# Patient Record
Sex: MALE | Race: WHITE | Employment: OTHER | URBAN - METROPOLITAN AREA
[De-identification: names, ages, dates, MRNs, and addresses within clinical notes are randomized per-mention and may not be internally consistent; named-entity substitution may affect disease eponyms.]

---

## 2017-01-20 ENCOUNTER — ANTI-COAG VISIT (OUTPATIENT)
Dept: PHARMACY | Facility: CLINIC | Age: 67
End: 2017-01-20

## 2017-01-20 DIAGNOSIS — Z95.2 S/P AVR (AORTIC VALVE REPLACEMENT): ICD-10-CM

## 2017-01-20 LAB — INR BLD: 3.2

## 2017-01-23 ENCOUNTER — TELEPHONE (OUTPATIENT)
Dept: CARDIOLOGY CLINIC | Age: 67
End: 2017-01-23

## 2017-01-23 RX ORDER — WARFARIN SODIUM 7.5 MG/1
TABLET ORAL
Qty: 90 TABLET | Refills: 1 | Status: SHIPPED | OUTPATIENT
Start: 2017-01-23 | End: 2017-06-14 | Stop reason: SDUPTHER

## 2017-01-30 RX ORDER — TESTOSTERONE 16.2 MG/G
GEL TRANSDERMAL
Qty: 5 BOTTLE | Refills: 1 | Status: SHIPPED | OUTPATIENT
Start: 2017-01-30 | End: 2017-05-23 | Stop reason: SDUPTHER

## 2017-02-04 ENCOUNTER — HOSPITAL ENCOUNTER (OUTPATIENT)
Dept: OTHER | Age: 67
Discharge: HOME | End: 2017-02-04
Attending: INTERNAL MEDICINE | Admitting: INTERNAL MEDICINE

## 2017-02-04 DIAGNOSIS — E03.9 ACQUIRED HYPOTHYROIDISM: ICD-10-CM

## 2017-02-04 DIAGNOSIS — E11.59 TYPE 2 DIABETES MELLITUS WITH OTHER CIRCULATORY COMPLICATION: ICD-10-CM

## 2017-02-04 DIAGNOSIS — E79.0 HYPERURICEMIA: ICD-10-CM

## 2017-02-04 DIAGNOSIS — Z95.2 S/P AVR (AORTIC VALVE REPLACEMENT): ICD-10-CM

## 2017-02-04 DIAGNOSIS — E55.9 VITAMIN D DEFICIENCY: ICD-10-CM

## 2017-02-04 DIAGNOSIS — R79.83 HOMOCYSTEINEMIA: ICD-10-CM

## 2017-02-04 DIAGNOSIS — E78.5 HYPERLIPIDEMIA WITH TARGET LDL LESS THAN 70: ICD-10-CM

## 2017-02-04 LAB
A/G RATIO: 1.4 (ref 1.1–2.2)
ALBUMIN SERPL-MCNC: 3.9 G/DL (ref 3.4–5)
ALP BLD-CCNC: 78 U/L (ref 40–129)
ALT SERPL-CCNC: 33 U/L (ref 10–40)
ANION GAP SERPL CALCULATED.3IONS-SCNC: 12 MMOL/L (ref 3–16)
AST SERPL-CCNC: 27 U/L (ref 15–37)
BILIRUB SERPL-MCNC: 0.3 MG/DL (ref 0–1)
BUN BLDV-MCNC: 15 MG/DL (ref 7–20)
CALCIUM SERPL-MCNC: 9.2 MG/DL (ref 8.3–10.6)
CHLORIDE BLD-SCNC: 106 MMOL/L (ref 99–110)
CHOLESTEROL, TOTAL: 122 MG/DL (ref 0–199)
CO2: 23 MMOL/L (ref 21–32)
CREAT SERPL-MCNC: 1.1 MG/DL (ref 0.8–1.3)
GFR AFRICAN AMERICAN: >60
GFR NON-AFRICAN AMERICAN: >60
GLOBULIN: 2.8 G/DL
GLUCOSE BLD-MCNC: 123 MG/DL (ref 70–99)
HDLC SERPL-MCNC: 33 MG/DL (ref 40–60)
HOMOCYSTEINE: 8 UMOL/L (ref 0–10)
LDL CHOLESTEROL CALCULATED: 72 MG/DL
POTASSIUM SERPL-SCNC: 4.4 MMOL/L (ref 3.5–5.1)
SODIUM BLD-SCNC: 141 MMOL/L (ref 136–145)
TOTAL PROTEIN: 6.7 G/DL (ref 6.4–8.2)
TRIGL SERPL-MCNC: 87 MG/DL (ref 0–150)
URIC ACID, SERUM: 3.4 MG/DL (ref 3.5–7.2)
VITAMIN D 25-HYDROXY: 45 NG/ML
VLDLC SERPL CALC-MCNC: 17 MG/DL

## 2017-02-05 LAB
ESTIMATED AVERAGE GLUCOSE: 137 MG/DL
HBA1C MFR BLD: 6.4 %

## 2017-02-10 ENCOUNTER — OFFICE VISIT (OUTPATIENT)
Dept: ENDOCRINOLOGY | Age: 67
End: 2017-02-10

## 2017-02-10 VITALS
OXYGEN SATURATION: 98 % | HEART RATE: 78 BPM | BODY MASS INDEX: 30.8 KG/M2 | WEIGHT: 220 LBS | HEIGHT: 71 IN | SYSTOLIC BLOOD PRESSURE: 118 MMHG | DIASTOLIC BLOOD PRESSURE: 78 MMHG

## 2017-02-10 DIAGNOSIS — E79.0 HYPERURICEMIA: ICD-10-CM

## 2017-02-10 DIAGNOSIS — E03.9 ACQUIRED HYPOTHYROIDISM: ICD-10-CM

## 2017-02-10 DIAGNOSIS — E11.59 TYPE 2 DIABETES MELLITUS WITH OTHER CIRCULATORY COMPLICATION: Primary | ICD-10-CM

## 2017-02-10 DIAGNOSIS — E78.5 HYPERLIPIDEMIA WITH TARGET LDL LESS THAN 70: ICD-10-CM

## 2017-02-10 DIAGNOSIS — E55.9 VITAMIN D DEFICIENCY: ICD-10-CM

## 2017-02-10 DIAGNOSIS — R79.83 HOMOCYSTEINEMIA: ICD-10-CM

## 2017-02-10 PROCEDURE — 99214 OFFICE O/P EST MOD 30 MIN: CPT | Performed by: INTERNAL MEDICINE

## 2017-02-10 ASSESSMENT — PATIENT HEALTH QUESTIONNAIRE - PHQ9
2. FEELING DOWN, DEPRESSED OR HOPELESS: 0
SUM OF ALL RESPONSES TO PHQ9 QUESTIONS 1 & 2: 0
1. LITTLE INTEREST OR PLEASURE IN DOING THINGS: 0
SUM OF ALL RESPONSES TO PHQ QUESTIONS 1-9: 0

## 2017-02-17 ENCOUNTER — ANTI-COAG VISIT (OUTPATIENT)
Dept: PHARMACY | Facility: CLINIC | Age: 67
End: 2017-02-17

## 2017-02-17 DIAGNOSIS — Z95.2 S/P AVR (AORTIC VALVE REPLACEMENT): ICD-10-CM

## 2017-02-17 LAB — INR BLD: 3.2

## 2017-02-22 ENCOUNTER — TELEPHONE (OUTPATIENT)
Dept: PHARMACY | Facility: CLINIC | Age: 67
End: 2017-02-22

## 2017-03-24 ENCOUNTER — ANTI-COAG VISIT (OUTPATIENT)
Dept: PHARMACY | Facility: CLINIC | Age: 67
End: 2017-03-24

## 2017-03-24 DIAGNOSIS — Z95.2 S/P AVR (AORTIC VALVE REPLACEMENT): ICD-10-CM

## 2017-03-24 LAB — INR BLD: 3

## 2017-04-08 ENCOUNTER — EMPLOYEE WELLNESS (OUTPATIENT)
Dept: OTHER | Age: 67
End: 2017-04-08

## 2017-04-08 LAB
CHOLESTEROL, TOTAL: 117 MG/DL (ref 0–199)
GLUCOSE BLD-MCNC: 104 MG/DL (ref 70–99)
HDLC SERPL-MCNC: 30 MG/DL (ref 40–60)
LDL CHOLESTEROL CALCULATED: 62 MG/DL
TRIGL SERPL-MCNC: 126 MG/DL (ref 0–150)

## 2017-04-28 ENCOUNTER — ANTI-COAG VISIT (OUTPATIENT)
Dept: PHARMACY | Facility: CLINIC | Age: 67
End: 2017-04-28

## 2017-04-28 LAB — INR BLD: 2.6

## 2017-05-13 ENCOUNTER — HOSPITAL ENCOUNTER (OUTPATIENT)
Dept: OTHER | Age: 67
Discharge: HOME | End: 2017-05-13
Attending: INTERNAL MEDICINE | Admitting: INTERNAL MEDICINE

## 2017-05-13 DIAGNOSIS — E11.59 TYPE 2 DIABETES MELLITUS WITH OTHER CIRCULATORY COMPLICATION: ICD-10-CM

## 2017-05-13 DIAGNOSIS — E78.5 HYPERLIPIDEMIA WITH TARGET LDL LESS THAN 70: ICD-10-CM

## 2017-05-13 DIAGNOSIS — E03.9 ACQUIRED HYPOTHYROIDISM: ICD-10-CM

## 2017-05-13 DIAGNOSIS — E55.9 VITAMIN D DEFICIENCY: ICD-10-CM

## 2017-05-13 DIAGNOSIS — R79.83 HOMOCYSTEINEMIA: ICD-10-CM

## 2017-05-13 DIAGNOSIS — E79.0 HYPERURICEMIA: ICD-10-CM

## 2017-05-13 LAB
A/G RATIO: 1.8 (ref 1.1–2.2)
ALBUMIN SERPL-MCNC: 4.5 G/DL (ref 3.4–5)
ALP BLD-CCNC: 66 U/L (ref 40–129)
ALT SERPL-CCNC: 27 U/L (ref 10–40)
ANION GAP SERPL CALCULATED.3IONS-SCNC: 13 MMOL/L (ref 3–16)
AST SERPL-CCNC: 21 U/L (ref 15–37)
BILIRUB SERPL-MCNC: 0.4 MG/DL (ref 0–1)
BUN BLDV-MCNC: 19 MG/DL (ref 7–20)
C-REACTIVE PROTEIN, HIGH SENSITIVITY: 0.56 MG/L (ref 0.16–3)
CALCIUM SERPL-MCNC: 9.7 MG/DL (ref 8.3–10.6)
CHLORIDE BLD-SCNC: 104 MMOL/L (ref 99–110)
CHOLESTEROL, TOTAL: 142 MG/DL (ref 0–199)
CO2: 22 MMOL/L (ref 21–32)
CREAT SERPL-MCNC: 1 MG/DL (ref 0.8–1.3)
GFR AFRICAN AMERICAN: >60
GFR NON-AFRICAN AMERICAN: >60
GLOBULIN: 2.5 G/DL
GLUCOSE BLD-MCNC: 146 MG/DL (ref 70–99)
HDLC SERPL-MCNC: 30 MG/DL (ref 40–60)
HOMOCYSTEINE: 8 UMOL/L (ref 0–10)
LDL CHOLESTEROL CALCULATED: 93 MG/DL
POTASSIUM SERPL-SCNC: 4.7 MMOL/L (ref 3.5–5.1)
SODIUM BLD-SCNC: 139 MMOL/L (ref 136–145)
TOTAL PROTEIN: 7 G/DL (ref 6.4–8.2)
TRIGL SERPL-MCNC: 95 MG/DL (ref 0–150)
VLDLC SERPL CALC-MCNC: 19 MG/DL

## 2017-05-15 LAB
ESTIMATED AVERAGE GLUCOSE: 151.3 MG/DL
HBA1C MFR BLD: 6.9 %

## 2017-05-19 ENCOUNTER — OFFICE VISIT (OUTPATIENT)
Dept: CARDIOLOGY CLINIC | Age: 67
End: 2017-05-19

## 2017-05-19 VITALS
HEART RATE: 73 BPM | DIASTOLIC BLOOD PRESSURE: 64 MMHG | SYSTOLIC BLOOD PRESSURE: 124 MMHG | HEIGHT: 71 IN | WEIGHT: 222 LBS | OXYGEN SATURATION: 95 % | BODY MASS INDEX: 31.08 KG/M2

## 2017-05-19 DIAGNOSIS — I25.10 CORONARY ARTERY DISEASE INVOLVING NATIVE CORONARY ARTERY OF NATIVE HEART WITHOUT ANGINA PECTORIS: ICD-10-CM

## 2017-05-19 DIAGNOSIS — E78.5 HYPERLIPIDEMIA WITH TARGET LDL LESS THAN 70: ICD-10-CM

## 2017-05-19 DIAGNOSIS — I10 ESSENTIAL HYPERTENSION: ICD-10-CM

## 2017-05-19 DIAGNOSIS — Z95.2 S/P AVR (AORTIC VALVE REPLACEMENT): Primary | ICD-10-CM

## 2017-05-19 DIAGNOSIS — E78.00 HYPERCHOLESTEROLEMIA: ICD-10-CM

## 2017-05-19 DIAGNOSIS — I35.0 NONRHEUMATIC AORTIC VALVE STENOSIS: ICD-10-CM

## 2017-05-19 PROCEDURE — 99214 OFFICE O/P EST MOD 30 MIN: CPT | Performed by: INTERNAL MEDICINE

## 2017-05-22 ENCOUNTER — TELEPHONE (OUTPATIENT)
Dept: CARDIOLOGY CLINIC | Age: 67
End: 2017-05-22

## 2017-05-22 RX ORDER — EXENATIDE 2 MG/.65ML
INJECTION, SUSPENSION, EXTENDED RELEASE SUBCUTANEOUS
Qty: 12 EACH | Refills: 0 | Status: SHIPPED | OUTPATIENT
Start: 2017-05-22 | End: 2017-05-23 | Stop reason: SDUPTHER

## 2017-05-23 ENCOUNTER — OFFICE VISIT (OUTPATIENT)
Dept: ENDOCRINOLOGY | Age: 67
End: 2017-05-23

## 2017-05-23 VITALS
HEIGHT: 71 IN | OXYGEN SATURATION: 96 % | WEIGHT: 222 LBS | HEART RATE: 82 BPM | DIASTOLIC BLOOD PRESSURE: 74 MMHG | BODY MASS INDEX: 31.08 KG/M2 | SYSTOLIC BLOOD PRESSURE: 132 MMHG

## 2017-05-23 DIAGNOSIS — E11.59 TYPE 2 DIABETES MELLITUS WITH OTHER CIRCULATORY COMPLICATION: Primary | ICD-10-CM

## 2017-05-23 DIAGNOSIS — E78.00 HYPERCHOLESTEROLEMIA: ICD-10-CM

## 2017-05-23 DIAGNOSIS — R79.83 HOMOCYSTEINEMIA: ICD-10-CM

## 2017-05-23 DIAGNOSIS — E29.1 HYPOGONADISM MALE: ICD-10-CM

## 2017-05-23 DIAGNOSIS — E03.9 ACQUIRED HYPOTHYROIDISM: ICD-10-CM

## 2017-05-23 DIAGNOSIS — E55.9 VITAMIN D DEFICIENCY: ICD-10-CM

## 2017-05-23 PROCEDURE — 99214 OFFICE O/P EST MOD 30 MIN: CPT | Performed by: INTERNAL MEDICINE

## 2017-05-23 RX ORDER — TESTOSTERONE 16.2 MG/G
GEL TRANSDERMAL
Qty: 5 BOTTLE | Refills: 1 | Status: SHIPPED | OUTPATIENT
Start: 2017-05-23 | End: 2019-05-29 | Stop reason: ALTCHOICE

## 2017-05-23 RX ORDER — ROSUVASTATIN CALCIUM 40 MG/1
40 TABLET, COATED ORAL EVERY EVENING
Qty: 90 TABLET | Refills: 3 | Status: SHIPPED | OUTPATIENT
Start: 2017-05-23

## 2017-05-23 RX ORDER — INSULIN GLARGINE 300 U/ML
40 INJECTION, SOLUTION SUBCUTANEOUS DAILY
Qty: 18 PEN | Refills: 3 | Status: SHIPPED | OUTPATIENT
Start: 2017-05-23

## 2017-05-24 ENCOUNTER — HOSPITAL ENCOUNTER (OUTPATIENT)
Dept: NON INVASIVE DIAGNOSTICS | Age: 67
Discharge: HOME | End: 2017-05-24
Attending: INTERNAL MEDICINE | Admitting: INTERNAL MEDICINE

## 2017-05-24 DIAGNOSIS — Z95.2 S/P AVR (AORTIC VALVE REPLACEMENT): ICD-10-CM

## 2017-05-24 DIAGNOSIS — Z95.2 PRESENCE OF PROSTHETIC HEART VALVE: ICD-10-CM

## 2017-05-24 DIAGNOSIS — I35.0 NONRHEUMATIC AORTIC VALVE STENOSIS: ICD-10-CM

## 2017-05-24 LAB
LV EF: 58 %
LVEF MODALITY: NORMAL

## 2017-06-02 ENCOUNTER — ANTI-COAG VISIT (OUTPATIENT)
Dept: PHARMACY | Facility: CLINIC | Age: 67
End: 2017-06-02

## 2017-06-02 DIAGNOSIS — Z95.2 S/P AVR (AORTIC VALVE REPLACEMENT): ICD-10-CM

## 2017-06-02 LAB — INR BLD: 3

## 2017-06-05 RX ORDER — NIACIN 500 MG/1
TABLET, EXTENDED RELEASE ORAL
Qty: 360 TABLET | Refills: 0 | Status: SHIPPED | OUTPATIENT
Start: 2017-06-05 | End: 2020-02-05

## 2017-06-07 ENCOUNTER — TELEPHONE (OUTPATIENT)
Dept: CARDIOLOGY CLINIC | Age: 67
End: 2017-06-07

## 2017-06-08 ENCOUNTER — TELEPHONE (OUTPATIENT)
Dept: ENDOCRINOLOGY | Age: 67
End: 2017-06-08

## 2017-06-13 ENCOUNTER — TELEPHONE (OUTPATIENT)
Dept: CARDIOLOGY CLINIC | Age: 67
End: 2017-06-13

## 2017-06-14 RX ORDER — WARFARIN SODIUM 7.5 MG/1
TABLET ORAL
Qty: 90 TABLET | Refills: 1 | Status: SHIPPED | OUTPATIENT
Start: 2017-06-14 | End: 2017-12-27 | Stop reason: DRUGHIGH

## 2017-06-26 RX ORDER — LEVOTHYROXINE SODIUM 0.15 MG/1
TABLET ORAL
Qty: 90 TABLET | Refills: 2 | Status: SHIPPED | OUTPATIENT
Start: 2017-06-26 | End: 2019-05-29 | Stop reason: ALTCHOICE

## 2017-06-30 ENCOUNTER — TELEPHONE (OUTPATIENT)
Dept: ENDOCRINOLOGY | Age: 67
End: 2017-06-30

## 2017-07-07 ENCOUNTER — ANTI-COAG VISIT (OUTPATIENT)
Dept: PHARMACY | Facility: CLINIC | Age: 67
End: 2017-07-07

## 2017-07-07 DIAGNOSIS — Z95.2 S/P AVR (AORTIC VALVE REPLACEMENT): ICD-10-CM

## 2017-07-07 LAB — INR BLD: 4.2

## 2017-08-11 ENCOUNTER — ANTI-COAG VISIT (OUTPATIENT)
Dept: PHARMACY | Facility: CLINIC | Age: 67
End: 2017-08-11

## 2017-08-11 DIAGNOSIS — Z95.2 S/P AVR (AORTIC VALVE REPLACEMENT): ICD-10-CM

## 2017-08-11 LAB — INR BLD: 4.9

## 2017-08-23 ENCOUNTER — ANTI-COAG VISIT (OUTPATIENT)
Dept: PHARMACY | Facility: CLINIC | Age: 67
End: 2017-08-23

## 2017-08-23 LAB — INR BLD: 2.3

## 2017-09-07 ENCOUNTER — TELEPHONE (OUTPATIENT)
Dept: ENDOCRINOLOGY | Age: 67
End: 2017-09-07

## 2017-09-12 ENCOUNTER — HOSPITAL ENCOUNTER (OUTPATIENT)
Dept: OTHER | Age: 67
Discharge: HOME | End: 2017-09-12
Attending: INTERNAL MEDICINE | Admitting: INTERNAL MEDICINE

## 2017-09-12 LAB
A/G RATIO: 1.4 (ref 1.1–2.2)
ALBUMIN SERPL-MCNC: 4 G/DL (ref 3.4–5)
ALP BLD-CCNC: 54 U/L (ref 40–129)
ALT SERPL-CCNC: 40 U/L (ref 10–40)
ANION GAP SERPL CALCULATED.3IONS-SCNC: 11 MMOL/L (ref 3–16)
AST SERPL-CCNC: 33 U/L (ref 15–37)
BILIRUB SERPL-MCNC: 0.4 MG/DL (ref 0–1)
BUN BLDV-MCNC: 25 MG/DL (ref 7–20)
CALCIUM SERPL-MCNC: 9.4 MG/DL (ref 8.3–10.6)
CHLORIDE BLD-SCNC: 106 MMOL/L (ref 99–110)
CHOLESTEROL, TOTAL: 103 MG/DL (ref 0–199)
CO2: 24 MMOL/L (ref 21–32)
CREAT SERPL-MCNC: 1.2 MG/DL (ref 0.8–1.3)
CREATININE URINE: 93.5 MG/DL (ref 39–259)
ESTIMATED AVERAGE GLUCOSE: 148.5 MG/DL
ESTRADIOL LEVEL: 18 PG/ML
GFR AFRICAN AMERICAN: >60
GFR NON-AFRICAN AMERICAN: >60
GLOBULIN: 2.8 G/DL
GLUCOSE BLD-MCNC: 95 MG/DL (ref 70–99)
HBA1C MFR BLD: 6.8 %
HDLC SERPL-MCNC: 29 MG/DL (ref 40–60)
LDL CHOLESTEROL CALCULATED: 45 MG/DL
MICROALBUMIN UR-MCNC: 4 MG/DL
MICROALBUMIN/CREAT UR-RTO: 42.8 MG/G (ref 0–30)
POTASSIUM SERPL-SCNC: 4.7 MMOL/L (ref 3.5–5.1)
SODIUM BLD-SCNC: 141 MMOL/L (ref 136–145)
T4 FREE: 1.4 NG/DL (ref 0.9–1.8)
TOTAL PROTEIN: 6.8 G/DL (ref 6.4–8.2)
TRIGL SERPL-MCNC: 143 MG/DL (ref 0–150)
TSH SERPL DL<=0.05 MIU/L-ACNC: 1.64 UIU/ML (ref 0.27–4.2)
VITAMIN D 25-HYDROXY: 51.4 NG/ML
VLDLC SERPL CALC-MCNC: 29 MG/DL

## 2017-09-15 LAB
SEX HORMONE BINDING GLOBULIN: 18 NMOL/L (ref 11–80)
TESTOSTERONE FREE-NONMALE: 94 PG/ML (ref 47–244)
TESTOSTERONE TOTAL: 348 NG/DL (ref 220–1000)

## 2017-09-28 ENCOUNTER — TELEPHONE (OUTPATIENT)
Dept: PHARMACY | Facility: CLINIC | Age: 67
End: 2017-09-28

## 2017-09-28 ENCOUNTER — ANTI-COAG VISIT (OUTPATIENT)
Dept: PHARMACY | Facility: CLINIC | Age: 67
End: 2017-09-28

## 2017-09-28 DIAGNOSIS — Z95.2 S/P AVR (AORTIC VALVE REPLACEMENT): ICD-10-CM

## 2017-09-28 LAB — INR BLD: 4.9

## 2017-10-05 ENCOUNTER — OFFICE VISIT (OUTPATIENT)
Dept: PULMONOLOGY | Age: 67
End: 2017-10-05

## 2017-10-05 VITALS
BODY MASS INDEX: 30.52 KG/M2 | DIASTOLIC BLOOD PRESSURE: 78 MMHG | SYSTOLIC BLOOD PRESSURE: 126 MMHG | OXYGEN SATURATION: 98 % | HEIGHT: 71 IN | TEMPERATURE: 98.9 F | RESPIRATION RATE: 16 BRPM | HEART RATE: 80 BPM | WEIGHT: 218 LBS

## 2017-10-05 DIAGNOSIS — G47.10 HYPERSOMNIA WITH SLEEP APNEA: ICD-10-CM

## 2017-10-05 DIAGNOSIS — G47.30 HYPERSOMNIA WITH SLEEP APNEA: ICD-10-CM

## 2017-10-05 DIAGNOSIS — G47.33 OSA ON CPAP: Primary | ICD-10-CM

## 2017-10-05 DIAGNOSIS — Z23 NEED FOR INFLUENZA VACCINATION: ICD-10-CM

## 2017-10-05 DIAGNOSIS — Z99.89 OSA ON CPAP: Primary | ICD-10-CM

## 2017-10-05 PROCEDURE — 90688 IIV4 VACCINE SPLT 0.5 ML IM: CPT | Performed by: INTERNAL MEDICINE

## 2017-10-05 PROCEDURE — 99213 OFFICE O/P EST LOW 20 MIN: CPT | Performed by: INTERNAL MEDICINE

## 2017-10-05 PROCEDURE — 90471 IMMUNIZATION ADMIN: CPT | Performed by: INTERNAL MEDICINE

## 2017-10-05 ASSESSMENT — SLEEP AND FATIGUE QUESTIONNAIRES
HOW LIKELY ARE YOU TO NOD OFF OR FALL ASLEEP WHILE SITTING AND TALKING TO SOMEONE: 0
HOW LIKELY ARE YOU TO NOD OFF OR FALL ASLEEP WHEN YOU ARE A PASSENGER IN A CAR FOR AN HOUR WITHOUT A BREAK: 1
HOW LIKELY ARE YOU TO NOD OFF OR FALL ASLEEP WHILE SITTING QUIETLY AFTER LUNCH WITHOUT ALCOHOL: 1
HOW LIKELY ARE YOU TO NOD OFF OR FALL ASLEEP WHILE SITTING AND READING: 2
HOW LIKELY ARE YOU TO NOD OFF OR FALL ASLEEP IN A CAR, WHILE STOPPED FOR A FEW MINUTES IN TRAFFIC: 0
HOW LIKELY ARE YOU TO NOD OFF OR FALL ASLEEP WHILE WATCHING TV: 2
HOW LIKELY ARE YOU TO NOD OFF OR FALL ASLEEP WHILE SITTING INACTIVE IN A PUBLIC PLACE: 1
ESS TOTAL SCORE: 9
HOW LIKELY ARE YOU TO NOD OFF OR FALL ASLEEP WHILE LYING DOWN TO REST IN THE AFTERNOON WHEN CIRCUMSTANCES PERMIT: 2
NECK CIRCUMFERENCE (INCHES): 17

## 2017-10-05 NOTE — MR AVS SNAPSHOT
estimate of body fat, calculated from your height and weight. The higher your BMI, the greater your risk of heart disease, high blood pressure, type 2 diabetes, stroke, gallstones, arthritis, sleep apnea, and certain cancers. BMI is not perfect. It may overestimate body fat in athletes and people who are more muscular. Even a small weight loss (between 5 and 10 percent of your current weight) by decreasing your calorie intake and becoming more physically active will help lower your risk of developing or worsening diseases associated with obesity. Learn more at: iovationco.uk             Medications and Orders      Your Current Medications Are              levothyroxine (SYNTHROID) 150 MCG tablet TAKE 1 TABLET DAILY    warfarin (COUMADIN) 7.5 MG tablet Take 1 tablets daily as directed.     niacin (NIASPAN) 500 MG extended release tablet TAKE 4 TABLETS AT BEDTIME WITH A SMALL SNACK 30 MINUTES AFTER ASPIRIN    PT/INR Testing Monitor KIT 1 each by Does not apply route once a week Coumadin Therapy for Aortic Valve replacement    PT/INR Test STRP 1 each by In Vitro route once a week Coumadin Therapy for Aortic Valve replacement    Exenatide (BYDUREON) 2 MG PEN Inject 1 Pen into the skin once a week    ANDROGEL PUMP 20.25 MG/ACT (1.62%) GEL gel Place 3 ACT onto skin daily, please, dispense 5 bottles for 90 day suppy    TOUJEO SOLOSTAR 300 UNIT/ML injection pen Inject 40 Units into the skin daily    Empagliflozin-Metformin HCl ER 12.5-1000 MG TB24 Take 2 tablets by mouth daily    rosuvastatin (CRESTOR) 40 MG tablet Take 1 tablet by mouth every evening    ZETIA 10 MG tablet TAKE 1 TABLET DAILY    allopurinol (ZYLOPRIM) 300 MG tablet TAKE 1 TABLET DAILY    EASY TOUCH PEN NEEDLES 31G X 8 MM MISC USE 3 NEEDLES PER DAY AS DIRECTED    acetaminophen (TYLENOL) 325 MG tablet Take 2 tablets by mouth every 4 hours as needed for Pain    FOLBIC 2.5-25-2 MG TABS TAKE 1 TABLET DAILY ONE TOUCH ULTRA TEST strip TEST 4 TIMES DAILY    tiZANidine (ZANAFLEX) 4 MG tablet 1 po hs    ONETOUCH DELICA LANCETS 54V MISC USE 4 TIMES DAILY    Cholecalciferol (VITAMIN D3) 5000 UNITS TABS Take 5,000 Units by mouth daily    b complex vitamins capsule Take 1 capsule by mouth daily. aspirin 81 MG EC tablet Take 81 mg by mouth nightly.     metoprolol succinate (TOPROL XL) 25 MG extended release tablet Take 3 tablets by mouth 2 times daily 3 tabs po BID    losartan (COZAAR) 25 MG tablet Take 1 tablet by mouth daily      Allergies              Ciprofloxacin Hives      We Ordered/Performed the following           INFLUENZA, QUADV, 3 YRS AND OLDER, IM, MDV, 0.5ML (Chela Pawel)          Additional Information        Basic Information     Date Of Birth Sex Race Ethnicity Preferred Language    1950 Male White Non-/Non  English      Problem List as of 10/5/2017  Date Reviewed: 5/23/2017                Nonrheumatic aortic valve stenosis    Bicuspid aortic valve    Type 2 diabetes mellitus with circulatory disorder (HCC)    Essential hypertension    S/P AVR (aortic valve replacement)    Pre-op chest exam    Coronary artery disease involving native coronary artery of native heart with angina pectoris (HCC)    Benign non-nodular prostatic hyperplasia without lower urinary tract symptoms    Coronary artery disease due to lipid rich plaque    Hyperuricemia    Hypoglycemia    Hyperlipidemia with target LDL less than 70    Colitis    Acute renal failure (ARF) ([facility] [address]    [facility] DJD(carpometacarpal degenerative joint disease), localized primary    Hypogonadism male    Homocysteinemia ([facility] [address]    Vitamin D deficiency    Hypothyroid    Kidney stones    Hypercholesterolemia      Immunizations as of 10/5/2017     Name Date    Influenza Virus Vaccine 11/1/2015, 10/4/2012, 10/4/2011    Influenza, Flavia Carter, 3 Years and older, IM 10/25/2016    Pneumococcal Polysaccharide ([id number]) 10/4/2012 Tdap (Boostrix, Adacel) 4/3/2015    Zoster 4/3/2015      Preventive Care        Date Due    Hepatitis C screening is recommended for all adults regardless of risk factors born between [facility] at least once (lifetime) who have never been tested. 1950    HIV screening is recommended for all people regardless of risk factors  aged 15-65 years at least once (lifetime) who have never been HIV tested. 12/12/1967    Eye Exam By An Eye Doctor 7/1/2015    Diabetic Foot Exam 8/5/2017    Yearly Flu Vaccine (1) 9/1/2017    Hemoglobin A1C (Test For Long-Term Glucose Control) 9/12/2018    Urine Check For Kidney Problems 9/12/2018    Cholesterol Screening 9/12/2018    Colonoscopy 9/24/2024    Tetanus Combination Vaccine (2 - Td) 4/3/2025            [employer] Signup           Our records indicate that you have an active [employer] account. You can view your After Visit Summary by going to [email or web address] and logging in with your [employer] username and password. If you don't have a [employer] username and password but a parent or guardian has access to your record, the parent or guardian should login with their own [employer] username and password and access your record to view the After Visit Summary. Additional Information  If you have questions, please contact the physician practice where you receive care. Remember, [employer] is NOT to be used for urgent needs. For medical emergencies, dial 911. For questions regarding your [employer] account call [phone]. If you have a clinical question, please call your doctor's office.

## 2017-10-12 ENCOUNTER — ANTI-COAG VISIT (OUTPATIENT)
Dept: PHARMACY | Facility: CLINIC | Age: 67
End: 2017-10-12

## 2017-10-12 LAB — INR BLD: 3.4

## 2017-10-12 NOTE — PROGRESS NOTES
Mr. Selam Arrieta is a 61 y.o.  male with history of Heart Valve Replacement who presents today for anticoagulation monitoring and adjustment. Patient verifies current dosing regimen. Patient denies s/s bleeding/bruising/swelling/SOB. No blood in urine or stool. No dietary changes. No changes in medication/OTC agents/Herbals. No change in alcohol use. No missed doses. No Procedures scheduled in the future at this time. Lab Results   Component Value Date    INR 3.4 10/12/2017    INR 4.9 09/28/2017    INR 2.3 08/23/2017     Will give only 3.75mg (1/2 tab) today instead of 7.5mg then will continue current dose. Coumadin dose: 7.5mg daily except 3.75mg QMon & Fri. Recheck INR in 4 weeks. Patient reminded to call the Anticoagulation Clinic with any signs or symptoms of bleeding or with any medication changes. Patient given instructions utilizing the teach back method. After visit summary printed and reviewed with patient.       Medications reviewed and updated on home medication list Yes  Warfarin dose updated on patient home medication list  Yes

## 2017-10-26 ENCOUNTER — CLINICAL DOCUMENTATION (OUTPATIENT)
Dept: PHARMACY | Facility: CLINIC | Age: 67
End: 2017-10-26

## 2017-11-01 ENCOUNTER — HOSPITAL ENCOUNTER (OUTPATIENT)
Dept: OTHER | Age: 67
Discharge: HOME | End: 2017-11-30
Attending: INTERNAL MEDICINE | Admitting: INTERNAL MEDICINE

## 2017-11-06 ENCOUNTER — TELEPHONE (OUTPATIENT)
Dept: PHARMACY | Facility: CLINIC | Age: 67
End: 2017-11-06

## 2017-11-06 ENCOUNTER — OFFICE VISIT (OUTPATIENT)
Dept: FAMILY MEDICINE CLINIC | Age: 67
End: 2017-11-06

## 2017-11-06 VITALS
HEIGHT: 71 IN | RESPIRATION RATE: 18 BRPM | DIASTOLIC BLOOD PRESSURE: 76 MMHG | HEART RATE: 78 BPM | SYSTOLIC BLOOD PRESSURE: 118 MMHG | BODY MASS INDEX: 30.38 KG/M2 | WEIGHT: 217 LBS

## 2017-11-06 DIAGNOSIS — R31.29 OTHER MICROSCOPIC HEMATURIA: ICD-10-CM

## 2017-11-06 DIAGNOSIS — N41.0 ACUTE PROSTATITIS: Primary | ICD-10-CM

## 2017-11-06 DIAGNOSIS — I10 ESSENTIAL HYPERTENSION: ICD-10-CM

## 2017-11-06 LAB
BILIRUBIN, POC: ABNORMAL
BLOOD URINE, POC: ABNORMAL
CLARITY, POC: ABNORMAL
COLOR, POC: YELLOW
GLUCOSE URINE, POC: 500
KETONES, POC: ABNORMAL
LEUKOCYTE EST, POC: ABNORMAL
NITRITE, POC: ABNORMAL
PH, POC: 6
PROTEIN, POC: ABNORMAL
SPECIFIC GRAVITY, POC: 1.01
UROBILINOGEN, POC: 0.2

## 2017-11-06 PROCEDURE — 99214 OFFICE O/P EST MOD 30 MIN: CPT | Performed by: FAMILY MEDICINE

## 2017-11-06 PROCEDURE — 81002 URINALYSIS NONAUTO W/O SCOPE: CPT | Performed by: FAMILY MEDICINE

## 2017-11-06 RX ORDER — SULFAMETHOXAZOLE AND TRIMETHOPRIM 800; 160 MG/1; MG/1
1 TABLET ORAL 2 TIMES DAILY
Qty: 56 TABLET | Refills: 0 | Status: SHIPPED | OUTPATIENT
Start: 2017-11-06 | End: 2017-11-06 | Stop reason: SDUPTHER

## 2017-11-06 RX ORDER — SULFAMETHOXAZOLE AND TRIMETHOPRIM 800; 160 MG/1; MG/1
1 TABLET ORAL 2 TIMES DAILY
Qty: 56 TABLET | Refills: 0 | Status: SHIPPED | OUTPATIENT
Start: 2017-11-06 | End: 2017-12-04

## 2017-11-06 NOTE — PROGRESS NOTES
sounds normal. No respiratory distress. He has no wheezes. He has no rhonchi. He has no rales. Abdominal: Soft. Bowel sounds are normal. He exhibits no distension. There is no tenderness. Genitourinary:   Genitourinary Comments: Prostate normal size w/o nodule but boggy, nontender   Musculoskeletal: He exhibits no edema. Neurological: He is alert. Skin: Skin is intact. No rash noted. No erythema. Psychiatric: He has a normal mood and affect. Assessment:      1. Acute prostatitis     2. Essential hypertension     3.  Other microscopic hematuria             Plan:      F/u endo/ cardio/ pulm - on cpap for wale  inr f/u w/ hospital - stable AV valve replacement  Kidney stones rare - push water intake  Most problems managed by endo    Bactrim ds bid for 28 days - se d/w pt  Probiotic encouraged  Recheck ua / f/u at end of course - f/u sooner prn  ua showed sm blood and 500 glucose - o/w normal  Check ucx  Push water/ avoid bladder irritants  F/u prn

## 2017-11-06 NOTE — TELEPHONE ENCOUNTER
Patient put on Bactrim BID for 28 days  Will need weekly dose reduction  Coming in for appt on Thursday  Having patient take 1/2 tablet on Wednesday this week prior to appointment. Further adjustment to be done at appointment for the 4 week duration.

## 2017-11-08 LAB — URINE CULTURE, ROUTINE: NORMAL

## 2017-11-09 ENCOUNTER — ANTI-COAG VISIT (OUTPATIENT)
Dept: PHARMACY | Facility: CLINIC | Age: 67
End: 2017-11-09

## 2017-11-09 DIAGNOSIS — Z95.2 S/P AVR (AORTIC VALVE REPLACEMENT): ICD-10-CM

## 2017-11-09 LAB — INR BLD: 2.6

## 2017-11-15 ENCOUNTER — ANTI-COAG VISIT (OUTPATIENT)
Dept: PHARMACY | Facility: CLINIC | Age: 67
End: 2017-11-15

## 2017-11-15 LAB — INR BLD: 3.6

## 2017-11-15 NOTE — PROGRESS NOTES
Mr. Rolly Baron is a 61 y.o.  male with history of Heart Valve Replacement who presents today for anticoagulation monitoring and adjustment. Patient verifies current dosing regimen. Patient denies s/s bleeding/bruising/swelling/SOB. No blood in urine or stool. No dietary changes. No changes in medication/OTC agents/Herbals. No change in alcohol use. No missed doses. No Procedures scheduled in the future at this time. Lab Results   Component Value Date    INR 3.6 11/15/2017    INR 2.6 11/09/2017    INR 3.4 10/12/2017       He started a 28 day course of Bactrim last week. He says that his last day will be 12-4-17. We reduced his warfarin dose in anticipation of an increase in his INR due to the interaction with the BActrim. His INR is 3.6 today. We will continue to alternate warfarin 3.75mg and 7.5mg every other day while on the Bactrim. He will call with any bruising or bleeding issues. He will retrun in 2 weeks for his next INR. Of note, he will be leaving for a trip to [facility] shortly after his next appointment. Coumadin dose: 3.75mg alternating with 7.5mg while on the Bactrim. Recheck INR in 2 weeks. Patient reminded to call the Anticoagulation Clinic with any signs or symptoms of bleeding or with any medication changes. Patient given instructions utilizing the teach back method. After visit summary printed and reviewed with patient.

## 2017-11-27 ENCOUNTER — PATIENT MESSAGE (OUTPATIENT)
Dept: FAMILY MEDICINE CLINIC | Age: 67
End: 2017-11-27

## 2017-11-27 RX ORDER — TAMSULOSIN HYDROCHLORIDE 0.4 MG/1
0.4 CAPSULE ORAL NIGHTLY
Qty: 30 CAPSULE | Refills: 0 | Status: SHIPPED | OUTPATIENT
Start: 2017-11-27 | End: 2018-02-07 | Stop reason: ALTCHOICE

## 2017-11-28 ENCOUNTER — OFFICE VISIT (OUTPATIENT)
Dept: CARDIOLOGY CLINIC | Age: 67
End: 2017-11-28

## 2017-11-28 VITALS
HEIGHT: 71 IN | OXYGEN SATURATION: 96 % | DIASTOLIC BLOOD PRESSURE: 62 MMHG | SYSTOLIC BLOOD PRESSURE: 116 MMHG | BODY MASS INDEX: 29.12 KG/M2 | HEART RATE: 81 BPM | WEIGHT: 208 LBS

## 2017-11-28 DIAGNOSIS — I35.0 NONRHEUMATIC AORTIC VALVE STENOSIS: Primary | ICD-10-CM

## 2017-11-28 PROCEDURE — 99214 OFFICE O/P EST MOD 30 MIN: CPT | Performed by: INTERNAL MEDICINE

## 2017-11-28 PROCEDURE — 93000 ELECTROCARDIOGRAM COMPLETE: CPT | Performed by: INTERNAL MEDICINE

## 2017-11-28 NOTE — PROGRESS NOTES
[facility]      Cardiology follow up    Mirlande Diaz  1950 November 28, 2017    CC: AVR     HPI:  Dear Doctor Dr. Hemant Parra, It was our pleasure to evaluate your patient today during their recent visit to our clinic. As you know Elodia Wiggins is a 63 y.o. male with PMH of Bicuspid Aortic valve, hypercholesterolemia, hypertension, type II diabetes and hypothyroid, vitamin D deficiency, who is being seen today as a follow up. He immediately reports that he is doing well. Walking at home for exercise on the treadmill, yard work. No cardiac complaints. Patient denies any symptoms of chest pain, pressure, tightness, TOLENTINO, shortness of breath at rest, nausea, vomiting, near syncope, syncope, heart racing, palpitations, dizziness, lightheaded, PND, orthopnea, bilateral lower extremities pain, cramping or fatigue. No abnormal bruising or bleeding. Compliant with medical therapy and tolerating.        Past Medical History:   Diagnosis Date    Aortic stenosis 9/2013    moderate    Arthritis     Asthma     BRONCHIAL ASTHMA  AS A CHILD    Deviated septum     Diabetes mellitus type II 2000    Dr. Dm Milton thyroiditis dx 1984    medical treatment    Homocysteinemia ([facility] [address]     Hyperlipidemia     Hypertension     Hypertriglyceridemia     Hypogonadism male     topical testosterone     Hypothyroid 12/9/2011    Kidney stone 1990s    Passed on own and also surgical excision     Murmur since 2005    Nasal polyps     Osteoarthritis     Right thumb     Prolonged emergence from general anesthesia     Sleep apnea 2005    CPAP nightly    Wears glasses      Past Surgical History:   Procedure Laterality Date    AORTIC VALVE REPLACEMENT  09/14/2016    21mm [facility]    COLECTOMY  1996    perforated bowel: colonoscopy    COLONOSCOPY  9/24/2014; 2004    x2 normal colonoscopies/ perforated bowel    CYST REMOVAL      hairy nevus left biceps as child    KNEE SURGERY Left 2004 torn meniscus     NASAL POLYP SURGERY  1989, 1987    OTHER SURGICAL HISTORY  1990s    kidney stone removal     ROTATOR CUFF REPAIR Right 2009     Family History   Problem Relation Age of Onset    Heart Disease Father     Kidney Disease Father      was on HD     Arthritis Father     High Blood Pressure Father     High Cholesterol Mother     Stroke Mother     Diabetes Brother     Other Brother     High Cholesterol Brother     Diabetes Other      aunts, uncles/ grandparents    Heart Disease Maternal Grandfather     Cancer Paternal Grandmother      type unknown. Social History   Substance Use Topics    Smoking status: Never Smoker    Smokeless tobacco: Never Used    Alcohol use 1.2 oz/week     1 Shots of liquor, 1 Standard drinks or equivalent per week      Comment: 1 per week - is rare        Allergies   Allergen Reactions    Ciprofloxacin Hives     Current Outpatient Prescriptions   Medication Sig Dispense Refill    tamsulosin (FLOMAX) 0.4 MG capsule Take 1 capsule by mouth nightly 30 capsule 0    sulfamethoxazole-trimethoprim (BACTRIM DS) 800-160 MG per tablet Take 1 tablet by mouth 2 times daily for 28 days 56 tablet 0    levothyroxine (SYNTHROID) 150 MCG tablet TAKE 1 TABLET DAILY 90 tablet 2    warfarin (COUMADIN) 7.5 MG tablet Take 1 tablets daily as directed.  (Patient taking differently: 7.5 mg 7.5mg every-other-day alternating with  3.75mg every-other-day or as directed by [facility] anticoagulation service) 90 tablet 1    niacin (NIASPAN) 500 MG extended release tablet TAKE 4 TABLETS AT BEDTIME WITH A SMALL SNACK 30 MINUTES AFTER ASPIRIN 360 tablet 0    PT/INR Testing Monitor KIT 1 each by Does not apply route once a week Coumadin Therapy for Aortic Valve replacement 1 kit 0    PT/INR Test STRP 1 each by In Vitro route once a week Coumadin Therapy for Aortic Valve replacement 25 strip 1    Exenatide (BYDUREON) 2 MG PEN Inject 1 Pen into the skin once a week 12 each 2    ANDROGEL PUMP 20.25 MG/ACT (1.62%) GEL gel Place 3 ACT onto skin daily, please, dispense 5 bottles for 90 day suppy 5 Bottle 1    TOUJEO SOLOSTAR 300 UNIT/ML injection pen Inject 40 Units into the skin daily 18 Pen 3    Empagliflozin-Metformin HCl ER 12.5-1000 MG TB24 Take 2 tablets by mouth daily 180 tablet 3    rosuvastatin (CRESTOR) 40 MG tablet Take 1 tablet by mouth every evening 90 tablet 3    ZETIA 10 MG tablet TAKE 1 TABLET DAILY 90 tablet 2    allopurinol (ZYLOPRIM) 300 MG tablet TAKE 1 TABLET DAILY 90 tablet 3    metoprolol succinate (TOPROL XL) 25 MG extended release tablet Take 3 tablets by mouth 2 times daily 3 tabs po  tablet 3    losartan (COZAAR) 25 MG tablet Take 1 tablet by mouth daily 90 tablet 3    EASY TOUCH PEN NEEDLES 31G X 8 MM MISC USE 3 NEEDLES PER DAY AS DIRECTED 300 each 3    acetaminophen (TYLENOL) 325 MG tablet Take 2 tablets by mouth every 4 hours as needed for Pain 120 tablet 3    FOLBIC 2.5-25-2 MG TABS TAKE 1 TABLET DAILY 90 tablet 3    ONE TOUCH ULTRA TEST strip TEST 4 TIMES DAILY 400 strip 3    tiZANidine (ZANAFLEX) 4 MG tablet 1 po hs (Patient taking differently: as needed 1 po hs) 30 tablet 0    ONETOUCH DELICA LANCETS 99D MISC USE 4 TIMES DAILY 400 each 3    Cholecalciferol (VITAMIN D3) 5000 UNITS TABS Take 5,000 Units by mouth daily      b complex vitamins capsule Take 1 capsule by mouth daily.  aspirin 81 MG EC tablet Take 81 mg by mouth nightly. No current facility-administered medications for this visit.         Review of Systems:    Constitutional: negative  Eyes: negative  Ears, nose, mouth, throat, and face: negative  Respiratory: negative  Cardiovascular: negative  Gastrointestinal: negative  Genitourinary:negative  Integument/breast: negative  Hematologic/lymphatic: negative  Musculoskeletal:negative  Neurological: negative  Behavioral/Psych: negative  Endocrine: negative  Allergic/Immunologic: negative     Physical Exam:   /62 (Site: Right Arm, Position: Sitting)   Pulse 81   Ht 5' 11\" (1.803 m)   Wt 208 lb (94.3 kg) Comment: shoes on  SpO2 96%   BMI 29.01 kg/m²   Wt Readings from Last 3 Encounters:   11/28/17 208 lb (94.3 kg)   11/06/17 217 lb (98.4 kg)   10/05/17 218 lb (98.9 kg)       Constitutional: He is oriented to person, place, and time. He appears well-developed and well-nourished. In no acute distress. Head: Normocephalic and atraumatic. Pupils equal and round. Neck: Neck supple. No JVP or carotid bruit appreciated. No mass and no thyromegaly present. No lymphadenopathy present. Cardiovascular: Normal rate. Normal heart sounds. Exam reveals no gallop and no friction rub. No murmur heard. Pulmonary/Chest: Effort normal and breath sounds normal. No respiratory distress. He has no wheezes, rhonchi or rales. Abdominal: Soft, non-tender. Bowel sounds are normal. He exhibits no organomegaly, mass or bruit. Extremities: No edema, cyanosis, or clubbing. Pulses are 2+ radial/dorsalis pedis/posterior tibial/carotid bilaterally. Neurological: Awake  alert and orientedNo gross cranial nerve deficit. Coordination normal.     Skin: Skin is warm and dry. There is no rash or diaphoresis. Psychiatric: He has a normal mood and affect. His speech is normal and behavior is normal.     Lab Review:   Lab Results   Component Value Date    TRIG 143 09/12/2017    HDL 29 09/12/2017    HDL 26 04/27/2012    LDLCALC 45 09/12/2017    LABVLDL 29 09/12/2017    TC 92 9/12/16  Lab Results   Component Value Date    HGB 9.1 09/21/2016    HCT 28.9 09/21/2016     Lab Results   Component Value Date     09/12/2017      Lab Results   Component Value Date    K 4.7 09/12/2017     Lab Results   Component Value Date    BUN 25 09/12/2017    CREATININE 1.2 09/12/2017     EKG Interpretation: 11/28/17 SR, 82 bpm     Image Review:    ECHO:6/22/16  LVH with normal systolic function. EF    60%.  Grade I Diastolic dysfunction.  [place] mitral regurgitation is present.   The aortic valve area is calculated at .81 cm2 with a maximum pressure   gradient of 66 mmHg and a mean pressure gradient of 44 mmHg. This is c/w   severe aortic stenosis.   Trivial tricuspid regurgitation.   ormal right ventricular size and function. [facility]: 1/8/16  1. Moderate to severe aortic valve stenosis. 2. Moderate left main disease with 40% stenosis of the ostium of the left  Main. Fractional flow reserve of the ostium of the left main was done. It was  0.86.  3. Elevated left-sided pressures. [facility]:8/31/16  ANGIOGRAPHIC FINDINGS:  1. The left main coronary artery comes from the left coronary cusp, giving  rise to the left anterior descending artery, left circumflex artery. Ostial  left main has 40% stenosis. 2. The intravascular ultrasound did not reveal any stenotic lesion of left  Main. The LM area was calculated to be at least 20 mm square. The Fractional Flow Reserve was 0.83. 3. The left anterior descending artery comes from the left main coronary  artery. It is patent without any significant stenosis. 4. The left circumflex comes from the left main coronary artery. It is  patent, giving rise to several obtuse marginal without any stenosis. Christel Forrest 6. The right coronary artery is nondominant and small caliber with 50 to 60%  stenosis in the midportion. 7. Aortic valve has 40 to 45 mmHg gradient. SUMMARY: Severe aortic valve stenosis. Stress Study:9/1/16   Pharmacological Stress/MPI Results:        1. Technically a satisfactory study.    2. Normal pharmacological stress portion of the study.    3. No evidence of Ischemia by Myocardial Perfusion Imaging.    4. Gated Study shows normal LV size and Systolic function; EF is 49 %. Carotid Duplex:9/12/16  Impressions   Right Impression   The right internal carotid artery appears to have a 1-15% diameter reducing   stenosis based on velocity criteria.    The right vertebral artery demonstrates normal antegrade

## 2017-11-29 ENCOUNTER — ANTI-COAG VISIT (OUTPATIENT)
Dept: PHARMACY | Facility: CLINIC | Age: 67
End: 2017-11-29

## 2017-11-29 LAB — INTERNATIONAL NORMALIZATION RATIO, POC: 3.4

## 2017-12-01 ENCOUNTER — HOSPITAL ENCOUNTER (OUTPATIENT)
Dept: OTHER | Age: 67
Discharge: HOME | End: 2017-12-31
Attending: INTERNAL MEDICINE | Admitting: INTERNAL MEDICINE

## 2017-12-14 ENCOUNTER — ANTI-COAG VISIT (OUTPATIENT)
Dept: PHARMACY | Facility: CLINIC | Age: 67
End: 2017-12-14

## 2017-12-14 DIAGNOSIS — Z95.2 S/P AVR (AORTIC VALVE REPLACEMENT): ICD-10-CM

## 2017-12-14 LAB — INTERNATIONAL NORMALIZATION RATIO, POC: 2

## 2017-12-15 ENCOUNTER — OFFICE VISIT (OUTPATIENT)
Dept: FAMILY MEDICINE CLINIC | Age: 67
End: 2017-12-15

## 2017-12-15 VITALS
SYSTOLIC BLOOD PRESSURE: 120 MMHG | BODY MASS INDEX: 29.12 KG/M2 | HEART RATE: 82 BPM | DIASTOLIC BLOOD PRESSURE: 78 MMHG | WEIGHT: 208 LBS | RESPIRATION RATE: 14 BRPM | HEIGHT: 71 IN

## 2017-12-15 DIAGNOSIS — N41.0 ACUTE PROSTATITIS: ICD-10-CM

## 2017-12-15 DIAGNOSIS — R31.9 HEMATURIA, UNSPECIFIED TYPE: ICD-10-CM

## 2017-12-15 DIAGNOSIS — R30.0 DYSURIA: Primary | ICD-10-CM

## 2017-12-15 LAB
BILIRUBIN, POC: ABNORMAL
BLOOD URINE, POC: ABNORMAL
CLARITY, POC: CLEAR
COLOR, POC: YELLOW
GLUCOSE URINE, POC: ABNORMAL
KETONES, POC: ABNORMAL
LEUKOCYTE EST, POC: ABNORMAL
NITRITE, POC: ABNORMAL
PH, POC: 6
PROTEIN, POC: ABNORMAL
SPECIFIC GRAVITY, POC: 1.02
UROBILINOGEN, POC: 0.2

## 2017-12-15 PROCEDURE — 99213 OFFICE O/P EST LOW 20 MIN: CPT | Performed by: FAMILY MEDICINE

## 2017-12-15 PROCEDURE — 81002 URINALYSIS NONAUTO W/O SCOPE: CPT | Performed by: FAMILY MEDICINE

## 2017-12-15 NOTE — PROGRESS NOTES
Subjective:      Patient ID: Kenyatta Winston is a 74 y.o. male. HPI  Chief Complaint   Patient presents with    Dysuria     FOLLOW UP TO URINARY ISSUES PAIN PRESSURE NOT BEING ABLE TO EMPTY BLADDER HE IS NOT LONGER HAVING ANY OF THESE SX      BP Readings from Last 3 Encounters:   12/15/17 120/78   11/28/17 116/62   11/06/17 118/76     Pulse Readings from Last 3 Encounters:   12/15/17 82   11/28/17 81   11/06/17 78     Wt Readings from Last 3 Encounters:   12/15/17 208 lb (94.3 kg)   11/28/17 208 lb (94.3 kg)   11/06/17 217 lb (98.4 kg)       Per cardiology 2 weeks ago:  Severe Aortic Stenosis Bicuspid Aortic valve with no evidence of aortic aneurysm  S/p S/p AVR with 21mm [facility] per Dr. Deandra Haq. Champ Umana 9/14/16  Doing well  No murmur  PT/INR [facility] I  Echo 5/24/17 wnl and will repeat next year   Walking for exercise, yard work      CAD: Moderate left main stenosis. No angina. Continue ASA, Statin, Toprol XL 25 mg daily. Walk daily for aerobic exercise. Strict lipid management. repeat stress study next year.      HTN: Stable.      Hyperlipidemia: 9/12/17 lipid profile with an LDL-c 45. On Zetia, niacin and crestor. Continue high potency statin therapy. He does follow a specialist-Dr. Mireya Tomas      DM: Managed per Dr. Mireya Tomas      Hypothyroid: Managed per PCP. Seen here 11/06 and tx'd w/ bactrim for 4 weeks. ua today concentrated o/w normal w/o blood or signs of infection. No significant changes. More labs per dr. Essie Chaparro tomorrow. bs readings fairly stable - high today 125 -usually around 100. Eating differently to lose weight -on adipex. Just got back from cruise. Limited pop  Review of Systems    Objective:   Physical Exam   Constitutional: He is oriented to person, place, and time. He appears well-developed and well-nourished. No distress. Eyes: No scleral icterus. Pulmonary/Chest: Effort normal.   Musculoskeletal: He exhibits no edema.    Neurological: He is alert and oriented to person, place, and time. Skin: Skin is warm and dry. Psychiatric: He has a normal mood and affect. Assessment:      1. Dysuria     2. Hematuria, unspecified type     3.  Acute prostatitis             Plan:      F/u yearly/ prn  Urinary sx cleared - ua reviewed w/ pt  As well as plan per endo and cardio  No other concerns presently   Stay hydrated encouraged - drinks mostly water

## 2017-12-16 ENCOUNTER — HOSPITAL ENCOUNTER (OUTPATIENT)
Dept: OTHER | Age: 67
Discharge: HOME | End: 2017-12-16
Attending: INTERNAL MEDICINE | Admitting: INTERNAL MEDICINE

## 2017-12-16 LAB
CHOLESTEROL, TOTAL: 106 MG/DL (ref 0–199)
HDLC SERPL-MCNC: 31 MG/DL (ref 40–60)
HOMOCYSTEINE: 12 UMOL/L (ref 0–10)
LDL CHOLESTEROL CALCULATED: 46 MG/DL
TRIGL SERPL-MCNC: 144 MG/DL (ref 0–150)
VLDLC SERPL CALC-MCNC: 29 MG/DL

## 2017-12-17 LAB
ESTIMATED AVERAGE GLUCOSE: 131.2 MG/DL
HBA1C MFR BLD: 6.2 %

## 2017-12-27 ENCOUNTER — ANTI-COAG VISIT (OUTPATIENT)
Dept: PHARMACY | Facility: CLINIC | Age: 67
End: 2017-12-27

## 2017-12-27 DIAGNOSIS — Z95.2 S/P AVR (AORTIC VALVE REPLACEMENT): ICD-10-CM

## 2017-12-27 LAB — INTERNATIONAL NORMALIZATION RATIO, POC: 2.9

## 2017-12-27 RX ORDER — WARFARIN SODIUM 7.5 MG/1
7.5 TABLET ORAL DAILY
COMMUNITY
End: 2018-02-19 | Stop reason: SDUPTHER

## 2017-12-27 NOTE — PROGRESS NOTES
Mr. Cam Fernandez is a 74 y.o.  male with history of Heart Valve Replacement who presents today for anticoagulation monitoring and adjustment. Patient verifies current dosing regimen  Patient denies s/s bleeding/bruising/swelling/SOB  No blood in urine or stool. No dietary changes. No changes in medication/OTC agents/Herbals. No change in alcohol use. No missed doses. No Procedures scheduled in the future at this time. Lab Results   Component Value Date    INR 2.9 12/27/2017    INR 2 12/14/2017    INR 3.4 11/29/2017     After visit summary printed and reviewed with patient.       Medications reviewed and updated on home medication list Yes  Warfarin dose updated on patient home medication list:Yes     Influenza vaccine:     [] given    [] declined   [x] received previously   [] plans to receive at a later time   [] refused    [x] documented in EPIC

## 2018-01-01 ENCOUNTER — HOSPITAL ENCOUNTER (OUTPATIENT)
Dept: OTHER | Age: 68
Discharge: HOME | End: 2018-01-31
Attending: INTERNAL MEDICINE | Admitting: INTERNAL MEDICINE

## 2018-01-24 ENCOUNTER — ANTI-COAG VISIT (OUTPATIENT)
Dept: PHARMACY | Facility: CLINIC | Age: 68
End: 2018-01-24

## 2018-01-24 DIAGNOSIS — Z95.2 S/P AVR (AORTIC VALVE REPLACEMENT): ICD-10-CM

## 2018-01-24 LAB — INR BLD: 3.4

## 2018-01-24 NOTE — PROGRESS NOTES
Mr. Marlon Gooden is a 74 y.o.  male with history of Heart Valve Replacement who presents today for anticoagulation monitoring and adjustment. Patient verifies current dosing regimen  Patient denies s/s bleeding/bruising/swelling/SOB  No blood in urine or stool. No dietary changes. No changes in medication/OTC agents/Herbals. No change in alcohol use. No missed doses. No Procedures scheduled in the future at this time. Lab Results   Component Value Date    INR 3.4 01/24/2018    INR 2.9 12/27/2017    INR 2 12/14/2017       Mr. Kevin states he is doing very well today and has not had any medication changes or recent antibiotics or steroids. Today's INR is within goal range, so will have him continue his current warfarin regimen and return in 4 weeks. After visit summary printed and reviewed with patient.       Medications reviewed and updated on home medication list: No: stated no changes  Warfarin dose updated on patient home medication list: No: no changes

## 2018-02-01 ENCOUNTER — HOSPITAL ENCOUNTER (OUTPATIENT)
Dept: OTHER | Age: 68
Discharge: HOME | End: 2018-02-28
Attending: INTERNAL MEDICINE | Admitting: INTERNAL MEDICINE

## 2018-02-07 ENCOUNTER — SCHEDULED TELEPHONE ENCOUNTER (OUTPATIENT)
Dept: PHARMACY | Facility: CLINIC | Age: 68
End: 2018-02-07

## 2018-02-07 RX ORDER — LANCETS 33 GAUGE
EACH MISCELLANEOUS
Qty: 400 EACH | Refills: 3 | Status: SHIPPED | OUTPATIENT
Start: 2018-02-07 | End: 2020-01-30 | Stop reason: ALTCHOICE

## 2018-02-07 RX ORDER — ASPIRIN 81 MG/1
81 TABLET ORAL NIGHTLY
Qty: 100 TABLET | Refills: 3 | OUTPATIENT
Start: 2018-02-07

## 2018-02-07 RX ORDER — BLOOD-GLUCOSE METER
EACH MISCELLANEOUS
Qty: 1 KIT | Refills: 0 | Status: SHIPPED | OUTPATIENT
Start: 2018-02-07 | End: 2020-01-30 | Stop reason: ALTCHOICE

## 2018-02-07 NOTE — TELEPHONE ENCOUNTER
Thong Booth MD,  Your patient is currently enrolled in [facility].   Please assist, orders pended for your signature/modification if you agree:  · ASA and Agamatrix blood glucose meter/strips/lancets (eligible for copay waiver to $0 for patient through mail order pharmacy)    Thank you,  Chelsea Peña, PharmD, [address] [facility]  Direct: [phone]  Department, toll free: [phone], option 7

## 2018-02-07 NOTE — TELEPHONE ENCOUNTER
[address] [facility] Diabetes Program  =================================================================  Kraig Matos is a 74 y.o. male enrolled in the [address] [place] [address] Diabetes Program.    Medications:  As per current medication list (updated)  - One touch - running out of strips, tests @TID-QID     Allergies: Allergies   Allergen Reactions    Ciprofloxacin Hives      Vitals/Labs:  BP Readings from Last 3 Encounters:   12/15/17 120/78   11/28/17 116/62   11/06/17 118/76     Lab Results   Component Value Date    LABMICR 4.00 (H) 09/12/2017     Lab Results   Component Value Date    LABA1C 6.2 12/16/2017    LABA1C 6.8 09/12/2017    LABA1C 6.9 05/13/2017     Lab Results   Component Value Date    CHOL 106 12/16/2017     Lab Results   Component Value Date    TRIG 144 12/16/2017     Lab Results   Component Value Date    HDL 31 (L) 12/16/2017     Lab Results   Component Value Date    LDLCALC 46 12/16/2017    LDLCHOLESTEROL 39 04/16/2016     ALT   Date Value Ref Range Status   09/12/2017 40 10 - 40 U/L Final     The ASCVD Risk score (Tayo Issa, et al., 2013) failed to calculate for the following reasons: The valid total cholesterol range is 130 to 320 mg/dL     CrCl cannot be calculated (Unknown ideal weight.).     Immunizations:  Immunization History   Administered Date(s) Administered    Influenza Virus Vaccine 10/04/2011, 10/04/2012, 11/01/2015    Influenza, Quadv, 3 Years and older, IM 10/25/2016, 10/05/2017    Pneumococcal Polysaccharide ([id number]) 10/04/2012    Tdap (Boostrix, Adacel) 04/03/2015    Zoster 04/03/2015      Smoking Status:  History   Smoking Status    Never Smoker   Smokeless Tobacco    Never Used      ASSESSMENT:  Initial Program Requirements (to be completed by 7/1/2018):  [] OV with provider for DM (1st)  [] A1c (1st)  [x] On statin   [x] On ACEi/ARB     Ongoing Program Requirements (to be completed by 12/15/2018):  [] OV with provider for DM (2nd)  [] ACC/diabetes educator visit (if A1c over 8%) - discussed with patient  [] A1c (2nd)  [] Lipid panel  [] Urine protein  [] Pneumococcal vaccination: up to date - turned 74, due for Prevnar (PCV13)  [] Influenza vaccination for 4197-3767  [] Medication adherence over 70%    Formulary Medication Review:  Non-formulary or medications with cost-effective alternatives: n/a. Current medications eligible for copay waiver, up to $600, through [facility]) (mail order) Pharmacy:  - aspirin, Trulicity, Synjardy, losartan, rosuvastatin, Toujeo  - Generic ([facility] pharmacy-stocked) insulin syringes and pen needles  - Agamatrix meter and supplies     Other Considerations:  - Glycemic Goal: <7.0% and directed by provider.  Is at blood glucose goal.   - Blood Pressure Goal: BP less than 140/90 mmHg due to history of DM: Is at blood pressure goal   - Lipids: on high intensity statin  - Smoking status: never smoked    PLAN:   - Consideration(s) for provider:   · Rx request: Agamatrix, ASA - see refill encounter to PCP  - DM program gaps identified:   · Initial requirements: OV with provider for DM (1st) and A1c (1st)   · Ongoing requirements: OV with provider for DM (2nd), A1c (2nd), Lipid panel, Pneumococcal vaccination: up to date, Influenza vaccination for 2775-2503, Medication adherence over 70% and urine protein   · Prevnar/PCV13 - Pneumococcal vaccine at PCP appt or available for $0 at local outpatient pharmacies (as long as not a [facility] or [facility] pharmacy) - message to medication/anticoag clinic to give if able/available  - Follow up: PCP for identified gaps or as scheduled below  - Upcoming appointments:   Future Appointments  Date Time Provider Chaz Eaton   2/21/2018 8:30 AM [address] [facility] None   4/10/2018 8:00 AM Ira Alonso MD [facility]     Mitali Peña, PharmD, [address] [facility]  Direct: [phone]  Department, toll free: [phone], option 7

## 2018-02-12 ENCOUNTER — PATIENT MESSAGE (OUTPATIENT)
Dept: FAMILY MEDICINE CLINIC | Age: 68
End: 2018-02-12

## 2018-02-13 RX ORDER — ALLOPURINOL 300 MG/1
TABLET ORAL
Qty: 90 TABLET | Refills: 3 | Status: SHIPPED | OUTPATIENT
Start: 2018-02-13 | End: 2019-01-16 | Stop reason: SDUPTHER

## 2018-02-19 ENCOUNTER — ANTI-COAG VISIT (OUTPATIENT)
Dept: PHARMACY | Facility: CLINIC | Age: 68
End: 2018-02-19

## 2018-02-19 DIAGNOSIS — Z95.2 S/P AVR (AORTIC VALVE REPLACEMENT): ICD-10-CM

## 2018-02-19 LAB — INTERNATIONAL NORMALIZATION RATIO, POC: 2.8

## 2018-02-19 RX ORDER — WARFARIN SODIUM 7.5 MG/1
7.5 TABLET ORAL SEE ADMIN INSTRUCTIONS
Qty: 90 TABLET | Refills: 3 | OUTPATIENT
Start: 2018-02-19 | End: 2018-06-24 | Stop reason: SDUPTHER

## 2018-02-19 NOTE — PROGRESS NOTES
Mr. Gema Echeverria is a 74 y.o.  male with history of Heart Valve Replacement who presents today for anticoagulation monitoring and adjustment. Patient verifies current dosing regimen  Patient denies s/s bleeding/bruising/swelling/SOB  No blood in urine or stool. No dietary changes. No changes in medication/OTC agents/Herbals. No change in alcohol use. No missed doses. No Procedures scheduled in the future at this time. Lab Results   Component Value Date    INR 2.8 02/19/2018    INR 3.4 01/24/2018    INR 2.9 12/27/2017       Warfarin 7.5mg daily except 3.75mg(1/2 tablet) on Monday and Friday    See in 4 weeks    After visit summary printed and reviewed with patient.       Medications reviewed and updated on home medication list: Yes  Warfarin dose updated on patient home medication list: Yes     Influenza vaccine:     [] given    [] declined   [] received previously   [] plans to receive at a later time   [] refused    [] documented in EPIC

## 2018-03-01 ENCOUNTER — HOSPITAL ENCOUNTER (OUTPATIENT)
Dept: OTHER | Age: 68
Discharge: HOME | End: 2018-03-31
Attending: INTERNAL MEDICINE | Admitting: INTERNAL MEDICINE

## 2018-03-12 ENCOUNTER — EMPLOYEE WELLNESS (OUTPATIENT)
Dept: OTHER | Age: 68
End: 2018-03-12

## 2018-03-12 LAB
CHOLESTEROL, TOTAL: 91 MG/DL (ref 0–199)
GLUCOSE BLD-MCNC: 102 MG/DL (ref 70–99)
HDLC SERPL-MCNC: 27 MG/DL (ref 40–60)
LDL CHOLESTEROL CALCULATED: 34 MG/DL
TRIGL SERPL-MCNC: 148 MG/DL (ref 0–150)

## 2018-03-20 ENCOUNTER — ANTI-COAG VISIT (OUTPATIENT)
Dept: PHARMACY | Facility: CLINIC | Age: 68
End: 2018-03-20

## 2018-03-20 VITALS — WEIGHT: 219 LBS | BODY MASS INDEX: 30.54 KG/M2

## 2018-03-20 DIAGNOSIS — Z95.2 S/P AVR (AORTIC VALVE REPLACEMENT): ICD-10-CM

## 2018-03-20 LAB — INTERNATIONAL NORMALIZATION RATIO, POC: 2.8

## 2018-04-01 ENCOUNTER — HOSPITAL ENCOUNTER (OUTPATIENT)
Dept: OTHER | Age: 68
Discharge: HOME | End: 2018-04-30
Attending: INTERNAL MEDICINE | Admitting: INTERNAL MEDICINE

## 2018-04-02 VITALS — BODY MASS INDEX: 27.75 KG/M2 | WEIGHT: 199 LBS

## 2018-04-05 ENCOUNTER — CLINICAL DOCUMENTATION (OUTPATIENT)
Dept: PHARMACY | Facility: CLINIC | Age: 68
End: 2018-04-05

## 2018-04-18 ENCOUNTER — ANTI-COAG VISIT (OUTPATIENT)
Dept: PHARMACY | Facility: CLINIC | Age: 68
End: 2018-04-18

## 2018-04-18 ENCOUNTER — TELEPHONE (OUTPATIENT)
Dept: PHARMACY | Facility: CLINIC | Age: 68
End: 2018-04-18

## 2018-04-18 LAB — INTERNATIONAL NORMALIZATION RATIO, POC: 2.8

## 2018-04-30 ENCOUNTER — OFFICE VISIT (OUTPATIENT)
Dept: PULMONOLOGY | Age: 68
End: 2018-04-30

## 2018-04-30 ENCOUNTER — OFFICE VISIT (OUTPATIENT)
Dept: CARDIOLOGY CLINIC | Age: 68
End: 2018-04-30

## 2018-04-30 VITALS
OXYGEN SATURATION: 97 % | BODY MASS INDEX: 27.72 KG/M2 | HEART RATE: 74 BPM | WEIGHT: 198 LBS | SYSTOLIC BLOOD PRESSURE: 116 MMHG | HEIGHT: 71 IN | DIASTOLIC BLOOD PRESSURE: 80 MMHG

## 2018-04-30 VITALS
DIASTOLIC BLOOD PRESSURE: 62 MMHG | RESPIRATION RATE: 16 BRPM | SYSTOLIC BLOOD PRESSURE: 100 MMHG | HEIGHT: 71 IN | TEMPERATURE: 98.4 F | BODY MASS INDEX: 27.72 KG/M2 | OXYGEN SATURATION: 98 % | HEART RATE: 74 BPM | WEIGHT: 198 LBS

## 2018-04-30 DIAGNOSIS — G47.30 HYPERSOMNIA WITH SLEEP APNEA: ICD-10-CM

## 2018-04-30 DIAGNOSIS — Z99.89 OSA ON CPAP: Primary | ICD-10-CM

## 2018-04-30 DIAGNOSIS — I25.10 CORONARY ARTERY DISEASE INVOLVING NATIVE CORONARY ARTERY OF NATIVE HEART WITHOUT ANGINA PECTORIS: Primary | ICD-10-CM

## 2018-04-30 DIAGNOSIS — I10 ESSENTIAL HYPERTENSION: ICD-10-CM

## 2018-04-30 DIAGNOSIS — E11.9 CONTROLLED TYPE 2 DIABETES MELLITUS WITHOUT COMPLICATION, WITHOUT LONG-TERM CURRENT USE OF INSULIN (HCC): ICD-10-CM

## 2018-04-30 DIAGNOSIS — E03.9 HYPOTHYROIDISM, UNSPECIFIED TYPE: ICD-10-CM

## 2018-04-30 DIAGNOSIS — G47.10 HYPERSOMNIA WITH SLEEP APNEA: ICD-10-CM

## 2018-04-30 DIAGNOSIS — E78.2 MIXED HYPERLIPIDEMIA: ICD-10-CM

## 2018-04-30 DIAGNOSIS — G47.33 OSA ON CPAP: Primary | ICD-10-CM

## 2018-04-30 DIAGNOSIS — Z95.2 S/P AVR (AORTIC VALVE REPLACEMENT): ICD-10-CM

## 2018-04-30 PROCEDURE — 99214 OFFICE O/P EST MOD 30 MIN: CPT | Performed by: INTERNAL MEDICINE

## 2018-04-30 PROCEDURE — 99213 OFFICE O/P EST LOW 20 MIN: CPT | Performed by: INTERNAL MEDICINE

## 2018-04-30 RX ORDER — ASPIRIN 81 MG/1
81 TABLET ORAL NIGHTLY
Qty: 90 TABLET | Refills: 3 | Status: SHIPPED | OUTPATIENT
Start: 2018-04-30

## 2018-04-30 ASSESSMENT — SLEEP AND FATIGUE QUESTIONNAIRES
HOW LIKELY ARE YOU TO NOD OFF OR FALL ASLEEP WHEN YOU ARE A PASSENGER IN A CAR FOR AN HOUR WITHOUT A BREAK: 0
HOW LIKELY ARE YOU TO NOD OFF OR FALL ASLEEP WHILE WATCHING TV: 1
NECK CIRCUMFERENCE (INCHES): 17
HOW LIKELY ARE YOU TO NOD OFF OR FALL ASLEEP WHILE SITTING INACTIVE IN A PUBLIC PLACE: 0
ESS TOTAL SCORE: 3
HOW LIKELY ARE YOU TO NOD OFF OR FALL ASLEEP IN A CAR, WHILE STOPPED FOR A FEW MINUTES IN TRAFFIC: 0
HOW LIKELY ARE YOU TO NOD OFF OR FALL ASLEEP WHILE SITTING AND READING: 1
HOW LIKELY ARE YOU TO NOD OFF OR FALL ASLEEP WHILE SITTING QUIETLY AFTER LUNCH WITHOUT ALCOHOL: 0
HOW LIKELY ARE YOU TO NOD OFF OR FALL ASLEEP WHILE LYING DOWN TO REST IN THE AFTERNOON WHEN CIRCUMSTANCES PERMIT: 1
HOW LIKELY ARE YOU TO NOD OFF OR FALL ASLEEP WHILE SITTING AND TALKING TO SOMEONE: 0

## 2018-05-01 ENCOUNTER — HOSPITAL ENCOUNTER (OUTPATIENT)
Dept: OTHER | Age: 68
Discharge: HOME | End: 2018-05-31
Attending: INTERNAL MEDICINE | Admitting: INTERNAL MEDICINE

## 2018-05-16 ENCOUNTER — ANTI-COAG VISIT (OUTPATIENT)
Dept: PHARMACY | Facility: CLINIC | Age: 68
End: 2018-05-16

## 2018-05-16 DIAGNOSIS — Z95.2 S/P AVR (AORTIC VALVE REPLACEMENT): ICD-10-CM

## 2018-05-16 LAB — INR BLD: 2.8

## 2018-06-01 ENCOUNTER — HOSPITAL ENCOUNTER (OUTPATIENT)
Dept: OTHER | Age: 68
Discharge: HOME | End: 2018-06-30
Attending: INTERNAL MEDICINE | Admitting: INTERNAL MEDICINE

## 2018-06-13 ENCOUNTER — ANTI-COAG VISIT (OUTPATIENT)
Dept: PHARMACY | Facility: CLINIC | Age: 68
End: 2018-06-13

## 2018-06-13 LAB — INTERNATIONAL NORMALIZATION RATIO, POC: 2.6

## 2018-06-30 RX ORDER — WARFARIN SODIUM 7.5 MG/1
TABLET ORAL
Qty: 90 TABLET | Refills: 2 | Status: SHIPPED | OUTPATIENT
Start: 2018-06-30 | End: 2019-01-07 | Stop reason: SDUPTHER

## 2018-07-01 ENCOUNTER — HOSPITAL ENCOUNTER (OUTPATIENT)
Dept: OTHER | Age: 68
Discharge: HOME | End: 2018-07-31
Attending: INTERNAL MEDICINE | Admitting: INTERNAL MEDICINE

## 2018-07-11 ENCOUNTER — ANTI-COAG VISIT (OUTPATIENT)
Dept: PHARMACY | Facility: CLINIC | Age: 68
End: 2018-07-11

## 2018-07-11 DIAGNOSIS — Z95.2 S/P AVR (AORTIC VALVE REPLACEMENT): ICD-10-CM

## 2018-07-11 LAB — INR BLD: 2.7

## 2018-07-18 ENCOUNTER — HOSPITAL ENCOUNTER (OUTPATIENT)
Dept: OTHER | Age: 68
Discharge: HOME | End: 2018-07-18
Attending: INTERNAL MEDICINE | Admitting: INTERNAL MEDICINE

## 2018-07-18 LAB
CREATININE URINE: 73.1 MG/DL (ref 39–259)
MICROALBUMIN UR-MCNC: 4.2 MG/DL
MICROALBUMIN/CREAT UR-RTO: 57.5 MG/G (ref 0–30)

## 2018-08-01 ENCOUNTER — HOSPITAL ENCOUNTER (OUTPATIENT)
Dept: OTHER | Age: 68
Discharge: HOME | End: 2018-08-31
Attending: INTERNAL MEDICINE | Admitting: INTERNAL MEDICINE

## 2018-08-08 ENCOUNTER — ANTI-COAG VISIT (OUTPATIENT)
Dept: PHARMACY | Facility: CLINIC | Age: 68
End: 2018-08-08

## 2018-08-08 DIAGNOSIS — Z95.2 S/P AVR (AORTIC VALVE REPLACEMENT): ICD-10-CM

## 2018-08-08 LAB — INR BLD: 2.1

## 2018-09-01 ENCOUNTER — HOSPITAL ENCOUNTER (OUTPATIENT)
Dept: OTHER | Age: 68
Discharge: HOME | End: 2018-09-01
Attending: INTERNAL MEDICINE | Admitting: INTERNAL MEDICINE

## 2018-09-05 ENCOUNTER — ANTI-COAG VISIT (OUTPATIENT)
Dept: PHARMACY | Facility: CLINIC | Age: 68
End: 2018-09-05

## 2018-09-05 LAB — INTERNATIONAL NORMALIZATION RATIO, POC: 2.7

## 2018-10-03 ENCOUNTER — ANTI-COAG VISIT (OUTPATIENT)
Dept: PHARMACY | Age: 68
End: 2018-10-03
Payer: COMMERCIAL

## 2018-10-03 LAB — INTERNATIONAL NORMALIZATION RATIO, POC: 2.6

## 2018-10-03 PROCEDURE — 85610 PROTHROMBIN TIME: CPT

## 2018-10-03 PROCEDURE — 99211 OFF/OP EST MAY X REQ PHY/QHP: CPT

## 2018-10-11 ENCOUNTER — OFFICE VISIT (OUTPATIENT)
Dept: FAMILY MEDICINE CLINIC | Age: 68
End: 2018-10-11
Payer: COMMERCIAL

## 2018-10-11 ENCOUNTER — TELEPHONE (OUTPATIENT)
Dept: PHARMACY | Age: 68
End: 2018-10-11

## 2018-10-11 VITALS
HEART RATE: 60 BPM | HEIGHT: 69 IN | BODY MASS INDEX: 31.55 KG/M2 | WEIGHT: 213 LBS | RESPIRATION RATE: 14 BRPM | DIASTOLIC BLOOD PRESSURE: 74 MMHG | SYSTOLIC BLOOD PRESSURE: 120 MMHG

## 2018-10-11 DIAGNOSIS — Z11.4 ENCOUNTER FOR SCREENING FOR HIV: ICD-10-CM

## 2018-10-11 DIAGNOSIS — Z11.59 ENCOUNTER FOR HEPATITIS C SCREENING TEST FOR LOW RISK PATIENT: ICD-10-CM

## 2018-10-11 DIAGNOSIS — I10 ESSENTIAL HYPERTENSION: ICD-10-CM

## 2018-10-11 DIAGNOSIS — E78.5 HYPERLIPIDEMIA WITH TARGET LDL LESS THAN 70: ICD-10-CM

## 2018-10-11 DIAGNOSIS — Z23 NEED FOR INFLUENZA VACCINATION: ICD-10-CM

## 2018-10-11 DIAGNOSIS — E03.9 ACQUIRED HYPOTHYROIDISM: ICD-10-CM

## 2018-10-11 DIAGNOSIS — N20.0 KIDNEY STONES: ICD-10-CM

## 2018-10-11 DIAGNOSIS — I25.10 CORONARY ARTERY DISEASE DUE TO LIPID RICH PLAQUE: ICD-10-CM

## 2018-10-11 DIAGNOSIS — E29.1 HYPOGONADISM MALE: ICD-10-CM

## 2018-10-11 DIAGNOSIS — I25.83 CORONARY ARTERY DISEASE DUE TO LIPID RICH PLAQUE: ICD-10-CM

## 2018-10-11 DIAGNOSIS — E79.0 HYPERURICEMIA: ICD-10-CM

## 2018-10-11 DIAGNOSIS — Z00.00 WELL ADULT EXAM: Primary | ICD-10-CM

## 2018-10-11 DIAGNOSIS — R35.0 URINE FREQUENCY: ICD-10-CM

## 2018-10-11 DIAGNOSIS — Z23 NEED FOR PNEUMOCOCCAL VACCINATION: ICD-10-CM

## 2018-10-11 LAB
BILIRUBIN, POC: ABNORMAL
BLOOD URINE, POC: ABNORMAL
CLARITY, POC: CLEAR
COLOR, POC: YELLOW
GLUCOSE URINE, POC: 500
HEPATITIS C ANTIBODY INTERPRETATION: NORMAL
KETONES, POC: ABNORMAL
LEUKOCYTE EST, POC: ABNORMAL
NITRITE, POC: ABNORMAL
PH, POC: 6
PROTEIN, POC: 30
SPECIFIC GRAVITY, POC: 1.02
UROBILINOGEN, POC: 0.2

## 2018-10-11 PROCEDURE — 90471 IMMUNIZATION ADMIN: CPT | Performed by: FAMILY MEDICINE

## 2018-10-11 PROCEDURE — 90472 IMMUNIZATION ADMIN EACH ADD: CPT | Performed by: FAMILY MEDICINE

## 2018-10-11 PROCEDURE — 90670 PCV13 VACCINE IM: CPT | Performed by: FAMILY MEDICINE

## 2018-10-11 PROCEDURE — 90662 IIV NO PRSV INCREASED AG IM: CPT | Performed by: FAMILY MEDICINE

## 2018-10-11 PROCEDURE — 81002 URINALYSIS NONAUTO W/O SCOPE: CPT | Performed by: FAMILY MEDICINE

## 2018-10-11 PROCEDURE — 99397 PER PM REEVAL EST PAT 65+ YR: CPT | Performed by: FAMILY MEDICINE

## 2018-10-11 ASSESSMENT — PATIENT HEALTH QUESTIONNAIRE - PHQ9
2. FEELING DOWN, DEPRESSED OR HOPELESS: 0
SUM OF ALL RESPONSES TO PHQ QUESTIONS 1-9: 0
SUM OF ALL RESPONSES TO PHQ9 QUESTIONS 1 & 2: 0
1. LITTLE INTEREST OR PLEASURE IN DOING THINGS: 0
SUM OF ALL RESPONSES TO PHQ QUESTIONS 1-9: 0

## 2018-10-11 NOTE — PROGRESS NOTES
nightly Yes Michaela Vega MD   allopurinol (ZYLOPRIM) 300 MG tablet TAKE 1 TABLET DAILY Yes Shahbaz Guidry MD   Dulaglutide (TRULICITY) 1.5 GI/0.5UR SOPN Inject 1.5 mg into the skin every 7 days Yes Historical Provider, MD   glucose blood VI test strips (AGAMATRIX PRESTO TEST) strip 1 each by In Vitro route 4 times daily As needed.  Yes Matthieu SadeMARITA hernadez CNP   AGAMATRIX ULTRA-THIN LANCETS MISC Use as directed to test up to 4 times daily Yes MARITA Rodriguez CNP   Blood Glucose Monitoring Suppl (AGAMATRIX PRESTO) w/Device KIT Use as directed to test up to 4 times daily Yes MARITA Rodriguez CNP   levothyroxine (SYNTHROID) 150 MCG tablet TAKE 1 TABLET DAILY Yes MARITA Samson CNP   niacin (NIASPAN) 500 MG extended release tablet TAKE 4 TABLETS AT BEDTIME WITH A SMALL SNACK 30 MINUTES AFTER ASPIRIN Yes MARITA Martino CNP   ANDROGEL PUMP 20.25 MG/ACT (1.62%) GEL gel Place 3 ACT onto skin daily, please, dispense 5 bottles for 90 day suppy Yes Molly Abarca MD   TOUJEO SOLOSTAR 300 UNIT/ML injection pen Inject 40 Units into the skin daily Yes Molly Abarca MD   Empagliflozin-Metformin HCl ER 12.5-1000 MG TB24 Take 2 tablets by mouth daily Yes Molly Abarca MD   rosuvastatin (CRESTOR) 40 MG tablet Take 1 tablet by mouth every evening Yes Molly Abarca MD   ZETIA 10 MG tablet TAKE 1 TABLET DAILY Yes MARITA Martino CNP   metoprolol succinate (TOPROL XL) 25 MG extended release tablet Take 3 tablets by mouth 2 times daily 3 tabs po BID Yes Molly Abarca MD   losartan (COZAAR) 25 MG tablet Take 1 tablet by mouth daily Yes Molly Abarca MD   EASY TOUCH PEN NEEDLES 31G X 8 MM MISC USE 3 NEEDLES PER DAY AS DIRECTED Yes MARITA Martino CNP   acetaminophen (TYLENOL) 325 MG tablet Take 2 tablets by mouth every 4 hours as needed for Pain Yes MARITA Chen CNP   FOLBIC 2.5-25-2 MG TABS TAKE 1 TABLET DAILY Yes Romel Felix

## 2018-10-12 LAB
HIV AG/AB: NORMAL
HIV ANTIGEN: NORMAL
HIV-1 ANTIBODY: NORMAL
HIV-2 AB: NORMAL

## 2018-10-13 LAB — URINE CULTURE, ROUTINE: NORMAL

## 2018-10-19 ENCOUNTER — ANTI-COAG VISIT (OUTPATIENT)
Dept: PHARMACY | Age: 68
End: 2018-10-19
Payer: COMMERCIAL

## 2018-10-19 DIAGNOSIS — E66.9 DIABETES MELLITUS TYPE 2 IN OBESE (HCC): ICD-10-CM

## 2018-10-19 DIAGNOSIS — Z95.2 S/P AVR (AORTIC VALVE REPLACEMENT): ICD-10-CM

## 2018-10-19 DIAGNOSIS — E11.69 DIABETES MELLITUS TYPE 2 IN OBESE (HCC): ICD-10-CM

## 2018-10-19 LAB
ESTIMATED AVERAGE GLUCOSE: 125.5 MG/DL
HBA1C MFR BLD: 6 %
INTERNATIONAL NORMALIZATION RATIO, POC: 1.7

## 2018-10-19 PROCEDURE — 85610 PROTHROMBIN TIME: CPT

## 2018-10-19 PROCEDURE — 99211 OFF/OP EST MAY X REQ PHY/QHP: CPT

## 2018-10-19 NOTE — PROGRESS NOTES
Mr. Joi Fan is a 74 y.o.  male with history of Heart Valve Replacement who presents today for anticoagulation monitoring and adjustment. Patient verifies current dosing regimen  Patient denies s/s bleeding/bruising/swelling/SOB  No blood in urine or stool. No dietary changes. No change in alcohol use. No missed doses. No Procedures scheduled in the future at this time. Lab Results   Component Value Date    INR 1.7 10/19/2018    INR 2.6 10/03/2018    INR 2.7 09/05/2018       Pertinent findings: Bactrim for 7 days that was completed 10/18/18 - significant interaction with warfarin. His warfarin dose was lowered over this past week. Warfarin dosing: Take warfarin 11.25mg (1.5 tablets) today only    THEN go back to Warfarin 7.5mg daily except 3.75mg(1/2 tablet) on Monday and Friday    After visit summary printed and reviewed with patient. Medications reviewed and updated on home medication list: No: no changes  Warfarin dose updated on patient home medication list: Yes     Assessed Influenza Vaccine Information for 9748-0568 season. The vaccine was given and/or documented in EPIC: No: did not assess today.

## 2018-10-31 ENCOUNTER — ANTI-COAG VISIT (OUTPATIENT)
Dept: PHARMACY | Age: 68
End: 2018-10-31
Payer: COMMERCIAL

## 2018-10-31 DIAGNOSIS — Z95.2 S/P AVR (AORTIC VALVE REPLACEMENT): ICD-10-CM

## 2018-10-31 LAB — INTERNATIONAL NORMALIZATION RATIO, POC: 2.8

## 2018-10-31 PROCEDURE — 85610 PROTHROMBIN TIME: CPT

## 2018-10-31 PROCEDURE — 99211 OFF/OP EST MAY X REQ PHY/QHP: CPT

## 2018-11-19 ENCOUNTER — OFFICE VISIT (OUTPATIENT)
Dept: CARDIOLOGY CLINIC | Age: 68
End: 2018-11-19
Payer: COMMERCIAL

## 2018-11-19 VITALS
WEIGHT: 210 LBS | BODY MASS INDEX: 31.1 KG/M2 | OXYGEN SATURATION: 97 % | SYSTOLIC BLOOD PRESSURE: 110 MMHG | HEIGHT: 69 IN | HEART RATE: 79 BPM | DIASTOLIC BLOOD PRESSURE: 64 MMHG

## 2018-11-19 DIAGNOSIS — I35.0 NONRHEUMATIC AORTIC VALVE STENOSIS: Primary | ICD-10-CM

## 2018-11-19 DIAGNOSIS — E78.2 MIXED HYPERLIPIDEMIA: ICD-10-CM

## 2018-11-19 DIAGNOSIS — I25.10 CORONARY ARTERY DISEASE INVOLVING NATIVE CORONARY ARTERY OF NATIVE HEART WITHOUT ANGINA PECTORIS: ICD-10-CM

## 2018-11-19 DIAGNOSIS — Z95.2 S/P AVR: ICD-10-CM

## 2018-11-19 DIAGNOSIS — I10 ESSENTIAL HYPERTENSION: ICD-10-CM

## 2018-11-19 PROCEDURE — 93000 ELECTROCARDIOGRAM COMPLETE: CPT | Performed by: INTERNAL MEDICINE

## 2018-11-19 PROCEDURE — 99214 OFFICE O/P EST MOD 30 MIN: CPT | Performed by: INTERNAL MEDICINE

## 2018-11-19 NOTE — PATIENT INSTRUCTIONS
angina, and you have angina symptoms that do not go away with rest or are not getting better within 5 minutes after you take a dose of nitroglycerin. · You have symptoms of a heart attack. These may include:  ? Chest pain or pressure, or a strange feeling in the chest.  ? Sweating. ? Shortness of breath. ? Nausea or vomiting. ? Pain, pressure, or a strange feeling in the back, neck, jaw, or upper belly or in one or both shoulders or arms. ? Lightheadedness or sudden weakness. ? A fast or irregular heartbeat. After you call 911, the  may tell you to chew 1 adult-strength or 2 to 4 low-dose aspirin. Wait for an ambulance. Do not try to drive yourself. Watch closely for changes in your health, and be sure to contact your doctor if you have any problems. Follow-up care is a key part of your treatment and safety. Be sure to make and go to all appointments, and call your doctor if you are having problems. It's also a good idea to keep a list of the medicines you take. Ask your doctor when you can expect to have your test results. Where can you learn more? Go to [email or web address] and sign in to your Reble account. Enter R320 in the Rollbar box to learn more about \"Cardiac Perfusion Scan (Medicine): About This Test.\"     If you do not have an account, please click on the \"Sign Up Now\" link. Current as of: December 6, 2017  Content Version: 11.8  © 1183-1027 Healthwise, Incorporated. Care instructions adapted under license by [facility] ([facility]). If you have questions about a medical condition or this instruction, always ask your healthcare professional. [place] [address] any warranty or liability for your use of this information.

## 2018-11-27 ENCOUNTER — TELEPHONE (OUTPATIENT)
Dept: PHARMACY | Facility: CLINIC | Age: 68
End: 2018-11-27

## 2018-11-28 ENCOUNTER — ANTI-COAG VISIT (OUTPATIENT)
Dept: PHARMACY | Age: 68
End: 2018-11-28
Payer: COMMERCIAL

## 2018-11-28 ENCOUNTER — CLINICAL DOCUMENTATION (OUTPATIENT)
Dept: PHARMACY | Facility: CLINIC | Age: 68
End: 2018-11-28

## 2018-11-28 DIAGNOSIS — Z95.2 S/P AVR (AORTIC VALVE REPLACEMENT): ICD-10-CM

## 2018-11-28 LAB — INTERNATIONAL NORMALIZATION RATIO, POC: 2.5

## 2018-11-28 PROCEDURE — 85610 PROTHROMBIN TIME: CPT

## 2018-11-28 PROCEDURE — 99211 OFF/OP EST MAY X REQ PHY/QHP: CPT

## 2018-11-28 NOTE — LETTER
Lalit Cameron   [address]  [address]           11/28/18     Dear Frank Lee,    We regret to inform you that you have been disqualified from The [address] [place] [address] DM Program because the following requirements were not met by the stated deadline:    [address] [facility] will not receive the copay waiver up to $600 towards your diabetic medications and supplies in 2018. If you qualify once again, you will be eligible to reapply to The [address] [place] [address] DM Program the following calendar year. [address] [place] [address] Team    [phone] Option #7    Email: [email or web address]    Fax Number: [phone]

## 2019-01-02 ENCOUNTER — ANTI-COAG VISIT (OUTPATIENT)
Dept: PHARMACY | Age: 69
End: 2019-01-02
Payer: MEDICARE

## 2019-01-02 LAB — INTERNATIONAL NORMALIZATION RATIO, POC: 2.9

## 2019-01-02 PROCEDURE — 99211 OFF/OP EST MAY X REQ PHY/QHP: CPT

## 2019-01-02 PROCEDURE — 85610 PROTHROMBIN TIME: CPT

## 2019-01-07 RX ORDER — WARFARIN SODIUM 7.5 MG/1
TABLET ORAL
Qty: 90 TABLET | Refills: 2 | Status: SHIPPED | OUTPATIENT
Start: 2019-01-07 | End: 2019-11-03 | Stop reason: SDUPTHER

## 2019-01-16 RX ORDER — ALLOPURINOL 300 MG/1
TABLET ORAL
Qty: 90 TABLET | Refills: 2 | Status: SHIPPED | OUTPATIENT
Start: 2019-01-16 | End: 2019-08-05 | Stop reason: SDUPTHER

## 2019-01-30 ENCOUNTER — ANTI-COAG VISIT (OUTPATIENT)
Dept: PHARMACY | Age: 69
End: 2019-01-30
Payer: MEDICARE

## 2019-01-30 DIAGNOSIS — Z95.2 S/P AVR (AORTIC VALVE REPLACEMENT): ICD-10-CM

## 2019-01-30 LAB — INR BLD: 3.8

## 2019-01-30 PROCEDURE — 85610 PROTHROMBIN TIME: CPT

## 2019-01-30 PROCEDURE — 99211 OFF/OP EST MAY X REQ PHY/QHP: CPT

## 2019-02-27 ENCOUNTER — ANTI-COAG VISIT (OUTPATIENT)
Dept: PHARMACY | Age: 69
End: 2019-02-27
Payer: MEDICARE

## 2019-02-27 LAB — INTERNATIONAL NORMALIZATION RATIO, POC: 2.7

## 2019-02-27 PROCEDURE — 85610 PROTHROMBIN TIME: CPT

## 2019-02-27 PROCEDURE — 99211 OFF/OP EST MAY X REQ PHY/QHP: CPT

## 2019-03-27 ENCOUNTER — ANTI-COAG VISIT (OUTPATIENT)
Dept: PHARMACY | Age: 69
End: 2019-03-27
Payer: MEDICARE

## 2019-03-27 LAB — INTERNATIONAL NORMALIZATION RATIO, POC: 3

## 2019-03-27 PROCEDURE — 85610 PROTHROMBIN TIME: CPT

## 2019-03-27 PROCEDURE — 99211 OFF/OP EST MAY X REQ PHY/QHP: CPT

## 2019-04-24 ENCOUNTER — ANTI-COAG VISIT (OUTPATIENT)
Dept: PHARMACY | Age: 69
End: 2019-04-24
Payer: MEDICARE

## 2019-04-24 LAB — INTERNATIONAL NORMALIZATION RATIO, POC: 2.6

## 2019-04-24 PROCEDURE — 99211 OFF/OP EST MAY X REQ PHY/QHP: CPT

## 2019-04-24 PROCEDURE — 85610 PROTHROMBIN TIME: CPT

## 2019-05-16 ENCOUNTER — HOSPITAL ENCOUNTER (OUTPATIENT)
Dept: NON INVASIVE DIAGNOSTICS | Age: 69
Discharge: HOME | End: 2019-05-16
Payer: MEDICARE

## 2019-05-16 DIAGNOSIS — Z95.2 S/P AVR: ICD-10-CM

## 2019-05-16 DIAGNOSIS — I25.10 CORONARY ARTERY DISEASE INVOLVING NATIVE CORONARY ARTERY OF NATIVE HEART WITHOUT ANGINA PECTORIS: ICD-10-CM

## 2019-05-16 DIAGNOSIS — I10 ESSENTIAL HYPERTENSION: ICD-10-CM

## 2019-05-16 DIAGNOSIS — I35.0 NONRHEUMATIC AORTIC VALVE STENOSIS: ICD-10-CM

## 2019-05-16 LAB
LV EF: 53 %
LV EF: 58 %
LVEF MODALITY: NORMAL
LVEF MODALITY: NORMAL

## 2019-05-16 PROCEDURE — 78452 HT MUSCLE IMAGE SPECT MULT: CPT

## 2019-05-16 PROCEDURE — 3430000000 HC RX DIAGNOSTIC RADIOPHARMACEUTICAL: Performed by: INTERNAL MEDICINE

## 2019-05-16 PROCEDURE — A9502 TC99M TETROFOSMIN: HCPCS | Performed by: INTERNAL MEDICINE

## 2019-05-16 PROCEDURE — 93306 TTE W/DOPPLER COMPLETE: CPT

## 2019-05-16 PROCEDURE — 6360000002 HC RX W HCPCS: Performed by: INTERNAL MEDICINE

## 2019-05-16 PROCEDURE — 93017 CV STRESS TEST TRACING ONLY: CPT

## 2019-05-16 RX ADMIN — REGADENOSON 0.4 MG: 0.08 INJECTION, SOLUTION INTRAVENOUS at 10:28

## 2019-05-16 RX ADMIN — TETROFOSMIN 30 MILLICURIE: 1.38 INJECTION, POWDER, LYOPHILIZED, FOR SOLUTION INTRAVENOUS at 10:33

## 2019-05-16 RX ADMIN — TETROFOSMIN 10 MILLICURIE: 1.38 INJECTION, POWDER, LYOPHILIZED, FOR SOLUTION INTRAVENOUS at 09:26

## 2019-05-22 ENCOUNTER — ANTI-COAG VISIT (OUTPATIENT)
Dept: PHARMACY | Age: 69
End: 2019-05-22
Payer: MEDICARE

## 2019-05-22 LAB — INTERNATIONAL NORMALIZATION RATIO, POC: 2.4

## 2019-05-22 PROCEDURE — 85610 PROTHROMBIN TIME: CPT

## 2019-05-22 PROCEDURE — 99211 OFF/OP EST MAY X REQ PHY/QHP: CPT

## 2019-05-23 ENCOUNTER — OFFICE VISIT (OUTPATIENT)
Dept: CARDIOLOGY CLINIC | Age: 69
End: 2019-05-23
Payer: MEDICARE

## 2019-05-23 VITALS
BODY MASS INDEX: 31.7 KG/M2 | HEART RATE: 74 BPM | SYSTOLIC BLOOD PRESSURE: 118 MMHG | HEIGHT: 69 IN | OXYGEN SATURATION: 95 % | DIASTOLIC BLOOD PRESSURE: 76 MMHG | WEIGHT: 214 LBS

## 2019-05-23 DIAGNOSIS — I10 ESSENTIAL HYPERTENSION: Primary | ICD-10-CM

## 2019-05-23 DIAGNOSIS — I35.0 NONRHEUMATIC AORTIC VALVE STENOSIS: ICD-10-CM

## 2019-05-23 DIAGNOSIS — Z95.2 S/P AVR (AORTIC VALVE REPLACEMENT): ICD-10-CM

## 2019-05-23 PROCEDURE — 1036F TOBACCO NON-USER: CPT | Performed by: INTERNAL MEDICINE

## 2019-05-23 PROCEDURE — G8598 ASA/ANTIPLAT THER USED: HCPCS | Performed by: INTERNAL MEDICINE

## 2019-05-23 PROCEDURE — 99214 OFFICE O/P EST MOD 30 MIN: CPT | Performed by: INTERNAL MEDICINE

## 2019-05-23 PROCEDURE — 4040F PNEUMOC VAC/ADMIN/RCVD: CPT | Performed by: INTERNAL MEDICINE

## 2019-05-23 PROCEDURE — 1123F ACP DISCUSS/DSCN MKR DOCD: CPT | Performed by: INTERNAL MEDICINE

## 2019-05-23 PROCEDURE — G8417 CALC BMI ABV UP PARAM F/U: HCPCS | Performed by: INTERNAL MEDICINE

## 2019-05-23 PROCEDURE — 3017F COLORECTAL CA SCREEN DOC REV: CPT | Performed by: INTERNAL MEDICINE

## 2019-05-23 PROCEDURE — G8427 DOCREV CUR MEDS BY ELIG CLIN: HCPCS | Performed by: INTERNAL MEDICINE

## 2019-05-23 NOTE — PROGRESS NOTES
[facility]      Cardiology follow up    Zonia Curtis  1950    May 23, 2019    CC: \"I am feeling great. \"     HPI:  Morris Ko is a 63 y.o. male with PMH significant for type II diabetes and hypothyroid, vitamin D deficiency. and presents in follow up for  Bicuspid Aortic valve, LM CAD non obstructive, hypercholesterolemia, hypertension, He returns in follow up for his CAD, AVR with hx of stenosis of aortic valve. Doing well from cardiovascular stand point and reports that he \"feels great. \"   No cardiac complaints reported today. He is exercising 2-3 times per week at the [facility]. No abnormal bruising or bleeding. Compliant with medical therapy Patient denies any symptoms of chest pain, pressure, tightness, TOLENTINO, shortness of breath at rest, nausea, vomiting, near syncope, syncope, heart racing, palpitations, dizziness, lightheaded, PND, orthopnea, bilateral lower extremities pain, cramping or fatigue. No abnormal bruising or bleeding.     Past Medical History:   Diagnosis Date    Aortic stenosis 9/2013    moderate    Arthritis     Asthma     BRONCHIAL ASTHMA  AS A CHILD    Deviated septum     Diabetes mellitus type II 2000    Dr. Андрей [place] thyroiditis dx 1984    medical treatment    Homocysteinemia ([address]     Hyperlipidemia     Hypertension     Hypertriglyceridemia     Hypogonadism male     topical testosterone     Hypothyroid 12/9/2011    Kidney stone 1990s    Passed on own and also surgical excision     Murmur since 2005    Nasal polyps     Osteoarthritis     Right thumb     Prolonged emergence from general anesthesia     Sleep apnea 2005    CPAP nightly    Wears glasses      Past Surgical History:   Procedure Laterality Date    AORTIC VALVE REPLACEMENT  09/14/2016    21mm [facility] [place]    COLECTOMY  1996    perforated bowel: colonoscopy    COLONOSCOPY  9/24/2014; 2004    x2 normal colonoscopies/ perforated bowel    CYST REMOVAL      hairy nevus left tablet TAKE 4 TABLETS AT BEDTIME WITH A SMALL SNACK 30 MINUTES AFTER ASPIRIN 360 tablet 0    ANDROGEL PUMP 20.25 MG/ACT (1.62%) GEL gel Place 3 ACT onto skin daily, please, dispense 5 bottles for 90 day suppy 5 Bottle 1    TOUJEO SOLOSTAR 300 UNIT/ML injection pen Inject 40 Units into the skin daily 18 Pen 3    Empagliflozin-Metformin HCl ER 12.5-1000 MG TB24 Take 2 tablets by mouth daily 180 tablet 3    rosuvastatin (CRESTOR) 40 MG tablet Take 1 tablet by mouth every evening 90 tablet 3    ZETIA 10 MG tablet TAKE 1 TABLET DAILY 90 tablet 2    metoprolol succinate (TOPROL XL) 25 MG extended release tablet Take 3 tablets by mouth 2 times daily 3 tabs po  tablet 3    losartan (COZAAR) 25 MG tablet Take 1 tablet by mouth daily 90 tablet 3    EASY TOUCH PEN NEEDLES 31G X 8 MM MISC USE 3 NEEDLES PER DAY AS DIRECTED 300 each 3    acetaminophen (TYLENOL) 325 MG tablet Take 2 tablets by mouth every 4 hours as needed for Pain 120 tablet 3    FOLBIC 2.5-25-2 MG TABS TAKE 1 TABLET DAILY 90 tablet 3    b complex vitamins capsule Take 1 capsule by mouth daily. No current facility-administered medications for this visit. Review of Systems:    Constitutional: negative  Eyes: negative  Ears, nose, mouth, throat, and face: negative  Respiratory: negative  Cardiovascular: negative  Gastrointestinal: negative  Genitourinary:negative  Integument/breast: negative  Hematologic/lymphatic: negative  Musculoskeletal:negative  Neurological: negative  Behavioral/Psych: negative  Endocrine: negative  Allergic/Immunologic: negative     Physical Exam:   Vitals:    05/23/19 1307   BP: 118/76   Site: Right Upper Arm   Position: Sitting   Pulse: 74   SpO2: 95%   Weight: 214 lb (97.1 kg)   Height: 5' 9\" (1.753 m)       Wt Readings from Last 3 Encounters:   05/23/19 214 lb (97.1 kg)   11/19/18 210 lb (95.3 kg)   10/11/18 213 lb (96.6 kg)       Constitutional: He is oriented to person, place, and time.  He appears well-developed and well-nourished. In no acute distress. Head: Normocephalic and atraumatic. Pupils equal and round. Neck: Neck supple. No JVP or carotid bruit appreciated. No mass and no thyromegaly present. No lymphadenopathy present. Cardiovascular: Normal rate. Normal heart sounds. Exam reveals no gallop and no friction rub. No murmur heard. Pulmonary/Chest: Effort normal and breath sounds normal. No respiratory distress. He has no wheezes, rhonchi or rales. Abdominal: Soft, non-tender. Bowel sounds are normal. He exhibits no organomegaly, mass or bruit. Extremities: No edema, cyanosis, or clubbing. Pulses are 2+ radial/dorsalis pedis/posterior tibial/carotid bilaterally. Neurological: Awake  alert and orientedNo gross cranial nerve deficit. Coordination normal.     Skin: Skin is warm and dry. There is no rash or diaphoresis. Psychiatric: He has a normal mood and affect. His speech is normal and behavior is normal.     Lab Review:   Lab Results   Component Value Date    TRIG 156 04/11/2019    HDL 28 04/11/2019    HDL 26 04/27/2012    LDLCALC 33 04/11/2019    LABVLDL 31 04/11/2019    TC 92 9/12/16  Lab Results   Component Value Date    HGB 14.9 04/11/2019    HCT 45.3 04/11/2019     Lab Results   Component Value Date     04/11/2019      Lab Results   Component Value Date    K 4.3 04/11/2019     Lab Results   Component Value Date    BUN 18 04/11/2019    CREATININE 1.2 04/11/2019     Image Review:    ECHO:6/22/16  LVH with normal systolic function. EF    60%. Grade I Diastolic dysfunction.   Mild mitral regurgitation is present.   The aortic valve area is calculated at .81 cm2 with a maximum pressure   gradient of 66 mmHg and a mean pressure gradient of 44 mmHg. This is c/w   severe aortic stenosis.   Trivial tricuspid regurgitation.   ormal right ventricular size and function. [facility]: 1/8/16  1. Moderate to severe aortic valve stenosis.   2. Moderate left main disease with 40% stenosis of the ostium of the left  Main. Fractional flow reserve of the ostium of the left main was done. It was  0.86.  3. Elevated left-sided pressures. [facility]:8/31/16  ANGIOGRAPHIC FINDINGS:  1. The left main coronary artery comes from the left coronary cusp, giving  rise to the left anterior descending artery, left circumflex artery. Ostial  left main has 40% stenosis. 2. The intravascular ultrasound did not reveal any stenotic lesion of left  Main. The LM area was calculated to be at least 20 mm square. The Fractional Flow Reserve was 0.83. 3. The left anterior descending artery comes from the left main coronary  artery. It is patent without any significant stenosis. 4. The left circumflex comes from the left main coronary artery. It is  patent, giving rise to several obtuse marginal without any stenosis. [place] 6. The right coronary artery is nondominant and small caliber with 50 to 60%  stenosis in the midportion. 7. Aortic valve has 40 to 45 mmHg gradient. SUMMARY: Severe aortic valve stenosis. Stress Study:9/1/16   Pharmacological Stress/MPI Results:        1. Technically a satisfactory study.    2. Normal pharmacological stress portion of the study.    3. No evidence of Ischemia by Myocardial Perfusion Imaging.    4. Gated Study shows normal LV size and Systolic function; EF is 49 %. Carotid Duplex:9/12/16  Impressions   Right Impression   The right internal carotid artery appears to have a 1-15% diameter reducing   stenosis based on velocity criteria. The right vertebral artery demonstrates normal antegrade flow. Left Impression   The left internal carotid artery appears to have a 1-15% diameter reducing   stenosis based on velocity criteria. The left vertebral artery demonstrates normal antegrade flow. S/p AVR with 21mm [facility] [place] per Dr. Lori Hidalgo.  Piña 9/14/16    Echo 5/24/17  Summary   Normal left ventricular size.   Normal left ventricular systolic function with an estimated ejection   fraction of 55-60% .   No regional wall motion abnormalities.   A mechinical aortic valve appears well seated with a mean gradient of 16 mmHg.   Normal right ventricular size and function. T    Echo:5/16/19  Summary   Left ventricular cavity size is normal.   There is mild concentric left ventricular hypertrophy.   Ejection fraction is visually estimated to be 55-60%.   A mechanical artificial aortic valve with a mean gradient of 17mmHg.   No evidence of aortic valve regurgitation   Normal right ventricular size and function.   mild MAC    Assessment&Plan:    -Severe Aortic Stenosis Bicuspid Aortic valve with no evidence of aortic aneurysm  S/p S/p AVR with 21mm [facility] [place] [place] per Dr. Kaveh Grider. Piña 9/14/16  No murmur appreciated   PT/INR [facility] -Coumadin therapy   Also on Aspirin 81 mg daily   Reviewed his echocardiogram 5/16/19 normal with normal valve function  Will need echo ordered at next visit    -CAD: Moderate left main stenosis. No angina reported today. Continue ASA, Statin, niacin, zetia, Toprol XL, losartan  Walk daily for aerobic exercise-current 2-3 x per week  Strict lipid management again discussed    -HTN:   Stable today on toprol-xl and losartan     -Hyperlipidemia:   4/11/19 stable except low HDL, elevated TRG   We again discussed working on both   On Zetia, niacin and crestor. Continue high potency statin therapy. He does follow a specialist-Dr. Monster Penaloza     -DM:  Managed per Dr. Monster Penaloza     -Hypothyroid: Managed per PCP. He will return in follow up 7-8 months     Thank you very much for allowing me to participate in the care of your patient. Please do not hesitate to contact me if you have any questions.     Sincerely,    Alanis Vega MD, MPH      [facility], [address] [place] [address]  Ph: [phone]  Fax: [phone]    This note was scribed in the presence of Dr. Lazara Barnes MD by Mary Crane, RN.  Physician Attestation:  The scribes documentation has been prepared under my direction and personally reviewed by me in its entirety. I confirm that the note above accurately reflects all work, treatment, procedures, and medical decision making performed by me.

## 2019-05-29 ENCOUNTER — OFFICE VISIT (OUTPATIENT)
Dept: FAMILY MEDICINE CLINIC | Age: 69
End: 2019-05-29
Payer: MEDICARE

## 2019-05-29 VITALS
OXYGEN SATURATION: 99 % | HEIGHT: 69 IN | BODY MASS INDEX: 31.25 KG/M2 | SYSTOLIC BLOOD PRESSURE: 128 MMHG | RESPIRATION RATE: 14 BRPM | WEIGHT: 211 LBS | DIASTOLIC BLOOD PRESSURE: 76 MMHG | HEART RATE: 72 BPM

## 2019-05-29 DIAGNOSIS — G47.00 INSOMNIA, UNSPECIFIED TYPE: ICD-10-CM

## 2019-05-29 DIAGNOSIS — F32.1 CURRENT MODERATE EPISODE OF MAJOR DEPRESSIVE DISORDER WITHOUT PRIOR EPISODE (HCC): ICD-10-CM

## 2019-05-29 DIAGNOSIS — R10.9 LEFT FLANK PAIN: Primary | ICD-10-CM

## 2019-05-29 LAB
BILIRUBIN, POC: ABNORMAL
BLOOD URINE, POC: ABNORMAL
CLARITY, POC: CLEAR
COLOR, POC: YELLOW
GLUCOSE URINE, POC: 1000
KETONES, POC: ABNORMAL
LEUKOCYTE EST, POC: ABNORMAL
NITRITE, POC: ABNORMAL
PH, POC: 5.5
PROTEIN, POC: 30
SPECIFIC GRAVITY, POC: 1.02
UROBILINOGEN, POC: 0.2

## 2019-05-29 PROCEDURE — 3017F COLORECTAL CA SCREEN DOC REV: CPT | Performed by: FAMILY MEDICINE

## 2019-05-29 PROCEDURE — 1036F TOBACCO NON-USER: CPT | Performed by: FAMILY MEDICINE

## 2019-05-29 PROCEDURE — 1123F ACP DISCUSS/DSCN MKR DOCD: CPT | Performed by: FAMILY MEDICINE

## 2019-05-29 PROCEDURE — G8417 CALC BMI ABV UP PARAM F/U: HCPCS | Performed by: FAMILY MEDICINE

## 2019-05-29 PROCEDURE — 4040F PNEUMOC VAC/ADMIN/RCVD: CPT | Performed by: FAMILY MEDICINE

## 2019-05-29 PROCEDURE — 99214 OFFICE O/P EST MOD 30 MIN: CPT | Performed by: FAMILY MEDICINE

## 2019-05-29 PROCEDURE — 81002 URINALYSIS NONAUTO W/O SCOPE: CPT | Performed by: FAMILY MEDICINE

## 2019-05-29 PROCEDURE — G8598 ASA/ANTIPLAT THER USED: HCPCS | Performed by: FAMILY MEDICINE

## 2019-05-29 PROCEDURE — G8427 DOCREV CUR MEDS BY ELIG CLIN: HCPCS | Performed by: FAMILY MEDICINE

## 2019-05-29 RX ORDER — TRAMADOL HYDROCHLORIDE 50 MG/1
50 TABLET ORAL EVERY 6 HOURS PRN
Qty: 20 TABLET | Refills: 0 | Status: SHIPPED | OUTPATIENT
Start: 2019-05-29 | End: 2019-06-03

## 2019-05-29 RX ORDER — LEVOTHYROXINE SODIUM 137 UG/1
137 TABLET ORAL DAILY
COMMUNITY
Start: 2019-04-05 | End: 2020-02-05

## 2019-05-29 RX ORDER — MIRTAZAPINE 30 MG/1
30 TABLET, FILM COATED ORAL NIGHTLY
Qty: 30 TABLET | Refills: 2 | Status: SHIPPED | OUTPATIENT
Start: 2019-05-29 | End: 2019-07-16 | Stop reason: SDUPTHER

## 2019-05-29 ASSESSMENT — PATIENT HEALTH QUESTIONNAIRE - PHQ9
SUM OF ALL RESPONSES TO PHQ9 QUESTIONS 1 & 2: 2
SUM OF ALL RESPONSES TO PHQ QUESTIONS 1-9: 2
1. LITTLE INTEREST OR PLEASURE IN DOING THINGS: 1
SUM OF ALL RESPONSES TO PHQ QUESTIONS 1-9: 2
2. FEELING DOWN, DEPRESSED OR HOPELESS: 1

## 2019-05-29 NOTE — PROGRESS NOTES
Subjective:      Patient ID: Lionel Hedrick is a 79 y.o. male. HPI  Chief Complaint   Patient presents with    Depression     DEPRESSION, NOT SURE IF HE IS DEPRESSED HAS A LOT GOING ON NO THOUGHTS OF SELF HARM    Insomnia     3 HOURS MAYBE TOTAL HE IS UP AND DOWN MOST OF THE NIGHT    some mind racing/ anxious  Wife passed in November - harder now - grieving over/ reality has set in  Emotions bubble up at unexpected times  Looking for something to take edge off. Has 4 boys - 3 of them take meds. Never used in past anything to help. [facility]ined [facility]x/ week for cardio  Got stabbing pain in back after 1 hour of cardio - left sided - used heat/ icy hot - better but pain started moving around - could be kidney stone - 2 weeks ago - pain mostly gone now - tylenol not real helpful  Tens seemed to help. Some radiation into leg initially -not so much now  BP Readings from Last 3 Encounters:   05/29/19 128/76   05/23/19 118/76   11/19/18 110/64     Pulse Readings from Last 3 Encounters:   05/29/19 72   05/23/19 74   11/19/18 79     Wt Readings from Last 3 Encounters:   05/29/19 211 lb (95.7 kg)   05/23/19 214 lb (97.1 kg)   11/19/18 210 lb (95.3 kg)   a1c 6.0%  Current Outpatient Medications   Medication Sig Dispense Refill    levothyroxine (SYNTHROID) 137 MCG tablet Take 137 mcg by mouth daily      allopurinol (ZYLOPRIM) 300 MG tablet TAKE 1 TABLET DAILY 90 tablet 2    warfarin (COUMADIN) 7.5 MG tablet TAKE ONE TABLET (7.5MG) BY MOUTH DAILY EXCEPT ONE-HALF TABLET (3.75MG) ON MONDAYS AND FRIDAYS OR AS DIRECTED BY [facility] 90 tablet 2    aspirin 81 MG EC tablet Take 1 tablet by mouth nightly 90 tablet 3    Dulaglutide (TRULICITY) 1.5 SK/5.9PH SOPN Inject 1.5 mg into the skin every 7 days      glucose blood VI test strips (AGAMATRIX PRESTO TEST) strip 1 each by In Vitro route 4 times daily As needed.  400 each 3    AGAMATRIX ULTRA-THIN LANCETS MISC Use as directed to test up to 4 times daily 400 each 3    Blood Glucose Monitoring Suppl (AGAMATRIX PRESTO) w/Device KIT Use as directed to test up to 4 times daily 1 kit 0    niacin (NIASPAN) 500 MG extended release tablet TAKE 4 TABLETS AT BEDTIME WITH A SMALL SNACK 30 MINUTES AFTER ASPIRIN 360 tablet 0    TOUJEO SOLOSTAR 300 UNIT/ML injection pen Inject 40 Units into the skin daily 18 Pen 3    Empagliflozin-Metformin HCl ER 12.5-1000 MG TB24 Take 2 tablets by mouth daily 180 tablet 3    rosuvastatin (CRESTOR) 40 MG tablet Take 1 tablet by mouth every evening 90 tablet 3    ZETIA 10 MG tablet TAKE 1 TABLET DAILY 90 tablet 2    metoprolol succinate (TOPROL XL) 25 MG extended release tablet Take 3 tablets by mouth 2 times daily 3 tabs po  tablet 3    losartan (COZAAR) 25 MG tablet Take 1 tablet by mouth daily 90 tablet 3    EASY TOUCH PEN NEEDLES 31G X 8 MM MISC USE 3 NEEDLES PER DAY AS DIRECTED 300 each 3    acetaminophen (TYLENOL) 325 MG tablet Take 2 tablets by mouth every 4 hours as needed for Pain 120 tablet 3    FOLBIC 2.5-25-2 MG TABS TAKE 1 TABLET DAILY 90 tablet 3    b complex vitamins capsule Take 1 capsule by mouth daily. No current facility-administered medications for this visit. tried tylenol pm which works sometimes , sometimes doesn't -same as zzquil/ melatonin  Any option/ combo may work 50% of time - up 2-3x/ night - not from urge to urinate  On cpap for wale  Not much change in urine color - bright from b complex  Flow slower than in past  Review of Systems  Pain 10% better overall  Able to bend/ walk  Would like something for pain  Objective:   Physical Exam   Constitutional: He is oriented to person, place, and time. He appears well-developed and well-nourished. No distress. Eyes: No scleral icterus. Pulmonary/Chest: Effort normal.   Abdominal:   Mild ttp left low back to flank area   Musculoskeletal: He exhibits no edema. Neurological: He is alert and oriented to person, place, and time. Skin: Skin is warm and dry. Psychiatric: He has a normal mood and affect. Assessment:       Diagnosis Orders   1. Left flank pain     2. Insomnia, unspecified type     3.  Current moderate episode of major depressive disorder without prior episode ([facility] [address]             Plan:      ua  Consider ct of kidney prior to PT if fails to improve  Push water  Ultram/ apap prn - se dw/ pt  oarrs reviewed and results c/w rx'd meds  Start remeron 30mg nightly for mood/ sleep - otc aids prn  Sleep hygiene d/w pt  E-stim for pain/ gentle stretching        Gloria Fontaine MD

## 2019-06-19 ENCOUNTER — ANTI-COAG VISIT (OUTPATIENT)
Dept: PHARMACY | Age: 69
End: 2019-06-19
Payer: MEDICARE

## 2019-06-19 LAB — INTERNATIONAL NORMALIZATION RATIO, POC: 2.6

## 2019-06-19 PROCEDURE — 99211 OFF/OP EST MAY X REQ PHY/QHP: CPT

## 2019-06-19 PROCEDURE — 85610 PROTHROMBIN TIME: CPT

## 2019-06-19 NOTE — PROGRESS NOTES
Mr. Francisca Garcia is a 79 y.o.  male with history of Heart Valve Replacement who presents today for anticoagulation monitoring and adjustment. Patient verifies current dosing regimen  Patient denies s/s bleeding/bruising/swelling/SOB  No blood in urine or stool. No dietary changes. No changes in medication/OTC agents/Herbals. No change in alcohol use. No missed doses. No Procedures scheduled in the future at this time. Lab Results   Component Value Date    INR 2.6 06/19/2019    INR 2.4 05/22/2019    INR 2.6 04/24/2019       Pertinent findings: Looks and feels well today. No changes in medications or diet. No bruising or bleeding noted. No change in dose for INR of  2.6. Will see in 4 weeks for next INR. Warfarin dosing: Continue Warfarin 7.5mg daily except 3.75mg(1/2 tablet) on Monday and Friday    After visit summary printed and reviewed with patient.

## 2019-07-10 ENCOUNTER — OFFICE VISIT (OUTPATIENT)
Dept: PULMONOLOGY | Age: 69
End: 2019-07-10
Payer: MEDICARE

## 2019-07-10 VITALS
HEIGHT: 71 IN | WEIGHT: 214 LBS | OXYGEN SATURATION: 94 % | BODY MASS INDEX: 29.96 KG/M2 | RESPIRATION RATE: 16 BRPM | HEART RATE: 80 BPM | TEMPERATURE: 97.8 F | DIASTOLIC BLOOD PRESSURE: 68 MMHG | SYSTOLIC BLOOD PRESSURE: 120 MMHG

## 2019-07-10 DIAGNOSIS — G47.33 OSA ON CPAP: Primary | ICD-10-CM

## 2019-07-10 DIAGNOSIS — Z99.89 OSA ON CPAP: Primary | ICD-10-CM

## 2019-07-10 PROCEDURE — 1123F ACP DISCUSS/DSCN MKR DOCD: CPT | Performed by: INTERNAL MEDICINE

## 2019-07-10 PROCEDURE — 4040F PNEUMOC VAC/ADMIN/RCVD: CPT | Performed by: INTERNAL MEDICINE

## 2019-07-10 PROCEDURE — G8417 CALC BMI ABV UP PARAM F/U: HCPCS | Performed by: INTERNAL MEDICINE

## 2019-07-10 PROCEDURE — 1036F TOBACCO NON-USER: CPT | Performed by: INTERNAL MEDICINE

## 2019-07-10 PROCEDURE — 3017F COLORECTAL CA SCREEN DOC REV: CPT | Performed by: INTERNAL MEDICINE

## 2019-07-10 PROCEDURE — 99213 OFFICE O/P EST LOW 20 MIN: CPT | Performed by: INTERNAL MEDICINE

## 2019-07-10 PROCEDURE — G8598 ASA/ANTIPLAT THER USED: HCPCS | Performed by: INTERNAL MEDICINE

## 2019-07-10 PROCEDURE — G8427 DOCREV CUR MEDS BY ELIG CLIN: HCPCS | Performed by: INTERNAL MEDICINE

## 2019-07-10 ASSESSMENT — SLEEP AND FATIGUE QUESTIONNAIRES
HOW LIKELY ARE YOU TO NOD OFF OR FALL ASLEEP WHILE WATCHING TV: 2
HOW LIKELY ARE YOU TO NOD OFF OR FALL ASLEEP WHILE SITTING QUIETLY AFTER LUNCH WITHOUT ALCOHOL: 0
HOW LIKELY ARE YOU TO NOD OFF OR FALL ASLEEP WHILE SITTING AND READING: 2
HOW LIKELY ARE YOU TO NOD OFF OR FALL ASLEEP WHEN YOU ARE A PASSENGER IN A CAR FOR AN HOUR WITHOUT A BREAK: 0
ESS TOTAL SCORE: 6
HOW LIKELY ARE YOU TO NOD OFF OR FALL ASLEEP WHILE SITTING AND TALKING TO SOMEONE: 0
HOW LIKELY ARE YOU TO NOD OFF OR FALL ASLEEP WHILE SITTING INACTIVE IN A PUBLIC PLACE: 1
HOW LIKELY ARE YOU TO NOD OFF OR FALL ASLEEP WHILE LYING DOWN TO REST IN THE AFTERNOON WHEN CIRCUMSTANCES PERMIT: 1
HOW LIKELY ARE YOU TO NOD OFF OR FALL ASLEEP IN A CAR, WHILE STOPPED FOR A FEW MINUTES IN TRAFFIC: 0

## 2019-07-16 ENCOUNTER — OFFICE VISIT (OUTPATIENT)
Dept: FAMILY MEDICINE CLINIC | Age: 69
End: 2019-07-16
Payer: MEDICARE

## 2019-07-16 VITALS
HEIGHT: 71 IN | BODY MASS INDEX: 29.82 KG/M2 | WEIGHT: 213 LBS | SYSTOLIC BLOOD PRESSURE: 104 MMHG | DIASTOLIC BLOOD PRESSURE: 70 MMHG

## 2019-07-16 DIAGNOSIS — R79.83 HOMOCYSTEINEMIA: ICD-10-CM

## 2019-07-16 DIAGNOSIS — E66.9 DIABETES MELLITUS TYPE 2 IN OBESE (HCC): Primary | ICD-10-CM

## 2019-07-16 DIAGNOSIS — R45.89 DEPRESSED MOOD: ICD-10-CM

## 2019-07-16 DIAGNOSIS — E11.69 DIABETES MELLITUS TYPE 2 IN OBESE (HCC): Primary | ICD-10-CM

## 2019-07-16 DIAGNOSIS — G47.00 INSOMNIA, UNSPECIFIED TYPE: ICD-10-CM

## 2019-07-16 PROCEDURE — 3044F HG A1C LEVEL LT 7.0%: CPT | Performed by: FAMILY MEDICINE

## 2019-07-16 PROCEDURE — 2022F DILAT RTA XM EVC RTNOPTHY: CPT | Performed by: FAMILY MEDICINE

## 2019-07-16 PROCEDURE — G8598 ASA/ANTIPLAT THER USED: HCPCS | Performed by: FAMILY MEDICINE

## 2019-07-16 PROCEDURE — 3017F COLORECTAL CA SCREEN DOC REV: CPT | Performed by: FAMILY MEDICINE

## 2019-07-16 PROCEDURE — 1036F TOBACCO NON-USER: CPT | Performed by: FAMILY MEDICINE

## 2019-07-16 PROCEDURE — 4040F PNEUMOC VAC/ADMIN/RCVD: CPT | Performed by: FAMILY MEDICINE

## 2019-07-16 PROCEDURE — 99213 OFFICE O/P EST LOW 20 MIN: CPT | Performed by: FAMILY MEDICINE

## 2019-07-16 PROCEDURE — 1123F ACP DISCUSS/DSCN MKR DOCD: CPT | Performed by: FAMILY MEDICINE

## 2019-07-16 PROCEDURE — G8417 CALC BMI ABV UP PARAM F/U: HCPCS | Performed by: FAMILY MEDICINE

## 2019-07-16 PROCEDURE — G8427 DOCREV CUR MEDS BY ELIG CLIN: HCPCS | Performed by: FAMILY MEDICINE

## 2019-07-16 RX ORDER — MIRTAZAPINE 30 MG/1
30 TABLET, FILM COATED ORAL NIGHTLY
Qty: 90 TABLET | Refills: 3 | Status: SHIPPED | OUTPATIENT
Start: 2019-07-16 | End: 2020-05-11

## 2019-07-17 ENCOUNTER — ANTI-COAG VISIT (OUTPATIENT)
Dept: PHARMACY | Age: 69
End: 2019-07-17
Payer: MEDICARE

## 2019-07-17 DIAGNOSIS — Z95.2 S/P AVR (AORTIC VALVE REPLACEMENT): ICD-10-CM

## 2019-07-17 LAB — INTERNATIONAL NORMALIZATION RATIO, POC: 3.4

## 2019-07-17 PROCEDURE — 99211 OFF/OP EST MAY X REQ PHY/QHP: CPT

## 2019-07-17 PROCEDURE — 85610 PROTHROMBIN TIME: CPT

## 2019-07-17 RX ORDER — MIRTAZAPINE 30 MG/1
TABLET, FILM COATED ORAL
Qty: 30 TABLET | Refills: 2 | Status: SHIPPED | OUTPATIENT
Start: 2019-07-17 | End: 2020-01-20

## 2019-08-05 RX ORDER — ALLOPURINOL 300 MG/1
TABLET ORAL
Qty: 90 TABLET | Refills: 1 | Status: SHIPPED | OUTPATIENT
Start: 2019-08-05 | End: 2020-06-08

## 2019-08-14 ENCOUNTER — ANTI-COAG VISIT (OUTPATIENT)
Dept: PHARMACY | Age: 69
End: 2019-08-14
Payer: MEDICARE

## 2019-08-14 DIAGNOSIS — Z95.2 S/P AVR (AORTIC VALVE REPLACEMENT): ICD-10-CM

## 2019-08-14 LAB — INTERNATIONAL NORMALIZATION RATIO, POC: 2.4

## 2019-08-14 PROCEDURE — 85610 PROTHROMBIN TIME: CPT

## 2019-08-14 PROCEDURE — 99211 OFF/OP EST MAY X REQ PHY/QHP: CPT

## 2019-09-05 ENCOUNTER — TELEPHONE (OUTPATIENT)
Dept: PHARMACY | Age: 69
End: 2019-09-05

## 2019-09-05 NOTE — TELEPHONE ENCOUNTER
I spoke with the patient. We are adjusting his dose. He will be taking 3.75mg (1/2 tab) on Friday 9/6, Sunday 9/8, Monday 9/9, and Tuesday 9/10, and then go back to his normal dose after that. Patient verbalized understanding of the new instructions, and knows to watch out for signs and symptoms of bleeding. He has a follow up appointment on 9/18.

## 2019-09-18 ENCOUNTER — ANTI-COAG VISIT (OUTPATIENT)
Dept: PHARMACY | Age: 69
End: 2019-09-18
Payer: MEDICARE

## 2019-09-18 LAB — INTERNATIONAL NORMALIZATION RATIO, POC: 2.1

## 2019-09-18 PROCEDURE — 99211 OFF/OP EST MAY X REQ PHY/QHP: CPT

## 2019-09-18 PROCEDURE — 85610 PROTHROMBIN TIME: CPT

## 2019-09-25 ENCOUNTER — IMMUNIZATION (OUTPATIENT)
Dept: FAMILY MEDICINE CLINIC | Age: 69
End: 2019-09-25
Payer: MEDICARE

## 2019-09-25 DIAGNOSIS — Z23 NEED FOR IMMUNIZATION AGAINST INFLUENZA: Primary | ICD-10-CM

## 2019-09-25 PROCEDURE — G0008 ADMIN INFLUENZA VIRUS VAC: HCPCS | Performed by: FAMILY MEDICINE

## 2019-09-25 PROCEDURE — 90653 IIV ADJUVANT VACCINE IM: CPT | Performed by: FAMILY MEDICINE

## 2019-09-25 NOTE — PROGRESS NOTES
Vaccine Information Sheet, \"Influenza - Inactivated\"  given to Faith Bacon, or parent/legal guardian of  Faith Bacon and verbalized understanding. Patient responses:    Have you ever had a reaction to a flu vaccine? No  Do you have any current illness? No  Have you ever had Guillian Mcallen Syndrome? No  Do you have a serious allergy to any of the follow: Neomycin, Polymyxin, Thimerosal, eggs or egg products? No    Flu vaccine given per order. Please see immunization tab. Risks and benefits explained. Current VIS given. Consent signed.     Immunizations Administered     Name Date Dose Route    Influenza, Triv, inactivated, subunit, adjuvanted, IM (Fluad 65 yrs and older) 9/25/2019 0.5 mL Intramuscular    Site: Deltoid- Left    Lot: [id number]    [address]: 88559-231-14

## 2019-10-14 ENCOUNTER — TELEPHONE (OUTPATIENT)
Dept: PHARMACY | Age: 69
End: 2019-10-14

## 2019-10-14 ENCOUNTER — ANTI-COAG VISIT (OUTPATIENT)
Dept: PHARMACY | Age: 69
End: 2019-10-14
Payer: MEDICARE

## 2019-10-14 DIAGNOSIS — Z95.2 S/P AVR (AORTIC VALVE REPLACEMENT): ICD-10-CM

## 2019-10-14 LAB — INTERNATIONAL NORMALIZATION RATIO, POC: 3.4

## 2019-10-14 PROCEDURE — 85610 PROTHROMBIN TIME: CPT

## 2019-10-14 PROCEDURE — 99211 OFF/OP EST MAY X REQ PHY/QHP: CPT

## 2019-11-04 ENCOUNTER — ANTI-COAG VISIT (OUTPATIENT)
Dept: PHARMACY | Age: 69
End: 2019-11-04
Payer: MEDICARE

## 2019-11-04 DIAGNOSIS — Z95.2 S/P AVR (AORTIC VALVE REPLACEMENT): ICD-10-CM

## 2019-11-04 LAB — INTERNATIONAL NORMALIZATION RATIO, POC: 1.5

## 2019-11-04 PROCEDURE — 85610 PROTHROMBIN TIME: CPT

## 2019-11-04 PROCEDURE — 99211 OFF/OP EST MAY X REQ PHY/QHP: CPT

## 2019-11-05 RX ORDER — WARFARIN SODIUM 7.5 MG/1
TABLET ORAL
Qty: 78 TABLET | Refills: 2 | Status: SHIPPED | OUTPATIENT
Start: 2019-11-05 | End: 2019-12-04 | Stop reason: DRUGHIGH

## 2019-11-13 ENCOUNTER — ANTI-COAG VISIT (OUTPATIENT)
Dept: PHARMACY | Age: 69
End: 2019-11-13
Payer: MEDICARE

## 2019-11-13 DIAGNOSIS — Z95.2 S/P AVR (AORTIC VALVE REPLACEMENT): ICD-10-CM

## 2019-11-13 LAB — INTERNATIONAL NORMALIZATION RATIO, POC: 2.1

## 2019-11-13 PROCEDURE — 85610 PROTHROMBIN TIME: CPT

## 2019-11-13 PROCEDURE — 99211 OFF/OP EST MAY X REQ PHY/QHP: CPT

## 2019-12-04 ENCOUNTER — ANTI-COAG VISIT (OUTPATIENT)
Dept: PHARMACY | Age: 69
End: 2019-12-04
Payer: MEDICARE

## 2019-12-04 DIAGNOSIS — Z95.2 S/P AVR (AORTIC VALVE REPLACEMENT): ICD-10-CM

## 2019-12-04 LAB — INTERNATIONAL NORMALIZATION RATIO, POC: 3.8

## 2019-12-04 PROCEDURE — 99211 OFF/OP EST MAY X REQ PHY/QHP: CPT

## 2019-12-04 PROCEDURE — 85610 PROTHROMBIN TIME: CPT

## 2019-12-04 RX ORDER — WARFARIN SODIUM 7.5 MG/1
TABLET ORAL
Qty: 78 TABLET | Refills: 2 | Status: SHIPPED
Start: 2019-12-04 | End: 2020-07-24

## 2019-12-09 ENCOUNTER — OFFICE VISIT (OUTPATIENT)
Dept: CARDIOLOGY CLINIC | Age: 69
End: 2019-12-09
Payer: MEDICARE

## 2019-12-09 VITALS
SYSTOLIC BLOOD PRESSURE: 112 MMHG | BODY MASS INDEX: 30.24 KG/M2 | OXYGEN SATURATION: 98 % | DIASTOLIC BLOOD PRESSURE: 72 MMHG | WEIGHT: 216 LBS | HEART RATE: 68 BPM | HEIGHT: 71 IN

## 2019-12-09 DIAGNOSIS — E78.2 MIXED HYPERLIPIDEMIA: ICD-10-CM

## 2019-12-09 DIAGNOSIS — I25.10 CORONARY ARTERY DISEASE INVOLVING NATIVE CORONARY ARTERY OF NATIVE HEART WITHOUT ANGINA PECTORIS: ICD-10-CM

## 2019-12-09 DIAGNOSIS — I10 ESSENTIAL HYPERTENSION: ICD-10-CM

## 2019-12-09 DIAGNOSIS — I35.0 NONRHEUMATIC AORTIC VALVE STENOSIS: ICD-10-CM

## 2019-12-09 DIAGNOSIS — Z95.2 S/P AVR: Primary | ICD-10-CM

## 2019-12-09 PROCEDURE — G8417 CALC BMI ABV UP PARAM F/U: HCPCS | Performed by: INTERNAL MEDICINE

## 2019-12-09 PROCEDURE — G8598 ASA/ANTIPLAT THER USED: HCPCS | Performed by: INTERNAL MEDICINE

## 2019-12-09 PROCEDURE — 3017F COLORECTAL CA SCREEN DOC REV: CPT | Performed by: INTERNAL MEDICINE

## 2019-12-09 PROCEDURE — 1123F ACP DISCUSS/DSCN MKR DOCD: CPT | Performed by: INTERNAL MEDICINE

## 2019-12-09 PROCEDURE — G8427 DOCREV CUR MEDS BY ELIG CLIN: HCPCS | Performed by: INTERNAL MEDICINE

## 2019-12-09 PROCEDURE — 4040F PNEUMOC VAC/ADMIN/RCVD: CPT | Performed by: INTERNAL MEDICINE

## 2019-12-09 PROCEDURE — G8482 FLU IMMUNIZE ORDER/ADMIN: HCPCS | Performed by: INTERNAL MEDICINE

## 2019-12-09 PROCEDURE — 1036F TOBACCO NON-USER: CPT | Performed by: INTERNAL MEDICINE

## 2019-12-09 PROCEDURE — 99214 OFFICE O/P EST MOD 30 MIN: CPT | Performed by: INTERNAL MEDICINE

## 2019-12-09 PROCEDURE — 93000 ELECTROCARDIOGRAM COMPLETE: CPT | Performed by: INTERNAL MEDICINE

## 2020-01-08 ENCOUNTER — ANTI-COAG VISIT (OUTPATIENT)
Dept: PHARMACY | Age: 70
End: 2020-01-08
Payer: MEDICARE

## 2020-01-08 LAB — INTERNATIONAL NORMALIZATION RATIO, POC: 2.7

## 2020-01-08 PROCEDURE — 99211 OFF/OP EST MAY X REQ PHY/QHP: CPT

## 2020-01-08 PROCEDURE — 85610 PROTHROMBIN TIME: CPT

## 2020-01-08 NOTE — PROGRESS NOTES
Mr. Daphnie Rendon is a 81 y.o.  male with history of Heart Valve Replacement who presents today for anticoagulation monitoring and adjustment. Patient verifies current dosing regimen  Patient denies s/s bleeding/bruising/swelling/SOB  No blood in urine or stool. No dietary changes. No changes in medication/OTC agents/Herbals. No change in alcohol use. No missed doses. No Procedures scheduled in the future at this time. Lab Results   Component Value Date    INR 2.7 01/08/2020    INR 3.8 12/04/2019    INR 2.1 11/13/2019       Pertinent findings: Looks and feels well today. No changes in medications or diet. No bruising or bleeding noted. No change in dose for INR of  2.7. Will see in 4 weeks for next INR. Warfarin dosing: Continue Warfarin 7.5mg daily except 3.75mg(1/2 tablet) on Monday and Friday    After visit summary printed and reviewed with patient.

## 2020-01-15 ENCOUNTER — TELEPHONE (OUTPATIENT)
Dept: PHARMACY | Age: 70
End: 2020-01-15

## 2020-01-20 RX ORDER — MIRTAZAPINE 30 MG/1
TABLET, FILM COATED ORAL
Qty: 90 TABLET | Refills: 0 | Status: SHIPPED | OUTPATIENT
Start: 2020-01-20 | End: 2020-01-30 | Stop reason: ALTCHOICE

## 2020-01-28 ENCOUNTER — ANTI-COAG VISIT (OUTPATIENT)
Dept: PHARMACY | Age: 70
End: 2020-01-28
Payer: MEDICARE

## 2020-01-28 LAB — INTERNATIONAL NORMALIZATION RATIO, POC: 1.7

## 2020-01-28 PROCEDURE — 85610 PROTHROMBIN TIME: CPT

## 2020-01-28 PROCEDURE — 99211 OFF/OP EST MAY X REQ PHY/QHP: CPT

## 2020-01-30 ENCOUNTER — OFFICE VISIT (OUTPATIENT)
Dept: FAMILY MEDICINE CLINIC | Age: 70
End: 2020-01-30
Payer: MEDICARE

## 2020-01-30 VITALS
WEIGHT: 216 LBS | DIASTOLIC BLOOD PRESSURE: 74 MMHG | SYSTOLIC BLOOD PRESSURE: 128 MMHG | HEART RATE: 70 BPM | RESPIRATION RATE: 14 BRPM | BODY MASS INDEX: 30.24 KG/M2 | HEIGHT: 71 IN

## 2020-01-30 PROCEDURE — G8417 CALC BMI ABV UP PARAM F/U: HCPCS | Performed by: FAMILY MEDICINE

## 2020-01-30 PROCEDURE — G8482 FLU IMMUNIZE ORDER/ADMIN: HCPCS | Performed by: FAMILY MEDICINE

## 2020-01-30 PROCEDURE — G8427 DOCREV CUR MEDS BY ELIG CLIN: HCPCS | Performed by: FAMILY MEDICINE

## 2020-01-30 PROCEDURE — 3017F COLORECTAL CA SCREEN DOC REV: CPT | Performed by: FAMILY MEDICINE

## 2020-01-30 PROCEDURE — 1036F TOBACCO NON-USER: CPT | Performed by: FAMILY MEDICINE

## 2020-01-30 PROCEDURE — 4040F PNEUMOC VAC/ADMIN/RCVD: CPT | Performed by: FAMILY MEDICINE

## 2020-01-30 PROCEDURE — 1123F ACP DISCUSS/DSCN MKR DOCD: CPT | Performed by: FAMILY MEDICINE

## 2020-01-30 PROCEDURE — 99213 OFFICE O/P EST LOW 20 MIN: CPT | Performed by: FAMILY MEDICINE

## 2020-01-30 ASSESSMENT — PATIENT HEALTH QUESTIONNAIRE - PHQ9
2. FEELING DOWN, DEPRESSED OR HOPELESS: 0
SUM OF ALL RESPONSES TO PHQ9 QUESTIONS 1 & 2: 0
SUM OF ALL RESPONSES TO PHQ QUESTIONS 1-9: 0
SUM OF ALL RESPONSES TO PHQ QUESTIONS 1-9: 0
1. LITTLE INTEREST OR PLEASURE IN DOING THINGS: 0

## 2020-01-30 NOTE — PROGRESS NOTES
Dr. Adams [facility], [place], [address]  Phone: [phone]    HPI:  Chief Complaint   Patient presents with    Depression     DEPRESSION ROUTINE FOLLOW UP     Other     CHRONIC CONDITION         Daphnie Rendon is a 81 y.o. male here for evaluation of depression. Patient states that he visited the cardiologist on 12/9/19 and will be visiting him again in May 2020. He states that he is doing well s/p AVR. He notes that he has been sleeping well on remeron 30 mg 1 tablet daily. Patient states that he visits Dr. Charley Maldonado for management of his diabetes every 3 months and notes that his A1C has been around 6.4%. He reports that his mood has been good. He states that he wears his CPAP machine nightly and visits a sleep physician once a year. Patient notes that he has yearly eye exams. He states that he has no neuropathy in his feet. Patient reports that he has recently had some dental work.      Vitals:  BP Readings from Last 3 Encounters:   01/30/20 128/74   12/09/19 112/72   07/16/19 104/70       Pulse Readings from Last 3 Encounters:   01/30/20 70   12/09/19 68   07/10/19 80        Wt Readings from Last 3 Encounters:   01/30/20 216 lb (98 kg)   12/09/19 216 lb (98 kg)   07/16/19 213 lb (96.6 kg)        Medication Review:  Current Outpatient Medications   Medication Sig Dispense Refill    warfarin (JANTOVEN) 7.5 MG tablet Take 7.5mg by mouth daily EXCEPT  3.75mg every Monday and Friday or as directed by [facility] [phone] (Patient taking differently: Take 7.5mg by mouth daily EXCEPT  3.75mg every Monday and Friday or as directed by [facility] [phone] Goal INR = 2.5 - 3.5) 78 tablet 2    allopurinol (ZYLOPRIM) 300 MG tablet TAKE 1 TABLET DAILY 90 tablet 1    mirtazapine (REMERON) 30 MG tablet Take 1 tablet by mouth nightly 90 tablet 3    CPAP Machine MISC by Does not apply route CPAP 7 cwp, travel device with humidifier 1 each 0 emergence from general anesthesia     Sleep apnea 2005    CPAP nightly    Warfarin anticoagulation 2016    Wears glasses         Past Surgical History:   Procedure Laterality Date    AORTIC VALVE REPLACEMENT  09/14/2016    21mm [facility] [place] [facility]    COLECTOMY  1996    perforated bowel: colonoscopy    COLONOSCOPY  9/24/2014; 2004    x2 normal colonoscopies/ perforated bowel    CYST REMOVAL      hairy nevus left biceps as child    KNEE SURGERY Left 2004    torn meniscus     NASAL POLYP SURGERY  1989, 1987    OTHER SURGICAL HISTORY  1990s    kidney stone removal     ROTATOR CUFF REPAIR Right 2009        Social History     Socioeconomic History    Marital status:      Spouse name: Not on file    Number of children: 3    Years of education: Not on file    Highest education level: Not on file   Occupational History    Occupation:      Employer: [employer]   Social Needs    Financial resource strain: Not on file    Food insecurity:     Worry: Not on file     Inability: Not on file   [employer] needs:     Medical: Not on file     Non-medical: Not on file   Tobacco Use    Smoking status: Never Smoker    Smokeless tobacco: Never Used   Substance and Sexual Activity    Alcohol use:  Yes     Alcohol/week: 2.0 standard drinks     Types: 1 Shots of liquor, 1 Standard drinks or equivalent per week     Comment: 1 per week - is rare     Drug use: No     Comment: never    Sexual activity: Not Currently   Lifestyle    Physical activity:     Days per week: Not on file     Minutes per session: Not on file    Stress: Not on file   Relationships    Social connections:     Talks on phone: Not on file     Gets together: Not on file     Attends Confucianism service: Not on file     Active member of club or organization: Not on file     Attends meetings of clubs or organizations: Not on file     Relationship status: Not on file    Intimate partner violence:     Fear of current or ex partner: Not on file     Emotionally abused: Not on file     Physically abused: Not on file     Forced sexual activity: Not on file   Other Topics Concern    Not on file   Social History Narrative    Not on file        Family History   Problem Relation Age of Onset    Heart Disease Father     Kidney Disease Father         was on HD    Adelfo Lemus Arthritis Father     High Blood Pressure Father     High Cholesterol Mother     Stroke Mother     Diabetes Brother     Other Brother     High Cholesterol Brother     Diabetes Other         aunts, uncles/ grandparents    Heart Disease Maternal Grandfather     Cancer Paternal Grandmother         type unknown. Physical Exam:  Physical Exam  Vitals signs and nursing note reviewed. Constitutional:       General: He is not in acute distress. Appearance: He is well-developed. HENT:      Head: Normocephalic and atraumatic. Right Ear: External ear normal.      Left Ear: External ear normal.   Eyes:      General: No scleral icterus. Conjunctiva/sclera: Conjunctivae normal.   Cardiovascular:      Rate and Rhythm: Normal rate and regular rhythm. Heart sounds: Normal heart sounds. No murmur. Pulmonary:      Effort: Pulmonary effort is normal. No respiratory distress. Breath sounds: Normal breath sounds. No wheezing or rales. Abdominal:      General: There is no distension. Palpations: Abdomen is soft. Tenderness: There is no abdominal tenderness. Skin:     General: Skin is warm and dry. Neurological:      Mental Status: He is alert and oriented to person, place, and time.             Laboratory Studies:  Lab Results   Component Value Date    LABA1C 6.6 11/15/2019    LABA1C 6.9 08/09/2019    LABA1C 6.0 04/11/2019        Lab Results   Component Value Date    WBC 6.1 04/11/2019    HGB 14.9 04/11/2019    HCT 45.3 04/11/2019    MCV 90.2 04/11/2019     04/11/2019        Lab Results   Component Value Date     11/15/2019    K 4.2 11/15/2019     11/15/2019    CO2 22 11/15/2019    BUN 18 11/15/2019    CREATININE 1.2 11/15/2019    GLUCOSE 103 (H) 11/15/2019    CALCIUM 9.9 11/15/2019    PROT 6.6 11/15/2019    LABALBU 4.3 11/15/2019    BILITOT 0.3 11/15/2019    ALKPHOS 67 11/15/2019    AST 28 11/15/2019    ALT 30 11/15/2019    LABGLOM >60 11/15/2019    GFRAA >60 11/15/2019    AGRATIO 1.9 11/15/2019    GLOB 2.3 11/15/2019       Lab Results   Component Value Date    CHOL 102 11/15/2019    TRIG 237 (H) 11/15/2019    HDL 33 (L) 11/15/2019    LDLCHOLESTEROL 39 04/16/2016    LDLCALC 22 11/15/2019    LABVLDL 47 11/15/2019       Lab Results   Component Value Date    TSH 3.37 11/15/2019    R9STMNE 8.7 08/27/2011    FT3 2.4 08/09/2019    T4FREE 1.7 08/09/2019        Lab Results   Component Value Date    VITD25 49.7 11/15/2019    VITD25 51.9 04/11/2019    VITD25 33.2 01/11/2019            Health Maintenance Review:  Health Maintenance Due   Topic Date Due    Shingles Vaccine (2 of 3) 05/29/2015    Diabetic foot exam  08/05/2017    Annual Wellness Visit (AWV)  06/16/2019    Pneumococcal 65+ years Vaccine (2 of 2 - PPSV23) 10/11/2019         Cancer Screenings:  Lab Results   Component Value Date    PSA 2.09 01/11/2019    PSA 1.37 07/30/2016    PSA 1.50 07/11/2015     Immunizations:  Immunization History   Administered Date(s) Administered    Influenza 11/01/2015    Influenza Vaccine, unspecified formulation 10/05/2017    Influenza Virus Vaccine 10/04/2011, 10/04/2012    Influenza, High Dose (Fluzone 65 yrs and older) 10/11/2018    Influenza, [place], IM, (6 mo and older Fluzone, Flulaval, Fluarix and 3 yrs and older Afluria) 10/25/2016, 10/05/2017    Influenza, Triv, inactivated, subunit, adjuvanted, IM (Fluad 65 yrs and older) 09/25/2019    Pneumococcal Conjugate 13-valent ([id number]) 10/11/2018    Pneumococcal Polysaccharide ([id number]) 10/04/2012    Tdap (Boostrix, Adacel) 04/03/2015    Zoster Live (Zostavax) 04/03/2015 have reviewed the chart and discharge instructions (if applicable) and agree that the record reflects my personal performance and is accurate and complete.  Cirilo Gandara MD, 1/30/2020, 5:11 PM.

## 2020-02-05 ENCOUNTER — ANTI-COAG VISIT (OUTPATIENT)
Dept: PHARMACY | Age: 70
End: 2020-02-05
Payer: MEDICARE

## 2020-02-05 LAB — INTERNATIONAL NORMALIZATION RATIO, POC: 2.5

## 2020-02-05 PROCEDURE — 99211 OFF/OP EST MAY X REQ PHY/QHP: CPT

## 2020-02-05 PROCEDURE — 85610 PROTHROMBIN TIME: CPT

## 2020-02-05 RX ORDER — LEVOTHYROXINE SODIUM 112 UG/1
112 TABLET ORAL DAILY
COMMUNITY

## 2020-02-05 RX ORDER — NIACIN 1000 MG
1000 TABLET, EXTENDED RELEASE ORAL NIGHTLY
COMMUNITY

## 2020-03-04 ENCOUNTER — ANTI-COAG VISIT (OUTPATIENT)
Dept: PHARMACY | Age: 70
End: 2020-03-04
Payer: MEDICARE

## 2020-03-04 LAB — INTERNATIONAL NORMALIZATION RATIO, POC: 4.1

## 2020-03-04 PROCEDURE — 85610 PROTHROMBIN TIME: CPT

## 2020-03-04 PROCEDURE — 99211 OFF/OP EST MAY X REQ PHY/QHP: CPT

## 2020-03-18 ENCOUNTER — ANTI-COAG VISIT (OUTPATIENT)
Dept: PHARMACY | Age: 70
End: 2020-03-18
Payer: MEDICARE

## 2020-03-18 LAB — INTERNATIONAL NORMALIZATION RATIO, POC: 2.9

## 2020-03-18 PROCEDURE — 85610 PROTHROMBIN TIME: CPT

## 2020-03-18 PROCEDURE — 99211 OFF/OP EST MAY X REQ PHY/QHP: CPT

## 2020-03-18 NOTE — PROGRESS NOTES
Mr. Yaneth Acosta is a 81 y.o.  male with history of Heart Valve Replacement who presents today for anticoagulation monitoring and adjustment. Patient verifies current dosing regimen  Patient denies s/s bleeding/bruising/swelling/SOB  No blood in urine or stool. No dietary changes. No changes in medication/OTC agents/Herbals. No change in alcohol use. No missed doses. No Procedures scheduled in the future at this time. Lab Results   Component Value Date    INR 2.9 03/18/2020    INR 4.1 03/04/2020    INR 2.5 02/05/2020       Pertinent findings: He looks and feels well today. His tooth extraction was postponed indefinitely due to the Vinny virus. He will let us know when it is rescheduled. He will hold his warfarin for 5 days prior to the procedure, in order to get the INR under 2, as desired by the oral surgeon. No change in warfarin dose for INR of 2.9 today. We will schedule his next INR for 6 weeks due to corona virus concerns. He will call with any concerns or medication chnages. Warfarin dosing: Continue:  Warfarin 7.5mg daily except 3.75mg(1/2 tablet) on Monday and Friday      After visit summary printed and reviewed with patient.

## 2020-04-21 RX ORDER — MIRTAZAPINE 30 MG/1
TABLET, FILM COATED ORAL
Qty: 90 TABLET | Refills: 0 | OUTPATIENT
Start: 2020-04-21

## 2020-04-27 ENCOUNTER — TELEPHONE (OUTPATIENT)
Dept: PHARMACY | Age: 70
End: 2020-04-27

## 2020-04-29 ENCOUNTER — ANTI-COAG VISIT (OUTPATIENT)
Dept: PHARMACY | Age: 70
End: 2020-04-29
Payer: MEDICARE

## 2020-04-29 DIAGNOSIS — Z95.2 S/P AVR (AORTIC VALVE REPLACEMENT): ICD-10-CM

## 2020-04-29 LAB
INR BLD: 2.83 (ref 0.86–1.14)
PROTHROMBIN TIME: 33.2 SEC (ref 10–13.2)

## 2020-04-29 PROCEDURE — 99211 OFF/OP EST MAY X REQ PHY/QHP: CPT

## 2020-05-11 RX ORDER — MIRTAZAPINE 30 MG/1
TABLET, FILM COATED ORAL
Qty: 90 TABLET | Refills: 1 | Status: SHIPPED | OUTPATIENT
Start: 2020-05-11 | End: 2020-11-02

## 2020-05-27 ENCOUNTER — ANTI-COAG VISIT (OUTPATIENT)
Dept: PHARMACY | Age: 70
End: 2020-05-27
Payer: MEDICARE

## 2020-05-27 DIAGNOSIS — Z95.2 S/P AVR (AORTIC VALVE REPLACEMENT): ICD-10-CM

## 2020-05-27 LAB
INR BLD: 2.94 (ref 0.86–1.14)
PROTHROMBIN TIME: 34.5 SEC (ref 10–13.2)

## 2020-05-27 PROCEDURE — 99211 OFF/OP EST MAY X REQ PHY/QHP: CPT

## 2020-06-08 RX ORDER — ALLOPURINOL 300 MG/1
TABLET ORAL
Qty: 90 TABLET | Refills: 1 | Status: SHIPPED | OUTPATIENT
Start: 2020-06-08 | End: 2020-11-11

## 2020-06-09 ENCOUNTER — HOSPITAL ENCOUNTER (OUTPATIENT)
Dept: NON INVASIVE DIAGNOSTICS | Age: 70
Discharge: HOME | End: 2020-06-09
Payer: MEDICARE

## 2020-06-09 LAB
LV EF: 58 %
LVEF MODALITY: NORMAL

## 2020-06-09 PROCEDURE — 93306 TTE W/DOPPLER COMPLETE: CPT

## 2020-06-12 NOTE — PROGRESS NOTES
m)   Wt 213 lb (96.6 kg) Comment: with shoes  SpO2 97%   BMI 29.71 kg/m²   Wt Readings from Last 2 Encounters:   06/16/20 213 lb (96.6 kg)   01/30/20 216 lb (98 kg)     Constitutional: He is oriented to person, place, and time. He appears well-developed and well-nourished. In no acute distress. HEENT: Normocephalic and atraumatic. Sclerae anicteric. No xanthelasmas. EOM's intact. Neck: Supple. No JVD present. Carotids without bruits. No thyromegaly present. No lymphadenopathy present. Cardiovascular: RRR, normal S1 and S2; soft valve closure  Pulmonary/Chest: Effort normal.  Lungs clear to auscultation. Chest wall nontender  Abdominal: soft, nontender, nondistended. + bowel sounds; no hepatomegaly or bruits. Extremities: No edema, cyanosis, or clubbing. Pulses are 2+ radial/carotid/DP/PT bilaterally. Cap refill brisk. Neurological: No focal deficit. Skin: Skin is warm and dry. Psychiatric: He has a normal mood and affect. His speech is normal and behavior is normal.     Lab Review:   Lab Results   Component Value Date    TRIG 174 03/13/2020    HDL 23 03/13/2020    HDL 26 04/27/2012    LDLCALC 20 03/13/2020    LABVLDL 35 03/13/2020     Lab Results   Component Value Date     03/13/2020    K 4.6 03/13/2020     03/13/2020    CO2 24 03/13/2020    BUN 22 03/13/2020    CREATININE 1.5 03/13/2020    GLUCOSE 86 03/13/2020    CALCIUM 9.9 03/13/2020      Lab Results   Component Value Date    WBC 6.1 04/11/2019    HGB 14.9 04/11/2019    HCT 45.3 04/11/2019    MCV 90.2 04/11/2019     04/11/2019     Echo 6/9/2020:   Left ventricular cavity size is normal.   There is mild concentric left ventricular hypertrophy. Ejection fraction is visually estimated to be 55-60%. grade 1 diastolic dysfunction   A 21 mm [facility] [place] mechanical aortic valve appears well seated with a   maximum velocity of 2.67 m/s and a mean gradient of 15 mmHg. No evidence of aortic valve regurgitation.     5/16/2019

## 2020-06-16 ENCOUNTER — OFFICE VISIT (OUTPATIENT)
Dept: CARDIOLOGY CLINIC | Age: 70
End: 2020-06-16
Payer: MEDICARE

## 2020-06-16 VITALS
BODY MASS INDEX: 29.82 KG/M2 | HEIGHT: 71 IN | OXYGEN SATURATION: 97 % | HEART RATE: 72 BPM | WEIGHT: 213 LBS | DIASTOLIC BLOOD PRESSURE: 78 MMHG | SYSTOLIC BLOOD PRESSURE: 120 MMHG | TEMPERATURE: 97.6 F

## 2020-06-16 PROCEDURE — G8427 DOCREV CUR MEDS BY ELIG CLIN: HCPCS | Performed by: NURSE PRACTITIONER

## 2020-06-16 PROCEDURE — 99214 OFFICE O/P EST MOD 30 MIN: CPT | Performed by: NURSE PRACTITIONER

## 2020-06-16 PROCEDURE — 1123F ACP DISCUSS/DSCN MKR DOCD: CPT | Performed by: NURSE PRACTITIONER

## 2020-06-16 PROCEDURE — 4040F PNEUMOC VAC/ADMIN/RCVD: CPT | Performed by: NURSE PRACTITIONER

## 2020-06-16 PROCEDURE — 3017F COLORECTAL CA SCREEN DOC REV: CPT | Performed by: NURSE PRACTITIONER

## 2020-06-16 PROCEDURE — 93000 ELECTROCARDIOGRAM COMPLETE: CPT | Performed by: NURSE PRACTITIONER

## 2020-06-16 PROCEDURE — G8417 CALC BMI ABV UP PARAM F/U: HCPCS | Performed by: NURSE PRACTITIONER

## 2020-06-16 PROCEDURE — 1036F TOBACCO NON-USER: CPT | Performed by: NURSE PRACTITIONER

## 2020-07-09 ENCOUNTER — ANTI-COAG VISIT (OUTPATIENT)
Dept: PHARMACY | Age: 70
End: 2020-07-09
Payer: MEDICARE

## 2020-07-09 ENCOUNTER — OFFICE VISIT (OUTPATIENT)
Dept: PULMONOLOGY | Age: 70
End: 2020-07-09
Payer: MEDICARE

## 2020-07-09 VITALS
WEIGHT: 212 LBS | BODY MASS INDEX: 29.68 KG/M2 | RESPIRATION RATE: 14 BRPM | DIASTOLIC BLOOD PRESSURE: 76 MMHG | TEMPERATURE: 97.7 F | HEIGHT: 71 IN | SYSTOLIC BLOOD PRESSURE: 118 MMHG | HEART RATE: 74 BPM | OXYGEN SATURATION: 96 %

## 2020-07-09 LAB — INTERNATIONAL NORMALIZATION RATIO, POC: 1.4

## 2020-07-09 PROCEDURE — 4040F PNEUMOC VAC/ADMIN/RCVD: CPT | Performed by: INTERNAL MEDICINE

## 2020-07-09 PROCEDURE — 99213 OFFICE O/P EST LOW 20 MIN: CPT | Performed by: INTERNAL MEDICINE

## 2020-07-09 PROCEDURE — G8427 DOCREV CUR MEDS BY ELIG CLIN: HCPCS | Performed by: INTERNAL MEDICINE

## 2020-07-09 PROCEDURE — 1036F TOBACCO NON-USER: CPT | Performed by: INTERNAL MEDICINE

## 2020-07-09 PROCEDURE — 85610 PROTHROMBIN TIME: CPT

## 2020-07-09 PROCEDURE — 1123F ACP DISCUSS/DSCN MKR DOCD: CPT | Performed by: INTERNAL MEDICINE

## 2020-07-09 PROCEDURE — 99211 OFF/OP EST MAY X REQ PHY/QHP: CPT

## 2020-07-09 PROCEDURE — G8417 CALC BMI ABV UP PARAM F/U: HCPCS | Performed by: INTERNAL MEDICINE

## 2020-07-09 PROCEDURE — 3017F COLORECTAL CA SCREEN DOC REV: CPT | Performed by: INTERNAL MEDICINE

## 2020-07-09 ASSESSMENT — SLEEP AND FATIGUE QUESTIONNAIRES
HOW LIKELY ARE YOU TO NOD OFF OR FALL ASLEEP IN A CAR, WHILE STOPPED FOR A FEW MINUTES IN TRAFFIC: 0
HOW LIKELY ARE YOU TO NOD OFF OR FALL ASLEEP WHILE SITTING INACTIVE IN A PUBLIC PLACE: 0
HOW LIKELY ARE YOU TO NOD OFF OR FALL ASLEEP WHILE SITTING AND READING: 1
HOW LIKELY ARE YOU TO NOD OFF OR FALL ASLEEP WHILE LYING DOWN TO REST IN THE AFTERNOON WHEN CIRCUMSTANCES PERMIT: 1
ESS TOTAL SCORE: 3
HOW LIKELY ARE YOU TO NOD OFF OR FALL ASLEEP WHEN YOU ARE A PASSENGER IN A CAR FOR AN HOUR WITHOUT A BREAK: 0
HOW LIKELY ARE YOU TO NOD OFF OR FALL ASLEEP WHILE WATCHING TV: 1
HOW LIKELY ARE YOU TO NOD OFF OR FALL ASLEEP WHILE SITTING QUIETLY AFTER LUNCH WITHOUT ALCOHOL: 0
HOW LIKELY ARE YOU TO NOD OFF OR FALL ASLEEP WHILE SITTING AND TALKING TO SOMEONE: 0

## 2020-07-09 NOTE — PROGRESS NOTES
REASON FOR CONSULTATION/CC: JOSÉ MANUEL      PCP: Cecille Chaparro MD    HISTORY OF PRESENT ILLNESS: Vania Lee is a 81y.o. year old male with a history of JOSÉ MANUEL who presents :     Sleep history:       JOSÉ MANUEL  No issues    hypersomnia  Controlled    No dry mouth                   [place] Sleepiness Scale:    Sleep Medicine 7/9/2020 7/10/2019 4/30/2018 10/5/2017 8/4/2016   Sitting and reading 1 2 1 2 1   Watching TV 1 2 1 2 2   Sitting, inactive in a public place (e.g. a theatre or a meeting) 0 1 0 1 0   As a passenger in a car for an hour without a break 0 0 0 1 0   Lying down to rest in the afternoon when circumstances permit 1 1 1 2 0   Sitting and talking to someone 0 0 0 0 0   Sitting quietly after a lunch without alcohol 0 0 0 1 0   In a car, while stopped for a few minutes in traffic 0 0 0 0 0   Total score 3 6 3 9 3   Neck circumference - - 17 17 17         0 = no chance of dozing  1 = slight chance of dozing  2 = moderate chance of dozing  3 = high chance of dozing    Interpretation:   0-7: It is unlikely that you are abnormally sleepy. 8-9:You have an average amount of daytime sleepiness. 10-15: You may be excessively sleepy depending on the situation. You may want to consider   seeking medical attention. 16-24: You are excessively sleepy and should consider seeking medical attention      PAST MEDICAL HISTORY:  Past Medical History:   Diagnosis Date    Aortic stenosis 9/2013    moderate    Arthritis     Asthma     BRONCHIAL ASTHMA  AS A CHILD    Deviated septum     Diabetes mellitus type II 2000    Dr. Eddi Flower thyroiditis dx 1984    medical treatment    Homocysteinemia ([facility] [address]     Hyperlipidemia     Hypertension     Hypertriglyceridemia     Hypogonadism male     topical testosterone     Hypothyroid 12/9/2011    Kidney stone 1990s    Passed on own and also surgical excision     Mechanical heart valve present 2016    Murmur since 2005    Nasal polyps     Osteoarthritis     Right thumb  Prolonged emergence from general anesthesia     Sleep apnea 2005    CPAP nightly    Warfarin anticoagulation 2016    Wears glasses        PAST SURGICAL HISTORY:  Past Surgical History:   Procedure Laterality Date    AORTIC VALVE REPLACEMENT  09/14/2016    21mm [facility]    COLECTOMY  1996    perforated bowel: colonoscopy    COLONOSCOPY  9/24/2014; 2004    x2 normal colonoscopies/ perforated bowel    CYST REMOVAL      hairy nevus left biceps as child    KNEE SURGERY Left 2004    torn meniscus     NASAL POLYP SURGERY  1989, 1987    OTHER SURGICAL HISTORY  1990s    kidney stone removal     ROTATOR CUFF REPAIR Right 2009       FAMILY HISTORY:  family history includes Arthritis in his father; Cancer in his paternal grandmother; Diabetes in his brother and another family member; Heart Disease in his father and maternal grandfather; High Blood Pressure in his father; High Cholesterol in his brother and mother; Kidney Disease in his father; Other in his brother; Stroke in his mother. SOCIAL HISTORY:   reports that he has never smoked. He has never used smokeless tobacco.      ALLERGIES:  Patient is allergic to ciprofloxacin. REVIEW OF SYSTEMS:  Constitutional: Negative for fever   HENT: Negative for sore throat  Respiratory: Negative for dyspnea, cough  Cardiovascular: Negative for chest pain  Gastrointestinal: Negative for vomiting, diarrhea   Skin: Negative for rash  Psychiatric/Behavorial: Negative for anxiety          Objective:   PHYSICAL EXAM:  Blood pressure 118/76, pulse 74, temperature 97.7 °F (36.5 °C), temperature source Oral, resp. rate 14, height 5' 11\" (1.803 m), weight 212 lb (96.2 kg), SpO2 96 %.'  Gen: No distress. ENT:   Resp: No accessory muscle use. No crackles. No wheezes. No rhonchi. CV: Regular rate. Regular rhythm. No murmur or rub. No edema. Skin: Warm, dry, normal texture and turgor. No nodule on exposed extremities. M/S: No cyanosis. No clubbing.  No joint deformity. Psych: Oriented x 3. No anxiety. Awake. Alert. Intact judgement and insight. Good Mood / Affect. Memory appears in tact     Current Outpatient Medications   Medication Sig Dispense Refill    allopurinol (ZYLOPRIM) 300 MG tablet TAKE 1 TABLET DAILY 90 tablet 1    mirtazapine (REMERON) 30 MG tablet TAKE 1 TABLET BY MOUTH EVERY DAY EVERY NIGHT 90 tablet 1    levothyroxine (SYNTHROID) 125 MCG tablet Take 125 mcg by mouth Daily      Niacin ER 1000 MG TBCR Take 1,000 mg by mouth nightly      warfarin (JANTOVEN) 7.5 MG tablet Take 7.5mg by mouth daily EXCEPT  3.75mg every Monday and Friday or as directed by [facility] [phone] (Patient taking differently: Take 7.5mg by mouth daily EXCEPT  3.75mg every Monday and Friday or as directed by [facility] [phone] Goal INR = 2.5 - 3.5) 78 tablet 2    CPAP Machine MISC by Does not apply route CPAP 7 cwp, travel device with humidifier 1 each 0    aspirin 81 MG EC tablet Take 1 tablet by mouth nightly 90 tablet 3    Dulaglutide (TRULICITY) 1.5 OD/7.7KH SOPN Inject 1.5 mg into the skin every 7 days      TOUJEO SOLOSTAR 300 UNIT/ML injection pen Inject 40 Units into the skin daily 18 Pen 3    Empagliflozin-Metformin HCl ER 12.5-1000 MG TB24 Take 2 tablets by mouth daily (Patient taking differently: Take 1 tablet by mouth 2 times daily ) 180 tablet 3    rosuvastatin (CRESTOR) 40 MG tablet Take 1 tablet by mouth every evening 90 tablet 3    ZETIA 10 MG tablet TAKE 1 TABLET DAILY 90 tablet 2    metoprolol succinate (TOPROL XL) 25 MG extended release tablet Take 3 tablets by mouth 2 times daily 3 tabs po  tablet 3    losartan (COZAAR) 25 MG tablet Take 1 tablet by mouth daily 90 tablet 3    acetaminophen (TYLENOL) 325 MG tablet Take 2 tablets by mouth every 4 hours as needed for Pain 120 tablet 3    FOLBIC 2.5-25-2 MG TABS TAKE 1 TABLET DAILY 90 tablet 3    b complex vitamins capsule Take 1 capsule by mouth daily. No current facility-administered medications for this visit. Data Reviewed:   CBC and Renal reviewed  Last CBC  Lab Results   Component Value Date    WBC 6.1 04/11/2019    RBC 5.02 04/11/2019    HGB 14.9 04/11/2019    MCV 90.2 04/11/2019     04/11/2019     Last Renal  Lab Results   Component Value Date     03/13/2020    K 4.6 03/13/2020     03/13/2020    CO2 24 03/13/2020    CO2 22 11/15/2019    CO2 21 08/09/2019    BUN 22 03/13/2020    CREATININE 1.5 03/13/2020    GLUCOSE 86 03/13/2020    CALCIUM 9.9 03/13/2020       Last ABG  POC Blood Gas: No results found for: POCPH, POCPCO2, POCPO2, POCHCO3, NBEA, DFCE1FYY  No results for input(s): PH, PCO2, PO2, HCO3, BE, O2SAT in the last 72 hours. Assessment:     ·  JOSÉ MANUEL      Plan:            Problem List Items Addressed This Visit     JOSÉ MANUEL on CPAP     PAP download information:  Usage > 4 hours  100 %   Pressure setting  7 cwp    AHI with usage  1          Cleatus Mantle  is deriving benefit from PAP demonstrated by improved [place], AHI, symptoms.

## 2020-07-09 NOTE — PROGRESS NOTES
Mr. Geno Post is a 81 y.o.  male with history of Heart Valve Replacement. Mr. Geno Post had an INR test today. Results were reviewed and appropriate warfarin management was completed. THIS VISIT WAS COMPLETED AS:   []   A VIRTUAL VISIT VIA TELEPHONE IN EFFORTS TO REDUCE THE SPREAD OF COVID-19.  []   A DRIVE-THRU VISIT IN EFFORTS TO REDUCE THE SPREAD OF COVID-19. [x]   AN IN PERSON VISIT. PROTOCOLS WERE FOLLOWED WITH PRECAUTIONS TO REDUCE THE SPREAD OF COVID-19. Patient verifies current dosing regimen  Patient denies s/s bleeding/bruising/swelling/SOB  No blood in urine or stool. No dietary changes. No changes in medication/OTC agents/Herbals. No change in alcohol use. No missed doses. Lab Results   Component Value Date    INR 1.4 07/09/2020    INR 2.94 (H) 05/27/2020    INR 2.83 (H) 04/29/2020       Pertinent findings: Patient is having a tooth extraction tomorrow. His INR was 1.4 today and he stated the dentist wouldn't do the procedure if his INR was above 2. His warfarin dose was decreased to 3.75mg daily since 7/5/20 to reach this goal of INR < 2. No bleeding/bruising. No diet changes. Warfarin dosing: Told him to take 7.5mg today because it shouldn't increase his INR to up over 2. After the procedure tomorrow he will also take 7.5 then continue his normal dosing of 7.5mg daily except 3.75mg on Monday and Friday.     Next INR: 8/5/20     Medications reviewed and updated on home medication list: No: no changes  Warfarin dose reviewed and/or updated on the patient 's home medication list: Yes       CLINICAL PHARMACY CONSULT: MED RECONCILIATION/REVIEW ADDENDUM    For Pharmacy Admin Tracking Only    PHSO: Yes  Total # of Interventions Recommended: 1  - Increased Dose #: 1    Total Interventions Accepted: 1  Time Spent (min): 15

## 2020-07-09 NOTE — ASSESSMENT & PLAN NOTE
PAP download information:  Usage > 4 hours  100 %   Pressure setting  7 cwp    AHI with usage  1          Ko Elder  is deriving benefit from PAP demonstrated by improved [place], AHI, symptoms.

## 2020-07-21 DIAGNOSIS — I10 ESSENTIAL HYPERTENSION: ICD-10-CM

## 2020-07-21 DIAGNOSIS — I25.10 CORONARY ARTERY DISEASE INVOLVING NATIVE CORONARY ARTERY OF NATIVE HEART WITHOUT ANGINA PECTORIS: ICD-10-CM

## 2020-07-24 RX ORDER — WARFARIN SODIUM 7.5 MG/1
TABLET ORAL
Qty: 78 TABLET | Refills: 5 | Status: SHIPPED | OUTPATIENT
Start: 2020-07-24 | End: 2021-07-20

## 2020-08-05 ENCOUNTER — ANTI-COAG VISIT (OUTPATIENT)
Dept: PHARMACY | Age: 70
End: 2020-08-05
Payer: MEDICARE

## 2020-08-05 LAB — INTERNATIONAL NORMALIZATION RATIO, POC: 2.8

## 2020-08-05 PROCEDURE — 99211 OFF/OP EST MAY X REQ PHY/QHP: CPT

## 2020-08-05 PROCEDURE — 85610 PROTHROMBIN TIME: CPT

## 2020-08-05 NOTE — PROGRESS NOTES
Mr. Ema Thompson is a 81 y.o.  male. Mr. Ema Thompson had an INR test today. Results were reviewed and appropriate warfarin management was completed. THIS VISIT WAS COMPLETED AS:  []    A VIRTUAL VISIT VIA TELEPHONE IN EFFORTS TO REDUCE THE SPREAD OF COVID-19.  []    A DRIVE-THRU VISIT IN EFFORTS TO REDUCE THE SPREAD OF COVID-19. [x]    AN IN PERSON VISIT. PROTOCOLS WERE FOLLOWED WITH PRECAUTIONS TO REDUCE THE SPREAD OF COVID-19. Patient verifies current dosing regimen: Yes     Warfarin medication reviewed and updated on the patient 's home medication list: No: no change   All other medications reviewed and updated on the patient 's home medication list: No:      Lab Results   Component Value Date    INR 2.8 2020    INR 1.4 2020    INR 2.94 (H) 2020           Anticoagulation Summary  As of 2020    INR goal:   2.5-3.5   TTR:   69.4 % (3.7 y)   INR used for dosin.8 (2020)   Warfarin maintenance plan:   3.75 mg (7.5 mg x 0.5) every Mon, Fri; 7.5 mg (7.5 mg x 1) all other days   Weekly warfarin total:   45 mg   Plan last modified: Vincent Porras [facility] (2017)   Next INR check:   2020   Priority:   Maintenance   Target end date: Indefinite    Indications    S/P AVR (aortic valve replacement) [Z95.2]             Anticoagulation Episode Summary     INR check location:       Preferred lab:       Send INR reminders to:   [facility] STAFF    Comments:   EPIC      Anticoagulation Care Providers     Provider Role Specialty Phone number    Naren Anglin MD Referring Cardiology [phone]          Warfarin plan:     Description    Continue:  Warfarin 7.5mg daily except 3.75mg(1/2 tablet) on Monday and Friday      Please call with any new medications. Keep greens consistent. Reviewed AVS with patient / caregiver.       CLINICAL PHARMACY CONSULT: MED RECONCILIATION/REVIEW ADDENDUM    For Pharmacy Admin Tracking Only    PHSO: No  Total # of Interventions Recommended: 0    Total Interventions Accepted: 0  Time Spent (min): 15

## 2020-09-02 ENCOUNTER — ANTI-COAG VISIT (OUTPATIENT)
Dept: PHARMACY | Age: 70
End: 2020-09-02
Payer: MEDICARE

## 2020-09-02 VITALS — TEMPERATURE: 96.6 F

## 2020-09-02 LAB — INTERNATIONAL NORMALIZATION RATIO, POC: 3.4

## 2020-09-02 PROCEDURE — 99211 OFF/OP EST MAY X REQ PHY/QHP: CPT

## 2020-09-02 PROCEDURE — 85610 PROTHROMBIN TIME: CPT

## 2020-09-02 NOTE — PROGRESS NOTES
Mr. Niecy Cruz is a 81 y.o.  male. Mr. Niecy Cruz had an INR test today. Results were reviewed and appropriate warfarin management was completed. THIS VISIT WAS COMPLETED AS:   []    A VIRTUAL VISIT VIA TELEPHONE IN EFFORTS TO REDUCE THE SPREAD OF COVID-19.  []    A DRIVE-THRU VISIT IN EFFORTS TO REDUCE THE SPREAD OF COVID-19. [x]    AN IN PERSON VISIT. PROTOCOLS WERE FOLLOWED WITH PRECAUTIONS TO REDUCE THE SPREAD OF COVID-19. Patient verifies current dosing regimen: Yes     Warfarin medication reviewed and updated on the patient 's home medication list: Yes   All other medications reviewed and updated on the patient 's home medication list: No:      Lab Results   Component Value Date    INR 3.4 09/02/2020    INR 2.8 08/05/2020    INR 1.4 07/09/2020           Anticoagulation Summary  As of 9/2/2020    INR goal:   2.5-3.5   TTR:   70.1 % (3.8 y)   INR used for dosing:   3.4 (9/2/2020)   Warfarin maintenance plan:   3.75 mg (7.5 mg x 0.5) every Mon, Fri; 7.5 mg (7.5 mg x 1) all other days   Weekly warfarin total:   45 mg   Plan last modified: Rachid Reyna [place] (12/14/2017)   Next INR check:   9/30/2020   Priority:   Maintenance   Target end date: Indefinite    Indications    S/P AVR (aortic valve replacement) [Z95.2]             Anticoagulation Episode Summary     INR check location:       Preferred lab:       Send INR reminders to:   [facility]ICAL STAFF    Comments:   EPIC      Anticoagulation Care Providers     Provider Role Specialty Phone number    Liss Birch MD Referring Cardiology [phone]          Warfarin plan:       Description    Continue:  Warfarin 7.5mg daily except 3.75mg(1/2 tablet) on Monday and Friday      Please call with any new medications. Keep greens consistent. Reviewed AVS with patient / caregiver.       CLINICAL PHARMACY CONSULT: MED RECONCILIATION/REVIEW ADDENDUM    For Pharmacy Admin Tracking Only    PHSO: No  Total # of Interventions Recommended: 0    Total Interventions Accepted: 0  Time Spent (min): 15

## 2020-09-24 ENCOUNTER — OFFICE VISIT (OUTPATIENT)
Dept: FAMILY MEDICINE CLINIC | Age: 70
End: 2020-09-24
Payer: MEDICARE

## 2020-09-24 VITALS
TEMPERATURE: 98.1 F | HEIGHT: 71 IN | HEART RATE: 70 BPM | BODY MASS INDEX: 28.14 KG/M2 | OXYGEN SATURATION: 98 % | DIASTOLIC BLOOD PRESSURE: 64 MMHG | WEIGHT: 201 LBS | SYSTOLIC BLOOD PRESSURE: 118 MMHG

## 2020-09-24 PROCEDURE — 90732 PPSV23 VACC 2 YRS+ SUBQ/IM: CPT | Performed by: NURSE PRACTITIONER

## 2020-09-24 PROCEDURE — 99213 OFFICE O/P EST LOW 20 MIN: CPT | Performed by: NURSE PRACTITIONER

## 2020-09-24 PROCEDURE — G0009 ADMIN PNEUMOCOCCAL VACCINE: HCPCS | Performed by: NURSE PRACTITIONER

## 2020-09-24 PROCEDURE — 90653 IIV ADJUVANT VACCINE IM: CPT | Performed by: NURSE PRACTITIONER

## 2020-09-24 PROCEDURE — G0008 ADMIN INFLUENZA VIRUS VAC: HCPCS | Performed by: NURSE PRACTITIONER

## 2020-09-24 ASSESSMENT — ENCOUNTER SYMPTOMS
DIARRHEA: 0
NAUSEA: 0
SORE THROAT: 0
COUGH: 0
SHORTNESS OF BREATH: 0
EYE DISCHARGE: 0
WHEEZING: 0
ABDOMINAL PAIN: 0
VOMITING: 0
EYE REDNESS: 0
EYE PAIN: 0
SINUS PAIN: 0
SINUS PRESSURE: 0
ABDOMINAL DISTENTION: 0

## 2020-09-24 NOTE — PROGRESS NOTES
2020     Zeferino Davis (:  1950) is a 81 y.o. male, here for evaluation of the following medical concerns:    ENRIQUE Thompson is here for depression follow-up. States that the medication is working very well. Denies any issues with side effects. He started seeing a nephrologist since he previous visit given a significant family history of ESRD. The nephrologist discontinued losartan due to hypotension. They also decreased the allopurinol. He gets his diabetic foot exam with the podiatrist. Has an appointment in October. He sees an endocrinologist to manage his diabetes. Review of Systems   Constitutional: Negative for chills and fever. HENT: Negative for ear discharge, ear pain, hearing loss, sinus pressure, sinus pain and sore throat. Eyes: Negative for pain, discharge and redness. Respiratory: Negative for cough, shortness of breath and wheezing. Cardiovascular: Negative for chest pain and palpitations. Gastrointestinal: Negative for abdominal distention, abdominal pain, diarrhea, nausea and vomiting. Genitourinary: Negative for dysuria and hematuria. Musculoskeletal: Negative for myalgias. Skin: Negative for rash. Neurological: Negative for weakness, numbness and headaches. Prior to Visit Medications    Medication Sig Taking?  Authorizing Provider   warfarin (JANTOVEN) 7.5 MG tablet TAKE 1 TABLET DAILY EXCEPT TAKE 1/2 TABLET (3.75MG) ON  AND  OR AS DIRECTED BY RASHARD Olea Yes Kimberly Domínguez MD   allopurinol (ZYLOPRIM) 300 MG tablet TAKE 1 TABLET DAILY  Patient taking differently: 150 mg TAKE 1 TABLET DAILY Yes MARITA Lanza CNP   mirtazapine (REMERON) 30 MG tablet TAKE 1 TABLET BY MOUTH EVERY DAY EVERY NIGHT Yes MARITA Smith CNP   levothyroxine (SYNTHROID) 112 MCG tablet Take 112 mcg by mouth Daily  Yes Historical Provider, MD   Niacin ER 1000 MG TBCR Take 1,000 mg by mouth nightly Yes Historical Provider, MD   CPAP Machine reviewed. Constitutional:       Appearance: Normal appearance. He is obese. HENT:      Head: Normocephalic and atraumatic. Right Ear: Tympanic membrane, ear canal and external ear normal.      Left Ear: Tympanic membrane, ear canal and external ear normal.      Nose: Nose normal.      Mouth/Throat:      Mouth: Mucous membranes are moist.      Pharynx: Oropharynx is clear. No posterior oropharyngeal erythema. Eyes:      General: No scleral icterus. Right eye: No discharge. Left eye: No discharge. Extraocular Movements: Extraocular movements intact. Pupils: Pupils are equal, round, and reactive to light. Neck:      Musculoskeletal: Normal range of motion and neck supple. Cardiovascular:      Rate and Rhythm: Normal rate and regular rhythm. Pulses: Normal pulses. Heart sounds: Normal heart sounds. Pulmonary:      Effort: Pulmonary effort is normal.      Breath sounds: Normal breath sounds. No wheezing. Abdominal:      General: Bowel sounds are normal.      Palpations: Abdomen is soft. Tenderness: There is no abdominal tenderness. There is no guarding or rebound. Musculoskeletal: Normal range of motion. Skin:     General: Skin is warm and dry. Capillary Refill: Capillary refill takes less than 2 seconds. Neurological:      Mental Status: He is alert and oriented to person, place, and time. Mental status is at baseline. Psychiatric:         Mood and Affect: Mood normal.         Behavior: Behavior normal.         ASSESSMENT/PLAN:  1. Major depressive disorder in full remission, unspecified whether recurrent ([facility]  - Controlled on mirtazapine. No changes today. - Educated to follow-up if symptoms develop. Call 911 with suicidal ideation. 2. Homocysteinemia ([facility]  - Managed by endocrinology. Cholesterol controlled on crestor and zetia. 3. Diabetes mellitus type 2 in obese ([facility] [address]  - Managed by endocrinology    4.  Need for influenza vaccination  - INFLUENZA, TRIV, INACTIVATED, SUBUNIT, ADJUVANTED, 65 YRS AND OLDER, IM, PREFILL SYR, 0.5ML (FLUAD TRIV)    5. Need for pneumococcal vaccination  - Pneumococcal polysaccharide vaccine 23-valent greater than or equal to 1yo subcutaneous/IM      Return in about 1 year (around 9/24/2021) for AWV. An electronic signature was used to authenticate this note.     --MARITA Sims - CNP on 9/24/2020 at 9:30 AM

## 2020-09-24 NOTE — PROGRESS NOTES
Immunizations Administered     Name Date Dose Route    Influenza, Triv, inactivated, subunit, adjuvanted, IM (Fluad 65 yrs and older) 9/24/2020 0.5 mL Intramuscular    Site: Deltoid- Left    Lot: [id number]    NDC: 44544-104-67          Vaccine Information Sheet, \"Influenza - Inactivated\"  given to Shannon Cardoso, or parent/legal guardian of  Shannon Cardoso and verbalized understanding. Patient responses:    Have you ever had a reaction to a flu vaccine? No  Do you have any current illness? No  Have you ever had [place] Syndrome? No  Do you have a serious allergy to any of the follow: Neomycin, Polymyxin, Thimerosal, eggs or egg products? No    Flu vaccine given per order. Please see immunization tab. Risks and benefits explained. Current VIS given.       Immunizations Administered     Name Date Dose Route    Influenza, Triv, inactivated, subunit, adjuvanted, IM (Fluad 65 yrs and older) 9/24/2020 0.5 mL Intramuscular    Site: Deltoid- Left    Lot: [id number]    [address]: 27357-044-03

## 2020-09-30 ENCOUNTER — ANTI-COAG VISIT (OUTPATIENT)
Dept: PHARMACY | Age: 70
End: 2020-09-30
Payer: MEDICARE

## 2020-09-30 LAB — INR BLD: 3.7

## 2020-09-30 PROCEDURE — 99211 OFF/OP EST MAY X REQ PHY/QHP: CPT

## 2020-09-30 PROCEDURE — 85610 PROTHROMBIN TIME: CPT

## 2020-09-30 NOTE — PROGRESS NOTES
Mr. Quinn Chen is a 81 y.o.  male. Mr. Quinn Chen had an INR test today. Results were reviewed and appropriate warfarin management was completed. THIS VISIT WAS COMPLETED AS:   []    A VIRTUAL VISIT VIA TELEPHONE IN EFFORTS TO REDUCE THE SPREAD OF COVID-19.  []    A DRIVE-THRU VISIT IN EFFORTS TO REDUCE THE SPREAD OF COVID-19. [x]    AN IN PERSON VISIT. PROTOCOLS WERE FOLLOWED WITH PRECAUTIONS TO REDUCE THE SPREAD OF COVID-19. Patient verifies current dosing regimen: Yes     Warfarin medication reviewed and updated on the patient 's home medication list: Yes   All other medications reviewed and updated on the patient 's home medication list: No: no changes      Lab Results   Component Value Date    INR 3.70 09/30/2020    INR 3.4 09/02/2020    INR 2.8 08/05/2020       Patient Findings     Negatives:   Signs/symptoms of bleeding, Missed doses, Change in medications, Change in diet/appetite, Bruising          Anticoagulation Summary  As of 9/30/2020    INR goal:   2.5-3.5   TTR:   69.3 % (3.9 y)   INR used for dosing:   3.70! (9/30/2020)   Warfarin maintenance plan:   3.75 mg (7.5 mg x 0.5) every Mon, Fri; 7.5 mg (7.5 mg x 1) all other days   Weekly warfarin total:   45 mg   Plan last modified:   Kendra Mccoy, [facility] (9/2/2020)   Next INR check:   10/28/2020   Priority:   Maintenance   Target end date: Indefinite    Indications    S/P AVR (aortic valve replacement) [Z95.2]             Anticoagulation Episode Summary     INR check location:       Preferred lab:       Send INR reminders to:   [facility]NAGEMENT CLINICAL STAFF    Comments:   EPIC      Anticoagulation Care Providers     Provider Role Specialty Phone number    Shabnam Montgomery MD Referring Cardiology [phone]          Warfarin plan:   INR today above goal (3.7). Pt states he is doing well and is not experiencing any bruising or bleeding.  Pt states he typically eats 2-3 salads and has only had 1 which could be attributing to his elevated INR. Instructed ot to take 3.75mg today then continue normal regimen indicated below. Recheck in 4 weeks. Description    Take warfarin 3.75mg (1/2 tablet) TODAY (9/30) then,  Continue: Warfarin 7.5mg daily except 3.75mg(1/2 tablet) on Monday and Friday      Please call with any new medications. Keep greens consistent. Reviewed AVS with patient / caregiver.       CLINICAL PHARMACY CONSULT: MED RECONCILIATION/REVIEW ADDENDUM    For Pharmacy Admin Tracking Only    PHSO: No  Total # of Interventions Recommended: 1  - Decreased Dose #: 1  - Maintenance Safety Lab Monitoring #: 1  Total Interventions Accepted: 1  Time Spent (min): 15

## 2020-10-28 ENCOUNTER — ANTI-COAG VISIT (OUTPATIENT)
Dept: PHARMACY | Age: 70
End: 2020-10-28
Payer: MEDICARE

## 2020-10-28 VITALS — TEMPERATURE: 96.4 F

## 2020-10-28 LAB — INTERNATIONAL NORMALIZATION RATIO, POC: 3

## 2020-10-28 PROCEDURE — 99211 OFF/OP EST MAY X REQ PHY/QHP: CPT

## 2020-10-28 PROCEDURE — 85610 PROTHROMBIN TIME: CPT

## 2020-10-28 NOTE — PROGRESS NOTES
Mr. Nikolas Edge is a 81 y.o.  male. Mr. Nikolas Edge had an INR test today. Results were reviewed and appropriate warfarin management was completed. THIS VISIT WAS COMPLETED AS:   []    A VIRTUAL VISIT VIA TELEPHONE IN EFFORTS TO REDUCE THE SPREAD OF COVID-19.  []    A DRIVE-THRU VISIT IN EFFORTS TO REDUCE THE SPREAD OF COVID-19. [x]    AN IN PERSON VISIT. PROTOCOLS WERE FOLLOWED WITH PRECAUTIONS TO REDUCE THE SPREAD OF COVID-19. Patient verifies current dosing regimen: Yes     Warfarin medication reviewed and updated on the patient 's home medication list: Yes   All other medications reviewed and updated on the patient 's home medication list: Yes    Lab Results   Component Value Date    INR 3.0 10/28/2020    INR 3.70 09/30/2020    INR 3.4 09/02/2020           Anticoagulation Summary  As of 10/28/2020    INR goal:   2.5-3.5   TTR:   69.4 % (4 y)   INR used for dosing:   3.0 (10/28/2020)   Warfarin maintenance plan:   3.75 mg (7.5 mg x 0.5) every Mon, Fri; 7.5 mg (7.5 mg x 1) all other days   Weekly warfarin total:   45 mg   Plan last modified:   Esperanza Moralez, [address] [facility] (9/2/2020)   Next INR check:   11/25/2020   Priority:   Maintenance   Target end date: Indefinite    Indications    S/P AVR (aortic valve replacement) [Z95.2]             Anticoagulation Episode Summary     INR check location:       Preferred lab:       Send INR reminders to:   [facility]NAGEMENT CLINICAL STAFF    Comments:   EPIC      Anticoagulation Care Providers     Provider Role Specialty Phone number    Yaima Welch MD Referring Cardiology [phone]          Warfarin plan:   Patient states he is doing well on warfarin therapy and reports no bruising or bleeding. Denies missed doses or changes in his diet. States he is now taking allopurinol 150 mg daily and is no longer taking losartan. No changes to weekly dose today for an INR of 3.0 (goal 2.5 to 3.5). Will recheck INR in 4 weeks.     Description    Continue: Warfarin 7.5 mg daily except 3.75mg (1/2 tablet) on Monday and Friday    Please call with any new medications. Keep greens consistent. Reviewed AVS with patient / caregiver. CLINICAL PHARMACY CONSULT: MED RECONCILIATION/REVIEW ADDENDUM    For Pharmacy Admin Tracking Only    PHSO: No  Total # of Interventions Recommended: 0  - Maintenance Safety Lab Monitoring #: 1  Total Interventions Accepted: 0  Time Spent (min): 50 Jelani Jack, Pharmacy Student.  10-

## 2020-11-02 RX ORDER — MIRTAZAPINE 30 MG/1
TABLET, FILM COATED ORAL
Qty: 90 TABLET | Refills: 0 | Status: SHIPPED | OUTPATIENT
Start: 2020-11-02 | End: 2021-02-03

## 2020-11-11 RX ORDER — ALLOPURINOL 300 MG/1
TABLET ORAL
Qty: 90 TABLET | Refills: 3 | Status: SHIPPED | OUTPATIENT
Start: 2020-11-11 | End: 2021-01-20

## 2020-11-25 ENCOUNTER — ANTI-COAG VISIT (OUTPATIENT)
Dept: PHARMACY | Age: 70
End: 2020-11-25
Payer: MEDICARE

## 2020-11-25 VITALS — TEMPERATURE: 96.4 F

## 2020-11-25 LAB — INTERNATIONAL NORMALIZATION RATIO, POC: 3

## 2020-11-25 PROCEDURE — 85610 PROTHROMBIN TIME: CPT

## 2020-11-25 PROCEDURE — 99211 OFF/OP EST MAY X REQ PHY/QHP: CPT

## 2020-11-25 NOTE — PROGRESS NOTES
Mr. Gloria Weaver is a 81 y.o.  male. Mr. Gloria Weaver had an INR test today. Results were reviewed and appropriate warfarin management was completed. THIS VISIT WAS COMPLETED AS:   []    A VIRTUAL VISIT VIA TELEPHONE IN EFFORTS TO REDUCE THE SPREAD OF COVID-19.  []    A DRIVE-THRU VISIT IN EFFORTS TO REDUCE THE SPREAD OF COVID-19. [x]    AN IN PERSON VISIT. PROTOCOLS WERE FOLLOWED WITH PRECAUTIONS TO REDUCE THE SPREAD OF COVID-19. Patient verifies current dosing regimen: Yes     Warfarin medication reviewed and updated on the patient 's home medication list: Yes   All other medications reviewed and updated on the patient 's home medication list: Yes     Lab Results   Component Value Date    INR 3.0 11/25/2020    INR 3.0 10/28/2020    INR 3.70 09/30/2020       Patient Findings     Positives:   Change in medications    Negatives:   Signs/symptoms of bleeding, Change in diet/appetite, Bruising    Comments:   Toujeo dose decrease - no affect on INR          Anticoagulation Summary  As of 11/25/2020    INR goal:   2.5-3.5   TTR:   70.0 % (4.1 y)   INR used for dosing:   3.0 (11/25/2020)   Warfarin maintenance plan:   3.75 mg (7.5 mg x 0.5) every Mon, Fri; 7.5 mg (7.5 mg x 1) all other days   Weekly warfarin total:   45 mg   Plan last modified:   Yamileth Prabhakar, [facility] (9/2/2020)   Next INR check:   12/23/2020   Priority:   Maintenance   Target end date: Indefinite    Indications    S/P AVR (aortic valve replacement) [Z95.2]             Anticoagulation Episode Summary     INR check location:       Preferred lab:       Send INR reminders to:   [facility]AFF    Comments:   EPIC      Anticoagulation Care Providers     Provider Role Specialty Phone number    Paulina Moss MD Referring Cardiology [phone]          Warfarin plan:   No changes    Description    Continue: Warfarin 7.5 mg daily except 3.75mg (1/2 tablet) on Monday and Friday    Please call with any new medications.   Keep greens

## 2020-11-30 NOTE — PROGRESS NOTES
[address]      Cardiology follow up    Handy Braxton  1950 December 1, 2020    CC: \"I am doing great. \"     HPI:  Thuy Delgadillo is a 63 y.o. male with PMH significant for type II diabetes and hypothyroid, vitamin D deficiency. Today, he is here for routine follow up. He says that he is doing great. He does get his labs from his endocrinologist and nephrologist. Rakesh Barnhart that nephrologist has adjusted his medications. Patient denies exertional chest pain/pressure, dyspnea at rest, TOLENTINO, PND, orthopnea, palpitations, lightheadedness, weight changes, changes in LE edema, and syncope. Patient reports compliance to his medications.      Past Medical History:   Diagnosis Date    Aortic stenosis 9/2013    moderate    Arthritis     Asthma     BRONCHIAL ASTHMA  AS A CHILD    Deviated septum     Diabetes mellitus type II 2000    Dr. Frederick Client thyroiditis dx 1984    medical treatment    Homocysteinemia ([address]     Hyperlipidemia     Hypertension     Hypertriglyceridemia     Hypogonadism male     topical testosterone     Hypothyroid 12/9/2011    Kidney stone 1990s    Passed on own and also surgical excision     Mechanical heart valve present 2016    Murmur since 2005    Nasal polyps     Osteoarthritis     Right thumb     Prolonged emergence from general anesthesia     Sleep apnea 2005    CPAP nightly    Warfarin anticoagulation 2016    Wears glasses      Past Surgical History:   Procedure Laterality Date    AORTIC VALVE REPLACEMENT  09/14/2016    21mm [facility] [place]    COLECTOMY  1996    perforated bowel: colonoscopy    COLONOSCOPY  9/24/2014; 2004    x2 normal colonoscopies/ perforated bowel    CYST REMOVAL      hairy nevus left biceps as child    KNEE SURGERY Left 2004    torn meniscus     NASAL POLYP SURGERY  1989, 1987    OTHER SURGICAL HISTORY  1990s    kidney stone removal     ROTATOR CUFF REPAIR Right 2009     Family History   Problem Relation Age of Onset    Heart Disease Father     Kidney Disease Father         was on HD     Arthritis Father     High Blood Pressure Father     High Cholesterol Mother     Stroke Mother     Diabetes Brother     Other Brother     High Cholesterol Brother     Diabetes Other         aunts, uncles/ grandparents    Heart Disease Maternal Grandfather     Cancer Paternal Grandmother         type unknown. Social History     Tobacco Use    Smoking status: Never Smoker    Smokeless tobacco: Never Used   Substance Use Topics    Alcohol use:  Yes     Alcohol/week: 2.0 standard drinks     Types: 1 Shots of liquor, 1 Standard drinks or equivalent per week     Comment: 1 per week - is rare     Drug use: No     Comment: never       Allergies   Allergen Reactions    Ciprofloxacin Hives     Current Outpatient Medications   Medication Sig Dispense Refill    allopurinol (ZYLOPRIM) 300 MG tablet TAKE 1 TABLET DAILY 90 tablet 3    mirtazapine (REMERON) 30 MG tablet TAKE 1 TABLET BY MOUTH EVERY DAY EVERY NIGHT 90 tablet 0    warfarin (JANTOVEN) 7.5 MG tablet TAKE 1 TABLET DAILY EXCEPT TAKE 1/2 TABLET (3.75MG) ON MONDAYS AND FRIDAYS OR AS DIRECTED BY [facility] 78 tablet 5    levothyroxine (SYNTHROID) 112 MCG tablet Take 112 mcg by mouth Daily       Niacin ER 1000 MG TBCR Take 1,000 mg by mouth nightly      CPAP Machine MISC by Does not apply route CPAP 7 cwp, travel device with humidifier 1 each 0    aspirin 81 MG EC tablet Take 1 tablet by mouth nightly 90 tablet 3    Dulaglutide (TRULICITY) 1.5 LZ/2.0JL SOPN Inject 1.5 mg into the skin every 7 days      TOUJEO SOLOSTAR 300 UNIT/ML injection pen Inject 40 Units into the skin daily 18 Pen 3    Empagliflozin-Metformin HCl ER 12.5-1000 MG TB24 Take 2 tablets by mouth daily (Patient taking differently: Take 1 tablet by mouth 2 times daily ) 180 tablet 3    rosuvastatin (CRESTOR) 40 MG tablet Take 1 tablet by mouth every evening 90 tablet 3    ZETIA 10 MG tablet TAKE 1 TABLET DAILY 90 tablet 2    metoprolol succinate (TOPROL XL) 25 MG extended release tablet Take 3 tablets by mouth 2 times daily 3 tabs po  tablet 3    acetaminophen (TYLENOL) 325 MG tablet Take 2 tablets by mouth every 4 hours as needed for Pain 120 tablet 3    FOLBIC 2.5-25-2 MG TABS TAKE 1 TABLET DAILY 90 tablet 3    b complex vitamins capsule Take 1 capsule by mouth daily. No current facility-administered medications for this visit. Review of Systems:    Constitutional: negative  Eyes: negative  Ears, nose, mouth, throat, and face: negative  Respiratory: negative  Cardiovascular: negative  Gastrointestinal: negative  Genitourinary:negative  Integument/breast: negative  Hematologic/lymphatic: negative  Musculoskeletal:negative  Neurological: negative  Behavioral/Psych: negative  Endocrine: negative  Allergic/Immunologic: negative     Physical Exam:   Vitals:    12/01/20 1243   BP: 124/78   Pulse: 64   Temp: 97.1 °F (36.2 °C)   SpO2: 98%   Weight: 196 lb 12.8 oz (89.3 kg)   Height: 5' 11\" (1.803 m)       Wt Readings from Last 3 Encounters:   12/01/20 196 lb 12.8 oz (89.3 kg)   09/24/20 201 lb (91.2 kg)   07/09/20 212 lb (96.2 kg)       Constitutional: He is oriented to person, place, and time. He appears well-developed and well-nourished. In no acute distress. Head: Normocephalic and atraumatic. Pupils equal and round. Neck: Neck supple. No JVP or carotid bruit appreciated. No mass and no thyromegaly present. No lymphadenopathy present. Cardiovascular: Normal rate. Normal heart sounds. Exam reveals no gallop and no friction rub. No murmur heard. Pulmonary/Chest: Effort normal and breath sounds normal. No respiratory distress. He has no wheezes, rhonchi or rales. Abdominal: Soft, non-tender. Bowel sounds are normal. He exhibits no organomegaly, mass or bruit. Extremities: No edema, cyanosis, or clubbing.      Pulses are 2+ radial/dorsalis pedis/posterior stenosis. 4. The left circumflex comes from the left main coronary artery. It is  patent, giving rise to several obtuse marginal without any stenosis. Shalini Cosby 6. The right coronary artery is nondominant and small caliber with 50 to 60%  stenosis in the midportion. 7. Aortic valve has 40 to 45 mmHg gradient. SUMMARY: Severe aortic valve stenosis. Stress Study:9/1/16   Pharmacological Stress/MPI Results:        1. Technically a satisfactory study.    2. Normal pharmacological stress portion of the study.    3. No evidence of Ischemia by Myocardial Perfusion Imaging.    4. Gated Study shows normal LV size and Systolic function; EF is 49 %. Carotid Duplex:9/12/16  Impressions   Right Impression   The right internal carotid artery appears to have a 1-15% diameter reducing   stenosis based on velocity criteria. The right vertebral artery demonstrates normal antegrade flow. Left Impression   The left internal carotid artery appears to have a 1-15% diameter reducing   stenosis based on velocity criteria. The left vertebral artery demonstrates normal antegrade flow. S/p AVR with 21mm [facility] [place] per Dr. Shanita Arriaga. Piña 9/14/16    Echo 5/24/17  Summary  Normal left ventricular size. Normal left ventricular systolic function with an estimated ejection  fraction of 55-60% . No regional wall motion abnormalities. A mechinical aortic valve appears well seated with a mean gradient of 16 mmHg. Normal right ventricular size and function. Echo:5/16/19  Summary  Left ventricular cavity size is normal.  There is mild concentric left ventricular hypertrophy. Ejection fraction is visually estimated to be 55-60%. A mechanical artificial aortic valve with a mean gradient of 17mmHg. No evidence of aortic valve regurgitation  Normal right ventricular size and function. mild MAC    ECHO 6/9/2020  Left ventricular cavity size is normal.  There is mild concentric left ventricular hypertrophy.   Ejection fraction is visually estimated to be 55-60%. grade 1 diastolic dysfunction  A 21 mm [facility] [place] mechanical aortic valve appears well seated with a maximum velocity of 2.67 m/s and a mean gradient of 15 mmHg. No evidence of aortic valve regurgitation. Assessment&Plan:    Severe Aortic Stenosis Bicuspid Aortic valve with no evidence of aortic aneurysm  S/p S/p AVR with 21mm [facility] [place] per Dr. Janny Hill. Piña 9/14/16  Asymptomatic. Continue Warfarin. CAD: Moderate left main stenosis. Asymptomatic. Continue Asa, B-blocker and statin therapy. HTN   Controlled. Continue current medical management. Hyperlipidemia:   Continue statin and Zetia. DM  Managed per Dr. Hilario Arambula     Hypothyroid  Managed per PCP. Follow up in 6 months. Thank you very much for allowing me to participate in the care of your patient. Please do not hesitate to contact me if you have any questions. Sincerely,    Nas Hilton MD, MPH      [facility], [address] [place] [address] [address] [place] [address]  Ph: [phone]  Fax: [phone]    This note was scribed in the presence of Dr Timo Graham, by Kar Del Real RN  Physician Attestation:  The scribes documentation has been prepared under my direction and personally reviewed by me in its entirety. I confirm that the note above accurately reflects all work, treatment, procedures, and medical decision making performed by me.

## 2020-12-01 ENCOUNTER — OFFICE VISIT (OUTPATIENT)
Dept: CARDIOLOGY CLINIC | Age: 70
End: 2020-12-01
Payer: MEDICARE

## 2020-12-01 VITALS
SYSTOLIC BLOOD PRESSURE: 124 MMHG | BODY MASS INDEX: 27.55 KG/M2 | HEART RATE: 64 BPM | WEIGHT: 196.8 LBS | DIASTOLIC BLOOD PRESSURE: 78 MMHG | HEIGHT: 71 IN | OXYGEN SATURATION: 98 % | TEMPERATURE: 97.1 F

## 2020-12-01 PROCEDURE — 1036F TOBACCO NON-USER: CPT | Performed by: INTERNAL MEDICINE

## 2020-12-01 PROCEDURE — 99213 OFFICE O/P EST LOW 20 MIN: CPT | Performed by: INTERNAL MEDICINE

## 2020-12-01 PROCEDURE — G8427 DOCREV CUR MEDS BY ELIG CLIN: HCPCS | Performed by: INTERNAL MEDICINE

## 2020-12-01 PROCEDURE — G8482 FLU IMMUNIZE ORDER/ADMIN: HCPCS | Performed by: INTERNAL MEDICINE

## 2020-12-01 PROCEDURE — 1123F ACP DISCUSS/DSCN MKR DOCD: CPT | Performed by: INTERNAL MEDICINE

## 2020-12-01 PROCEDURE — 3017F COLORECTAL CA SCREEN DOC REV: CPT | Performed by: INTERNAL MEDICINE

## 2020-12-01 PROCEDURE — 4040F PNEUMOC VAC/ADMIN/RCVD: CPT | Performed by: INTERNAL MEDICINE

## 2020-12-01 PROCEDURE — G8417 CALC BMI ABV UP PARAM F/U: HCPCS | Performed by: INTERNAL MEDICINE

## 2020-12-01 NOTE — PATIENT INSTRUCTIONS
Patient Education        Heart-Healthy Diet: Care Instructions  Your Care Instructions     A heart-healthy diet has lots of vegetables, fruits, nuts, beans, and whole grains, and is low in salt. It limits foods that are high in saturated fat, such as meats, cheeses, and fried foods. It may be hard to change your diet, but even small changes can lower your risk of heart attack and heart disease. Follow-up care is a key part of your treatment and safety. Be sure to make and go to all appointments, and call your doctor if you are having problems. It's also a good idea to know your test results and keep a list of the medicines you take. How can you care for yourself at home? Watch your portions  · Learn what a serving is. A \"serving\" and a \"portion\" are not always the same thing. Make sure that you are not eating larger portions than are recommended. For example, a serving of pasta is ½ cup. A serving size of meat is 2 to 3 ounces. A 3-ounce serving is about the size of a deck of cards. Measure serving sizes until you are good at [place]" them. Keep in mind that restaurants often serve portions that are 2 or 3 times the size of one serving. · To keep your energy level up and keep you from feeling hungry, eat often but in smaller portions. · Eat only the number of calories you need to stay at a healthy weight. If you need to lose weight, eat fewer calories than your body burns (through exercise and other physical activity). Eat more fruits and vegetables  · Eat a variety of fruit and vegetables every day. Dark green, deep orange, red, or yellow fruits and vegetables are especially good for you. Examples include spinach, carrots, peaches, and berries. · Keep carrots, celery, and other veggies handy for snacks. Buy fruit that is in season and store it where you can see it so that you will be tempted to eat it. · Cook dishes that have a lot of veggies in them, such as stir-fries and soups.   Limit saturated and trans fat  · Read food labels, and try to avoid saturated and trans fats. They increase your risk of heart disease. · Use olive or canola oil when you cook. · Bake, broil, grill, or steam foods instead of frying them. · Choose lean meats instead of high-fat meats such as hot dogs and sausages. Cut off all visible fat when you prepare meat. · Eat fish, skinless poultry, and meat alternatives such as soy products instead of high-fat meats. Soy products, such as tofu, may be especially good for your heart. · Choose low-fat or fat-free milk and dairy products. Eat foods high in fiber  · Eat a variety of grain products every day. Include whole-grain foods that have lots of fiber and nutrients. Examples of whole-grain foods include oats, whole wheat bread, and brown rice. · Buy whole-grain breads and cereals, instead of white bread or pastries. Limit salt and sodium  · Limit how much salt and sodium you eat to help lower your blood pressure. · Taste food before you salt it. Add only a little salt when you think you need it. With time, your taste buds will adjust to less salt. · Eat fewer snack items, fast foods, and other high-salt, processed foods. Check food labels for the amount of sodium in packaged foods. · Choose low-sodium versions of canned goods (such as soups, vegetables, and beans). Limit sugar  · Limit drinks and foods with added sugar. These include candy, desserts, and soda pop. Limit alcohol  · Limit alcohol to no more than 2 drinks a day for men and 1 drink a day for women. Too much alcohol can cause health problems. When should you call for help? Watch closely for changes in your health, and be sure to contact your doctor if:    · You would like help planning heart-healthy meals. Where can you learn more? Go to [email or web address] and sign in to your [employer] account.  Enter V137 in the [employer] box to learn more about \"Heart-Healthy Diet: Care Instructions. \"     If you do not have an account, please click on the \"Sign Up Now\" link. Current as of: August 22, 2019               Content Version: 12.6  © 5440-9464 [employer]. Care instructions adapted under license by [facility] ([facility]). If you have questions about a medical condition or this instruction, always ask your healthcare professional. [address] any warranty or liability for your use of this information.

## 2020-12-23 ENCOUNTER — ANTI-COAG VISIT (OUTPATIENT)
Dept: PHARMACY | Age: 70
End: 2020-12-23
Payer: MEDICARE

## 2020-12-23 VITALS — TEMPERATURE: 96.1 F

## 2020-12-23 LAB — INR BLD: 3.3

## 2020-12-23 PROCEDURE — 85610 PROTHROMBIN TIME: CPT

## 2020-12-23 PROCEDURE — 99211 OFF/OP EST MAY X REQ PHY/QHP: CPT

## 2020-12-23 NOTE — PROGRESS NOTES
Mr. Ritika Aguirre is a 78 y.o.  male. Mr. Ritika Aguirre had an INR test today. Results were reviewed and appropriate warfarin management was completed. THIS VISIT WAS COMPLETED AS:   []    A VIRTUAL VISIT VIA TELEPHONE IN EFFORTS TO REDUCE THE SPREAD OF COVID-19.  []    A DRIVE-THRU VISIT IN EFFORTS TO REDUCE THE SPREAD OF COVID-19. [x]    AN IN PERSON VISIT. PROTOCOLS WERE FOLLOWED WITH PRECAUTIONS TO REDUCE THE SPREAD OF COVID-19. Patient verifies current dosing regimen: Yes     Warfarin medication reviewed and updated on the patient 's home medication list: Yes   All other medications reviewed and updated on the patient 's home medication list: Yes     Lab Results   Component Value Date    INR 3.30 12/23/2020    INR 3.0 11/25/2020    INR 3.0 10/28/2020       Patient Findings     Negatives:  Signs/symptoms of bleeding, Change in health, Missed doses, Change in medications, Change in diet/appetite, Bruising          Anticoagulation Summary  As of 12/23/2020    INR goal:  2.5-3.5   TTR:  70.5 % (4.1 y)   INR used for dosing:  3.30 (12/23/2020)   Warfarin maintenance plan:  3.75 mg (7.5 mg x 0.5) every Mon, Fri; 7.5 mg (7.5 mg x 1) all other days   Weekly warfarin total:  45 mg   Plan last modified:  Taiwo Raymond, [facility] (9/2/2020)   Next INR check:  1/20/2021   Priority:  Maintenance   Target end date: Indefinite    Indications    S/P AVR (aortic valve replacement) [Z95.2]             Anticoagulation Episode Summary     INR check location:      Preferred lab:      Send INR reminders to:  [facility] STAFF    Comments:  EPIC      Anticoagulation Care Providers     Provider Role Specialty Phone number    Susan Lynn MD Referring Cardiology [phone]          Warfarin plan:   Looks and feels well today. No changes in medications or diet. No bruising or bleeding noted. No change in dose for INR of  3.3  Will see in 4 weeks for next INR.     Description Continue: Warfarin 7.5 mg daily except 3.75mg (1/2 tablet) on Monday and Friday    Call [phone] with signs or symptoms of bleeding or ANY medication changes (including over-the-counter medications or herbal supplements). If significant bleeding occurs please seek immediate medical attention. Keep the number of servings of vitamin K containing foods (dark green, leafy vegetables) the same each week. Please call if this changes. Limit alcohol intake. Please call if this changes. Immunization History   Administered Date(s) Administered    Influenza 11/01/2015    Influenza Vaccine, unspecified formulation 10/05/2017    Influenza Virus Vaccine 10/04/2011, 10/04/2012    Influenza, High Dose (Fluzone 65 yrs and older) 10/11/2018    Influenza, Jenelle Finner, IM, (6 mo and older Fluzone, Flulaval, Fluarix and 3 yrs and older Afluria) 10/25/2016, 10/05/2017    Influenza, Triv, inactivated, subunit, adjuvanted, IM (Fluad 65 yrs and older) 09/25/2019, 09/24/2020    Pneumococcal Conjugate 13-valent ([id number]) 10/11/2018    Pneumococcal Polysaccharide ([id number]) 10/04/2012, 09/24/2020    Tdap (Boostrix, Adacel) 04/03/2015    Zoster Live (Zostavax) 04/03/2015    Zoster Recombinant (Shingrix) 10/20/2020         Reviewed AVS with patient / caregiver.       CLINICAL PHARMACY CONSULT: MED RECONCILIATION/REVIEW ADDENDUM    For Pharmacy Admin Tracking Only    PHSO (orange banner): No  Total # of Interventions Recommended (warfarin changes): 0  (warfarin changes)   (other medication updates)- Updated Order #: 0 Updated Order Reason(s):   (#1 for reviewing INR) - Maintenance Safety Lab Monitoring #: 1  Total Interventions Accepted (warfarin related): 0  Time Spent (min) (round up): 15

## 2021-01-20 ENCOUNTER — ANTI-COAG VISIT (OUTPATIENT)
Dept: PHARMACY | Age: 71
End: 2021-01-20
Payer: MEDICARE

## 2021-01-20 VITALS — TEMPERATURE: 96.2 F

## 2021-01-20 DIAGNOSIS — Z95.2 S/P AVR (AORTIC VALVE REPLACEMENT): ICD-10-CM

## 2021-01-20 LAB — INR BLD: 2.2

## 2021-01-20 PROCEDURE — 99211 OFF/OP EST MAY X REQ PHY/QHP: CPT

## 2021-01-20 PROCEDURE — 85610 PROTHROMBIN TIME: CPT

## 2021-01-20 RX ORDER — ALLOPURINOL 100 MG/1
100 TABLET ORAL DAILY
COMMUNITY
End: 2021-09-28 | Stop reason: DRUGHIGH

## 2021-01-20 NOTE — PROGRESS NOTES
Mr. Sudheer Scales is a 78 y.o.  male. Mr. Sudheer Scales had an INR test today. Results were reviewed and appropriate warfarin management was completed. THIS VISIT WAS COMPLETED AS:   []    A VIRTUAL VISIT VIA TELEPHONE IN EFFORTS TO REDUCE THE SPREAD OF COVID-19.  []    A DRIVE-THRU VISIT IN EFFORTS TO REDUCE THE SPREAD OF COVID-19. [x]    AN IN PERSON VISIT. PROTOCOLS WERE FOLLOWED WITH PRECAUTIONS TO REDUCE THE SPREAD OF COVID-19. Patient verifies current dosing regimen: Yes     Warfarin medication reviewed and updated on the patient 's home medication list: Yes   All other medications reviewed and updated on the patient 's home medication list: Yes    Lab Results   Component Value Date    INR 2.20 2021    INR 3.30 2020    INR 3.0 2020       Patient Findings     Positives:  Change in medications    Negatives:  Signs/symptoms of bleeding, Missed doses, Change in diet/appetite, Bruising    Comments:  allopurinol, empagliflozin-metformin, and Toujeo doses decreased  No longer taking losartan           Anticoagulation Summary  As of 2021    INR goal:  2.5-3.5   TTR:  70.5 % (4.2 y)   INR used for dosin.20 (2021)   Warfarin maintenance plan:  3.75 mg (7.5 mg x 0.5) every Mon, Fri; 7.5 mg (7.5 mg x 1) all other days   Weekly warfarin total:  45 mg   Plan last modified:  Sidra Vines, [facility] (2020)   Next INR check:  2/10/2021   Priority:  Maintenance   Target end date:   Indefinite    Indications    S/P AVR (aortic valve replacement) [Z95.2]             Anticoagulation Episode Summary     INR check location:      Preferred lab:      Send INR reminders to:  [facility]NAGEMENT [facility]ICAL STAFF    Comments:  EPIC      Anticoagulation Care Providers     Provider Role Specialty Phone number    Jennifer Ambrocio MD Referring Cardiology [phone]          Warfarin plan: INR below goal at 2.2, was unable to pinpoint direct cause for the decrease in his INR today. Patient had numerous dose reductions in his diabetes medications. He also discontinued his losartan. None of theses changes should impact the INR to any great degree. These medication changes were made 5 days ago. Will take 11.25 mg of warfarin today, then resume regular dose and return in 3 weeks. We may need to increase his dose at his next visit if his INR remains below therapeutic range. Description    Take 11.25 mg (1 and 1/2 tablets) TODAY (1/20/21)  THEN, Continue: Warfarin 7.5 mg daily except 3.75mg (1/2 tablet) on Monday and Friday    Call [phone] with signs or symptoms of bleeding or ANY medication changes (including over-the-counter medications or herbal supplements). If significant bleeding occurs please seek immediate medical attention. Keep the number of servings of vitamin K containing foods (dark green, leafy vegetables) the same each week. Please call if this changes. Limit alcohol intake. Please call if this changes. Immunization History   Administered Date(s) Administered    Influenza 11/01/2015    Influenza Vaccine, unspecified formulation 10/05/2017    Influenza Virus Vaccine 10/04/2011, 10/04/2012    Influenza, High Dose (Fluzone 65 yrs and older) 10/11/2018    Influenza, Ted Ritchiening, IM, (6 mo and older Fluzone, Flulaval, Fluarix and 3 yrs and older Afluria) 10/25/2016, 10/05/2017    Influenza, Triv, inactivated, subunit, adjuvanted, IM (Fluad 65 yrs and older) 09/25/2019, 09/24/2020    Pneumococcal Conjugate 13-valent ([id number]) 10/11/2018    Pneumococcal Polysaccharide ([id number]) 10/04/2012, 09/24/2020    Tdap (Boostrix, Adacel) 04/03/2015    Zoster Live (Zostavax) 04/03/2015    Zoster Recombinant (Shingrix) 10/20/2020       Reviewed AVS with patient / caregiver.       CLINICAL PHARMACY CONSULT: MED RECONCILIATION/REVIEW ADDENDUM For Pharmacy Admin Tracking Only    PHSO (orange banner): Yes  Total # of Interventions Recommended (warfarin changes): 1  (warfarin changes) - Increased Dose #: 1  (other medication updates)- Updated Order #: 0 Updated Order Reason(s):   (#1 for reviewing INR) - Maintenance Safety Lab Monitoring #: 1  Total Interventions Accepted (warfarin related): 0  Time Spent (min) (round up): 15

## 2021-01-22 ENCOUNTER — HOSPITAL ENCOUNTER (OUTPATIENT)
Dept: ULTRASOUND IMAGING | Age: 71
Discharge: HOME | End: 2021-01-22
Payer: MEDICARE

## 2021-01-22 DIAGNOSIS — N18.4 CHRONIC KIDNEY DISEASE, STAGE IV (SEVERE) (HCC): ICD-10-CM

## 2021-01-22 PROCEDURE — 76700 US EXAM ABDOM COMPLETE: CPT

## 2021-02-03 RX ORDER — MIRTAZAPINE 30 MG/1
TABLET, FILM COATED ORAL
Qty: 90 TABLET | Refills: 2 | Status: SHIPPED | OUTPATIENT
Start: 2021-02-03 | End: 2021-11-17

## 2021-02-17 ENCOUNTER — ANTI-COAG VISIT (OUTPATIENT)
Dept: PHARMACY | Age: 71
End: 2021-02-17
Payer: MEDICARE

## 2021-02-17 VITALS — TEMPERATURE: 96.4 F

## 2021-02-17 DIAGNOSIS — Z95.2 S/P AVR (AORTIC VALVE REPLACEMENT): ICD-10-CM

## 2021-02-17 LAB — INTERNATIONAL NORMALIZATION RATIO, POC: 3.2

## 2021-02-17 PROCEDURE — 99211 OFF/OP EST MAY X REQ PHY/QHP: CPT

## 2021-02-17 PROCEDURE — 85610 PROTHROMBIN TIME: CPT

## 2021-02-17 NOTE — PROGRESS NOTES
Mr. Yruiy Elizalde is a 78 y.o.  male. Mr. Yuriy Elizalde had an INR test today. Results were reviewed and appropriate warfarin management was completed. THIS VISIT WAS COMPLETED AS:   []    A VIRTUAL VISIT VIA TELEPHONE IN EFFORTS TO REDUCE THE SPREAD OF COVID-19.  []    A DRIVE-THRU VISIT IN EFFORTS TO REDUCE THE SPREAD OF COVID-19. [x]    AN IN PERSON VISIT. PROTOCOLS WERE FOLLOWED WITH PRECAUTIONS TO REDUCE THE SPREAD OF COVID-19. Patient verifies current dosing regimen: Yes     Warfarin medication reviewed and updated on the patient 's home medication list:yes   All other medications reviewed and updated on the patient 's home medication list: No: no changes     Lab Results   Component Value Date    INR 3.2 02/17/2021    INR 2.20 01/20/2021    INR 3.30 12/23/2020           Anticoagulation Summary  As of 2/17/2021    INR goal:  2.5-3.5   TTR:  70.5 % (4.3 y)   INR used for dosing:  3.2 (2/17/2021)   Warfarin maintenance plan:  3.75 mg (7.5 mg x 0.5) every Mon, Fri; 7.5 mg (7.5 mg x 1) all other days   Weekly warfarin total:  45 mg   Plan last modified:  Onel [facility] (9/2/2020)   Next INR check:  3/17/2021   Priority:  Maintenance   Target end date: Indefinite    Indications    S/P AVR (aortic valve replacement) [Z95.2]             Anticoagulation Episode Summary     INR check location:      Preferred lab:      Send INR reminders to:  [facility]    Comments:  EPIC      Anticoagulation Care Providers     Provider Role Specialty Phone number    Mariano Covarrubias MD Referring Cardiology [phone]          Warfarin assessment / plan:   Looks and feels well today. No changes in medications or diet. No bruising or bleeding noted. No change in dose for INR of  3.2. Will see in 4 weeks for next INR.       Description    Continue: Warfarin 7.5 mg daily except 3.75mg (1/2 tablet) on Monday and Friday Call [phone] with signs or symptoms of bleeding or ANY medication changes (including over-the-counter medications or herbal supplements). If significant bleeding occurs please seek immediate medical attention. Keep the number of servings of vitamin K containing foods (dark green, leafy vegetables) the same each week. Please call if this changes. Limit alcohol intake. Please call if this changes. Immunization History   Administered Date(s) Administered    Influenza 11/01/2015    Influenza Vaccine, unspecified formulation 10/05/2017    Influenza Virus Vaccine 10/04/2011, 10/04/2012    Influenza, High Dose (Fluzone 65 yrs and older) 10/11/2018    Influenza, Cathren Rell, IM, (6 mo and older Fluzone, Flulaval, Fluarix and 3 yrs and older Afluria) 10/25/2016, 10/05/2017    Influenza, Triv, inactivated, subunit, adjuvanted, IM (Fluad 65 yrs and older) 09/25/2019, 09/24/2020    Pneumococcal Conjugate 13-valent ([id number]) 10/11/2018    Pneumococcal Polysaccharide ([id number]) 10/04/2012, 09/24/2020    Tdap (Boostrix, Adacel) 04/03/2015    Zoster Live (Zostavax) 04/03/2015    Zoster Recombinant (Shingrix) 10/20/2020       Reviewed AVS with patient / caregiver.       CLINICAL PHARMACY CONSULT: MED RECONCILIATION/REVIEW ADDENDUM    For Pharmacy Admin Tracking Only    PHSO (orange banner): Yes  Total # of Interventions Recommended (warfarin changes): 0  (warfarin changes)   (other medication updates)- Updated Order #: 0 Updated Order Reason(s):   (#1 for reviewing INR) - Maintenance Safety Lab Monitoring #: 1  Total Interventions Accepted (warfarin related): 0  Time Spent (min) (round up): 15

## 2021-02-18 ENCOUNTER — IMMUNIZATION (OUTPATIENT)
Dept: PRIMARY CARE CLINIC | Age: 71
End: 2021-02-18
Payer: MEDICARE

## 2021-02-18 PROCEDURE — 91301 COVID-19, MODERNA VACCINE 100MCG/0.5ML DOSE: CPT | Performed by: FAMILY MEDICINE

## 2021-02-18 PROCEDURE — 0011A COVID-19, MODERNA VACCINE 100MCG/0.5ML DOSE: CPT | Performed by: FAMILY MEDICINE

## 2021-03-17 ENCOUNTER — ANTI-COAG VISIT (OUTPATIENT)
Dept: PHARMACY | Age: 71
End: 2021-03-17
Payer: MEDICARE

## 2021-03-17 DIAGNOSIS — Z95.2 S/P AVR (AORTIC VALVE REPLACEMENT): ICD-10-CM

## 2021-03-17 LAB — INTERNATIONAL NORMALIZATION RATIO, POC: 3

## 2021-03-17 PROCEDURE — 85610 PROTHROMBIN TIME: CPT

## 2021-03-17 PROCEDURE — 99211 OFF/OP EST MAY X REQ PHY/QHP: CPT

## 2021-03-17 NOTE — PROGRESS NOTES
Mr. Kilo Ventura is a 78 y.o.  male. Mr. Kilo Ventura had an INR test today. Results were reviewed and appropriate warfarin management was completed. THIS VISIT WAS COMPLETED AS:   []    A VIRTUAL VISIT VIA TELEPHONE IN EFFORTS TO REDUCE THE SPREAD OF COVID-19.  []    A DRIVE-THRU VISIT IN EFFORTS TO REDUCE THE SPREAD OF COVID-19. [x]    AN IN PERSON VISIT. PROTOCOLS WERE FOLLOWED WITH PRECAUTIONS TO REDUCE THE SPREAD OF COVID-19. Patient verifies current dosing regimen: Yes     Warfarin medication reviewed and updated on the patient 's home medication list: Yes   All other medications reviewed and updated on the patient 's home medication list: daily insulin dose reduced by 2 units       Lab Results   Component Value Date    INR 3.0 03/17/2021    INR 3.2 02/17/2021    INR 2.20 01/20/2021           Anticoagulation Summary  As of 3/17/2021    INR goal:  2.5-3.5   TTR:  71.1 % (4.4 y)   INR used for dosing:  3.0 (3/17/2021)   Warfarin maintenance plan:  3.75 mg (7.5 mg x 0.5) every Mon, Fri; 7.5 mg (7.5 mg x 1) all other days   Weekly warfarin total:  45 mg   Plan last modified:  Alfreda Moore, [facility] (9/2/2020)   Next INR check:  4/14/2021   Priority:  Maintenance   Target end date: Indefinite    Indications    S/P AVR (aortic valve replacement) [Z95.2]             Anticoagulation Episode Summary     INR check location:      Preferred lab:      Send INR reminders to:  [facility]    Comments:  EPIC      Anticoagulation Care Providers     Provider Role Specialty Phone number    Ernestina Virgen MD Referring Cardiology [phone]          Warfarin assessment / plan:   He looks and feels well today. His insulin dose was reduced by 2 units daily. No bruising or bleeding noted. No change in warfarin dose for INR of 3 today. He will receive his second dose of covid vaccine tomorrow. He will return in 4 weeks for his next INR.     Description    Continue: Warfarin 7.5 mg daily except 3.75mg (1/2 tablet) on Monday and Friday    Call [phone] with signs or symptoms of bleeding or ANY medication changes (including over-the-counter medications or herbal supplements). If significant bleeding occurs please seek immediate medical attention. Keep the number of servings of vitamin K containing foods (dark green, leafy vegetables) the same each week. Please call if this changes. Limit alcohol intake. Please call if this changes. Immunization History   Administered Date(s) Administered    COVID-19, Moderna, PF, 100mcg/0.5mL 02/18/2021    Influenza 11/01/2015    Influenza Vaccine, unspecified formulation 10/05/2017    Influenza Virus Vaccine 10/04/2011, 10/04/2012    Influenza, High Dose (Fluzone 65 yrs and older) 10/11/2018    Influenza, Raf Days, IM, (6 mo and older Fluzone, Flulaval, Fluarix and 3 yrs and older Afluria) 10/25/2016, 10/05/2017    Influenza, Triv, inactivated, subunit, adjuvanted, IM (Fluad 65 yrs and older) 09/25/2019, 09/24/2020    Pneumococcal Conjugate 13-valent ([id number]) 10/11/2018    Pneumococcal Polysaccharide ([id number]) 10/04/2012, 09/24/2020    Tdap (Boostrix, Adacel) 04/03/2015    Zoster Live (Zostavax) 04/03/2015    Zoster Recombinant (Shingrix) 10/20/2020         Reviewed AVS with patient / caregiver.       CLINICAL PHARMACY CONSULT: MED RECONCILIATION/REVIEW ADDENDUM    For Pharmacy Admin Tracking Only    PHSO (orange banner): Yes  Total # of Interventions Recommended (warfarin changes): 0  (warfarin changes)   (other medication updates)- Updated Order #: 0 Updated Order Reason(s):   (#1 for reviewing INR) - Maintenance Safety Lab Monitoring #: 1  Total Interventions Accepted (warfarin related): 0  Time Spent (min) (round up): 15

## 2021-03-18 ENCOUNTER — IMMUNIZATION (OUTPATIENT)
Dept: PRIMARY CARE CLINIC | Age: 71
End: 2021-03-18
Payer: MEDICARE

## 2021-03-18 PROCEDURE — 0012A COVID-19, MODERNA VACCINE 100MCG/0.5ML DOSE: CPT | Performed by: NURSE PRACTITIONER

## 2021-03-18 PROCEDURE — 91301 COVID-19, MODERNA VACCINE 100MCG/0.5ML DOSE: CPT | Performed by: NURSE PRACTITIONER

## 2021-04-14 ENCOUNTER — ANTI-COAG VISIT (OUTPATIENT)
Dept: PHARMACY | Age: 71
End: 2021-04-14
Payer: MEDICARE

## 2021-04-14 DIAGNOSIS — Z95.2 S/P AVR (AORTIC VALVE REPLACEMENT): ICD-10-CM

## 2021-04-14 LAB — INTERNATIONAL NORMALIZATION RATIO, POC: 2.5

## 2021-04-14 PROCEDURE — 85610 PROTHROMBIN TIME: CPT

## 2021-04-14 PROCEDURE — 99211 OFF/OP EST MAY X REQ PHY/QHP: CPT

## 2021-04-14 RX ORDER — LISINOPRIL 5 MG/1
5 TABLET ORAL NIGHTLY
COMMUNITY

## 2021-04-14 NOTE — PROGRESS NOTES
Mr. Tacho Hernandez is a 78 y.o.  male. Mr. Tacho Hernandez had an INR test today. Results were reviewed and appropriate warfarin management was completed. THIS VISIT WAS COMPLETED AS:   []    A VIRTUAL VISIT VIA TELEPHONE IN EFFORTS TO REDUCE THE SPREAD OF COVID-19.  []    A DRIVE-THRU VISIT IN EFFORTS TO REDUCE THE SPREAD OF COVID-19. [x]    AN IN PERSON VISIT. PROTOCOLS WERE FOLLOWED WITH PRECAUTIONS TO REDUCE THE SPREAD OF COVID-19. Patient verifies current dosing regimen: Yes     Warfarin medication reviewed and updated on the patient 's home medication list: Yes   All other medications reviewed and updated on the patient 's home medication list: Yes :Now taking lisinopril 5mg nightly. Lab Results   Component Value Date    INR 2.5 2021    INR 3.0 2021    INR 3.2 2021           Anticoagulation Summary  As of 2021    INR goal:  2.5-3.5   TTR:  71.6 % (4.4 y)   INR used for dosin.5 (2021)   Warfarin maintenance plan:  3.75 mg (7.5 mg x 0.5) every Mon, Fri; 7.5 mg (7.5 mg x 1) all other days   Weekly warfarin total:  45 mg   Plan last modified:  Lucinda Ray [facility] (2020)   Next INR check:  2021   Priority:  Maintenance   Target end date: Indefinite    Indications    S/P AVR (aortic valve replacement) [Z95.2]             Anticoagulation Episode Summary     INR check location:      Preferred lab:      Send INR reminders to:  [facility]    Comments:  EPIC      Anticoagulation Care Providers     Provider Role Specialty Phone number    Sophie Turcios MD Referring Cardiology [phone]          Warfarin assessment / plan: Deann Paredes states that he started lisinopril two days ago. I do not anticipate any change in the INR with this addition. He looks and feels well today. No cruising or bleeding noted. No change in warfarin dose for INR of 2.5 today. He will return in 4 weeks for his next INR.     Description    Continue: Warfarin 7.5 mg daily

## 2021-05-12 ENCOUNTER — ANTI-COAG VISIT (OUTPATIENT)
Dept: PHARMACY | Age: 71
End: 2021-05-12
Payer: MEDICARE

## 2021-05-12 DIAGNOSIS — Z95.2 S/P AVR (AORTIC VALVE REPLACEMENT): ICD-10-CM

## 2021-05-12 LAB — INTERNATIONAL NORMALIZATION RATIO, POC: 3.1

## 2021-05-12 PROCEDURE — 99211 OFF/OP EST MAY X REQ PHY/QHP: CPT

## 2021-05-12 PROCEDURE — 85610 PROTHROMBIN TIME: CPT

## 2021-05-12 RX ORDER — EMPAGLIFLOZIN 10 MG/1
10 TABLET, FILM COATED ORAL DAILY
COMMUNITY

## 2021-05-12 RX ORDER — NATEGLINIDE 120 MG/1
120 TABLET ORAL PRN
COMMUNITY

## 2021-05-12 NOTE — PROGRESS NOTES
Mr. Tacho Hernandez is a 78 y.o.  male. Mr. Tacho Hernandez had an INR test today. Results were reviewed and appropriate warfarin management was completed. THIS VISIT WAS PERFORMED AS: AN IN PERSON VISIT. PROTOCOLS WERE FOLLOWED WITH PRECAUTIONS TO REDUCE THE SPREAD OF COVID-19. Patient verifies current dosing regimen: Yes     Warfarin medication reviewed and updated on the patient 's home medication list: Yes   All other medications reviewed and updated on the patient 's home medication list: Yes     Lab Results   Component Value Date    INR 3.1 05/12/2021    INR 2.5 04/14/2021    INR 3.0 03/17/2021       Patient Findings     Positives:  Change in medications    Negatives:  Signs/symptoms of thrombosis, Signs/symptoms of bleeding, Change in health, Missed doses, Extra doses, Change in diet/appetite, Bruising          Anticoagulation Summary  As of 5/12/2021    INR goal:  2.5-3.5   TTR:  72.0 % (4.5 y)   INR used for dosing:  3.1 (5/12/2021)   Warfarin maintenance plan:  3.75 mg (7.5 mg x 0.5) every Mon, Fri; 7.5 mg (7.5 mg x 1) all other days   Weekly warfarin total:  45 mg   Plan last modified:  Lucinda Ray [facility] (9/2/2020)   Next INR check:  6/9/2021   Priority:  Maintenance   Target end date: Indefinite    Indications    S/P AVR (aortic valve replacement) [Z95.2]             Anticoagulation Episode Summary     INR check location:      Preferred lab:      Send INR reminders to:  [facility]    Comments:  EPIC      Anticoagulation Care Providers     Provider Role Specialty Phone number    Sophie Turcios MD Referring Cardiology [phone]          Warfarin assessment / plan:   Looks and feels well today. No changes in medications or diet. No bruising or bleeding noted. No change in dose for INR of  3.1. Will see in 4 weeks for next INR.     Description    Continue: Warfarin 7.5 mg daily except 3.75mg (1/2 tablet) on Monday and Friday    Call [phone] with signs or symptoms of

## 2021-05-17 DIAGNOSIS — Z95.2 S/P AVR: Primary | ICD-10-CM

## 2021-06-02 NOTE — PROGRESS NOTES
[address]      Cardiology follow up    Thalia Matt  1950 June 4, 2021    CC: \"Everything is fine. \"     HPI:  Thalia Matt is a 63 y.o. male with PMH significant for type II diabetes and hypothyroid, vitamin D deficiency. Today, he denies any sounding cardiac complaints. He says that he is doing okay. He has been losing weight. Patient denies exertional chest pain/pressure, dyspnea at rest, TOLENTINO, PND, orthopnea, palpitations, lightheadedness, weight changes, changes in LE edema, and syncope. Patient reports compliance to his medications.     Past Medical History:   Diagnosis Date    Aortic stenosis 9/2013    moderate    Arthritis     Asthma     BRONCHIAL ASTHMA  AS A CHILD    Deviated septum     Diabetes mellitus type II 2000    Dr. Puja Palmer thyroiditis dx 1984    medical treatment    Homocysteinemia ([address]     Hyperlipidemia     Hypertension     Hypertriglyceridemia     Hypogonadism male     topical testosterone     Hypothyroid 12/9/2011    Kidney stone 1990s    Passed on own and also surgical excision     Mechanical heart valve present 2016    Murmur since 2005    Nasal polyps     Osteoarthritis     Right thumb     Prolonged emergence from general anesthesia     Sleep apnea 2005    CPAP nightly    Warfarin anticoagulation 2016    Wears glasses      Past Surgical History:   Procedure Laterality Date    AORTIC VALVE REPLACEMENT  09/14/2016    21mm [facility]    COLECTOMY  1996    perforated bowel: colonoscopy    COLONOSCOPY  9/24/2014; 2004    x2 normal colonoscopies/ perforated bowel    CYST REMOVAL      hairy nevus left biceps as child    KNEE SURGERY Left 2004    torn meniscus     NASAL POLYP SURGERY  1989, 1987    OTHER SURGICAL HISTORY  1990s    kidney stone removal     ROTATOR CUFF REPAIR Right 2009     Family History   Problem Relation Age of Onset    Heart Disease Father     Kidney Disease Father         was on HD    [facility] ([facility]) 40 MG tablet Take 1 tablet by mouth every evening 90 tablet 3    ZETIA 10 MG tablet TAKE 1 TABLET DAILY 90 tablet 2    metoprolol succinate (TOPROL XL) 25 MG extended release tablet Take 3 tablets by mouth 2 times daily 3 tabs po  tablet 3    acetaminophen (TYLENOL) 325 MG tablet Take 2 tablets by mouth every 4 hours as needed for Pain 120 tablet 3    FOLBIC 2.5-25-2 MG TABS TAKE 1 TABLET DAILY 90 tablet 3    b complex vitamins capsule Take 1 capsule by mouth daily. No current facility-administered medications for this visit. Review of Systems:    Constitutional: negative  Eyes: negative  Ears, nose, mouth, throat, and face: negative  Respiratory: negative  Cardiovascular: negative  Gastrointestinal: negative  Genitourinary:negative  Integument/breast: negative  Hematologic/lymphatic: negative  Musculoskeletal:negative  Neurological: negative  Behavioral/Psych: negative  Endocrine: negative  Allergic/Immunologic: negative     Physical Exam:   Vitals:    06/04/21 0921   BP: 128/76   Pulse: 76   SpO2: 98%   Weight: 199 lb 6.4 oz (90.4 kg)   Height: 5' 11\" (1.803 m)       Wt Readings from Last 3 Encounters:   06/04/21 199 lb 6.4 oz (90.4 kg)   12/01/20 196 lb 12.8 oz (89.3 kg)   09/24/20 201 lb (91.2 kg)       Constitutional: He is oriented to person, place, and time. He appears well-developed and well-nourished. In no acute distress. Head: Normocephalic and atraumatic. Pupils equal and round. Neck: Neck supple. No JVP or carotid bruit appreciated. No mass and no thyromegaly present. No lymphadenopathy present. Cardiovascular: Normal rate. Normal heart sounds. Exam reveals no gallop and no friction rub. No murmur heard. Pulmonary/Chest: Effort normal and breath sounds normal. No respiratory distress. He has no wheezes, rhonchi or rales. Abdominal: Soft, non-tender. Bowel sounds are normal. He exhibits no organomegaly, mass or bruit.      Extremities: No edema, cyanosis, or clubbing. Pulses are 2+ radial/dorsalis pedis/posterior tibial/carotid bilaterally. Neurological: Awake  alert and orientedNo gross cranial nerve deficit. Coordination normal.     Skin: Skin is warm and dry. There is no rash or diaphoresis. Psychiatric: He has a normal mood and affect. His speech is normal and behavior is normal.     Lab Review:   Lab Results   Component Value Date    TRIG 118 04/29/2021    HDL 29 04/29/2021    HDL 26 04/27/2012    LDLCALC 40 04/29/2021    LABVLDL 24 04/29/2021    TC 92 9/12/16  Lab Results   Component Value Date    HGB 14.9 04/11/2019    HCT 45.3 04/11/2019     Lab Results   Component Value Date     04/29/2021      Lab Results   Component Value Date    K 4.3 04/29/2021     Lab Results   Component Value Date    BUN 24 04/29/2021    CREATININE 1.9 04/29/2021     Image Review:    EKG 6/4/2021 Sinus rhythm    ECHO:6/22/16  LVH with normal systolic function. EF    60%. Grade I Diastolic dysfunction.   Mild mitral regurgitation is present.   The aortic valve area is calculated at .81 cm2 with a maximum pressure   gradient of 66 mmHg and a mean pressure gradient of 44 mmHg. This is c/w   severe aortic stenosis.   Trivial tricuspid regurgitation.   ormal right ventricular size and function. [facility]: 1/8/16  1. Moderate to severe aortic valve stenosis. 2. Moderate left main disease with 40% stenosis of the ostium of the left  Main. Fractional flow reserve of the ostium of the left main was done. It was  0.86.  3. Elevated left-sided pressures. [facility]:8/31/16  ANGIOGRAPHIC FINDINGS:  1. The left main coronary artery comes from the left coronary cusp, giving  rise to the left anterior descending artery, left circumflex artery. Ostial  left main has 40% stenosis. 2. The intravascular ultrasound did not reveal any stenotic lesion of left  Main. The LM area was calculated to be at least 20 mm square. The Fractional Flow Reserve was 0.83.   3. The left anterior descending artery comes from the left main coronary  artery. It is patent without any significant stenosis. 4. The left circumflex comes from the left main coronary artery. It is  patent, giving rise to several obtuse marginal without any stenosis. [place] 6. The right coronary artery is nondominant and small caliber with 50 to 60%  stenosis in the midportion. 7. Aortic valve has 40 to 45 mmHg gradient. SUMMARY: Severe aortic valve stenosis. Stress Study:9/1/16   Pharmacological Stress/MPI Results:        1. Technically a satisfactory study.    2. Normal pharmacological stress portion of the study.    3. No evidence of Ischemia by Myocardial Perfusion Imaging.    4. Gated Study shows normal LV size and Systolic function; EF is 49 %. Carotid Duplex:9/12/16  Impressions   Right Impression   The right internal carotid artery appears to have a 1-15% diameter reducing   stenosis based on velocity criteria. The right vertebral artery demonstrates normal antegrade flow. Left Impression   The left internal carotid artery appears to have a 1-15% diameter reducing   stenosis based on velocity criteria. The left vertebral artery demonstrates normal antegrade flow. S/p AVR with 21mm [facility] Silver per Dr. Stefani Piña 9/14/16    Echo 5/24/17  Summary  Normal left ventricular size. Normal left ventricular systolic function with an estimated ejection  fraction of 55-60% . No regional wall motion abnormalities. A mechinical aortic valve appears well seated with a mean gradient of 16 mmHg. Normal right ventricular size and function. Echo:5/16/19  Summary  Left ventricular cavity size is normal.  There is mild concentric left ventricular hypertrophy. Ejection fraction is visually estimated to be 55-60%. A mechanical artificial aortic valve with a mean gradient of 17mmHg. No evidence of aortic valve regurgitation  Normal right ventricular size and function.   mild MAC    ECHO 6/9/2020  Left ventricular cavity size is normal.  There is mild concentric left ventricular hypertrophy. Ejection fraction is visually estimated to be 55-60%. grade 1 diastolic dysfunction  A 21 mm St Fred [place]coe mechanical aortic valve appears well seated with a maximum velocity of 2.67 m/s and a mean gradient of 15 mmHg. No evidence of aortic valve regurgitation. Assessment&Plan:    Severe Aortic Stenosis Bicuspid Aortic valve with no evidence of aortic aneurysm  S/p S/p AVR with 21mm St. Fred Roscoe per Dr. Candis Jones. Piña 9/14/16  Asymptomatic. Continue Warfarin. Repeat echocardiogram.    CAD: Moderate left main stenosis. Asymptomatic. Continue Asa, B-blocker and statin therapy. Will order coronary CTA. HTN   Controlled. Continue current medical management. Hyperlipidemia:   Continue statin and Zetia. DM  Managed per Dr. [place]     Hypothyroid  Managed per PCP. Follow up in 7-8 months. Thank you very much for allowing me to participate in the care of your patient. Please do not hesitate to contact me if you have any questions. Sincerely,    Omar Jean MD, MPH      [place] [address]  Ph: [phone]  Fax: [phone]    This note was scribed in the presence of Dr Ceasar Martinez,   Physician Attestation:  The scribes documentation has been prepared under my direction and personally reviewed by me in its entirety. I confirm that the note above accurately reflects all work, treatment, procedures, and medical decision making performed by me.

## 2021-06-04 ENCOUNTER — OFFICE VISIT (OUTPATIENT)
Dept: CARDIOLOGY CLINIC | Age: 71
End: 2021-06-04
Payer: MEDICARE

## 2021-06-04 VITALS
HEART RATE: 76 BPM | OXYGEN SATURATION: 98 % | WEIGHT: 199.4 LBS | HEIGHT: 71 IN | BODY MASS INDEX: 27.92 KG/M2 | SYSTOLIC BLOOD PRESSURE: 128 MMHG | DIASTOLIC BLOOD PRESSURE: 76 MMHG

## 2021-06-04 DIAGNOSIS — E78.2 MIXED HYPERLIPIDEMIA: ICD-10-CM

## 2021-06-04 DIAGNOSIS — I35.0 NONRHEUMATIC AORTIC VALVE STENOSIS: ICD-10-CM

## 2021-06-04 DIAGNOSIS — I25.10 CORONARY ARTERY DISEASE INVOLVING NATIVE CORONARY ARTERY OF NATIVE HEART WITHOUT ANGINA PECTORIS: ICD-10-CM

## 2021-06-04 DIAGNOSIS — Z95.2 S/P AVR: ICD-10-CM

## 2021-06-04 DIAGNOSIS — I10 ESSENTIAL HYPERTENSION: Primary | ICD-10-CM

## 2021-06-04 PROCEDURE — G8417 CALC BMI ABV UP PARAM F/U: HCPCS | Performed by: INTERNAL MEDICINE

## 2021-06-04 PROCEDURE — 93000 ELECTROCARDIOGRAM COMPLETE: CPT | Performed by: INTERNAL MEDICINE

## 2021-06-04 PROCEDURE — 1036F TOBACCO NON-USER: CPT | Performed by: INTERNAL MEDICINE

## 2021-06-04 PROCEDURE — 99213 OFFICE O/P EST LOW 20 MIN: CPT | Performed by: INTERNAL MEDICINE

## 2021-06-04 PROCEDURE — 4040F PNEUMOC VAC/ADMIN/RCVD: CPT | Performed by: INTERNAL MEDICINE

## 2021-06-04 PROCEDURE — 1123F ACP DISCUSS/DSCN MKR DOCD: CPT | Performed by: INTERNAL MEDICINE

## 2021-06-04 PROCEDURE — G8427 DOCREV CUR MEDS BY ELIG CLIN: HCPCS | Performed by: INTERNAL MEDICINE

## 2021-06-04 PROCEDURE — 3017F COLORECTAL CA SCREEN DOC REV: CPT | Performed by: INTERNAL MEDICINE

## 2021-06-04 NOTE — PATIENT INSTRUCTIONS
good for your heart. · Choose low-fat or fat-free milk and dairy products. Eat foods high in fiber  · Eat a variety of grain products every day. Include whole-grain foods that have lots of fiber and nutrients. Examples of whole-grain foods include oats, whole wheat bread, and brown rice. · Buy whole-grain breads and cereals, instead of white bread or pastries. Limit salt and sodium  · Limit how much salt and sodium you eat to help lower your blood pressure. · Taste food before you salt it. Add only a little salt when you think you need it. With time, your taste buds will adjust to less salt. · Eat fewer snack items, fast foods, and other high-salt, processed foods. Check food labels for the amount of sodium in packaged foods. · Choose low-sodium versions of canned goods (such as soups, vegetables, and beans). Limit sugar  · Limit drinks and foods with added sugar. These include candy, desserts, and soda pop. Limit alcohol  · Limit alcohol to no more than 2 drinks a day for men and 1 drink a day for women. Too much alcohol can cause health problems. When should you call for help? Watch closely for changes in your health, and be sure to contact your doctor if:    · You would like help planning heart-healthy meals. Where can you learn more? Go to https://[employer]. org and sign in to your [employer] account. Enter V137 in the [facility] box to learn more about \"Heart-Healthy Diet: Care Instructions. \"     If you do not have an account, please click on the \"Sign Up Now\" link. Current as of: December 17, 2020               Content Version: 12.8  © 9885-7924 [employer]wise, [employer]. Care instructions adapted under license by [facility] ([facility]). If you have questions about a medical condition or this instruction, always ask your healthcare professional. [address] any warranty or liability for your use of this information.

## 2021-06-09 ENCOUNTER — ANTI-COAG VISIT (OUTPATIENT)
Dept: PHARMACY | Age: 71
End: 2021-06-09
Payer: MEDICARE

## 2021-06-09 DIAGNOSIS — Z95.2 S/P AVR (AORTIC VALVE REPLACEMENT): Primary | ICD-10-CM

## 2021-06-09 LAB — INR BLD: 3.2

## 2021-06-09 PROCEDURE — 85610 PROTHROMBIN TIME: CPT

## 2021-06-09 PROCEDURE — 99211 OFF/OP EST MAY X REQ PHY/QHP: CPT

## 2021-06-09 NOTE — PROGRESS NOTES
Mr. Vane Borja is a 78 y.o.  male. Mr. Vane Borja had an INR test today. Results were reviewed and appropriate warfarin management was completed. THIS VISIT WAS PERFORMED AS: An in person visit. Protocols were followed with precautions to reduce the spread of COVID-19. Patient verifies current dosing regimen: Yes     Warfarin medication reviewed and updated on the patient 's home medication list: Yes   All other medications reviewed and updated on the patient 's home medication list: No changes     Lab Results   Component Value Date    INR 3.20 06/09/2021    INR 3.1 05/12/2021    INR 2.5 04/14/2021       Patient Findings     Negatives:  Signs/symptoms of bleeding, Change in health, Missed doses, Change in medications, Change in diet/appetite, Bruising          Anticoagulation Summary  As of 6/9/2021    INR goal:  2.5-3.5   TTR:  72.5 % (4.6 y)   INR used for dosing:  3.20 (6/9/2021)   Warfarin maintenance plan:  3.75 mg (7.5 mg x 0.5) every Mon, Fri; 7.5 mg (7.5 mg x 1) all other days   Weekly warfarin total:  45 mg   Plan last modified:  Alona Zhu [facility] (9/2/2020)   Next INR check:  7/7/2021   Priority:  Maintenance   Target end date: Indefinite    Indications    S/P AVR (aortic valve replacement) [Z95.2]             Anticoagulation Episode Summary     INR check location:      Preferred lab:      Send INR reminders to:  [facility]    Comments:  EPIC      Anticoagulation Care Providers     Provider Role Specialty Phone number    Jossie Vigil MD Referring Cardiology [phone]          Warfarin assessment / plan:   Therapeutic INR: Appears well. No changes and no complaints. No change to warfarin therapy today. Description    Continue: Warfarin 7.5 mg daily except 3.75mg (1/2 tablet) on Monday and Friday    Call [phone] with signs or symptoms of bleeding or ANY medication changes (including over-the-counter medications or herbal supplements).        If significant bleeding occurs please seek immediate medical attention. Keep the number of servings of vitamin K containing foods (dark green, leafy vegetables) the same each week. Please call if this changes. Limit alcohol intake. Please call if this changes. Immunization History   Administered Date(s) Administered    COVID-19, Moderna, PF, 100mcg/0.5mL 02/18/2021, 03/18/2021    Influenza 11/01/2015    Influenza Vaccine, unspecified formulation 10/05/2017    Influenza Virus Vaccine 10/04/2011, 10/04/2012    Influenza, High Dose (Fluzone 65 yrs and older) 10/11/2018    Influenza, Aldean Cheese, IM, (6 mo and older Fluzone, Flulaval, Fluarix and 3 yrs and older Afluria) 10/25/2016, 10/05/2017    Influenza, Triv, inactivated, subunit, adjuvanted, IM (Fluad 65 yrs and older) 09/25/2019, 09/24/2020    Pneumococcal Conjugate 13-valent ([id number]) 10/11/2018    Pneumococcal Polysaccharide ([id number]) 10/04/2012, 09/24/2020    Tdap (Boostrix, Adacel) 04/03/2015    Zoster Live (Zostavax) 04/03/2015    Zoster Recombinant (Shingrix) 10/20/2020         Reviewed AVS with patient / caregiver.       CLINICAL PHARMACY CONSULT: MED RECONCILIATION/REVIEW ADDENDUM    For Pharmacy Admin Tracking Only     Intervention Detail:   Total # of Interventions Recommended: 0   Total # of Interventions Accepted: 0   Time Spent (min): 20

## 2021-07-06 ENCOUNTER — TELEPHONE (OUTPATIENT)
Dept: CARDIOLOGY CLINIC | Age: 71
End: 2021-07-06

## 2021-07-06 ENCOUNTER — TELEPHONE (OUTPATIENT)
Dept: INTERVENTIONAL RADIOLOGY/VASCULAR | Age: 71
End: 2021-07-06

## 2021-07-07 ENCOUNTER — ANTI-COAG VISIT (OUTPATIENT)
Dept: PHARMACY | Age: 71
End: 2021-07-07
Payer: MEDICARE

## 2021-07-07 DIAGNOSIS — Z95.2 S/P AVR (AORTIC VALVE REPLACEMENT): Primary | ICD-10-CM

## 2021-07-07 LAB — INTERNATIONAL NORMALIZATION RATIO, POC: 3.3

## 2021-07-07 PROCEDURE — 99211 OFF/OP EST MAY X REQ PHY/QHP: CPT

## 2021-07-07 PROCEDURE — 85610 PROTHROMBIN TIME: CPT

## 2021-07-07 NOTE — PROGRESS NOTES
Mr. Eli Hidalgo is a 78 y.o.  male. Mr. Eli Hidalgo had an INR test today. Results were reviewed and appropriate warfarin management was completed. This visit was performed as: An in person visit. Protocols were followed with precautions to reduce the spread of COVID-19. Patient verifies current dosing regimen: Yes     Warfarin medication reviewed and updated on the patient 's home medication list: Yes   All other medications reviewed and updated on the patient 's home medication list: No: no changes     Lab Results   Component Value Date    INR 3.3 07/07/2021    INR 3.20 06/09/2021    INR 3.1 05/12/2021       Patient Findings     Negatives:  Signs/symptoms of bleeding, Change in medications, Change in diet/appetite, Bruising          Anticoagulation Summary  As of 7/7/2021    INR goal:  2.5-3.5   TTR:  73.0 % (4.7 y)   INR used for dosing:  3.3 (7/7/2021)   Warfarin maintenance plan:  3.75 mg (7.5 mg x 0.5) every Mon, Fri; 7.5 mg (7.5 mg x 1) all other days   Weekly warfarin total:  45 mg   Plan last modified:  Morris Huffman, [facility] (9/2/2020)   Next INR check:  8/4/2021   Priority:  Maintenance   Target end date: Indefinite    Indications    S/P AVR (aortic valve replacement) [Z95.2]             Anticoagulation Episode Summary     INR check location:      Preferred lab:      Send INR reminders to:  [facility]    Comments:  EPIC      Anticoagulation Care Providers     Provider Role Specialty Phone number    Giuseppe Rene MD Referring Cardiology [phone]          Warfarin assessment / plan:     Appears well. No changes   No acute findings  No change to warfarin therapy today. Description    Continue: Warfarin 7.5 mg daily except 3.75mg (1/2 tablet) on Monday and Friday    Call [phone] with signs or symptoms of bleeding or ANY medication changes (including over-the-counter medications or herbal supplements).        If significant bleeding occurs please seek immediate medical attention. Keep the number of servings of vitamin K containing foods (dark green, leafy vegetables) the same each week. Please call if this changes. Limit alcohol intake. Please call if this changes. Immunization History   Administered Date(s) Administered    COVID-19, Moderna, PF, 100mcg/0.5mL 02/18/2021, 03/18/2021    Influenza 11/01/2015    Influenza Vaccine, unspecified formulation 10/05/2017    Influenza Virus Vaccine 10/04/2011, 10/04/2012    Influenza, High Dose (Fluzone 65 yrs and older) 10/11/2018    Influenza, Bernis Bump, IM, (6 mo and older Fluzone, Flulaval, Fluarix and 3 yrs and older Afluria) 10/25/2016, 10/05/2017    Influenza, Triv, inactivated, subunit, adjuvanted, IM (Fluad 65 yrs and older) 09/25/2019, 09/24/2020    Pneumococcal Conjugate 13-valent ([id number]) 10/11/2018    Pneumococcal Polysaccharide ([id number]) 10/04/2012, 09/24/2020    Tdap (Boostrix, Adacel) 04/03/2015    Zoster Live (Zostavax) 04/03/2015    Zoster Recombinant (Shingrix) 10/20/2020         Reviewed AVS with patient / caregiver.       CLINICAL PHARMACY CONSULT: MED RECONCILIATION/REVIEW ADDENDUM    For Pharmacy Admin Tracking Only     Intervention Detail:    Total # of Interventions Recommended: 0   Total # of Interventions Accepted: 0   Time Spent (min): 15

## 2021-07-13 ENCOUNTER — HOSPITAL ENCOUNTER (OUTPATIENT)
Dept: NON INVASIVE DIAGNOSTICS | Age: 71
Discharge: HOME | End: 2021-07-13
Payer: MEDICARE

## 2021-07-13 ENCOUNTER — HOSPITAL ENCOUNTER (OUTPATIENT)
Dept: CT IMAGING | Age: 71
Discharge: HOME | End: 2021-07-13
Payer: MEDICARE

## 2021-07-13 VITALS
DIASTOLIC BLOOD PRESSURE: 62 MMHG | HEART RATE: 57 BPM | SYSTOLIC BLOOD PRESSURE: 117 MMHG | TEMPERATURE: 97.3 F | RESPIRATION RATE: 16 BRPM

## 2021-07-13 DIAGNOSIS — I35.0 NONRHEUMATIC AORTIC VALVE STENOSIS: ICD-10-CM

## 2021-07-13 DIAGNOSIS — I25.10 CORONARY ARTERY DISEASE INVOLVING NATIVE CORONARY ARTERY OF NATIVE HEART WITHOUT ANGINA PECTORIS: ICD-10-CM

## 2021-07-13 DIAGNOSIS — Z95.2 S/P AVR: ICD-10-CM

## 2021-07-13 LAB
CALCIUM IONIZED: 1.35 MMOL/L (ref 1.12–1.32)
GFR AFRICAN AMERICAN: 52
GFR NON-AFRICAN AMERICAN: 43
GLUCOSE BLD-MCNC: 138 MG/DL (ref 70–99)
LV EF: 60 %
LVEF MODALITY: NORMAL
PERFORMED ON: ABNORMAL
POC CHLORIDE: 111 MMOL/L (ref 99–110)
POC CREATININE: 1.6 MG/DL (ref 0.8–1.3)
POC POTASSIUM: 4.5 MMOL/L (ref 3.5–5.1)
POC SAMPLE TYPE: ABNORMAL
POC SODIUM: 145 MMOL/L (ref 136–145)

## 2021-07-13 PROCEDURE — 75574 CT ANGIO HRT W/3D IMAGE: CPT

## 2021-07-13 PROCEDURE — 93306 TTE W/DOPPLER COMPLETE: CPT

## 2021-07-13 PROCEDURE — 82330 ASSAY OF CALCIUM: CPT

## 2021-07-13 PROCEDURE — 6370000000 HC RX 637 (ALT 250 FOR IP): Performed by: RADIOLOGY

## 2021-07-13 PROCEDURE — 84132 ASSAY OF SERUM POTASSIUM: CPT

## 2021-07-13 PROCEDURE — 82565 ASSAY OF CREATININE: CPT

## 2021-07-13 PROCEDURE — 82947 ASSAY GLUCOSE BLOOD QUANT: CPT

## 2021-07-13 PROCEDURE — 84295 ASSAY OF SERUM SODIUM: CPT

## 2021-07-13 PROCEDURE — 82435 ASSAY OF BLOOD CHLORIDE: CPT

## 2021-07-13 PROCEDURE — 6360000004 HC RX CONTRAST MEDICATION: Performed by: INTERNAL MEDICINE

## 2021-07-13 RX ORDER — NITROGLYCERIN 0.4 MG/1
0.4 TABLET SUBLINGUAL ONCE
Status: COMPLETED | OUTPATIENT
Start: 2021-07-13 | End: 2021-07-13

## 2021-07-13 RX ADMIN — NITROGLYCERIN 0.4 MG: 0.4 TABLET, ORALLY DISINTEGRATING SUBLINGUAL at 08:34

## 2021-07-13 RX ADMIN — IOPAMIDOL 75 ML: 755 INJECTION, SOLUTION INTRAVENOUS at 08:45

## 2021-07-15 DIAGNOSIS — I25.10 CORONARY ARTERY DISEASE INVOLVING NATIVE CORONARY ARTERY OF NATIVE HEART WITHOUT ANGINA PECTORIS: Primary | ICD-10-CM

## 2021-07-20 RX ORDER — WARFARIN SODIUM 7.5 MG/1
TABLET ORAL
Qty: 78 TABLET | Refills: 3 | Status: SHIPPED | OUTPATIENT
Start: 2021-07-20 | End: 2022-07-15

## 2021-07-20 NOTE — TELEPHONE ENCOUNTER
Last ov 6/4/21  Pending appt due in January, 2022 schedule not out yet  Last refill 7/24/20 #[id number]  Last labs 7/7/21

## 2021-07-29 ENCOUNTER — HOSPITAL ENCOUNTER (OUTPATIENT)
Dept: NON INVASIVE DIAGNOSTICS | Age: 71
Discharge: HOME | End: 2021-07-29
Payer: MEDICARE

## 2021-07-29 DIAGNOSIS — I25.10 CORONARY ARTERY DISEASE INVOLVING NATIVE CORONARY ARTERY OF NATIVE HEART WITHOUT ANGINA PECTORIS: ICD-10-CM

## 2021-07-29 LAB
LV EF: 65 %
LVEF MODALITY: NORMAL

## 2021-07-29 PROCEDURE — 78452 HT MUSCLE IMAGE SPECT MULT: CPT

## 2021-07-29 PROCEDURE — A9502 TC99M TETROFOSMIN: HCPCS | Performed by: INTERNAL MEDICINE

## 2021-07-29 PROCEDURE — 3430000000 HC RX DIAGNOSTIC RADIOPHARMACEUTICAL: Performed by: INTERNAL MEDICINE

## 2021-07-29 PROCEDURE — 93017 CV STRESS TEST TRACING ONLY: CPT

## 2021-07-29 RX ADMIN — TETROFOSMIN 10 MILLICURIE: 1.38 INJECTION, POWDER, LYOPHILIZED, FOR SOLUTION INTRAVENOUS at 09:34

## 2021-07-29 RX ADMIN — TETROFOSMIN 30 MILLICURIE: 1.38 INJECTION, POWDER, LYOPHILIZED, FOR SOLUTION INTRAVENOUS at 10:55

## 2021-08-11 ENCOUNTER — ANTI-COAG VISIT (OUTPATIENT)
Dept: PHARMACY | Age: 71
End: 2021-08-11
Payer: MEDICARE

## 2021-08-11 DIAGNOSIS — Z95.2 S/P AVR (AORTIC VALVE REPLACEMENT): Primary | ICD-10-CM

## 2021-08-11 LAB — INTERNATIONAL NORMALIZATION RATIO, POC: 3.1

## 2021-08-11 PROCEDURE — 85610 PROTHROMBIN TIME: CPT

## 2021-08-11 PROCEDURE — 99211 OFF/OP EST MAY X REQ PHY/QHP: CPT

## 2021-08-11 RX ORDER — METHYLDOPA/HYDROCHLOROTHIAZIDE 250MG-15MG
100 TABLET ORAL DAILY
COMMUNITY

## 2021-08-11 NOTE — PROGRESS NOTES
Mr. Artemio Alba is a 78 y.o.  male. Mr. Artemio Alba had an INR test today. Results were reviewed and appropriate warfarin management was completed. This visit was performed as: An in person visit. Protocols were followed with precautions to reduce the spread of COVID-19. Patient verifies current dosing regimen: Yes     Warfarin medication reviewed and updated on the patient 's home medication list: Yes   All other medications reviewed and updated on the patient 's home medication list: Yes :  He is now taking vitamin K2 and folinic acid Plus daily. Lab Results   Component Value Date    INR 3.1 08/11/2021    INR 3.3 07/07/2021    INR 3.20 06/09/2021           Anticoagulation Summary  As of 8/11/2021    INR goal:  2.5-3.5   TTR:  73.5 % (4.8 y)   INR used for dosing:  3.1 (8/11/2021)   Warfarin maintenance plan:  3.75 mg (7.5 mg x 0.5) every Mon, Fri; 7.5 mg (7.5 mg x 1) all other days   Weekly warfarin total:  45 mg   Plan last modified:  Dipika Baron, [facility] (9/2/2020)   Next INR check:  8/25/2021   Priority:  Maintenance   Target end date: Indefinite    Indications    S/P AVR (aortic valve replacement) [Z95.2]             Anticoagulation Episode Summary     INR check location:      Preferred lab:      Send INR reminders to:  [facility]    Comments:  EPIC      Anticoagulation Care Providers     Provider Role Specialty Phone number    Matt Amezquita MD Referring Cardiology [phone]          Warfarin assessment / plan: Forrest Cheng states that he just returned from a trip to [facility] 3 days ago. His diet and alcohol consumption was a bit different during his trip. His INR, however, is in range today at 3.1. He reports that he started taking Folinic Acid Plus and Vitamin K2 on 8-10-21 to improve his bone health. His physician is aware that he is on warfarin.  We will continue his current dose of warfarin and check his INR in 2 weeks to see if we need to adjust the dose with the addition of the vitamin K2. Description    Continue: Warfarin 7.5 mg daily except 3.75mg (1/2 tablet) on Monday and Friday    Call [phone] with signs or symptoms of bleeding or ANY medication changes (including over-the-counter medications or herbal supplements). If significant bleeding occurs please seek immediate medical attention. Keep the number of servings of vitamin K containing foods (dark green, leafy vegetables) the same each week. Please call if this changes. Limit alcohol intake. Please call if this changes. Immunization History   Administered Date(s) Administered    COVID-19, Moderna, PF, 100mcg/0.5mL 02/18/2021, 03/18/2021    Influenza 11/01/2015    Influenza Vaccine, unspecified formulation 10/05/2017    Influenza Virus Vaccine 10/04/2011, 10/04/2012    Influenza, High Dose (Fluzone 65 yrs and older) 10/11/2018    Influenza, Cherise Herb, IM, (6 mo and older Fluzone, Flulaval, Fluarix and 3 yrs and older Afluria) 10/25/2016, 10/05/2017    Influenza, Triv, inactivated, subunit, adjuvanted, IM (Fluad 65 yrs and older) 09/25/2019, 09/24/2020    Pneumococcal Conjugate 13-valent ([id number]) 10/11/2018    Pneumococcal Polysaccharide ([id number]) 10/04/2012, 09/24/2020    Tdap (Boostrix, Adacel) 04/03/2015    Zoster Live (Zostavax) 04/03/2015    Zoster Recombinant (Shingrix) 10/20/2020         Reviewed AVS with patient / caregiver.       CLINICAL PHARMACY CONSULT: MED RECONCILIATION/REVIEW ADDENDUM    For Pharmacy Admin Tracking Only     Intervention Detail:    Total # of Interventions Recommended: 0   Total # of Interventions Accepted: 0   Time Spent (min): 20

## 2021-08-25 ENCOUNTER — ANTI-COAG VISIT (OUTPATIENT)
Dept: PHARMACY | Age: 71
End: 2021-08-25
Payer: MEDICARE

## 2021-08-25 DIAGNOSIS — Z95.2 S/P AVR (AORTIC VALVE REPLACEMENT): Primary | ICD-10-CM

## 2021-08-25 LAB — INR BLD: 2.7

## 2021-08-25 PROCEDURE — 99211 OFF/OP EST MAY X REQ PHY/QHP: CPT

## 2021-08-25 PROCEDURE — 85610 PROTHROMBIN TIME: CPT

## 2021-08-25 NOTE — PROGRESS NOTES
Mr. Yamilet Torrez is a 78 y.o.  male. Mr. Yamilet Torrez had an INR test today. Results were reviewed and appropriate warfarin management was completed. This visit was performed as: An in person visit. Protocols were followed with precautions to reduce the spread of COVID-19. Patient verifies current dosing regimen: Yes     Warfarin medication reviewed and updated on the patient 's home medication list: Yes   All other medications reviewed and updated on the patient 's home medication list: No: reviewed medication changes     Lab Results   Component Value Date    INR 2.70 2021    INR 3.1 2021    INR 3.3 2021       Patient Findings     Positives:  Change in medications    Negatives:  Signs/symptoms of bleeding, Missed doses, Change in diet/appetite    Comments:  Started vitamin K2 a little over 2 weeks ago           Anticoagulation Summary  As of 2021    INR goal:  2.5-3.5   TTR:  73.7 % (4.8 y)   INR used for dosin.70 (2021)   Warfarin maintenance plan:  3.75 mg (7.5 mg x 0.5) every Mon; 7.5 mg (7.5 mg x 1) all other days   Weekly warfarin total:  48.75 mg   Plan last modified:  [facility] (2021)   Next INR check:  2021   Priority:  Maintenance   Target end date: Indefinite    Indications    S/P AVR (aortic valve replacement) [Z95.2]             Anticoagulation Episode Summary     INR check location:      Preferred lab:      Send INR reminders to:  [facility]    Comments:  EPIC      Anticoagulation Care Providers     Provider Role Specialty Phone number    Brooklyn Olivarez MD Referring Cardiology [phone]          Warfarin assessment / plan:     Patient's INR therapeutic at 2.7. Patient started on vitamin K2 daily a little over 2 weeks ago for bone health. Patient denies any other medication changes or any changes in his diet - does not eat many vitamin K rich foods. Patient denies any missed doses.     Plan - warfarin dose increased to 7.5 mg daily EXCEPT 3.75 mg on Mondays to account for new vitamin K supplement. This is a dose increase of about 8%. Plan to recheck INR in 3 weeks. Description    NEW DOSE: Warfarin 7.5 mg daily except 3.75 mg (1/2 tablet) on Monday    Call [phone] with signs or symptoms of bleeding or ANY medication changes (including over-the-counter medications or herbal supplements). If significant bleeding occurs please seek immediate medical attention. Keep the number of servings of vitamin K containing foods (dark green, leafy vegetables) the same each week. Please call if this changes. Limit alcohol intake. Please call if this changes. Immunization History   Administered Date(s) Administered    COVID-19, Moderna, PF, 100mcg/0.5mL 02/18/2021, 03/18/2021    Influenza 11/01/2015    Influenza Vaccine, unspecified formulation 10/05/2017    Influenza Virus Vaccine 10/04/2011, 10/04/2012    Influenza, High Dose (Fluzone 65 yrs and older) 10/11/2018    Influenza, Doralee Age, IM, (6 mo and older Fluzone, Flulaval, Fluarix and 3 yrs and older Afluria) 10/25/2016, 10/05/2017    Influenza, Triv, inactivated, subunit, adjuvanted, IM (Fluad 65 yrs and older) 09/25/2019, 09/24/2020    Pneumococcal Conjugate 13-valent ([id number]) 10/11/2018    Pneumococcal Polysaccharide ([id number]) 10/04/2012, 09/24/2020    Tdap (Boostrix, Adacel) 04/03/2015    Zoster Live (Zostavax) 04/03/2015    Zoster Recombinant (Shingrix) 10/20/2020         Reviewed AVS with patient / caregiver.       CLINICAL PHARMACY CONSULT: MED RECONCILIATION/REVIEW ADDENDUM    For Pharmacy Admin Tracking Only     Intervention Detail: Dose Adjustment: 1, reason: Therapy Optimization   Total # of Interventions Recommended: 1   Total # of Interventions Accepted: 1   Time Spent (min): 15

## 2021-09-15 ENCOUNTER — ANTI-COAG VISIT (OUTPATIENT)
Dept: PHARMACY | Age: 71
End: 2021-09-15
Payer: MEDICARE

## 2021-09-15 DIAGNOSIS — Z95.2 S/P AVR (AORTIC VALVE REPLACEMENT): Primary | ICD-10-CM

## 2021-09-15 LAB — INR BLD: 3.2

## 2021-09-15 PROCEDURE — 99211 OFF/OP EST MAY X REQ PHY/QHP: CPT

## 2021-09-15 PROCEDURE — 85610 PROTHROMBIN TIME: CPT

## 2021-09-15 NOTE — PROGRESS NOTES
Mr. Loretta Mon is a 78 y.o.  male. Mr. Loretta Mon had an INR test today. Results were reviewed and appropriate warfarin management was completed. This visit was performed as: An in person visit. Protocols were followed with precautions to reduce the spread of COVID-19. Patient verifies current dosing regimen: Yes     Warfarin medication reviewed and updated on the patient 's home medication list: Yes   All other medications reviewed and updated on the patient 's home medication list: No: no changes     Lab Results   Component Value Date    INR 3.20 09/15/2021    INR 2.70 08/25/2021    INR 3.1 08/11/2021       Patient Findings     Negatives:  Signs/symptoms of bleeding, Missed doses, Change in medications, Change in diet/appetite, Bruising          Anticoagulation Summary  As of 9/15/2021    INR goal:  2.5-3.5   TTR:  74.0 % (4.9 y)   INR used for dosing:  3.20 (9/15/2021)   Warfarin maintenance plan:  3.75 mg (7.5 mg x 0.5) every Mon; 7.5 mg (7.5 mg x 1) all other days   Weekly warfarin total:  48.75 mg   Plan last modified:  Nyarashi Koch (8/25/2021)   Next INR check:  10/13/2021   Priority:  Maintenance   Target end date: Indefinite    Indications    S/P AVR (aortic valve replacement) [Z95.2]             Anticoagulation Episode Summary     INR check location:      Preferred lab:      Send INR reminders to:  [facility]FF    Comments:  EPIC      Anticoagulation Care Providers     Provider Role Specialty Phone number    Sindy Weir MD Referring Cardiology [phone]          Warfarin assessment / plan:   Patient appears well today and with no complains. No changes to diet or medications, and no signs of bleeding or bruising. His INR was therapeutic at 3.20. No change to warfarin therapy today. He was told to call us with any changes to his medications. Will see him again in 4 weeks.   Description    CONTINUE: Warfarin 7.5 mg daily except 3.75 mg (1/2 tablet) on Monday    Call [phone] with signs or symptoms of bleeding or ANY medication changes (including over-the-counter medications or herbal supplements). If significant bleeding occurs please seek immediate medical attention. Keep the number of servings of vitamin K containing foods (dark green, leafy vegetables) the same each week. Please call if this changes. Limit alcohol intake. Please call if this changes. Immunization History   Administered Date(s) Administered    COVID-19, Moderna, PF, 100mcg/0.5mL 2021, 2021    Influenza 2015    Influenza Vaccine, unspecified formulation 10/05/2017    Influenza Virus Vaccine 10/04/2011, 10/04/2012    Influenza, High Dose (Fluzone 65 yrs and older) 10/11/2018    Influenza, Anna Ben, IM, (6 mo and older Fluzone, Flulaval, Fluarix and 3 yrs and older Afluria) 10/25/2016, 10/05/2017    Influenza, Triv, inactivated, subunit, adjuvanted, IM (Fluad 65 yrs and older) 2019, 2020    Pneumococcal Conjugate 13-valent ([id number]) 10/11/2018    Pneumococcal Polysaccharide ([id number]) 10/04/2012, 2020    Tdap (Boostrix, Adacel) 2015    Zoster Live (Zostavax) 2015    Zoster Recombinant (Shingrix) 10/20/2020         Reviewed AVS with patient / caregiver.       CLINICAL PHARMACY CONSULT: MED RECONCILIATION/REVIEW ADDENDUM    For Pharmacy Admin Tracking Only     Intervention Detail: Adherence Monitorin   Total # of Interventions Recommended: 0   Total # of Interventions Accepted: 0   Time Spent (min): 20

## 2021-09-28 ENCOUNTER — VIRTUAL VISIT (OUTPATIENT)
Dept: FAMILY MEDICINE CLINIC | Age: 71
End: 2021-09-28
Payer: MEDICARE

## 2021-09-28 DIAGNOSIS — Z00.00 MEDICARE ANNUAL WELLNESS VISIT, INITIAL: Primary | ICD-10-CM

## 2021-09-28 DIAGNOSIS — N18.30 CKD STAGE 3 DUE TO TYPE 2 DIABETES MELLITUS (HCC): ICD-10-CM

## 2021-09-28 DIAGNOSIS — R79.83 HOMOCYSTEINEMIA: ICD-10-CM

## 2021-09-28 DIAGNOSIS — I25.10 CORONARY ARTERY DISEASE INVOLVING NATIVE CORONARY ARTERY OF NATIVE HEART WITHOUT ANGINA PECTORIS: ICD-10-CM

## 2021-09-28 DIAGNOSIS — E66.9 DIABETES MELLITUS TYPE 2 IN OBESE (HCC): ICD-10-CM

## 2021-09-28 DIAGNOSIS — E79.0 HYPERURICEMIA: ICD-10-CM

## 2021-09-28 DIAGNOSIS — Z00.00 ROUTINE GENERAL MEDICAL EXAMINATION AT A HEALTH CARE FACILITY: ICD-10-CM

## 2021-09-28 DIAGNOSIS — E55.9 VITAMIN D DEFICIENCY: ICD-10-CM

## 2021-09-28 DIAGNOSIS — E78.2 MIXED HYPERLIPIDEMIA: ICD-10-CM

## 2021-09-28 DIAGNOSIS — E11.22 CKD STAGE 3 DUE TO TYPE 2 DIABETES MELLITUS (HCC): ICD-10-CM

## 2021-09-28 DIAGNOSIS — Z79.01 WARFARIN ANTICOAGULATION: ICD-10-CM

## 2021-09-28 DIAGNOSIS — Z95.2 S/P AVR (AORTIC VALVE REPLACEMENT): ICD-10-CM

## 2021-09-28 DIAGNOSIS — N20.0 KIDNEY STONES: ICD-10-CM

## 2021-09-28 DIAGNOSIS — G47.33 OSA ON CPAP: ICD-10-CM

## 2021-09-28 DIAGNOSIS — Z99.89 OSA ON CPAP: ICD-10-CM

## 2021-09-28 DIAGNOSIS — E11.69 DIABETES MELLITUS TYPE 2 IN OBESE (HCC): ICD-10-CM

## 2021-09-28 DIAGNOSIS — Z12.5 PROSTATE CANCER SCREENING: ICD-10-CM

## 2021-09-28 DIAGNOSIS — F32.5 MAJOR DEPRESSIVE DISORDER IN FULL REMISSION, UNSPECIFIED WHETHER RECURRENT (HCC): ICD-10-CM

## 2021-09-28 DIAGNOSIS — E03.9 ACQUIRED HYPOTHYROIDISM: ICD-10-CM

## 2021-09-28 PROCEDURE — 1123F ACP DISCUSS/DSCN MKR DOCD: CPT | Performed by: FAMILY MEDICINE

## 2021-09-28 PROCEDURE — 3017F COLORECTAL CA SCREEN DOC REV: CPT | Performed by: FAMILY MEDICINE

## 2021-09-28 PROCEDURE — 3044F HG A1C LEVEL LT 7.0%: CPT | Performed by: FAMILY MEDICINE

## 2021-09-28 PROCEDURE — G0438 PPPS, INITIAL VISIT: HCPCS | Performed by: FAMILY MEDICINE

## 2021-09-28 PROCEDURE — 4040F PNEUMOC VAC/ADMIN/RCVD: CPT | Performed by: FAMILY MEDICINE

## 2021-09-28 RX ORDER — ALLOPURINOL 300 MG/1
300 TABLET ORAL EVERY MORNING
COMMUNITY

## 2021-09-28 RX ORDER — AMOXICILLIN 500 MG/1
4 CAPSULE ORAL SEE ADMIN INSTRUCTIONS
COMMUNITY
Start: 2021-09-13

## 2021-09-28 RX ORDER — PEN NEEDLE, DIABETIC 31 GX5/16"
1 NEEDLE, DISPOSABLE MISCELLANEOUS DAILY
COMMUNITY
Start: 2021-09-12

## 2021-09-28 SDOH — ECONOMIC STABILITY: FOOD INSECURITY: WITHIN THE PAST 12 MONTHS, THE FOOD YOU BOUGHT JUST DIDN'T LAST AND YOU DIDN'T HAVE MONEY TO GET MORE.: NEVER TRUE

## 2021-09-28 SDOH — ECONOMIC STABILITY: TRANSPORTATION INSECURITY
IN THE PAST 12 MONTHS, HAS THE LACK OF TRANSPORTATION KEPT YOU FROM MEDICAL APPOINTMENTS OR FROM GETTING MEDICATIONS?: NO

## 2021-09-28 SDOH — ECONOMIC STABILITY: TRANSPORTATION INSECURITY
IN THE PAST 12 MONTHS, HAS LACK OF TRANSPORTATION KEPT YOU FROM MEETINGS, WORK, OR FROM GETTING THINGS NEEDED FOR DAILY LIVING?: NO

## 2021-09-28 SDOH — ECONOMIC STABILITY: FOOD INSECURITY: WITHIN THE PAST 12 MONTHS, YOU WORRIED THAT YOUR FOOD WOULD RUN OUT BEFORE YOU GOT MONEY TO BUY MORE.: NEVER TRUE

## 2021-09-28 ASSESSMENT — LIFESTYLE VARIABLES
HOW OFTEN DO YOU HAVE SIX OR MORE DRINKS ON ONE OCCASION: 0
HOW MANY STANDARD DRINKS CONTAINING ALCOHOL DO YOU HAVE ON A TYPICAL DAY: 0
HAVE YOU OR SOMEONE ELSE BEEN INJURED AS A RESULT OF YOUR DRINKING: 0
HOW OFTEN DURING THE LAST YEAR HAVE YOU FAILED TO DO WHAT WAS NORMALLY EXPECTED FROM YOU BECAUSE OF DRINKING: 0
HOW OFTEN DURING THE LAST YEAR HAVE YOU NEEDED AN ALCOHOLIC DRINK FIRST THING IN THE MORNING TO GET YOURSELF GOING AFTER A NIGHT OF HEAVY DRINKING: 0
HAS A RELATIVE, FRIEND, DOCTOR, OR ANOTHER HEALTH PROFESSIONAL EXPRESSED CONCERN ABOUT YOUR DRINKING OR SUGGESTED YOU CUT DOWN: 0
AUDIT TOTAL SCORE: 2
HOW OFTEN DO YOU HAVE A DRINK CONTAINING ALCOHOL: 2
HOW OFTEN DURING THE LAST YEAR HAVE YOU HAD A FEELING OF GUILT OR REMORSE AFTER DRINKING: 0
HOW OFTEN DURING THE LAST YEAR HAVE YOU FOUND THAT YOU WERE NOT ABLE TO STOP DRINKING ONCE YOU HAD STARTED: 0
HOW OFTEN DURING THE LAST YEAR HAVE YOU BEEN UNABLE TO REMEMBER WHAT HAPPENED THE NIGHT BEFORE BECAUSE YOU HAD BEEN DRINKING: 0
AUDIT-C TOTAL SCORE: 2

## 2021-09-28 ASSESSMENT — SOCIAL DETERMINANTS OF HEALTH (SDOH): HOW HARD IS IT FOR YOU TO PAY FOR THE VERY BASICS LIKE FOOD, HOUSING, MEDICAL CARE, AND HEATING?: NOT HARD AT ALL

## 2021-09-28 ASSESSMENT — PATIENT HEALTH QUESTIONNAIRE - PHQ9
SUM OF ALL RESPONSES TO PHQ QUESTIONS 1-9: 0
2. FEELING DOWN, DEPRESSED OR HOPELESS: 0
1. LITTLE INTEREST OR PLEASURE IN DOING THINGS: 0
SUM OF ALL RESPONSES TO PHQ9 QUESTIONS 1 & 2: 0

## 2021-09-28 NOTE — PROGRESS NOTES
Medicare Annual Wellness Visit  Name: Janette Benitez Date: 2021   MRN: [id number] Sex: Male   Age: 78 y.o. Ethnicity: Non- / Non    : 1950 Race: White (non-)      Jorge Lloyd is here for Medicare AWV (ANNUAL MEDICARE WELLNESS EXAM)   s/p AVR w / hx of CAD -on warfarin managed by hospital pharm clinic  Sees nephrology for ckd 3  Seen by endo - hx of uric acid stones, low thyroid and dm w/ nephropathy w/o retinopathy- on trulicity, synjardy, toujeo and has freestyle josé miguel for monitoring  On low dose lisinopril as well as alllopurinol and vit d replacement as well as synthroid  Recent stress test.  No particular health concerns  Volunteers at [facility] committee - some beer drinking  Plays euphonium in band. Diet fairly good - not great - some fruits/ veggies. No concerns w/ memory - active w/ mind. Sleeping okay - may be up couple times/ night - rested - feels good generally  Mood good  No balance issues. bp good overall. No kidney stones. Drinking plenty of water  Screenings for behavioral, psychosocial and functional/safety risks, and cognitive dysfunction are all negative except as indicated below. These results, as well as other patient data from the [address] form, are documented in Flowsheets linked to this Encounter. Allergies   Allergen Reactions    Ciprofloxacin Hives       Prior to Visit Medications    Medication Sig Taking?  Authorizing Provider   allopurinol (ZYLOPRIM) 300 MG tablet Take 300 mg by mouth every morning Yes Historical Provider, MD   Folinic Acid-Vit B6-Vit B12 4-50-2 MG TABS Take 1 tablet by mouth daily Yes Historical Provider, MD   Menatetrenone (VITAMIN K2) 100 MCG TABS Take 100 mcg by mouth daily Yes Historical Provider, MD   warfarin (TOVEN) 7.5 MG tablet TAKE 1 TABLET DAILY EXCEPT TAKE 1/2 TABLET (3.75MG) ON  AND  OR AS DIRECTED BY [facility] [address]  Patient taking differently: TAKE 1 TABLET DAILY EXCEPT TAKE 1/2 TABLET (3.75MG) ON MONDAYS OR AS DIRECTED BY [facility] Yes Tom Menendez MD   empagliflozin (JARDIANCE) 10 MG tablet Take 10 mg by mouth daily Yes Historical Provider, MD   nateglinide (STARLIX) 120 MG tablet Take 120 mg by mouth as needed (for a high carb meal) Yes Historical Provider, MD   lisinopril (PRINIVIL;ZESTRIL) 5 MG tablet Take 5 mg by mouth nightly Yes Historical Provider, MD   mirtazapine (REMERON) 30 MG tablet TAKE 1 TABLET BY MOUTH EVERY DAY EVERY NIGHT Yes Dominique Tao MD   levothyroxine (SYNTHROID) 112 MCG tablet Take 112 mcg by mouth Daily  Yes Historical Provider, MD   Niacin ER 1000 MG TBCR Take 1,000 mg by mouth nightly Yes Historical Provider, MD   CPAP Machine MISC by Does not apply route CPAP 7 cwp, travel device with humidifier Yes Naresh Fragoso MD   aspirin 81 MG EC tablet Take 1 tablet by mouth nightly Yes Tom Menendez MD   Dulaglutide (TRULICITY) 1.5 JU/1.8NF SOPN Inject 1.5 mg into the skin every 7 days Yes Historical Provider, MD   TOUJEO SOLOSTAR 300 UNIT/ML injection pen Inject 40 Units into the skin daily  Patient taking differently: Inject 20 Units into the skin daily  Yes Anna Isaac MD   rosuvastatin (CRESTOR) 40 MG tablet Take 1 tablet by mouth every evening Yes Anna Isaac MD   ZETIA 10 MG tablet TAKE 1 TABLET DAILY Yes Maria Esther Diaz APRN - CNP   metoprolol succinate (TOPROL XL) 25 MG extended release tablet Take 3 tablets by mouth 2 times daily 3 tabs po BID  Patient taking differently: Take 75 mg by mouth 2 times daily  Yes Anna Isaac MD   acetaminophen (TYLENOL) 325 MG tablet Take 2 tablets by mouth every 4 hours as needed for Pain Yes MARITA Sarabia CNP       Past Medical History:   Diagnosis Date    Aortic stenosis 9/2013    moderate    Arthritis     Asthma     BRONCHIAL ASTHMA  AS A CHILD    Deviated septum     Diabetes mellitus type II 2000    Dr. Ranjan Park Hashimoto thyroiditis dx 1984    medical treatment    Homocysteinemia ([facility] [address]     Hyperlipidemia     Hypertension     Hypertriglyceridemia     Hypogonadism male     topical testosterone     Hypothyroid 12/9/2011    Kidney stone 1990s    Passed on own and also surgical excision     Mechanical heart valve present 2016    Murmur since 2005    Nasal polyps     Osteoarthritis     Right thumb     Prolonged emergence from general anesthesia     Sleep apnea 2005    CPAP nightly    Warfarin anticoagulation 2016    Wears glasses        Past Surgical History:   Procedure Laterality Date    AORTIC VALVE REPLACEMENT  09/14/2016    21mm [facility] [place]    COLECTOMY  1996    perforated bowel: colonoscopy    COLONOSCOPY  9/24/2014; 2004    x2 normal colonoscopies/ perforated bowel    CYST REMOVAL      hairy nevus left biceps as child    KNEE SURGERY Left 2004    torn meniscus     NASAL POLYP SURGERY  1989, 1987    OTHER SURGICAL HISTORY  1990s    kidney stone removal     ROTATOR CUFF REPAIR Right 2009       Family History   Problem Relation Age of Onset    Heart Disease Father     Kidney Disease Father         was on HD     Arthritis Father     High Blood Pressure Father     High Cholesterol Mother     Stroke Mother     Diabetes Brother     Other Brother     High Cholesterol Brother     Diabetes Other         aunts, uncles/ grandparents    Heart Disease Maternal Grandfather     Cancer Paternal Grandmother         type unknown.        CareTeam (Including outside providers/suppliers regularly involved in providing care):   Patient Care Team:  Paula Durán MD as PCP [facility] - [facility] Empaneled Provider  Nathanael Bautista MD as Consulting Physician (Endocrinology)  Colby Jernigan MD as Surgeon (Orthopedic Surgery)    Wt Readings from Last 3 Encounters:   06/04/21 199 lb 6.4 oz (90.4 kg)   12/01/20 196 lb 12.8 oz (89.3 kg)   09/24/20 201 lb (91.2 kg)     Patient-Reported Vitals 9/28/2021   Patient-Reported Weight 207 LB   Patient-Reported Height 71\"      There is no height or weight on file to calculate BMI. Based upon direct observation of the patient, evaluation of cognition reveals recent and remote memory intact. Patient's complete Health Risk Assessment and screening values have been reviewed and are found in Flowsheets. The following problems were reviewed today and where indicated follow up appointments were made and/or referrals ordered. Positive Risk Factor Screenings with Interventions:               No Positive Risk Factors identified today.     Personalized Preventive Plan   Current Health Maintenance Status  Immunization History   Administered Date(s) Administered    COVID-19, Moderna, PF, 100mcg/0.5mL 02/18/2021, 03/18/2021    Influenza 11/01/2015    Influenza Vaccine, unspecified formulation 10/05/2017    Influenza Virus Vaccine 10/04/2011, 10/04/2012    Influenza, High Dose (Fluzone 65 yrs and older) 10/11/2018    Influenza, Eulice Sarah, IM, (6 mo and older Fluzone, Flulaval, Fluarix and 3 yrs and older Afluria) 10/25/2016, 10/05/2017    Influenza, Triv, inactivated, subunit, adjuvanted, IM (Fluad 65 yrs and older) 09/25/2019, 09/24/2020    Pneumococcal Conjugate 13-valent ([id number]) 10/11/2018    Pneumococcal Polysaccharide ([id number]) 10/04/2012, 09/24/2020    Tdap (Boostrix, Adacel) 04/03/2015    Zoster Live (Zostavax) 04/03/2015    Zoster Recombinant (Shingrix) 10/20/2020        Health Maintenance   Topic Date Due    Diabetic foot exam  08/05/2017    Annual Wellness Visit (AWV)  Never done    Diabetic retinal exam  07/03/2021    Flu vaccine (1) 09/01/2021    A1C test (Diabetic or Prediabetic)  04/29/2022    Lipid screen  04/29/2022    TSH testing  04/29/2022    Potassium monitoring  06/23/2022    Creatinine monitoring  07/22/2022    Colon cancer screen colonoscopy  09/24/2024    DTaP/Tdap/Td vaccine (2 - Td or Tdap) 04/03/2025    Shingles Vaccine  Completed    Pneumococcal 65+ yrs at Risk Vaccine  Completed    COVID-19 Vaccine  Completed    Hepatitis C screen  Completed    Hepatitis A vaccine  Aged Out    Hib vaccine  Aged Out    Meningococcal (ACWY) vaccine  Aged Out     Recommendations for [employer] Due: see orders and patient instructions/AVS.  . Recommended screening schedule for the next 5-10 years is provided to the patient in written form: see Patient Instructions/AVS.        Diagnosis Orders   1. Medicare annual wellness visit, initial     2. Routine general medical examination at a health care facility     3. Major depressive disorder in full remission, unspecified whether recurrent ([facility] [address]     4. Diabetes mellitus type 2 in obese ([facility] [address]     5. Homocysteinemia ([facility] [address]     6. Coronary artery disease involving native coronary artery of native heart without angina pectoris     7. Acquired hypothyroidism     8. Kidney stones     9. JOSÉ MANUEL on CPAP     10. S/P AVR (aortic valve replacement)     11. Mixed hyperlipidemia     12. Vitamin D deficiency     13. Warfarin anticoagulation     14. Hyperuricemia     15. CKD stage 3 due to type 2 diabetes mellitus ([facility] [address]         remeron for mood and sleep - stable  Colonoscopy - dr. Maninder Watkins - due 2024 per chart  gfr 43 w/ uric acid 3.1 and normal vit d in June  a1c 6.3% at that time  Lipid low w/ hdl 27, ldl 40 and tg 132 11/20  psa 2.09 1/19  [place] , sdma / lp analysis/ nmr lipoprotein, vit d, uric acid, crp -hs, tsh/ t4/t3, homocysteine. F/u routinely cardio, nephro, endo and eye doctor for routine checks - feet care  Immunizations reviewed - eligible for booster w/ moderna when approved - flu shot soon o/w utd   ? 2nd shingrix  Using cpap for josé manuel - sees dr. Le Redmond is a 78 y.o. male being evaluated by a Virtual Visit (video and audio) encounter to address concerns as mentioned above. A caregiver was present when appropriate.  Due to this being a TeleHealth encounter (During EIBCT-68 public health emergency), evaluation of the following organ systems was limited: Vitals/Constitutional/EENT/Resp/CV/GI//MS/Neuro/Skin/Heme-Lymph-Imm. Pursuant to the emergency declaration under the [address] [place] [address], [address] [place] [address] and the [employer] Act, this Virtual Visit was conducted with patient's (and/or legal guardian's) consent, to reduce the patient's risk of exposure to COVID-19 and provide necessary medical care. The patient (and/or legal guardian) has also been advised to contact this office for worsening conditions or problems, and seek emergency medical treatment and/or call 911 if deemed necessary. Patient identification was verified at the start of the visit: Yes    Services were provided through a video synchronous discussion virtually to substitute for in-person clinic visit. Patient and provider were located at their individual homes. --Akua High MD on 9/28/2021 at 8:56 AM    An electronic signature was used to authenticate this note.

## 2021-09-28 NOTE — PATIENT INSTRUCTIONS
Personalized Preventive Plan for Sheree Yancey - 9/28/2021  Medicare offers a range of preventive health benefits. Some of the tests and screenings are paid in full while other may be subject to a deductible, co-insurance, and/or copay. Some of these benefits include a comprehensive review of your medical history including lifestyle, illnesses that may run in your family, and various assessments and screenings as appropriate. After reviewing your medical record and screening and assessments performed today your provider may have ordered immunizations, labs, imaging, and/or referrals for you. A list of these orders (if applicable) as well as your Preventive Care list are included within your After Visit Summary for your review. Other Preventive Recommendations:    · A preventive eye exam performed by an eye specialist is recommended every 1-2 years to screen for glaucoma; cataracts, macular degeneration, and other eye disorders. · A preventive dental visit is recommended every 6 months. · Try to get at least 150 minutes of exercise per week or 10,000 steps per day on a pedometer . · Order or download the FREE \"Exercise & Physical Activity: Your Everyday Guide\" from [employer]. Call 5-122.637.8395 or search [employer] online. · You need 8432-8286 mg of calcium and 1447-9169 IU of vitamin D per day. It is possible to meet your calcium requirement with diet alone, but a vitamin D supplement is usually necessary to meet this goal.  · When exposed to the sun, use a sunscreen that protects against both UVA and UVB radiation with an SPF of 30 or greater. Reapply every 2 to 3 hours or after sweating, drying off with a towel, or swimming. · Always wear a seat belt when traveling in a car. Always wear a helmet when riding a bicycle or motorcycle.

## 2021-09-30 ENCOUNTER — PATIENT MESSAGE (OUTPATIENT)
Dept: FAMILY MEDICINE CLINIC | Age: 71
End: 2021-09-30

## 2021-09-30 DIAGNOSIS — R97.20 ELEVATED PSA: Primary | ICD-10-CM

## 2021-09-30 NOTE — TELEPHONE ENCOUNTER
From: Nora Kevin  To: Tonja Paredes MD  Sent: 9/30/2021 11:03 AM EDT  Subject: Visit Follow-Up Question    For Sadiq Munoz. ... Per our conversation last Tuesday, Took BP this morning: was 117/77.     Meghan Randall

## 2021-10-12 ENCOUNTER — OFFICE VISIT (OUTPATIENT)
Dept: PULMONOLOGY | Age: 71
End: 2021-10-12
Payer: MEDICARE

## 2021-10-12 VITALS
RESPIRATION RATE: 16 BRPM | HEART RATE: 78 BPM | WEIGHT: 210 LBS | HEIGHT: 71 IN | OXYGEN SATURATION: 96 % | TEMPERATURE: 97.3 F | BODY MASS INDEX: 29.4 KG/M2 | DIASTOLIC BLOOD PRESSURE: 60 MMHG | SYSTOLIC BLOOD PRESSURE: 100 MMHG

## 2021-10-12 DIAGNOSIS — G47.33 OSA ON CPAP: ICD-10-CM

## 2021-10-12 DIAGNOSIS — Z99.89 OSA ON CPAP: ICD-10-CM

## 2021-10-12 PROCEDURE — G8482 FLU IMMUNIZE ORDER/ADMIN: HCPCS | Performed by: INTERNAL MEDICINE

## 2021-10-12 PROCEDURE — 1123F ACP DISCUSS/DSCN MKR DOCD: CPT | Performed by: INTERNAL MEDICINE

## 2021-10-12 PROCEDURE — 1036F TOBACCO NON-USER: CPT | Performed by: INTERNAL MEDICINE

## 2021-10-12 PROCEDURE — 99213 OFFICE O/P EST LOW 20 MIN: CPT | Performed by: INTERNAL MEDICINE

## 2021-10-12 PROCEDURE — 3017F COLORECTAL CA SCREEN DOC REV: CPT | Performed by: INTERNAL MEDICINE

## 2021-10-12 PROCEDURE — G8427 DOCREV CUR MEDS BY ELIG CLIN: HCPCS | Performed by: INTERNAL MEDICINE

## 2021-10-12 PROCEDURE — 4040F PNEUMOC VAC/ADMIN/RCVD: CPT | Performed by: INTERNAL MEDICINE

## 2021-10-12 PROCEDURE — G8417 CALC BMI ABV UP PARAM F/U: HCPCS | Performed by: INTERNAL MEDICINE

## 2021-10-12 ASSESSMENT — SLEEP AND FATIGUE QUESTIONNAIRES
HOW LIKELY ARE YOU TO NOD OFF OR FALL ASLEEP WHILE LYING DOWN TO REST IN THE AFTERNOON WHEN CIRCUMSTANCES PERMIT: 0
HOW LIKELY ARE YOU TO NOD OFF OR FALL ASLEEP WHILE SITTING AND TALKING TO SOMEONE: 1
HOW LIKELY ARE YOU TO NOD OFF OR FALL ASLEEP WHILE SITTING QUIETLY AFTER LUNCH WITHOUT ALCOHOL: 0
HOW LIKELY ARE YOU TO NOD OFF OR FALL ASLEEP WHILE WATCHING TV: 1
HOW LIKELY ARE YOU TO NOD OFF OR FALL ASLEEP WHILE SITTING AND READING: 1
ESS TOTAL SCORE: 3
HOW LIKELY ARE YOU TO NOD OFF OR FALL ASLEEP WHILE SITTING INACTIVE IN A PUBLIC PLACE: 0
HOW LIKELY ARE YOU TO NOD OFF OR FALL ASLEEP IN A CAR, WHILE STOPPED FOR A FEW MINUTES IN TRAFFIC: 0
HOW LIKELY ARE YOU TO NOD OFF OR FALL ASLEEP WHEN YOU ARE A PASSENGER IN A CAR FOR AN HOUR WITHOUT A BREAK: 0

## 2021-10-12 NOTE — ASSESSMENT & PLAN NOTE
Download reviewed on phone joan.  100% compliance with AHI of 0.5. Using portable cpap. Using cpap nightly > 4 hours per day. No issues. No complaints mask fit or humidification issues. Knows to changes mask and hoses every 6 months. No titration needed during current visit.

## 2021-10-12 NOTE — PROGRESS NOTES
REASON FOR CONSULTATION/CC: JOSÉ MANUEL      PCP: Sherry Tejeda MD    HISTORY OF PRESENT ILLNESS: Andrey Monteiro is a 78y.o. year old male with a history of JOSÉ MANUEL who presents :     Sleep history:       JOSÉ MANUEL  Using cpap nightly > 4 hours per day. No issues. No complaints mask fit or humidification issues. Knows to changes mask and hoses every 6 months. Huxley Sleepiness Scale:    Sleep Medicine 10/12/2021 7/9/2020 7/10/2019 4/30/2018 10/5/2017 8/4/2016   Sitting and reading 1 1 2 1 2 1   Watching TV 1 1 2 1 2 2   Sitting, inactive in a public place (e.g. a theatre or a meeting) 0 0 1 0 1 0   As a passenger in a car for an hour without a break 0 0 0 0 1 0   Lying down to rest in the afternoon when circumstances permit 0 1 1 1 2 0   Sitting and talking to someone 1 0 0 0 0 0   Sitting quietly after a lunch without alcohol 0 0 0 0 1 0   In a car, while stopped for a few minutes in traffic 0 0 0 0 0 0   Total score 3 3 6 3 9 3   Neck circumference - - - 17 17 17         0 = no chance of dozing  1 = slight chance of dozing  2 = moderate chance of dozing  3 = high chance of dozing    Interpretation:   0-7: It is unlikely that you are abnormally sleepy. 8-9:You have an average amount of daytime sleepiness. 10-15: You may be excessively sleepy depending on the situation. You may want to consider   seeking medical attention. 16-24: You are excessively sleepy and should consider seeking medical attention       REVIEW OF SYSTEMS:  Constitutional: Negative for fever   HENT: Negative for sore throat  Respiratory: Negative for dyspnea, cough  Cardiovascular: Negative for chest pain  Gastrointestinal: Negative for vomiting, diarrhea   Skin: Negative for rash  Psychiatric/Behavorial: Negative for anxiety          Objective:   PHYSICAL EXAM:  Blood pressure 100/60, pulse 78, temperature 97.3 °F (36.3 °C), temperature source Infrared, resp.  rate 16, height 5' 11\" (1.803 m), weight 210 lb (95.3 kg), SpO2 96 %.'  Gen: No distress. ENT:   Resp: No accessory muscle use. No crackles. No wheezes. No rhonchi. CV: Regular rate. Regular rhythm. No murmur or rub. No edema. Skin: Warm, dry, normal texture and turgor. No nodule on exposed extremities. M/S: No cyanosis. No clubbing. No joint deformity. Psych: Oriented x 3. No anxiety. Awake. Alert. Intact judgement and insight. Good Mood / Affect. Memory appears in tact    Data Reviewed:        Assessment:     ·  JOSÉ MANUEL      Plan:            Problem List Items Addressed This Visit     JOSÉ MANUEL on CPAP      Download reviewed on phone joan.  100% compliance with AHI of 0.5. Using portable cpap. Using cpap nightly > 4 hours per day. No issues. No complaints mask fit or humidification issues. Knows to changes mask and hoses every 6 months. No titration needed during current visit.

## 2021-10-13 ENCOUNTER — APPOINTMENT (OUTPATIENT)
Dept: PHARMACY | Age: 71
End: 2021-10-13
Payer: MEDICARE

## 2021-10-13 ENCOUNTER — TELEPHONE (OUTPATIENT)
Dept: CARDIOLOGY CLINIC | Age: 71
End: 2021-10-13

## 2021-10-13 NOTE — TELEPHONE ENCOUNTER
Cardiac Risk Assessment message information:     What type of procedure are you having:  Prostate Biopsy     When is your procedure scheduled for:   not scheduled yet     Who is the physician performing your procedure:  Dr. Zaragoza Memos   Which medications need to be held for your procedure and for how long:Coumadin 5 days prior           Phone Number:[phone]     Fax number to send the letter:[phone]    Clinical Staff use:     Cardiologist:  Last Appointment:  Next Appointment:  Are you on any blood thinners:  History of Coronary Artery Disease:  Last stress test:  Last echo:  Device Type and :

## 2021-10-13 NOTE — TELEPHONE ENCOUNTER
Patient planning to have prostate biopsy and needs to hold warfarin 5 days prior. Lovenox 1.5 mg/kg BID for how many days? Hx S/p AVR, CAD, HTN, HLD  Last office visit 6/4/2021.

## 2021-10-14 NOTE — TELEPHONE ENCOUNTER
Mina Elkins is calling in from the Urology group wanting to know if they cardiac clearance she received is of the urology group taking care of the [facility] or [facility]. Mina Elkins said that her office is not familiar with the process and would like Dr. Fermín Marx to take care of it.        Mina Elkins can be reached at [phone]

## 2021-10-14 NOTE — TELEPHONE ENCOUNTER
Left vm for Dr. Mer Stack office to let them know of message below. Will type letter and fax it over to number provided below.

## 2021-10-26 NOTE — TELEPHONE ENCOUNTER
Spoke with pharmacy this morning regarding Lovenox dosing. Called and spoke with Waynetta Hammans to let him know this has been taken care of. He v/u.

## 2021-10-26 NOTE — TELEPHONE ENCOUNTER
Yulia Gallagher is calling in stating that the pharmacy has faxed over two forms with questions about the medication lovenox and is waiting for a response back.

## 2021-10-27 ENCOUNTER — ANTI-COAG VISIT (OUTPATIENT)
Dept: PHARMACY | Age: 71
End: 2021-10-27
Payer: MEDICARE

## 2021-10-27 LAB — INTERNATIONAL NORMALIZATION RATIO, POC: 2.6

## 2021-10-27 PROCEDURE — 85610 PROTHROMBIN TIME: CPT

## 2021-10-27 PROCEDURE — 99211 OFF/OP EST MAY X REQ PHY/QHP: CPT

## 2021-10-27 NOTE — PROGRESS NOTES
Mr. Henrique Jones is a 78 y.o.  male. Mr. Henrique Jones had an INR test today. Results were reviewed and appropriate warfarin management was completed. This visit was performed as: An in person visit. Protocols were followed with precautions to reduce the spread of COVID-19. Patient verifies current dosing regimen: Yes     Warfarin medication reviewed and updated on the patient 's home medication list: Yes   All other medications reviewed and updated on the patient 's home medication list: No: no changes     Lab Results   Component Value Date    INR 2.6 10/27/2021    INR 3.20 09/15/2021    INR 2.70 2021           Anticoagulation Summary  As of 10/27/2021    INR goal:  2.5-3.5   TTR:  74.6 % (5 y)   INR used for dosin.6 (10/27/2021)   Warfarin maintenance plan:  3.75 mg (7.5 mg x 0.5) every Mon; 7.5 mg (7.5 mg x 1) all other days   Weekly warfarin total:  48.75 mg   Plan last modified:  Nyaflroentin Koch (2021)   Next INR check:  11/10/2021   Priority:  Maintenance   Target end date: Indefinite    Indications    S/P AVR (aortic valve replacement) [Z95.2]             Anticoagulation Episode Summary     INR check location:      Preferred lab:      Send INR reminders to:  [facility]    Comments:  EPIC      Anticoagulation Care Providers     Provider Role Specialty Phone number    Estrellita Lord MD Referring Cardiology [phone]          Warfarin assessment / plan: His INR is in range today at 2.6. Boy Yancey states that he is scheduled to have a biopsy of his prostate on 21. He will hold his warfarin for 5 days prior to the procedure. He will bridge with Lovenox twice daily. He was prescribed  5 day supply of Lovenox. I advised him to restart his warfarin the evening of the procedure if ok with his physician. He will take warfarin 7.5mg that day and then resume his previous dosing. He was counseled on the proper technique for injecting the Lovenox.  Since he is Interventions Recommended: 1   Total # of Interventions Accepted: 1   Time Spent (min): 20

## 2021-11-02 ENCOUNTER — HOSPITAL ENCOUNTER (EMERGENCY)
Age: 71
Discharge: HOME | End: 2021-11-02
Payer: MEDICARE

## 2021-11-02 VITALS
RESPIRATION RATE: 16 BRPM | HEIGHT: 71 IN | BODY MASS INDEX: 29.91 KG/M2 | TEMPERATURE: 97 F | SYSTOLIC BLOOD PRESSURE: 138 MMHG | WEIGHT: 213.63 LBS | OXYGEN SATURATION: 98 % | HEART RATE: 72 BPM | DIASTOLIC BLOOD PRESSURE: 84 MMHG

## 2021-11-02 DIAGNOSIS — R31.9 HEMATURIA, UNSPECIFIED TYPE: Primary | ICD-10-CM

## 2021-11-02 LAB
BILIRUBIN URINE: NEGATIVE
BLOOD, URINE: ABNORMAL
CLARITY: ABNORMAL
COLOR: ABNORMAL
GLUCOSE URINE: >=1000 MG/DL
KETONES, URINE: NEGATIVE MG/DL
LEUKOCYTE ESTERASE, URINE: NEGATIVE
MICROSCOPIC EXAMINATION: YES
NITRITE, URINE: NEGATIVE
PH UA: 5 (ref 5–8)
PROTEIN UA: 30 MG/DL
RBC UA: >100 /HPF (ref 0–4)
SPECIFIC GRAVITY UA: 1.01 (ref 1–1.03)
URINE REFLEX TO CULTURE: ABNORMAL
URINE TYPE: ABNORMAL
UROBILINOGEN, URINE: 0.2 E.U./DL
WBC UA: ABNORMAL /HPF (ref 0–5)

## 2021-11-02 PROCEDURE — 99282 EMERGENCY DEPT VISIT SF MDM: CPT

## 2021-11-02 PROCEDURE — 81001 URINALYSIS AUTO W/SCOPE: CPT

## 2021-11-02 RX ORDER — OXYCODONE HYDROCHLORIDE AND ACETAMINOPHEN 5; 325 MG/1; MG/1
1 TABLET ORAL ONCE
Status: DISCONTINUED | OUTPATIENT
Start: 2021-11-02 | End: 2021-11-02 | Stop reason: HOSPADM

## 2021-11-02 ASSESSMENT — ENCOUNTER SYMPTOMS
NAUSEA: 0
FACIAL SWELLING: 0
APNEA: 0
SHORTNESS OF BREATH: 0
EYE REDNESS: 0
EYE DISCHARGE: 0
BACK PAIN: 0
CHOKING: 0
VOMITING: 0
ABDOMINAL PAIN: 0

## 2021-11-02 ASSESSMENT — PAIN SCALES - GENERAL: PAINLEVEL_OUTOF10: 0

## 2021-11-02 NOTE — ED PROVIDER NOTES
Take 1,000 mg by mouth nightly    ROSUVASTATIN (CRESTOR) 40 MG TABLET    Take 1 tablet by mouth every evening    SERGE SOLOSTAR 300 UNIT/ML INJECTION PEN    Inject 40 Units into the skin daily    VITAMIN D (CHOLECALCIFEROL) 125 MCG (5000 UT) CAPS CAPSULE    Take 1 capsule by mouth once a week    WARFARIN (JANTOVEN) 7.5 MG TABLET    TAKE 1 TABLET DAILY EXCEPT TAKE 1/2 TABLET (3.75MG) ON MONDAYS AND FRIDAYS OR AS DIRECTED BY [facility]    ZETIA 10 MG TABLET    TAKE 1 TABLET DAILY         Review of Systems:  (2-9 systems needed)  Review of Systems   Constitutional: Negative for chills and fever. HENT: Negative for congestion, facial swelling and sneezing. Eyes: Negative for discharge and redness. Respiratory: Negative for apnea, choking and shortness of breath. Cardiovascular: Negative for chest pain. Gastrointestinal: Negative for abdominal pain, nausea and vomiting. Genitourinary: Positive for hematuria. Negative for dysuria. Musculoskeletal: Negative for back pain, neck pain and neck stiffness. Neurological: Negative for dizziness, tremors, seizures and headaches. All other systems reviewed and are negative. \"Positives and Pertinent negatives as per HPI\"    Physical Exam:  Physical Exam  Vitals and nursing note reviewed. Constitutional:       Appearance: He is well-developed. He is not diaphoretic. HENT:      Head: Normocephalic and atraumatic. Nose: Nose normal.   Eyes:      General:         Right eye: No discharge. Left eye: No discharge. Cardiovascular:      Rate and Rhythm: Normal rate and regular rhythm. Heart sounds: Normal heart sounds. No murmur heard. No friction rub. No gallop. Pulmonary:      Effort: Pulmonary effort is normal. No respiratory distress. Breath sounds: Normal breath sounds. No wheezing or rales. Chest:      Chest wall: No tenderness. Abdominal:      General: Abdomen is flat.  Bowel sounds are normal. There is no distension. Palpations: Abdomen is soft. There is no mass. Tenderness: There is no abdominal tenderness. There is no guarding or rebound. Genitourinary:     Penis: Normal.       Testes:         Right: Tenderness or swelling not present. Left: Tenderness or swelling not present. Epididymis:      Right: Normal.      Left: Normal.   Musculoskeletal:         General: Normal range of motion. Cervical back: Normal range of motion and neck supple. Skin:     General: Skin is warm and dry. Neurological:      General: No focal deficit present. Mental Status: He is alert and oriented to person, place, and time. Psychiatric:         Behavior: Behavior normal.         MEDICAL DECISION MAKING    Vitals:    Vitals:    11/02/21 1743   BP: 138/84   Pulse: 72   Resp: 16   Temp: 97 °F (36.1 °C)   TempSrc: Temporal   SpO2: 98%   Weight: 213 lb 10 oz (96.9 kg)   Height: 5' 11\" (1.803 m)       LABS:  Labs Reviewed   URINE RT REFLEX TO CULTURE - Abnormal; Notable for the following components:       Result Value    Color, UA RED (*)     Clarity, UA TURBID (*)     Glucose, Ur >=1000 (*)     Blood, Urine LARGE (*)     Protein, UA 30 (*)     All other components within normal limits    Narrative:     Performed at:  [address] [address]   Phone [phone]   MICROSCOPIC URINALYSIS - Abnormal; Notable for the following components:    RBC, UA >100 (*)     All other components within normal limits    Narrative:     Performed at:  [address] [address]   Phone ([phone] of labs reviewed and were negative at this time or not returned at the time of this note.     RADIOLOGY:   Non-plain film images such as CT, Ultrasound and MRI are read by the radiologist. Eliana Sánchez PA-C have directly visualized the radiologic plain film image(s) with the below findings:      Interpretation per the Radiologist below, if available at the time of this note:    No orders to display        No results found. MEDICAL DECISION MAKING / ED COURSE:      PROCEDURES:   Procedures    None    Patient was given:  Medications   oxyCODONE-acetaminophen (PERCOCET) 5-325 MG per tablet 1 tablet (1 tablet Oral Not Given 11/2/21 1950)     Emergency room course: Patient on exam cardiovascular regular rhythm, lungs are clear. No wheeze rales or rhonchi noted. Abdomen is soft nontender. Suprapubically no tenderness. Normal bowel sounds all 4 quadrants. No CVA or flank tenderness. Patient inguinal area shows no tenderness. No swelling noted. No adenopathy with palpation. Testicle scrotum show no tenderness and no swelling. Penis show no lesions. There is no blood around the meatus. Nontender. He is alert oriented x4. Does not appear to be in acute distress. Urinalysis shows negative leukocytes, negative nitrates does show large blood negative ketones. Microscopically WBCs 3-5, RBCs greater than 100. Placed a call out to urologist. López Freed with Dr. Murali mckeon and he wanted me to let the patient know to not take his Lovenox. The patient is also warfarin that he will start tonight I informed him not to take the warfarin as well and he supposed to take a second dose of his Lovenox I told him not to take any of that tonight. And next for the next 24 to 48 hours. He was instructed to drink plenty of water. Any worsening return emergency room otherwise follow-up with Dr. Cachorro Whittaker his urologist. Give his office a call tomorrow to schedule follow-up appointment. Patient understood and will be discharged stable condition. The patient tolerated their visit well. I evaluated the patient. The physician was available for consultation as needed.   The patient and / or the family were informed of the results of any tests, a time was given to answer questions, a plan was proposed and they agreed with plan. CLINICAL IMPRESSION:  1.  Hematuria, unspecified type        DISPOSITION  DISPOSITION Decision To Discharge 11/02/2021 09:08:57 PM        PATIENT REFERRED TO:  Tnaya Smith, [address] [place] [address]  [phone]    Call in 1 day        DISCHARGE MEDICATIONS:  New Prescriptions    No medications on file       DISCONTINUED MEDICATIONS:  Discontinued Medications    No medications on file              (Please note the MDM and HPI sections of this note were completed with a voice recognition program.  Efforts were made to edit the dictations but occasionally words are mis-transcribed.)    Electronically signed, Charity Esquivel PA-C,          Charity Esquivel PA-C  11/02/21 7221

## 2021-11-03 ENCOUNTER — TELEPHONE (OUTPATIENT)
Dept: PHARMACY | Age: 71
End: 2021-11-03

## 2021-11-03 NOTE — TELEPHONE ENCOUNTER
Jerilyn Zamudio was off of his warfarin for a prostate biopsy last Wednesday. He started a Lovenox bridge on Saturday(10-30) and Sunday (10-31). He had his prostate biopsy on Monday(11-1-21). He resumed his Lovenox on Tuesday. He developed blood in his urine and was subsequently sent to the ED by his urologist. He was told to hold the Lovenox and warfarin for another two days. As of this morning, he still has blood in his urine. He will call us tomorrow or Friday to update us on his condition. We will then make a decision as to when we will restart the anticoagulation.     [address]  Anticoagulation Service  [phone]

## 2021-11-05 ENCOUNTER — TELEPHONE (OUTPATIENT)
Dept: PHARMACY | Age: 71
End: 2021-11-05

## 2021-11-05 NOTE — TELEPHONE ENCOUNTER
Waynetta Hammans called and left a message asking about restarting his warfarin. I returned his call. Waynetta Hammans had a prostate biopsy 11/1. When he restarted warfarin and Lovenox 11/2 he had hematuria. He was advised by Urology to wait a couple more days before starting anticoagulation. He held anticoagulation the remainder of 11/2, 11/3 and 11/4. Today he reports bleeding has resolved. He reports no medication changes. I advised to go ahead and restart Lovenox and warfarin this evening. Lovenox every 12 hours as prescribed. Warfarin at his regular dose of 7.5mg daily Except 3.75mg every Monday. We had him down for an INR check 11/8, but I feel his INR will still be subtherapeutic after just 3 doses. He cannot come 11/10. So, we schedule a f/u 11/9/21. He understands to hold the anticoagulation should he start having bleeding again over the weekend.      Topher Al, PharmD, [address] [place] [address]  Anticoagulation Service  [phone]

## 2021-11-09 ENCOUNTER — ANTI-COAG VISIT (OUTPATIENT)
Dept: PHARMACY | Age: 71
End: 2021-11-09
Payer: MEDICARE

## 2021-11-09 ENCOUNTER — TELEPHONE (OUTPATIENT)
Dept: CARDIOLOGY CLINIC | Age: 71
End: 2021-11-09

## 2021-11-09 DIAGNOSIS — Z95.2 S/P AVR (AORTIC VALVE REPLACEMENT): Primary | ICD-10-CM

## 2021-11-09 LAB — INTERNATIONAL NORMALIZATION RATIO, POC: 1.3

## 2021-11-09 PROCEDURE — 99211 OFF/OP EST MAY X REQ PHY/QHP: CPT

## 2021-11-09 PROCEDURE — 85610 PROTHROMBIN TIME: CPT

## 2021-11-09 NOTE — PROGRESS NOTES
Mr. Jim Ulrich is a 78 y.o.  male. Mr. Jim Ulrich had an INR test today. Results were reviewed and appropriate warfarin management was completed. This visit was performed as: An in person visit. Protocols were followed with precautions to reduce the spread of COVID-19. Patient verifies current dosing regimen: Yes     Warfarin medication reviewed and updated on the patient 's home medication list: Yes   All other medications reviewed and updated on the patient 's home medication list: No: no changes     Lab Results   Component Value Date    INR 1.3 2021    INR 2.6 10/27/2021    INR 3.20 09/15/2021       Patient Findings     Positives:  Signs/symptoms of bleeding    Comments:  Hematuria recently after his prostate biopsy. Warfarin and Lovenox held. Restarted 21. Hematuria over the weekend after restarting warfarin and Lovenox. He stopped the Lovenox on his own. The hematuria resolved. Anticoagulation Summary  As of 2021    INR goal:  2.5-3.5   TTR:  74.1 % (5 y)   INR used for dosin.3 (2021)   Warfarin maintenance plan:  3.75 mg (7.5 mg x 0.5) every Mon; 7.5 mg (7.5 mg x 1) all other days   Weekly warfarin total:  48.75 mg   Plan last modified:  Nya Koch (2021)   Next INR check:  2021   Priority:  Maintenance   Target end date: Indefinite    Indications    S/P AVR (aortic valve replacement) [Z95.2]             Anticoagulation Episode Summary     INR check location:      Preferred lab:      Send INR reminders to:  [facility]    Comments:  EPIC      Anticoagulation Care Providers     Provider Role Specialty Phone number    Michael Asher MD Referring Cardiology [phone]          Warfarin assessment / plan:     Appears well today. He recently had a prostate biopsy 21. Warfarin was held prior to the procedure and he was to be bridged with Lovenox. After taking Lovenox  he had hematuria requiring an ED visit. His Urologist was consulted and anticoagulation was held through 11/4. Bleeding resolved. Warfarin and Lovenox were restarted 11/5. He reports hematuria again over the weekend. He stopped Lovenox on his own and continued warfarin only. Bleeding resolved by Sunday evening. No bleeding since. He reported this to his Urologist 11/8. Today his INR is just 1.3. He reports that he did take warfarin 7.5mg yesterday (vs 3.75mg usual dose). I advised to take a one time higher dose today and then resume his regular warfarin maintenance dose. We will recheck his INR 11/12/21. I reached out to Martha Young, Dr. Judy Walsh nurse and made her aware to see what Dr. Molly Stack would like to do with regards to Lovenox bridging. She will check with Dr. Molly Stack and get back to me. Description    Take warfarin 11.25mg today ONLY  THEN CONTINUE: Warfarin 7.5 mg daily except 3.75 mg (1/2 tablet) on Monday    Call [phone] with signs or symptoms of bleeding or ANY medication changes (including over-the-counter medications or herbal supplements). If significant bleeding occurs please seek immediate medical attention. Keep the number of servings of vitamin K containing foods (dark green, leafy vegetables) the same each week. Please call if this changes. Limit alcohol intake. Please call if this changes.               Immunization History   Administered Date(s) Administered    COVID-19, Moderna, Primary or Immunocompromised, PF, 100mcg/0.5mL 02/18/2021, 03/18/2021    Influenza 11/01/2015    Influenza Vaccine, unspecified formulation 10/05/2017    Influenza Virus Vaccine 10/04/2011, 10/04/2012    Influenza, High Dose (Fluzone 65 yrs and older) 10/11/2018, 10/08/2021    Influenza, Roger, IM, (6 mo and older Fluzone, Flulaval, Fluarix and 3 yrs and older Afluria) 10/25/2016, 10/05/2017    Influenza, Triv, inactivated, subunit, adjuvanted, IM (Fluad 65 yrs and older) 09/25/2019, 09/24/2020    Pneumococcal Conjugate 13-valent Austyn May) 10/11/2018    Pneumococcal Polysaccharide ([id number]) 10/04/2012, 09/24/2020    Tdap (Boostrix, Adacel) 04/03/2015    Zoster Live (Zostavax) 04/03/2015    Zoster Recombinant (Shingrix) 10/20/2020, 01/22/2021             Reviewed AVS with patient / caregiver.       CLINICAL PHARMACY CONSULT: MED RECONCILIATION/REVIEW ADDENDUM    For Pharmacy Admin Tracking Only     Intervention Detail: Dose Adjustment: 1, reason: Therapy Optimization   Total # of Interventions Recommended: 1   Total # of Interventions Accepted: 1   Time Spent (min): 20

## 2021-11-09 NOTE — TELEPHONE ENCOUNTER
Will continue with warfarin only at this time.      Thank you  Natalie Cordero, PharmD,  [facility]  Anticoagulation Service  [phone]

## 2021-11-09 NOTE — TELEPHONE ENCOUNTER
Spoke to Kacy in the anticoagulation clinic and patient INR is 1.3 today. He stopped his Lovenox due to hematuria. Once he stopped the Lovenox hematuria resolved. Kacy in the anticoagulation clinic gave him a higher dose of Coumadin yesterday and today and will see him back on Friday. He has history of AVR. Do you have any further recommendations?

## 2021-11-12 ENCOUNTER — ANTI-COAG VISIT (OUTPATIENT)
Dept: PHARMACY | Age: 71
End: 2021-11-12
Payer: MEDICARE

## 2021-11-12 DIAGNOSIS — Z95.2 S/P AVR (AORTIC VALVE REPLACEMENT): Primary | ICD-10-CM

## 2021-11-12 LAB — INTERNATIONAL NORMALIZATION RATIO, POC: 1.9

## 2021-11-12 PROCEDURE — 99211 OFF/OP EST MAY X REQ PHY/QHP: CPT

## 2021-11-12 PROCEDURE — 85610 PROTHROMBIN TIME: CPT

## 2021-11-12 NOTE — PROGRESS NOTES
Trent Blood is a 78 y.o. here for warfarin management. Navjot Garcia had an INR test today. Results were reviewed and appropriate warfarin management was completed. This visit was performed as: An in person visit. Protocols were followed with precautions to reduce the spread of COVID-19. Patient verifies current dosing regimen: Yes     Warfarin medication reviewed and updated on the patient 's home medication list: Yes   All other medications reviewed and updated on the patient 's home medication list: No: no changes     Lab Results   Component Value Date    INR 1.9 2021    INR 1.3 2021    INR 2.6 10/27/2021       Patient Findings     Negatives:  Signs/symptoms of bleeding, Change in medications, Change in diet/appetite, Bruising          Anticoagulation Summary  As of 2021    INR goal:  2.5-3.5   TTR:  74.0 % (5 y)   INR used for dosin.9 (2021)   Warfarin maintenance plan:  3.75 mg (7.5 mg x 0.5) every Mon; 7.5 mg (7.5 mg x 1) all other days   Weekly warfarin total:  48.75 mg   Plan last modified:  Nya Leisure (2021)   Next INR check:  2021   Priority:  Maintenance   Target end date: Indefinite    Indications    S/P AVR (aortic valve replacement) [Z95.2]             Anticoagulation Episode Summary     INR check location:      Preferred lab:      Send INR reminders to:  [facility]    Comments:  EPIC      Anticoagulation Care Providers     Provider Role Specialty Phone number    Jessica Metz MD Referring Cardiology [phone]          Warfarin assessment / plan:     Appears well. Recent hematuria post prostate biopsy. Lovenox bridging held as a result. He reports no visible hematuria since his last visit. His INR has come up nicely. I will not give a booster dose today due to recent bleeding issue. I will just continue his regular maintenance dose that has worked well for him.  I expect his INR to continue to rise and reach therapeutic goal over the weekend. He would like his COVID Booster here next visit. Description    CONTINUE: Warfarin 7.5 mg daily except 3.75 mg (1/2 tablet) on Monday    Call [phone] with signs or symptoms of bleeding or ANY medication changes (including over-the-counter medications or herbal supplements). If significant bleeding occurs please seek immediate medical attention. Keep the number of servings of vitamin K containing foods (dark green, leafy vegetables) the same each week. Please call if this changes. Limit alcohol intake. Please call if this changes. Immunization History   Administered Date(s) Administered    COVID-19, Moderna, Primary or Immunocompromised, PF, 100mcg/0.5mL 02/18/2021, 03/18/2021    Influenza 11/01/2015    Influenza Vaccine, unspecified formulation 10/05/2017    Influenza Virus Vaccine 10/04/2011, 10/04/2012    Influenza, High Dose (Fluzone 65 yrs and older) 10/11/2018, 10/08/2021    Influenza, Logan Spine, IM, (6 mo and older Fluzone, Flulaval, Fluarix and 3 yrs and older Afluria) 10/25/2016, 10/05/2017    Influenza, Triv, inactivated, subunit, adjuvanted, IM (Fluad 65 yrs and older) 09/25/2019, 09/24/2020    Pneumococcal Conjugate 13-valent ([id number]) 10/11/2018    Pneumococcal Polysaccharide ([id number]) 10/04/2012, 09/24/2020    Tdap (Boostrix, Adacel) 04/03/2015    Zoster Live (Zostavax) 04/03/2015    Zoster Recombinant (Shingrix) 10/20/2020, 01/22/2021             Reviewed AVS with patient / caregiver.       CLINICAL PHARMACY CONSULT: MED RECONCILIATION/REVIEW ADDENDUM    For Pharmacy Admin Tracking Only     Intervention Detail:    Total # of Interventions Recommended: 0   Total # of Interventions Accepted: 0   Time Spent (min): 15

## 2021-11-15 ENCOUNTER — HOSPITAL ENCOUNTER (OUTPATIENT)
Dept: NUCLEAR MEDICINE | Age: 71
Discharge: HOME | End: 2021-11-15
Payer: MEDICARE

## 2021-11-15 ENCOUNTER — HOSPITAL ENCOUNTER (OUTPATIENT)
Dept: CT IMAGING | Age: 71
Discharge: HOME | End: 2021-11-15
Payer: MEDICARE

## 2021-11-15 DIAGNOSIS — C61 PROSTATIC CANCER (HCC): ICD-10-CM

## 2021-11-15 PROCEDURE — 74177 CT ABD & PELVIS W/CONTRAST: CPT

## 2021-11-15 PROCEDURE — A9503 TC99M MEDRONATE: HCPCS | Performed by: SURGERY

## 2021-11-15 PROCEDURE — 6360000004 HC RX CONTRAST MEDICATION: Performed by: SURGERY

## 2021-11-15 PROCEDURE — 3430000000 HC RX DIAGNOSTIC RADIOPHARMACEUTICAL: Performed by: SURGERY

## 2021-11-15 PROCEDURE — 78306 BONE IMAGING WHOLE BODY: CPT

## 2021-11-15 RX ORDER — TC 99M MEDRONATE 20 MG/10ML
25 INJECTION, POWDER, LYOPHILIZED, FOR SOLUTION INTRAVENOUS
Status: COMPLETED | OUTPATIENT
Start: 2021-11-15 | End: 2021-11-15

## 2021-11-15 RX ADMIN — IOPAMIDOL 75 ML: 755 INJECTION, SOLUTION INTRAVENOUS at 07:45

## 2021-11-15 RX ADMIN — TC 99M MEDRONATE 25 MILLICURIE: 20 INJECTION, POWDER, LYOPHILIZED, FOR SOLUTION INTRAVENOUS at 07:43

## 2021-11-17 RX ORDER — MIRTAZAPINE 30 MG/1
TABLET, FILM COATED ORAL
Qty: 90 TABLET | Refills: 2 | Status: SHIPPED | OUTPATIENT
Start: 2021-11-17 | End: 2022-08-10

## 2021-11-23 ENCOUNTER — ANTI-COAG VISIT (OUTPATIENT)
Dept: PHARMACY | Age: 71
End: 2021-11-23
Payer: MEDICARE

## 2021-11-23 DIAGNOSIS — Z95.2 S/P AVR (AORTIC VALVE REPLACEMENT): Primary | ICD-10-CM

## 2021-11-23 LAB — INR BLD: 3.2

## 2021-11-23 PROCEDURE — 85610 PROTHROMBIN TIME: CPT

## 2021-11-23 PROCEDURE — 99211 OFF/OP EST MAY X REQ PHY/QHP: CPT

## 2021-11-23 NOTE — PROGRESS NOTES
Javier Farrell is a 78 y.o. here for warfarin management. Justino Suarez had an INR test today. Results were reviewed and appropriate warfarin management was completed. This visit was performed as: An in person visit. Protocols were followed with precautions to reduce the spread of COVID-19. Patient verifies current dosing regimen: Yes     Warfarin medication reviewed and updated on the patient 's home medication list: Yes   All other medications reviewed and updated on the patient 's home medication list: No: no changes     Lab Results   Component Value Date    INR 3.20 11/23/2021    INR 1.9 11/12/2021    INR 1.3 11/09/2021       Patient Findings     Negatives:  Signs/symptoms of bleeding, Missed doses, Change in medications, Change in diet/appetite, Bruising          Anticoagulation Summary  As of 11/23/2021    INR goal:  2.5-3.5   TTR:  73.9 % (5 y)   INR used for dosing:  3.20 (11/23/2021)   Warfarin maintenance plan:  3.75 mg (7.5 mg x 0.5) every Mon; 7.5 mg (7.5 mg x 1) all other days   Weekly warfarin total:  48.75 mg   Plan last modified:  Nya Leisure (8/25/2021)   Next INR check:  12/29/2021   Priority:  Maintenance   Target end date: Indefinite    Indications    S/P AVR (aortic valve replacement) [Z95.2]             Anticoagulation Episode Summary     INR check location:      Preferred lab:      Send INR reminders to:  [facility]    Comments:  EPIC      Anticoagulation Care Providers     Provider Role Specialty Phone number    Erika Quiros MD Referring Cardiology [phone]          Warfarin assessment / plan:     Appears well. No changes   No acute findings     No change to warfarin therapy today. He was previously holding warfarin due to a prostate biopsy followed by some bleeding afterwards. Has resumed warfarin for about 2 weeks. His INR today is back at goal. Will continue this regimen and recheck in about 4 weeks.     Planning to get COVID booster

## 2021-12-08 LAB — PATHOLOGY/CYTOLOGY REPORT: NORMAL

## 2021-12-29 ENCOUNTER — ANTI-COAG VISIT (OUTPATIENT)
Dept: PHARMACY | Age: 71
End: 2021-12-29
Payer: MEDICARE

## 2021-12-29 DIAGNOSIS — Z95.2 S/P AVR (AORTIC VALVE REPLACEMENT): Primary | ICD-10-CM

## 2021-12-29 LAB — INR BLD: 2.7

## 2021-12-29 PROCEDURE — 99211 OFF/OP EST MAY X REQ PHY/QHP: CPT

## 2021-12-29 PROCEDURE — 85610 PROTHROMBIN TIME: CPT

## 2022-01-04 ENCOUNTER — TELEPHONE (OUTPATIENT)
Dept: CARDIOLOGY CLINIC | Age: 72
End: 2022-01-04

## 2022-01-04 NOTE — TELEPHONE ENCOUNTER
Preoperative risk assessment. Transrectal fusion coil fiducial marker placement with transperineal placement of spaceoar hydro gel for radiant therapy for prostate CA with anesthesia on 1/11/2022. Requesting to hold Coumadin and Asa x 5 days. (S/p AVR 9/14/16)     Lovenox 1.5 mg/kg BID 2 days prior and 2 days after procedure?

## 2022-01-04 NOTE — TELEPHONE ENCOUNTER
CARDIAC CLEARANCE     Procedure: Transrectal fusion coil fiducial marker placement with transperineal placement of spaceoar hydro gel for radiant therapy for prostate CA with anesthesia. : Davie Hansen  Date:01/11/2022    Medications needing held: Warfarin & ASA      How long?: 5 days prior     Phone Number and Contact Name for Physicians office: [phone]  Fax number to send information: [phone]    Clinical staff:    Cardiologist:  Last Appointment:  Next Appointment:  Cardiac History:       Original request scanned into patient's chart.

## 2022-01-05 NOTE — TELEPHONE ENCOUNTER
Spoke with Dr Isidoro Trotter and alli to hold Asa and Coumadin. He will need to be bridged with Lovenox 1.5 mg/kg BID 2 days prior and until INR therapeutic after procedure. Please prepare letter and fax.

## 2022-01-05 NOTE — TELEPHONE ENCOUNTER
Typed letter in [address] [place] [address] Rd. Printed on letterhead. Faxed to Dr. Dorcas Arnold [phone].

## 2022-01-05 NOTE — TELEPHONE ENCOUNTER
Please see response below and prepare letter. I have spoke to patient and he is aware of Lovenox and is agreeable.

## 2022-01-07 ENCOUNTER — TELEPHONE (OUTPATIENT)
Dept: PHARMACY | Age: 72
End: 2022-01-07

## 2022-01-07 NOTE — TELEPHONE ENCOUNTER
Wan Chavez called. Holding warfarin for a procedure 1/11/22at 11am.  Last warfarin dose 1/5/22. Will be bridging w Lovenox. Starting Sunday. Will not take Monday evening or Tuesday morning. He had issues with bleeding at his last procedure with the use of Lovenox. He wants to be cautious and check his INR at the end of next week. The plan would be to restart warfarin 1/11/22 evening and Lovenox 1/12/22 am unless instructed otherwise by his physician. I schedule a f/u with him 1/14/21.     Ayah Garcia, PharmD, [address] [place] [address]  Anticoagulation Service  [phone]

## 2022-01-14 ENCOUNTER — ANTI-COAG VISIT (OUTPATIENT)
Dept: PHARMACY | Age: 72
End: 2022-01-14
Payer: MEDICARE

## 2022-01-14 DIAGNOSIS — Z95.2 S/P AVR (AORTIC VALVE REPLACEMENT): Primary | ICD-10-CM

## 2022-01-14 LAB — INTERNATIONAL NORMALIZATION RATIO, POC: 1.3

## 2022-01-14 PROCEDURE — 99211 OFF/OP EST MAY X REQ PHY/QHP: CPT

## 2022-01-14 PROCEDURE — 85610 PROTHROMBIN TIME: CPT

## 2022-01-14 NOTE — PROGRESS NOTES
I was finishing his note and thought to check his Lovenox dose. The Lovenox RX in Epic states 1.5mg/kg twice daily. Should be 1.5mg/kg DAILY or 1mg/kg twice daily. I called Mr. Humberto Pena. He verifies that he weighs 210 pounds. (95 kg). He verifies he has the 150mg injection and takes 140mg twice daily. I advised of my concern. I advised a typical dose would be 140mg once daily. I recommended to decrease the Lovenox 1/15 and 1/16 to 140mg once daily and we can verify with Dr. Ganesh Bush Monday if he needs to continue Lovenox. He agrees. It is after office hours.

## 2022-01-14 NOTE — PROGRESS NOTES
Andres Childress is a 73 y.o. here for warfarin management. Artemio Bernal had an INR test today. Results were reviewed and appropriate warfarin management was completed. This visit was performed as: An in person visit. Protocols were followed with precautions to reduce the spread of COVID-19. Patient verifies current dosing regimen: Yes     Warfarin medication reviewed and updated on the patient 's home medication list: Yes   All other medications reviewed and updated on the patient 's home medication list: No: no changes     Lab Results   Component Value Date    INR 1.3 2022    INR 2.70 2021    INR 3.20 2021       Patient Findings     Positives:  Signs/symptoms of bleeding, Missed doses    Negatives:  Change in medications, Change in diet/appetite    Comments:  Off of warfarin for a procedure . Bridged w Lovenox. Did not take Lovenox last evening and this morning due to hematuria. History of bleeding with Lovenox bridging in the past.           Anticoagulation Summary  As of 2022    INR goal:  2.5-3.5   TTR:  73.9 % (5.2 y)   INR used for dosin.3 (2022)   Warfarin maintenance plan:  3.75 mg (7.5 mg x 0.5) every Mon; 7.5 mg (7.5 mg x 1) all other days   Weekly warfarin total:  48.75 mg   Plan last modified:  [facility] (2021)   Next INR check:  2022   Priority:  Maintenance   Target end date: Indefinite    Indications    S/P AVR (aortic valve replacement) [Z95.2]             Anticoagulation Episode Summary     INR check location:      Preferred lab:      Send INR reminders to:  [facility]    Comments:  EPIC      Anticoagulation Care Providers     Provider Role Specialty Phone number    Belen Gaming MD Referring Cardiology [phone]          Warfarin assessment / plan:     Mr. Gabriella Morley appears well. He had a procedure 22 and warfarin was held 5 days prior. He was bridged with Lovenox.   He is here for close monitoring due to history of bleeding with Lovenox the last time he was bridged. He reports that he took his Lovenox as instructed twice daily but started with hematuria and held his Lovenox dose last evening and this am. No hematuria since this am.     I advised to take a higher dose of warfarin tonight and tomorrow. He plans to hold Lovenox tonight. I advised to take Lovenox tomorrow and Sunday if tolerated. Will check his INR again 1/17/22. Description    Take Warfarin 11.25mg today and tomorrow  THEN CONTINUE: Warfarin 7.5 mg daily except 3.75 mg (1/2 tablet) on Monday. Continue Lovenox as tolerated    Call [phone] with signs or symptoms of bleeding or ANY medication changes (including over-the-counter medications or herbal supplements). If significant bleeding occurs please seek immediate medical attention. Keep the number of servings of vitamin K containing foods (dark green, leafy vegetables) the same each week. Please call if this changes. Limit alcohol intake. Please call if this changes.           Immunization History   Administered Date(s) Administered    COVID-19, Moderna, Primary or Immunocompromised, PF, 100mcg/0.5mL 02/18/2021, 03/18/2021    COVID-19, Pfizer, PF, 30mcg/0.3mL 11/23/2021    Influenza 11/01/2015    Influenza Vaccine, unspecified formulation 10/05/2017    Influenza Virus Vaccine 10/04/2011, 10/04/2012    Influenza, High Dose (Fluzone 65 yrs and older) 10/11/2018, 10/08/2021    Influenza, High-dose, Quadv, 65 yrs +, IM (Fluzone) 10/08/2021    Influenza, Quadv, IM, (6 mo and older Fluzone, Flulaval, Fluarix and 3 yrs and older Afluria) 10/25/2016, 10/05/2017    Influenza, Triv, inactivated, subunit, adjuvanted, IM (Fluad 65 yrs and older) 09/25/2019, 09/24/2020    Pneumococcal Conjugate 13-valent ([id number]) 10/11/2018    Pneumococcal Polysaccharide ([id number]) 10/04/2012, 09/24/2020    Tdap (Boostrix, Adacel) 04/03/2015    Zoster Live (Zostavax) 04/03/2015    Zoster Recombinant (Shingrix) 10/20/2020, 01/22/2021             Reviewed AVS with patient / caregiver.       CLINICAL PHARMACY CONSULT: MED RECONCILIATION/REVIEW ADDENDUM    For Pharmacy Admin Tracking Only     Intervention Detail: Dose Adjustment: 1, reason: Therapy Optimization   Total # of Interventions Recommended: 1   Total # of Interventions Accepted: 1   Time Spent (min): 30

## 2022-01-17 ENCOUNTER — ANTI-COAG VISIT (OUTPATIENT)
Dept: PHARMACY | Age: 72
End: 2022-01-17
Payer: MEDICARE

## 2022-01-17 DIAGNOSIS — Z95.2 S/P AVR (AORTIC VALVE REPLACEMENT): Primary | ICD-10-CM

## 2022-01-17 LAB — INTERNATIONAL NORMALIZATION RATIO, POC: 2.2

## 2022-01-17 PROCEDURE — 99211 OFF/OP EST MAY X REQ PHY/QHP: CPT

## 2022-01-17 PROCEDURE — 85610 PROTHROMBIN TIME: CPT

## 2022-01-17 NOTE — PROGRESS NOTES
[facility] is a 73 y.o. here for warfarin management. Jes Cain had an INR test today. Results were reviewed and appropriate warfarin management was completed. This visit was performed as: An in person visit. Protocols were followed with precautions to reduce the spread of COVID-19. Patient verifies current dosing regimen: Yes     Warfarin medication reviewed and updated on the patient 's home medication list: Yes   All other medications reviewed and updated on the patient 's home medication list: Yes     Lab Results   Component Value Date    INR 2.2 2022    INR 1.3 2022    INR 2.70 2021           Anticoagulation Summary  As of 2022    INR goal:  2.5-3.5   TTR:  73.8 % (5.2 y)   INR used for dosin.2 (2022)   Warfarin maintenance plan:  3.75 mg (7.5 mg x 0.5) every Mon; 7.5 mg (7.5 mg x 1) all other days   Weekly warfarin total:  48.75 mg   Plan last modified:  Nya Koch (2021)   Next INR check:  2022   Priority:  Maintenance   Target end date: Indefinite    Indications    S/P AVR (aortic valve replacement) [Z95.2]             Anticoagulation Episode Summary     INR check location:      Preferred lab:      Send INR reminders to:  [facility]    Comments:  EPIC      Anticoagulation Care Providers     Provider Role Specialty Phone number    Joceline Mcknight MD Referring Cardiology [phone]          Warfarin assessment / plan:     Doing well. Tolerated Lovenox over the weekend. No further hematuria    INR is up to 2.2 today after higher dosing. Goal 2.5-3.5 and given his history of bleeding, will not recommend further lovenox. Patient agreeable. INR likely still going up given trend. Will have him continue with 7.5 mg tonight then move his 1/2 dose to later this week on Thursday to keep his 7 day total a little closer to his 7 day total.     Given he was stable on this dose prior to stopping, will recheck in 4 weeks. Description    Take 7.5 mg today only, and move 1/2 tablet to this Thursday  THEN CONTINUE: Warfarin 7.5 mg daily except 3.75 mg (1/2 tablet) on Monday. Call [phone] with signs or symptoms of bleeding or ANY medication changes (including over-the-counter medications or herbal supplements). If significant bleeding occurs please seek immediate medical attention. Keep the number of servings of vitamin K containing foods (dark green, leafy vegetables) the same each week. Please call if this changes. Limit alcohol intake. Please call if this changes. Immunization History   Administered Date(s) Administered    COVID-19, Moderna, Primary or Immunocompromised, PF, 100mcg/0.5mL 02/18/2021, 03/18/2021    COVID-19, Pfizer, PF, 30mcg/0.3mL 11/23/2021    Influenza 11/01/2015    Influenza Vaccine, unspecified formulation 10/05/2017    Influenza Virus Vaccine 10/04/2011, 10/04/2012    Influenza, High Dose (Fluzone 65 yrs and older) 10/11/2018, 10/08/2021    Influenza, High-dose, Quadv, 65 yrs +, IM (Fluzone) 10/08/2021    Influenza, Quadv, IM, (6 mo and older Fluzone, Flulaval, Fluarix and 3 yrs and older Afluria) 10/25/2016, 10/05/2017    Influenza, Triv, inactivated, subunit, adjuvanted, IM (Fluad 65 yrs and older) 09/25/2019, 09/24/2020    Pneumococcal Conjugate 13-valent ([id number]) 10/11/2018    Pneumococcal Polysaccharide ([id number]) 10/04/2012, 09/24/2020    Tdap (Boostrix, Adacel) 04/03/2015    Zoster Live (Zostavax) 04/03/2015    Zoster Recombinant (Shingrix) 10/20/2020, 01/22/2021             Reviewed AVS with patient / caregiver.       CLINICAL PHARMACY CONSULT: MED RECONCILIATION/REVIEW ADDENDUM    For Pharmacy Admin Tracking Only     Intervention Detail: Dose Adjustment: 1, reason: Therapy Optimization   Total # of Interventions Recommended: 1   Total # of Interventions Accepted: 1   Time Spent (min): 15

## 2022-02-07 NOTE — PROGRESS NOTES
[address]  Office Visit    Roman Sebastian  1950    February 10, 2022    CC:   Chief Complaint   Patient presents with    Follow-up     no cc     HPI:  The patient is 73 y.o. male with a past medical history significant for type II diabetes mellitus, vitamin D deficiency, prostate CA, hypothyroidism, HTN, HLP, aortic stenosis and S/P mechanical AVR who is here for routine follow up. He was last seen by Dr. Greg Block in June 2021. Overall doing okay. He just finished radiation for prostate CA (good long term prognosis). Has some fatigue since finishing up treatments. Uses CPAP at night. Denies chest pain/discomfort, SOB, orthopnea/PND, cough, palpitations, dizziness, syncope, edema, weight change or claudication. Follows with coag clinic here. He has not been exercising regularly. Review of Systems:  Constitutional: Denies weakness, night sweats or fever. HEENT: Denies new visual changes, ringing in ears, nosebleeds,nasal congestion  Respiratory: Denies new or change in SOB, PND, orthopnea or cough. Cardiovascular: see HPI  GI: Denies N/V, diarrhea, constipation, abdominal pain, change in bowel habits, melena or hematochezia  : Denies urinary frequency, urgency, incontinence, hematuria or dysuria. Skin: Denies rash, hives, or cyanosis  Musculoskeletal: Denies joint or muscle aches/pain  Neurological: Denies syncope or TIA-like symptoms.   Psychiatric: Denies anxiety, insomnia or depression     Past Medical History:   Diagnosis Date    Aortic stenosis 9/2013    moderate    Arthritis     Asthma     BRONCHIAL ASTHMA  AS A CHILD    Deviated septum     Diabetes mellitus type II 2000    Dr. Pricilla Costello thyroiditis dx 1984    medical treatment    Homocysteinemia     Hyperlipidemia     Hypertension     Hypertriglyceridemia     Hypogonadism male     topical testosterone     Hypothyroid 12/9/2011    Kidney stone 1990s    Passed on own and also surgical excision  Mechanical heart valve present 2016    Murmur since 2005    Nasal polyps     Osteoarthritis     Right thumb     Prolonged emergence from general anesthesia     Sleep apnea 2005    CPAP nightly    Warfarin anticoagulation 2016    Wears glasses      Past Surgical History:   Procedure Laterality Date    AORTIC VALVE REPLACEMENT  09/14/2016    21mm [facility] [place]    COLECTOMY  1996    perforated bowel: colonoscopy    COLONOSCOPY  9/24/2014; 2004    x2 normal colonoscopies/ perforated bowel    CYST REMOVAL      hairy nevus left biceps as child    KNEE SURGERY Left 2004    torn meniscus     NASAL POLYP SURGERY  1989, 1987    OTHER SURGICAL HISTORY  1990s    kidney stone removal     ROTATOR CUFF REPAIR Right 2009     Family History   Problem Relation Age of Onset    Heart Disease Father     Kidney Disease Father         was on HD     Arthritis Father     High Blood Pressure Father     High Cholesterol Mother     Stroke Mother     Diabetes Brother     Other Brother     High Cholesterol Brother     Diabetes Other         aunts, uncles/ grandparents    Heart Disease Maternal Grandfather     Cancer Paternal Grandmother         type unknown. Social History     Tobacco Use    Smoking status: Never Smoker    Smokeless tobacco: Never Used   Vaping Use    Vaping Use: Never used   Substance Use Topics    Alcohol use:  Yes     Alcohol/week: 2.0 standard drinks     Types: 1 Shots of liquor, 1 Standard drinks or equivalent per week     Comment: 1 per week - is rare     Drug use: No     Comment: never       Allergies   Allergen Reactions    Ciprofloxacin Hives     Current Outpatient Medications   Medication Sig Dispense Refill    mirtazapine (REMERON) 30 MG tablet TAKE 1 TABLET BY MOUTH EVERY DAY EVERY NIGHT 90 tablet 2    allopurinol (ZYLOPRIM) 300 MG tablet Take 300 mg by mouth every morning Patient taking 150 mg daily      amoxicillin (AMOXIL) 500 MG capsule Take 4 capsules by mouth See Admin Instructions       vitamin D (CHOLECALCIFEROL) 125 MCG (5000 UT) CAPS capsule Take 1 capsule by mouth once a week      B-D ULTRAFINE III SHORT PEN 31G X 8 MM MISC 1 each by In Vitro route daily      Folinic Acid-Vit B6-Vit B12 4-50-2 MG TABS Take 1 tablet by mouth daily      Menatetrenone (VITAMIN K2) 100 MCG TABS Take 100 mcg by mouth daily      warfarin (JANTOVEN) 7.5 MG tablet TAKE 1 TABLET DAILY EXCEPT TAKE 1/2 TABLET (3.75MG) ON MONDAYS AND FRIDAYS OR AS DIRECTED BY [facility] (Patient taking differently: TAKE 1 TABLET DAILY EXCEPT TAKE 1/2 TABLET (3.75MG) ON MONDAYS OR AS DIRECTED BY [facility]) 78 tablet 3    empagliflozin (JARDIANCE) 10 MG tablet Take 10 mg by mouth daily      nateglinide (STARLIX) 120 MG tablet Take 120 mg by mouth as needed (for a high carb meal)      lisinopril (PRINIVIL;ZESTRIL) 5 MG tablet Take 5 mg by mouth nightly      levothyroxine (SYNTHROID) 112 MCG tablet Take 112 mcg by mouth Daily       Niacin ER 1000 MG TBCR Take 1,000 mg by mouth nightly      CPAP Machine MISC by Does not apply route CPAP 7 cwp, travel device with humidifier 1 each 0    aspirin 81 MG EC tablet Take 1 tablet by mouth nightly 90 tablet 3    Dulaglutide (TRULICITY) 1.5 IY/4.0FH SOPN Inject 1.5 mg into the skin every 7 days      TOUJEO SOLOSTAR 300 UNIT/ML injection pen Inject 40 Units into the skin daily (Patient taking differently: Inject 20 Units into the skin daily ) 18 Pen 3    rosuvastatin (CRESTOR) 40 MG tablet Take 1 tablet by mouth every evening 90 tablet 3    ZETIA 10 MG tablet TAKE 1 TABLET DAILY 90 tablet 2    metoprolol succinate (TOPROL XL) 25 MG extended release tablet Take 3 tablets by mouth 2 times daily 3 tabs po BID (Patient taking differently: Take 75 mg by mouth 2 times daily ) 560 tablet 3    acetaminophen (TYLENOL) 325 MG tablet Take 2 tablets by mouth every 4 hours as needed for Pain 120 tablet 3     No current facility-administered medications for this visit.       Physical Exam:   /62   Pulse 73   Ht 5' 11\" (1.803 m)   Wt 212 lb 9.6 oz (96.4 kg)   SpO2 97%   BMI 29.65 kg/m²   Wt Readings from Last 2 Encounters:   02/10/22 212 lb 9.6 oz (96.4 kg)   11/02/21 213 lb 10 oz (96.9 kg)     Constitutional: He is oriented to person, place, and time. He appears well-developed and well-nourished. In no acute distress. HEENT: Normocephalic and atraumatic. Sclerae anicteric. No xanthelasmas. EOM's intact. Neck: Supple. No JVD present. Carotids without bruits. No thyromegaly present. No lymphadenopathy present. Cardiovascular: RRR, normal S1 and S2; + valve click; no murmur/gallop or rub  Pulmonary/Chest: Effort normal.  Lungs clear to auscultation. Chest wall nontender  Abdominal: soft, nontender, nondistended. + bowel sounds; no organomegaly or bruits. Extremities: No edema, cyanosis, or clubbing. Pulses are 2+ radial/carotid/DP/PT bilaterally. Cap refill brisk. Neurological: No focal deficit. Skin: Skin is warm and dry. Psychiatric: He has a normal mood and affect. His speech is normal and behavior is normal.     Lab Review:   Lab Results   Component Value Date    TRIG 118 09/29/2021    TRIG 118 09/29/2021    HDL 35 01/05/2022    HDL 26 04/27/2012    LDLCALC 39 01/05/2022    LDLDIRECT 51 09/29/2021    LDLDIRECT 54 09/29/2021    LABVLDL 32 01/05/2022     Lab Results   Component Value Date     01/05/2022    K 4.6 01/05/2022     01/05/2022    CO2 23 01/05/2022    BUN 28 01/05/2022    CREATININE 1.8 01/05/2022    GLUCOSE 139 01/05/2022    CALCIUM 9.7 01/05/2022      Lab Results   Component Value Date    WBC 6.6 11/23/2021    HGB 15.7 11/23/2021    HCT 49.4 11/23/2021    MCV 90.1 11/23/2021     11/23/2021 7/29/2021 Stress-Myoview:  Summary    Treadmill MPI Results        1.  Technically a satisfactory study.    2. Normal treadmill exercise stress portion of the study.    3. No evidence of Ischemia by Myocardial Perfusion Imaging.    4. Gated Study shows normal LV size and Systolic function; EF is 77%.    5. The patient exercised for 8.32 min. on the Eris protocol to achieve a HR    of 127, which is 84% of PMHR. The study was stopped due to shortness of    breath & exhaustion. There was no chest pain or ischemic ST changes. 7/13/2021 CTA coronaries:  Somewhat limited exam with extensive coronary calcification, metal artifact   and mild motion.       In spite of this, patient is seen to be left dominant with least moderate if   not greater stenosis multifocal including left main, LAD, circumflex and   right coronary artery.  Suspect significant 3 vessel disease.  This would   need further evaluation with coronary angiogram for best determination.       Left coronary dominance.       Total Agatston calcium score of 832     Echo 7/13/2021:   Normal LV size and wall motion. EF is    60%. Normal diastolic function. The left atrium is normal in size. Normal right ventricular size and function. A 21 mm [facility] [place] mechanical aortic valve appears well seated with a   maximum velocity of 2.17 m/s and a mean gradient of 9 mmHg. No evidence of aortic valve regurgitation. Trivial Mitral and Tricuspid regurgitation. 5/16/2019 Stress Myoview:  Summary    No evidence of reversible ischemia.    There is a small sized, mild intensity, fixed apical cap defect which is    most consistent with artifact/apical thinning.    Normal LV size and systolic function.    Overall findings represent a low risk study.         AVR (21 mm [facility] [place]) 9/14/16 08/31/2016 [facility]:  ANGIOGRAPHIC FINDINGS:  1. The left main coronary artery comes from the left coronary cusp, giving  rise to the left anterior descending artery, left circumflex artery. Ostial  left main has 40% stenosis. 2.  The intravascular ultrasound did not reveal any stenotic lesion of left  Main. The LM area was calculated to be at least 20 mm square.   The Fractional Flow [place] was 0.83.  3.  The left anterior descending artery comes from the left main coronary  artery. It is patent without any significant stenosis. 4.  The left circumflex comes from the left main coronary artery. It is  patent, giving rise to several obtuse marginal without any stenosis. Marquis Savage 6. The right coronary artery is nondominant and small caliber with 50 to 60%  stenosis in the midportion. 7.  Aortic valve has 40 to 45 mmHg gradient. SUMMARY:  Severe aortic valve stenosis. 1/7/2016 R and L heart cath:  ANGIOGRAPHIC FINDINGS:  1. The left main coronary comes from the left coronary cusp, gave rise to  the left anterior descending artery and left circumflex artery and has IMAN-3  flow. The ostium of the left main is 40% stenosed. 2.  The left anterior descending artery looks normal and gave rise to  diagonal branches and has IMAN-3 flow. 3.  The left circumflex is dominant and gave rise to several obtuse marginal  and left PDA. It has no significant stenosis. 4.  The right coronary artery is nondominant and has IMAN-3 flow. No  significant stenosis. 5.  Fractional flow reserve of the ostium of the left main was done. It was  0.86. The rest of the hemodynamics are as follows:  1. Right atrial pressure 3 mmHg. 2.  RV pressure 25/-1. 3.  Pulmonary capillary wedge pressure of 8 mmHg. 4.  Pulmonary artery pressure 24/10.  5.  Aortic pressure 84/54. 6.  LV pressure 167/-7 with an LVEDP of 19 mmHg. 7.  Aortic valve gradient was 36.5 mmHg, mean gradient with a peak to peak  gradient of 57 mmHg. 8.  Cardiac output was 6.2 L per minute. 9.  Aortic valve area was calculated around 1.1 to 1.2 cm squared. SUMMARY:    1.  Moderate to severe aortic valve stenosis. 2.  Moderate left main disease with 40% stenosis of the ostium of the left  Main. Fractional flow reserve of the ostium of the left main was done. It was  0.86.  3.  Elevated left-sided pressures. Assessment:    1.  Aortic stenosis due to bicuspid aortic valve  -S/P AVR(21 mm [facility] [place] per Dr. Jamaica Mclaughlin 9/2016  -continue warfarin  -last echo in July 2021 with normal function of valve    2. Coronary artery disease involving native coronary artery of native heart without angina pectoris  -moderate LM stenosis  -continue ASA, Toprol and statin  -CTA in July 2021 followed by stress testing without ischemia  -nothing to suggest angina    3. Essential hypertension  -well controlled  -goal BP < 130/80  -continue medical management    4. Dyslipidemia  -managed by endocrinology  -continue statin and zetia     5. Diabetes mellitus  -followed by endocrinology    6. Sleep apnea  -uses CPAP nightly    7. Prostate CA  -S/P radiation    Plan:  Continue ASA, Toprol, statin, zetia, lisinopril  Discussed low fat/low sodium diet and reinforced regular aerobic exercise. Labs from January 2022 reviewed  Follow up with Dr. Ceasar Martinez in 9 months or sooner if needed    Return in about 9 months (around 11/10/2022) for with Dr. Ceasar Martinez. Thanks for allowing me to participate in the care of this patient.       JOSE L Rodriguez  [facility], [address] [place] [address] [address] [place] [address] 23[facility], [address] [place] [address]  Office: [phone]  Fax: [phone]      Electronically signed by MARITA Granados CNP on 2/10/2022 at 1:45 PM

## 2022-02-10 ENCOUNTER — OFFICE VISIT (OUTPATIENT)
Dept: CARDIOLOGY CLINIC | Age: 72
End: 2022-02-10
Payer: MEDICARE

## 2022-02-10 VITALS
DIASTOLIC BLOOD PRESSURE: 62 MMHG | SYSTOLIC BLOOD PRESSURE: 114 MMHG | HEIGHT: 71 IN | BODY MASS INDEX: 29.76 KG/M2 | OXYGEN SATURATION: 97 % | WEIGHT: 212.6 LBS | HEART RATE: 73 BPM

## 2022-02-10 DIAGNOSIS — I25.10 CORONARY ARTERY DISEASE INVOLVING NATIVE CORONARY ARTERY OF NATIVE HEART WITHOUT ANGINA PECTORIS: ICD-10-CM

## 2022-02-10 DIAGNOSIS — Q23.1 AORTIC STENOSIS DUE TO BICUSPID AORTIC VALVE: Primary | ICD-10-CM

## 2022-02-10 DIAGNOSIS — E78.5 DYSLIPIDEMIA: ICD-10-CM

## 2022-02-10 DIAGNOSIS — Z95.2 S/P AVR: ICD-10-CM

## 2022-02-10 DIAGNOSIS — I10 ESSENTIAL HYPERTENSION: ICD-10-CM

## 2022-02-10 DIAGNOSIS — Q23.0 AORTIC STENOSIS DUE TO BICUSPID AORTIC VALVE: Primary | ICD-10-CM

## 2022-02-10 PROCEDURE — G8427 DOCREV CUR MEDS BY ELIG CLIN: HCPCS | Performed by: NURSE PRACTITIONER

## 2022-02-10 PROCEDURE — 4040F PNEUMOC VAC/ADMIN/RCVD: CPT | Performed by: NURSE PRACTITIONER

## 2022-02-10 PROCEDURE — 1036F TOBACCO NON-USER: CPT | Performed by: NURSE PRACTITIONER

## 2022-02-10 PROCEDURE — G8417 CALC BMI ABV UP PARAM F/U: HCPCS | Performed by: NURSE PRACTITIONER

## 2022-02-10 PROCEDURE — 1123F ACP DISCUSS/DSCN MKR DOCD: CPT | Performed by: NURSE PRACTITIONER

## 2022-02-10 PROCEDURE — 99214 OFFICE O/P EST MOD 30 MIN: CPT | Performed by: NURSE PRACTITIONER

## 2022-02-10 PROCEDURE — 3017F COLORECTAL CA SCREEN DOC REV: CPT | Performed by: NURSE PRACTITIONER

## 2022-02-10 PROCEDURE — G8482 FLU IMMUNIZE ORDER/ADMIN: HCPCS | Performed by: NURSE PRACTITIONER

## 2022-02-16 ENCOUNTER — ANTI-COAG VISIT (OUTPATIENT)
Dept: PHARMACY | Age: 72
End: 2022-02-16
Payer: MEDICARE

## 2022-02-16 DIAGNOSIS — Z95.2 S/P AVR (AORTIC VALVE REPLACEMENT): Primary | ICD-10-CM

## 2022-02-16 LAB — INR BLD: 3.9

## 2022-02-16 PROCEDURE — 85610 PROTHROMBIN TIME: CPT

## 2022-02-16 PROCEDURE — 99211 OFF/OP EST MAY X REQ PHY/QHP: CPT

## 2022-02-16 NOTE — PROGRESS NOTES
Jean Carlos Cheung is a 73 y.o. here for warfarin management. Angelica Mejias had an INR test today. Results were reviewed and appropriate warfarin management was completed. This visit was performed as: An in person visit. Protocols were followed with precautions to reduce the spread of COVID-19. Patient verifies current dosing regimen: Yes     Warfarin medication reviewed and updated on the patient 's home medication list: Yes   All other medications reviewed and updated on the patient 's home medication list: No: no changes     Lab Results   Component Value Date    INR 3.90 02/16/2022    INR 2.2 01/17/2022    INR 1.3 01/14/2022       Patient Findings     Positives:  Change in health    Negatives:  Signs/symptoms of bleeding, Change in alcohol use, Missed doses, Change in medications, Change in diet/appetite, Bruising    Comments:  Side effects from radiation 2 weeks ago - burning, frequency          Anticoagulation Summary  As of 2/16/2022    INR goal:  2.5-3.5   TTR:  73.5 % (5.3 y)   INR used for dosing:  3.90 (2/16/2022)   Warfarin maintenance plan:  3.75 mg (7.5 mg x 0.5) every Mon; 7.5 mg (7.5 mg x 1) all other days   Weekly warfarin total:  48.75 mg   Plan last modified:  Nya Koch (8/25/2021)   Next INR check:  3/2/2022   Priority:  Maintenance   Target end date: Indefinite    Indications    S/P AVR (aortic valve replacement) [Z95.2]             Anticoagulation Episode Summary     INR check location:      Preferred lab:      Send INR reminders to:  [facility]    Comments:  EPIC      Anticoagulation Care Providers     Provider Role Specialty Phone number    Yasir Smith MD Referring Cardiology [phone]          Warfarin assessment / plan:     Appears well. Two weeks ago, he received radiation therapy for prostate cancer. He is experiencing side effects of burning and frequent urination. Has not experienced any more hematuria.  At the last visit 1 month ago, his INR was low at ADDENDUM    For Pharmacy Admin Tracking Only     Intervention Detail: Dose Adjustment: 1, reason: Therapy Optimization   Total # of Interventions Recommended: 1   Total # of Interventions Accepted: 1   Time Spent (min): 20

## 2022-03-02 ENCOUNTER — ANTI-COAG VISIT (OUTPATIENT)
Dept: PHARMACY | Age: 72
End: 2022-03-02
Payer: MEDICARE

## 2022-03-02 DIAGNOSIS — Z95.2 S/P AVR (AORTIC VALVE REPLACEMENT): Primary | ICD-10-CM

## 2022-03-02 LAB — INR BLD: 2.7

## 2022-03-02 PROCEDURE — 99211 OFF/OP EST MAY X REQ PHY/QHP: CPT

## 2022-03-02 PROCEDURE — 85610 PROTHROMBIN TIME: CPT

## 2022-03-02 NOTE — PROGRESS NOTES
Wade Adams is a 73 y.o. here for warfarin management. Rich Saleem had an INR test today. Results were reviewed and appropriate warfarin management was completed. This visit was performed as: An in person visit. Protocols were followed with precautions to reduce the spread of COVID-19. Patient verifies current dosing regimen: Yes     Warfarin medication reviewed and updated on the patient 's home medication list: Yes   All other medications reviewed and updated on the patient 's home medication list: Yes     Lab Results   Component Value Date    INR 2.70 2022    INR 3.90 2022    INR 2.2 2022       Patient Findings     Negatives:  Signs/symptoms of bleeding, Change in medications, Change in diet/appetite, Bruising          Anticoagulation Summary  As of 3/2/2022    INR goal:  2.5-3.5   TTR:  73.5 % (5.3 y)   INR used for dosin.70 (3/2/2022)   Warfarin maintenance plan:  3.75 mg (7.5 mg x 0.5) every Mon; 7.5 mg (7.5 mg x 1) all other days   Weekly warfarin total:  48.75 mg   Plan last modified:  Nya Leisure (2021)   Next INR check:  2022   Priority:  Maintenance   Target end date: Indefinite    Indications    S/P AVR (aortic valve replacement) [Z95.2]             Anticoagulation Episode Summary     INR check location:      Preferred lab:      Send INR reminders to:  [facility]    Comments:  EPIC      Anticoagulation Care Providers     Provider Role Specialty Phone number    Jeramy Salazar MD Referring Cardiology [phone]          Warfarin assessment / plan:     Appears well. No changes. No acute findings. No change to warfarin therapy today. Description    CONTINUE: Warfarin 7.5 mg daily except 3.75 mg (1/2 tablet) on Monday. Call [phone] with signs or symptoms of bleeding or ANY medication changes (including over-the-counter medications or herbal supplements).        If significant bleeding occurs please seek immediate medical attention. Keep the number of servings of vitamin K containing foods (dark green, leafy vegetables) the same each week. Please call if this changes. Limit alcohol intake. Please call if this changes. Immunization History   Administered Date(s) Administered    COVID-19, Moderna, Primary or Immunocompromised, PF, 100mcg/0.5mL 02/18/2021, 03/18/2021    COVID-19, Pfizer Purple top, DILUTE for use, 12+ yrs, 30mcg/0.3mL dose 11/23/2021    Influenza 11/01/2015    Influenza Vaccine, unspecified formulation 10/05/2017    Influenza Virus Vaccine 10/04/2011, 10/04/2012    Influenza, High Dose (Fluzone 65 yrs and older) 10/11/2018, 10/08/2021    Influenza, High-dose, Quadv, 65 yrs +, IM (Fluzone) 10/08/2021    Influenza, Quadv, IM, (6 mo and older Fluzone, Flulaval, Fluarix and 3 yrs and older Afluria) 10/25/2016, 10/05/2017    Influenza, Triv, inactivated, subunit, adjuvanted, IM (Fluad 65 yrs and older) 09/25/2019, 09/24/2020    Pneumococcal Conjugate 13-valent (Onfceum70) 10/11/2018    Pneumococcal Polysaccharide ([id number]) 10/04/2012, 09/24/2020    Tdap (Boostrix, Adacel) 04/03/2015    Zoster Live (Zostavax) 04/03/2015    Zoster Recombinant (Shingrix) 10/20/2020, 01/22/2021           Orders Placed This Encounter   Procedures    Protime-INR     This external order was created through the results console. No orders of the defined types were placed in this encounter. Reviewed AVS with patient / caregiver.     Billing Points:  0 billing points this visit       CLINICAL PHARMACY CONSULT: MED RECONCILIATION/REVIEW ADDENDUM    For Pharmacy Admin Tracking Only     Intervention Detail:    Total # of Interventions Recommended: 0   Total # of Interventions Accepted: 0   Time Spent (min): 15

## 2022-03-09 NOTE — TELEPHONE ENCOUNTER
Patient called to let us know he started Amoxicillin please call him at [phone]. Hydroxychloroquine Counseling:  I discussed with the patient that a baseline ophthalmologic exam is needed at the start of therapy and every year thereafter while on therapy. A CBC may also be warranted for monitoring.  The side effects of this medication were discussed with the patient, including but not limited to agranulocytosis, aplastic anemia, seizures, rashes, retinopathy, and liver toxicity. Patient instructed to call the office should any adverse effect occur.  The patient verbalized understanding of the proper use and possible adverse effects of Plaquenil.  All the patient's questions and concerns were addressed.

## 2022-04-06 ENCOUNTER — ANTI-COAG VISIT (OUTPATIENT)
Dept: PHARMACY | Age: 72
End: 2022-04-06
Payer: MEDICARE

## 2022-04-06 DIAGNOSIS — Z95.2 S/P AVR (AORTIC VALVE REPLACEMENT): Primary | ICD-10-CM

## 2022-04-06 LAB — INTERNATIONAL NORMALIZATION RATIO, POC: 2.5

## 2022-04-06 PROCEDURE — 99211 OFF/OP EST MAY X REQ PHY/QHP: CPT

## 2022-04-06 PROCEDURE — 85610 PROTHROMBIN TIME: CPT

## 2022-04-06 NOTE — PROGRESS NOTES
servings of vitamin K containing foods (dark green, leafy vegetables) the same each week. Please call if this changes. Limit alcohol intake. Please call if this changes. Immunization History   Administered Date(s) Administered    COVID-19, Moderna, Primary or Immunocompromised, PF, 100mcg/0.5mL 02/18/2021, 03/18/2021    COVID-19, Pfizer Purple top, DILUTE for use, 12+ yrs, 30mcg/0.3mL dose 11/23/2021    Influenza 11/01/2015    Influenza Vaccine, unspecified formulation 10/05/2017    Influenza Virus Vaccine 10/04/2011, 10/04/2012    Influenza, High Dose (Fluzone 65 yrs and older) 10/11/2018, 10/08/2021    Influenza, High-dose, Quadv, 65 yrs +, IM (Fluzone) 10/08/2021    Influenza, Quadv, IM, (6 mo and older Fluzone, Flulaval, Fluarix and 3 yrs and older Afluria) 10/25/2016, 10/05/2017    Influenza, Triv, inactivated, subunit, adjuvanted, IM (Fluad 65 yrs and older) 09/25/2019, 09/24/2020    Pneumococcal Conjugate 13-valent (Etmruno28) 10/11/2018    Pneumococcal Polysaccharide (Htizadlpx23) 10/04/2012, 09/24/2020    Tdap (Boostrix, Adacel) 04/03/2015    Zoster Live (Zostavax) 04/03/2015    Zoster Recombinant (Shingrix) 10/20/2020, 01/22/2021           Orders Placed This Encounter   Procedures    POCT INR     This external order was created through the results console. No orders of the defined types were placed in this encounter. Reviewed AVS with patient / caregiver.     Billing Points:  0 billing points this visit       CLINICAL PHARMACY CONSULT: MED RECONCILIATION/REVIEW ADDENDUM    For Pharmacy Admin Tracking Only     Intervention Detail:    Total # of Interventions Recommended: 0   Total # of Interventions Accepted: 0   Time Spent (min): 20

## 2022-05-04 ENCOUNTER — ANTI-COAG VISIT (OUTPATIENT)
Dept: PHARMACY | Age: 72
End: 2022-05-04
Payer: MEDICARE

## 2022-05-04 DIAGNOSIS — Z95.2 S/P AVR (AORTIC VALVE REPLACEMENT): Primary | ICD-10-CM

## 2022-05-04 LAB — INTERNATIONAL NORMALIZATION RATIO, POC: 2

## 2022-05-04 PROCEDURE — 99211 OFF/OP EST MAY X REQ PHY/QHP: CPT

## 2022-05-04 PROCEDURE — 85610 PROTHROMBIN TIME: CPT

## 2022-05-04 NOTE — PROGRESS NOTES
Lizzie Monsalve is a 73 y.o. here for warfarin management. Geno Yuan had an INR test today. Results were reviewed and appropriate warfarin management was completed. This visit was performed as: An in person visit. Protocols were followed with precautions to reduce the spread of COVID-19. Patient verifies current dosing regimen: Yes     Warfarin medication reviewed and updated on the patient 's home medication list: Yes   All other medications reviewed and updated on the patient 's home medication list: No: no changes     Lab Results   Component Value Date    INR 2.0 2022    INR 2.5 2022    INR 2.70 2022           Anticoagulation Summary  As of 2022    INR goal:  2.5-3.5   TTR:  72.9 % (5.5 y)   INR used for dosin.0 (2022)   Warfarin maintenance plan:  7.5 mg (7.5 mg x 1) every day   Weekly warfarin total:  52.5 mg   Plan last modified:  Inga Hernandez [facility] (2022)   Next INR check:  2022   Priority:  Maintenance   Target end date: Indefinite    Indications    S/P AVR (aortic valve replacement) [Z95.2]             Anticoagulation Episode Summary     INR check location:      Preferred lab:      Send INR reminders to:  [facility]    Comments:  EPIC      Anticoagulation Care Providers     Provider Role Specialty Phone number    Fallon Romero MD Referring Cardiology [phone]          Warfarin assessment / plan:   He looks and feels well today. No changes in meds or diet noted. His INR has been running on the low end of therapeutic of late. The INR is low today at 2.0. We will have him take 11.25mg of warfarin today and then increase his weekly dose by 7.7%. He will return in 3 weeks for his next INR. He will call with any issues.     Description    Take warfarin 11.25mg(1 & 1/2 tablets) TODAY ONLY   CONTINUE: Warfarin 7.5 mg daily    Call [phone] with signs or symptoms of bleeding or ANY medication changes (including over-the-counter medications or herbal supplements). If significant bleeding occurs please seek immediate medical attention. Keep the number of servings of vitamin K containing foods (dark green, leafy vegetables) the same each week. Please call if this changes. Limit alcohol intake. Please call if this changes. Immunization History   Administered Date(s) Administered    COVID-19, Moderna, Primary or Immunocompromised, PF, 100mcg/0.5mL 02/18/2021, 03/18/2021    COVID-19, Pfizer Purple top, DILUTE for use, 12+ yrs, 30mcg/0.3mL dose 11/23/2021    Influenza 11/01/2015    Influenza Vaccine, unspecified formulation 10/05/2017    Influenza Virus Vaccine 10/04/2011, 10/04/2012    Influenza, High Dose (Fluzone 65 yrs and older) 10/11/2018, 10/08/2021    Influenza, High-dose, Quadv, 65 yrs +, IM (Fluzone) 10/08/2021    Influenza, Quadv, IM, (6 mo and older Fluzone, Flulaval, Fluarix and 3 yrs and older Afluria) 10/25/2016, 10/05/2017    Influenza, Triv, inactivated, subunit, adjuvanted, IM (Fluad 65 yrs and older) 09/25/2019, 09/24/2020    Pneumococcal Conjugate 13-valent (Snzavub60) 10/11/2018    Pneumococcal Polysaccharide (Zvpibwjma06) 10/04/2012, 09/24/2020    Tdap (Boostrix, Adacel) 04/03/2015    Zoster Live (Zostavax) 04/03/2015    Zoster Recombinant (Shingrix) 10/20/2020, 01/22/2021           Orders Placed This Encounter   Procedures    POCT INR     This external order was created through the results console. No orders of the defined types were placed in this encounter. Reviewed AVS with patient / caregiver.     Billing Points:  Adjust dosage and/or reconcile meds (fill pill box) </= 5 medications - 2 points       CLINICAL PHARMACY CONSULT: MED RECONCILIATION/REVIEW ADDENDUM    For Pharmacy Admin Tracking Only     Intervention Detail: Dose Adjustment: 1, reason: Therapy Optimization   Total # of Interventions Recommended: 1   Total # of Interventions Accepted: 1   Time Spent (min): 21

## 2022-05-23 ENCOUNTER — ANTI-COAG VISIT (OUTPATIENT)
Dept: PHARMACY | Age: 72
End: 2022-05-23
Payer: MEDICARE

## 2022-05-23 DIAGNOSIS — Z95.2 S/P AVR (AORTIC VALVE REPLACEMENT): Primary | ICD-10-CM

## 2022-05-23 LAB — INTERNATIONAL NORMALIZATION RATIO, POC: 2.7

## 2022-05-23 PROCEDURE — 85610 PROTHROMBIN TIME: CPT

## 2022-05-23 PROCEDURE — 99211 OFF/OP EST MAY X REQ PHY/QHP: CPT

## 2022-05-23 NOTE — PROGRESS NOTES
Sherrie Preston is a 73 y.o. here for warfarin management. Britt Cavazos had an INR test today. Results were reviewed and appropriate warfarin management was completed. This visit was performed as: An in person visit. Protocols were followed with precautions to reduce the spread of COVID-19. Patient verifies current dosing regimen: Yes     Warfarin medication reviewed and updated on the patient 's home medication list: Yes   All other medications reviewed and updated on the patient 's home medication list: No: He is on a short course of tamsulosin     Lab Results   Component Value Date    INR 2.7 2022    INR 2.0 2022    INR 2.5 2022           Anticoagulation Summary  As of 2022    INR goal:  2.5-3.5   TTR:  72.5 % (5.5 y)   INR used for dosin.7 (2022)   Warfarin maintenance plan:  7.5 mg (7.5 mg x 1) every day   Weekly warfarin total:  52.5 mg   Plan last modified:  Christen Kc, [facility] (2022)   Next INR check:  2022   Priority:  Maintenance   Target end date: Indefinite    Indications    S/P AVR (aortic valve replacement) [Z95.2]             Anticoagulation Episode Summary     INR check location:      Preferred lab:      Send INR reminders to:  [facility]    Comments:  EPIC      Anticoagulation Care Providers     Provider Role Specialty Phone number    Osvaldo Meyers MD Referring Cardiology [phone]          Warfarin assessment / plan:     Appears well. No changes affecting warfarin therapy were noted. No acute findings. INR within goal range. No change to warfarin therapy today. His INR came up nicely after an increase in his weekly dose at his last visit. No change in dose for INR of 2.7 today. He is leaving for [place] in 2 days.  He states that he has an ample supply of warfarin for his trip Next INR in 4 weeks    Description    CONTINUE: Warfarin 7.5 mg daily    Call [phone] with signs or symptoms of bleeding or ANY medication changes (including over-the-counter medications or herbal supplements). If significant bleeding occurs please seek immediate medical attention. Keep the number of servings of vitamin K containing foods (dark green, leafy vegetables) the same each week. Please call if this changes. Limit alcohol intake. Please call if this changes. Immunization History   Administered Date(s) Administered    COVID-19, Moderna, Primary or Immunocompromised, PF, 100mcg/0.5mL 02/18/2021, 03/18/2021    COVID-19, [employer] top, DO NOT Dilute, Paco-Sucrose, 12+ yrs, PF, 30 mcg/0.3 mL dose 05/04/2022    COVID-19, Pfizer Purple top, DILUTE for use, 12+ yrs, 30mcg/0.3mL dose 11/23/2021    Influenza 11/01/2015    Influenza Vaccine, unspecified formulation 10/05/2017    Influenza Virus Vaccine 10/04/2011, 10/04/2012    Influenza, High Dose (Fluzone 65 yrs and older) 10/11/2018, 10/08/2021    Influenza, High-dose, Quadv, 65 yrs +, IM (Fluzone) 10/08/2021    Influenza, Quadv, IM, (6 mo and older Fluzone, Flulaval, Fluarix and 3 yrs and older Afluria) 10/25/2016, 10/05/2017    Influenza, Triv, inactivated, subunit, adjuvanted, IM (Fluad 65 yrs and older) 09/25/2019, 09/24/2020    Pneumococcal Conjugate 13-valent (Ocoqqoh59) 10/11/2018    Pneumococcal Polysaccharide (Dzejlndvm12) 10/04/2012, 09/24/2020    Tdap (Boostrix, Adacel) 04/03/2015    Zoster Live (Zostavax) 04/03/2015    Zoster Recombinant (Shingrix) 10/20/2020, 01/22/2021           Orders Placed This Encounter   Procedures    POCT INR     This external order was created through the results console. No orders of the defined types were placed in this encounter. Reviewed AVS with patient / caregiver.     Billing Points:  0 billing points this visit       CLINICAL PHARMACY CONSULT: MED RECONCILIATION/REVIEW ADDENDUM    For Pharmacy Admin Tracking Only     Intervention Detail:    Total # of Interventions Recommended: 0   Total # of Interventions Accepted: 0   Time Spent (min): 20

## 2022-06-03 LAB — PROSTATE SPECIFIC ANTIGEN: 1.97 NG/ML (ref 0–4)

## 2022-06-22 ENCOUNTER — ANTI-COAG VISIT (OUTPATIENT)
Dept: PHARMACY | Age: 72
End: 2022-06-22
Payer: MEDICARE

## 2022-06-22 DIAGNOSIS — Z95.2 S/P AVR (AORTIC VALVE REPLACEMENT): Primary | ICD-10-CM

## 2022-06-22 LAB — INTERNATIONAL NORMALIZATION RATIO, POC: 3.4

## 2022-06-22 PROCEDURE — 99211 OFF/OP EST MAY X REQ PHY/QHP: CPT

## 2022-06-22 PROCEDURE — 85610 PROTHROMBIN TIME: CPT

## 2022-06-22 RX ORDER — MAGNESIUM OXIDE 400 MG/1
400 TABLET ORAL DAILY
COMMUNITY

## 2022-06-22 NOTE — PROGRESS NOTES
Rusty Castillo is a 73 y.o. here for warfarin management. Martina Ortez had an INR test today. Results were reviewed and appropriate warfarin management was completed. This visit was performed as: An in person visit. Protocols were followed with precautions to reduce the spread of COVID-19. Patient verifies current dosing regimen: Yes     Warfarin medication reviewed and updated on the patient 's home medication list: Yes   All other medications reviewed and updated on the patient 's home medication list: Yes: Now taking magnesium for leg cramps. Will not interact with the warfarin. Lab Results   Component Value Date    INR 3.4 06/22/2022    INR 2.7 05/23/2022    INR 2.0 05/04/2022           Anticoagulation Summary  As of 6/22/2022    INR goal:  2.5-3.5   TTR:  72.9 % (5.6 y)   INR used for dosing:  3.4 (6/22/2022)   Warfarin maintenance plan:  7.5 mg (7.5 mg x 1) every day   Weekly warfarin total:  52.5 mg   Plan last modified:  [facility] (5/4/2022)   Next INR check:  7/20/2022   Priority:  Maintenance   Target end date: Indefinite    Indications    S/P AVR (aortic valve replacement) [Z95.2]             Anticoagulation Episode Summary     INR check location:      Preferred lab:      Send INR reminders to:  [facility]    Comments:  EPIC      Anticoagulation Care Providers     Provider Role Specialty Phone number    Brooklyn Olivarez MD Referring Cardiology [phone]          Warfarin assessment / plan:     Appears well. No changes affecting warfarin therapy were noted. No acute findings. INR within goal range. No change to warfarin therapy today. Description    CONTINUE: Warfarin 7.5 mg daily    Call [phone] with signs or symptoms of bleeding or ANY medication changes (including over-the-counter medications or herbal supplements). If significant bleeding occurs please seek immediate medical attention.     Keep the number of servings of vitamin K containing foods (dark green, leafy vegetables) the same each week. Please call if this changes. Limit alcohol intake. Please call if this changes. Immunization History   Administered Date(s) Administered    COVID-19, Moderna, Primary or Immunocompromised, PF, 100mcg/0.5mL 02/18/2021, 03/18/2021    COVID-19, [employer] top, DO NOT Dilute, Paco-Sucrose, 12+ yrs, PF, 30 mcg/0.3 mL dose 05/04/2022    COVID-19, Pfizer Purple top, DILUTE for use, 12+ yrs, 30mcg/0.3mL dose 11/23/2021    Influenza 11/01/2015    Influenza Vaccine, unspecified formulation 10/05/2017    Influenza Virus Vaccine 10/04/2011, 10/04/2012    Influenza, High Dose (Fluzone 65 yrs and older) 10/11/2018, 10/08/2021    Influenza, High-dose, Quadv, 65 yrs +, IM (Fluzone) 10/08/2021    Influenza, Quadv, IM, (6 mo and older Fluzone, Flulaval, Fluarix and 3 yrs and older Afluria) 10/25/2016, 10/05/2017    Influenza, Triv, inactivated, subunit, adjuvanted, IM (Fluad 65 yrs and older) 09/25/2019, 09/24/2020    Pneumococcal Conjugate 13-valent (Sywjrtq06) 10/11/2018    Pneumococcal Polysaccharide (Bnwfothma45) 10/04/2012, 09/24/2020    Tdap (Boostrix, Adacel) 04/03/2015    Zoster Live (Zostavax) 04/03/2015    Zoster Recombinant (Shingrix) 10/20/2020, 01/22/2021           Orders Placed This Encounter   Procedures    POCT INR     This external order was created through the results console. No orders of the defined types were placed in this encounter. Reviewed AVS with patient / caregiver.     Billing Points:  0 billing points this visit       CLINICAL PHARMACY CONSULT: MED RECONCILIATION/REVIEW ADDENDUM    For Pharmacy Admin Tracking Only     Intervention Detail:    Total # of Interventions Recommended: 0   Total # of Interventions Accepted: 0   Time Spent (min): 20

## 2022-07-15 RX ORDER — WARFARIN SODIUM 7.5 MG/1
TABLET ORAL
Qty: 90 TABLET | Refills: 3 | Status: SHIPPED | OUTPATIENT
Start: 2022-07-15 | End: 2022-10-19

## 2022-07-15 NOTE — TELEPHONE ENCOUNTER
Last OV: 2/10/22  Next OV 9 mth f/u 11/2022  Last refill:7/20/21  Most recent Labs: 5/4/22  Last EKG (if needed):6/9/21

## 2022-07-20 ENCOUNTER — ANTI-COAG VISIT (OUTPATIENT)
Dept: PHARMACY | Age: 72
End: 2022-07-20
Payer: MEDICARE

## 2022-07-20 DIAGNOSIS — Z95.2 S/P AVR (AORTIC VALVE REPLACEMENT): Primary | ICD-10-CM

## 2022-07-20 LAB — INTERNATIONAL NORMALIZATION RATIO, POC: 2.3

## 2022-07-20 PROCEDURE — 85610 PROTHROMBIN TIME: CPT

## 2022-07-20 PROCEDURE — 99211 OFF/OP EST MAY X REQ PHY/QHP: CPT

## 2022-07-20 NOTE — PROGRESS NOTES
Amy Prescott is a 73 y.o. here for warfarin management. Moisés Fleming had an INR test today. Results were reviewed and appropriate warfarin management was completed. This visit was performed as: An in person visit. Protocols were followed with precautions to reduce the spread of COVID-19. Patient verifies current dosing regimen: Yes     Warfarin medication reviewed and updated on the patient 's home medication list: Yes   All other medications reviewed and updated on the patient 's home medication list: No: No changes     Lab Results   Component Value Date    INR 2.3 2022    INR 3.4 2022    INR 2.7 2022       Patient Findings       Negatives:  Signs/symptoms of thrombosis, Signs/symptoms of bleeding, Change in health, Missed doses, Change in medications, Change in diet/appetite, Bruising            Anticoagulation Summary  As of 2022      INR goal:  2.5-3.5   TTR:  73.0 % (5.7 y)   INR used for dosin.3 (2022)   Warfarin maintenance plan:  7.5 mg (7.5 mg x 1) every day   Weekly warfarin total:  52.5 mg   Plan last modified:  Gilberto Harper, [facility] (2022)   Next INR check:     Priority:  Maintenance   Target end date: Indefinite    Indications    S/P AVR (aortic valve replacement) [Z95.2]                 Anticoagulation Episode Summary       INR check location:      Preferred lab:      Send INR reminders to:  [facility]FF    Comments:  EPIC          Anticoagulation Care Providers       Provider Role Specialty Phone number    Dimple Shelton MD Referring Cardiology [phone]            Warfarin assessment / plan:     Appears well  Sub-therapeutic INR     Denies missed doses. Denies increased vitamin K intake. Denies medication changes. Denies alcohol changes. Denies signs or symptoms of clotting. Denies changes to smoking. Cannot determine a particular reason for low INR today but has been in range on this dose since February.  For INR of 2.3 today, increase today's dose to 11.25 mg then continue 7.5 daily. Hernando Valenzuela will need to have another dental procedure soon. His INR needs to be less than 2.5 for this procedure. The last time he had this done(June of 2020) we reduced his warfarin by 1/2 for the 5 days before the procedure, which worked well. Description    CONTINUE: Warfarin 7.5 mg daily    Call [phone] with signs or symptoms of bleeding or ANY medication changes (including over-the-counter medications or herbal supplements). If significant bleeding occurs please seek immediate medical attention. Keep the number of servings of vitamin K containing foods (dark green, leafy vegetables) the same each week. Please call if this changes. Limit alcohol intake. Please call if this changes.           Immunization History   Administered Date(s) Administered    COVID-19, MODERNA BLUE border, Primary or Immunocompromised, (age 12y+), IM, 100 mcg/0.5mL 02/18/2021, 03/18/2021    COVID-19, P[employer] GRAY top, DO NOT Dilute, (age 15 y+), IM, 30 mcg/0.3 mL 05/04/2022    COVID-19, PFIZER PURPLE top, DILUTE for use, (age 15 y+), 30mcg/0.3mL 11/23/2021    Influenza 11/01/2015    Influenza Vaccine, unspecified formulation 10/05/2017    Influenza Virus Vaccine 10/04/2011, 10/04/2012    Influenza, High Dose (Fluzone 65 yrs and older) 10/11/2018, 10/08/2021    Influenza, High-dose, Quadv, 65 yrs +, IM (Fluzone) 10/08/2021    Influenza, Quadv, IM, (6 mo and older Fluzone, Flulaval, Fluarix and 3 yrs and older Afluria) 10/25/2016, 10/05/2017    Influenza, Triv, inactivated, subunit, adjuvanted, IM (Fluad 65 yrs and older) 09/25/2019, 09/24/2020    Pneumococcal Conjugate 13-valent (Rmpiqii39) 10/11/2018    Pneumococcal Polysaccharide (Uqwitohjv09) 10/04/2012, 09/24/2020    Tdap (Boostrix, Adacel) 04/03/2015    Zoster Live (Zostavax) 04/03/2015    Zoster Recombinant (Shingrix) 10/20/2020, 01/22/2021           Orders Placed This Encounter   Procedures    POCT INR This external order was created through the results console. No orders of the defined types were placed in this encounter. Reviewed AVS with patient / caregiver.     Billing Points:  Adjust dosage and/or reconcile meds (fill pill box) </= 5 medications - 2 points       CLINICAL PHARMACY CONSULT: MED RECONCILIATION/REVIEW ADDENDUM    For Pharmacy Admin Tracking Only    Intervention Detail: Dose Adjustment: 1, reason: Therapy Optimization  Total # of Interventions Recommended: 1  Total # of Interventions Accepted: 1  Time Spent (min): 20

## 2022-08-05 LAB — PROSTATE SPECIFIC ANTIGEN: 2.4 NG/ML (ref 0–4)

## 2022-08-10 RX ORDER — MIRTAZAPINE 30 MG/1
TABLET, FILM COATED ORAL
Qty: 90 TABLET | Refills: 0 | Status: SHIPPED | OUTPATIENT
Start: 2022-08-10 | End: 2022-11-02

## 2022-08-17 ENCOUNTER — ANTI-COAG VISIT (OUTPATIENT)
Dept: PHARMACY | Age: 72
End: 2022-08-17
Payer: MEDICARE

## 2022-08-17 DIAGNOSIS — Z95.2 S/P AVR (AORTIC VALVE REPLACEMENT): Primary | ICD-10-CM

## 2022-08-17 LAB — INR BLD: 3.2

## 2022-08-17 PROCEDURE — 99211 OFF/OP EST MAY X REQ PHY/QHP: CPT

## 2022-08-17 PROCEDURE — 85610 PROTHROMBIN TIME: CPT

## 2022-08-17 NOTE — PROGRESS NOTES
Christy Tran is a 73 y.o. here for warfarin management. Jerod Jeffries had an INR test today. Results were reviewed and appropriate warfarin management was completed. This visit was performed as: An in person visit. Protocols were followed with precautions to reduce the spread of COVID-19. Patient verifies current dosing regimen: Yes     Warfarin medication reviewed and updated on the patient 's home medication list: Yes   All other medications reviewed and updated on the patient 's home medication list: No: no changes     Lab Results   Component Value Date    INR 3.20 08/17/2022    INR 2.3 07/20/2022    INR 3.4 06/22/2022       Patient Findings       Negatives:  Signs/symptoms of thrombosis, Signs/symptoms of bleeding, Change in health, Missed doses, Change in medications, Change in diet/appetite, Bruising            Anticoagulation Summary  As of 8/17/2022      INR goal:  2.5-3.5   TTR:  73.0 % (5.8 y)   INR used for dosing:  3.20 (8/17/2022)   Warfarin maintenance plan:  7.5 mg (7.5 mg x 1) every day   Weekly warfarin total:  52.5 mg   Plan last modified:  [facility] (5/4/2022)   Next INR check:  9/14/2022   Priority:  Maintenance   Target end date: Indefinite    Indications    S/P AVR (aortic valve replacement) [Z95.2]                 Anticoagulation Episode Summary       INR check location:      Preferred lab:      Send INR reminders to:  [facility]AFF    Comments:  EPIC          Anticoagulation Care Providers       Provider Role Specialty Phone number    Lauryn Cali MD Referring Cardiology [phone]            Warfarin assessment / plan:     Appears well. No changes affecting warfarin therapy were noted. No acute findings. INR within goal range. No change to warfarin therapy today.    Will see patient back in 4 weeks    Description    CONTINUE: Warfarin 7.5 mg daily    Call [phone] with signs or symptoms of bleeding or ANY medication changes (including over-the-counter medications or herbal supplements). If significant bleeding occurs please seek immediate medical attention. Keep the number of servings of vitamin K containing foods (dark green, leafy vegetables) the same each week. Please call if this changes. Limit alcohol intake. Please call if this changes. Immunization History   Administered Date(s) Administered     Influenza, FLUZONE (age 72 y+), High Dose 10/08/2021    COVID-19, MODERNA BLUE border, Primary or Immunocompromised, (age 12y+), IM, 100 mcg/0.5mL 02/18/2021, 03/18/2021    COVID-19, PFIZER GRAY top, DO NOT Dilute, (age 15 y+), IM, 30 mcg/0.3 mL 05/04/2022    COVID-19, PFIZER PURPLE top, DILUTE for use, (age 15 y+), 30mcg/0.3mL 11/23/2021    Influenza 11/01/2015    Influenza Vaccine, unspecified formulation 10/05/2017    Influenza Virus Vaccine 10/04/2011, 10/04/2012    Influenza, AFLURIAMisa (age 1 y+), MDV 10/25/2016, 10/05/2017    Influenza, High Dose (Fluzone 65 yrs and older) 10/11/2018, 10/08/2021    Influenza, Triv, inactivated, subunit, adjuvanted, IM (Fluad 65 yrs and older) 09/25/2019, 09/24/2020    Pneumococcal Conjugate 13-valent ([id number]) 10/11/2018    Pneumococcal Polysaccharide ([id number]) 10/04/2012, 09/24/2020    Tdap (Boostrix, Adacel) 04/03/2015    Zoster Live (Zostavax) 04/03/2015    Zoster Recombinant (Shingrix) 10/20/2020, 01/22/2021           Orders Placed This Encounter   Procedures    Protime-INR     This external order was created through the results console. No orders of the defined types were placed in this encounter. Reviewed AVS with patient / caregiver.     Billing Points:  0 billing points this visit       CLINICAL PHARMACY CONSULT: MED RECONCILIATION/REVIEW ADDENDUM    For Pharmacy Admin Tracking Only    Intervention Detail:   Total # of Interventions Recommended: 0  Total # of Interventions Accepted: 0  Time Spent (min): 15     I have seen the patient and reviewed the progress note written by the PharmD Candidate. I agree with this assesment and plan.    Kristin Hoover [facility], PharmD 8/17/22 9:04 AM

## 2022-09-14 ENCOUNTER — ANTI-COAG VISIT (OUTPATIENT)
Dept: PHARMACY | Age: 72
End: 2022-09-14
Payer: MEDICARE

## 2022-09-14 DIAGNOSIS — Z95.2 S/P AVR (AORTIC VALVE REPLACEMENT): Primary | ICD-10-CM

## 2022-09-14 LAB — INR BLD: 2.6

## 2022-09-14 PROCEDURE — 99211 OFF/OP EST MAY X REQ PHY/QHP: CPT

## 2022-09-14 PROCEDURE — 85610 PROTHROMBIN TIME: CPT

## 2022-09-14 NOTE — PROGRESS NOTES
Rock Frances is a 73 y.o. here for warfarin management. Mary Stanton had an INR test today. Results were reviewed and appropriate warfarin management was completed. This visit was performed as: An in person visit. Protocols were followed with precautions to reduce the spread of COVID-19. Patient verifies current dosing regimen: Yes     Warfarin medication reviewed and updated on the patient 's home medication list: Yes   All other medications reviewed and updated on the patient 's home medication list: No: No changes in medications. Lab Results   Component Value Date    INR 2.60 2022    INR 3.20 2022    INR 2.3 2022       Patient Findings       Negatives:  Signs/symptoms of bleeding, Change in health, Change in activity, Missed doses, Extra doses, Change in medications, Change in diet/appetite, Bruising            Anticoagulation Summary  As of 2022      INR goal:  2.5-3.5   TTR:  73.4 % (5.9 y)   INR used for dosin.60 (2022)   Warfarin maintenance plan:  7.5 mg (7.5 mg x 1) every day   Weekly warfarin total:  52.5 mg   Plan last modified:  Erna Aguillon, [facility] (2022)   Next INR check:  10/19/2022   Priority:  Maintenance   Target end date: Indefinite    Indications    S/P AVR (aortic valve replacement) [Z95.2]                 Anticoagulation Episode Summary       INR check location:      Preferred lab:      Send INR reminders to:  [facility] STAFF    Comments:  EPIC          Anticoagulation Care Providers       Provider Role Specialty Phone number    Delaney Melgar MD Referring Cardiology [phone]            Warfarin assessment / plan:     Appears well. No changes affecting warfarin therapy were noted. No acute findings. INR within goal range. No change to warfarin therapy today. INR for today is 2.6. Patient instructed to CONTINUE: Warfarin 7.5 mg daily.     Description    CONTINUE: Warfarin 7.5 mg daily    Call [phone] with signs or symptoms of bleeding or ANY medication changes (including over-the-counter medications or herbal supplements). If significant bleeding occurs please seek immediate medical attention. Keep the number of servings of vitamin K containing foods (dark green, leafy vegetables) the same each week. Please call if this changes. Limit alcohol intake. Please call if this changes. Immunization History   Administered Date(s) Administered    COVID-19, MODERNA BLUE border, Primary or Immunocompromised, (age 12y+), IM, 100 mcg/0.5mL 02/18/2021, 03/18/2021    COVID-19, PFIZER GRAY top, DO NOT Dilute, (age 15 y+), IM, 30 mcg/0.3 mL 05/04/2022    COVID-19, PFIZER PURPLE top, DILUTE for use, (age 15 y+), 30mcg/0.3mL 11/23/2021    Influenza 11/01/2015    Influenza Vaccine, unspecified formulation 10/05/2017    Influenza Virus Vaccine 10/04/2011, 10/04/2012    Influenza, AFLURIA (age 1 yrs+), FLUZONE, (age 10 mo+), MDV, 0.5mL 10/25/2016, 10/05/2017    Influenza, FLUZONE (age 72 y+), High Dose, 0.7mL 10/08/2021    Influenza, High Dose (Fluzone 65 yrs and older) 10/11/2018, 10/08/2021    Influenza, Triv, inactivated, subunit, adjuvanted, IM (Fluad 65 yrs and older) 09/25/2019, 09/24/2020    Pneumococcal Conjugate 13-valent ([id number]) 10/11/2018    Pneumococcal Polysaccharide ([id number]) 10/04/2012, 09/24/2020    Tdap (Boostrix, Adacel) 04/03/2015    Zoster Live (Zostavax) 04/03/2015    Zoster Recombinant (Shingrix) 10/20/2020, 01/22/2021           Orders Placed This Encounter   Procedures    Protime-INR     This external order was created through the results console. No orders of the defined types were placed in this encounter. Reviewed AVS with patient / caregiver.     Billing Points:  0 billing points this visit       CLINICAL PHARMACY CONSULT: MED RECONCILIATION/REVIEW ADDENDUM    For Pharmacy Admin Tracking Only    Intervention Detail:   Total # of Interventions Recommended: 0  Total # of

## 2022-10-11 LAB — PROSTATE SPECIFIC ANTIGEN: 2.7 NG/ML (ref 0–4)

## 2022-10-19 ENCOUNTER — ANTI-COAG VISIT (OUTPATIENT)
Dept: PHARMACY | Age: 72
End: 2022-10-19
Payer: MEDICARE

## 2022-10-19 ENCOUNTER — OFFICE VISIT (OUTPATIENT)
Dept: FAMILY MEDICINE CLINIC | Age: 72
End: 2022-10-19
Payer: MEDICARE

## 2022-10-19 VITALS
OXYGEN SATURATION: 98 % | SYSTOLIC BLOOD PRESSURE: 114 MMHG | WEIGHT: 212 LBS | HEIGHT: 71 IN | DIASTOLIC BLOOD PRESSURE: 76 MMHG | HEART RATE: 72 BPM | BODY MASS INDEX: 29.68 KG/M2

## 2022-10-19 DIAGNOSIS — E11.22 CKD STAGE 3 DUE TO TYPE 2 DIABETES MELLITUS (HCC): ICD-10-CM

## 2022-10-19 DIAGNOSIS — Z99.89 OSA ON CPAP: ICD-10-CM

## 2022-10-19 DIAGNOSIS — E78.2 MIXED HYPERLIPIDEMIA: ICD-10-CM

## 2022-10-19 DIAGNOSIS — E55.9 VITAMIN D DEFICIENCY: ICD-10-CM

## 2022-10-19 DIAGNOSIS — Z79.01 WARFARIN ANTICOAGULATION: ICD-10-CM

## 2022-10-19 DIAGNOSIS — N18.30 CKD STAGE 3 DUE TO TYPE 2 DIABETES MELLITUS (HCC): ICD-10-CM

## 2022-10-19 DIAGNOSIS — Z00.00 MEDICARE ANNUAL WELLNESS VISIT, SUBSEQUENT: Primary | ICD-10-CM

## 2022-10-19 DIAGNOSIS — N40.0 BENIGN PROSTATIC HYPERPLASIA, UNSPECIFIED WHETHER LOWER URINARY TRACT SYMPTOMS PRESENT: ICD-10-CM

## 2022-10-19 DIAGNOSIS — E79.0 HYPERURICEMIA: ICD-10-CM

## 2022-10-19 DIAGNOSIS — Z95.2 S/P AVR (AORTIC VALVE REPLACEMENT): ICD-10-CM

## 2022-10-19 DIAGNOSIS — F32.5 MAJOR DEPRESSIVE DISORDER IN FULL REMISSION, UNSPECIFIED WHETHER RECURRENT (HCC): ICD-10-CM

## 2022-10-19 DIAGNOSIS — I25.10 CORONARY ARTERY DISEASE INVOLVING NATIVE CORONARY ARTERY OF NATIVE HEART WITHOUT ANGINA PECTORIS: ICD-10-CM

## 2022-10-19 DIAGNOSIS — E11.9 DIABETES MELLITUS TYPE 2 IN NONOBESE (HCC): ICD-10-CM

## 2022-10-19 DIAGNOSIS — E72.11 HOMOCYSTINURIA (HCC): ICD-10-CM

## 2022-10-19 DIAGNOSIS — Z95.2 S/P AVR (AORTIC VALVE REPLACEMENT): Primary | ICD-10-CM

## 2022-10-19 DIAGNOSIS — E03.9 ACQUIRED HYPOTHYROIDISM: ICD-10-CM

## 2022-10-19 DIAGNOSIS — Z95.2 MECHANICAL HEART VALVE PRESENT: ICD-10-CM

## 2022-10-19 DIAGNOSIS — I10 ESSENTIAL HYPERTENSION: ICD-10-CM

## 2022-10-19 DIAGNOSIS — G47.33 OSA ON CPAP: ICD-10-CM

## 2022-10-19 LAB — INTERNATIONAL NORMALIZATION RATIO, POC: 3.7

## 2022-10-19 PROCEDURE — 99211 OFF/OP EST MAY X REQ PHY/QHP: CPT

## 2022-10-19 PROCEDURE — G8484 FLU IMMUNIZE NO ADMIN: HCPCS | Performed by: FAMILY MEDICINE

## 2022-10-19 PROCEDURE — 3044F HG A1C LEVEL LT 7.0%: CPT | Performed by: FAMILY MEDICINE

## 2022-10-19 PROCEDURE — G0008 ADMIN INFLUENZA VIRUS VAC: HCPCS | Performed by: FAMILY MEDICINE

## 2022-10-19 PROCEDURE — 85610 PROTHROMBIN TIME: CPT

## 2022-10-19 PROCEDURE — G0439 PPPS, SUBSEQ VISIT: HCPCS | Performed by: FAMILY MEDICINE

## 2022-10-19 PROCEDURE — 3017F COLORECTAL CA SCREEN DOC REV: CPT | Performed by: FAMILY MEDICINE

## 2022-10-19 PROCEDURE — 1123F ACP DISCUSS/DSCN MKR DOCD: CPT | Performed by: FAMILY MEDICINE

## 2022-10-19 PROCEDURE — 90694 VACC AIIV4 NO PRSRV 0.5ML IM: CPT | Performed by: FAMILY MEDICINE

## 2022-10-19 RX ORDER — WARFARIN SODIUM 7.5 MG/1
TABLET ORAL
Qty: 90 TABLET | Refills: 2 | Status: SHIPPED | OUTPATIENT
Start: 2022-10-19

## 2022-10-19 RX ORDER — TAMSULOSIN HYDROCHLORIDE 0.4 MG/1
0.4 CAPSULE ORAL EVERY OTHER DAY
COMMUNITY

## 2022-10-19 ASSESSMENT — PATIENT HEALTH QUESTIONNAIRE - PHQ9
SUM OF ALL RESPONSES TO PHQ QUESTIONS 1-9: 0
4. FEELING TIRED OR HAVING LITTLE ENERGY: 0
9. THOUGHTS THAT YOU WOULD BE BETTER OFF DEAD, OR OF HURTING YOURSELF: 0
8. MOVING OR SPEAKING SO SLOWLY THAT OTHER PEOPLE COULD HAVE NOTICED. OR THE OPPOSITE, BEING SO FIGETY OR RESTLESS THAT YOU HAVE BEEN MOVING AROUND A LOT MORE THAN USUAL: 0
5. POOR APPETITE OR OVEREATING: 0
SUM OF ALL RESPONSES TO PHQ QUESTIONS 1-9: 0
3. TROUBLE FALLING OR STAYING ASLEEP: 0
SUM OF ALL RESPONSES TO PHQ QUESTIONS 1-9: 0
10. IF YOU CHECKED OFF ANY PROBLEMS, HOW DIFFICULT HAVE THESE PROBLEMS MADE IT FOR YOU TO DO YOUR WORK, TAKE CARE OF THINGS AT HOME, OR GET ALONG WITH OTHER PEOPLE: 0
SUM OF ALL RESPONSES TO PHQ QUESTIONS 1-9: 0
7. TROUBLE CONCENTRATING ON THINGS, SUCH AS READING THE NEWSPAPER OR WATCHING TELEVISION: 0
SUM OF ALL RESPONSES TO PHQ9 QUESTIONS 1 & 2: 0
2. FEELING DOWN, DEPRESSED OR HOPELESS: 0
6. FEELING BAD ABOUT YOURSELF - OR THAT YOU ARE A FAILURE OR HAVE LET YOURSELF OR YOUR FAMILY DOWN: 0
1. LITTLE INTEREST OR PLEASURE IN DOING THINGS: 0

## 2022-10-19 ASSESSMENT — LIFESTYLE VARIABLES
HOW MANY STANDARD DRINKS CONTAINING ALCOHOL DO YOU HAVE ON A TYPICAL DAY: 1 OR 2
HOW OFTEN DO YOU HAVE A DRINK CONTAINING ALCOHOL: 2-4 TIMES A MONTH

## 2022-10-19 NOTE — PROGRESS NOTES
Medicare Annual Wellness Visit    Moe Shankar is here for Medicare AWV (AWV)    Assessment & Plan    Diagnosis Orders   1. Medicare annual wellness visit, subsequent        2. Essential hypertension        3. Homocystinuria ([facility]        4. Major depressive disorder in full remission, unspecified whether recurrent ([facility]        5. CKD stage 3 due to type 2 diabetes mellitus ([facility]        6. Diabetes mellitus type 2 in nonobese (HCC)        7. S/P AVR (aortic valve replacement)        8. Acquired hypothyroidism        9. JOSÉ MANUEL on CPAP        10. Mixed hyperlipidemia        11. Hyperuricemia        12. Coronary artery disease involving native coronary artery of native heart without angina pectoris        13. Mechanical heart valve present        14. Warfarin anticoagulation        15. Vitamin D deficiency        16. Benign prostatic hyperplasia, unspecified whether lower urinary tract symptoms present        F/u routinely - - endo, dr. Nasra Cline, nephrology, cardiology - dr. Kye Woo, sees podiatry - dr. Bevin Ahumada routinely  Cont asa, toprol, statin for cad  Warfarin management at coumadin clinic  Bp well controlled  Hydrate well - f/u urology routinely w/ hx of prostate cancer s/p XRT  Recent labs reviewed - tg up somewhat at 216 o/w good lipid  Psa 10/22 2.7  A1c 6.9%  Tsh 8.32  Gfr 46  Hx of perforated bowel colonoscopy 2014 - normal 2004 - no sxs - due again 2024  On vit d weekly, k2 daily, mg daily for leg cramps  Utd on vaccines - covid booster     Recommendations for Preventive Services Due: see orders and patient instructions/AVS.  Recommended screening schedule for the next 5-10 years is provided to the patient in written form: see Patient Instructions/AVS.          Subjective   S/p xrt for prostate cancer  Slight bump in psa - \"benign bounce\" - tolerated cryoknife well. On flomax every other day to maintain stream.  Seen by oncology and endo today - plan to bump thyroid dose.   Jardiance/ metformin and starlix. Vision stable - routine eye exam - dr. Hendricks Sol  No neuropathy - sees podiatry regularly  Using cpap at night - using 8h/ night - seems to be working well  Reviewed 2/22 cardio note - mechanical AV - warfarin via coumadin clinic - stable at present dose - normal ef  Inr stable - no angina  Walks around a lot but no formal exercise at gym. Diet not real good - fruits good -   Memory seems okay generally - retired. Hearing good. Patient's complete Health Risk Assessment and screening values have been reviewed and are found in Flowsheets. The following problems were reviewed today and where indicated follow up appointments were made and/or referrals ordered. Positive Risk Factor Screenings with Interventions:                  No Positive Risk Factors identified today. Objective   Vitals:    10/19/22 1500   BP: 114/76   Site: Left Upper Arm   Position: Sitting   Cuff Size: Medium Adult   Pulse: 72   SpO2: 98%   Weight: 212 lb (96.2 kg)   Height: 5' 11\" (1.803 m)      Body mass index is 29.57 kg/m². Pe -nad  Cv rrr  Lungs cta      Allergies   Allergen Reactions    Ciprofloxacin Hives     Prior to Visit Medications    Medication Sig Taking?  Authorizing Provider   tamsulosin (FLOMAX) 0.4 MG capsule Take 0.4 mg by mouth every other day Yes Historical Provider, MD   warfarin (COUMADIN) 7.5 MG tablet Take 7.5mg daily or as directed by [facility] [phone] Yes Archana Fajardo MD   allopurinol (ZYLOPRIM) 300 MG tablet Take 300 mg by mouth every morning Patient taking 150 mg daily Yes Historical Provider, MD   amoxicillin (AMOXIL) 500 MG capsule Take 4 capsules by mouth See Admin Instructions Yes Historical Provider, MD   vitamin D (CHOLECALCIFEROL) 125 MCG (5000 UT) CAPS capsule Take 1 capsule by mouth once a week Yes Historical Provider, MD   B-DUNCAN ULTRAFINE III SHORT PEN 31G X 8 MM MISC 1 each by In Vitro route daily Yes Historical Provider, MD   Folinic Acid-Vit B6-Vit B12 4-50-2 MG TABS Take 1 tablet by mouth daily Yes Historical Provider, MD   empagliflozin (JARDIANCE) 10 MG tablet Take 10 mg by mouth daily Yes Historical Provider, MD   nateglinide (STARLIX) 120 MG tablet Take 120 mg by mouth as needed (for a high carb meal) Yes Historical Provider, MD   lisinopril (PRINIVIL;ZESTRIL) 5 MG tablet Take 5 mg by mouth nightly Yes Historical Provider, MD   levothyroxine (SYNTHROID) 112 MCG tablet Take 112 mcg by mouth Daily  Yes Historical Provider, MD   Niacin ER 1000 MG TBCR Take 1,000 mg by mouth nightly Yes Historical Provider, MD   CPAP Machine MISC by Does not apply route CPAP 7 cwp, travel device with humidifier Yes Tammi Camarena MD   aspirin 81 MG EC tablet Take 1 tablet by mouth nightly Yes Mary Ellen Jang MD   dulaglutide (TRULICITY) 1.5 JY/6.9BR SC injection Inject 1.5 mg into the skin every 7 days Yes Historical Provider, MD   TOUJEO SOLOSTAR 300 UNIT/ML injection pen Inject 40 Units into the skin daily  Patient taking differently: Inject 20 Units into the skin daily Yes Maria Teresa Todd MD   rosuvastatin (CRESTOR) 40 MG tablet Take 1 tablet by mouth every evening Yes Maria Teresa Todd MD   ZETIA 10 MG tablet TAKE 1 TABLET DAILY Yes MARITA Bonilla CNP   acetaminophen (TYLENOL) 325 MG tablet Take 2 tablets by mouth every 4 hours as needed for Pain Yes MARITA Campos CNP   mirtazapine (REMERON) 30 MG tablet TAKE 1 TABLET BY MOUTH EVERY DAY EVERY NIGHT  MARITA Yen CNP   magnesium oxide (MAG-OX) 400 MG tablet Take 400 mg by mouth daily  Historical Provider, MD   Menatetrenone (VITAMIN K2) 100 MCG TABS Take 100 mcg by mouth daily  Historical Provider, MD   metoprolol succinate (TOPROL XL) 25 MG extended release tablet Take 3 tablets by mouth 2 times daily 3 tabs po BID  Patient taking differently: Take 75 mg by mouth 2 times daily   Maria Teresa Todd MD       CareTeam (Including outside providers/suppliers regularly involved in providing care): Patient Care Team:  Gloria Cline MD as PCP - Orlando Barker MD as PCP - [facility]  Dameon Schmitt MD as Consulting Physician (Endocrinology)  Gonzalo Olson MD as Surgeon (Orthopedic Surgery)  Leighann Singh DPM as Consulting Physician (Podiatry)  Neel Dunlap MD as Consulting Physician (Nephrology)  Edilberto Black MD as Consulting Physician (Pulmonary Disease)  Yanique Barriga MD as Consulting Physician (Cardiology)     Reviewed and updated this visit:  Tobacco  Allergies  Meds  Med Hx  Surg Hx  Soc Hx  Fam Hx

## 2022-10-19 NOTE — PATIENT INSTRUCTIONS
Personalized Preventive Plan for Wade Potts - 10/19/2022  Medicare offers a range of preventive health benefits. Some of the tests and screenings are paid in full while other may be subject to a deductible, co-insurance, and/or copay. Some of these benefits include a comprehensive review of your medical history including lifestyle, illnesses that may run in your family, and various assessments and screenings as appropriate. After reviewing your medical record and screening and assessments performed today your provider may have ordered immunizations, labs, imaging, and/or referrals for you. A list of these orders (if applicable) as well as your Preventive Care list are included within your After Visit Summary for your review. Other Preventive Recommendations:    A preventive eye exam performed by an eye specialist is recommended every 1-2 years to screen for glaucoma; cataracts, macular degeneration, and other eye disorders. A preventive dental visit is recommended every 6 months. Try to get at least 150 minutes of exercise per week or 10,000 steps per day on a pedometer . Order or download the FREE \"Exercise & Physical Activity: Your Everyday Guide\" from [employer]. Call 7-315.476.1033 or search [employer] online. You need 9845-7851 mg of calcium and 6674-6474 IU of vitamin D per day. It is possible to meet your calcium requirement with diet alone, but a vitamin D supplement is usually necessary to meet this goal.  When exposed to the sun, use a sunscreen that protects against both UVA and UVB radiation with an SPF of 30 or greater. Reapply every 2 to 3 hours or after sweating, drying off with a towel, or swimming. Always wear a seat belt when traveling in a car. Always wear a helmet when riding a bicycle or motorcycle.

## 2022-10-19 NOTE — PROGRESS NOTES
Yolette Tse is a 73 y.o. here for warfarin management. Edwin Freeman had an INR test today. Results were reviewed and appropriate warfarin management was completed. This visit was performed as: An in person visit. Protocols were followed with precautions to reduce the spread of COVID-19. Patient verifies current warfarin dosing regimen: Yes     Warfarin medication reviewed and updated on the patient 's home medication list: Yes   All other medications reviewed and updated on the patient 's home medication list: Yes     Lab Results   Component Value Date    INR 3.7 10/19/2022    INR 2.60 09/14/2022    INR 3.20 08/17/2022           Anticoagulation Summary  As of 10/19/2022      INR goal:  2.5-3.5   TTR:  73.5 % (6 y)   INR used for dosing:  3.7 (10/19/2022)   Warfarin maintenance plan:  7.5 mg (7.5 mg x 1) every day   Weekly warfarin total:  52.5 mg   Plan last modified:  [facility] (5/4/2022)   Next INR check:  11/16/2022   Priority:  Maintenance   Target end date: Indefinite    Indications    S/P AVR (aortic valve replacement) [Z95.2]                 Anticoagulation Episode Summary       INR check location:      Preferred lab:      Send INR reminders to:  [facility]GEMENT [facility] STAFF    Comments:  EPIC          Anticoagulation Care Providers       Provider Role Specialty Phone number    Daren Mallory MD Referring Cardiology [phone]            Warfarin assessment / plan:     Appears well  Supra-therapeutic INR. Denies signs and symptoms of bleeding/bruising. Medication changes.: Now taking tamsulosin 0.4mg every other day. This should not interact with his warfarin. 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 He may have had more alcohol than he generally does. Recent illness, fever or diarrhea: He has been dealing with cold symptoms over the past 2 weeks. He has been taking Nyquil/Dayquil for his symptoms.     His INR is a bit elevated today, possibly due to recent viral illness or increased alcohol intake. He denies any unusual bruising or bleeding. For INR of 3.7, we will have him take a reduced dose of warfarin 3.75mg today and then resume his previous dosing. He will return in 4 weeks for his next INR. A new prescription was sent to his pharmacy, as the previous quantity was insufficient each time it was refilled. [facility] to [address] delivery phone [phone]  Coumadin 7.5mg tablets  #90 tablets, UD, 2RF  Physician :Lennox Waterman       Description    Take warfarin 3.75mg(1/2 tablet) TODAY ONLY then  CONTINUE: Warfarin 7.5 mg daily    Call [phone] with signs or symptoms of bleeding or ANY medication changes (including over-the-counter medications or herbal supplements). If significant bleeding occurs please seek immediate medical attention. Keep the number of servings of vitamin K containing foods (dark green, leafy vegetables) the same each week. Please call if this changes. Limit alcohol intake. Please call if this changes.           Immunization History   Administered Date(s) Administered    COVID-19, MODERNA BLUE border, Primary or Immunocompromised, (age 12y+), IM, 100 mcg/0.5mL 02/18/2021, 03/18/2021    COVID-19, PFIZER GRAY top, DO NOT Dilute, (age 15 y+), IM, 30 mcg/0.3 mL 05/04/2022    COVID-19, PFIZER PURPLE top, DILUTE for use, (age 15 y+), 30mcg/0.3mL 11/23/2021    Influenza 11/01/2015    Influenza Vaccine, unspecified formulation 10/05/2017    Influenza Virus Vaccine 10/04/2011, 10/04/2012    Influenza, AFLURIA (age 1 yrs+), FLUZONE, (age 10 mo+), MDV, 0.5mL 10/25/2016, 10/05/2017    Influenza, FLUZONE (age 72 y+), High Dose, 0.7mL 10/08/2021    Influenza, High Dose (Fluzone 65 yrs and older) 10/11/2018, 10/08/2021    Influenza, Triv, inactivated, subunit, adjuvanted, IM (Fluad 65 yrs and older) 09/25/2019, 09/24/2020    Pneumococcal Conjugate 13-valent (Andie Plane) 10/11/2018

## 2022-11-01 NOTE — TELEPHONE ENCOUNTER
Last OV: 10/19/22 Medicare Annual   Return in 1 year (on 10/19/2023) for Medicare Annual Wellness Visit in 1 year.     Next OV: 10/20/2023 Medicare Wellness

## 2022-11-02 ENCOUNTER — TELEPHONE (OUTPATIENT)
Dept: PHARMACY | Age: 72
End: 2022-11-02

## 2022-11-02 RX ORDER — MIRTAZAPINE 30 MG/1
TABLET, FILM COATED ORAL
Qty: 90 TABLET | Refills: 0 | Status: SHIPPED | OUTPATIENT
Start: 2022-11-02

## 2022-11-02 NOTE — TELEPHONE ENCOUNTER
Ennis Claude reports he started a new medication Synjardy. The metformin in this medication may decrease the INR. I dod not believe the change will be that significant. Last INR elevated and we held a dose and returned to the same weekly dose. Dose change to his levothyroxine. Do not expect this to affect his INR. No change to warfarin therapy today. F/u 11/16 as planned and we will adjust accordingly at that visit. Jerilyn Winston, PharmD, [address] [place] [address]  [phone]    For Pharmacy Admin Tracking Only    Intervention Detail:   Total # of Interventions Recommended:    Total # of Interventions Accepted:   Time Spent (min): 10

## 2022-11-16 ENCOUNTER — ANTI-COAG VISIT (OUTPATIENT)
Dept: PHARMACY | Age: 72
End: 2022-11-16
Payer: MEDICARE

## 2022-11-16 DIAGNOSIS — Z95.2 S/P AVR (AORTIC VALVE REPLACEMENT): Primary | ICD-10-CM

## 2022-11-16 LAB — INTERNATIONAL NORMALIZATION RATIO, POC: 3.5

## 2022-11-16 PROCEDURE — 85610 PROTHROMBIN TIME: CPT

## 2022-11-16 PROCEDURE — 99211 OFF/OP EST MAY X REQ PHY/QHP: CPT

## 2022-11-16 RX ORDER — EMPAGLIFLOZIN, METFORMIN HYDROCHLORIDE 25; 1000 MG/1; MG/1
1 TABLET, EXTENDED RELEASE ORAL 2 TIMES DAILY
COMMUNITY

## 2022-11-16 NOTE — PROGRESS NOTES
Raven Pool is a 73 y.o. here for warfarin management. Marleny Gutierrez had an INR test today. Results were reviewed and appropriate warfarin management was completed. This visit was performed as: An in person visit. Protocols were followed with precautions to reduce the spread of COVID-19. Patient verifies current warfarin dosing regimen: Yes     Warfarin medication reviewed and updated on the patient 's home medication list: Yes   All other medications reviewed and updated on the patient 's home medication list: Yes     Lab Results   Component Value Date    INR 3.5 11/16/2022    INR 3.7 10/19/2022    INR 2.60 09/14/2022       Patient Findings       Positives:  Change in medications    Negatives:  Signs/symptoms of thrombosis, Signs/symptoms of bleeding, Change in diet/appetite, Bruising    Comments:  Changed Jardiance to Synjardy - the metformin may decrease the INR. Anticoagulation Summary  As of 11/16/2022      INR goal:  2.5-3.5   TTR:  72.6 % (6 y)   INR used for dosing:  3.5 (11/16/2022)   Warfarin maintenance plan:  7.5 mg (7.5 mg x 1) every day; Starting 11/16/2022   Weekly warfarin total:  52.5 mg   Plan last modified:  Karmen Clarke [facility] (5/4/2022)   Next INR check:  12/14/2022   Priority:  Maintenance   Target end date: Indefinite    Indications    S/P AVR (aortic valve replacement) [Z95.2]                 Anticoagulation Episode Summary       INR check location:      Preferred lab:      Send INR reminders to:  [facility]    Comments:  EPIC          Anticoagulation Care Providers       Provider Role Specialty Phone number    Yanique Barriga MD Referring Cardiology [phone]            There were no vitals taken for this visit. Warfarin assessment / plan:     Appears well. No acute findings. INR within goal range. No change to warfarin dosing today.      Description    CONTINUE: Warfarin 7.5 mg daily    Call [phone] with signs or symptoms of bleeding or ANY medication changes (including over-the-counter medications or herbal supplements). If significant bleeding occurs please seek immediate medical attention. Keep the number of servings of vitamin K containing foods (dark green, leafy vegetables) the same each week. Please call if this changes. Limit alcohol intake. Please call if this changes. Immunization History   Administered Date(s) Administered    COVID-19, MODERNA BLUE border, Primary or Immunocompromised, (age 12y+), IM, 100 mcg/0.5mL 02/18/2021, 03/18/2021    COVID-19, PFIZER Bivalent BOOSTER, (age 12y+), IM, 30 mcg/0.3 mL dose 11/01/2022    COVID-19, PFIZER GRAY top, DO NOT Dilute, (age 15 y+), IM, 30 mcg/0.3 mL 05/04/2022    COVID-19, PFIZER PURPLE top, DILUTE for use, (age 15 y+), 30mcg/0.3mL 11/23/2021    Influenza 11/01/2015    Influenza Vaccine, unspecified formulation 10/05/2017    Influenza Virus Vaccine 10/04/2011, 10/04/2012    Influenza, AFLURIA (age 1 yrs+), FLUZONE, (age 10 mo+), MDV, 0.5mL 10/25/2016, 10/05/2017    Influenza, FLUAD, (age 72 y+), Adjuvanted, 0.5mL 10/19/2022    Influenza, FLUZONE (age 72 y+), High Dose, 0.7mL 10/08/2021    Influenza, High Dose (Fluzone 65 yrs and older) 10/11/2018, 10/08/2021    Influenza, Triv, inactivated, subunit, adjuvanted, IM (Fluad 65 yrs and older) 09/25/2019, 09/24/2020    Pneumococcal Conjugate 13-valent ([id number]) 10/11/2018    Pneumococcal Polysaccharide ([id number]) 10/04/2012, 09/24/2020    Tdap (Boostrix, Adacel) 04/03/2015    Zoster Live (Zostavax) 04/03/2015    Zoster Recombinant (Shingrix) 10/20/2020, 01/22/2021           Orders Placed This Encounter   Procedures    POCT INR     This external order was created through the results console. No orders of the defined types were placed in this encounter. Reviewed AVS with patient / caregiver.     Billing Points:  0 billing points this visit       CLINICAL PHARMACY CONSULT: MED RECONCILIATION/REVIEW ADDENDUM    For Pharmacy Admin Tracking Only    Intervention Detail:   Total # of Interventions Recommended: 0  Total # of Interventions Accepted: 0  Time Spent (min): 15

## 2022-12-14 ENCOUNTER — ANTI-COAG VISIT (OUTPATIENT)
Dept: PHARMACY | Age: 72
End: 2022-12-14
Payer: MEDICARE

## 2022-12-14 DIAGNOSIS — Z95.2 S/P AVR (AORTIC VALVE REPLACEMENT): Primary | ICD-10-CM

## 2022-12-14 LAB — INR BLD: 4.3

## 2022-12-14 PROCEDURE — 99211 OFF/OP EST MAY X REQ PHY/QHP: CPT

## 2022-12-14 PROCEDURE — 85610 PROTHROMBIN TIME: CPT

## 2022-12-14 NOTE — PROGRESS NOTES
normal dose 7.5 mg (1 tablet) daily except 3.75 mg (1/2 tablet) every Thursday     Description    CONTINUE: Warfarin 7.5 mg (1 tablet) daily except 3.75 mg (1/2 tablet) every Thursday     Call [phone] with signs or symptoms of bleeding or ANY medication changes (including over-the-counter medications or herbal supplements). If significant bleeding occurs please seek immediate medical attention. Keep the number of servings of vitamin K containing foods (dark green, leafy vegetables) the same each week. Please call if this changes. Limit alcohol intake. Please call if this changes.           Immunization History   Administered Date(s) Administered    COVID-19, MODERNA BLUE border, Primary or Immunocompromised, (age 12y+), IM, 100 mcg/0.5mL 02/18/2021, 03/18/2021    COVID-19, PFIZER Bivalent BOOSTER, (age 12y+), IM, 30 mcg/0.3 mL dose 11/01/2022    COVID-19, PFIZER GRAY top, DO NOT Dilute, (age 15 y+), IM, 30 mcg/0.3 mL 05/04/2022    COVID-19, PFIZER PURPLE top, DILUTE for use, (age 15 y+), 30mcg/0.3mL 11/23/2021    Influenza 11/01/2015    Influenza Vaccine, unspecified formulation 10/05/2017    Influenza Virus Vaccine 10/04/2011, 10/04/2012    Influenza, AFLURIA (age 1 yrs+), FLUZONE, (age 10 mo+), MDV, 0.5mL 10/25/2016, 10/05/2017    Influenza, FLUAD, (age 72 y+), Adjuvanted, 0.5mL 10/19/2022    Influenza, FLUZONE (age 72 y+), High Dose, 0.7mL 10/08/2021    Influenza, High Dose (Fluzone 65 yrs and older) 10/11/2018, 10/08/2021    Influenza, Triv, inactivated, subunit, adjuvanted, IM (Fluad 65 yrs and older) 09/25/2019, 09/24/2020    Pneumococcal Conjugate 13-valent ([id number]) 10/11/2018    Pneumococcal Polysaccharide ([id number]) 10/04/2012, 09/24/2020    Tdap (Boostrix, Adacel) 04/03/2015    Zoster Live (Zostavax) 04/03/2015    Zoster Recombinant (Shingrix) 10/20/2020, 01/22/2021           Orders Placed This Encounter   Procedures    Protime-INR     This external order was created through the results console. No orders of the defined types were placed in this encounter. Reviewed AVS with patient / caregiver.     Billing Points:  Adjust dosage and/or reconcile meds (fill pill box) </= 5 medications - 2 points       CLINICAL PHARMACY CONSULT: MED RECONCILIATION/REVIEW ADDENDUM    For Pharmacy Admin Tracking Only    Intervention Detail: Dose Adjustment: 1, reason: Therapy De-escalation  Total # of Interventions Recommended: 1  Total # of Interventions Accepted: 1  Time Spent (min): 15

## 2022-12-27 ENCOUNTER — OFFICE VISIT (OUTPATIENT)
Dept: FAMILY MEDICINE CLINIC | Age: 72
End: 2022-12-27
Payer: MEDICARE

## 2022-12-27 VITALS
SYSTOLIC BLOOD PRESSURE: 128 MMHG | DIASTOLIC BLOOD PRESSURE: 70 MMHG | HEIGHT: 71 IN | BODY MASS INDEX: 29.82 KG/M2 | OXYGEN SATURATION: 99 % | HEART RATE: 74 BPM | WEIGHT: 213 LBS

## 2022-12-27 DIAGNOSIS — M79.89 SWELLING OF THIGH: ICD-10-CM

## 2022-12-27 DIAGNOSIS — M79.652 LEFT THIGH PAIN: Primary | ICD-10-CM

## 2022-12-27 PROCEDURE — 1123F ACP DISCUSS/DSCN MKR DOCD: CPT | Performed by: FAMILY MEDICINE

## 2022-12-27 PROCEDURE — G8484 FLU IMMUNIZE NO ADMIN: HCPCS | Performed by: FAMILY MEDICINE

## 2022-12-27 PROCEDURE — G8428 CUR MEDS NOT DOCUMENT: HCPCS | Performed by: FAMILY MEDICINE

## 2022-12-27 PROCEDURE — G8417 CALC BMI ABV UP PARAM F/U: HCPCS | Performed by: FAMILY MEDICINE

## 2022-12-27 PROCEDURE — 3078F DIAST BP <80 MM HG: CPT | Performed by: FAMILY MEDICINE

## 2022-12-27 PROCEDURE — 1036F TOBACCO NON-USER: CPT | Performed by: FAMILY MEDICINE

## 2022-12-27 PROCEDURE — 3074F SYST BP LT 130 MM HG: CPT | Performed by: FAMILY MEDICINE

## 2022-12-27 PROCEDURE — 99213 OFFICE O/P EST LOW 20 MIN: CPT | Performed by: FAMILY MEDICINE

## 2022-12-27 PROCEDURE — 3017F COLORECTAL CA SCREEN DOC REV: CPT | Performed by: FAMILY MEDICINE

## 2022-12-27 NOTE — PROGRESS NOTES
[facility] Medicine  Clinic Note    Date: 12/27/2022                                               Subjective:     Chief Complaint   Patient presents with    Pain     3 MO PAIN FOLLOW UP      HPI    Pain in upper thigh w/ swelling left thigh -nki  Swollen 17 to 19.5 inches in diameter  Nothing to suggest reason for this - last inr high - [place]. Started 10 days ago - woke w/ pain anterior upper thigh - but persisting  Swelling and moving down   Overall doing better = unable to flex knee on left side. No problems w/ hip.   Skipped full tab and 1/2 tab next day - goal is 2.5 to 3.5 INR  On metformin/ jardiance combination for dm control       Patient Active Problem List    Diagnosis Date Noted    Nonrheumatic aortic valve stenosis 02/26/2014    Bicuspid aortic valve 02/26/2014    Diabetes mellitus type 2 in nonobese ([address] 12/09/2011    Essential hypertension 12/09/2011    Homocystinuria ([facility] [address] [address] 10/19/2022    Major depressive disorder in full remission, unspecified whether recurrent ([address] 09/28/2021    CKD stage 3 due to type 2 diabetes mellitus ([facility] [address] 09/28/2021    JOSÉ MANUEL on CPAP 07/10/2019    S/P AVR (aortic valve replacement)     Coronary artery disease involving native coronary artery of native heart without angina pectoris 08/31/2016    Benign prostatic hyperplasia 07/08/2016    Mechanical heart valve present 01/01/2016    Warfarin anticoagulation 01/01/2016    Hyperuricemia 10/19/2015    Mixed hyperlipidemia 10/19/2015    Colitis 05/07/2015    Aia 16 DJD(carpometacarpal degenerative joint disease), localized primary 08/11/2014    Hypogonadism male 01/03/2014    Homocysteinemia 12/23/2013    Vitamin D deficiency 03/22/2013    Type II or unspecified type diabetes mellitus with renal manifestations, uncontrolled(250.42) 01/03/2012    Hypothyroid 12/09/2011    Kidney stones 12/09/2011    Disturbance in sleep behavior 02/08/2006     Past Medical History:   Diagnosis Date    Aortic stenosis 9/2013 moderate    Arthritis     Asthma     BRONCHIAL ASTHMA  AS A CHILD    Deviated septum     Diabetes mellitus type II 2000    Dr. John Bardales thyroiditis dx 1984    medical treatment    Homocysteinemia     Hyperlipidemia     Hypertension     Hypertriglyceridemia     Hypogonadism male     topical testosterone     Hypothyroid 12/9/2011    Kidney stone 1990s    Passed on own and also surgical excision     Mechanical heart valve present 2016    Murmur since 2005    Nasal polyps     Osteoarthritis     Right thumb     Prolonged emergence from general anesthesia     Sleep apnea 2005    CPAP nightly    Warfarin anticoagulation 2016    Wears glasses      Past Surgical History:   Procedure Laterality Date    AORTIC VALVE REPLACEMENT  09/14/2016    21mm [facility]    COLECTOMY  1996    perforated bowel: colonoscopy    COLONOSCOPY  9/24/2014; 2004    x2 normal colonoscopies/ perforated bowel    CYST REMOVAL      hairy nevus left biceps as child    KNEE SURGERY Left 2004    torn meniscus     NASAL POLYP SURGERY  1989, 1987    OTHER SURGICAL HISTORY  1990s    kidney stone removal     ROTATOR CUFF REPAIR Right 2009     Anti-coag visit on 12/14/2022   Component Date Value Ref Range Status    INR 12/14/2022 4.30   Final     Family History   Problem Relation Age of Onset    Heart Disease Father     Kidney Disease Father         was on HD     Arthritis Father     High Blood Pressure Father     High Cholesterol Mother     Stroke Mother     Diabetes Brother     Other Brother     High Cholesterol Brother     Diabetes Other         aunts, uncles/ grandparents    Heart Disease Maternal Grandfather     Cancer Paternal Grandmother         type unknown.      Current Outpatient Medications   Medication Sig Dispense Refill    Empagliflozin-metFORMIN HCl ER (SYNJARDY XR)  MG TB24 Take 1 tablet by mouth 2 times daily      mirtazapine (REMERON) 30 MG tablet TAKE 1 TABLET BY MOUTH EVERY DAY EVERY NIGHT 90 tablet 0    tamsulosin (FLOMAX) 0.4 MG capsule Take 0.4 mg by mouth every other day      warfarin (COUMADIN) 7.5 MG tablet Take 7.5mg daily or as directed by [facility] [phone] (Patient taking differently: Take 7.5mg daily except for 3.75mg Thursdays or as directed by [facility] [phone]) 90 tablet 2    magnesium oxide (MAG-OX) 400 MG tablet Take 400 mg by mouth daily      allopurinol (ZYLOPRIM) 300 MG tablet Take 300 mg by mouth every morning Patient taking 150 mg daily      amoxicillin (AMOXIL) 500 MG capsule Take 4 capsules by mouth See Admin Instructions      vitamin D (CHOLECALCIFEROL) 125 MCG (5000 UT) CAPS capsule Take 1 capsule by mouth once a week      B-D ULTRAFINE III SHORT PEN 31G X 8 MM MISC 1 each by In Vitro route daily      Folinic Acid-Vit B6-Vit B12 4-50-2 MG TABS Take 1 tablet by mouth daily      Menatetrenone (VITAMIN K2) 100 MCG TABS Take 100 mcg by mouth daily      nateglinide (STARLIX) 120 MG tablet Take 120 mg by mouth as needed (for a high carb meal)      lisinopril (PRINIVIL;ZESTRIL) 5 MG tablet Take 5 mg by mouth nightly      levothyroxine (SYNTHROID) 112 MCG tablet Take 112 mcg by mouth Daily       Niacin ER 1000 MG TBCR Take 1,000 mg by mouth nightly      CPAP Machine MISC by Does not apply route CPAP 7 cwp, travel device with humidifier 1 each 0    aspirin 81 MG EC tablet Take 1 tablet by mouth nightly 90 tablet 3    dulaglutide (TRULICITY) 1.5 AU/6.2EA SC injection Inject 1.5 mg into the skin every 7 days      TOUJEO SOLOSTAR 300 UNIT/ML injection pen Inject 40 Units into the skin daily (Patient taking differently: Inject 20 Units into the skin daily) 18 Pen 3    rosuvastatin (CRESTOR) 40 MG tablet Take 1 tablet by mouth every evening 90 tablet 3    ZETIA 10 MG tablet TAKE 1 TABLET DAILY 90 tablet 2    metoprolol succinate (TOPROL XL) 25 MG extended release tablet Take 3 tablets by mouth 2 times daily 3 tabs po BID (Patient taking differently: Take 75 mg by mouth 2 times daily ) 560 tablet 3    acetaminophen (TYLENOL) 325 MG tablet Take 2 tablets by mouth every 4 hours as needed for Pain 120 tablet 3     No current facility-administered medications for this visit. Allergies   Allergen Reactions    Ciprofloxacin Hives       Review of Systems    Objective:  /70 (Site: Left Upper Arm, Position: Sitting, Cuff Size: Medium Adult)   Pulse 74   Ht 5' 11\" (1.803 m)   Wt 213 lb (96.6 kg)   SpO2 99%   BMI 29.71 kg/m²     BP Readings from Last 3 Encounters:   12/27/22 128/70   10/19/22 114/76   02/10/22 114/62       Pulse Readings from Last 3 Encounters:   12/27/22 74   10/19/22 72   02/10/22 73       Wt Readings from Last 3 Encounters:   12/27/22 213 lb (96.6 kg)   10/19/22 212 lb (96.2 kg)   02/10/22 212 lb 9.6 oz (96.4 kg)       Physical Exam  Vitals and nursing note reviewed. Constitutional:       Appearance: He is well-developed. HENT:      Head: Normocephalic and atraumatic. Eyes:      General: No scleral icterus. Conjunctiva/sclera: Conjunctivae normal.   Cardiovascular:      Pulses: Normal pulses. Pulmonary:      Effort: Pulmonary effort is normal.   Musculoskeletal:      Comments: Swelling/ tightness left thigh w/ purple to yellow bruising medial aspect distal thigh/ left knee - no effusion/ ttp knee joint  Pain to palpate lateral distal quad     Skin:     General: Skin is warm and dry. Neurological:      Mental Status: He is alert and oriented to person, place, and time. Assessment/Plan:      Diagnosis Orders   1. Left thigh pain        2. Swelling of thigh            D/w pt diff dx - suspect tear in quad m. Seems to be improving  Continue to work rom  Voltaren gel topically w/ ice prn swelling  RICE  Avoid oral nsaids on warfarin  Goes in 2 days from now for INR check w/ pharmacy clinic  Hold on imaging/ ortho/ PT right now since improving  Let me know back if this doesn't continue to improve.       Katie Osborn MD, MD  12/27/2022  1:12 PM

## 2022-12-29 ENCOUNTER — ANTI-COAG VISIT (OUTPATIENT)
Dept: PHARMACY | Age: 72
End: 2022-12-29
Payer: MEDICARE

## 2022-12-29 DIAGNOSIS — Z95.2 S/P AVR (AORTIC VALVE REPLACEMENT): Primary | ICD-10-CM

## 2022-12-29 LAB — INR BLD: 3.3

## 2022-12-29 PROCEDURE — 99211 OFF/OP EST MAY X REQ PHY/QHP: CPT

## 2022-12-29 PROCEDURE — 85610 PROTHROMBIN TIME: CPT

## 2022-12-29 NOTE — PROGRESS NOTES
Lucinda Rivera is a 81 y.o. here for warfarin management. Darius Nowak had an INR test today. Results were reviewed and appropriate warfarin management was completed. This visit was performed as: An in person visit. Protocols were followed with precautions to reduce the spread of COVID-19. Patient verifies current warfarin dosing regimen: Yes     Warfarin medication reviewed and updated on the patient 's home medication list: Yes   All other medications reviewed and updated on the patient 's home medication list: No: no changes     Lab Results   Component Value Date    INR 3.30 12/29/2022    INR 4.30 12/14/2022    INR 3.5 11/16/2022       Patient Findings       Positives:  Bruising    Negatives:  Signs/symptoms of bleeding, Missed doses, Extra doses, Change in medications, Change in diet/appetite    Comments:  Patient reports having new bruising on his left thigh 12/16. Anticoagulation Summary  As of 12/29/2022      INR goal:  2.5-3.5   TTR:  71.4 % (6.1 y)   INR used for dosing:  3.30 (12/29/2022)   Warfarin maintenance plan:  3.75 mg (7.5 mg x 0.5) every Thu; 7.5 mg (7.5 mg x 1) all other days; Starting 12/29/2022   Weekly warfarin total:  48.75 mg   Plan last modified: Dulce Duque (12/14/2022)   Next INR check:  1/11/2023   Priority:  Maintenance   Target end date: Indefinite    Indications    S/P AVR (aortic valve replacement) [Z95.2]                 Anticoagulation Episode Summary       INR check location:      Preferred lab:      Send INR reminders to:  [facility]    Comments:  EPIC          Anticoagulation Care Providers       Provider Role Specialty Phone number    Don Cooper MD Referring Cardiology [phone]            There were no vitals taken for this visit. Warfarin assessment / plan:     Appears well. No changes affecting warfarin therapy were noted. No acute findings. INR within goal range. No change to warfarin dosing today.      Patient reports new bruising along with pain in his left thigh that began on 12/16. He is not aware of why this happened, as he has had no new injuries. His physician prescribed diclofenac gel and ice to the area. We advised him to give us a call if the bruising worsens. Description    CONTINUE: Warfarin 7.5 mg (1 tablet) daily except 3.75 mg (1/2 tablet) every Thursday     Call [phone] with signs or symptoms of bleeding or ANY medication changes (including over-the-counter medications or herbal supplements). If significant bleeding occurs please seek immediate medical attention. Keep the number of servings of vitamin K containing foods (dark green, leafy vegetables) the same each week. Please call if this changes. Limit alcohol intake. Please call if this changes.           Immunization History   Administered Date(s) Administered    COVID-19, MODERNA BLUE border, Primary or Immunocompromised, (age 12y+), IM, 100 mcg/0.5mL 02/18/2021, 03/18/2021    COVID-19, PFIZER Bivalent BOOSTER, DO NOT Dilute, (age 12y+), IM, 30 mcg/0.3 mL 11/01/2022    COVID-19, PFIZER GRAY top, DO NOT Dilute, (age 15 y+), IM, 30 mcg/0.3 mL 05/04/2022    COVID-19, PFIZER PURPLE top, DILUTE for use, (age 15 y+), 30mcg/0.3mL 11/23/2021    Influenza 11/01/2015    Influenza Vaccine, unspecified formulation 10/05/2017    Influenza Virus Vaccine 10/04/2011, 10/04/2012    Influenza, AFLURIA (age 1 yrs+), FLUZONE, (age 10 mo+), MDV, 0.5mL 10/25/2016, 10/05/2017    Influenza, FLUAD, (age 72 y+), Adjuvanted, 0.5mL 10/19/2022    Influenza, FLUZONE (age 72 y+), High Dose, 0.7mL 10/08/2021    Influenza, High Dose (Fluzone 65 yrs and older) 10/11/2018, 10/08/2021    Influenza, Triv, inactivated, subunit, adjuvanted, IM (Fluad 65 yrs and older) 09/25/2019, 09/24/2020    Pneumococcal Conjugate 13-valent ([id number]) 10/11/2018    Pneumococcal Polysaccharide ([id number]) 10/04/2012, 09/24/2020    Tdap (Boostrix, Adacel) 04/03/2015    Zoster Live (Zostavax) 04/03/2015    Zoster Recombinant (Shingrix) 10/20/2020, 01/22/2021           Orders Placed This Encounter   Procedures    Protime-INR     This external order was created through the results console. No orders of the defined types were placed in this encounter. Reviewed AVS with patient / caregiver.     Billing Points:  0 billing points this visit       CLINICAL PHARMACY CONSULT: MED RECONCILIATION/REVIEW ADDENDUM    For Pharmacy Admin Tracking Only    Time Spent (min): 15

## 2023-01-11 ENCOUNTER — ANTI-COAG VISIT (OUTPATIENT)
Dept: PHARMACY | Age: 73
End: 2023-01-11
Payer: MEDICARE

## 2023-01-11 DIAGNOSIS — Z95.2 S/P AVR (AORTIC VALVE REPLACEMENT): Primary | ICD-10-CM

## 2023-01-11 LAB — INTERNATIONAL NORMALIZATION RATIO, POC: 3.6

## 2023-01-11 PROCEDURE — 85610 PROTHROMBIN TIME: CPT | Performed by: SPEECH-LANGUAGE PATHOLOGIST

## 2023-01-11 PROCEDURE — 99211 OFF/OP EST MAY X REQ PHY/QHP: CPT | Performed by: SPEECH-LANGUAGE PATHOLOGIST

## 2023-01-11 NOTE — PROGRESS NOTES
Douglas Sandoval is a 81 y.o. here for warfarin management. Linwood Gr had an INR test today. Results were reviewed and appropriate warfarin management was completed. This visit was performed as: An in person visit. Protocols were followed with precautions to reduce the spread of COVID-19. Patient verifies current warfarin dosing regimen: Yes     Warfarin medication reviewed and updated on the patient 's home medication list: Yes   All other medications reviewed and updated on the patient 's home medication list: Yes     Lab Results   Component Value Date    INR 3.6 01/11/2023    INR 3.30 12/29/2022    INR 4.30 12/14/2022       Patient Findings       Positives:  Upcoming dental procedure, Change in medications    Negatives:  Signs/symptoms of thrombosis, Signs/symptoms of bleeding, Change in health, Change in alcohol use, Upcoming invasive procedure, Emergency department visit, Missed doses, Extra doses, Change in diet/appetite, Hospital admission, Bruising    Comments:  Increased levothyroxine dose 3 months ago  Synjardy start 3 months ago  Root canal in around a month            Anticoagulation Summary  As of 1/11/2023      INR goal:  2.5-3.5   TTR:  71.3 % (6.2 y)   INR used for dosing:  3.6 (1/11/2023)   Warfarin maintenance plan:  3.75 mg (7.5 mg x 0.5) every Thu; 7.5 mg (7.5 mg x 1) all other days; Starting 1/11/2023   Weekly warfarin total:  48.75 mg   Plan last modified: Oscar Birch (12/14/2022)   Next INR check:  2/1/2023   Priority:  Maintenance   Target end date:   Indefinite    Indications    S/P AVR (aortic valve replacement) [Z95.2]                 Anticoagulation Episode Summary       INR check location:      Preferred lab:      Send INR reminders to:  [facility]    Comments:  EPIC          Anticoagulation Care Providers       Provider Role Specialty Phone number    Shu Leahy MD Referring Cardiology [phone]            There were no vitals taken for this visit. Warfarin assessment / plan:     Appears well  Supra-therapeutic INR. Denies signs and symptoms of bleeding/bruising. Denies extra warfarin doses. Denies increased alcohol intake. Denies change in appetite. Denies illness, fever, vomiting or diarrhea. Denies recent hospitalization / ED visit  Denies decrease in vitamin K intake. Denies changes in physical activity. Denies changes in smoking habits. Denies fluid retention. Medication changes. Patient is supratherapeutic with an INR of 3.7 today. Patient has had changes to his levothyroxine dose and has started empagliflozin/metformin in the last 3 months. Levothyroxine may enhance the anticoagulant effect of warfarin, while metformin may diminish the anticoagulant effect of warfarin. Patient did not endorse any changes to health, diet, or lifestyle to contribute to the increased INR today. Will give a smaller dose TODAY only and continue weekly warfarin regimen. Will see patient again in 3 weeks to reassess INR. Description    Take 3.75 mg (one-half tablet) of warfarin TODAY(1/11) only then:  CONTINUE: Warfarin 7.5 mg (1 tablet) daily except 3.75 mg (1/2 tablet) every Thursday     Call [phone] with signs or symptoms of bleeding or ANY medication changes (including over-the-counter medications or herbal supplements). If significant bleeding occurs please seek immediate medical attention. Keep the number of servings of vitamin K containing foods (dark green, leafy vegetables) the same each week. Please call if this changes. Limit alcohol intake. Please call if this changes.           Immunization History   Administered Date(s) Administered    COVID-19, MODERNA BLUE border, Primary or Immunocompromised, (age 12y+), IM, 100 mcg/0.5mL 02/18/2021, 03/18/2021    COVID-19, PFIZER Bivalent BOOSTER, DO NOT Dilute, (age 12y+), IM, 30 mcg/0.3 mL 11/01/2022    COVID-19, PFIZER GRAY top, DO NOT Dilute, (age 15 y+), IM, 27 mcg/0.3 mL 05/04/2022    COVID-19, PFIZER PURPLE top, DILUTE for use, (age 15 y+), 30mcg/0.3mL 11/23/2021    Influenza 11/01/2015    Influenza Vaccine, unspecified formulation 10/05/2017    Influenza Virus Vaccine 10/04/2011, 10/04/2012    Influenza, AFLURIA (age 1 yrs+), FLUZONE, (age 10 mo+), MDV, 0.5mL 10/25/2016, 10/05/2017    Influenza, FLUAD, (age 72 y+), Adjuvanted, 0.5mL 10/19/2022    Influenza, FLUZONE (age 72 y+), High Dose, 0.7mL 10/08/2021    Influenza, High Dose (Fluzone 65 yrs and older) 10/11/2018, 10/08/2021    Influenza, Triv, inactivated, subunit, adjuvanted, IM (Fluad 65 yrs and older) 09/25/2019, 09/24/2020    Pneumococcal Conjugate 13-valent ([id number]) 10/11/2018    Pneumococcal Polysaccharide (Vcvlzadfk23) 10/04/2012, 09/24/2020    Tdap (Boostrix, Adacel) 04/03/2015    Zoster Live (Zostavax) 04/03/2015    Zoster Recombinant (Shingrix) 10/20/2020, 01/22/2021           Orders Placed This Encounter   Procedures    POCT INR     This external order was created through the results console. No orders of the defined types were placed in this encounter. Reviewed AVS with patient / caregiver.     Billing Points:  Adjust dosage and/or reconcile meds (fill pill box) </= 5 medications - 2 points       CLINICAL PHARMACY CONSULT: MED RECONCILIATION/REVIEW ADDENDUM    For Pharmacy Admin Tracking Only    Intervention Detail: Dose Adjustment: 1, reason: Therapy De-escalation  Total # of Interventions Recommended: 1  Total # of Interventions Accepted: 1  Time Spent (min): 15    Thank you,  Adelina Lesch, PharmD Candidate [id number]

## 2023-01-30 RX ORDER — MIRTAZAPINE 30 MG/1
TABLET, FILM COATED ORAL
Qty: 90 TABLET | Refills: 0 | Status: SHIPPED | OUTPATIENT
Start: 2023-01-30

## 2023-02-02 ENCOUNTER — ANTI-COAG VISIT (OUTPATIENT)
Dept: PHARMACY | Age: 73
End: 2023-02-02
Payer: MEDICARE

## 2023-02-02 DIAGNOSIS — Z95.2 S/P AVR (AORTIC VALVE REPLACEMENT): Primary | ICD-10-CM

## 2023-02-02 LAB — INTERNATIONAL NORMALIZATION RATIO, POC: 2.9

## 2023-02-02 PROCEDURE — 99211 OFF/OP EST MAY X REQ PHY/QHP: CPT | Performed by: SPEECH-LANGUAGE PATHOLOGIST

## 2023-02-02 PROCEDURE — 85610 PROTHROMBIN TIME: CPT | Performed by: SPEECH-LANGUAGE PATHOLOGIST

## 2023-02-02 NOTE — PROGRESS NOTES
Sj Galaviz is a 81 y.o. here for warfarin management. Diana Hooks had an INR test today. Results were reviewed and appropriate warfarin management was completed. This visit was performed as: An in person visit. Protocols were followed with precautions to reduce the spread of COVID-19. Patient verifies current warfarin dosing regimen: Yes     Warfarin medication reviewed and updated on the patient 's home medication list: Yes   All other medications reviewed and updated on the patient 's home medication list: Yes     Lab Results   Component Value Date    INR 2.9 2023    INR 3.6 2023    INR 3.30 2022       Patient Findings       Positives:  Missed doses    Negatives:  Signs/symptoms of thrombosis, Signs/symptoms of bleeding, Change in health, Extra doses, Change in medications, Change in diet/appetite, Bruising            Anticoagulation Summary  As of 2023      INR goal:  2.5-3.5   TTR:  71.5 % (6.2 y)   INR used for dosin.9 (2023)   Warfarin maintenance plan:  3.75 mg (7.5 mg x 0.5) every Thu; 7.5 mg (7.5 mg x 1) all other days; Starting 2023   Weekly warfarin total:  48.75 mg   Plan last modified: Denice Suh (2022)   Next INR check:  2023   Priority:  Maintenance   Target end date: Indefinite    Indications    S/P AVR (aortic valve replacement) [Z95.2]                 Anticoagulation Episode Summary       INR check location:      Preferred lab:      Send INR reminders to:  [facility]    Comments:  EPIC          Anticoagulation Care Providers       Provider Role Specialty Phone number    Slim Fuller MD Referring Cardiology [phone]            There were no vitals taken for this visit. Warfarin assessment / plan:     Patient states he missed his dose on 23. Appears well. No changes affecting warfarin therapy were noted. No acute findings. INR within goal range. No change to warfarin dosing today. *For upcoming dental procedure on 2/15/23, patient was told by oral surgeon that his INR needs to be between 2 and 2.5. Patient prefers that it be closer to 2 due to some bleeding issues he's had in the past after a procedure. Patient is to have an INR check on 2/14/23 and results will need to be faxed right away to the oral surgeon (patient will bring fax number to next appointment) so that he is clear to have procedure on 2/15/23. Patient will resume warfarin therapy the evening of the procedure unless instructed to do otherwise by the oral surgeon. Description    CONTINUE: Warfarin 7.5 mg (1 tablet) daily except 3.75 mg (1/2 tablet) every Thursday     For upcoming dental procedure on 2/15/23 (goal INR around 2): Take 3.75 mg (1/2 tablet on Saturday 2/11, and hold warfarin on Sunday 2/12 and Monday 2/13). Come to clinic for an INR check on 2/14/23. Call [phone] with signs or symptoms of bleeding or ANY medication changes (including over-the-counter medications or herbal supplements). If significant bleeding occurs please seek immediate medical attention. Keep the number of servings of vitamin K containing foods (dark green, leafy vegetables) the same each week. Please call if this changes. Limit alcohol intake. Please call if this changes.           Immunization History   Administered Date(s) Administered    COVID-19, MODERNA BLUE border, Primary or Immunocompromised, (age 12y+), IM, 100 mcg/0.5mL 02/18/2021, 03/18/2021    COVID-19, PFIZER Bivalent BOOSTER, DO NOT Dilute, (age 12y+), IM, 30 mcg/0.3 mL 11/01/2022    COVID-19, PFIZER GRAY top, DO NOT Dilute, (age 15 y+), IM, 30 mcg/0.3 mL 05/04/2022    COVID-19, PFIZER PURPLE top, DILUTE for use, (age 15 y+), 30mcg/0.3mL 11/23/2021    Influenza 11/01/2015    Influenza Vaccine, unspecified formulation 10/05/2017    Influenza Virus Vaccine 10/04/2011, 10/04/2012    Influenza, AFLURIA (age 1 yrs+), FLUZONE, (age 10 mo+), MDV, 0.5mL 10/25/2016, 10/05/2017    Influenza, FLUAD, (age 72 y+), Adjuvanted, 0.5mL 10/19/2022    Influenza, FLUZONE (age 72 y+), High Dose, 0.7mL 10/08/2021    Influenza, High Dose (Fluzone 65 yrs and older) 10/11/2018, 10/08/2021    Influenza, Triv, inactivated, subunit, adjuvanted, IM (Fluad 65 yrs and older) 09/25/2019, 09/24/2020    Pneumococcal Conjugate 13-valent (Tytpjws43) 10/11/2018    Pneumococcal Polysaccharide (Iqmzuwifv92) 10/04/2012, 09/24/2020    Tdap (Boostrix, Adacel) 04/03/2015    Zoster Live (Zostavax) 04/03/2015    Zoster Recombinant (Shingrix) 10/20/2020, 01/22/2021           Orders Placed This Encounter   Procedures    POCT INR     This external order was created through the results console. No orders of the defined types were placed in this encounter. Reviewed AVS with patient / caregiver.     Billing Points:  Basic Education (disease state, med overview, expected symptoms) - 1 point      For Pharmacy Admin Tracking Only    Intervention Detail: Dose Adjustment: 1, reason: Therapy Optimization  Total # of Interventions Recommended: 1  Total # of Interventions Accepted: 1  Time Spent (min): [address] [place], PharmD, BCPS  2/2/2023  2:24 PM

## 2023-02-14 ENCOUNTER — ANTI-COAG VISIT (OUTPATIENT)
Dept: PHARMACY | Age: 73
End: 2023-02-14
Payer: MEDICARE

## 2023-02-14 DIAGNOSIS — Z95.2 S/P AVR (AORTIC VALVE REPLACEMENT): Primary | ICD-10-CM

## 2023-02-14 LAB — INR BLD: 1.3

## 2023-02-14 PROCEDURE — 99211 OFF/OP EST MAY X REQ PHY/QHP: CPT

## 2023-02-14 PROCEDURE — 85610 PROTHROMBIN TIME: CPT

## 2023-02-14 NOTE — PROGRESS NOTES
Shelley Landeros is a 81 y.o. here for warfarin management. Rishabh Urbano had an INR test today. Results were reviewed and appropriate warfarin management was completed. This visit was performed as: An in person visit. Protocols were followed with precautions to reduce the spread of COVID-19. Patient verifies current warfarin dosing regimen: Yes     Warfarin medication reviewed and updated on the patient 's home medication list: Yes   All other medications reviewed and updated on the patient 's home medication list: No: no changes     Lab Results   Component Value Date    INR 1.30 2023    INR 2.9 2023    INR 3.6 2023       Patient Findings       Positives:  Upcoming dental procedure, Missed doses    Comments:  Dental procedure 2/15/23. Pulling 2 teeth. Warfarin held for tooth extraction for 2 days  and . Wanted INR close to 2 for the procedure. Anticoagulation Summary  As of 2023      INR goal:  2.5-3.5   TTR:  71.2 % (6.3 y)   INR used for dosin.30 (2023)   Warfarin maintenance plan:  3.75 mg (7.5 mg x 0.5) every Thu; 7.5 mg (7.5 mg x 1) all other days; Starting 2023   Weekly warfarin total:  48.75 mg   Plan last modified: Star Alberts (2022)   Next INR check:  3/1/2023   Priority:  Maintenance   Target end date: Indefinite    Indications    S/P AVR (aortic valve replacement) [Z95.2]                 Anticoagulation Episode Summary       INR check location:      Preferred lab:      Send INR reminders to:  [facility]    Comments:  EPIC          Anticoagulation Care Providers       Provider Role Specialty Phone number    Love Barrios MD Referring Cardiology [phone]            There were no vitals taken for this visit. Warfarin assessment / plan:     Appears well  Sub-therapeutic INR      Missed dose(s) . Patient is having a dental surgery tomorrow 2/15/23.  With having 2 teeth pulled, they want his INR to be in the goal of 2-2.5 to proceed. Starting on 2/11 patient took reduced dose of 3.75 mg (1/2 tablet) and held warfarin doses on Sunday (2/12) and Monday (2/13) prior to procedure. With an INR of 1.3, will continue today's dose of warfarin 7.5 mg (1 tablet) and then with MD approval take increased dose of warfarin 11.25 mg (1.5 tablets) tomorrow evening post-procedure (2/15) and 7.5 mg (1 tablet) on Thursday (2/16) then continue Warfarin 7.5 mg (1 tablet) daily except 3.75 mg (1/2 tablet) every Thursday. Follow up in 2 weeks to reassess INR. Philippe Gary asked we fax INR results today to Dr. Michael Ramirez, DDS - DONE      Description    Take warfarin 7.5 mg (1 tablet) TODAY and then the evening after procedure with MD approval take warfarin 11.25 mg (1.5 tablets) TOMORROW (2/15) and 7.5 mg (1 tablet) on Thursday (2/16) then   CONTINUE: Warfarin 7.5 mg (1 tablet) daily except 3.75 mg (1/2 tablet) every Thursday   *For upcoming dental procedure on 2/15/23 (goal INR around 2)     Call [phone] with signs or symptoms of bleeding or ANY medication changes (including over-the-counter medications or herbal supplements). If significant bleeding occurs please seek immediate medical attention. Keep the number of servings of vitamin K containing foods (dark green, leafy vegetables) the same each week. Please call if this changes. Limit alcohol intake. Please call if this changes.           Immunization History   Administered Date(s) Administered    COVID-19, MODERNA BLUE border, Primary or Immunocompromised, (age 12y+), IM, 100 mcg/0.5mL 02/18/2021, 03/18/2021    COVID-19, PFIZER Bivalent BOOSTER, DO NOT Dilute, (age 12y+), IM, 30 mcg/0.3 mL 11/01/2022    COVID-19, PFIZER GRAY top, DO NOT Dilute, (age 15 y+), IM, 30 mcg/0.3 mL 05/04/2022    COVID-19, PFIZER PURPLE top, DILUTE for use, (age 15 y+), 30mcg/0.3mL 11/23/2021    Influenza 11/01/2015    Influenza Vaccine, unspecified formulation 10/05/2017    Influenza Virus Vaccine 10/04/2011, 10/04/2012    Influenza, AFLURIA (age 1 yrs+), FLUZONE, (age 10 mo+), MDV, 0.5mL 10/25/2016, 10/05/2017    Influenza, FLUAD, (age 72 y+), Adjuvanted, 0.5mL 10/19/2022    Influenza, FLUZONE (age 72 y+), High Dose, 0.7mL 10/08/2021    Influenza, High Dose (Fluzone 65 yrs and older) 10/11/2018, 10/08/2021    Influenza, Triv, inactivated, subunit, adjuvanted, IM (Fluad 65 yrs and older) 09/25/2019, 09/24/2020    Pneumococcal Conjugate 13-valent (Ucfbkpj25) 10/11/2018    Pneumococcal Polysaccharide (Ovlppsdjh60) 10/04/2012, 09/24/2020    Tdap (Boostrix, Adacel) 04/03/2015    Zoster Live (Zostavax) 04/03/2015    Zoster Recombinant (Shingrix) 10/20/2020, 01/22/2021           Orders Placed This Encounter   Procedures    Protime-INR     This external order was created through the results console. No orders of the defined types were placed in this encounter. Reviewed AVS with patient / caregiver.     Billing Points:  Adjust dosage and/or reconcile meds (fill pill box) </= 5 medications - 2 points     For Pharmacy Admin Tracking Only    Intervention Detail: Dose Adjustment: 1, reason: Therapy Optimization  Total # of Interventions Recommended: 2  Total # of Interventions Accepted: 2  Time Spent (min): 20     Mark ChunD Candidate 2023

## 2023-03-01 ENCOUNTER — ANTI-COAG VISIT (OUTPATIENT)
Dept: PHARMACY | Age: 73
End: 2023-03-01
Payer: MEDICARE

## 2023-03-01 DIAGNOSIS — Z95.2 S/P AVR (AORTIC VALVE REPLACEMENT): Primary | ICD-10-CM

## 2023-03-01 LAB — INTERNATIONAL NORMALIZATION RATIO, POC: 3.8

## 2023-03-01 PROCEDURE — 99211 OFF/OP EST MAY X REQ PHY/QHP: CPT

## 2023-03-01 PROCEDURE — 85610 PROTHROMBIN TIME: CPT

## 2023-03-01 NOTE — PROGRESS NOTES
Lourdes Chen is a 81 y.o. here for warfarin management. Aly Vieira had an INR test today. Results were reviewed and appropriate warfarin management was completed. This visit was performed as: An in person visit. Protocols were followed with precautions to reduce the spread of COVID-19. Patient verifies current warfarin dosing regimen: Yes     Warfarin medication reviewed and updated on the patient 's home medication list: Yes   All other medications reviewed and updated on the patient 's home medication list: No: no changes     Lab Results   Component Value Date    INR 3.8 03/01/2023    INR 1.30 02/14/2023    INR 2.9 02/02/2023           Anticoagulation Summary  As of 3/1/2023      INR goal:  2.5-3.5   TTR:  71.0 % (6.3 y)   INR used for dosing:  3.8 (3/1/2023)   Warfarin maintenance plan:  3.75 mg (7.5 mg x 0.5) every Thu; 7.5 mg (7.5 mg x 1) all other days; Starting 3/1/2023   Weekly warfarin total:  48.75 mg   Plan last modified: Toro Byrd (12/14/2022)   Next INR check:  3/29/2023   Priority:  Maintenance   Target end date: Indefinite    Indications    S/P AVR (aortic valve replacement) [Z95.2]                 Anticoagulation Episode Summary       INR check location:      Preferred lab:      Send INR reminders to:  [facility]    Comments:  EPIC          Anticoagulation Care Providers       Provider Role Specialty Phone number    William Chirinos MD Referring Cardiology [phone]            There were no vitals taken for this visit. Warfarin assessment / plan:     Appears well  Supra-therapeutic INR. Denies signs and symptoms of bleeding/bruising. Denies medication changes. Denies extra warfarin doses. Denies increased alcohol intake. Denies change in appetite. Denies illness, fever, vomiting or diarrhea. Denies cranberry or grapefruit juice intake. He looks and feels well today. It is unclear why his INR is elevated today.  He did have dental surgery 2 weeks ago but states he is eating a normal diet. He is drinking less alcohol than usual these days. For INR of 3.8, we will have him take a reduced dose of warfarin 3.75mg today and then resume his previous dosing. He will return in 4 weeks for his next INR. Description    Take warfarin 3.75mg(1/2 tablet) TODAY then  CONTINUE: Warfarin 7.5 mg (1 tablet) daily except 3.75 mg (1/2 tablet) every Thursday      Call [phone] with signs or symptoms of bleeding or ANY medication changes (including over-the-counter medications or herbal supplements). If significant bleeding occurs please seek immediate medical attention. Keep the number of servings of vitamin K containing foods (dark green, leafy vegetables) the same each week. Please call if this changes. Limit alcohol intake. Please call if this changes.           Immunization History   Administered Date(s) Administered    COVID-19, MODERNA BLUE border, Primary or Immunocompromised, (age 12y+), IM, 100 mcg/0.5mL 02/18/2021, 03/18/2021    COVID-19, PFIZER Bivalent BOOSTER, DO NOT Dilute, (age 12y+), IM, 30 mcg/0.3 mL 11/01/2022    COVID-19, PFIZER GRAY top, DO NOT Dilute, (age 15 y+), IM, 30 mcg/0.3 mL 05/04/2022    COVID-19, PFIZER PURPLE top, DILUTE for use, (age 15 y+), 30mcg/0.3mL 11/23/2021    Influenza 11/01/2015    Influenza Vaccine, unspecified formulation 10/05/2017    Influenza Virus Vaccine 10/04/2011, 10/04/2012    Influenza, AFLURIA (age 1 yrs+), FLUZONE, (age 10 mo+), MDV, 0.5mL 10/25/2016, 10/05/2017    Influenza, FLUAD, (age 72 y+), Adjuvanted, 0.5mL 10/19/2022    Influenza, FLUZONE (age 72 y+), High Dose, 0.7mL 10/08/2021    Influenza, High Dose (Fluzone 65 yrs and older) 10/11/2018, 10/08/2021    Influenza, Triv, inactivated, subunit, adjuvanted, IM (Fluad 65 yrs and older) 09/25/2019, 09/24/2020    Pneumococcal Conjugate 13-valent (Lcjexao24) 10/11/2018    Pneumococcal Polysaccharide (Ssasgmzoo29) 10/04/2012, 09/24/2020    Tdap (Boostrix, Adacel) 04/03/2015    Zoster Live (Zostavax) 04/03/2015    Zoster Recombinant (Shingrix) 10/20/2020, 01/22/2021           Orders Placed This Encounter   Procedures    POCT INR     This external order was created through the results console. No orders of the defined types were placed in this encounter. Reviewed AVS with patient / caregiver.     Billing Points:  Adjust dosage and/or reconcile meds (fill pill box) </= 5 medications - 2 points     For Pharmacy Admin Tracking Only    Intervention Detail: Dose Adjustment: 1, reason: Therapy Optimization  Total # of Interventions Recommended: 1  Total # of Interventions Accepted: 1  Time Spent (min): 20

## 2023-03-14 SDOH — ECONOMIC STABILITY: FOOD INSECURITY: WITHIN THE PAST 12 MONTHS, YOU WORRIED THAT YOUR FOOD WOULD RUN OUT BEFORE YOU GOT MONEY TO BUY MORE.: NEVER TRUE

## 2023-03-14 SDOH — ECONOMIC STABILITY: FOOD INSECURITY: WITHIN THE PAST 12 MONTHS, THE FOOD YOU BOUGHT JUST DIDN'T LAST AND YOU DIDN'T HAVE MONEY TO GET MORE.: NEVER TRUE

## 2023-03-14 SDOH — ECONOMIC STABILITY: HOUSING INSECURITY
IN THE LAST 12 MONTHS, WAS THERE A TIME WHEN YOU DID NOT HAVE A STEADY PLACE TO SLEEP OR SLEPT IN A SHELTER (INCLUDING NOW)?: NO

## 2023-03-14 SDOH — ECONOMIC STABILITY: INCOME INSECURITY: HOW HARD IS IT FOR YOU TO PAY FOR THE VERY BASICS LIKE FOOD, HOUSING, MEDICAL CARE, AND HEATING?: NOT HARD AT ALL

## 2023-03-17 ENCOUNTER — OFFICE VISIT (OUTPATIENT)
Dept: FAMILY MEDICINE CLINIC | Age: 73
End: 2023-03-17

## 2023-03-17 DIAGNOSIS — S50.311A ABRASION OF RIGHT ELBOW, INITIAL ENCOUNTER: ICD-10-CM

## 2023-03-17 DIAGNOSIS — M70.21 OLECRANON BURSITIS OF RIGHT ELBOW: Primary | ICD-10-CM

## 2023-03-17 ASSESSMENT — PATIENT HEALTH QUESTIONNAIRE - PHQ9
2. FEELING DOWN, DEPRESSED OR HOPELESS: 0
SUM OF ALL RESPONSES TO PHQ QUESTIONS 1-9: 0
5. POOR APPETITE OR OVEREATING: 0
SUM OF ALL RESPONSES TO PHQ9 QUESTIONS 1 & 2: 0
9. THOUGHTS THAT YOU WOULD BE BETTER OFF DEAD, OR OF HURTING YOURSELF: 0
SUM OF ALL RESPONSES TO PHQ QUESTIONS 1-9: 0
6. FEELING BAD ABOUT YOURSELF - OR THAT YOU ARE A FAILURE OR HAVE LET YOURSELF OR YOUR FAMILY DOWN: 0
4. FEELING TIRED OR HAVING LITTLE ENERGY: 0
8. MOVING OR SPEAKING SO SLOWLY THAT OTHER PEOPLE COULD HAVE NOTICED. OR THE OPPOSITE, BEING SO FIGETY OR RESTLESS THAT YOU HAVE BEEN MOVING AROUND A LOT MORE THAN USUAL: 0
3. TROUBLE FALLING OR STAYING ASLEEP: 0
1. LITTLE INTEREST OR PLEASURE IN DOING THINGS: 0
SUM OF ALL RESPONSES TO PHQ QUESTIONS 1-9: 0
SUM OF ALL RESPONSES TO PHQ QUESTIONS 1-9: 0
7. TROUBLE CONCENTRATING ON THINGS, SUCH AS READING THE NEWSPAPER OR WATCHING TELEVISION: 0
10. IF YOU CHECKED OFF ANY PROBLEMS, HOW DIFFICULT HAVE THESE PROBLEMS MADE IT FOR YOU TO DO YOUR WORK, TAKE CARE OF THINGS AT HOME, OR GET ALONG WITH OTHER PEOPLE: 0

## 2023-03-17 NOTE — PROGRESS NOTES
Administrations This Visit       triamcinolone acetonide (KENALOG) injection 10 mg       Admin Date  03/17/2023  15:48 Action  Given Dose  10 mg Route  Intra-artICUlar Site  Elbow Right Administered By  Yonathan Little MA    Ordering Provider: Pratibha Swan MD    NDC: 2548-0948-73    Lot#: [id number]    : [employer] [place] (PRIMARY CARE)    Patient Supplied?: No

## 2023-03-22 NOTE — PROGRESS NOTES
lisinopril (PRINIVIL;ZESTRIL) 5 MG tablet Take 5 mg by mouth nightly      levothyroxine (SYNTHROID) 137 MCG tablet Take 137 mcg by mouth Daily      Niacin ER 1000 MG TBCR Take 1,000 mg by mouth nightly      CPAP Machine MISC by Does not apply route CPAP 7 cwp, travel device with humidifier 1 each 0    aspirin 81 MG EC tablet Take 1 tablet by mouth nightly 90 tablet 3    dulaglutide (TRULICITY) 1.5 QW/8.9JK SC injection Inject 1.5 mg into the skin every 7 days Sundays      TOUJEO SOLOSTAR 300 UNIT/ML injection pen Inject 40 Units into the skin daily (Patient taking differently: Inject 20 Units into the skin daily) 18 Pen 3    rosuvastatin (CRESTOR) 40 MG tablet Take 1 tablet by mouth every evening 90 tablet 3    ZETIA 10 MG tablet TAKE 1 TABLET DAILY 90 tablet 2    metoprolol succinate (TOPROL XL) 25 MG extended release tablet Take 3 tablets by mouth 2 times daily 3 tabs po BID (Patient taking differently: Take 75 mg by mouth 2 times daily) 560 tablet 3    acetaminophen (TYLENOL) 325 MG tablet Take 2 tablets by mouth every 4 hours as needed for Pain 120 tablet 3     No current facility-administered medications for this visit. Review of Systems:    Constitutional: negative  Eyes: negative  Ears, nose, mouth, throat, and face: negative  Respiratory: negative  Cardiovascular: negative  Gastrointestinal: negative  Genitourinary:negative  Integument/breast: negative  Hematologic/lymphatic: negative  Musculoskeletal:negative  Neurological: negative  Behavioral/Psych: negative  Endocrine: negative  Allergic/Immunologic: negative     Physical Exam:   Vitals:    03/23/23 1046   BP: 122/62   Pulse: 82   SpO2: 95%   Weight: 201 lb (91.2 kg)   Height: 5' 11\" (1.803 m)         Wt Readings from Last 3 Encounters:   03/23/23 201 lb (91.2 kg)   12/27/22 213 lb (96.6 kg)   10/19/22 212 lb (96.2 kg)       Constitutional: He is oriented to person, place, and time. He appears well-developed and well-nourished.  In no acute

## 2023-03-23 ENCOUNTER — OFFICE VISIT (OUTPATIENT)
Dept: CARDIOLOGY CLINIC | Age: 73
End: 2023-03-23
Payer: MEDICARE

## 2023-03-23 VITALS
HEIGHT: 71 IN | SYSTOLIC BLOOD PRESSURE: 122 MMHG | OXYGEN SATURATION: 95 % | BODY MASS INDEX: 28.14 KG/M2 | WEIGHT: 201 LBS | HEART RATE: 82 BPM | DIASTOLIC BLOOD PRESSURE: 62 MMHG

## 2023-03-23 DIAGNOSIS — Q23.1 AORTIC STENOSIS DUE TO BICUSPID AORTIC VALVE: Primary | ICD-10-CM

## 2023-03-23 DIAGNOSIS — E78.2 MIXED HYPERLIPIDEMIA: ICD-10-CM

## 2023-03-23 DIAGNOSIS — Q23.0 AORTIC STENOSIS DUE TO BICUSPID AORTIC VALVE: Primary | ICD-10-CM

## 2023-03-23 DIAGNOSIS — I10 ESSENTIAL HYPERTENSION: ICD-10-CM

## 2023-03-23 DIAGNOSIS — Z95.2 S/P AVR: ICD-10-CM

## 2023-03-23 DIAGNOSIS — I25.10 CORONARY ARTERY DISEASE INVOLVING NATIVE CORONARY ARTERY OF NATIVE HEART WITHOUT ANGINA PECTORIS: ICD-10-CM

## 2023-03-23 PROCEDURE — G8484 FLU IMMUNIZE NO ADMIN: HCPCS | Performed by: INTERNAL MEDICINE

## 2023-03-23 PROCEDURE — 93000 ELECTROCARDIOGRAM COMPLETE: CPT | Performed by: INTERNAL MEDICINE

## 2023-03-23 PROCEDURE — 1036F TOBACCO NON-USER: CPT | Performed by: INTERNAL MEDICINE

## 2023-03-23 PROCEDURE — 3078F DIAST BP <80 MM HG: CPT | Performed by: INTERNAL MEDICINE

## 2023-03-23 PROCEDURE — 1123F ACP DISCUSS/DSCN MKR DOCD: CPT | Performed by: INTERNAL MEDICINE

## 2023-03-23 PROCEDURE — G8427 DOCREV CUR MEDS BY ELIG CLIN: HCPCS | Performed by: INTERNAL MEDICINE

## 2023-03-23 PROCEDURE — G8417 CALC BMI ABV UP PARAM F/U: HCPCS | Performed by: INTERNAL MEDICINE

## 2023-03-23 PROCEDURE — 3074F SYST BP LT 130 MM HG: CPT | Performed by: INTERNAL MEDICINE

## 2023-03-23 PROCEDURE — 3017F COLORECTAL CA SCREEN DOC REV: CPT | Performed by: INTERNAL MEDICINE

## 2023-03-23 PROCEDURE — 99214 OFFICE O/P EST MOD 30 MIN: CPT | Performed by: INTERNAL MEDICINE

## 2023-03-29 ENCOUNTER — ANTI-COAG VISIT (OUTPATIENT)
Dept: PHARMACY | Age: 73
End: 2023-03-29
Payer: MEDICARE

## 2023-03-29 DIAGNOSIS — Z95.2 S/P AVR (AORTIC VALVE REPLACEMENT): Primary | ICD-10-CM

## 2023-03-29 LAB — INR BLD: 3.1

## 2023-03-29 PROCEDURE — 85610 PROTHROMBIN TIME: CPT

## 2023-03-29 PROCEDURE — 99211 OFF/OP EST MAY X REQ PHY/QHP: CPT

## 2023-03-29 NOTE — PROGRESS NOTES
console. No orders of the defined types were placed in this encounter. Reviewed AVS with patient / caregiver.     Billing Points:  0 billing points this visit     For Pharmacy Admin Tracking Only    Intervention Detail: Adherence Monitorin  Total # of Interventions Recommended: 0  Total # of Interventions Accepted: 0  Time Spent (min): 20     Mal Neri PharmD Candidate 2023 3/29/2023 9:01 AM

## 2023-04-26 ENCOUNTER — ANTI-COAG VISIT (OUTPATIENT)
Dept: PHARMACY | Age: 73
End: 2023-04-26
Payer: MEDICARE

## 2023-04-26 DIAGNOSIS — Z95.2 S/P AVR (AORTIC VALVE REPLACEMENT): Primary | ICD-10-CM

## 2023-04-26 LAB — INTERNATIONAL NORMALIZATION RATIO, POC: 4

## 2023-04-26 PROCEDURE — 99211 OFF/OP EST MAY X REQ PHY/QHP: CPT

## 2023-04-26 PROCEDURE — 85610 PROTHROMBIN TIME: CPT

## 2023-04-26 NOTE — PROGRESS NOTES
Jacquie Nixon is a 81 y.o. here for warfarin management. Debi Moeller had an INR test today. Results were reviewed and appropriate warfarin management was completed. This visit was performed as: An in person visit. Protocols were followed with precautions to reduce the spread of COVID-19. Patient verifies current warfarin dosing regimen: Yes     Warfarin medication reviewed and updated on the patient 's home medication list: Yes   All other medications reviewed and updated on the patient 's home medication list: No new medications     Lab Results   Component Value Date    INR 4.0 2023    INR 3.10 2023    INR 3.8 2023     Patient Findings       Negatives:  Signs/symptoms of thrombosis, Signs/symptoms of bleeding, Change in health, Missed doses, Change in medications, Change in diet/appetite, Bruising          Anticoagulation Summary  As of 2023      INR goal:  2.5-3.5   TTR:  70.5 % (6.5 y)   INR used for dosin.0 (2023)   Warfarin maintenance plan:  3.75 mg (7.5 mg x 0.5) every Thu; 7.5 mg (7.5 mg x 1) all other days   Weekly warfarin total:  48.75 mg   Plan last modified: Fidel Garcia (2022)   Next INR check:  2023   Priority:  Maintenance   Target end date: Indefinite    Indications    S/P AVR (aortic valve replacement) [Z95.2]                 Anticoagulation Episode Summary       INR check location:      Preferred lab:      Send INR reminders to:  [facility]    Comments:  EPIC          Anticoagulation Care Providers       Provider Role Specialty Phone number    Luda Pemberton MD Referring Cardiology [phone]          There were no vitals taken for this visit. Warfarin assessment / plan:     Appears well  Supra-therapeutic INR. Denies signs and symptoms of bleeding/bruising. Denies medication changes. Denies extra warfarin doses. Denies change in appetite. Ceasar Claude states doing well. No complaints regarding warfarin therapy.

## 2023-04-30 RX ORDER — MIRTAZAPINE 30 MG/1
TABLET, FILM COATED ORAL
Qty: 90 TABLET | Refills: 1 | Status: SHIPPED | OUTPATIENT
Start: 2023-04-30

## 2023-05-05 ENCOUNTER — HOSPITAL ENCOUNTER (OUTPATIENT)
Dept: NON INVASIVE DIAGNOSTICS | Age: 73
Discharge: HOME | End: 2023-05-05
Payer: MEDICARE

## 2023-05-05 DIAGNOSIS — Q23.1 AORTIC STENOSIS DUE TO BICUSPID AORTIC VALVE: ICD-10-CM

## 2023-05-05 DIAGNOSIS — Q23.0 AORTIC STENOSIS DUE TO BICUSPID AORTIC VALVE: ICD-10-CM

## 2023-05-05 LAB
LV EF: 58 %
LVEF MODALITY: NORMAL

## 2023-05-05 PROCEDURE — 93306 TTE W/DOPPLER COMPLETE: CPT

## 2023-05-24 ENCOUNTER — ANTI-COAG VISIT (OUTPATIENT)
Dept: PHARMACY | Age: 73
End: 2023-05-24
Payer: MEDICARE

## 2023-05-24 DIAGNOSIS — Z95.2 S/P AVR (AORTIC VALVE REPLACEMENT): Primary | ICD-10-CM

## 2023-05-24 LAB — INTERNATIONAL NORMALIZATION RATIO, POC: 3.2

## 2023-05-24 PROCEDURE — 99211 OFF/OP EST MAY X REQ PHY/QHP: CPT

## 2023-05-24 PROCEDURE — 85610 PROTHROMBIN TIME: CPT

## 2023-05-24 RX ORDER — ALLOPURINOL 100 MG/1
100 TABLET ORAL DAILY
COMMUNITY

## 2023-05-24 NOTE — PROGRESS NOTES
Gertrudis Mayer is a 81 y.o. here for warfarin management. Eloisa Sanders had an INR test today. Results were reviewed and appropriate warfarin management was completed. Patient verifies current warfarin dosing regimen: Yes     Warfarin medication reviewed and updated on the patient 's home medication list: Yes   All other medications reviewed and updated on the patient 's home medication list: Yes : allopurinol dose reduced to 100mg daily    Lab Results   Component Value Date    INR 3.2 05/24/2023    INR 4.0 04/26/2023    INR 3.10 03/29/2023     Patient Findings       Positives:  Change in medications    Negatives:  Signs/symptoms of bleeding, Change in health, Missed doses, Change in diet/appetite, Bruising    Comments:  Allopurinol dose reduced to 100mg daily          Anticoagulation Summary  As of 5/24/2023      INR goal:  2.5-3.5   TTR:  70.2 % (6.5 y)   INR used for dosing:  3.2 (5/24/2023)   Warfarin maintenance plan:  3.75 mg (7.5 mg x 0.5) every Thu; 7.5 mg (7.5 mg x 1) all other days   Weekly warfarin total:  48.75 mg   Plan last modified: Myke Manjarrez (12/14/2022)   Next INR check:  6/21/2023   Priority:  Maintenance   Target end date: Indefinite    Indications    S/P AVR (aortic valve replacement) [Z95.2]                 Anticoagulation Episode Summary       INR check location:      Preferred lab:      Send INR reminders to:  [facility]    Comments:  EPIC          Anticoagulation Care Providers       Provider Role Specialty Phone number    Rosemary Jorgensen MD Referring Cardiology [phone]          There were no vitals taken for this visit. Warfarin assessment / plan:     Patient appears well today. His allopurinol dose was reduced to 100mg daily(from 150mg daily). I do not anticipate any real change in his INR with this dosing change. No acute findings with regards to warfarin therapy. INR today is within goal range.      Instructed to continue the same weekly warfarin

## 2023-06-21 ENCOUNTER — ANTI-COAG VISIT (OUTPATIENT)
Dept: PHARMACY | Age: 73
End: 2023-06-21
Payer: MEDICARE

## 2023-06-21 ENCOUNTER — TELEPHONE (OUTPATIENT)
Dept: CARDIOLOGY CLINIC | Age: 73
End: 2023-06-21

## 2023-06-21 DIAGNOSIS — Z95.2 S/P AVR (AORTIC VALVE REPLACEMENT): Primary | ICD-10-CM

## 2023-06-21 DIAGNOSIS — Z95.2 S/P AVR: Primary | ICD-10-CM

## 2023-06-21 LAB — INR BLD: 5

## 2023-06-21 PROCEDURE — 85610 PROTHROMBIN TIME: CPT

## 2023-06-21 PROCEDURE — 99212 OFFICE O/P EST SF 10 MIN: CPT

## 2023-06-21 NOTE — TELEPHONE ENCOUNTER
----- Message from Mercedes Bangura, [facility] sent at 2023  9:54 AM EDT -----  Regarding: Anticoagulation Management Referral Request  Hi Dr. Irineo Abraham,    The referral for Mr. sAhley Norman for warfarin management has . Can you please send a new referral?  You can use [id number] to send electronically or please call [phone] if you need a paper referral.    They were previously referred by you for S/P AVR with an INR goal of 2.5-3.5 with duration of therapy indefinite.      Thank you,  Sharee Kocher, PharmD    [facility]  Anticoagulation Service  [phone]

## 2023-06-21 NOTE — PROGRESS NOTES
10/04/2011, 10/04/2012    Influenza, AFLURIA (age 1 yrs+), FLUZONE, (age 10 mo+), MDV, 0.5mL 10/25/2016, 10/05/2017    Influenza, FLUAD, (age 72 y+), Adjuvanted, 0.5mL 10/19/2022    Influenza, FLUZONE (age 72 y+), High Dose, 0.7mL 10/08/2021    Influenza, High Dose (Fluzone 65 yrs and older) 10/11/2018, 10/08/2021    Influenza, Triv, inactivated, subunit, adjuvanted, IM (Fluad 65 yrs and older) 2019, 2020    Pneumococcal, PCV-13, PREVNAR 13, (age 6w+), IM, 0.5mL 10/11/2018    Pneumococcal, PPSV23, PNEUMOVAX 23, (age 2y+), SC/IM, 0.5mL 10/04/2012, 2020    TDaP, ADACEL (age 10y-63y), BOOSTRIX (age 10y+), IM, 0.5mL 2015    Zoster Live (Zostavax) 2015    Zoster Recombinant (Shingrix) 10/20/2020, 2021       Orders Placed This Encounter   Procedures    Protime-INR     This external order was created through the results console. No orders of the defined types were placed in this encounter. Reviewed AVS with patient / caregiver. Billing Points:  Adjust dosage and/or reconcile meds (fill pill box) </= 5 medications - 2 points  BASIC ASSESSMENT UNCOMPLICATED (including but not limited to: vital signs; physical assessment screening; review of secondary markers like lab results, allergies, home readings; instructions on treatment plan and basic education; assessment of medication list with one med change and compliance) - 2 points     For Pharmacy Admin Tracking Only    Intervention Detail: Adherence Monitorin and Dose Adjustment: 1, reason: Therapy De-escalation  Total # of Interventions Recommended: 2  Total # of Interventions Accepted: 1  Time Spent (min): 15      I have seen the patient and reviewed the progress note written by the PharmD Candidate. I agree with this assesment and plan.    Donald Mcfadden, [facility], PharmD 23 9:52 AM

## 2023-06-22 NOTE — PROGRESS NOTES
Mr. Adan Benjamin is a 74 y.o.  male with history of Heart Valve Replacement who presents today for anticoagulation monitoring and adjustment. Patient verifies current dosing regimen  Patient denies s/s bleeding/bruising/swelling/SOB  No blood in urine or stool. No dietary changes. No changes in medication/OTC agents/Herbals. No change in alcohol use. No missed doses. No Procedures scheduled in the future at this time. Lab Results   Component Value Date    INR 2.5 11/28/2018    INR 2.8 10/31/2018    INR 1.7 10/19/2018       Pertinent findings: Patients INR was in range today at 2.5 so we are continuing his current warfarin dosing. Recheck in 5 weeks. Warfarin dosing: Continue Warfarin 7.5mg daily except 3.75mg(1/2 tablet) on Monday and Friday    After visit summary printed and reviewed with patient.
Andrae Rivera(Attending)

## 2023-06-27 ENCOUNTER — HOSPITAL ENCOUNTER (INPATIENT)
Age: 73
LOS: 10 days | Discharge: HOME | End: 2023-07-07
Attending: EMERGENCY MEDICINE | Admitting: HOSPITALIST
Payer: MEDICARE

## 2023-06-27 ENCOUNTER — APPOINTMENT (OUTPATIENT)
Dept: CT IMAGING | Age: 73
End: 2023-06-27
Payer: MEDICARE

## 2023-06-27 ENCOUNTER — APPOINTMENT (OUTPATIENT)
Dept: GENERAL RADIOLOGY | Age: 73
End: 2023-06-27
Payer: MEDICARE

## 2023-06-27 DIAGNOSIS — Z95.2 HISTORY OF AORTIC VALVE REPLACEMENT: ICD-10-CM

## 2023-06-27 DIAGNOSIS — R42 LIGHTHEADEDNESS: Primary | ICD-10-CM

## 2023-06-27 DIAGNOSIS — R79.89 ELEVATED BRAIN NATRIURETIC PEPTIDE (BNP) LEVEL: ICD-10-CM

## 2023-06-27 DIAGNOSIS — Z51.81 ANTICOAGULATION GOAL OF INR 2.5 TO 3.5: ICD-10-CM

## 2023-06-27 DIAGNOSIS — Z71.89 GOALS OF CARE, COUNSELING/DISCUSSION: ICD-10-CM

## 2023-06-27 DIAGNOSIS — Z79.01 ON ANTICOAGULANT THERAPY: ICD-10-CM

## 2023-06-27 DIAGNOSIS — R06.09 DYSPNEA ON EXERTION: ICD-10-CM

## 2023-06-27 DIAGNOSIS — D64.9 ANEMIA, UNSPECIFIED TYPE: ICD-10-CM

## 2023-06-27 DIAGNOSIS — R77.8 ELEVATED TROPONIN: ICD-10-CM

## 2023-06-27 DIAGNOSIS — R06.02 SHORTNESS OF BREATH: ICD-10-CM

## 2023-06-27 DIAGNOSIS — Z79.01 ANTICOAGULATION GOAL OF INR 2.5 TO 3.5: ICD-10-CM

## 2023-06-27 DIAGNOSIS — I35.0 AORTIC VALVE STENOSIS, ETIOLOGY OF CARDIAC VALVE DISEASE UNSPECIFIED: ICD-10-CM

## 2023-06-27 LAB
ABO + RH BLD: NORMAL
ALBUMIN SERPL-MCNC: 3.5 G/DL (ref 3.4–5)
ALBUMIN/GLOB SERPL: 1.8 {RATIO} (ref 1.1–2.2)
ALP SERPL-CCNC: 53 U/L (ref 40–129)
ALT SERPL-CCNC: 94 U/L (ref 10–40)
ANION GAP SERPL CALCULATED.3IONS-SCNC: 12 MMOL/L (ref 3–16)
APTT BLD: 31.3 SEC (ref 22.7–35.9)
APTT BLD: 32.5 SEC (ref 22.7–35.9)
AST SERPL-CCNC: 75 U/L (ref 15–37)
BASE EXCESS BLDV CALC-SCNC: -2.8 MMOL/L
BASOPHILS # BLD: 0 K/UL (ref 0–0.2)
BASOPHILS NFR BLD: 0.5 %
BILIRUB SERPL-MCNC: 0.3 MG/DL (ref 0–1)
BLD GP AB SCN SERPL QL: NORMAL
BLOOD BANK DISPENSE STATUS: NORMAL
BLOOD BANK PRODUCT CODE: NORMAL
BPU ID: NORMAL
BUN SERPL-MCNC: 25 MG/DL (ref 7–20)
CALCIUM SERPL-MCNC: 9 MG/DL (ref 8.3–10.6)
CHLORIDE SERPL-SCNC: 104 MMOL/L (ref 99–110)
CO2 BLDV-SCNC: 23 MMOL/L
CO2 SERPL-SCNC: 19 MMOL/L (ref 21–32)
COHGB MFR BLDV: 2.2 %
CREAT SERPL-MCNC: 1.7 MG/DL (ref 0.8–1.3)
DEPRECATED RDW RBC AUTO: 16.8 % (ref 12.4–15.4)
DEPRECATED RDW RBC AUTO: 16.9 % (ref 12.4–15.4)
DESCRIPTION BLOOD BANK: NORMAL
EKG ATRIAL RATE: 85 BPM
EKG DIAGNOSIS: NORMAL
EKG P AXIS: 11 DEGREES
EKG P-R INTERVAL: 178 MS
EKG Q-T INTERVAL: 380 MS
EKG QRS DURATION: 80 MS
EKG QTC CALCULATION (BAZETT): 452 MS
EKG R AXIS: -47 DEGREES
EKG T AXIS: 45 DEGREES
EKG VENTRICULAR RATE: 85 BPM
EOSINOPHIL # BLD: 0.1 K/UL (ref 0–0.6)
EOSINOPHIL NFR BLD: 2 %
FOLATE SERPL-MCNC: >20 NG/ML (ref 4.78–24.2)
GFR SERPLBLD CREATININE-BSD FMLA CKD-EPI: 43 ML/MIN/{1.73_M2}
GLUCOSE BLD-MCNC: 112 MG/DL (ref 70–99)
GLUCOSE BLD-MCNC: 179 MG/DL (ref 70–99)
GLUCOSE SERPL-MCNC: 182 MG/DL (ref 70–99)
HCO3 BLDV-SCNC: 22 MMOL/L (ref 23–29)
HCT VFR BLD AUTO: 19.9 % (ref 40.5–52.5)
HCT VFR BLD AUTO: 21 % (ref 40.5–52.5)
HCT VFR BLD AUTO: 21.3 % (ref 40.5–52.5)
HEMOCCULT STL QL: ABNORMAL
HGB BLD-MCNC: 6.6 G/DL (ref 13.5–17.5)
HGB BLD-MCNC: 7 G/DL (ref 13.5–17.5)
HGB BLD-MCNC: 7 G/DL (ref 13.5–17.5)
IMMATURE RETIC FRACT: 0.66 (ref 0.21–0.37)
INR PPP: 2.61 (ref 0.84–1.16)
IRON SATN MFR SERPL: 12 % (ref 20–50)
IRON SERPL-MCNC: 42 UG/DL (ref 59–158)
LYMPHOCYTES # BLD: 1.3 K/UL (ref 1–5.1)
LYMPHOCYTES NFR BLD: 24 %
MCH RBC QN AUTO: 30.5 PG (ref 26–34)
MCH RBC QN AUTO: 30.8 PG (ref 26–34)
MCHC RBC AUTO-ENTMCNC: 32.9 G/DL (ref 31–36)
MCHC RBC AUTO-ENTMCNC: 32.9 G/DL (ref 31–36)
MCV RBC AUTO: 92.8 FL (ref 80–100)
MCV RBC AUTO: 93.6 FL (ref 80–100)
METHGB MFR BLDV: 1.2 %
MONOCYTES # BLD: 0.4 K/UL (ref 0–1.3)
MONOCYTES NFR BLD: 7.1 %
NEUTROPHILS # BLD: 3.6 K/UL (ref 1.7–7.7)
NEUTROPHILS NFR BLD: 66.4 %
NT-PROBNP SERPL-MCNC: 236 PG/ML (ref 0–124)
O2 THERAPY: ABNORMAL
PCO2 BLDV: 34.7 MMHG (ref 40–50)
PERFORMED ON: ABNORMAL
PERFORMED ON: ABNORMAL
PH BLDV: 7.4 [PH] (ref 7.35–7.45)
PLATELET # BLD AUTO: 169 K/UL (ref 135–450)
PLATELET # BLD AUTO: 182 K/UL (ref 135–450)
PMV BLD AUTO: 7.7 FL (ref 5–10.5)
PMV BLD AUTO: 7.7 FL (ref 5–10.5)
PO2 BLDV: 45 MMHG
POTASSIUM SERPL-SCNC: 4.7 MMOL/L (ref 3.5–5.1)
PROT SERPL-MCNC: 5.5 G/DL (ref 6.4–8.2)
PROTHROMBIN TIME: 27.8 SEC (ref 11.5–14.8)
RBC # BLD AUTO: 2.15 M/UL (ref 4.2–5.9)
RBC # BLD AUTO: 2.28 M/UL (ref 4.2–5.9)
RETICS # AUTO: 0.12 M/UL
RETICS/RBC NFR AUTO: 5.4 % (ref 0.5–2.18)
SAO2 % BLDV: 77 %
SODIUM SERPL-SCNC: 135 MMOL/L (ref 136–145)
TIBC SERPL-MCNC: 344 UG/DL (ref 260–445)
TROPONIN, HIGH SENSITIVITY: 24 NG/L (ref 0–22)
TROPONIN, HIGH SENSITIVITY: 25 NG/L (ref 0–22)
VIT B12 SERPL-MCNC: 1536 PG/ML (ref 211–911)
WBC # BLD AUTO: 4.5 K/UL (ref 4–11)
WBC # BLD AUTO: 5.5 K/UL (ref 4–11)

## 2023-06-27 PROCEDURE — 82728 ASSAY OF FERRITIN: CPT

## 2023-06-27 PROCEDURE — 86850 RBC ANTIBODY SCREEN: CPT

## 2023-06-27 PROCEDURE — 2580000003 HC RX 258: Performed by: HOSPITALIST

## 2023-06-27 PROCEDURE — 93010 ELECTROCARDIOGRAM REPORT: CPT | Performed by: INTERNAL MEDICINE

## 2023-06-27 PROCEDURE — 99285 EMERGENCY DEPT VISIT HI MDM: CPT

## 2023-06-27 PROCEDURE — 86901 BLOOD TYPING SEROLOGIC RH(D): CPT

## 2023-06-27 PROCEDURE — 82746 ASSAY OF FOLIC ACID SERUM: CPT

## 2023-06-27 PROCEDURE — 71046 X-RAY EXAM CHEST 2 VIEWS: CPT

## 2023-06-27 PROCEDURE — 84484 ASSAY OF TROPONIN QUANT: CPT

## 2023-06-27 PROCEDURE — P9016 RBC LEUKOCYTES REDUCED: HCPCS

## 2023-06-27 PROCEDURE — 85610 PROTHROMBIN TIME: CPT

## 2023-06-27 PROCEDURE — 86900 BLOOD TYPING SEROLOGIC ABO: CPT

## 2023-06-27 PROCEDURE — 82607 VITAMIN B-12: CPT

## 2023-06-27 PROCEDURE — 82803 BLOOD GASES ANY COMBINATION: CPT

## 2023-06-27 PROCEDURE — 36430 TRANSFUSION BLD/BLD COMPNT: CPT

## 2023-06-27 PROCEDURE — 83550 IRON BINDING TEST: CPT

## 2023-06-27 PROCEDURE — 85027 COMPLETE CBC AUTOMATED: CPT

## 2023-06-27 PROCEDURE — 85014 HEMATOCRIT: CPT

## 2023-06-27 PROCEDURE — 85730 THROMBOPLASTIN TIME PARTIAL: CPT

## 2023-06-27 PROCEDURE — 85045 AUTOMATED RETICULOCYTE COUNT: CPT

## 2023-06-27 PROCEDURE — 6360000002 HC RX W HCPCS: Performed by: HOSPITALIST

## 2023-06-27 PROCEDURE — 83880 ASSAY OF NATRIURETIC PEPTIDE: CPT

## 2023-06-27 PROCEDURE — 30233N1 TRANSFUSION OF NONAUTOLOGOUS RED BLOOD CELLS INTO PERIPHERAL VEIN, PERCUTANEOUS APPROACH: ICD-10-PCS | Performed by: INTERNAL MEDICINE

## 2023-06-27 PROCEDURE — 93005 ELECTROCARDIOGRAM TRACING: CPT | Performed by: EMERGENCY MEDICINE

## 2023-06-27 PROCEDURE — 1200000000 HC SEMI PRIVATE

## 2023-06-27 PROCEDURE — 36415 COLL VENOUS BLD VENIPUNCTURE: CPT

## 2023-06-27 PROCEDURE — 85025 COMPLETE CBC W/AUTO DIFF WBC: CPT

## 2023-06-27 PROCEDURE — 80053 COMPREHEN METABOLIC PANEL: CPT

## 2023-06-27 PROCEDURE — 6370000000 HC RX 637 (ALT 250 FOR IP): Performed by: PHYSICIAN ASSISTANT

## 2023-06-27 PROCEDURE — 71260 CT THORAX DX C+: CPT

## 2023-06-27 PROCEDURE — 85018 HEMOGLOBIN: CPT

## 2023-06-27 PROCEDURE — 83540 ASSAY OF IRON: CPT

## 2023-06-27 PROCEDURE — 86923 COMPATIBILITY TEST ELECTRIC: CPT

## 2023-06-27 PROCEDURE — 6370000000 HC RX 637 (ALT 250 FOR IP): Performed by: HOSPITALIST

## 2023-06-27 PROCEDURE — 82270 OCCULT BLOOD FECES: CPT

## 2023-06-27 PROCEDURE — 6360000004 HC RX CONTRAST MEDICATION: Performed by: PHYSICIAN ASSISTANT

## 2023-06-27 PROCEDURE — C9113 INJ PANTOPRAZOLE SODIUM, VIA: HCPCS | Performed by: HOSPITALIST

## 2023-06-27 RX ORDER — ACETAMINOPHEN 325 MG/1
650 TABLET ORAL EVERY 6 HOURS PRN
Status: DISCONTINUED | OUTPATIENT
Start: 2023-06-27 | End: 2023-07-07 | Stop reason: HOSPADM

## 2023-06-27 RX ORDER — ENOXAPARIN SODIUM 100 MG/ML
40 INJECTION SUBCUTANEOUS DAILY
Status: DISCONTINUED | OUTPATIENT
Start: 2023-06-27 | End: 2023-06-27

## 2023-06-27 RX ORDER — ASPIRIN 81 MG/1
81 TABLET ORAL NIGHTLY
Status: DISCONTINUED | OUTPATIENT
Start: 2023-06-27 | End: 2023-06-30

## 2023-06-27 RX ORDER — HEPARIN SODIUM 10000 [USP'U]/100ML
5-30 INJECTION, SOLUTION INTRAVENOUS CONTINUOUS
Status: DISCONTINUED | OUTPATIENT
Start: 2023-06-27 | End: 2023-06-27

## 2023-06-27 RX ORDER — ROSUVASTATIN CALCIUM 40 MG/1
40 TABLET, COATED ORAL EVERY EVENING
Status: DISCONTINUED | OUTPATIENT
Start: 2023-06-27 | End: 2023-07-07 | Stop reason: HOSPADM

## 2023-06-27 RX ORDER — HEPARIN SODIUM 1000 [USP'U]/ML
80 INJECTION, SOLUTION INTRAVENOUS; SUBCUTANEOUS PRN
Status: DISCONTINUED | OUTPATIENT
Start: 2023-06-27 | End: 2023-06-27

## 2023-06-27 RX ORDER — ONDANSETRON 2 MG/ML
4 INJECTION INTRAMUSCULAR; INTRAVENOUS EVERY 6 HOURS PRN
Status: DISCONTINUED | OUTPATIENT
Start: 2023-06-27 | End: 2023-07-07 | Stop reason: HOSPADM

## 2023-06-27 RX ORDER — SODIUM CHLORIDE 0.9 % (FLUSH) 0.9 %
5-40 SYRINGE (ML) INJECTION PRN
Status: DISCONTINUED | OUTPATIENT
Start: 2023-06-27 | End: 2023-07-07 | Stop reason: HOSPADM

## 2023-06-27 RX ORDER — SODIUM CHLORIDE 9 MG/ML
INJECTION, SOLUTION INTRAVENOUS CONTINUOUS
Status: ACTIVE | OUTPATIENT
Start: 2023-06-27 | End: 2023-06-29

## 2023-06-27 RX ORDER — ALLOPURINOL 100 MG/1
100 TABLET ORAL DAILY
Status: DISCONTINUED | OUTPATIENT
Start: 2023-06-28 | End: 2023-07-07 | Stop reason: HOSPADM

## 2023-06-27 RX ORDER — SODIUM CHLORIDE 9 MG/ML
INJECTION, SOLUTION INTRAVENOUS PRN
Status: DISCONTINUED | OUTPATIENT
Start: 2023-06-27 | End: 2023-07-07 | Stop reason: HOSPADM

## 2023-06-27 RX ORDER — HEPARIN SODIUM 1000 [USP'U]/ML
80 INJECTION, SOLUTION INTRAVENOUS; SUBCUTANEOUS ONCE
Status: DISCONTINUED | OUTPATIENT
Start: 2023-06-27 | End: 2023-06-27

## 2023-06-27 RX ORDER — HEPARIN SODIUM 1000 [USP'U]/ML
40 INJECTION, SOLUTION INTRAVENOUS; SUBCUTANEOUS PRN
Status: DISCONTINUED | OUTPATIENT
Start: 2023-06-27 | End: 2023-06-27

## 2023-06-27 RX ORDER — TAMSULOSIN HYDROCHLORIDE 0.4 MG/1
0.4 CAPSULE ORAL NIGHTLY
Status: DISCONTINUED | OUTPATIENT
Start: 2023-06-27 | End: 2023-07-07 | Stop reason: HOSPADM

## 2023-06-27 RX ORDER — INSULIN LISPRO 100 [IU]/ML
0-16 INJECTION, SOLUTION INTRAVENOUS; SUBCUTANEOUS
Status: DISCONTINUED | OUTPATIENT
Start: 2023-06-27 | End: 2023-07-07 | Stop reason: HOSPADM

## 2023-06-27 RX ORDER — ASPIRIN 325 MG
325 TABLET ORAL ONCE
Status: COMPLETED | OUTPATIENT
Start: 2023-06-27 | End: 2023-06-27

## 2023-06-27 RX ORDER — ONDANSETRON 4 MG/1
4 TABLET, ORALLY DISINTEGRATING ORAL EVERY 8 HOURS PRN
Status: DISCONTINUED | OUTPATIENT
Start: 2023-06-27 | End: 2023-07-07 | Stop reason: HOSPADM

## 2023-06-27 RX ORDER — MIRTAZAPINE 15 MG/1
15 TABLET, FILM COATED ORAL NIGHTLY
Status: DISCONTINUED | OUTPATIENT
Start: 2023-06-27 | End: 2023-07-07 | Stop reason: HOSPADM

## 2023-06-27 RX ORDER — ALLOPURINOL 300 MG/1
150 TABLET ORAL DAILY
COMMUNITY
End: 2023-06-27

## 2023-06-27 RX ORDER — EZETIMIBE 10 MG/1
10 TABLET ORAL DAILY
Status: DISCONTINUED | OUTPATIENT
Start: 2023-06-28 | End: 2023-07-07 | Stop reason: HOSPADM

## 2023-06-27 RX ORDER — INSULIN GLARGINE 100 [IU]/ML
15 INJECTION, SOLUTION SUBCUTANEOUS NIGHTLY
Status: DISCONTINUED | OUTPATIENT
Start: 2023-06-27 | End: 2023-07-07 | Stop reason: HOSPADM

## 2023-06-27 RX ORDER — LANOLIN ALCOHOL/MO/W.PET/CERES
400 CREAM (GRAM) TOPICAL DAILY
Status: DISCONTINUED | OUTPATIENT
Start: 2023-06-28 | End: 2023-07-07 | Stop reason: HOSPADM

## 2023-06-27 RX ORDER — INSULIN LISPRO 100 [IU]/ML
0-4 INJECTION, SOLUTION INTRAVENOUS; SUBCUTANEOUS NIGHTLY
Status: DISCONTINUED | OUTPATIENT
Start: 2023-06-27 | End: 2023-07-07 | Stop reason: HOSPADM

## 2023-06-27 RX ORDER — SODIUM CHLORIDE 0.9 % (FLUSH) 0.9 %
5-40 SYRINGE (ML) INJECTION EVERY 12 HOURS SCHEDULED
Status: DISCONTINUED | OUTPATIENT
Start: 2023-06-27 | End: 2023-07-07 | Stop reason: HOSPADM

## 2023-06-27 RX ORDER — ACETAMINOPHEN 650 MG/1
650 SUPPOSITORY RECTAL EVERY 6 HOURS PRN
Status: DISCONTINUED | OUTPATIENT
Start: 2023-06-27 | End: 2023-07-07 | Stop reason: HOSPADM

## 2023-06-27 RX ORDER — POLYETHYLENE GLYCOL 3350 17 G/17G
17 POWDER, FOR SOLUTION ORAL DAILY PRN
Status: DISCONTINUED | OUTPATIENT
Start: 2023-06-27 | End: 2023-07-07 | Stop reason: HOSPADM

## 2023-06-27 RX ORDER — ALLOPURINOL 100 MG/1
100 TABLET ORAL DAILY
COMMUNITY

## 2023-06-27 RX ADMIN — ASPIRIN 325 MG: 325 TABLET ORAL at 14:54

## 2023-06-27 RX ADMIN — SODIUM CHLORIDE: 9 INJECTION, SOLUTION INTRAVENOUS at 18:31

## 2023-06-27 RX ADMIN — TAMSULOSIN HYDROCHLORIDE 0.4 MG: 0.4 CAPSULE ORAL at 20:44

## 2023-06-27 RX ADMIN — IOPAMIDOL 75 ML: 755 INJECTION, SOLUTION INTRAVENOUS at 14:06

## 2023-06-27 RX ADMIN — Medication 40 MG: at 20:44

## 2023-06-27 RX ADMIN — INSULIN GLARGINE 15 UNITS: 100 INJECTION, SOLUTION SUBCUTANEOUS at 20:44

## 2023-06-27 RX ADMIN — MIRTAZAPINE 15 MG: 15 TABLET, FILM COATED ORAL at 20:44

## 2023-06-27 RX ADMIN — ASPIRIN 81 MG: 81 TABLET, COATED ORAL at 20:44

## 2023-06-27 ASSESSMENT — LIFESTYLE VARIABLES: HOW OFTEN DO YOU HAVE A DRINK CONTAINING ALCOHOL: NEVER

## 2023-06-27 ASSESSMENT — PAIN - FUNCTIONAL ASSESSMENT: PAIN_FUNCTIONAL_ASSESSMENT: NONE - DENIES PAIN

## 2023-06-28 PROBLEM — D64.9 ANEMIA: Status: ACTIVE | Noted: 2023-06-28

## 2023-06-28 LAB
ALBUMIN SERPL-MCNC: 3.5 G/DL (ref 3.4–5)
ANION GAP SERPL CALCULATED.3IONS-SCNC: 7 MMOL/L (ref 3–16)
BUN SERPL-MCNC: 22 MG/DL (ref 7–20)
CALCIUM SERPL-MCNC: 9.3 MG/DL (ref 8.3–10.6)
CHLORIDE SERPL-SCNC: 109 MMOL/L (ref 99–110)
CO2 SERPL-SCNC: 24 MMOL/L (ref 21–32)
CREAT SERPL-MCNC: 1.5 MG/DL (ref 0.8–1.3)
DEPRECATED RDW RBC AUTO: 17 % (ref 12.4–15.4)
FERRITIN SERPL IA-MCNC: 23.5 NG/ML (ref 30–400)
GFR SERPLBLD CREATININE-BSD FMLA CKD-EPI: 50 ML/MIN/{1.73_M2}
GLUCOSE BLD-MCNC: 107 MG/DL (ref 70–99)
GLUCOSE BLD-MCNC: 125 MG/DL (ref 70–99)
GLUCOSE BLD-MCNC: 165 MG/DL (ref 70–99)
GLUCOSE BLD-MCNC: 97 MG/DL (ref 70–99)
GLUCOSE SERPL-MCNC: 101 MG/DL (ref 70–99)
HCT VFR BLD AUTO: 21.4 % (ref 40.5–52.5)
HCT VFR BLD AUTO: 21.8 % (ref 40.5–52.5)
HCT VFR BLD AUTO: 23.6 % (ref 40.5–52.5)
HGB BLD-MCNC: 7.2 G/DL (ref 13.5–17.5)
HGB BLD-MCNC: 7.3 G/DL (ref 13.5–17.5)
HGB BLD-MCNC: 8 G/DL (ref 13.5–17.5)
INR PPP: 2.42 (ref 0.84–1.16)
MCH RBC QN AUTO: 31 PG (ref 26–34)
MCHC RBC AUTO-ENTMCNC: 33 G/DL (ref 31–36)
MCV RBC AUTO: 94.2 FL (ref 80–100)
PERFORMED ON: ABNORMAL
PERFORMED ON: NORMAL
PHOSPHATE SERPL-MCNC: 2.9 MG/DL (ref 2.5–4.9)
PLATELET # BLD AUTO: 145 K/UL (ref 135–450)
PMV BLD AUTO: 8 FL (ref 5–10.5)
POTASSIUM SERPL-SCNC: 4.5 MMOL/L (ref 3.5–5.1)
PROTHROMBIN TIME: 26.2 SEC (ref 11.5–14.8)
RBC # BLD AUTO: 2.31 M/UL (ref 4.2–5.9)
SODIUM SERPL-SCNC: 140 MMOL/L (ref 136–145)
WBC # BLD AUTO: 3.6 K/UL (ref 4–11)

## 2023-06-28 PROCEDURE — 85018 HEMOGLOBIN: CPT

## 2023-06-28 PROCEDURE — 1200000000 HC SEMI PRIVATE

## 2023-06-28 PROCEDURE — 94760 N-INVAS EAR/PLS OXIMETRY 1: CPT

## 2023-06-28 PROCEDURE — 36415 COLL VENOUS BLD VENIPUNCTURE: CPT

## 2023-06-28 PROCEDURE — 6370000000 HC RX 637 (ALT 250 FOR IP): Performed by: HOSPITALIST

## 2023-06-28 PROCEDURE — 2580000003 HC RX 258: Performed by: HOSPITALIST

## 2023-06-28 PROCEDURE — 6360000002 HC RX W HCPCS: Performed by: HOSPITALIST

## 2023-06-28 PROCEDURE — 80069 RENAL FUNCTION PANEL: CPT

## 2023-06-28 PROCEDURE — 85027 COMPLETE CBC AUTOMATED: CPT

## 2023-06-28 PROCEDURE — 99223 1ST HOSP IP/OBS HIGH 75: CPT | Performed by: INTERNAL MEDICINE

## 2023-06-28 PROCEDURE — C9113 INJ PANTOPRAZOLE SODIUM, VIA: HCPCS | Performed by: HOSPITALIST

## 2023-06-28 PROCEDURE — 6360000002 HC RX W HCPCS: Performed by: PHYSICIAN ASSISTANT

## 2023-06-28 PROCEDURE — 85014 HEMATOCRIT: CPT

## 2023-06-28 PROCEDURE — 85610 PROTHROMBIN TIME: CPT

## 2023-06-28 RX ADMIN — MIRTAZAPINE 15 MG: 15 TABLET, FILM COATED ORAL at 21:00

## 2023-06-28 RX ADMIN — ROSUVASTATIN CALCIUM 40 MG: 40 TABLET, FILM COATED ORAL at 17:46

## 2023-06-28 RX ADMIN — ALLOPURINOL 100 MG: 100 TABLET ORAL at 09:22

## 2023-06-28 RX ADMIN — METOPROLOL SUCCINATE 75 MG: 50 TABLET, EXTENDED RELEASE ORAL at 09:22

## 2023-06-28 RX ADMIN — Medication 40 MG: at 09:23

## 2023-06-28 RX ADMIN — SODIUM CHLORIDE: 9 INJECTION, SOLUTION INTRAVENOUS at 21:10

## 2023-06-28 RX ADMIN — INSULIN GLARGINE 15 UNITS: 100 INJECTION, SOLUTION SUBCUTANEOUS at 21:00

## 2023-06-28 RX ADMIN — TAMSULOSIN HYDROCHLORIDE 0.4 MG: 0.4 CAPSULE ORAL at 21:00

## 2023-06-28 RX ADMIN — SODIUM CHLORIDE: 9 INJECTION, SOLUTION INTRAVENOUS at 01:13

## 2023-06-28 RX ADMIN — EZETIMIBE 10 MG: 10 TABLET ORAL at 09:22

## 2023-06-28 RX ADMIN — LEVOTHYROXINE SODIUM 137 MCG: 0.11 TABLET ORAL at 06:04

## 2023-06-28 RX ADMIN — Medication 400 MG: at 09:22

## 2023-06-28 RX ADMIN — Medication 40 MG: at 21:00

## 2023-06-28 RX ADMIN — METOPROLOL SUCCINATE 75 MG: 50 TABLET, EXTENDED RELEASE ORAL at 20:59

## 2023-06-28 RX ADMIN — IRON SUCROSE 200 MG: 20 INJECTION, SOLUTION INTRAVENOUS at 09:39

## 2023-06-29 LAB
ALBUMIN SERPL-MCNC: 3.2 G/DL (ref 3.4–5)
ANION GAP SERPL CALCULATED.3IONS-SCNC: 9 MMOL/L (ref 3–16)
APTT BLD: 31.2 SEC (ref 22.7–35.9)
BLOOD BANK DISPENSE STATUS: NORMAL
BLOOD BANK PRODUCT CODE: NORMAL
BPU ID: NORMAL
BUN SERPL-MCNC: 22 MG/DL (ref 7–20)
CALCIUM SERPL-MCNC: 8.8 MG/DL (ref 8.3–10.6)
CHLORIDE SERPL-SCNC: 107 MMOL/L (ref 99–110)
CO2 SERPL-SCNC: 20 MMOL/L (ref 21–32)
CREAT SERPL-MCNC: 1.3 MG/DL (ref 0.8–1.3)
DEPRECATED RDW RBC AUTO: 17 % (ref 12.4–15.4)
DEPRECATED RDW RBC AUTO: 17.4 % (ref 12.4–15.4)
DESCRIPTION BLOOD BANK: NORMAL
GFR SERPLBLD CREATININE-BSD FMLA CKD-EPI: 59 ML/MIN/{1.73_M2}
GLUCOSE BLD-MCNC: 106 MG/DL (ref 70–99)
GLUCOSE BLD-MCNC: 149 MG/DL (ref 70–99)
GLUCOSE BLD-MCNC: 161 MG/DL (ref 70–99)
GLUCOSE BLD-MCNC: 86 MG/DL (ref 70–99)
GLUCOSE SERPL-MCNC: 81 MG/DL (ref 70–99)
HCT VFR BLD AUTO: 21.1 % (ref 40.5–52.5)
HCT VFR BLD AUTO: 23.9 % (ref 40.5–52.5)
HCT VFR BLD AUTO: 24 % (ref 40.5–52.5)
HCT VFR BLD AUTO: 24.5 % (ref 40.5–52.5)
HGB BLD-MCNC: 7.1 G/DL (ref 13.5–17.5)
HGB BLD-MCNC: 7.8 G/DL (ref 13.5–17.5)
HGB BLD-MCNC: 8.2 G/DL (ref 13.5–17.5)
HGB BLD-MCNC: 8.2 G/DL (ref 13.5–17.5)
INR PPP: 1.91 (ref 0.84–1.16)
INR PPP: 2.14 (ref 0.84–1.16)
MCH RBC QN AUTO: 31 PG (ref 26–34)
MCH RBC QN AUTO: 31.2 PG (ref 26–34)
MCHC RBC AUTO-ENTMCNC: 33.4 G/DL (ref 31–36)
MCHC RBC AUTO-ENTMCNC: 33.5 G/DL (ref 31–36)
MCV RBC AUTO: 92.6 FL (ref 80–100)
MCV RBC AUTO: 93.4 FL (ref 80–100)
PERFORMED ON: ABNORMAL
PERFORMED ON: NORMAL
PHOSPHATE SERPL-MCNC: 2.5 MG/DL (ref 2.5–4.9)
PLATELET # BLD AUTO: 154 K/UL (ref 135–450)
PLATELET # BLD AUTO: 189 K/UL (ref 135–450)
PMV BLD AUTO: 7.4 FL (ref 5–10.5)
PMV BLD AUTO: 7.7 FL (ref 5–10.5)
POTASSIUM SERPL-SCNC: 4.3 MMOL/L (ref 3.5–5.1)
PROTHROMBIN TIME: 21.8 SEC (ref 11.5–14.8)
PROTHROMBIN TIME: 23.8 SEC (ref 11.5–14.8)
RBC # BLD AUTO: 2.26 M/UL (ref 4.2–5.9)
RBC # BLD AUTO: 2.65 M/UL (ref 4.2–5.9)
SODIUM SERPL-SCNC: 136 MMOL/L (ref 136–145)
WBC # BLD AUTO: 4.7 K/UL (ref 4–11)
WBC # BLD AUTO: 5.9 K/UL (ref 4–11)

## 2023-06-29 PROCEDURE — 85014 HEMATOCRIT: CPT

## 2023-06-29 PROCEDURE — 6370000000 HC RX 637 (ALT 250 FOR IP): Performed by: INTERNAL MEDICINE

## 2023-06-29 PROCEDURE — 2580000003 HC RX 258: Performed by: HOSPITALIST

## 2023-06-29 PROCEDURE — 85018 HEMOGLOBIN: CPT

## 2023-06-29 PROCEDURE — 6360000002 HC RX W HCPCS: Performed by: PHYSICIAN ASSISTANT

## 2023-06-29 PROCEDURE — 85610 PROTHROMBIN TIME: CPT

## 2023-06-29 PROCEDURE — 80069 RENAL FUNCTION PANEL: CPT

## 2023-06-29 PROCEDURE — 99232 SBSQ HOSP IP/OBS MODERATE 35: CPT | Performed by: NURSE PRACTITIONER

## 2023-06-29 PROCEDURE — 6360000002 HC RX W HCPCS: Performed by: HOSPITALIST

## 2023-06-29 PROCEDURE — 94760 N-INVAS EAR/PLS OXIMETRY 1: CPT

## 2023-06-29 PROCEDURE — 1200000000 HC SEMI PRIVATE

## 2023-06-29 PROCEDURE — 85730 THROMBOPLASTIN TIME PARTIAL: CPT

## 2023-06-29 PROCEDURE — 36430 TRANSFUSION BLD/BLD COMPNT: CPT

## 2023-06-29 PROCEDURE — 85027 COMPLETE CBC AUTOMATED: CPT

## 2023-06-29 PROCEDURE — 6360000002 HC RX W HCPCS: Performed by: STUDENT IN AN ORGANIZED HEALTH CARE EDUCATION/TRAINING PROGRAM

## 2023-06-29 PROCEDURE — 6370000000 HC RX 637 (ALT 250 FOR IP): Performed by: HOSPITALIST

## 2023-06-29 PROCEDURE — C9113 INJ PANTOPRAZOLE SODIUM, VIA: HCPCS | Performed by: HOSPITALIST

## 2023-06-29 PROCEDURE — 36415 COLL VENOUS BLD VENIPUNCTURE: CPT

## 2023-06-29 RX ORDER — DEXTROSE MONOHYDRATE 100 MG/ML
INJECTION, SOLUTION INTRAVENOUS CONTINUOUS PRN
Status: DISCONTINUED | OUTPATIENT
Start: 2023-06-29 | End: 2023-07-07 | Stop reason: HOSPADM

## 2023-06-29 RX ORDER — SODIUM CHLORIDE 9 MG/ML
INJECTION, SOLUTION INTRAVENOUS PRN
Status: COMPLETED | OUTPATIENT
Start: 2023-06-29 | End: 2023-07-03

## 2023-06-29 RX ORDER — HEPARIN SODIUM 1000 [USP'U]/ML
4000 INJECTION, SOLUTION INTRAVENOUS; SUBCUTANEOUS ONCE
Status: COMPLETED | OUTPATIENT
Start: 2023-06-29 | End: 2023-06-29

## 2023-06-29 RX ORDER — HEPARIN SODIUM 1000 [USP'U]/ML
60 INJECTION, SOLUTION INTRAVENOUS; SUBCUTANEOUS PRN
Status: DISCONTINUED | OUTPATIENT
Start: 2023-06-29 | End: 2023-06-29

## 2023-06-29 RX ORDER — HEPARIN SODIUM 10000 [USP'U]/100ML
0-4000 INJECTION, SOLUTION INTRAVENOUS CONTINUOUS
Status: DISCONTINUED | OUTPATIENT
Start: 2023-06-29 | End: 2023-07-07 | Stop reason: HOSPADM

## 2023-06-29 RX ORDER — HEPARIN SODIUM 1000 [USP'U]/ML
30 INJECTION, SOLUTION INTRAVENOUS; SUBCUTANEOUS PRN
Status: DISCONTINUED | OUTPATIENT
Start: 2023-06-29 | End: 2023-06-29

## 2023-06-29 RX ADMIN — Medication 400 MG: at 07:58

## 2023-06-29 RX ADMIN — HEPARIN SODIUM 1000 UNITS/HR: 10000 INJECTION, SOLUTION INTRAVENOUS at 19:25

## 2023-06-29 RX ADMIN — TAMSULOSIN HYDROCHLORIDE 0.4 MG: 0.4 CAPSULE ORAL at 21:06

## 2023-06-29 RX ADMIN — HEPARIN SODIUM 4000 UNITS: 1000 INJECTION INTRAVENOUS; SUBCUTANEOUS at 19:22

## 2023-06-29 RX ADMIN — LEVOTHYROXINE SODIUM 137 MCG: 0.11 TABLET ORAL at 06:14

## 2023-06-29 RX ADMIN — Medication 10 ML: at 08:01

## 2023-06-29 RX ADMIN — EZETIMIBE 10 MG: 10 TABLET ORAL at 07:58

## 2023-06-29 RX ADMIN — MIRTAZAPINE 15 MG: 15 TABLET, FILM COATED ORAL at 21:07

## 2023-06-29 RX ADMIN — METOPROLOL SUCCINATE 75 MG: 50 TABLET, EXTENDED RELEASE ORAL at 07:58

## 2023-06-29 RX ADMIN — IRON SUCROSE 200 MG: 20 INJECTION, SOLUTION INTRAVENOUS at 07:58

## 2023-06-29 RX ADMIN — ROSUVASTATIN CALCIUM 40 MG: 40 TABLET, FILM COATED ORAL at 18:07

## 2023-06-29 RX ADMIN — METOPROLOL SUCCINATE 75 MG: 50 TABLET, EXTENDED RELEASE ORAL at 21:06

## 2023-06-29 RX ADMIN — Medication 40 MG: at 07:58

## 2023-06-29 RX ADMIN — Medication 40 MG: at 22:49

## 2023-06-29 RX ADMIN — ALLOPURINOL 100 MG: 100 TABLET ORAL at 07:58

## 2023-06-29 RX ADMIN — POLYETHYLENE GLYCOL-3350 AND ELECTROLYTES 4000 ML: 236; 6.74; 5.86; 2.97; 22.74 POWDER, FOR SOLUTION ORAL at 18:00

## 2023-06-30 ENCOUNTER — ANESTHESIA (OUTPATIENT)
Dept: ENDOSCOPY | Age: 73
End: 2023-06-30
Payer: MEDICARE

## 2023-06-30 ENCOUNTER — ANESTHESIA EVENT (OUTPATIENT)
Dept: ENDOSCOPY | Age: 73
End: 2023-06-30
Payer: MEDICARE

## 2023-06-30 LAB
ALBUMIN SERPL-MCNC: 3.2 G/DL (ref 3.4–5)
ANION GAP SERPL CALCULATED.3IONS-SCNC: 7 MMOL/L (ref 3–16)
ANTI-XA UNFRAC HEPARIN: 0.16 IU/ML (ref 0.3–0.7)
ANTI-XA UNFRAC HEPARIN: 0.24 IU/ML (ref 0.3–0.7)
ANTI-XA UNFRAC HEPARIN: 0.38 IU/ML (ref 0.3–0.7)
BUN SERPL-MCNC: 20 MG/DL (ref 7–20)
CALCIUM SERPL-MCNC: 8.4 MG/DL (ref 8.3–10.6)
CHLORIDE SERPL-SCNC: 106 MMOL/L (ref 99–110)
CO2 SERPL-SCNC: 23 MMOL/L (ref 21–32)
CREAT SERPL-MCNC: 1.3 MG/DL (ref 0.8–1.3)
DEPRECATED RDW RBC AUTO: 17.3 % (ref 12.4–15.4)
GFR SERPLBLD CREATININE-BSD FMLA CKD-EPI: 59 ML/MIN/{1.73_M2}
GLUCOSE BLD-MCNC: 114 MG/DL (ref 70–99)
GLUCOSE BLD-MCNC: 120 MG/DL (ref 70–99)
GLUCOSE BLD-MCNC: 121 MG/DL (ref 70–99)
GLUCOSE BLD-MCNC: 122 MG/DL (ref 70–99)
GLUCOSE BLD-MCNC: 189 MG/DL (ref 70–99)
GLUCOSE SERPL-MCNC: 112 MG/DL (ref 70–99)
HCT VFR BLD AUTO: 21.8 % (ref 40.5–52.5)
HGB BLD-MCNC: 7.3 G/DL (ref 13.5–17.5)
INR PPP: 1.76 (ref 0.84–1.16)
MCH RBC QN AUTO: 30.7 PG (ref 26–34)
MCHC RBC AUTO-ENTMCNC: 33.5 G/DL (ref 31–36)
MCV RBC AUTO: 91.6 FL (ref 80–100)
PERFORMED ON: ABNORMAL
PHOSPHATE SERPL-MCNC: 1.8 MG/DL (ref 2.5–4.9)
PLATELET # BLD AUTO: 153 K/UL (ref 135–450)
PMV BLD AUTO: 7.4 FL (ref 5–10.5)
POTASSIUM SERPL-SCNC: 3.8 MMOL/L (ref 3.5–5.1)
PROTHROMBIN TIME: 20.5 SEC (ref 11.5–14.8)
RBC # BLD AUTO: 2.38 M/UL (ref 4.2–5.9)
SODIUM SERPL-SCNC: 136 MMOL/L (ref 136–145)
WBC # BLD AUTO: 4.9 K/UL (ref 4–11)

## 2023-06-30 PROCEDURE — 6360000002 HC RX W HCPCS: Performed by: INTERNAL MEDICINE

## 2023-06-30 PROCEDURE — 3609027000 HC COLONOSCOPY: Performed by: INTERNAL MEDICINE

## 2023-06-30 PROCEDURE — 80069 RENAL FUNCTION PANEL: CPT

## 2023-06-30 PROCEDURE — 85027 COMPLETE CBC AUTOMATED: CPT

## 2023-06-30 PROCEDURE — 0DJ07ZZ INSPECTION OF UPPER INTESTINAL TRACT, VIA NATURAL OR ARTIFICIAL OPENING: ICD-10-PCS | Performed by: INTERNAL MEDICINE

## 2023-06-30 PROCEDURE — 2500000003 HC RX 250 WO HCPCS

## 2023-06-30 PROCEDURE — 0DJD8ZZ INSPECTION OF LOWER INTESTINAL TRACT, VIA NATURAL OR ARTIFICIAL OPENING ENDOSCOPIC: ICD-10-PCS | Performed by: INTERNAL MEDICINE

## 2023-06-30 PROCEDURE — 3609019000 HC EGD CAPSULE ENDOSCOPY: Performed by: INTERNAL MEDICINE

## 2023-06-30 PROCEDURE — 85610 PROTHROMBIN TIME: CPT

## 2023-06-30 PROCEDURE — C9113 INJ PANTOPRAZOLE SODIUM, VIA: HCPCS | Performed by: HOSPITALIST

## 2023-06-30 PROCEDURE — 3700000001 HC ADD 15 MINUTES (ANESTHESIA): Performed by: INTERNAL MEDICINE

## 2023-06-30 PROCEDURE — 94760 N-INVAS EAR/PLS OXIMETRY 1: CPT

## 2023-06-30 PROCEDURE — 3609017100 HC EGD: Performed by: INTERNAL MEDICINE

## 2023-06-30 PROCEDURE — 7100000000 HC PACU RECOVERY - FIRST 15 MIN: Performed by: INTERNAL MEDICINE

## 2023-06-30 PROCEDURE — 2580000003 HC RX 258: Performed by: HOSPITALIST

## 2023-06-30 PROCEDURE — 6360000002 HC RX W HCPCS

## 2023-06-30 PROCEDURE — 2709999900 HC NON-CHARGEABLE SUPPLY: Performed by: INTERNAL MEDICINE

## 2023-06-30 PROCEDURE — 6360000002 HC RX W HCPCS: Performed by: HOSPITALIST

## 2023-06-30 PROCEDURE — 6360000002 HC RX W HCPCS: Performed by: STUDENT IN AN ORGANIZED HEALTH CARE EDUCATION/TRAINING PROGRAM

## 2023-06-30 PROCEDURE — 6360000002 HC RX W HCPCS: Performed by: PHYSICIAN ASSISTANT

## 2023-06-30 PROCEDURE — 6370000000 HC RX 637 (ALT 250 FOR IP): Performed by: INTERNAL MEDICINE

## 2023-06-30 PROCEDURE — 2720000010 HC SURG SUPPLY STERILE: Performed by: INTERNAL MEDICINE

## 2023-06-30 PROCEDURE — 85520 HEPARIN ASSAY: CPT

## 2023-06-30 PROCEDURE — 0DJ08ZZ INSPECTION OF UPPER INTESTINAL TRACT, VIA NATURAL OR ARTIFICIAL OPENING ENDOSCOPIC: ICD-10-PCS | Performed by: INTERNAL MEDICINE

## 2023-06-30 PROCEDURE — 7100000001 HC PACU RECOVERY - ADDTL 15 MIN: Performed by: INTERNAL MEDICINE

## 2023-06-30 PROCEDURE — 6370000000 HC RX 637 (ALT 250 FOR IP): Performed by: HOSPITALIST

## 2023-06-30 PROCEDURE — 1200000000 HC SEMI PRIVATE

## 2023-06-30 PROCEDURE — 36415 COLL VENOUS BLD VENIPUNCTURE: CPT

## 2023-06-30 PROCEDURE — 3700000000 HC ANESTHESIA ATTENDED CARE: Performed by: INTERNAL MEDICINE

## 2023-06-30 PROCEDURE — 99232 SBSQ HOSP IP/OBS MODERATE 35: CPT | Performed by: NURSE PRACTITIONER

## 2023-06-30 RX ORDER — SODIUM CHLORIDE 0.9 % (FLUSH) 0.9 %
5-40 SYRINGE (ML) INJECTION EVERY 12 HOURS SCHEDULED
Status: DISCONTINUED | OUTPATIENT
Start: 2023-06-30 | End: 2023-06-30

## 2023-06-30 RX ORDER — DROPERIDOL 2.5 MG/ML
0.62 INJECTION, SOLUTION INTRAMUSCULAR; INTRAVENOUS
Status: DISCONTINUED | OUTPATIENT
Start: 2023-06-30 | End: 2023-06-30

## 2023-06-30 RX ORDER — ONDANSETRON 2 MG/ML
4 INJECTION INTRAMUSCULAR; INTRAVENOUS
Status: DISCONTINUED | OUTPATIENT
Start: 2023-06-30 | End: 2023-06-30

## 2023-06-30 RX ORDER — HEPARIN SODIUM 1000 [USP'U]/ML
2000 INJECTION, SOLUTION INTRAVENOUS; SUBCUTANEOUS ONCE
Status: COMPLETED | OUTPATIENT
Start: 2023-06-30 | End: 2023-06-30

## 2023-06-30 RX ORDER — PROPOFOL 10 MG/ML
INJECTION, EMULSION INTRAVENOUS PRN
Status: DISCONTINUED | OUTPATIENT
Start: 2023-06-30 | End: 2023-06-30 | Stop reason: SDUPTHER

## 2023-06-30 RX ORDER — LIDOCAINE HYDROCHLORIDE 20 MG/ML
INJECTION, SOLUTION EPIDURAL; INFILTRATION; INTRACAUDAL; PERINEURAL PRN
Status: DISCONTINUED | OUTPATIENT
Start: 2023-06-30 | End: 2023-06-30 | Stop reason: SDUPTHER

## 2023-06-30 RX ORDER — PANTOPRAZOLE SODIUM 40 MG/1
40 TABLET, DELAYED RELEASE ORAL
Status: DISCONTINUED | OUTPATIENT
Start: 2023-07-01 | End: 2023-07-07 | Stop reason: HOSPADM

## 2023-06-30 RX ORDER — PROPOFOL 10 MG/ML
INJECTION, EMULSION INTRAVENOUS CONTINUOUS PRN
Status: DISCONTINUED | OUTPATIENT
Start: 2023-06-30 | End: 2023-06-30 | Stop reason: SDUPTHER

## 2023-06-30 RX ORDER — SODIUM CHLORIDE 9 MG/ML
INJECTION, SOLUTION INTRAVENOUS PRN
Status: DISCONTINUED | OUTPATIENT
Start: 2023-06-30 | End: 2023-06-30

## 2023-06-30 RX ORDER — ASPIRIN 81 MG/1
81 TABLET ORAL NIGHTLY
Status: DISCONTINUED | OUTPATIENT
Start: 2023-06-30 | End: 2023-07-07 | Stop reason: HOSPADM

## 2023-06-30 RX ORDER — SODIUM CHLORIDE 0.9 % (FLUSH) 0.9 %
5-40 SYRINGE (ML) INJECTION PRN
Status: DISCONTINUED | OUTPATIENT
Start: 2023-06-30 | End: 2023-06-30

## 2023-06-30 RX ADMIN — MIRTAZAPINE 15 MG: 15 TABLET, FILM COATED ORAL at 22:34

## 2023-06-30 RX ADMIN — ASPIRIN 81 MG: 81 TABLET, COATED ORAL at 22:33

## 2023-06-30 RX ADMIN — LIDOCAINE HYDROCHLORIDE 100 MG: 20 INJECTION, SOLUTION EPIDURAL; INFILTRATION; INTRACAUDAL; PERINEURAL at 13:44

## 2023-06-30 RX ADMIN — WARFARIN SODIUM 3.5 MG: 2.5 TABLET ORAL at 17:52

## 2023-06-30 RX ADMIN — HEPARIN SODIUM 2000 UNITS: 1000 INJECTION INTRAVENOUS; SUBCUTANEOUS at 22:41

## 2023-06-30 RX ADMIN — HEPARIN SODIUM 1180 UNITS/HR: 10000 INJECTION, SOLUTION INTRAVENOUS at 03:08

## 2023-06-30 RX ADMIN — TAMSULOSIN HYDROCHLORIDE 0.4 MG: 0.4 CAPSULE ORAL at 22:33

## 2023-06-30 RX ADMIN — PROPOFOL 140 MCG/KG/MIN: 10 INJECTION, EMULSION INTRAVENOUS at 13:44

## 2023-06-30 RX ADMIN — Medication 40 MG: at 08:31

## 2023-06-30 RX ADMIN — PROPOFOL 100 MG: 10 INJECTION, EMULSION INTRAVENOUS at 13:44

## 2023-06-30 RX ADMIN — HEPARIN SODIUM 2000 UNITS: 1000 INJECTION INTRAVENOUS; SUBCUTANEOUS at 03:06

## 2023-06-30 RX ADMIN — METOPROLOL SUCCINATE 75 MG: 50 TABLET, EXTENDED RELEASE ORAL at 08:31

## 2023-06-30 RX ADMIN — METOPROLOL SUCCINATE 75 MG: 50 TABLET, EXTENDED RELEASE ORAL at 22:30

## 2023-06-30 RX ADMIN — ALLOPURINOL 100 MG: 100 TABLET ORAL at 08:30

## 2023-06-30 RX ADMIN — Medication 10 ML: at 08:31

## 2023-06-30 RX ADMIN — IRON SUCROSE 200 MG: 20 INJECTION, SOLUTION INTRAVENOUS at 08:31

## 2023-06-30 RX ADMIN — ROSUVASTATIN CALCIUM 40 MG: 40 TABLET, FILM COATED ORAL at 17:52

## 2023-06-30 RX ADMIN — HEPARIN SODIUM 1180 UNITS/HR: 10000 INJECTION, SOLUTION INTRAVENOUS at 15:10

## 2023-06-30 RX ADMIN — SODIUM CHLORIDE: 9 INJECTION, SOLUTION INTRAVENOUS at 13:38

## 2023-06-30 RX ADMIN — EZETIMIBE 10 MG: 10 TABLET ORAL at 08:30

## 2023-06-30 RX ADMIN — INSULIN GLARGINE 15 UNITS: 100 INJECTION, SOLUTION SUBCUTANEOUS at 22:33

## 2023-06-30 RX ADMIN — Medication 400 MG: at 08:31

## 2023-06-30 RX ADMIN — HEPARIN SODIUM 1360 UNITS/HR: 10000 INJECTION, SOLUTION INTRAVENOUS at 22:48

## 2023-06-30 ASSESSMENT — PAIN - FUNCTIONAL ASSESSMENT: PAIN_FUNCTIONAL_ASSESSMENT: 0-10

## 2023-07-01 LAB
ABO + RH BLD: NORMAL
ALBUMIN SERPL-MCNC: 3.4 G/DL (ref 3.4–5)
ANION GAP SERPL CALCULATED.3IONS-SCNC: 7 MMOL/L (ref 3–16)
ANTI-XA UNFRAC HEPARIN: 0.37 IU/ML (ref 0.3–0.7)
ANTI-XA UNFRAC HEPARIN: 0.4 IU/ML (ref 0.3–0.7)
BLD GP AB SCN SERPL QL: NORMAL
BUN SERPL-MCNC: 16 MG/DL (ref 7–20)
CALCIUM SERPL-MCNC: 8.3 MG/DL (ref 8.3–10.6)
CHLORIDE SERPL-SCNC: 109 MMOL/L (ref 99–110)
CO2 SERPL-SCNC: 23 MMOL/L (ref 21–32)
CREAT SERPL-MCNC: 1.4 MG/DL (ref 0.8–1.3)
DEPRECATED RDW RBC AUTO: 17.1 % (ref 12.4–15.4)
GFR SERPLBLD CREATININE-BSD FMLA CKD-EPI: 54 ML/MIN/{1.73_M2}
GLUCOSE BLD-MCNC: 145 MG/DL (ref 70–99)
GLUCOSE BLD-MCNC: 152 MG/DL (ref 70–99)
GLUCOSE BLD-MCNC: 175 MG/DL (ref 70–99)
GLUCOSE BLD-MCNC: 207 MG/DL (ref 70–99)
GLUCOSE SERPL-MCNC: 115 MG/DL (ref 70–99)
HCT VFR BLD AUTO: 20.8 % (ref 40.5–52.5)
HCT VFR BLD AUTO: 22.9 % (ref 40.5–52.5)
HGB BLD-MCNC: 6.9 G/DL (ref 13.5–17.5)
HGB BLD-MCNC: 7.8 G/DL (ref 13.5–17.5)
INR PPP: 1.5 (ref 0.84–1.16)
MCH RBC QN AUTO: 31.5 PG (ref 26–34)
MCHC RBC AUTO-ENTMCNC: 33.4 G/DL (ref 31–36)
MCV RBC AUTO: 94.4 FL (ref 80–100)
PERFORMED ON: ABNORMAL
PHOSPHATE SERPL-MCNC: 2.1 MG/DL (ref 2.5–4.9)
PLATELET # BLD AUTO: 165 K/UL (ref 135–450)
PMV BLD AUTO: 7.4 FL (ref 5–10.5)
POTASSIUM SERPL-SCNC: 4.3 MMOL/L (ref 3.5–5.1)
PROTHROMBIN TIME: 18 SEC (ref 11.5–14.8)
RBC # BLD AUTO: 2.2 M/UL (ref 4.2–5.9)
SODIUM SERPL-SCNC: 139 MMOL/L (ref 136–145)
WBC # BLD AUTO: 5.8 K/UL (ref 4–11)

## 2023-07-01 PROCEDURE — 85520 HEPARIN ASSAY: CPT

## 2023-07-01 PROCEDURE — 80069 RENAL FUNCTION PANEL: CPT

## 2023-07-01 PROCEDURE — 85610 PROTHROMBIN TIME: CPT

## 2023-07-01 PROCEDURE — 36430 TRANSFUSION BLD/BLD COMPNT: CPT

## 2023-07-01 PROCEDURE — 6370000000 HC RX 637 (ALT 250 FOR IP): Performed by: INTERNAL MEDICINE

## 2023-07-01 PROCEDURE — 86850 RBC ANTIBODY SCREEN: CPT

## 2023-07-01 PROCEDURE — 1200000000 HC SEMI PRIVATE

## 2023-07-01 PROCEDURE — 6360000002 HC RX W HCPCS: Performed by: INTERNAL MEDICINE

## 2023-07-01 PROCEDURE — 86901 BLOOD TYPING SEROLOGIC RH(D): CPT

## 2023-07-01 PROCEDURE — 85018 HEMOGLOBIN: CPT

## 2023-07-01 PROCEDURE — P9016 RBC LEUKOCYTES REDUCED: HCPCS

## 2023-07-01 PROCEDURE — 85027 COMPLETE CBC AUTOMATED: CPT

## 2023-07-01 PROCEDURE — 85014 HEMATOCRIT: CPT

## 2023-07-01 PROCEDURE — 86923 COMPATIBILITY TEST ELECTRIC: CPT

## 2023-07-01 PROCEDURE — 86900 BLOOD TYPING SEROLOGIC ABO: CPT

## 2023-07-01 PROCEDURE — 36415 COLL VENOUS BLD VENIPUNCTURE: CPT

## 2023-07-01 PROCEDURE — 6370000000 HC RX 637 (ALT 250 FOR IP): Performed by: HOSPITALIST

## 2023-07-01 PROCEDURE — 99232 SBSQ HOSP IP/OBS MODERATE 35: CPT | Performed by: NURSE PRACTITIONER

## 2023-07-01 RX ORDER — SODIUM CHLORIDE 9 MG/ML
INJECTION, SOLUTION INTRAVENOUS PRN
Status: DISCONTINUED | OUTPATIENT
Start: 2023-07-01 | End: 2023-07-07 | Stop reason: HOSPADM

## 2023-07-01 RX ORDER — WARFARIN SODIUM 5 MG/1
5 TABLET ORAL
Status: COMPLETED | OUTPATIENT
Start: 2023-07-01 | End: 2023-07-01

## 2023-07-01 RX ADMIN — ROSUVASTATIN CALCIUM 40 MG: 40 TABLET, FILM COATED ORAL at 18:35

## 2023-07-01 RX ADMIN — PANTOPRAZOLE SODIUM 40 MG: 40 TABLET, DELAYED RELEASE ORAL at 06:32

## 2023-07-01 RX ADMIN — EZETIMIBE 10 MG: 10 TABLET ORAL at 08:35

## 2023-07-01 RX ADMIN — ASPIRIN 81 MG: 81 TABLET, COATED ORAL at 21:53

## 2023-07-01 RX ADMIN — INSULIN GLARGINE 15 UNITS: 100 INJECTION, SOLUTION SUBCUTANEOUS at 21:53

## 2023-07-01 RX ADMIN — TAMSULOSIN HYDROCHLORIDE 0.4 MG: 0.4 CAPSULE ORAL at 21:53

## 2023-07-01 RX ADMIN — ALLOPURINOL 100 MG: 100 TABLET ORAL at 08:35

## 2023-07-01 RX ADMIN — LEVOTHYROXINE SODIUM 137 MCG: 0.11 TABLET ORAL at 08:35

## 2023-07-01 RX ADMIN — HEPARIN SODIUM 1360 UNITS/HR: 10000 INJECTION, SOLUTION INTRAVENOUS at 19:33

## 2023-07-01 RX ADMIN — WARFARIN SODIUM 5 MG: 5 TABLET ORAL at 18:35

## 2023-07-01 RX ADMIN — Medication 400 MG: at 08:35

## 2023-07-01 RX ADMIN — METOPROLOL SUCCINATE 75 MG: 50 TABLET, EXTENDED RELEASE ORAL at 21:53

## 2023-07-01 RX ADMIN — MIRTAZAPINE 15 MG: 15 TABLET, FILM COATED ORAL at 21:53

## 2023-07-01 NOTE — PROGRESS NOTES
Clinical Pharmacy Note  Warfarin Consult    Melvi Mera is a 81 y.o. male on warfarin bridged with a heparin drip    [facility]  Target INR range: 2-3: Clarified INR goal with Dr. Nelson Wan. Dose prior to admission: 3.75 mg (7.5 mg x 0.5) every Thu; 7.5 mg (7.5 mg x 1) all other days    Current warfarin drug-drug interactions: -    Recent Labs     06/29/23  1657 06/29/23  1809 06/30/23  0629 07/01/23  0627   HGB  --  8.2* 7.3* 6.9*   HCT  --  24.5* 21.8* 20.8*   INR 1.91*  --  1.76* 1.50*         Assessment/Plan:    No source of bleeding identified, still pending capsule study results. Warfarin resumed last night. Regular diet resumed  INR down further today as anticipated since warfarin will take 24-48 hours to see effect. Continue with 5 mg tonight. Do not want to overshoot his INR goal given recent issues with bleeding. Will need close outpatient follow up in anticoag clinic. Thank you for the consult.    Jah Kapadia PharmD  7/1/2023  9:51 AM

## 2023-07-01 NOTE — PROGRESS NOTES
Clinical Pharmacy Note  Heparin Dosing       Lab Results   Component Value Date/Time    APTT 31.2 06/29/2023 05:52 PM     Lab Results   Component Value Date/Time    HGB 6.9 07/01/2023 06:27 AM    HCT 20.8 07/01/2023 06:27 AM     07/01/2023 06:27 AM    INR 1.50 07/01/2023 06:27 AM       Current Infusion Rate: 1360 units/hr    Plan:  Rate: continue at 1360 units/hr  Next anti-Xa level: 1230 7/1/23    Pharmacy will continue to monitor and adjust based on anti-Xa results.   ANUJ Wilkinson [facility], PharmD, 7/1/2023 8:12 AM

## 2023-07-01 NOTE — PROGRESS NOTES
V2.0    [facility] Progress Note      Name:  Keyona Rodriguez /Age/Sex: 1950  (81 y.o. male)   MRN & CSN:  [id number] & [id number] Encounter Date/Time: 2023 2:55 PM EDT   Location:  [id number] PCP: Gretta Palomo MD     Attending:Azeem Abdi, [address] Day: 5    Assessment and Recommendations       Keyona Rodriguez is a 81 y.o. male with pmh of mechanical aortic valve on Coumadin, CKD stage III, diabetes, CAD, hypothyroidism, hyperuricemia who presents with Dizziness        Plan:  1. Acute severe symptomatic anemia in the setting of GI bleed likely in the setting of Coumadin use: INR more than 2.5. Most recent INR less then 2 hense coumadin resumped. On heparin ggt for bridging. Cardiology and GI agree on starting the patient on heparin drip. Transfuse if low hemoglobin. Currently receiving third units of pRBCs . Capsule endoscopy results pending. If negative. Will get hem/onco for eval.   2.         History of mechanical aortic valve . Now on coumadi being bridge with heparin ggt  3. CKD stage III avoid nephrotoxic agents  4. Non-insulin-dependent diabetes mellitus type 2 on sliding scale insulin monitor for hypoglycemia  5. Coronary artery disease holding aspirin  6. Hypothyroidism on levothyroxine  7. Mild leukopenia  8. Hyperuricemia on allopurinol        DVT Prophylaxis: Lovenox. Code Status: Total Support. Barrier to DC: capsule Endoscopy, Monitor Hemoglobin, GI Clearance, Heparin and Coumadin Bridging. Subjective:     Patient was seen and examined today. Patient hemoglobin less than 7 therefore prompting the need for another unit of packed RBCs. Review of Systems:      Pertinent positives and negatives discussed in HPI    Objective:      Intake/Output Summary (Last 24 hours) at 2023 1455  Last data filed at 2023 1420  Gross per 24 hour   Intake 431 ml   Output 1700 ml   Net -1269 ml      Vitals:   Vitals:

## 2023-07-01 NOTE — PROGRESS NOTES
Clinical Pharmacy Note  Heparin Dosing       Lab Results   Component Value Date/Time    APTT 31.2 06/29/2023 05:52 PM     Lab Results   Component Value Date/Time    HGB 6.9 07/01/2023 06:27 AM    HCT 20.8 07/01/2023 06:27 AM     07/01/2023 06:27 AM    INR 1.50 07/01/2023 06:27 AM       Current Infusion Rate: 1360 units/hr    Plan:  Rate: continue at 1360 units/hr  Next anti-Xa level: 0600 7/2/23    Pharmacy will continue to monitor and adjust based on anti-Xa results.   Mili Godinez [facility] 7/1/2023 4:16 PM

## 2023-07-01 NOTE — PROGRESS NOTES
H&H 6.9/20.8. Dr. Marie Leslie notified. New order transfuse 1 unit PRBC. Consent verified, VSS nurse stayed with patient first 15min. No s/s of adverse effect. 1 unit infused tolerated well. No s/s of adverse effect. Post transfusion H&H ordered, timed for 1500.   1526 Uziel received from lab unable to see H&H order, lab reordered.

## 2023-07-02 LAB
ALBUMIN SERPL-MCNC: 3.3 G/DL (ref 3.4–5)
ANION GAP SERPL CALCULATED.3IONS-SCNC: 9 MMOL/L (ref 3–16)
ANTI-XA UNFRAC HEPARIN: 0.29 IU/ML (ref 0.3–0.7)
ANTI-XA UNFRAC HEPARIN: 0.38 IU/ML (ref 0.3–0.7)
ANTI-XA UNFRAC HEPARIN: 0.46 IU/ML (ref 0.3–0.7)
BUN SERPL-MCNC: 17 MG/DL (ref 7–20)
CALCIUM SERPL-MCNC: 8.5 MG/DL (ref 8.3–10.6)
CHLORIDE SERPL-SCNC: 108 MMOL/L (ref 99–110)
CO2 SERPL-SCNC: 22 MMOL/L (ref 21–32)
CREAT SERPL-MCNC: 1.3 MG/DL (ref 0.8–1.3)
DEPRECATED RDW RBC AUTO: 16.8 % (ref 12.4–15.4)
GFR SERPLBLD CREATININE-BSD FMLA CKD-EPI: 59 ML/MIN/{1.73_M2}
GLUCOSE BLD-MCNC: 157 MG/DL (ref 70–99)
GLUCOSE BLD-MCNC: 167 MG/DL (ref 70–99)
GLUCOSE BLD-MCNC: 168 MG/DL (ref 70–99)
GLUCOSE BLD-MCNC: 168 MG/DL (ref 70–99)
GLUCOSE SERPL-MCNC: 133 MG/DL (ref 70–99)
HCT VFR BLD AUTO: 22.3 % (ref 40.5–52.5)
HGB BLD-MCNC: 7.4 G/DL (ref 13.5–17.5)
INR PPP: 1.37 (ref 0.84–1.16)
MAGNESIUM SERPL-MCNC: 2.1 MG/DL (ref 1.8–2.4)
MCH RBC QN AUTO: 32.2 PG (ref 26–34)
MCHC RBC AUTO-ENTMCNC: 33.4 G/DL (ref 31–36)
MCV RBC AUTO: 96.4 FL (ref 80–100)
PERFORMED ON: ABNORMAL
PHOSPHATE SERPL-MCNC: 2.2 MG/DL (ref 2.5–4.9)
PLATELET # BLD AUTO: 162 K/UL (ref 135–450)
PMV BLD AUTO: 7.7 FL (ref 5–10.5)
POTASSIUM SERPL-SCNC: 4.1 MMOL/L (ref 3.5–5.1)
PROTHROMBIN TIME: 16.8 SEC (ref 11.5–14.8)
RBC # BLD AUTO: 2.31 M/UL (ref 4.2–5.9)
SODIUM SERPL-SCNC: 139 MMOL/L (ref 136–145)
WBC # BLD AUTO: 5.8 K/UL (ref 4–11)

## 2023-07-02 PROCEDURE — 83735 ASSAY OF MAGNESIUM: CPT

## 2023-07-02 PROCEDURE — 6370000000 HC RX 637 (ALT 250 FOR IP): Performed by: INTERNAL MEDICINE

## 2023-07-02 PROCEDURE — 85520 HEPARIN ASSAY: CPT

## 2023-07-02 PROCEDURE — 80069 RENAL FUNCTION PANEL: CPT

## 2023-07-02 PROCEDURE — 6370000000 HC RX 637 (ALT 250 FOR IP): Performed by: NURSE PRACTITIONER

## 2023-07-02 PROCEDURE — 6370000000 HC RX 637 (ALT 250 FOR IP): Performed by: HOSPITALIST

## 2023-07-02 PROCEDURE — 85610 PROTHROMBIN TIME: CPT

## 2023-07-02 PROCEDURE — 6360000002 HC RX W HCPCS: Performed by: HOSPITALIST

## 2023-07-02 PROCEDURE — 85027 COMPLETE CBC AUTOMATED: CPT

## 2023-07-02 PROCEDURE — 6360000002 HC RX W HCPCS: Performed by: INTERNAL MEDICINE

## 2023-07-02 PROCEDURE — 2580000003 HC RX 258: Performed by: HOSPITALIST

## 2023-07-02 PROCEDURE — 2580000003 HC RX 258: Performed by: INTERNAL MEDICINE

## 2023-07-02 PROCEDURE — 36415 COLL VENOUS BLD VENIPUNCTURE: CPT

## 2023-07-02 PROCEDURE — 1200000000 HC SEMI PRIVATE

## 2023-07-02 PROCEDURE — 2500000003 HC RX 250 WO HCPCS: Performed by: HOSPITALIST

## 2023-07-02 PROCEDURE — 99232 SBSQ HOSP IP/OBS MODERATE 35: CPT | Performed by: NURSE PRACTITIONER

## 2023-07-02 PROCEDURE — 94760 N-INVAS EAR/PLS OXIMETRY 1: CPT

## 2023-07-02 RX ORDER — METHOCARBAMOL 500 MG/1
500 TABLET, FILM COATED ORAL ONCE
Status: COMPLETED | OUTPATIENT
Start: 2023-07-02 | End: 2023-07-02

## 2023-07-02 RX ORDER — HEPARIN SODIUM 1000 [USP'U]/ML
2000 INJECTION, SOLUTION INTRAVENOUS; SUBCUTANEOUS ONCE
Status: COMPLETED | OUTPATIENT
Start: 2023-07-02 | End: 2023-07-02

## 2023-07-02 RX ORDER — WARFARIN SODIUM 7.5 MG/1
7.5 TABLET ORAL
Status: DISCONTINUED | OUTPATIENT
Start: 2023-07-02 | End: 2023-07-02

## 2023-07-02 RX ADMIN — TAMSULOSIN HYDROCHLORIDE 0.4 MG: 0.4 CAPSULE ORAL at 21:25

## 2023-07-02 RX ADMIN — ASPIRIN 81 MG: 81 TABLET, COATED ORAL at 21:25

## 2023-07-02 RX ADMIN — EZETIMIBE 10 MG: 10 TABLET ORAL at 08:21

## 2023-07-02 RX ADMIN — MIRTAZAPINE 15 MG: 15 TABLET, FILM COATED ORAL at 21:24

## 2023-07-02 RX ADMIN — ACETAMINOPHEN 650 MG: 325 TABLET ORAL at 19:01

## 2023-07-02 RX ADMIN — HEPARIN SODIUM 2000 UNITS: 1000 INJECTION INTRAVENOUS; SUBCUTANEOUS at 08:16

## 2023-07-02 RX ADMIN — METHOCARBAMOL 500 MG: 500 TABLET ORAL at 17:07

## 2023-07-02 RX ADMIN — POTASSIUM PHOSPHATE, MONOBASIC AND POTASSIUM PHOSPHATE, DIBASIC 20 MMOL: 224; 236 INJECTION, SOLUTION, CONCENTRATE INTRAVENOUS at 15:20

## 2023-07-02 RX ADMIN — Medication 400 MG: at 08:21

## 2023-07-02 RX ADMIN — HEPARIN SODIUM 1540 UNITS/HR: 10000 INJECTION, SOLUTION INTRAVENOUS at 08:18

## 2023-07-02 RX ADMIN — Medication 10 ML: at 21:27

## 2023-07-02 RX ADMIN — LEVOTHYROXINE SODIUM 137 MCG: 0.11 TABLET ORAL at 06:29

## 2023-07-02 RX ADMIN — Medication 10 ML: at 08:24

## 2023-07-02 RX ADMIN — ROSUVASTATIN CALCIUM 40 MG: 40 TABLET, FILM COATED ORAL at 18:59

## 2023-07-02 RX ADMIN — METOPROLOL SUCCINATE 75 MG: 50 TABLET, EXTENDED RELEASE ORAL at 10:09

## 2023-07-02 RX ADMIN — PANTOPRAZOLE SODIUM 40 MG: 40 TABLET, DELAYED RELEASE ORAL at 06:29

## 2023-07-02 RX ADMIN — HEPARIN SODIUM 1540 UNITS/HR: 10000 INJECTION, SOLUTION INTRAVENOUS at 16:34

## 2023-07-02 RX ADMIN — ALLOPURINOL 100 MG: 100 TABLET ORAL at 08:21

## 2023-07-02 ASSESSMENT — PAIN DESCRIPTION - LOCATION
LOCATION: LEG
LOCATION: LEG

## 2023-07-02 ASSESSMENT — PAIN DESCRIPTION - ORIENTATION
ORIENTATION: RIGHT
ORIENTATION: RIGHT

## 2023-07-02 ASSESSMENT — PAIN DESCRIPTION - DESCRIPTORS
DESCRIPTORS: ACHING
DESCRIPTORS: ACHING;DISCOMFORT

## 2023-07-02 ASSESSMENT — PAIN SCALES - GENERAL
PAINLEVEL_OUTOF10: 3
PAINLEVEL_OUTOF10: 3

## 2023-07-02 NOTE — OP NOTE
Capsule Endoscopy Procedure Note    Patient: Laura Cardona  : 1950  Acct#:     Procedure: Wireless Capsule Endoscopy of the small bowel                         Date:  2023     Surgeon:   Jordan Briceño MD    Referring Physician:  Cherie Thomas MD    Indications: This is a 81y.o. year old male who presents today with anemia. Postoperative Diagnosis:    Blood clot and blood seen in proximal small bowel, approximately 15 min into the small bowel after deployment of the capsule. Suspect underlying dieulafoy or AVM bleeding in the context of anticoagulation. Consent:  The patient or their legal guardian has signed an informed consent, and is aware of the potential risks, benefits, alternatives, and potential complications of this procedure. Description of Procedure: The patient swallowed the Given wireless capsule without complication. Appropriate monitoring devices were utilized to record the findings of the capsule. The monitoring device was collected and processed after 8 hours of recording time. The patient tolerated the procedure well. Findings:  Study quality was adequate     Esophagus/Stomach:  Limited views of the stomach and esophagus were normal.  No fresh or altered blood was noted    Small bowel: Visualization was adequate. Visualization of the small bowel architecture was normal.  Blood clot and blood seen in proximal small bowel, approximately 15 min into the small bowel after deployment of the capsule. Suspect underlying dieulafoy or AVM bleeding in the context of anticoagulation. Small bowel transit time of 3 hours and 27 minutes     Impression:    Blood clot and blood seen in proximal small bowel, approximately 15 min into the small bowel after deployment of the capsule. Suspect underlying dieulafoy or AVM bleeding in the context of anticoagulation.    Melena present in the distal small bowel     Plan:  - Will plan for push enteroscopy tomorrow, with

## 2023-07-02 NOTE — PROGRESS NOTES
Patient c/o having pain 3/10 and knot in his right calf. No redness in area. no increase warmth. C/o increased pain when he attempts to walk. Patient said he spoke with cardio about it this morning and was told it wasnt a clot. Dr. Cherry Ordoñez notified. New order for Robaxin 500mg x1. Medication administered.

## 2023-07-02 NOTE — PROGRESS NOTES
Clinical Pharmacy Note  Heparin Dosing       Lab Results   Component Value Date/Time    APTT 31.2 06/29/2023 05:52 PM     Lab Results   Component Value Date/Time    HGB 7.4 07/02/2023 06:19 AM    HCT 22.3 07/02/2023 06:19 AM     07/02/2023 06:19 AM    INR 1.37 07/02/2023 06:19 AM       Current Infusion Rate: 1360 units/hr    Anti-Xa Level = 0.29    Plan:  Bolus 2000 units  Rate: 1540 units/hr  Next anti-Xa level: 1430 7/2/23    Pharmacy will continue to monitor and adjust based on anti-Xa results.   ANUJ Castillo [facility] 7/2/2023 8:05 AM

## 2023-07-02 NOTE — PROGRESS NOTES
V2.0    [facility] Progress Note      Name:  Heladio Mejias /Age/Sex: 1950  (81 y.o. male)   MRN & CSN:  [id number] & [id number] Encounter Date/Time: 2023 2:51 PM EDT   Location:  [id number] PCP: Cristina Duggan MD     Attending:Azeem Morales, [address] [place] [address] Day: 6    Assessment and Recommendations       Heladio Mejias is a 81 y.o. male with pmh of mechanical aortic valve on Coumadin, CKD stage III, diabetes, CAD, hypothyroidism, hyperuricemia who presents with Dizziness        Plan:  1. Acute severe symptomatic anemia in the setting of GI bleed likely in the setting of Coumadin use: INR more than 2.5. Most recent INR less then 2 hense coumadin resumped. On heparin ggt for bridging. Cardiology and GI agree on starting the patient on heparin drip. Transfuse if low hemoglobin. Currently receiving third units of pRBCs . Capsule endoscopy results positive for bleeding. Zain Hurt GI following and plan for repeat endoscopy noted  2. History of mechanical aortic valve . Now on coumadi being bridge with heparin ggt  3. CKD stage III avoid nephrotoxic agents  4. Non-insulin-dependent diabetes mellitus type 2 on sliding scale insulin monitor for hypoglycemia  5. Coronary artery disease holding aspirin  6. Hypothyroidism on levothyroxine  7. Mild leukopenia  8. Hyperuricemia on allopurinol        DVT Prophylaxis: Lovenox. Code Status: Total Support. Barrier to DC: capsule Endoscopy, Monitor Hemoglobin, GI Clearance, Heparin and Coumadin Bridging. Subjective:     Patient was seen and examined today. No acute events overnight. Patient angella afebrile with stable vital signs. Capsule endoscopy positive. Scheduled for repeat endoscopy Julianna Garduno. Review of Systems:      Pertinent positives and negatives discussed in HPI    Objective:      Intake/Output Summary (Last 24 hours) at 2023 1451  Last data filed at 2023 0918  Gross per 24

## 2023-07-02 NOTE — PROGRESS NOTES
Received a call from [facility] [facility], Patient had 36 beats of VTach, HR up to 152. Patient said he had transferred from the bed to the recliner. He reports that during the transfer he was short of breath. He is nolonger SOB Patient observed sitting up in recliner no distress noted. HR 71 BP 97//6, Spo2 96%RA. Message sent to Dr. Josh Aguilar and call placed to Cardiololgy. Per Dr. Giselle French with cardiology. Check Magnesium level and replace if needed. House doctor to replace phosphorus. Message sent to Dr. Day Conception.

## 2023-07-02 NOTE — PLAN OF CARE
Problem: Discharge Planning  Goal: Discharge to home or other facility with appropriate resources  Outcome: Progressing   Continuing to work with patient and health care team on discharge plan. Discharge instructions and medication management will be reviewed prior to discharge. Problem: Safety - Adult  Goal: Free from fall injury  Outcome: Progressing   Pt free from falls this shift. Fall precautions in place at all times. Call light always within reach. Pt able and agreeable to contact for safety appropriately. Problem: ABCDS Injury Assessment  Goal: Absence of physical injury  Outcome: Progressing     Problem: Chronic Conditions and Co-morbidities  Goal: Patient's chronic conditions and co-morbidity symptoms are monitored and maintained or improved  Outcome: Progressing     Problem: Pain  Goal: Verbalizes/displays adequate comfort level or baseline comfort level  Outcome: Progressing   Pt able to express presence/absence of pain and rate pain appropriately using numerical scale. Pain/discomfort being managed with PRN analgesics per MD orders (see MAR). Pain assessed every shift and after interventions.

## 2023-07-02 NOTE — PROGRESS NOTES
Clinical Pharmacy Note  Heparin Dosing       Lab Results   Component Value Date/Time    APTT 31.2 06/29/2023 05:52 PM     Lab Results   Component Value Date/Time    HGB 7.4 07/02/2023 06:19 AM    HCT 22.3 07/02/2023 06:19 AM     07/02/2023 06:19 AM    INR 1.37 07/02/2023 06:19 AM       Current Infusion Rate: 1540 units/hr    Anti-Xa Level = 0.46    Plan:  Rate: 1540 units/hr  Next anti-Xa level: 2030 7/2/23    Pharmacy will continue to monitor and adjust based on anti-Xa results.   Yazan Guerra [facility] 7/2/2023 3:37 PM

## 2023-07-03 ENCOUNTER — ANESTHESIA EVENT (OUTPATIENT)
Dept: ENDOSCOPY | Age: 73
End: 2023-07-03
Payer: MEDICARE

## 2023-07-03 ENCOUNTER — ANESTHESIA (OUTPATIENT)
Dept: ENDOSCOPY | Age: 73
End: 2023-07-03
Payer: MEDICARE

## 2023-07-03 LAB
ALBUMIN SERPL-MCNC: 3.4 G/DL (ref 3.4–5)
ANION GAP SERPL CALCULATED.3IONS-SCNC: 6 MMOL/L (ref 3–16)
ANTI-XA UNFRAC HEPARIN: <0.1 IU/ML (ref 0.3–0.7)
BLOOD BANK DISPENSE STATUS: NORMAL
BLOOD BANK DISPENSE STATUS: NORMAL
BLOOD BANK PRODUCT CODE: NORMAL
BLOOD BANK PRODUCT CODE: NORMAL
BPU ID: NORMAL
BPU ID: NORMAL
BUN SERPL-MCNC: 22 MG/DL (ref 7–20)
CALCIUM SERPL-MCNC: 8.9 MG/DL (ref 8.3–10.6)
CHLORIDE SERPL-SCNC: 107 MMOL/L (ref 99–110)
CO2 SERPL-SCNC: 23 MMOL/L (ref 21–32)
CREAT SERPL-MCNC: 1.5 MG/DL (ref 0.8–1.3)
DEPRECATED RDW RBC AUTO: 17.3 % (ref 12.4–15.4)
DESCRIPTION BLOOD BANK: NORMAL
DESCRIPTION BLOOD BANK: NORMAL
GFR SERPLBLD CREATININE-BSD FMLA CKD-EPI: 50 ML/MIN/{1.73_M2}
GLUCOSE BLD-MCNC: 166 MG/DL (ref 70–99)
GLUCOSE BLD-MCNC: 171 MG/DL (ref 70–99)
GLUCOSE BLD-MCNC: 201 MG/DL (ref 70–99)
GLUCOSE BLD-MCNC: 207 MG/DL (ref 70–99)
GLUCOSE BLD-MCNC: 208 MG/DL (ref 70–99)
GLUCOSE BLD-MCNC: 213 MG/DL (ref 70–99)
GLUCOSE SERPL-MCNC: 164 MG/DL (ref 70–99)
HCT VFR BLD AUTO: 20 % (ref 40.5–52.5)
HCT VFR BLD AUTO: 20.3 % (ref 40.5–52.5)
HGB BLD-MCNC: 6.7 G/DL (ref 13.5–17.5)
HGB BLD-MCNC: 6.8 G/DL (ref 13.5–17.5)
INR PPP: 1.27 (ref 0.84–1.16)
MCH RBC QN AUTO: 32 PG (ref 26–34)
MCHC RBC AUTO-ENTMCNC: 33.4 G/DL (ref 31–36)
MCV RBC AUTO: 95.7 FL (ref 80–100)
PERFORMED ON: ABNORMAL
PHOSPHATE SERPL-MCNC: 2.8 MG/DL (ref 2.5–4.9)
PLATELET # BLD AUTO: 162 K/UL (ref 135–450)
PMV BLD AUTO: 7.9 FL (ref 5–10.5)
POTASSIUM SERPL-SCNC: 4.1 MMOL/L (ref 3.5–5.1)
PROTHROMBIN TIME: 15.9 SEC (ref 11.5–14.8)
RBC # BLD AUTO: 2.12 M/UL (ref 4.2–5.9)
SODIUM SERPL-SCNC: 136 MMOL/L (ref 136–145)
WBC # BLD AUTO: 5.2 K/UL (ref 4–11)

## 2023-07-03 PROCEDURE — 6370000000 HC RX 637 (ALT 250 FOR IP): Performed by: HOSPITALIST

## 2023-07-03 PROCEDURE — 6370000000 HC RX 637 (ALT 250 FOR IP): Performed by: INTERNAL MEDICINE

## 2023-07-03 PROCEDURE — 6360000002 HC RX W HCPCS: Performed by: INTERNAL MEDICINE

## 2023-07-03 PROCEDURE — 1200000000 HC SEMI PRIVATE

## 2023-07-03 PROCEDURE — 36430 TRANSFUSION BLD/BLD COMPNT: CPT

## 2023-07-03 PROCEDURE — 3700000000 HC ANESTHESIA ATTENDED CARE: Performed by: INTERNAL MEDICINE

## 2023-07-03 PROCEDURE — 6370000000 HC RX 637 (ALT 250 FOR IP): Performed by: NURSE PRACTITIONER

## 2023-07-03 PROCEDURE — 85027 COMPLETE CBC AUTOMATED: CPT

## 2023-07-03 PROCEDURE — 0W3P8ZZ CONTROL BLEEDING IN GASTROINTESTINAL TRACT, VIA NATURAL OR ARTIFICIAL OPENING ENDOSCOPIC: ICD-10-PCS | Performed by: INTERNAL MEDICINE

## 2023-07-03 PROCEDURE — 2709999900 HC NON-CHARGEABLE SUPPLY: Performed by: INTERNAL MEDICINE

## 2023-07-03 PROCEDURE — 2500000003 HC RX 250 WO HCPCS: Performed by: HOSPITALIST

## 2023-07-03 PROCEDURE — 94760 N-INVAS EAR/PLS OXIMETRY 1: CPT

## 2023-07-03 PROCEDURE — 2580000003 HC RX 258: Performed by: HOSPITALIST

## 2023-07-03 PROCEDURE — 85520 HEPARIN ASSAY: CPT

## 2023-07-03 PROCEDURE — 3700000001 HC ADD 15 MINUTES (ANESTHESIA): Performed by: INTERNAL MEDICINE

## 2023-07-03 PROCEDURE — 2500000003 HC RX 250 WO HCPCS

## 2023-07-03 PROCEDURE — 2580000003 HC RX 258: Performed by: INTERNAL MEDICINE

## 2023-07-03 PROCEDURE — 2720000010 HC SURG SUPPLY STERILE: Performed by: INTERNAL MEDICINE

## 2023-07-03 PROCEDURE — 6360000002 HC RX W HCPCS: Performed by: NURSE ANESTHETIST, CERTIFIED REGISTERED

## 2023-07-03 PROCEDURE — 80069 RENAL FUNCTION PANEL: CPT

## 2023-07-03 PROCEDURE — 7100000000 HC PACU RECOVERY - FIRST 15 MIN: Performed by: INTERNAL MEDICINE

## 2023-07-03 PROCEDURE — 85610 PROTHROMBIN TIME: CPT

## 2023-07-03 PROCEDURE — 3609014100 HC ENTEROSCOPY > 2ND PRTN W/CONTROL BLEEDING: Performed by: INTERNAL MEDICINE

## 2023-07-03 PROCEDURE — 7100000001 HC PACU RECOVERY - ADDTL 15 MIN: Performed by: INTERNAL MEDICINE

## 2023-07-03 PROCEDURE — 36415 COLL VENOUS BLD VENIPUNCTURE: CPT

## 2023-07-03 PROCEDURE — 85018 HEMOGLOBIN: CPT

## 2023-07-03 PROCEDURE — 2500000003 HC RX 250 WO HCPCS: Performed by: NURSE ANESTHETIST, CERTIFIED REGISTERED

## 2023-07-03 PROCEDURE — 85014 HEMATOCRIT: CPT

## 2023-07-03 RX ORDER — LIDOCAINE HYDROCHLORIDE 20 MG/ML
INJECTION, SOLUTION EPIDURAL; INFILTRATION; INTRACAUDAL; PERINEURAL PRN
Status: DISCONTINUED | OUTPATIENT
Start: 2023-07-03 | End: 2023-07-03 | Stop reason: SDUPTHER

## 2023-07-03 RX ORDER — PROPOFOL 10 MG/ML
INJECTION, EMULSION INTRAVENOUS PRN
Status: DISCONTINUED | OUTPATIENT
Start: 2023-07-03 | End: 2023-07-03 | Stop reason: SDUPTHER

## 2023-07-03 RX ORDER — SODIUM CHLORIDE 9 MG/ML
INJECTION, SOLUTION INTRAVENOUS PRN
Status: DISCONTINUED | OUTPATIENT
Start: 2023-07-03 | End: 2023-07-03

## 2023-07-03 RX ORDER — WARFARIN SODIUM 5 MG/1
5 TABLET ORAL
Status: COMPLETED | OUTPATIENT
Start: 2023-07-03 | End: 2023-07-03

## 2023-07-03 RX ORDER — SODIUM CHLORIDE 0.9 % (FLUSH) 0.9 %
5-40 SYRINGE (ML) INJECTION PRN
Status: DISCONTINUED | OUTPATIENT
Start: 2023-07-03 | End: 2023-07-03

## 2023-07-03 RX ORDER — SODIUM CHLORIDE 0.9 % (FLUSH) 0.9 %
5-40 SYRINGE (ML) INJECTION EVERY 12 HOURS SCHEDULED
Status: DISCONTINUED | OUTPATIENT
Start: 2023-07-03 | End: 2023-07-03

## 2023-07-03 RX ORDER — SODIUM CHLORIDE 9 MG/ML
INJECTION, SOLUTION INTRAVENOUS PRN
Status: DISCONTINUED | OUTPATIENT
Start: 2023-07-03 | End: 2023-07-07 | Stop reason: HOSPADM

## 2023-07-03 RX ADMIN — Medication 400 MG: at 09:38

## 2023-07-03 RX ADMIN — LIDOCAINE HYDROCHLORIDE 80 MG: 20 INJECTION, SOLUTION EPIDURAL; INFILTRATION; INTRACAUDAL; PERINEURAL at 15:49

## 2023-07-03 RX ADMIN — METOPROLOL SUCCINATE 75 MG: 50 TABLET, EXTENDED RELEASE ORAL at 21:47

## 2023-07-03 RX ADMIN — PROPOFOL 100 MG: 10 INJECTION, EMULSION INTRAVENOUS at 15:49

## 2023-07-03 RX ADMIN — ALLOPURINOL 100 MG: 100 TABLET ORAL at 09:38

## 2023-07-03 RX ADMIN — MIRTAZAPINE 15 MG: 15 TABLET, FILM COATED ORAL at 21:47

## 2023-07-03 RX ADMIN — PROPOFOL 180 MCG/KG/MIN: 10 INJECTION, EMULSION INTRAVENOUS at 15:50

## 2023-07-03 RX ADMIN — METOPROLOL SUCCINATE 75 MG: 50 TABLET, EXTENDED RELEASE ORAL at 09:38

## 2023-07-03 RX ADMIN — GLUCAGON HYDROCHLORIDE 0.5 MG: KIT at 16:06

## 2023-07-03 RX ADMIN — PANTOPRAZOLE SODIUM 40 MG: 40 TABLET, DELAYED RELEASE ORAL at 09:38

## 2023-07-03 RX ADMIN — INSULIN GLARGINE 15 UNITS: 100 INJECTION, SOLUTION SUBCUTANEOUS at 22:01

## 2023-07-03 RX ADMIN — EZETIMIBE 10 MG: 10 TABLET ORAL at 09:38

## 2023-07-03 RX ADMIN — WARFARIN SODIUM 5 MG: 5 TABLET ORAL at 21:53

## 2023-07-03 RX ADMIN — SODIUM PHOSPHATE, MONOBASIC, MONOHYDRATE AND SODIUM PHOSPHATE, DIBASIC, ANHYDROUS 20 MMOL: 142; 276 INJECTION, SOLUTION INTRAVENOUS at 10:45

## 2023-07-03 RX ADMIN — Medication 10 ML: at 21:54

## 2023-07-03 RX ADMIN — ASPIRIN 81 MG: 81 TABLET, COATED ORAL at 21:47

## 2023-07-03 RX ADMIN — TAMSULOSIN HYDROCHLORIDE 0.4 MG: 0.4 CAPSULE ORAL at 21:47

## 2023-07-03 RX ADMIN — LEVOTHYROXINE SODIUM 137 MCG: 0.11 TABLET ORAL at 09:38

## 2023-07-03 RX ADMIN — ROSUVASTATIN CALCIUM 40 MG: 40 TABLET, FILM COATED ORAL at 17:20

## 2023-07-03 RX ADMIN — INSULIN LISPRO 4 UNITS: 100 INJECTION, SOLUTION INTRAVENOUS; SUBCUTANEOUS at 17:21

## 2023-07-03 RX ADMIN — SODIUM CHLORIDE: 9 INJECTION, SOLUTION INTRAVENOUS at 15:46

## 2023-07-03 RX ADMIN — HEPARIN SODIUM 1540 UNITS/HR: 10000 INJECTION, SOLUTION INTRAVENOUS at 19:04

## 2023-07-03 ASSESSMENT — PAIN - FUNCTIONAL ASSESSMENT: PAIN_FUNCTIONAL_ASSESSMENT: NONE - DENIES PAIN

## 2023-07-03 ASSESSMENT — PAIN SCALES - GENERAL: PAINLEVEL_OUTOF10: 0

## 2023-07-03 NOTE — PROGRESS NOTES
Clinical Pharmacy Note  Heparin Dosing       Lab Results   Component Value Date/Time    APTT 31.2 06/29/2023 05:52 PM     Lab Results   Component Value Date/Time    HGB 7.4 07/02/2023 06:19 AM    HCT 22.3 07/02/2023 06:19 AM     07/02/2023 06:19 AM    INR 1.37 07/02/2023 06:19 AM       Current Infusion Rate: 1540 units/hr    Anti-Xa Level = 0.38    Plan:  Rate: 1540 units/hr  Next anti-Xa level: 0600 7/3/23    Pharmacy will continue to monitor and adjust based on anti-Xa results.   Elizabeth Genao [facility] 7/2/2023 9:09 PM

## 2023-07-03 NOTE — ANESTHESIA POSTPROCEDURE EVALUATION
Department of Anesthesiology  Postprocedure Note    Patient: Shane De Paz  MRN: [id number]  YOB: 1950  Date of evaluation: 7/3/2023      Procedure Summary     Date: 07/03/23 Room / Location: [address] [place] [address]    Anesthesia Start: 9755 Anesthesia Stop: 7546    Procedure: ENTEROSCOPY PUSH CONTROL HEMORRHAGE Diagnosis:       Anemia, unspecified type      (Anemia, unspecified type [D64.9])    Surgeons: Catrachita Valenzuela MD Responsible Provider: Roma Ghotra MD    Anesthesia Type: MAC ASA Status: 3          Anesthesia Type: No value filed.     Luis Alberto Phase I: Luis Alberto Score: 10    Luis Alberto Phase II:        Anesthesia Post Evaluation    Patient location during evaluation: PACU  Patient participation: complete - patient participated  Level of consciousness: awake and alert  Pain score: 0  Airway patency: patent  Nausea & Vomiting: no nausea and no vomiting  Complications: no  Cardiovascular status: blood pressure returned to baseline  Respiratory status: acceptable  Hydration status: euvolemic

## 2023-07-03 NOTE — PROGRESS NOTES
Patient alert and oriented, VSS, sitting up in bed. Patient c/o SOB, denies n/v, diarrhea, pain. O2 WNL on room air. Patient states no needs at this time. Heparin drip running per order. Bed in lowest and locked position. Patient is UAL and tolerating well, call light in reach.

## 2023-07-03 NOTE — OP NOTE
Esophagogastroduodenoscopy  and push enteroscopy procedure Note    Patient:   Chey Dunham    :    1950    Facility:   [facility] [Inpatient]   Referring/PCP: Felicia Schaefer MD    Procedure:   Esophagogastroduodenoscopy   Date:     7/3/2023   Endoscopist:  Kamari Shaw MD     Preoperative Diagnosis:    GI bleed, blood in the proximal jejunum on capsule endoscopy. 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 Treated with placement with hemoclips. Anesthesia:  MAC    Estimated blood loss: Minimal    Complications: None    Specimen: no    Instrument:     Description of Procedure:  Informed consent was obtained from the patient after explanation of the procedure including indications, description of the procedure,  benefits and possible risks and complications of the procedure, and alternatives. Questions were answered. The patient's history was reviewed and a directed physical examination was performed prior to the procedure. Patient was monitored throughout the procedure with pulse oximetry and periodic assessment of vital signs. Patient was sedated as noted above. With the patient in the left lateral decubitus position, the Olympus videoendoscope was placed in the patient's mouth and under direct visualization passed into the esophagus. Visualization of the esophagus, stomach, and duodenum was performed during both introduction and withdrawal of the endoscope and retroflexed view of the proximal stomach was obtained. The scope was passed to the proximal jejunum. The patient tolerated the procedure well and was taken to the recovery area in good condition. Findings: The scope was advanced into the esophagus, through the GE junction, stomach, duodenal bulb and descending duodenum. Subsequently the scope was advanced into the proximal jejunum. Fresh blood was noted in the proximal jejunum.   The scope was advanced until an actively bleeding DieulaFoy lesion was identified. The lesion was at the distal reach of the scope. 2 hemoclips were placed on the site. No active bleeding was noted after placement of the Hemoclip. The procedure was terminated. Recommendations: Start liquid diet. Trend H&H.     Narinder Contreras MD, MD

## 2023-07-03 NOTE — PROGRESS NOTES
Clinical Pharmacy Note  Warfarin Consult    Faby Leone is a 81 y.o. male on warfarin bridged with a heparin drip    Warfarin Indication: [facility] AVR  Target INR range: 2-3: Clarified INR goal with Dr. Vale Marshall. Dose prior to admission: 3.75 mg (7.5 mg x 0.5) every Thu; 7.5 mg (7.5 mg x 1) all other days - this will need to be changed given his change in INR goal    Current warfarin drug-drug interactions: -    Recent Labs     07/01/23  0627 07/01/23  1533 07/02/23  0619 07/03/23  0759 07/03/23  1750   HGB 6.9*   < > 7.4* 6.8* 6.7*   HCT 20.8*   < > 22.3* 20.3* 20.0*   INR 1.50*  --  1.37* 1.27*  --     < > = values in this interval not displayed. Assessment/Plan:  Endoscopy procedure completed today with placement of hemoclips. Warfarin was held yesterday. Warfarin 5 mg tonight. Home dose will likely need to be reduced given the change in his INR goal.   Also had a recent INR of 5 as an outpatient while on that dose. Will need close monitoring because it would be preferable not to overshoot his INR goal given recent issues with bleeding. Schedule a follow up in outpatient anticoagulation clinic prior to discharge. Thank you for the consult.    Ugo Sommers, [facility], PharmD, 7/3/2023 7:11 PM

## 2023-07-03 NOTE — PROGRESS NOTES
Report to floor Rn see flow sheet no belongings with pt stent card on front of chart pt tx on room air telemetry box on and working; IV pump with pt/sent to floor

## 2023-07-03 NOTE — PROGRESS NOTES
V2.[place]    [facility] [place]      Name:  Robyn Eden /Age/Sex: 1950  (81 y.o. male)   MRN & CSN:  [id number] & [id number] Encounter Date/Time: 7/3/2023 2:51 PM EDT   Location:  [id number] PCP: Gasper Ly MD     Attending:Azeem Betts, [address] [place] [address] Day: 7    Assessment and Recommendations       Robyn Eden is a 81 y.o. male with pmh of mechanical aortic valve on Coumadin, CKD stage III, diabetes, CAD, hypothyroidism, hyperuricemia who presents with Dizziness        Plan:  1. Acute severe symptomatic anemia in the setting of GI bleed likely in the setting of Coumadin use: INR more than 2.5. Most recent INR less then 2 hense coumadin resumped. On heparin ggt for bridging. Cardiology and GI agree on starting the patient on heparin drip. Transfuse if low hemoglobin. Currently receiving third units of pRBCs . Capsule endoscopy results positive for bleeding. Daisy Calabrese GI following and plan for repeat endoscopy noted for today  2. History of mechanical aortic valve . Now on coumadi being bridge with heparin ggt  3. CKD stage III avoid nephrotoxic agents  4. Non-insulin-dependent diabetes mellitus type 2 on sliding scale insulin monitor for hypoglycemia  5. Coronary artery disease holding aspirin  6. Hypothyroidism on levothyroxine  7. Mild leukopenia  8. Hyperuricemia on allopurinol        DVT Prophylaxis: Lovenox. Code Status: Total Support. Barrier to DC: repeat EGD today. Subjective:     Patient was seen and examined today. No acute events overnight. Patient angella afebrile with stable vital signs. .  Scheduled for repeat endoscopy today    Review of Systems:      Pertinent positives and negatives discussed in HPI    Objective:      Intake/Output Summary (Last 24 hours) at 7/3/2023 1637  Last data filed at 7/3/2023 1620  Gross per 24 hour   Intake 1869.95 ml   Output 925 ml   Net 944.95 ml        Vitals:   Vitals: BACTERIA Rare 12/09/2011 07:17 AM    CLARITYU TURBID 11/02/2021 05:56 PM    SPECGRAV 1.015 11/02/2021 05:56 PM    LEUKOCYTESUR Negative 11/02/2021 05:56 PM    UROBILINOGEN 0.2 11/02/2021 05:56 PM    BILIRUBINUR Negative 11/02/2021 05:56 PM    BILIRUBINUR NEG 05/29/2019 07:44 AM    BILIRUBINUR NEGATIVE 12/09/2011 07:17 AM    BLOODU LARGE 11/02/2021 05:56 PM    GLUCOSEU >=1000 11/02/2021 05:56 PM    GLUCOSEU NEGATIVE 12/09/2011 07:17 AM    KETUA Negative 11/02/2021 05:56 PM     Urine Cultures:   Lab Results   Component Value Date/Time    LABURIN No growth at 18-36 hours 10/11/2018 09:54 AM     Blood Cultures: No results found for: BC  No results found for: BLOODCULT2  Organism: No results found for: [facility]      Electronically signed by Jaya Ruffin MD on 7/3/2023 at 4:37 PM

## 2023-07-03 NOTE — ANESTHESIA PRE PROCEDURE
Department of Anesthesiology  Preprocedure Note       Name:  Sriram Caballero   Age:  81 y.o.  :  1950                                          MRN:  [id number]         Date:  7/3/2023      Surgeon: Jim La):  Sol Jeffries MD    Procedure: Procedure(s):  ENTEROSCOPY PUSH DIAGNOSTIC    Medications prior to admission:   Prior to Admission medications    Medication Sig Start Date End Date Taking?  Authorizing Provider   allopurinol (ZYLOPRIM) 100 MG tablet Take 1 tablet by mouth daily    Historical Provider, MD   mirtazapine (REMERON) 30 MG tablet TAKE 1 TABLET BY MOUTH EVERY DAY EVERY NIGHT 23   Lyle Forte MD   Empagliflozin-metFORMIN HCl ER (SYNJARDY XR)  MG TB24 Take 1 tablet by mouth 2 times daily    Historical Provider, MD   tamsulosin (FLOMAX) 0.4 MG capsule Take 1 capsule by mouth at bedtime    Historical Provider, MD   warfarin (COUMADIN) 7.5 MG tablet Take 7.5mg daily or as directed by [facility] Coumadin Service [phone]  Patient taking differently: Take 7.5mg daily except for 3.75mg  or as directed by [facility] Coumadin Service [phone] 10/19/22   Dar Montiel MD   magnesium oxide (MAG-OX) 400 MG tablet Take 1 tablet by mouth daily    Historical Provider, MD   vitamin D (CHOLECALCIFEROL) 125 MCG (5000 UT) CAPS capsule Take 1 capsule by mouth once a week    Historical Provider, MD   Folinic Acid-Vit B6-Vit B12 4-50-2 MG TABS Take 1 tablet by mouth daily    Historical Provider, MD   Menatetrenone (VITAMIN K2) 100 MCG TABS Take 100 mcg by mouth daily    Historical Provider, MD   lisinopril (PRINIVIL;ZESTRIL) 5 MG tablet Take 1 tablet by mouth nightly    Historical Provider, MD   levothyroxine (SYNTHROID) 137 MCG tablet Take 1 tablet by mouth Daily    Historical Provider, MD   Niacin ER 1000 MG TBCR Take 1,000 mg by mouth nightly    Historical Provider, MD   aspirin 81 MG EC tablet Take 1 tablet by mouth nightly 18   Dar Montiel MD   dulaglutide (TRULICITY) 1.5 LA/8.2NS

## 2023-07-04 LAB
ALBUMIN SERPL-MCNC: 3.3 G/DL (ref 3.4–5)
ANION GAP SERPL CALCULATED.3IONS-SCNC: 9 MMOL/L (ref 3–16)
ANTI-XA UNFRAC HEPARIN: 0.32 IU/ML (ref 0.3–0.7)
ANTI-XA UNFRAC HEPARIN: 0.5 IU/ML (ref 0.3–0.7)
BUN SERPL-MCNC: 16 MG/DL (ref 7–20)
CALCIUM SERPL-MCNC: 8.2 MG/DL (ref 8.3–10.6)
CHLORIDE SERPL-SCNC: 109 MMOL/L (ref 99–110)
CO2 SERPL-SCNC: 21 MMOL/L (ref 21–32)
CREAT SERPL-MCNC: 1.3 MG/DL (ref 0.8–1.3)
DEPRECATED RDW RBC AUTO: 18.5 % (ref 12.4–15.4)
GFR SERPLBLD CREATININE-BSD FMLA CKD-EPI: 59 ML/MIN/{1.73_M2}
GLUCOSE BLD-MCNC: 131 MG/DL (ref 70–99)
GLUCOSE BLD-MCNC: 135 MG/DL (ref 70–99)
GLUCOSE BLD-MCNC: 145 MG/DL (ref 70–99)
GLUCOSE BLD-MCNC: 149 MG/DL (ref 70–99)
GLUCOSE SERPL-MCNC: 119 MG/DL (ref 70–99)
HCT VFR BLD AUTO: 21.2 % (ref 40.5–52.5)
HCT VFR BLD AUTO: 21.7 % (ref 40.5–52.5)
HGB BLD-MCNC: 7.2 G/DL (ref 13.5–17.5)
HGB BLD-MCNC: 7.3 G/DL (ref 13.5–17.5)
INR PPP: 1.25 (ref 0.84–1.16)
MCH RBC QN AUTO: 32.1 PG (ref 26–34)
MCHC RBC AUTO-ENTMCNC: 33.3 G/DL (ref 31–36)
MCV RBC AUTO: 96.6 FL (ref 80–100)
PERFORMED ON: ABNORMAL
PHOSPHATE SERPL-MCNC: 2.6 MG/DL (ref 2.5–4.9)
PLATELET # BLD AUTO: 144 K/UL (ref 135–450)
PMV BLD AUTO: 7.5 FL (ref 5–10.5)
POTASSIUM SERPL-SCNC: 3.9 MMOL/L (ref 3.5–5.1)
PROTHROMBIN TIME: 15.6 SEC (ref 11.5–14.8)
RBC # BLD AUTO: 2.25 M/UL (ref 4.2–5.9)
SODIUM SERPL-SCNC: 139 MMOL/L (ref 136–145)
WBC # BLD AUTO: 5.1 K/UL (ref 4–11)

## 2023-07-04 PROCEDURE — 85014 HEMATOCRIT: CPT

## 2023-07-04 PROCEDURE — 94760 N-INVAS EAR/PLS OXIMETRY 1: CPT

## 2023-07-04 PROCEDURE — 85610 PROTHROMBIN TIME: CPT

## 2023-07-04 PROCEDURE — 6370000000 HC RX 637 (ALT 250 FOR IP): Performed by: HOSPITALIST

## 2023-07-04 PROCEDURE — 85018 HEMOGLOBIN: CPT

## 2023-07-04 PROCEDURE — 6370000000 HC RX 637 (ALT 250 FOR IP): Performed by: INTERNAL MEDICINE

## 2023-07-04 PROCEDURE — 85027 COMPLETE CBC AUTOMATED: CPT

## 2023-07-04 PROCEDURE — 2580000003 HC RX 258: Performed by: INTERNAL MEDICINE

## 2023-07-04 PROCEDURE — 36415 COLL VENOUS BLD VENIPUNCTURE: CPT

## 2023-07-04 PROCEDURE — 1200000000 HC SEMI PRIVATE

## 2023-07-04 PROCEDURE — 85520 HEPARIN ASSAY: CPT

## 2023-07-04 PROCEDURE — 6360000002 HC RX W HCPCS: Performed by: HOSPITALIST

## 2023-07-04 PROCEDURE — 80069 RENAL FUNCTION PANEL: CPT

## 2023-07-04 PROCEDURE — 6360000002 HC RX W HCPCS: Performed by: INTERNAL MEDICINE

## 2023-07-04 RX ORDER — WARFARIN SODIUM 7.5 MG/1
7.5 TABLET ORAL
Status: COMPLETED | OUTPATIENT
Start: 2023-07-04 | End: 2023-07-04

## 2023-07-04 RX ADMIN — HEPARIN SODIUM 1540 UNITS/HR: 10000 INJECTION, SOLUTION INTRAVENOUS at 21:05

## 2023-07-04 RX ADMIN — Medication 10 ML: at 11:55

## 2023-07-04 RX ADMIN — ROSUVASTATIN CALCIUM 40 MG: 40 TABLET, FILM COATED ORAL at 17:29

## 2023-07-04 RX ADMIN — ALLOPURINOL 100 MG: 100 TABLET ORAL at 08:06

## 2023-07-04 RX ADMIN — LEVOTHYROXINE SODIUM 137 MCG: 0.11 TABLET ORAL at 06:31

## 2023-07-04 RX ADMIN — INSULIN GLARGINE 15 UNITS: 100 INJECTION, SOLUTION SUBCUTANEOUS at 21:06

## 2023-07-04 RX ADMIN — ACETAMINOPHEN 650 MG: 325 TABLET ORAL at 21:14

## 2023-07-04 RX ADMIN — EZETIMIBE 10 MG: 10 TABLET ORAL at 08:06

## 2023-07-04 RX ADMIN — IRON SUCROSE 200 MG: 20 INJECTION, SOLUTION INTRAVENOUS at 11:55

## 2023-07-04 RX ADMIN — ASPIRIN 81 MG: 81 TABLET, COATED ORAL at 20:53

## 2023-07-04 RX ADMIN — METOPROLOL SUCCINATE 75 MG: 50 TABLET, EXTENDED RELEASE ORAL at 20:52

## 2023-07-04 RX ADMIN — Medication 10 ML: at 20:57

## 2023-07-04 RX ADMIN — PANTOPRAZOLE SODIUM 40 MG: 40 TABLET, DELAYED RELEASE ORAL at 06:30

## 2023-07-04 RX ADMIN — Medication 400 MG: at 08:06

## 2023-07-04 RX ADMIN — WARFARIN SODIUM 7.5 MG: 7.5 TABLET ORAL at 17:29

## 2023-07-04 RX ADMIN — MIRTAZAPINE 15 MG: 15 TABLET, FILM COATED ORAL at 20:53

## 2023-07-04 RX ADMIN — HEPARIN SODIUM 1540 UNITS/HR: 10000 INJECTION, SOLUTION INTRAVENOUS at 04:36

## 2023-07-04 RX ADMIN — TAMSULOSIN HYDROCHLORIDE 0.4 MG: 0.4 CAPSULE ORAL at 20:53

## 2023-07-04 ASSESSMENT — PAIN DESCRIPTION - ORIENTATION: ORIENTATION: RIGHT

## 2023-07-04 ASSESSMENT — PAIN DESCRIPTION - DESCRIPTORS: DESCRIPTORS: ACHING

## 2023-07-04 ASSESSMENT — PAIN DESCRIPTION - LOCATION: LOCATION: LEG

## 2023-07-04 ASSESSMENT — PAIN SCALES - GENERAL: PAINLEVEL_OUTOF10: 3

## 2023-07-04 NOTE — PROGRESS NOTES
Patient alert and oriented x4, VSS, sitting up in bed reading. Patient denies n/v, diarrhea, SOB, pain. Patient is tolerating a clear liquid diet and wishes to advance diet, will discuss with MD.  Patient states no needs at this time. Bed in lowest and locked position. Patient is UAL and tolerating well, non-slip socks on when OOB. Call light in reach, calls appropriately.

## 2023-07-04 NOTE — PROGRESS NOTES
Clinical Pharmacy Note  Heparin Dosing       Lab Results   Component Value Date/Time    APTT 31.2 06/29/2023 05:52 PM     Lab Results   Component Value Date/Time    HGB 7.2 07/04/2023 07:37 AM    HCT 21.7 07/04/2023 07:37 AM     07/04/2023 07:37 AM    INR 1.25 07/04/2023 07:37 AM       Current Infusion Rate: 1540 units/hr  Current anti-Xa level:  0.50    Plan:  Rate: Continue at 1540 units/hr  Next anti-Xa level: 0600  7/5/23    Pharmacy will continue to monitor and adjust based on anti-Xa results.     Bg Miles, [address] [place] [address]  7/4/2023 9:59 AM

## 2023-07-04 NOTE — PROGRESS NOTES
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 He has no complaint. He denies abdominal pain. He has not had a BM since his procedures, thus no melena or hematochezia. He is tolerating clear liquids    Coumadin resumed last night.   INR 1.25    MEDICATIONS   SCHEDULED:  warfarin placeholder: dosing by pharmacy, , Take Home see Admin Instr  aspirin, 81 mg, Nightly  pantoprazole, 40 mg, QAM AC  sodium chloride flush, 5-40 mL, 2 times per day  allopurinol, 100 mg, Daily  levothyroxine, 137 mcg, Daily  magnesium oxide, 400 mg, Daily  metoprolol succinate, 75 mg, BID  mirtazapine, 15 mg, Nightly  rosuvastatin, 40 mg, QPM  tamsulosin, 0.4 mg, Nightly  ezetimibe, 10 mg, Daily  insulin glargine, 15 Units, Nightly  insulin lispro, 0-16 Units, TID WC  insulin lispro, 0-4 Units, Nightly      FLUIDS/DRIPS:     sodium chloride      sodium chloride      dextrose      heparin (PORCINE) Infusion 1,540 Units/hr (07/04/23 0436)    sodium chloride Stopped (06/30/23 1415)    sodium chloride       PRNs: sodium chloride, , PRN  sodium chloride, , PRN  glucose, 4 tablet, PRN  dextrose bolus, 125 mL, PRN   Or  dextrose bolus, 250 mL, PRN  glucagon (rDNA), 1 mg, PRN  dextrose, , Continuous PRN  sodium chloride flush, 5-40 mL, PRN  sodium chloride, , PRN  ondansetron, 4 mg, Q8H PRN   Or  ondansetron, 4 mg, Q6H PRN  polyethylene glycol, 17 g, Daily PRN  acetaminophen, 650 mg, Q6H PRN   Or  acetaminophen, 650 mg, Q6H PRN  sodium chloride, , PRN      ALLERGIES:    Allergies   Allergen Reactions    Ciprofloxacin Hives         PHYSICAL EXAM   VITALS:  BP (!)

## 2023-07-04 NOTE — PLAN OF CARE
Problem: Discharge Planning  Goal: Discharge to home or other facility with appropriate resources  Outcome: Progressing     Problem: Safety - Adult  Goal: Free from fall injury  Outcome: Progressing     Problem: ABCDS Injury Assessment  Goal: Absence of physical injury  Outcome: Progressing     Problem: Chronic Conditions and Co-morbidities  Goal: Patient's chronic conditions and co-morbidity symptoms are monitored and maintained or improved  Outcome: Progressing     Problem: Pain  Goal: Verbalizes/displays adequate comfort level or baseline comfort level  Outcome: Progressing     Problem: Metabolic/Fluid and Electrolytes - Adult  Goal: Electrolytes maintained within normal limits  Outcome: Progressing  Goal: Hemodynamic stability and optimal renal function maintained  Outcome: Progressing  Goal: Glucose maintained within prescribed range  Outcome: Progressing     Problem: Hematologic - Adult  Goal: Maintains hematologic stability  Outcome: Progressing

## 2023-07-04 NOTE — PLAN OF CARE
Problem: Discharge Planning  Goal: Discharge to home or other facility with appropriate resources  7/4/2023 0814 by Majo Lee RN  Outcome: Adequate for Discharge  7/4/2023 0011 by Radha Sandoval RN  Outcome: Progressing     Problem: Safety - Adult  Goal: Free from fall injury  7/4/2023 0814 by Majo Lee RN  Outcome: Adequate for Discharge  7/4/2023 0011 by Radha Sandoval RN  Outcome: Progressing  Flowsheets (Taken 7/4/2023 0011)  Free From Fall Injury: Instruct family/caregiver on patient safety     Problem: ABCDS Injury Assessment  Goal: Absence of physical injury  7/4/2023 0814 by Majo Lee RN  Outcome: Adequate for Discharge  7/4/2023 0011 by Radha Sandoval RN  Outcome: Progressing  Flowsheets (Taken 7/4/2023 0011)  Absence of Physical Injury: Implement safety measures based on patient assessment     Problem: Chronic Conditions and Co-morbidities  Goal: Patient's chronic conditions and co-morbidity symptoms are monitored and maintained or improved  7/4/2023 0814 by Majo Lee RN  Outcome: Progressing  7/4/2023 0011 by Radha Sandoval RN  Outcome: Progressing     Problem: Pain  Goal: Verbalizes/displays adequate comfort level or baseline comfort level  7/4/2023 0814 by Majo Lee RN  Outcome: Adequate for Discharge  7/4/2023 0011 by Radha Sandoval RN  Outcome: Progressing     Problem: Metabolic/Fluid and Electrolytes - Adult  Goal: Electrolytes maintained within normal limits  7/4/2023 0814 by Majo Lee RN  Outcome: Progressing  7/4/2023 0011 by Radha Sandoval RN  Outcome: Progressing  Goal: Hemodynamic stability and optimal renal function maintained  7/4/2023 0814 by Majo Lee RN  Outcome: Progressing  7/4/2023 0011 by Radha Sandoval RN  Outcome: Progressing  Goal: Glucose maintained within prescribed range  7/4/2023 0814 by Majo Lee RN  Outcome: Adequate for Discharge  7/4/2023 0011 by Radha Sandoval RN  Outcome: Progressing     Problem: Hematologic - Adult  Goal: Maintains hematologic stability  7/4/2023 0814 by Pinky Epperson, RN  Outcome: Progressing  7/4/2023 0011 by Zohra Raphael RN  Outcome: Progressing

## 2023-07-04 NOTE — PROGRESS NOTES
Clinical Pharmacy Note  Heparin Dosing       Lab Results   Component Value Date/Time    APTT 31.2 06/29/2023 05:52 PM     Lab Results   Component Value Date/Time    HGB 7.3 07/04/2023 12:45 AM    HCT 21.2 07/04/2023 12:45 AM     07/03/2023 07:59 AM    INR 1.27 07/03/2023 07:59 AM       Current Infusion Rate: 1540 units/hr    Plan:  Rate: continue at 1540 units/hr  Next anti-Xa level: 0800 7/4/23    Pharmacy will continue to monitor and adjust based on anti-Xa results.   Hector Guerra, MarkD

## 2023-07-04 NOTE — PROGRESS NOTES
V2.0    [facility] [place]      Name:  Faby Leone /Age/Sex: 1950  (81 y.o. male)   MRN & CSN:  [id number] & [id number] Encounter Date/Time: 2023 2:51 PM EDT   Location:  [id number] PCP: Zeb Rooney MD     Attending:Azeem Rapp, [address] [place] [address] Day: 8    Assessment and Recommendations       Faby Leone is a 81 y.o. male with pmh of mechanical aortic valve on Coumadin, CKD stage III, diabetes, CAD, hypothyroidism, hyperuricemia who presents with Dizziness        Plan:  1. Acute severe symptomatic anemia in the setting of GI bleed likely in the setting of Coumadin use: INR more than 2.5. Most recent INR less then 2 hense coumadin resumed. On heparin ggt for bridging. Cardiology and GI agree on starting the patient on heparin drip. Transfuse if low hemoglobin ( less then 7)  . Capsule endoscopy results positive for bleedin. repeat endoscopy on 7/3 positive for Dieulafoy lesion s/p clipping. 2.         History of mechanical aortic valve . Now on coumadi being bridge with heparin ggt  3. CKD stage III avoid nephrotoxic agents  4. Non-insulin-dependent diabetes mellitus type 2 on sliding scale insulin monitor for hypoglycemia  5. Coronary artery disease holding aspirin  6. Hypothyroidism on levothyroxine  7. Mild leukopenia  8. Hyperuricemia on allopurinol        DVT Prophylaxis: Lovenox. Code Status: Total Support. Barrier to DC: repeat EGD today. Subjective:     Patient was seen and examined today. No acute events overnight. Patient angella afebrile with stable vital signs. .     Review of Systems:      Pertinent positives and negatives discussed in HPI    Objective:      Intake/Output Summary (Last 24 hours) at 2023 1241  Last data filed at 2023 0600  Gross per 24 hour   Intake 2027.67 ml   Output 700 ml   Net 1327.67 ml        Vitals:   Vitals:    23 0758 23 0758 23 0837 23 1133

## 2023-07-04 NOTE — PROGRESS NOTES
Pt blood transfusion complete. Pt tolerated well. No adverse reaction noted. Will continue to monitor.

## 2023-07-04 NOTE — PROGRESS NOTES
Clinical Pharmacy Note  Warfarin Consult    Keyona Rodriguez is a 81 y.o. male on warfarin bridged with a heparin drip    Warfarin Indication: [facility] AVR  Target INR range: 2-3: Clarified INR goal with Dr. Tona Kenyon. Dose prior to admission: 3.75 mg (7.5 mg x 0.5) every Thu; 7.5 mg (7.5 mg x 1) all other days - this will need to be changed given his change in INR goal    Current warfarin drug-drug interactions: -    Recent Labs     07/02/23  0619 07/03/23  0759 07/03/23  1750 07/04/23  0045 07/04/23  0737   HGB 7.4* 6.8* 6.7* 7.3* 7.2*   HCT 22.3* 20.3* 20.0* 21.2* 21.7*   INR 1.37* 1.27*  --   --  1.25*         Assessment/Plan:  Endoscopy procedure was completed on 07/03 with placement of hemoclips. Home dose will likely need to be reduced given the change in his INR goal. (also had a recent INR of 5 as an outpatient while on that dose). Will need close monitoring because it would be preferable not to overshoot his INR goal given recent issues with bleeding. However, will increase tonight's dose to 7.5 mg po x 1 in order to facilitate an uptrend toward therapeutic range. Would like to follow up in outpatient anticoagulation clinic prior to discharge. Thank you for the consult. Will continue to follow.     Jaja Gordon [facility], PharmD, BCPS  7/4/2023 11:39 AM

## 2023-07-05 LAB
ABO + RH BLD: NORMAL
ALBUMIN SERPL-MCNC: 3.1 G/DL (ref 3.4–5)
ANION GAP SERPL CALCULATED.3IONS-SCNC: 6 MMOL/L (ref 3–16)
ANTI-XA UNFRAC HEPARIN: 0.41 IU/ML (ref 0.3–0.7)
ANTI-XA UNFRAC HEPARIN: 0.75 IU/ML (ref 0.3–0.7)
ANTI-XA UNFRAC HEPARIN: <0.1 IU/ML (ref 0.3–0.7)
BLD GP AB SCN SERPL QL: NORMAL
BUN SERPL-MCNC: 15 MG/DL (ref 7–20)
CALCIUM SERPL-MCNC: 8.6 MG/DL (ref 8.3–10.6)
CHLORIDE SERPL-SCNC: 108 MMOL/L (ref 99–110)
CO2 SERPL-SCNC: 23 MMOL/L (ref 21–32)
CREAT SERPL-MCNC: 1.5 MG/DL (ref 0.8–1.3)
DEPRECATED RDW RBC AUTO: 18.1 % (ref 12.4–15.4)
GFR SERPLBLD CREATININE-BSD FMLA CKD-EPI: 50 ML/MIN/{1.73_M2}
GLUCOSE BLD-MCNC: 139 MG/DL (ref 70–99)
GLUCOSE BLD-MCNC: 180 MG/DL (ref 70–99)
GLUCOSE BLD-MCNC: 187 MG/DL (ref 70–99)
GLUCOSE SERPL-MCNC: 122 MG/DL (ref 70–99)
HCT VFR BLD AUTO: 20.9 % (ref 40.5–52.5)
HCT VFR BLD AUTO: 21.8 % (ref 40.5–52.5)
HGB BLD-MCNC: 7 G/DL (ref 13.5–17.5)
HGB BLD-MCNC: 7.3 G/DL (ref 13.5–17.5)
INR PPP: 1.48 (ref 0.84–1.16)
MCH RBC QN AUTO: 32.2 PG (ref 26–34)
MCHC RBC AUTO-ENTMCNC: 33.3 G/DL (ref 31–36)
MCV RBC AUTO: 96.7 FL (ref 80–100)
PERFORMED ON: ABNORMAL
PHOSPHATE SERPL-MCNC: 2.7 MG/DL (ref 2.5–4.9)
PLATELET # BLD AUTO: 126 K/UL (ref 135–450)
PMV BLD AUTO: 7.9 FL (ref 5–10.5)
POTASSIUM SERPL-SCNC: 4.1 MMOL/L (ref 3.5–5.1)
PROTHROMBIN TIME: 17.8 SEC (ref 11.5–14.8)
RBC # BLD AUTO: 2.16 M/UL (ref 4.2–5.9)
SODIUM SERPL-SCNC: 137 MMOL/L (ref 136–145)
WBC # BLD AUTO: 4.2 K/UL (ref 4–11)

## 2023-07-05 PROCEDURE — 6360000002 HC RX W HCPCS: Performed by: INTERNAL MEDICINE

## 2023-07-05 PROCEDURE — 86901 BLOOD TYPING SEROLOGIC RH(D): CPT

## 2023-07-05 PROCEDURE — 85018 HEMOGLOBIN: CPT

## 2023-07-05 PROCEDURE — 36415 COLL VENOUS BLD VENIPUNCTURE: CPT

## 2023-07-05 PROCEDURE — 6370000000 HC RX 637 (ALT 250 FOR IP): Performed by: INTERNAL MEDICINE

## 2023-07-05 PROCEDURE — 85610 PROTHROMBIN TIME: CPT

## 2023-07-05 PROCEDURE — 6370000000 HC RX 637 (ALT 250 FOR IP): Performed by: HOSPITALIST

## 2023-07-05 PROCEDURE — 1200000000 HC SEMI PRIVATE

## 2023-07-05 PROCEDURE — 86850 RBC ANTIBODY SCREEN: CPT

## 2023-07-05 PROCEDURE — 2580000003 HC RX 258: Performed by: INTERNAL MEDICINE

## 2023-07-05 PROCEDURE — 80069 RENAL FUNCTION PANEL: CPT

## 2023-07-05 PROCEDURE — 86900 BLOOD TYPING SEROLOGIC ABO: CPT

## 2023-07-05 PROCEDURE — 85014 HEMATOCRIT: CPT

## 2023-07-05 PROCEDURE — 85027 COMPLETE CBC AUTOMATED: CPT

## 2023-07-05 PROCEDURE — 6360000002 HC RX W HCPCS: Performed by: HOSPITALIST

## 2023-07-05 PROCEDURE — 94760 N-INVAS EAR/PLS OXIMETRY 1: CPT

## 2023-07-05 PROCEDURE — 85520 HEPARIN ASSAY: CPT

## 2023-07-05 RX ORDER — HEPARIN SODIUM 1000 [USP'U]/ML
4000 INJECTION, SOLUTION INTRAVENOUS; SUBCUTANEOUS ONCE
Status: COMPLETED | OUTPATIENT
Start: 2023-07-05 | End: 2023-07-05

## 2023-07-05 RX ORDER — WARFARIN SODIUM 5 MG/1
5 TABLET ORAL
Status: COMPLETED | OUTPATIENT
Start: 2023-07-05 | End: 2023-07-05

## 2023-07-05 RX ADMIN — Medication 400 MG: at 09:52

## 2023-07-05 RX ADMIN — IRON SUCROSE 200 MG: 20 INJECTION, SOLUTION INTRAVENOUS at 12:53

## 2023-07-05 RX ADMIN — METOPROLOL SUCCINATE 75 MG: 50 TABLET, EXTENDED RELEASE ORAL at 20:39

## 2023-07-05 RX ADMIN — SODIUM CHLORIDE: 9 INJECTION, SOLUTION INTRAVENOUS at 15:03

## 2023-07-05 RX ADMIN — EZETIMIBE 10 MG: 10 TABLET ORAL at 09:52

## 2023-07-05 RX ADMIN — HEPARIN SODIUM 4000 UNITS: 1000 INJECTION INTRAVENOUS; SUBCUTANEOUS at 15:02

## 2023-07-05 RX ADMIN — Medication 10 ML: at 09:53

## 2023-07-05 RX ADMIN — ASPIRIN 81 MG: 81 TABLET, COATED ORAL at 20:39

## 2023-07-05 RX ADMIN — LEVOTHYROXINE SODIUM 137 MCG: 0.11 TABLET ORAL at 06:29

## 2023-07-05 RX ADMIN — ACETAMINOPHEN 650 MG: 325 TABLET ORAL at 20:39

## 2023-07-05 RX ADMIN — ROSUVASTATIN CALCIUM 40 MG: 40 TABLET, FILM COATED ORAL at 17:34

## 2023-07-05 RX ADMIN — WARFARIN SODIUM 5 MG: 5 TABLET ORAL at 17:34

## 2023-07-05 RX ADMIN — INSULIN GLARGINE 15 UNITS: 100 INJECTION, SOLUTION SUBCUTANEOUS at 20:43

## 2023-07-05 RX ADMIN — MIRTAZAPINE 15 MG: 15 TABLET, FILM COATED ORAL at 20:39

## 2023-07-05 RX ADMIN — ALLOPURINOL 100 MG: 100 TABLET ORAL at 09:52

## 2023-07-05 RX ADMIN — TAMSULOSIN HYDROCHLORIDE 0.4 MG: 0.4 CAPSULE ORAL at 20:39

## 2023-07-05 RX ADMIN — PANTOPRAZOLE SODIUM 40 MG: 40 TABLET, DELAYED RELEASE ORAL at 06:29

## 2023-07-05 RX ADMIN — HEPARIN SODIUM 1450 UNITS/HR: 10000 INJECTION, SOLUTION INTRAVENOUS at 15:04

## 2023-07-05 ASSESSMENT — PAIN DESCRIPTION - LOCATION: LOCATION: LEG

## 2023-07-05 ASSESSMENT — PAIN DESCRIPTION - ORIENTATION: ORIENTATION: RIGHT

## 2023-07-05 ASSESSMENT — PAIN SCALES - GENERAL
PAINLEVEL_OUTOF10: 2
PAINLEVEL_OUTOF10: 0

## 2023-07-05 ASSESSMENT — PAIN DESCRIPTION - DESCRIPTORS: DESCRIPTORS: ACHING

## 2023-07-05 NOTE — PROGRESS NOTES
Nutrition Assessment     Type and Reason for Visit: RD Nutrition Re-Screen/LOS    Nutrition Recommendations/Plan:   Continue on regular diet  Monitor intake     Malnutrition Assessment:  Malnutrition Status: No malnutrition    Nutrition Assessment:  Pt admitted with anemia related to GI bleed. PMH includes mechanical aortic valve, CKD 3, CAD, DM. Pt has had hemo clips placed for bleeding of lesions in the jejunum. Pr is on a regular diet and eating well since he has been on solid food. He knows [facility] diet and controls BS well with A1C of 6.1. Nutrition Related Findings:   BM 6/30, no edema,, A1C 6.1 Wound Type: None    Current Nutrition Therapies:    ADULT DIET;  Regular    Anthropometric Measures:  Height: 5' 11\" (180.3 cm)  Current Body Wt: 185 lb 3 oz (84 kg)   BMI: 25.8    Nutrition Diagnosis:   No nutrition diagnosis at this time related to   as evidenced by      Nutrition Interventions:   Food and/or Nutrient Delivery: Continue Current Diet  Nutrition Education/Counseling: Education not indicated  Coordination of Nutrition Care: Continue to monitor while inpatient       Goals:     Goals: Meet at least 75% of estimated needs, by next RD assessment       Nutrition Monitoring and Evaluation:   Behavioral-Environmental Outcomes: None Identified  Food/Nutrient Intake Outcomes: Food and Nutrient Intake  Physical Signs/Symptoms Outcomes: Biochemical Data, Nutrition Focused Physical Findings, Weight    Discharge Planning:    No discharge needs at this time     Lorraine Posey, [id number] [facility]   Contact: [phone]

## 2023-07-05 NOTE — PROGRESS NOTES
Pt Hb was trending down. Nurse called stating Hb 7 after 5 am. Pt on heparin drip bridging for coumadin. Held heparin Requested to transfuse 1 units of prbc after Type n Screen/cross. Repeat HnH ordered.

## 2023-07-05 NOTE — CONSENT
Informed Consent for Blood Component Transfusion Note    I have discussed with the patient the rationale for blood component transfusion; its benefits in treating or preventing fatigue, organ damage, or death; and its risk which includes mild transfusion reactions, rare risk of blood borne infection, or more serious but rare reactions. I have discussed the alternatives to transfusion, including the risk and consequences of not receiving transfusion. The patient had an opportunity to ask questions and had agreed to proceed with transfusion of blood components.     Electronically signed by Beto Carter DO on 7/5/23 at 6:49 AM EDT

## 2023-07-05 NOTE — PLAN OF CARE
Problem: Discharge Planning  Goal: Discharge to home or other facility with appropriate resources  7/5/2023 1632 by Amy Pollard RN  Outcome: Progressing  Flowsheets (Taken 7/5/2023 0930)  Discharge to home or other facility with appropriate resources: Identify barriers to discharge with patient and caregiver  7/5/2023 0358 by Antoni Tomlin RN  Outcome: Progressing     Problem: Safety - Adult  Goal: Free from fall injury  7/5/2023 1632 by Amy Pollard RN  Outcome: Progressing  7/5/2023 0358 by Antoni Tomlin RN  Outcome: Progressing     Problem: ABCDS Injury Assessment  Goal: Absence of physical injury  7/5/2023 1632 by Amy Pollard RN  Outcome: Progressing  7/5/2023 0358 by Antoni Tomlin RN  Outcome: Progressing     Problem: Chronic Conditions and Co-morbidities  Goal: Patient's chronic conditions and co-morbidity symptoms are monitored and maintained or improved  7/5/2023 1632 by Amy Pollard RN  Outcome: Progressing  Flowsheets (Taken 7/5/2023 0930)  Care Plan - Patient's Chronic Conditions and Co-Morbidity Symptoms are Monitored and Maintained or Improved: Monitor and assess patient's chronic conditions and comorbid symptoms for stability, deterioration, or improvement  7/5/2023 0358 by Antoni Tomlin RN  Outcome: Progressing     Problem: Pain  Goal: Verbalizes/displays adequate comfort level or baseline comfort level  7/5/2023 1632 by Amy Pollard RN  Outcome: Progressing  7/5/2023 0358 by Antoni Tomlin RN  Outcome: Progressing     Problem: Metabolic/Fluid and Electrolytes - Adult  Goal: Electrolytes maintained within normal limits  7/5/2023 1632 by Amy Pollard RN  Outcome: Progressing  Flowsheets (Taken 7/5/2023 0930)  Electrolytes maintained within normal limits: Monitor labs and assess patient for signs and symptoms of electrolyte imbalances  7/5/2023 0358 by Antoni Tomlin RN  Outcome: Progressing  Goal: Hemodynamic stability and optimal renal function maintained  7/5/2023 1632 by Amy Pollard RN  Outcome: Progressing  Flowsheets (Taken 7/5/2023 0930)  Hemodynamic stability and optimal renal function maintained: Monitor labs and assess for signs and symptoms of volume excess or deficit  7/5/2023 0358 by Jada Arrington RN  Outcome: Progressing  Goal: Glucose maintained within prescribed range  7/5/2023 1632 by Zara Sanchez RN  Outcome: Progressing  Flowsheets (Taken 7/5/2023 0930)  Glucose maintained within prescribed range: Monitor blood glucose as ordered  7/5/2023 0358 by Jada Arrington RN  Outcome: Progressing     Problem: Hematologic - Adult  Goal: Maintains hematologic stability  7/5/2023 1632 by Zara Sanchez RN  Outcome: Progressing  Flowsheets (Taken 7/5/2023 0930)  Maintains hematologic stability: Assess for signs and symptoms of bleeding or hemorrhage  7/5/2023 0358 by Jada Arrington RN  Outcome: Progressing     Problem: Neurosensory - Adult  Goal: Achieves stable or improved neurological status  Outcome: Progressing  Flowsheets (Taken 7/5/2023 0930)  Achieves stable or improved neurological status: Assess for and report changes in neurological status  Goal: Absence of seizures  Outcome: Progressing  Flowsheets (Taken 7/5/2023 0930)  Absence of seizures: Monitor for seizure activity.   If seizure occurs, document type and location of movements and any associated apnea  Goal: Remains free of injury related to seizures activity  Outcome: Progressing  Flowsheets (Taken 7/5/2023 0930)  Remains free of injury related to seizure activity: Maintain airway, patient safety  and administer oxygen as ordered  Goal: Achieves maximal functionality and self care  Outcome: Progressing  Flowsheets (Taken 7/5/2023 0930)  Achieves maximal functionality and self care: Monitor swallowing and airway patency with patient fatigue and changes in neurological status     Problem: Respiratory - Adult  Goal: Achieves optimal ventilation and oxygenation  Outcome: Progressing  Flowsheets (Taken 7/5/2023 0930)  Achieves optimal ventilation and

## 2023-07-05 NOTE — PROGRESS NOTES
Pt re-draw of H&H was Hgb 7.3 Hct 21.8. Messaged MD to see if he still wanted the 1 unit of blood to be given to pt. MD stated no need for the transfusion at this time.  Will continue to monitor H&H.

## 2023-07-05 NOTE — PROGRESS NOTES
Clinical Pharmacy Note  Warfarin Consult    Heladio Mejias is a 81 y.o. male on warfarin bridged with a heparin drip    Warfarin Indication: [facility] AVR  Target INR range: 2-3: Clarified INR goal with Dr. Cat Lobo. Dose prior to admission: 3.75 mg (7.5 mg x 0.5) every Thu; 7.5 mg (7.5 mg x 1) all other days - this will need to be changed given his change in INR goal    Current warfarin drug-drug interactions: -    Recent Labs     07/03/23  0759 07/03/23  1750 07/04/23  0045 07/04/23  0737 07/05/23  0439   HGB 6.8*   < > 7.3* 7.2* 7.0*   HCT 20.3*   < > 21.2* 21.7* 20.9*   INR 1.27*  --   --  1.25* 1.48*    < > = values in this interval not displayed. Assessment/Plan:  Endoscopy procedure was completed on 07/03 with placement of hemoclips. Home dose will likely need to be reduced given the change in his INR goal. (also had a recent INR of 5 as an outpatient while on that dose). Will need close monitoring because it would be preferable not to overshoot his INR goal given recent issues with bleeding. INR is trending toward therapeutic range. Warfarin 5 mg tonight    Would like to follow up in outpatient anticoagulation clinic prior to discharge. Thank you for the consult. Will continue to follow.     Lindsay De eJsus, [address] [place] [address],  7/5/2023 8:06 AM

## 2023-07-05 NOTE — PROGRESS NOTES
Clinical Pharmacy Note  Heparin Dosing       Lab Results   Component Value Date/Time    APTT 31.2 06/29/2023 05:52 PM     Lab Results   Component Value Date/Time    HGB 7.0 07/05/2023 04:39 AM    HCT 20.9 07/05/2023 04:39 AM     07/05/2023 04:39 AM    INR 1.48 07/05/2023 04:39 AM       Current Infusion Rate: 1540 units/hr    Plan:  Rate: decrease to 1450 units/hr  Next anti-Xa level: 1200 7/5/23    Pharmacy will continue to monitor and adjust based on anti-Xa results.   Lanney Agent, PharmD

## 2023-07-05 NOTE — PROGRESS NOTES
V2.0    [facility] [place]      Name:  Lexie Marshall /Age/Sex: 1950  (81 y.o. male)   MRN & CSN:  [id number] & [id number] Encounter Date/Time: 2023 2:51 PM EDT   Location:  [id number] PCP: Milton Thurman MD     Attending:Azeem Francess, [address] [place] [address] Day: 9    Assessment and Recommendations       Lexie Marshall is a 81 y.o. male with pmh of mechanical aortic valve on Coumadin, CKD stage III, diabetes, CAD, hypothyroidism, hyperuricemia who presents with Dizziness/ patient is s/p EGD/Colonosocpy. 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 Hemoglobin tends to be stabalizing. Currently on coumadin/heparin ggt bridging. Plan:  1. Acute severe symptomatic anemia in the setting of GI bleed likely in the setting of Coumadin use: INR more than 2.5. Most recent INR less then 2 hense coumadin resumed. On heparin ggt for bridging. Cardiology and GI agree on starting the patient on heparin drip. Transfuse if low hemoglobin ( less then 7)  . Capsule endoscopy results positive for bleedin. repeat endoscopy on 7/3 positive for Dieulafoy lesion s/p clipping. 2.         History of mechanical aortic valve . Now on coumadi being bridge with heparin ggt  3. CKD stage III avoid nephrotoxic agents  4. Non-insulin-dependent diabetes mellitus type 2 on sliding scale insulin monitor for hypoglycemia  5. Coronary artery disease holding aspirin  6. Hypothyroidism on levothyroxine  7. Mild leukopenia  8. Hyperuricemia on allopurinol        DVT Prophylaxis: Lovenox. Code Status: Total Support. Barrier to DC: monitor hemoglboin. Continue heparin ggt/coumadin for bridging since INR is less then 2          Subjective:     Patient was seen and examined today. Hemoglobin around 7.2. denies any new symptoms. Remains afebrile with stable vital signs.    Review of Systems:      Pertinent positives and negatives discussed in

## 2023-07-06 LAB
ALBUMIN SERPL-MCNC: 3.5 G/DL (ref 3.4–5)
ANION GAP SERPL CALCULATED.3IONS-SCNC: 7 MMOL/L (ref 3–16)
ANTI-XA UNFRAC HEPARIN: 0.44 IU/ML (ref 0.3–0.7)
ANTI-XA UNFRAC HEPARIN: 0.45 IU/ML (ref 0.3–0.7)
BUN SERPL-MCNC: 13 MG/DL (ref 7–20)
CALCIUM SERPL-MCNC: 9.1 MG/DL (ref 8.3–10.6)
CHLORIDE SERPL-SCNC: 108 MMOL/L (ref 99–110)
CO2 SERPL-SCNC: 23 MMOL/L (ref 21–32)
CREAT SERPL-MCNC: 1.4 MG/DL (ref 0.8–1.3)
DEPRECATED RDW RBC AUTO: 18.5 % (ref 12.4–15.4)
GFR SERPLBLD CREATININE-BSD FMLA CKD-EPI: 54 ML/MIN/{1.73_M2}
GLUCOSE BLD-MCNC: 139 MG/DL (ref 70–99)
GLUCOSE BLD-MCNC: 140 MG/DL (ref 70–99)
GLUCOSE BLD-MCNC: 141 MG/DL (ref 70–99)
GLUCOSE BLD-MCNC: 179 MG/DL (ref 70–99)
GLUCOSE BLD-MCNC: 182 MG/DL (ref 70–99)
GLUCOSE SERPL-MCNC: 125 MG/DL (ref 70–99)
HCT VFR BLD AUTO: 23.1 % (ref 40.5–52.5)
HGB BLD-MCNC: 7.7 G/DL (ref 13.5–17.5)
INR PPP: 1.68 (ref 0.84–1.16)
MCH RBC QN AUTO: 32 PG (ref 26–34)
MCHC RBC AUTO-ENTMCNC: 33.3 G/DL (ref 31–36)
MCV RBC AUTO: 96.2 FL (ref 80–100)
PERFORMED ON: ABNORMAL
PHOSPHATE SERPL-MCNC: 3.1 MG/DL (ref 2.5–4.9)
PLATELET # BLD AUTO: 129 K/UL (ref 135–450)
PMV BLD AUTO: 8.1 FL (ref 5–10.5)
POTASSIUM SERPL-SCNC: 4.1 MMOL/L (ref 3.5–5.1)
PROTHROMBIN TIME: 19.7 SEC (ref 11.5–14.8)
RBC # BLD AUTO: 2.4 M/UL (ref 4.2–5.9)
SODIUM SERPL-SCNC: 138 MMOL/L (ref 136–145)
WBC # BLD AUTO: 3.4 K/UL (ref 4–11)

## 2023-07-06 PROCEDURE — 85027 COMPLETE CBC AUTOMATED: CPT

## 2023-07-06 PROCEDURE — 36415 COLL VENOUS BLD VENIPUNCTURE: CPT

## 2023-07-06 PROCEDURE — 6370000000 HC RX 637 (ALT 250 FOR IP): Performed by: INTERNAL MEDICINE

## 2023-07-06 PROCEDURE — 85520 HEPARIN ASSAY: CPT

## 2023-07-06 PROCEDURE — 1200000000 HC SEMI PRIVATE

## 2023-07-06 PROCEDURE — 6360000002 HC RX W HCPCS: Performed by: INTERNAL MEDICINE

## 2023-07-06 PROCEDURE — 85610 PROTHROMBIN TIME: CPT

## 2023-07-06 PROCEDURE — 6370000000 HC RX 637 (ALT 250 FOR IP): Performed by: HOSPITALIST

## 2023-07-06 PROCEDURE — 80069 RENAL FUNCTION PANEL: CPT

## 2023-07-06 PROCEDURE — 6360000002 HC RX W HCPCS: Performed by: HOSPITALIST

## 2023-07-06 PROCEDURE — 94760 N-INVAS EAR/PLS OXIMETRY 1: CPT

## 2023-07-06 RX ORDER — WARFARIN SODIUM 5 MG/1
5 TABLET ORAL
Status: COMPLETED | OUTPATIENT
Start: 2023-07-06 | End: 2023-07-06

## 2023-07-06 RX ADMIN — LEVOTHYROXINE SODIUM 137 MCG: 0.11 TABLET ORAL at 06:08

## 2023-07-06 RX ADMIN — ACETAMINOPHEN 650 MG: 325 TABLET ORAL at 20:42

## 2023-07-06 RX ADMIN — EZETIMIBE 10 MG: 10 TABLET ORAL at 09:01

## 2023-07-06 RX ADMIN — ASPIRIN 81 MG: 81 TABLET, COATED ORAL at 20:40

## 2023-07-06 RX ADMIN — Medication 400 MG: at 09:02

## 2023-07-06 RX ADMIN — ACETAMINOPHEN 650 MG: 325 TABLET ORAL at 09:05

## 2023-07-06 RX ADMIN — METOPROLOL SUCCINATE 75 MG: 50 TABLET, EXTENDED RELEASE ORAL at 20:40

## 2023-07-06 RX ADMIN — WARFARIN SODIUM 5 MG: 5 TABLET ORAL at 17:41

## 2023-07-06 RX ADMIN — ALLOPURINOL 100 MG: 100 TABLET ORAL at 09:02

## 2023-07-06 RX ADMIN — HEPARIN SODIUM 1450 UNITS/HR: 10000 INJECTION, SOLUTION INTRAVENOUS at 07:39

## 2023-07-06 RX ADMIN — METOPROLOL SUCCINATE 75 MG: 50 TABLET, EXTENDED RELEASE ORAL at 09:01

## 2023-07-06 RX ADMIN — ROSUVASTATIN CALCIUM 40 MG: 40 TABLET, FILM COATED ORAL at 17:39

## 2023-07-06 RX ADMIN — IRON SUCROSE 100 MG: 20 INJECTION, SOLUTION INTRAVENOUS at 14:24

## 2023-07-06 RX ADMIN — TAMSULOSIN HYDROCHLORIDE 0.4 MG: 0.4 CAPSULE ORAL at 20:40

## 2023-07-06 RX ADMIN — PANTOPRAZOLE SODIUM 40 MG: 40 TABLET, DELAYED RELEASE ORAL at 06:08

## 2023-07-06 RX ADMIN — MIRTAZAPINE 15 MG: 15 TABLET, FILM COATED ORAL at 20:40

## 2023-07-06 RX ADMIN — INSULIN GLARGINE 15 UNITS: 100 INJECTION, SOLUTION SUBCUTANEOUS at 20:40

## 2023-07-06 ASSESSMENT — PAIN DESCRIPTION - FREQUENCY: FREQUENCY: INTERMITTENT

## 2023-07-06 ASSESSMENT — PAIN DESCRIPTION - ONSET: ONSET: ON-GOING

## 2023-07-06 ASSESSMENT — PAIN - FUNCTIONAL ASSESSMENT: PAIN_FUNCTIONAL_ASSESSMENT: ACTIVITIES ARE NOT PREVENTED

## 2023-07-06 ASSESSMENT — PAIN DESCRIPTION - LOCATION: LOCATION: LEG

## 2023-07-06 ASSESSMENT — PAIN DESCRIPTION - ORIENTATION: ORIENTATION: RIGHT

## 2023-07-06 ASSESSMENT — PAIN SCALES - GENERAL
PAINLEVEL_OUTOF10: 7
PAINLEVEL_OUTOF10: 2

## 2023-07-06 ASSESSMENT — PAIN DESCRIPTION - PAIN TYPE: TYPE: ACUTE PAIN

## 2023-07-06 ASSESSMENT — PAIN DESCRIPTION - DESCRIPTORS: DESCRIPTORS: ACHING;CRAMPING;DISCOMFORT

## 2023-07-06 NOTE — PLAN OF CARE
Problem: Discharge Planning  Goal: Discharge to home or other facility with appropriate resources  7/6/2023 1143 by Jenny Bryan RN  Outcome: Progressing  Flowsheets (Taken 7/6/2023 0900)  Discharge to home or other facility with appropriate resources: Identify barriers to discharge with patient and caregiver  7/6/2023 0435 by Josue Dawkins RN  Outcome: Progressing     Problem: Safety - Adult  Goal: Free from fall injury  7/6/2023 1143 by Jenny Bryan RN  Outcome: Progressing  7/6/2023 0435 by Josue Dawkins RN  Outcome: Progressing     Problem: ABCDS Injury Assessment  Goal: Absence of physical injury  7/6/2023 1143 by Jenny Bryan RN  Outcome: Progressing  7/6/2023 0435 by Josue Dawkins RN  Outcome: Progressing     Problem: Chronic Conditions and Co-morbidities  Goal: Patient's chronic conditions and co-morbidity symptoms are monitored and maintained or improved  7/6/2023 1143 by Jenny Bryan RN  Outcome: Progressing  Flowsheets (Taken 7/6/2023 0900)  Care Plan - Patient's Chronic Conditions and Co-Morbidity Symptoms are Monitored and Maintained or Improved: Monitor and assess patient's chronic conditions and comorbid symptoms for stability, deterioration, or improvement  7/6/2023 0435 by Josue Dawkins RN  Outcome: Progressing     Problem: Pain  Goal: Verbalizes/displays adequate comfort level or baseline comfort level  7/6/2023 1143 by Jenny Bryan RN  Outcome: Progressing  7/6/2023 0435 by Josue Dawkins RN  Outcome: Progressing     Problem: Metabolic/Fluid and Electrolytes - Adult  Goal: Electrolytes maintained within normal limits  7/6/2023 1143 by Jenny Bryan RN  Outcome: Progressing  Flowsheets (Taken 7/6/2023 0900)  Electrolytes maintained within normal limits: Monitor labs and assess patient for signs and symptoms of electrolyte imbalances  7/6/2023 0435 by Josue Dawkins RN  Outcome: Progressing  Goal: Hemodynamic stability and optimal renal function maintained  7/6/2023 1143 by Jenny Bryan RN  Outcome: meal consumed  7/6/2023 0435 by Tr Reid RN  Outcome: Progressing  Goal: Establish and maintain optimal ostomy function  7/6/2023 1143 by Alea Holley RN  Outcome: Progressing  7/6/2023 0435 by Tr Reid RN  Outcome: Progressing     Problem: Infection - Adult  Goal: Absence of infection at discharge  7/6/2023 1143 by Alea Holley RN  Outcome: Progressing  Flowsheets (Taken 7/6/2023 0900)  Absence of infection at discharge: Assess and monitor for signs and symptoms of infection  7/6/2023 0435 by Tr Reid RN  Outcome: Progressing  Goal: Absence of infection during hospitalization  7/6/2023 1143 by Alea Holley RN  Outcome: Progressing  Flowsheets (Taken 7/6/2023 0900)  Absence of infection during hospitalization: Assess and monitor for signs and symptoms of infection  7/6/2023 0435 by Tr Reid RN  Outcome: Progressing  Goal: Absence of fever/infection during anticipated neutropenic period  7/6/2023 1143 by Alea Holley RN  Outcome: Progressing  Flowsheets (Taken 7/6/2023 0900)  Absence of fever/infection during anticipated neutropenic period: Monitor white blood cell count  7/6/2023 0435 by Tr Reid RN  Outcome: Progressing

## 2023-07-06 NOTE — PLAN OF CARE
Problem: Discharge Planning  Goal: Discharge to home or other facility with appropriate resources  7/6/2023 0435 by Zoe Ruffin RN  Outcome: Progressing  7/5/2023 1632 by Susi Byers RN  Outcome: Progressing  Flowsheets (Taken 7/5/2023 0930)  Discharge to home or other facility with appropriate resources: Identify barriers to discharge with patient and caregiver     Problem: Safety - Adult  Goal: Free from fall injury  7/6/2023 0435 by Zoe Ruffin RN  Outcome: Progressing  7/5/2023 1632 by Susi Byers RN  Outcome: Progressing     Problem: ABCDS Injury Assessment  Goal: Absence of physical injury  7/6/2023 0435 by Zoe Ruffin RN  Outcome: Progressing  7/5/2023 1632 by Susi Byers RN  Outcome: Progressing     Problem: Chronic Conditions and Co-morbidities  Goal: Patient's chronic conditions and co-morbidity symptoms are monitored and maintained or improved  7/6/2023 0435 by Zoe Ruffin RN  Outcome: Progressing  7/5/2023 1632 by Susi Byers RN  Outcome: Progressing  Flowsheets (Taken 7/5/2023 0930)  Care Plan - Patient's Chronic Conditions and Co-Morbidity Symptoms are Monitored and Maintained or Improved: Monitor and assess patient's chronic conditions and comorbid symptoms for stability, deterioration, or improvement     Problem: Pain  Goal: Verbalizes/displays adequate comfort level or baseline comfort level  7/6/2023 0435 by Zoe Ruffin RN  Outcome: Progressing  7/5/2023 1632 by Susi Byers RN  Outcome: Progressing     Problem: Metabolic/Fluid and Electrolytes - Adult  Goal: Electrolytes maintained within normal limits  7/6/2023 0435 by Zoe Ruffin RN  Outcome: Progressing  7/5/2023 1632 by Susi Byers RN  Outcome: Progressing  Flowsheets (Taken 7/5/2023 0930)  Electrolytes maintained within normal limits: Monitor labs and assess patient for signs and symptoms of electrolyte imbalances  Goal: Hemodynamic stability and optimal renal function maintained  7/6/2023 0435 by Zoe Ruffin RN  Outcome: Progressing  Flowsheets (Taken 7/5/2023 0930)  Achieves maximal functionality and self care: Monitor swallowing and airway patency with patient fatigue and changes in neurological status     Problem: Respiratory - Adult  Goal: Achieves optimal ventilation and oxygenation  7/6/2023 0435 by Carlos Sarah RN  Outcome: Progressing  7/5/2023 1632 by Yana Chen RN  Outcome: Progressing  Flowsheets (Taken 7/5/2023 0930)  Achieves optimal ventilation and oxygenation: Assess for changes in respiratory status     Problem: Cardiovascular - Adult  Goal: Maintains optimal cardiac output and hemodynamic stability  7/6/2023 0435 by Carlos Sarah RN  Outcome: Progressing  7/5/2023 1632 by Yana Chen RN  Outcome: Progressing  Flowsheets (Taken 7/5/2023 0930)  Maintains optimal cardiac output and hemodynamic stability: Monitor blood pressure and heart rate  Goal: Absence of cardiac dysrhythmias or at baseline  7/6/2023 0435 by Carlos Sarah RN  Outcome: Progressing  7/5/2023 1632 by Yana Chen RN  Outcome: Progressing  Flowsheets (Taken 7/5/2023 0930)  Absence of cardiac dysrhythmias or at baseline: Monitor cardiac rate and rhythm     Problem: Gastrointestinal - Adult  Goal: Minimal or absence of nausea and vomiting  7/6/2023 0435 by Carlos Sarah RN  Outcome: Progressing  7/5/2023 1632 by Yana Chen RN  Outcome: Progressing  Flowsheets (Taken 7/5/2023 0930)  Minimal or absence of nausea and vomiting: Administer IV fluids as ordered to ensure adequate hydration  Goal: Maintains or returns to baseline bowel function  7/6/2023 0435 by Carlos Sarah RN  Outcome: Progressing  7/5/2023 1632 by Yana Chen RN  Outcome: Progressing  Flowsheets (Taken 7/5/2023 0930)  Maintains or returns to baseline bowel function: Assess bowel function  Goal: Maintains adequate nutritional intake  7/6/2023 0435 by Carlos Sarah RN  Outcome: Progressing  7/5/2023 1632 by Yana Chen RN  Outcome: Progressing  Flowsheets (Taken 7/5/2023 0930)  Maintains

## 2023-07-06 NOTE — PROGRESS NOTES
Clinical Pharmacy Note  Heparin Dosing       Lab Results   Component Value Date/Time    APTT 31.2 06/29/2023 05:52 PM     Lab Results   Component Value Date/Time    HGB 7.3 07/05/2023 08:49 AM    HCT 21.8 07/05/2023 08:49 AM     07/05/2023 04:39 AM    INR 1.48 07/05/2023 04:39 AM       Current Infusion Rate: 1450 units/hr    Plan:  Continue current rate: 1450 units/hr  Next anti-Xa level: 0300 7/6    Pharmacy will continue to monitor and adjust based on anti-Xa results.   Narciso Cornelius, PharmD, BCPS  7/5/2023  9:46 PM

## 2023-07-06 NOTE — PROGRESS NOTES
Clinical Pharmacy Note  Heparin Dosing       Lab Results   Component Value Date/Time    APTT 31.2 06/29/2023 05:52 PM     Lab Results   Component Value Date/Time    HGB 7.3 07/05/2023 08:49 AM    HCT 21.8 07/05/2023 08:49 AM     07/05/2023 04:39 AM    INR 1.48 07/05/2023 04:39 AM       Current Infusion Rate: 1450 units/hr  Current anti-Xa level: 0.44    Plan:  Continue current rate: 1450 units/hr  Next anti-Xa level: 0930 7/6    Pharmacy will continue to monitor and adjust based on anti-Xa results.

## 2023-07-06 NOTE — PROGRESS NOTES
Clinical Pharmacy Note  Warfarin Consult    Senia Pozo is a 81 y.o. male on warfarin bridged with a heparin drip    Warfarin Indication: [facility] AVR  Target INR range: 2-3: Clarified INR goal with Dr. Ariella Pennington. Dose prior to admission: 3.75 mg (7.5 mg x 0.5) every Thu; 7.5 mg (7.5 mg x 1) all other days - this will need to be changed given his change in INR goal    Current warfarin drug-drug interactions: -    Recent Labs     07/04/23  0737 07/05/23  0439 07/05/23  0849 07/06/23  0745   HGB 7.2* 7.0* 7.3* 7.7*   HCT 21.7* 20.9* 21.8* 23.1*   INR 1.25* 1.48*  --  1.68*         Assessment/Plan:  Endoscopy procedure was completed on 07/03 with placement of hemoclips. Home dose will likely need to be reduced given the change in his INR goal. (also had a recent INR of 5 as an outpatient while on that dose). Will need close monitoring because it would be preferable not to overshoot his INR goal given recent issues with bleeding. INR is trending toward therapeutic range. Warfarin 5 mg tonight    Would like to follow up in outpatient anticoagulation clinic prior to discharge. Thank you for the consult. Will continue to follow.     Katelyn Hall, [facility],  7/6/2023 8:51 AM

## 2023-07-06 NOTE — PROGRESS NOTES
Clinical Pharmacy Note  Heparin Dosing       Lab Results   Component Value Date/Time    APTT 31.2 06/29/2023 05:52 PM     Lab Results   Component Value Date/Time    HGB 7.7 07/06/2023 07:45 AM    HCT 23.1 07/06/2023 07:45 AM     07/06/2023 07:45 AM    INR 1.68 07/06/2023 07:45 AM       Current Infusion Rate: 1450 units/hr    Plan:  Rate: 1450 units/hr  Next anti-Xa level: 0600 7/7/23    Pharmacy will continue to monitor and adjust based on anti-Xa results.

## 2023-07-07 VITALS
WEIGHT: 193.12 LBS | OXYGEN SATURATION: 97 % | HEART RATE: 64 BPM | BODY MASS INDEX: 27.04 KG/M2 | HEIGHT: 71 IN | RESPIRATION RATE: 16 BRPM | DIASTOLIC BLOOD PRESSURE: 53 MMHG | SYSTOLIC BLOOD PRESSURE: 108 MMHG | TEMPERATURE: 97.7 F

## 2023-07-07 LAB
ALBUMIN SERPL-MCNC: 3.6 G/DL (ref 3.4–5)
ANION GAP SERPL CALCULATED.3IONS-SCNC: 7 MMOL/L (ref 3–16)
ANTI-XA UNFRAC HEPARIN: <0.1 IU/ML (ref 0.3–0.7)
BUN SERPL-MCNC: 14 MG/DL (ref 7–20)
CALCIUM SERPL-MCNC: 9.2 MG/DL (ref 8.3–10.6)
CHLORIDE SERPL-SCNC: 107 MMOL/L (ref 99–110)
CO2 SERPL-SCNC: 25 MMOL/L (ref 21–32)
CREAT SERPL-MCNC: 1.4 MG/DL (ref 0.8–1.3)
DEPRECATED RDW RBC AUTO: 19 % (ref 12.4–15.4)
GFR SERPLBLD CREATININE-BSD FMLA CKD-EPI: 54 ML/MIN/{1.73_M2}
GLUCOSE BLD-MCNC: 124 MG/DL (ref 70–99)
GLUCOSE BLD-MCNC: 171 MG/DL (ref 70–99)
GLUCOSE SERPL-MCNC: 119 MG/DL (ref 70–99)
HCT VFR BLD AUTO: 23.7 % (ref 40.5–52.5)
HGB BLD-MCNC: 8 G/DL (ref 13.5–17.5)
INR PPP: 1.59 (ref 0.84–1.16)
MCH RBC QN AUTO: 32.3 PG (ref 26–34)
MCHC RBC AUTO-ENTMCNC: 33.6 G/DL (ref 31–36)
MCV RBC AUTO: 96.2 FL (ref 80–100)
PERFORMED ON: ABNORMAL
PERFORMED ON: ABNORMAL
PHOSPHATE SERPL-MCNC: 3.2 MG/DL (ref 2.5–4.9)
PLATELET # BLD AUTO: 129 K/UL (ref 135–450)
PMV BLD AUTO: 8 FL (ref 5–10.5)
POTASSIUM SERPL-SCNC: 4.1 MMOL/L (ref 3.5–5.1)
PROTHROMBIN TIME: 18.9 SEC (ref 11.5–14.8)
RBC # BLD AUTO: 2.47 M/UL (ref 4.2–5.9)
SODIUM SERPL-SCNC: 139 MMOL/L (ref 136–145)
WBC # BLD AUTO: 2.8 K/UL (ref 4–11)

## 2023-07-07 PROCEDURE — 6370000000 HC RX 637 (ALT 250 FOR IP): Performed by: INTERNAL MEDICINE

## 2023-07-07 PROCEDURE — 80069 RENAL FUNCTION PANEL: CPT

## 2023-07-07 PROCEDURE — 85027 COMPLETE CBC AUTOMATED: CPT

## 2023-07-07 PROCEDURE — 85520 HEPARIN ASSAY: CPT

## 2023-07-07 PROCEDURE — 36415 COLL VENOUS BLD VENIPUNCTURE: CPT

## 2023-07-07 PROCEDURE — 6360000002 HC RX W HCPCS: Performed by: INTERNAL MEDICINE

## 2023-07-07 PROCEDURE — 85610 PROTHROMBIN TIME: CPT

## 2023-07-07 PROCEDURE — 94760 N-INVAS EAR/PLS OXIMETRY 1: CPT

## 2023-07-07 PROCEDURE — 6370000000 HC RX 637 (ALT 250 FOR IP): Performed by: HOSPITALIST

## 2023-07-07 RX ORDER — ENOXAPARIN SODIUM 100 MG/ML
1 INJECTION SUBCUTANEOUS 2 TIMES DAILY
Qty: 6 ML | Refills: 0 | Status: SHIPPED | OUTPATIENT
Start: 2023-07-07 | End: 2023-07-10

## 2023-07-07 RX ORDER — WARFARIN SODIUM 7.5 MG/1
7.5 TABLET ORAL
Status: COMPLETED | OUTPATIENT
Start: 2023-07-07 | End: 2023-07-07

## 2023-07-07 RX ADMIN — ACETAMINOPHEN 650 MG: 325 TABLET ORAL at 08:59

## 2023-07-07 RX ADMIN — Medication 400 MG: at 08:59

## 2023-07-07 RX ADMIN — HEPARIN SODIUM 1450 UNITS/HR: 10000 INJECTION, SOLUTION INTRAVENOUS at 06:28

## 2023-07-07 RX ADMIN — WARFARIN SODIUM 7.5 MG: 7.5 TABLET ORAL at 12:07

## 2023-07-07 RX ADMIN — METOPROLOL SUCCINATE 75 MG: 50 TABLET, EXTENDED RELEASE ORAL at 08:59

## 2023-07-07 RX ADMIN — LEVOTHYROXINE SODIUM 137 MCG: 0.11 TABLET ORAL at 06:32

## 2023-07-07 RX ADMIN — EZETIMIBE 10 MG: 10 TABLET ORAL at 08:59

## 2023-07-07 RX ADMIN — PANTOPRAZOLE SODIUM 40 MG: 40 TABLET, DELAYED RELEASE ORAL at 06:32

## 2023-07-07 RX ADMIN — ALLOPURINOL 100 MG: 100 TABLET ORAL at 08:59

## 2023-07-07 ASSESSMENT — PAIN DESCRIPTION - ORIENTATION: ORIENTATION: RIGHT

## 2023-07-07 ASSESSMENT — PAIN DESCRIPTION - ONSET: ONSET: ON-GOING

## 2023-07-07 ASSESSMENT — PAIN DESCRIPTION - PAIN TYPE: TYPE: CHRONIC PAIN

## 2023-07-07 ASSESSMENT — PAIN SCALES - GENERAL: PAINLEVEL_OUTOF10: 2

## 2023-07-07 ASSESSMENT — PAIN - FUNCTIONAL ASSESSMENT: PAIN_FUNCTIONAL_ASSESSMENT: ACTIVITIES ARE NOT PREVENTED

## 2023-07-07 ASSESSMENT — PAIN DESCRIPTION - DESCRIPTORS: DESCRIPTORS: ACHING;DISCOMFORT

## 2023-07-07 ASSESSMENT — PAIN DESCRIPTION - FREQUENCY: FREQUENCY: INTERMITTENT

## 2023-07-07 ASSESSMENT — PAIN DESCRIPTION - LOCATION: LOCATION: LEG

## 2023-07-07 NOTE — PROGRESS NOTES
Clinical Pharmacy Note  Warfarin Consult    Chen Kamara is a 81 y.o. male on warfarin bridged with a heparin drip    Warfarin Indication: [facility] AVR  Target INR range: 2-3: Clarified INR goal with Dr. Delgado Alvarez. Dose prior to admission: 3.75 mg (7.5 mg x 0.5) every Thu; 7.5 mg (7.5 mg x 1) all other days - this will need to be changed given his change in INR goal    Current warfarin drug-drug interactions: -    Recent Labs     07/05/23  0439 07/05/23  0849 07/06/23  0745 07/07/23  0756   HGB 7.0* 7.3* 7.7* 8.0*   HCT 20.9* 21.8* 23.1* 23.7*   INR 1.48*  --  1.68* 1.59*         Assessment/Plan:  Endoscopy procedure was completed on 07/03 with placement of hemoclips. Home dose will likely need to be reduced given the change in his INR goal. (also had a recent INR of 5 as an outpatient while on that dose). Will need close monitoring because it would be preferable not to overshoot his INR goal given recent issues with bleeding. INR down a little today. Will give warfarin 7.5 mg now    Will need to schedule follow up in outpatient [facility] clinic prior to discharge    Thank you for the consult. Will continue to follow. Jigna Chatman, [address] [place] [address],  7/7/2023 11:17 AM      ADDENDUM:    D/w Dr. Gwendolyn Lyon - he is agreeable with discharge home on Lovenox until INR > 2    Patient in agreement with plan.  Has used lovenox in the past    Instructed to take 7.5 mg of warfarin daily and follow up in clinic on Monday for repeat INR

## 2023-07-07 NOTE — PROGRESS NOTES
Removed pt IV's and applied a dry dressing with out complications. Reviewed discharge instructions and answered all questions. Pt gathered belongings and was taken down in wheelchair for discharge home.

## 2023-07-07 NOTE — PLAN OF CARE
Problem: Discharge Planning  Goal: Discharge to home or other facility with appropriate resources  Outcome: Progressing  Flowsheets (Taken 7/7/2023 0845)  Discharge to home or other facility with appropriate resources: Identify barriers to discharge with patient and caregiver     Problem: Safety - Adult  Goal: Free from fall injury  Outcome: Progressing     Problem: ABCDS Injury Assessment  Goal: Absence of physical injury  Outcome: Progressing     Problem: Chronic Conditions and Co-morbidities  Goal: Patient's chronic conditions and co-morbidity symptoms are monitored and maintained or improved  Outcome: Progressing  Flowsheets (Taken 7/7/2023 0845)  Care Plan - Patient's Chronic Conditions and Co-Morbidity Symptoms are Monitored and Maintained or Improved: Monitor and assess patient's chronic conditions and comorbid symptoms for stability, deterioration, or improvement     Problem: Pain  Goal: Verbalizes/displays adequate comfort level or baseline comfort level  Outcome: Progressing     Problem: Metabolic/Fluid and Electrolytes - Adult  Goal: Electrolytes maintained within normal limits  Outcome: Progressing  Flowsheets (Taken 7/7/2023 0845)  Electrolytes maintained within normal limits: Monitor labs and assess patient for signs and symptoms of electrolyte imbalances  Goal: Hemodynamic stability and optimal renal function maintained  Outcome: Progressing  Flowsheets (Taken 7/7/2023 0845)  Hemodynamic stability and optimal renal function maintained: Monitor labs and assess for signs and symptoms of volume excess or deficit  Goal: Glucose maintained within prescribed range  Outcome: Progressing  Flowsheets (Taken 7/7/2023 0845)  Glucose maintained within prescribed range: Monitor blood glucose as ordered     Problem: Hematologic - Adult  Goal: Maintains hematologic stability  Outcome: Progressing  Flowsheets (Taken 7/7/2023 0845)  Maintains hematologic stability: Assess for signs and symptoms of bleeding or

## 2023-07-09 NOTE — PROGRESS NOTES
Physician Progress Note      Renuka Hathaway  CSN #:                  [id number]  :                       1950  ADMIT DATE:       2023 12:37 PM  [address] [place] [address] DATE:        2023 2:03 PM  RESPONDING  PROVIDER #:        Soraida Brar MD          QUERY TEXT:    Dear Dr. Mora Jeter,  Patient admitted with anemia and GI bleed related to dieulafoy lesion in small   intestine , noted to have Leukopenia, anemia and thrombocytopenia. If   possible, please document in progress notes and discharge summary if you are   evaluating and/or treating any of the following: The medical record reflects the following:  Risk Factors: Hx Aortic stenosis, DM, JOSÉ MANUEL,  mechanical valve replacement on   Coumadin, current GI bleed this admission  Clinical Indicators: ED for dizziness, SOB with exertion, admitted for   anemia and SUKUMAR on CKD, found to have  dieulafoy lesion in the proximal jejunum   treated with placement of hemoclips.  pt has leukopenia and   thrombocytopenia in addition to anemia WBC=3.4, H/H=7.7/23.1 and ubi=417,    WBC=2.8, H/H=8/23.7, Ebv=739  Treatment: monitor labs  Thank you,  Barrington Saucedo RN, CDS  [email or web address]  Options provided:  -- Pancytopenia  -- Low WBC and plts with anemia not clinically significant  -- Other - I will add my own diagnosis  -- Disagree - Not applicable / Not valid  -- Disagree - Clinically unable to determine / Unknown  -- Refer to Clinical Documentation Reviewer    PROVIDER RESPONSE TEXT:    This patient has pancytopenia. Query created by: Social Strategy 1 on 2023 10:51 AM      Electronically signed by:   Soraida Brar MD 2023 3:47 PM

## 2023-07-10 ENCOUNTER — ANTI-COAG VISIT (OUTPATIENT)
Dept: PHARMACY | Age: 73
End: 2023-07-10
Payer: MEDICARE

## 2023-07-10 DIAGNOSIS — Z95.2 HISTORY OF AORTIC VALVE REPLACEMENT: Primary | ICD-10-CM

## 2023-07-10 LAB — INTERNATIONAL NORMALIZATION RATIO, POC: 2.3

## 2023-07-10 PROCEDURE — 85610 PROTHROMBIN TIME: CPT

## 2023-07-10 PROCEDURE — 99212 OFFICE O/P EST SF 10 MIN: CPT

## 2023-07-10 NOTE — PROGRESS NOTES
Kate Parekh is a 81 y.o. here for warfarin management. Inga Robles had an INR test today. Results were reviewed and appropriate warfarin management was completed. Patient verifies current warfarin dosing regimen: Yes, reviewed his hospital dosing as well as confirmed what he took since discharge. Warfarin medication reviewed and updated on the patient 's home medication list: Yes   All other medications reviewed and updated on the patient 's home medication list: No: no changes other than Lovenox     Lab Results   Component Value Date    INR 2.3 07/10/2023    INR 1.59 (H) 2023    INR 1.68 (H) 2023     Patient Findings       Positives:  Signs/symptoms of bleeding, Change in medications, Hospital admission    Negatives:  Signs/symptoms of thrombosis, Change in diet/appetite, Bruising    Comments: Selma Stiles was admitted -. Was found to have a GI bleed. Warfarin was held and this was treated and warfarin was restarted. Discharged on warfarin and Lovenox. Anticoagulation Summary  As of 7/10/2023      INR goal:  2.0-3.0   TTR:  69.5 % (6.6 y)   Prior goal:  2.5-3.5   INR used for dosin.3 (7/10/2023)   Warfarin maintenance plan:  3.75 mg (7.5 mg x 0.5) every Mon, Thu; 7.5 mg (7.5 mg x 1) all other days   Weekly warfarin total:  45 mg   Plan last modified:  [place] [address], [address] [place] [address] (7/10/2023)   Next INR check:  2023   Priority:  Maintenance   Target end date: Indefinite    Indications    History of aortic valve replacement [Z95.2]                 Anticoagulation Episode Summary       INR check location:      Preferred lab:      Send INR reminders to:  [facility]    Comments:  EPIC          Anticoagulation Care Providers       Provider Role Specialty Phone number    Jeannie Frazier MD Referring Cardiology [phone]          There were no vitals taken for this visit. Warfarin assessment / plan: Selma Stiles was recently hospitalized for a GI bleed.  Warfarin was

## 2023-07-11 ENCOUNTER — CLINICAL DOCUMENTATION ONLY (OUTPATIENT)
Facility: CLINIC | Age: 73
End: 2023-07-11

## 2023-07-11 DIAGNOSIS — E11.42 TYPE 2 DIABETES MELLITUS WITH DIABETIC POLYNEUROPATHY, WITH LONG-TERM CURRENT USE OF INSULIN (HCC): ICD-10-CM

## 2023-07-11 DIAGNOSIS — E78.2 MIXED HYPERLIPIDEMIA: ICD-10-CM

## 2023-07-11 DIAGNOSIS — D50.0 IRON DEFICIENCY ANEMIA DUE TO CHRONIC BLOOD LOSS: ICD-10-CM

## 2023-07-11 DIAGNOSIS — Z79.4 TYPE 2 DIABETES MELLITUS WITH DIABETIC POLYNEUROPATHY, WITH LONG-TERM CURRENT USE OF INSULIN (HCC): ICD-10-CM

## 2023-07-11 DIAGNOSIS — E03.9 HYPOTHYROIDISM, ADULT: ICD-10-CM

## 2023-07-11 LAB
ALBUMIN SERPL-MCNC: 4 G/DL (ref 3.4–5)
ALBUMIN/GLOB SERPL: 2.1 {RATIO} (ref 1.1–2.2)
ALP SERPL-CCNC: 63 U/L (ref 40–129)
ALT SERPL-CCNC: 81 U/L (ref 10–40)
ANION GAP SERPL CALCULATED.3IONS-SCNC: 9 MMOL/L (ref 3–16)
AST SERPL-CCNC: 66 U/L (ref 15–37)
BASOPHILS # BLD: 0 K/UL (ref 0–0.2)
BASOPHILS NFR BLD: 1.1 %
BILIRUB SERPL-MCNC: 0.5 MG/DL (ref 0–1)
BUN SERPL-MCNC: 18 MG/DL (ref 7–20)
CALCIUM SERPL-MCNC: 9.1 MG/DL (ref 8.3–10.6)
CHLORIDE SERPL-SCNC: 107 MMOL/L (ref 99–110)
CHOLEST SERPL-MCNC: 107 MG/DL (ref 0–199)
CO2 SERPL-SCNC: 23 MMOL/L (ref 21–32)
CREAT SERPL-MCNC: 1.6 MG/DL (ref 0.8–1.3)
CREAT UR-MCNC: 84.5 MG/DL (ref 39–259)
CRP SERPL HS-MCNC: 0.66 MG/L (ref 0.16–3)
DEPRECATED RDW RBC AUTO: 19.7 % (ref 12.4–15.4)
EOSINOPHIL # BLD: 0.1 K/UL (ref 0–0.6)
EOSINOPHIL NFR BLD: 3.2 %
EST. AVERAGE GLUCOSE BLD GHB EST-MCNC: 76.7 MG/DL
GFR SERPLBLD CREATININE-BSD FMLA CKD-EPI: 46 ML/MIN/{1.73_M2}
GLUCOSE SERPL-MCNC: 111 MG/DL (ref 70–99)
HBA1C MFR BLD: 4.3 %
HCT VFR BLD AUTO: 26.2 % (ref 40.5–52.5)
HDLC SERPL-MCNC: 25 MG/DL (ref 40–60)
HGB BLD-MCNC: 8.6 G/DL (ref 13.5–17.5)
IRON SATN MFR SERPL: 11 % (ref 20–50)
IRON SERPL-MCNC: 41 UG/DL (ref 59–158)
LDLC SERPL CALC-MCNC: ABNORMAL MG/DL
LDLC SERPL-MCNC: 28 MG/DL
LYMPHOCYTES # BLD: 0.9 K/UL (ref 1–5.1)
LYMPHOCYTES NFR BLD: 29.7 %
MCH RBC QN AUTO: 32.1 PG (ref 26–34)
MCHC RBC AUTO-ENTMCNC: 32.9 G/DL (ref 31–36)
MCV RBC AUTO: 97.6 FL (ref 80–100)
MICROALBUMIN UR DL<=1MG/L-MCNC: 3 MG/DL
MICROALBUMIN/CREAT UR: 35.5 MG/G (ref 0–30)
MONOCYTES # BLD: 0.2 K/UL (ref 0–1.3)
MONOCYTES NFR BLD: 8.2 %
NEUTROPHILS # BLD: 1.7 K/UL (ref 1.7–7.7)
NEUTROPHILS NFR BLD: 57.8 %
PLATELET # BLD AUTO: 139 K/UL (ref 135–450)
PMV BLD AUTO: 8.6 FL (ref 5–10.5)
POTASSIUM SERPL-SCNC: 4.5 MMOL/L (ref 3.5–5.1)
PROT SERPL-MCNC: 5.9 G/DL (ref 6.4–8.2)
RBC # BLD AUTO: 2.68 M/UL (ref 4.2–5.9)
SODIUM SERPL-SCNC: 139 MMOL/L (ref 136–145)
T4 FREE SERPL-MCNC: 1 NG/DL (ref 0.9–1.8)
TIBC SERPL-MCNC: 370 UG/DL (ref 260–445)
TRIGL SERPL-MCNC: 309 MG/DL (ref 0–150)
TSH SERPL DL<=0.005 MIU/L-ACNC: 2.82 UIU/ML (ref 0.27–4.2)
VLDLC SERPL CALC-MCNC: ABNORMAL MG/DL
WBC # BLD AUTO: 2.9 K/UL (ref 4–11)

## 2023-07-17 ENCOUNTER — APPOINTMENT (OUTPATIENT)
Dept: PHARMACY | Age: 73
End: 2023-07-17
Payer: MEDICARE

## 2023-07-17 DIAGNOSIS — Z95.2 HISTORY OF AORTIC VALVE REPLACEMENT: Primary | ICD-10-CM

## 2023-07-17 LAB — INR BLD: 2.7

## 2023-07-17 PROCEDURE — 85610 PROTHROMBIN TIME: CPT

## 2023-07-17 PROCEDURE — 99211 OFF/OP EST MAY X REQ PHY/QHP: CPT

## 2023-07-17 NOTE — PROGRESS NOTES
Dawne Jeans is a 81 y.o. here for warfarin management. Toni Reyes had an INR test today. Results were reviewed and appropriate warfarin management was completed. Patient verifies current warfarin dosing regimen: Yes     Warfarin medication reviewed and updated on the patient 's home medication list: Yes   All other medications reviewed and updated on the patient 's home medication list: No: no changes     Lab Results   Component Value Date    INR 2.70 2023    INR 2.3 07/10/2023    INR 1.59 (H) 2023     Patient Findings       Positives:  Upcoming dental procedure    Negatives:  Signs/symptoms of bleeding, Change in health, Missed doses, Extra doses, Change in medications, Change in diet/appetite, Bruising    Comments:  Dental procedure on  need INR to be closer to 2.0 for procedure           Anticoagulation Summary  As of 2023      INR goal:  2.0-3.0   TTR:  69.6 % (6.7 y)   INR used for dosin.70 (2023)   Warfarin maintenance plan:  3.75 mg (7.5 mg x 0.5) every Mon, Thu; 7.5 mg (7.5 mg x 1) all other days   Weekly warfarin total:  45 mg   Plan last modified:  Kacy Jeffries, [facility] (7/10/2023)   Next INR check:  2023   Priority:  Maintenance   Target end date: Indefinite    Indications    History of aortic valve replacement [Z95.2]                 Anticoagulation Episode Summary       INR check location:      Preferred lab:      Send INR reminders to:  [facility]AFF    Comments:  EPIC          Anticoagulation Care Providers       Provider Role Specialty Phone number    Romulo Burger MD Referring Cardiology [phone]          There were no vitals taken for this visit. Warfarin assessment / plan:     Patient appears well today. No changes affecting warfarin therapy were noted. No acute findings with regards to warfarin therapy. INR today is within goal range. Instructed to continue the same weekly warfarin dose.       Morro's INR today was

## 2023-07-21 ENCOUNTER — OFFICE VISIT (OUTPATIENT)
Dept: FAMILY MEDICINE CLINIC | Age: 73
End: 2023-07-21

## 2023-07-21 VITALS
BODY MASS INDEX: 26.46 KG/M2 | WEIGHT: 189 LBS | HEIGHT: 71 IN | DIASTOLIC BLOOD PRESSURE: 68 MMHG | OXYGEN SATURATION: 99 % | SYSTOLIC BLOOD PRESSURE: 116 MMHG | HEART RATE: 75 BPM

## 2023-07-21 DIAGNOSIS — Z87.19 HISTORY OF GI BLEED: ICD-10-CM

## 2023-07-21 DIAGNOSIS — F32.5 MAJOR DEPRESSIVE DISORDER IN FULL REMISSION, UNSPECIFIED WHETHER RECURRENT (HCC): ICD-10-CM

## 2023-07-21 DIAGNOSIS — D68.9 COAGULATION DEFECT (HCC): ICD-10-CM

## 2023-07-21 DIAGNOSIS — E72.11 HOMOCYSTINURIA (HCC): ICD-10-CM

## 2023-07-21 DIAGNOSIS — Z09 HOSPITAL DISCHARGE FOLLOW-UP: Primary | ICD-10-CM

## 2023-07-21 DIAGNOSIS — N18.30 CKD STAGE 3 DUE TO TYPE 2 DIABETES MELLITUS (HCC): ICD-10-CM

## 2023-07-21 DIAGNOSIS — E11.22 CKD STAGE 3 DUE TO TYPE 2 DIABETES MELLITUS (HCC): ICD-10-CM

## 2023-07-21 RX ORDER — IRON POLYSACCHARIDE COMPLEX 180 MG
1 CAPSULE ORAL DAILY
COMMUNITY

## 2023-07-21 NOTE — PATIENT INSTRUCTIONS

## 2023-07-25 ENCOUNTER — ANTI-COAG VISIT (OUTPATIENT)
Dept: PHARMACY | Age: 73
End: 2023-07-25
Payer: MEDICARE

## 2023-07-25 DIAGNOSIS — Z95.2 HISTORY OF AORTIC VALVE REPLACEMENT: Primary | ICD-10-CM

## 2023-07-25 LAB — INR BLD: 1.4

## 2023-07-25 PROCEDURE — 85610 PROTHROMBIN TIME: CPT

## 2023-07-25 PROCEDURE — 99211 OFF/OP EST MAY X REQ PHY/QHP: CPT

## 2023-07-25 NOTE — PROGRESS NOTES
Trell Stack is a 81 y.o. here for warfarin management. Lalit Taylor had an INR test today. Results were reviewed and appropriate warfarin management was completed. Patient verifies current warfarin dosing regimen: Yes     Warfarin medication reviewed and updated on the patient 's home medication list: Yes   All other medications reviewed and updated on the patient 's home medication list: No: no changes      Lab Results   Component Value Date    INR 1.40 2023    INR 2.70 2023    INR 2.3 07/10/2023     Patient Findings       Positives:  Missed doses    Negatives:  Signs/symptoms of bleeding, Change in health, Change in alcohol use, Change in activity, Extra doses, Change in medications, Change in diet/appetite, Bruising    Comments:  Held warfarin on  for dental procedure on  (goal INR close to 2)          Anticoagulation Summary  As of 2023      INR goal:  2.0-3.0   TTR:  69.5 % (6.7 y)   INR used for dosin.40 (2023)   Warfarin maintenance plan:  3.75 mg (7.5 mg x 0.5) every Mon, Thu; 7.5 mg (7.5 mg x 1) all other days   Weekly warfarin total:  45 mg   Plan last modified:  Kacy Jeffries [facility] (7/10/2023)   Next INR check:  2023   Priority:  Maintenance   Target end date: Indefinite    Indications    History of aortic valve replacement [Z95.2]                 Anticoagulation Episode Summary       INR check location:      Preferred lab:      Send INR reminders to:  [facility]    Comments:  EPIC          Anticoagulation Care Providers       Provider Role Specialty Phone number    Meg Roth MD Referring Cardiology [phone]          There were no vitals taken for this visit. Warfarin assessment / plan:     Appears well  Sub-therapeutic INR     Denies medication changes. Denies alcohol changes. Denies increased activity. Missed Warfarin dose(s) on the following dates: . Increased Vitamin K intake.     His INR today is below goal

## 2023-08-04 ENCOUNTER — ANTI-COAG VISIT (OUTPATIENT)
Dept: PHARMACY | Age: 73
End: 2023-08-04
Payer: MEDICARE

## 2023-08-04 DIAGNOSIS — Z95.2 HISTORY OF AORTIC VALVE REPLACEMENT: Primary | ICD-10-CM

## 2023-08-04 LAB — INTERNATIONAL NORMALIZATION RATIO, POC: 2.9

## 2023-08-04 PROCEDURE — 99211 OFF/OP EST MAY X REQ PHY/QHP: CPT

## 2023-08-04 PROCEDURE — 85610 PROTHROMBIN TIME: CPT

## 2023-08-04 RX ORDER — WARFARIN SODIUM 7.5 MG/1
TABLET ORAL
Qty: 90 TABLET | Refills: 2 | Status: SHIPPED | OUTPATIENT
Start: 2023-08-04

## 2023-08-04 NOTE — PROGRESS NOTES
defined types were placed in this encounter. Reviewed AVS with patient / caregiver.     Billing Points:  0 billing points this visit     For Pharmacy Admin Tracking Only    Intervention Detail:   Total # of Interventions Recommended: 0  Total # of Interventions Accepted: 0  Time Spent (min): 20

## 2023-09-13 ENCOUNTER — ANTI-COAG VISIT (OUTPATIENT)
Dept: PHARMACY | Age: 73
End: 2023-09-13
Payer: MEDICARE

## 2023-09-13 DIAGNOSIS — Z95.2 HISTORY OF AORTIC VALVE REPLACEMENT: Primary | ICD-10-CM

## 2023-09-13 LAB — INR BLD: 2.6

## 2023-09-13 PROCEDURE — 85610 PROTHROMBIN TIME: CPT

## 2023-09-13 PROCEDURE — 99211 OFF/OP EST MAY X REQ PHY/QHP: CPT

## 2023-09-13 NOTE — PROGRESS NOTES
(1/2 tablet) every Monday and Thursday     Call [phone] with signs or symptoms of bleeding or ANY medication changes (including over-the-counter medications or herbal supplements). Especially if you begin oral steroids and/or antibiotics. If significant bleeding occurs please seek immediate medical attention. Keep the number of servings and portion size of vitamin K containing foods (dark green, leafy vegetables) the same each week. Vegetables high in vitamin K : broccoli, spinach, leaf lettuce, kyrstle lettuce, kale, collards, asparagus, brussel sprouts. Please call if you routine diet changes. Limit alcohol intake. Please call if this changes. Please arrive 15 minutes prior to your appointment. Allow 3 business days for warfarin refills.        Immunization History   Administered Date(s) Administered    COVID-19, MODERNA BLUE border, Primary or Immunocompromised, (age 12y+), IM, 100 mcg/0.5mL 02/18/2021, 03/18/2021    COVID-19, PFIZER Bivalent, DO NOT Dilute, (age 12y+), IM, 30 mcg/0.3 mL 11/01/2022    COVID-19, PFIZER GRAY top, DO NOT Dilute, (age 15 y+), IM, 30 mcg/0.3 mL 05/04/2022    COVID-19, PFIZER PURPLE top, DILUTE for use, (age 15 y+), 30mcg/0.3mL 11/23/2021    Influenza 11/01/2015    Influenza Vaccine, unspecified formulation 10/05/2017    Influenza Virus Vaccine 10/04/2011, 10/04/2012    Influenza, AFLURIA (age 1 yrs+), FLUZONE, (age 10 mo+), MDV, 0.5mL 10/25/2016, 10/05/2017    Influenza, FLUAD, (age 72 y+), Adjuvanted, 0.5mL 10/19/2022    Influenza, FLUZONE (age 72 y+), High Dose, 0.7mL 10/08/2021    Influenza, High Dose (Fluzone 65 yrs and older) 10/11/2018, 10/08/2021    Influenza, Triv, inactivated, subunit, adjuvanted, IM (Fluad 65 yrs and older) 09/25/2019, 09/24/2020    Pneumococcal, PCV-13, PREVNAR 13, (age 6w+), IM, 0.5mL 10/11/2018    Pneumococcal, PPSV23, PNEUMOVAX 21, (age 2y+), SC/IM, 0.5mL 10/04/2012, 09/24/2020    TDaP, ADACEL (age 6y-58y), BOOSTRIX (age 10y+),

## 2023-10-11 ENCOUNTER — ANTI-COAG VISIT (OUTPATIENT)
Dept: PHARMACY | Age: 73
End: 2023-10-11
Payer: MEDICARE

## 2023-10-11 ENCOUNTER — PATIENT MESSAGE (OUTPATIENT)
Dept: FAMILY MEDICINE CLINIC | Age: 73
End: 2023-10-11

## 2023-10-11 DIAGNOSIS — Z95.2 HISTORY OF AORTIC VALVE REPLACEMENT: Primary | ICD-10-CM

## 2023-10-11 DIAGNOSIS — E83.52 HYPERCALCEMIA: Primary | ICD-10-CM

## 2023-10-11 LAB — INTERNATIONAL NORMALIZATION RATIO, POC: 3.1

## 2023-10-11 PROCEDURE — 99211 OFF/OP EST MAY X REQ PHY/QHP: CPT

## 2023-10-11 PROCEDURE — 85610 PROTHROMBIN TIME: CPT

## 2023-10-11 NOTE — PROGRESS NOTES
Avril Bacon is a 81 y.o. here for warfarin management. Terry Graham had an INR test today. Results were reviewed and appropriate warfarin management was completed. Patient verifies current warfarin dosing regimen: Yes     Warfarin medication reviewed and updated on the patient 's home medication list: Yes   All other medications reviewed and updated on the patient 's home medication list: No: no changes     Lab Results   Component Value Date    INR 3.1 10/11/2023    INR 2.60 09/13/2023    INR 2.9 08/04/2023     Patient Findings       Negatives:  Signs/symptoms of bleeding, Change in health, Missed doses, Change in medications, Change in diet/appetite, Bruising          Anticoagulation Summary  As of 10/11/2023      INR goal:  2.0-3.0   TTR:  70.1 % (6.9 y)   INR used for dosing:  3.1 (10/11/2023)   Warfarin maintenance plan:  3.75 mg (7.5 mg x 0.5) every Mon, Thu; 7.5 mg (7.5 mg x 1) all other days   Weekly warfarin total:  45 mg   Plan last modified:  Kacy Jeffries [facility] (7/10/2023)   Next INR check:  11/8/2023   Priority:  Maintenance   Target end date: Indefinite    Indications    History of aortic valve replacement [Z95.2]                 Anticoagulation Episode Summary       INR check location:      Preferred lab:      Send INR reminders to:  [facility]    Comments:  EPIC          Anticoagulation Care Providers       Provider Role Specialty Phone number    Steffanie Martin MD Referring Cardiology [phone]          There were no vitals taken for this visit. Warfarin assessment / plan:     Appears well  Supra-therapeutic INR of 3.1. Denies signs and symptoms of bleeding/bruising. Denies medication changes. Denies extra warfarin doses. Denies increased alcohol intake. Denies change in appetite. Denies illness, fever, vomiting or diarrhea. He states that he may have consumed fewer green vegetables that usual over the past few weeks.  This is mot likely the reason for his

## 2023-11-08 ENCOUNTER — ANTI-COAG VISIT (OUTPATIENT)
Dept: PHARMACY | Age: 73
End: 2023-11-08
Payer: MEDICARE

## 2023-11-08 DIAGNOSIS — Z95.2 HISTORY OF AORTIC VALVE REPLACEMENT: Primary | ICD-10-CM

## 2023-11-08 LAB — INTERNATIONAL NORMALIZATION RATIO, POC: 2.3

## 2023-11-08 PROCEDURE — 85610 PROTHROMBIN TIME: CPT

## 2023-11-08 PROCEDURE — 99211 OFF/OP EST MAY X REQ PHY/QHP: CPT

## 2023-11-08 NOTE — PROGRESS NOTES
bleeding or ANY medication changes (including over-the-counter medications or herbal supplements). Especially if you begin oral steroids and/or antibiotics. If significant bleeding occurs please seek immediate medical attention. Keep the number of servings and portion size of vitamin K containing foods (dark green, leafy vegetables) the same each week. Vegetables high in vitamin K : broccoli, spinach, leaf lettuce, krystle lettuce, kale, collards, asparagus, brussel sprouts. Please call if you routine diet changes. Limit alcohol intake. Please call if this changes. Please arrive 15 minutes prior to your appointment. Allow 3 business days for warfarin refills.        Immunization History   Administered Date(s) Administered    COVID-19, MODERNA BLUE border, Primary or Immunocompromised, (age 12y+), IM, 100 mcg/0.5mL 02/18/2021, 03/18/2021    COVID-19, PFIZER Bivalent, DO NOT Dilute, (age 12y+), IM, 30 mcg/0.3 mL 11/01/2022    COVID-19, PFIZER GRAY top, DO NOT Dilute, (age 15 y+), IM, 30 mcg/0.3 mL 05/04/2022    COVID-19, PFIZER PURPLE top, DILUTE for use, (age 15 y+), 30mcg/0.3mL 11/23/2021    COVID-19, PFIZER, (2023-24 formula), (age 12y+), IM, 30mcg/0.3mL 10/05/2023    Influenza 11/01/2015    Influenza Vaccine, unspecified formulation 10/05/2017    Influenza Virus Vaccine 10/04/2011, 10/04/2012    Influenza, AFLURIA (age 1 yrs+), FLUZONE, (age 10 mo+), MDV, 0.5mL 10/25/2016, 10/05/2017    Influenza, FLUAD, (age 72 y+), Adjuvanted, 0.5mL 10/19/2022    Influenza, FLUZONE (age 72 y+), High Dose, 0.7mL 10/08/2021    Influenza, High Dose (Fluzone 65 yrs and older) 10/11/2018, 10/08/2021    Influenza, Triv, inactivated, subunit, adjuvanted, IM (Fluad 65 yrs and older) 09/25/2019, 09/24/2020    Pneumococcal, PCV-13, PREVNAR 13, (age 6w+), IM, 0.5mL 10/11/2018    Pneumococcal, PPSV23, PNEUMOVAX 21, (age 2y+), SC/IM, 0.5mL 10/04/2012, 09/24/2020    TDaP, ADACEL (age 6y-58y), BOOSTRIX (age 10y+), IM,

## 2023-11-10 SDOH — HEALTH STABILITY: PHYSICAL HEALTH: ON AVERAGE, HOW MANY MINUTES DO YOU ENGAGE IN EXERCISE AT THIS LEVEL?: 20 MIN

## 2023-11-10 SDOH — HEALTH STABILITY: PHYSICAL HEALTH: ON AVERAGE, HOW MANY DAYS PER WEEK DO YOU ENGAGE IN MODERATE TO STRENUOUS EXERCISE (LIKE A BRISK WALK)?: 2 DAYS

## 2023-11-10 ASSESSMENT — PATIENT HEALTH QUESTIONNAIRE - PHQ9
6. FEELING BAD ABOUT YOURSELF - OR THAT YOU ARE A FAILURE OR HAVE LET YOURSELF OR YOUR FAMILY DOWN: 0
2. FEELING DOWN, DEPRESSED OR HOPELESS: 0
SUM OF ALL RESPONSES TO PHQ QUESTIONS 1-9: 0
5. POOR APPETITE OR OVEREATING: 0
9. THOUGHTS THAT YOU WOULD BE BETTER OFF DEAD, OR OF HURTING YOURSELF: 0
1. LITTLE INTEREST OR PLEASURE IN DOING THINGS: 0
7. TROUBLE CONCENTRATING ON THINGS, SUCH AS READING THE NEWSPAPER OR WATCHING TELEVISION: 0
3. TROUBLE FALLING OR STAYING ASLEEP: 0
SUM OF ALL RESPONSES TO PHQ QUESTIONS 1-9: 0
4. FEELING TIRED OR HAVING LITTLE ENERGY: 0
SUM OF ALL RESPONSES TO PHQ9 QUESTIONS 1 & 2: 0
SUM OF ALL RESPONSES TO PHQ QUESTIONS 1-9: 0
SUM OF ALL RESPONSES TO PHQ QUESTIONS 1-9: 0
10. IF YOU CHECKED OFF ANY PROBLEMS, HOW DIFFICULT HAVE THESE PROBLEMS MADE IT FOR YOU TO DO YOUR WORK, TAKE CARE OF THINGS AT HOME, OR GET ALONG WITH OTHER PEOPLE: 0
8. MOVING OR SPEAKING SO SLOWLY THAT OTHER PEOPLE COULD HAVE NOTICED. OR THE OPPOSITE, BEING SO FIGETY OR RESTLESS THAT YOU HAVE BEEN MOVING AROUND A LOT MORE THAN USUAL: 0

## 2023-11-10 ASSESSMENT — LIFESTYLE VARIABLES
HOW OFTEN DO YOU HAVE A DRINK CONTAINING ALCOHOL: 3
HOW MANY STANDARD DRINKS CONTAINING ALCOHOL DO YOU HAVE ON A TYPICAL DAY: 1
HOW OFTEN DO YOU HAVE SIX OR MORE DRINKS ON ONE OCCASION: 1
HOW MANY STANDARD DRINKS CONTAINING ALCOHOL DO YOU HAVE ON A TYPICAL DAY: 1 OR 2
HOW OFTEN DO YOU HAVE A DRINK CONTAINING ALCOHOL: 2-4 TIMES A MONTH

## 2023-11-10 ASSESSMENT — COLUMBIA-SUICIDE SEVERITY RATING SCALE - C-SSRS
3. HAVE YOU BEEN THINKING ABOUT HOW YOU MIGHT KILL YOURSELF?: NO
5. HAVE YOU STARTED TO WORK OUT OR WORKED OUT THE DETAILS OF HOW TO KILL YOURSELF? DO YOU INTEND TO CARRY OUT THIS PLAN?: NO
7. DID THIS OCCUR IN THE LAST THREE MONTHS: NO
4. HAVE YOU HAD THESE THOUGHTS AND HAD SOME INTENTION OF ACTING ON THEM?: NO

## 2023-11-13 ENCOUNTER — OFFICE VISIT (OUTPATIENT)
Dept: FAMILY MEDICINE CLINIC | Age: 73
End: 2023-11-13
Payer: MEDICARE

## 2023-11-13 VITALS
SYSTOLIC BLOOD PRESSURE: 120 MMHG | DIASTOLIC BLOOD PRESSURE: 70 MMHG | WEIGHT: 189 LBS | OXYGEN SATURATION: 97 % | HEIGHT: 71 IN | BODY MASS INDEX: 26.46 KG/M2 | HEART RATE: 70 BPM

## 2023-11-13 DIAGNOSIS — Z00.00 MEDICARE ANNUAL WELLNESS VISIT, SUBSEQUENT: Primary | ICD-10-CM

## 2023-11-13 PROCEDURE — 3074F SYST BP LT 130 MM HG: CPT

## 2023-11-13 PROCEDURE — G0439 PPPS, SUBSEQ VISIT: HCPCS

## 2023-11-13 PROCEDURE — 3078F DIAST BP <80 MM HG: CPT

## 2023-11-13 PROCEDURE — 3017F COLORECTAL CA SCREEN DOC REV: CPT

## 2023-11-13 PROCEDURE — G8484 FLU IMMUNIZE NO ADMIN: HCPCS

## 2023-11-13 PROCEDURE — 1123F ACP DISCUSS/DSCN MKR DOCD: CPT

## 2023-11-13 NOTE — PATIENT INSTRUCTIONS
You may receive a survey regarding the care you received during your visit. Your input is valuable to us. We encourage you to complete and return your survey. We hope you will choose us in the future for your healthcare needs. GENERAL OFFICE POLICIES      Telephone Calls: Messages will be answered within 1-2 business days, unless the provider is out of the office. If it is urgent a covering provider will answer. (this does not include Medication refills). MyChart: We recommend all patients sign up for [employer]. Through this portal you can see your lab results, request refills, schedule appointments, pay your bill and send messages to the office. [employer] messages will be answered within 1-2 business days unless the provider is out of the office. For urgent matters, please call the office. Appointments:  All appointments must be scheduled. We ask all patients to schedule their next follow up appointment before they leave the office to make sure you will be able to be seen before you run out of medications. 24 hours notice is required to cancel or reschedule an appointment to avoid being marked as a no show. You may be dismissed from the practice after 3 no shows. LATE for Appointment: If you are 15 or more minutes late for your appointment, you may be asked to reschedule. MA/LAB APPTS: Must be scheduled, cannot accept walk in lab visits. We only draw labs for patients established in our office. We only do injections for medications ordered by our office. Acute Sick Visits:  Nothing other than acute complaint will be addressed at this visit. TRADITIONAL MEDICARE  DOES NOT COVER PHYSICALS  MEDICARE WELLNESS VISITS: These are NOT physicals but the free annual visit offered by Medicare to discuss wellness issues. Medication refills, checkups, etc. will not be addressed during this visit.   Medication Refills: Refills are handled electronically so please contact your pharmacy for medication refills

## 2023-11-13 NOTE — PROGRESS NOTES
Medicare Annual Wellness Visit    Gordon Dempsey is here for Medicare AWV (MEDICARE ANNUAL WELLNESS VISIT)    Assessment & Plan   Medicare annual wellness visit, subsequent  -Personal medical history, surgical history, family history reviewed. Medications reconciled. -Care gaps addressed. Up-to-date on screenings, up-to-date on vaccinations today. -AWV healthcare topics discussed.  -Continue healthy lifestyle habits.  -Continue following with endocrinology and nephrology.  -Educated on office policies and procedures.  -Follow-up in 12 months for AWV. Recommendations for Preventive Services Due: see orders and patient instructions/AVS.  Recommended screening schedule for the next 5-10 years is provided to the patient in written form: see Patient Instructions/AVS.     Return in about 1 year (around 11/13/2024) for Annual Wellness Visit. Subjective   The following acute and/or chronic problems were also addressed today:  Following with nephrology and endocrinology for CKD and DM 2. Doing well since episode in July. Continues with Dr Marylee Olmstead for 15 years, A1C is 5.9%. he continues CGM, can be a little low. Kidneys are going well with Dr Aden Rome. [place], [place]. Was not getting his money's worth from the gym, just walks to "[employer] 1.5 miles each way. He drinks 3-4 larger bottles of water a day, some diet pop, coffee 6 cups a day, black. He is sleeping 8+ hours of sleep a night. Doing well with CPAP, good energy. No problem falling asleep or staying asleep. Rare alcohol. Doing crosswords, puzzles, solitaire. Still seeing urology for prostate CA, hx radiation 2 years ago, seeing them every 6 months, PSA trending down. Patient's complete Health Risk Assessment and screening values have been reviewed and are found in Flowsheets. The following problems were reviewed today and where indicated follow up appointments were made and/or referrals ordered.     No Positive Risk Factors

## 2023-11-26 RX ORDER — MIRTAZAPINE 30 MG/1
30 TABLET, FILM COATED ORAL NIGHTLY
Qty: 90 TABLET | Refills: 0 | Status: SHIPPED | OUTPATIENT
Start: 2023-11-26 | End: 2024-02-24

## 2023-12-06 ENCOUNTER — ANTI-COAG VISIT (OUTPATIENT)
Dept: PHARMACY | Age: 73
End: 2023-12-06
Payer: MEDICARE

## 2023-12-06 DIAGNOSIS — Z95.2 HISTORY OF AORTIC VALVE REPLACEMENT: Primary | ICD-10-CM

## 2023-12-06 LAB — INTERNATIONAL NORMALIZATION RATIO, POC: 2.4

## 2023-12-06 PROCEDURE — 99211 OFF/OP EST MAY X REQ PHY/QHP: CPT

## 2023-12-06 PROCEDURE — 85610 PROTHROMBIN TIME: CPT

## 2023-12-06 NOTE — PROGRESS NOTES
Sinan Walden is a 81 y.o. here for warfarin management. Derrick Chow had an INR test today. Results were reviewed and appropriate warfarin management was completed. Patient verifies current warfarin dosing regimen: Yes     Warfarin medication reviewed and updated on the patient 's home medication list: Yes   All other medications reviewed and updated on the patient 's home medication list: No: no changes     Lab Results   Component Value Date    INR 2.4 2023    INR 2.3 2023    INR 3.1 10/11/2023     Patient Findings       Negatives:  Signs/symptoms of bleeding, Change in health, Missed doses, Extra doses, Change in medications, Change in diet/appetite, Bruising          Anticoagulation Summary  As of 2023      INR goal:  2.0-3.0   TTR:  70.6 % (7 y)   INR used for dosin.4 (2023)   Warfarin maintenance plan:  3.75 mg (7.5 mg x 0.5) every Mon, Th; 7.5 mg (7.5 mg x 1) all other days   Weekly warfarin total:  45 mg   Plan last modified:  Kacy Jeffries RP (7/10/2023)   Next INR check:  1/3/2024   Priority:  Maintenance   Target end date: Indefinite    Indications    History of aortic valve replacement [Z95.2]                 Anticoagulation Episode Summary       INR check location:      Preferred lab:      Send INR reminders to:  [facility]    Comments:  EPIC          Anticoagulation Care Providers       Provider Role Specialty Phone number    Karoline Prescott MD Referring Cardiology [phone]          There were no vitals taken for this visit. Warfarin assessment / plan:     Patient appears well today. No changes affecting warfarin therapy were noted. No acute findings with regards to warfarin therapy. INR today is within goal range. Instructed to continue the same weekly warfarin dose.         Description    CONTINUE:  Warfarin 7.5 mg (1 tablet) daily except 3.75 mg (1/2 tablet) every Monday and Thursday     Call [phone] with signs or symptoms

## 2024-01-03 ENCOUNTER — ANTI-COAG VISIT (OUTPATIENT)
Dept: PHARMACY | Age: 74
End: 2024-01-03
Payer: MEDICARE

## 2024-01-03 DIAGNOSIS — Z95.2 HISTORY OF AORTIC VALVE REPLACEMENT: Primary | ICD-10-CM

## 2024-01-03 LAB — INTERNATIONAL NORMALIZATION RATIO, POC: 1.6

## 2024-01-03 PROCEDURE — 85610 PROTHROMBIN TIME: CPT

## 2024-01-03 PROCEDURE — 99212 OFFICE O/P EST SF 10 MIN: CPT

## 2024-01-03 NOTE — PROGRESS NOTES
recently d/t the holidays. Will cut back on greens and take 11.25mg of warfarin today only.    Description    Today only, take 1 and 1/2 tablets (11.25mg) Wednesday 1/3, THEN    CONTINUE:  Warfarin 7.5 mg (1 tablet) daily except 3.75 mg (1/2 tablet) every Monday and Thursday     Call [phone] with signs or symptoms of bleeding or ANY medication changes (including over-the-counter medications or herbal supplements).     Especially if you begin oral steroids and/or antibiotics.    If significant bleeding occurs please seek immediate medical attention.    Keep the number of servings and portion size of vitamin K containing foods (dark green, leafy vegetables) the same each week. Vegetables high in vitamin K : broccoli, spinach, leaf lettuce, krystle lettuce, kale, collards, asparagus, brussel sprouts.  Please call if you routine diet changes.     Limit alcohol intake. Please call if this changes.     Please arrive 15 minutes prior to your appointment.    Allow 3 business days for warfarin refills.       Immunization History   Administered Date(s) Administered    COVID-19, MODERNA BLUE border, Primary or Immunocompromised, (age 12y+), IM, 100 mcg/0.5mL 02/18/2021, 03/18/2021    COVID-19, PFIZER Bivalent, DO NOT Dilute, (age 12y+), IM, 30 mcg/0.3 mL 11/01/2022    COVID-19, PFIZER GRAY top, DO NOT Dilute, (age 12 y+), IM, 30 mcg/0.3 mL 05/04/2022    COVID-19, PFIZER PURPLE top, DILUTE for use, (age 12 y+), 30mcg/0.3mL 11/23/2021    COVID-19, PFIZER, (2023-24 formula), (age 12y+), IM, 30mcg/0.3mL 10/05/2023    Influenza 11/01/2015    Influenza Vaccine, unspecified formulation 10/05/2017    Influenza Virus Vaccine 10/04/2011, 10/04/2012    Influenza, AFLURIA (age 3 yrs+), FLUZONE, (age 6 mo+), MDV, 0.5mL 10/25/2016, 10/05/2017    Influenza, FLUAD, (age 65 y+), Adjuvanted, 0.5mL 10/19/2022    Influenza, FLUZONE (age 65 y+), High Dose, 0.7mL 10/08/2021    Influenza, High Dose (Fluzone 65 yrs and older) 10/11/2018,

## 2024-01-24 ENCOUNTER — ANTI-COAG VISIT (OUTPATIENT)
Dept: PHARMACY | Age: 74
End: 2024-01-24
Payer: MEDICARE

## 2024-01-24 DIAGNOSIS — Z95.2 HISTORY OF AORTIC VALVE REPLACEMENT: Primary | ICD-10-CM

## 2024-01-24 LAB — INTERNATIONAL NORMALIZATION RATIO, POC: 2.3

## 2024-01-24 PROCEDURE — 85610 PROTHROMBIN TIME: CPT

## 2024-01-24 PROCEDURE — 99211 OFF/OP EST MAY X REQ PHY/QHP: CPT

## 2024-01-24 RX ORDER — EMPAGLIFLOZIN AND METFORMIN HYDROCHLORIDE 12.5; 1 MG/1; MG/1
1 TABLET ORAL 2 TIMES DAILY
COMMUNITY
Start: 2024-01-22

## 2024-01-24 NOTE — PROGRESS NOTES
Morro Kevin is a 73 y.o. here for warfarin management.  Morro had an INR test today. Results were reviewed and appropriate warfarin management was completed.     Patient verifies current warfarin dosing regimen: Yes     Warfarin medication reviewed and updated on the patient 's home medication list: Yes   All other medications reviewed and updated on the patient 's home medication list: Yes     Lab Results   Component Value Date    INR 2.3 2024    INR 1.6 2024    INR 2.4 2023       Anticoagulation Summary  As of 2024      INR goal:  2.0-3.0   TTR:  70.2 % (7.2 y)   INR used for dosin.3 (2024)   Warfarin maintenance plan:  3.75 mg (7.5 mg x 0.5) every Mon, Thu; 7.5 mg (7.5 mg x 1) all other days   Weekly warfarin total:  45 mg   Plan last modified:  Kacy Jeffries RPH (7/10/2023)   Next INR check:  2024   Priority:  Maintenance   Target end date:  Indefinite    Indications    History of aortic valve replacement [Z95.2]                 Anticoagulation Episode Summary       INR check location:      Preferred lab:      Send INR reminders to:  [facility]    Comments:  EPIC          Anticoagulation Care Providers       Provider Role Specialty Phone number    Ton Ray MD Referring Cardiology [phone]          There were no vitals taken for this visit.    Warfarin assessment / plan:     Patient appears well today.      No changes affecting warfarin therapy were noted.   No acute findings with regards to warfarin therapy.  INR today is within goal range.     Instructed to continue the same weekly warfarin dose.    Back in goal range this visit     Description    CONTINUE:  Warfarin 7.5 mg (1 tablet) daily except 3.75 mg (1/2 tablet) every Monday and Thursday     Call [phone] with signs or symptoms of bleeding or ANY medication changes (including over-the-counter medications or herbal supplements).     Especially if you begin oral steroids

## 2024-02-21 ENCOUNTER — ANTI-COAG VISIT (OUTPATIENT)
Dept: PHARMACY | Age: 74
End: 2024-02-21
Payer: MEDICARE

## 2024-02-21 DIAGNOSIS — Z95.2 HISTORY OF AORTIC VALVE REPLACEMENT: Primary | ICD-10-CM

## 2024-02-21 LAB — INTERNATIONAL NORMALIZATION RATIO, POC: 1.9

## 2024-02-21 PROCEDURE — 99211 OFF/OP EST MAY X REQ PHY/QHP: CPT | Performed by: SPEECH-LANGUAGE PATHOLOGIST

## 2024-02-21 PROCEDURE — 85610 PROTHROMBIN TIME: CPT | Performed by: SPEECH-LANGUAGE PATHOLOGIST

## 2024-02-21 RX ORDER — MIRTAZAPINE 30 MG/1
30 TABLET, FILM COATED ORAL NIGHTLY
Qty: 90 TABLET | Refills: 1 | Status: SHIPPED | OUTPATIENT
Start: 2024-02-21

## 2024-02-21 NOTE — PROGRESS NOTES
Morro Kevin is a 73 y.o. here for warfarin management.  Morro had an INR test today. Results were reviewed and appropriate warfarin management was completed.     Patient verifies current warfarin dosing regimen: Yes     Warfarin medication reviewed and updated on the patient 's home medication list: Yes   All other medications reviewed and updated on the patient 's home medication list: Yes     Lab Results   Component Value Date    INR 1.9 2024    INR 2.3 2024    INR 1.6 2024       Anticoagulation Summary  As of 2024      INR goal:  2.0-3.0   TTR:  70.2 % (7.3 y)   INR used for dosin.9 (2024)   Warfarin maintenance plan:  3.75 mg (7.5 mg x 0.5) every Mon, Thu; 7.5 mg (7.5 mg x 1) all other days   Weekly warfarin total:  45 mg   Plan last modified:  Kacy Jeffries RPH (7/10/2023)   Next INR check:  3/20/2024   Priority:  Maintenance   Target end date:  Indefinite    Indications    History of aortic valve replacement [Z95.2]                 Anticoagulation Episode Summary       INR check location:      Preferred lab:      Send INR reminders to:  [facility]    Comments:  EPIC          Anticoagulation Care Providers       Provider Role Specialty Phone number    Ton Ray MD Referring Cardiology [phone]          There were no vitals taken for this visit.    Warfarin assessment / plan:     Appears well  Sub-therapeutic INR     Denies missed doses.  Denies increased vitamin K intake.  Denies medication changes.  Denies alcohol changes.  Denies increased activity.  Denies signs or symptoms of clotting.  Denies signs or symptoms of a stroke  Denies changes to smoking.     Patient reports no changes in diet, medications, or activity.    Patient will take 11.25 mg (1.5 tablets) today only, and then he will resume his prior regimen of 7.5 mg daily, except 3.75 mg every Monday and Thursday.  He will return in 4 weeks.    Description    Take warfarin 11.25 mg

## 2024-03-06 ENCOUNTER — PATIENT MESSAGE (OUTPATIENT)
Dept: FAMILY MEDICINE CLINIC | Age: 74
End: 2024-03-06

## 2024-03-06 DIAGNOSIS — D22.9 CHANGE IN MOLE: Primary | ICD-10-CM

## 2024-03-06 NOTE — TELEPHONE ENCOUNTER
From: Morro Kevin  To: Dr. Fareed Marino  Sent: 3/6/2024 10:49 AM EST  Subject: Referral    Need a referral or recommendation for a dermatologist within the [facility] network. Developed a small white growth on my left forearm and have a family history of skin cancer.....just want to get it checked out!  Thanks.

## 2024-03-20 ENCOUNTER — ANTI-COAG VISIT (OUTPATIENT)
Dept: PHARMACY | Age: 74
End: 2024-03-20
Payer: MEDICARE

## 2024-03-20 DIAGNOSIS — Z95.2 HISTORY OF AORTIC VALVE REPLACEMENT: Primary | ICD-10-CM

## 2024-03-20 LAB — INR BLD: 1.8

## 2024-03-20 PROCEDURE — 85610 PROTHROMBIN TIME: CPT

## 2024-03-20 PROCEDURE — 99211 OFF/OP EST MAY X REQ PHY/QHP: CPT

## 2024-03-20 NOTE — PROGRESS NOTES
adjuvanted, IM (Fluad 65 yrs and older) 2019, 2020    Pneumococcal, PCV-13, PREVNAR 13, (age 6w+), IM, 0.5mL 10/11/2018    Pneumococcal, PPSV23, PNEUMOVAX 23, (age 2y+), SC/IM, 0.5mL 10/04/2012, 2020    TDaP, ADACEL (age 10y-64y), BOOSTRIX (age 10y+), IM, 0.5mL 2015    Zoster Live (Zostavax) 2015    Zoster Recombinant (Shingrix) 10/20/2020, 2021       Orders Placed This Encounter   Procedures    Protime-INR     This external order was created through the results console.      No orders of the defined types were placed in this encounter.     Reviewed AVS with patient / caregiver.    Billing Points:  BASIC ASSESSMENT UNCOMPLICATED (including but not limited to: vital signs; physical assessment screening; review of secondary markers like lab results, allergies, home readings; instructions on treatment plan and basic education; assessment of medication list with one med change and compliance) - 2 points     For Pharmacy Admin Tracking Only    Intervention Detail: Adherence Monitorin  Total # of Interventions Recommended: 1  Total # of Interventions Accepted: 1  Time Spent (min): 20

## 2024-03-28 NOTE — PROGRESS NOTES
Menatetrenone (VITAMIN K2) 100 MCG TABS Take 100 mcg by mouth daily      lisinopril (PRINIVIL;ZESTRIL) 5 MG tablet Take 1 tablet by mouth nightly      levothyroxine (SYNTHROID) 137 MCG tablet Take 1 tablet by mouth Daily      Niacin ER 1000 MG TBCR Take 1,000 mg by mouth nightly      aspirin 81 MG EC tablet Take 1 tablet by mouth nightly 90 tablet 3    dulaglutide (TRULICITY) 1.5 MG/0.5ML SC injection Inject 0.5 mLs into the skin every 7 days Sundays      TOUJEO SOLOSTAR 300 UNIT/ML injection pen Inject 40 Units into the skin daily (Patient taking differently: Inject 18 Units into the skin daily) 18 Pen 3    rosuvastatin (CRESTOR) 40 MG tablet Take 1 tablet by mouth every evening 90 tablet 3    ZETIA 10 MG tablet TAKE 1 TABLET DAILY (Patient taking differently: Take 1 tablet by mouth daily) 90 tablet 2    metoprolol succinate (TOPROL XL) 25 MG extended release tablet Take 3 tablets by mouth 2 times daily 3 tabs po BID (Patient taking differently: Take 3 tablets by mouth 2 times daily) 560 tablet 3    acetaminophen (TYLENOL) 325 MG tablet Take 2 tablets by mouth every 4 hours as needed for Pain 120 tablet 3     No current facility-administered medications for this visit.       Review of Systems:    Constitutional: negative  Eyes: negative  Ears, nose, mouth, throat, and face: negative  Respiratory: negative  Cardiovascular: negative  Gastrointestinal: negative  Genitourinary:negative  Integument/breast: negative  Hematologic/lymphatic: negative  Musculoskeletal:negative  Neurological: negative  Behavioral/Psych: negative  Endocrine: negative  Allergic/Immunologic: negative     Physical Exam:   Vitals:    04/04/24 1308   BP: 114/60   Pulse: 72   SpO2: 97%   Weight: 85.3 kg (188 lb)   Height: 1.803 m (5' 11\")           Wt Readings from Last 3 Encounters:   04/04/24 85.3 kg (188 lb)   11/13/23 85.7 kg (189 lb)   07/21/23 85.7 kg (189 lb)       Constitutional: He is oriented to person, place, and time. He appears

## 2024-04-04 ENCOUNTER — OFFICE VISIT (OUTPATIENT)
Dept: CARDIOLOGY CLINIC | Age: 74
End: 2024-04-04
Payer: MEDICARE

## 2024-04-04 VITALS
HEIGHT: 71 IN | HEART RATE: 72 BPM | WEIGHT: 188 LBS | SYSTOLIC BLOOD PRESSURE: 114 MMHG | BODY MASS INDEX: 26.32 KG/M2 | OXYGEN SATURATION: 97 % | DIASTOLIC BLOOD PRESSURE: 60 MMHG

## 2024-04-04 DIAGNOSIS — I25.10 CORONARY ARTERY DISEASE INVOLVING NATIVE CORONARY ARTERY OF NATIVE HEART WITHOUT ANGINA PECTORIS: ICD-10-CM

## 2024-04-04 DIAGNOSIS — I10 ESSENTIAL HYPERTENSION: ICD-10-CM

## 2024-04-04 DIAGNOSIS — E78.2 MIXED HYPERLIPIDEMIA: ICD-10-CM

## 2024-04-04 DIAGNOSIS — Q23.0 AORTIC STENOSIS DUE TO BICUSPID AORTIC VALVE: Primary | ICD-10-CM

## 2024-04-04 DIAGNOSIS — Z95.2 S/P AVR: ICD-10-CM

## 2024-04-04 DIAGNOSIS — Q23.1 AORTIC STENOSIS DUE TO BICUSPID AORTIC VALVE: Primary | ICD-10-CM

## 2024-04-04 PROCEDURE — 1123F ACP DISCUSS/DSCN MKR DOCD: CPT | Performed by: INTERNAL MEDICINE

## 2024-04-04 PROCEDURE — 3078F DIAST BP <80 MM HG: CPT | Performed by: INTERNAL MEDICINE

## 2024-04-04 PROCEDURE — G8427 DOCREV CUR MEDS BY ELIG CLIN: HCPCS | Performed by: INTERNAL MEDICINE

## 2024-04-04 PROCEDURE — 3017F COLORECTAL CA SCREEN DOC REV: CPT | Performed by: INTERNAL MEDICINE

## 2024-04-04 PROCEDURE — 1036F TOBACCO NON-USER: CPT | Performed by: INTERNAL MEDICINE

## 2024-04-04 PROCEDURE — 93000 ELECTROCARDIOGRAM COMPLETE: CPT | Performed by: INTERNAL MEDICINE

## 2024-04-04 PROCEDURE — 99214 OFFICE O/P EST MOD 30 MIN: CPT | Performed by: INTERNAL MEDICINE

## 2024-04-04 PROCEDURE — G8419 CALC BMI OUT NRM PARAM NOF/U: HCPCS | Performed by: INTERNAL MEDICINE

## 2024-04-04 PROCEDURE — 3074F SYST BP LT 130 MM HG: CPT | Performed by: INTERNAL MEDICINE

## 2024-04-08 RX ORDER — WARFARIN SODIUM 7.5 MG/1
TABLET ORAL
Qty: 90 TABLET | Refills: 2 | Status: SHIPPED | OUTPATIENT
Start: 2024-04-08

## 2024-04-09 ENCOUNTER — APPOINTMENT (OUTPATIENT)
Dept: GENERAL RADIOLOGY | Age: 74
End: 2024-04-09
Payer: MEDICARE

## 2024-04-09 ENCOUNTER — TELEPHONE (OUTPATIENT)
Dept: FAMILY MEDICINE CLINIC | Age: 74
End: 2024-04-09

## 2024-04-09 ENCOUNTER — HOSPITAL ENCOUNTER (INPATIENT)
Age: 74
LOS: 3 days | Discharge: HOME | End: 2024-04-12
Attending: STUDENT IN AN ORGANIZED HEALTH CARE EDUCATION/TRAINING PROGRAM | Admitting: INTERNAL MEDICINE
Payer: MEDICARE

## 2024-04-09 ENCOUNTER — APPOINTMENT (OUTPATIENT)
Dept: CT IMAGING | Age: 74
End: 2024-04-09
Payer: MEDICARE

## 2024-04-09 DIAGNOSIS — R58 RETROPERITONEAL HEMORRHAGE: Primary | ICD-10-CM

## 2024-04-09 DIAGNOSIS — M79.605 LEFT LEG PAIN: ICD-10-CM

## 2024-04-09 DIAGNOSIS — S70.12XA ILIOPSOAS MUSCLE HEMATOMA, LEFT, INITIAL ENCOUNTER: ICD-10-CM

## 2024-04-09 LAB
ALBUMIN SERPL-MCNC: 4.2 G/DL (ref 3.4–5)
ALBUMIN/GLOB SERPL: 1.8 {RATIO} (ref 1.1–2.2)
ALP SERPL-CCNC: 55 U/L (ref 40–129)
ALT SERPL-CCNC: 29 U/L (ref 10–40)
ANION GAP SERPL CALCULATED.3IONS-SCNC: 15 MMOL/L (ref 3–16)
AST SERPL-CCNC: 28 U/L (ref 15–37)
BASOPHILS # BLD: 0 K/UL (ref 0–0.2)
BASOPHILS NFR BLD: 0.3 %
BILIRUB SERPL-MCNC: 0.7 MG/DL (ref 0–1)
BUN SERPL-MCNC: 25 MG/DL (ref 7–20)
CALCIUM SERPL-MCNC: 9.8 MG/DL (ref 8.3–10.6)
CHLORIDE SERPL-SCNC: 103 MMOL/L (ref 99–110)
CO2 SERPL-SCNC: 21 MMOL/L (ref 21–32)
CREAT SERPL-MCNC: 1.5 MG/DL (ref 0.8–1.3)
DEPRECATED RDW RBC AUTO: 14.8 % (ref 12.4–15.4)
EKG ATRIAL RATE: 67 BPM
EKG DIAGNOSIS: NORMAL
EKG P AXIS: 17 DEGREES
EKG P-R INTERVAL: 178 MS
EKG Q-T INTERVAL: 428 MS
EKG QRS DURATION: 90 MS
EKG QTC CALCULATION (BAZETT): 452 MS
EKG R AXIS: -40 DEGREES
EKG T AXIS: 13 DEGREES
EKG VENTRICULAR RATE: 67 BPM
EOSINOPHIL # BLD: 0 K/UL (ref 0–0.6)
EOSINOPHIL NFR BLD: 0.5 %
GFR SERPLBLD CREATININE-BSD FMLA CKD-EPI: 49 ML/MIN/{1.73_M2}
GLUCOSE BLD-MCNC: 188 MG/DL (ref 70–99)
GLUCOSE BLD-MCNC: 242 MG/DL (ref 70–99)
GLUCOSE SERPL-MCNC: 184 MG/DL (ref 70–99)
HCT VFR BLD AUTO: 37.8 % (ref 40.5–52.5)
HGB BLD-MCNC: 12.7 G/DL (ref 13.5–17.5)
INR PPP: 2.69 (ref 0.85–1.15)
LYMPHOCYTES # BLD: 0.9 K/UL (ref 1–5.1)
LYMPHOCYTES NFR BLD: 11.3 %
MCH RBC QN AUTO: 30.4 PG (ref 26–34)
MCHC RBC AUTO-ENTMCNC: 33.7 G/DL (ref 31–36)
MCV RBC AUTO: 90.1 FL (ref 80–100)
MONOCYTES # BLD: 0.3 K/UL (ref 0–1.3)
MONOCYTES NFR BLD: 4.1 %
NEUTROPHILS # BLD: 7 K/UL (ref 1.7–7.7)
NEUTROPHILS NFR BLD: 83.8 %
NT-PROBNP SERPL-MCNC: 143 PG/ML (ref 0–124)
PERFORMED ON: ABNORMAL
PERFORMED ON: ABNORMAL
PLATELET # BLD AUTO: 151 K/UL (ref 135–450)
PMV BLD AUTO: 8 FL (ref 5–10.5)
POTASSIUM SERPL-SCNC: 4.6 MMOL/L (ref 3.5–5.1)
PROT SERPL-MCNC: 6.5 G/DL (ref 6.4–8.2)
PROTHROMBIN TIME: 28.5 SEC (ref 11.9–14.9)
RBC # BLD AUTO: 4.19 M/UL (ref 4.2–5.9)
SODIUM SERPL-SCNC: 139 MMOL/L (ref 136–145)
TROPONIN, HIGH SENSITIVITY: 12 NG/L (ref 0–22)
TROPONIN, HIGH SENSITIVITY: 14 NG/L (ref 0–22)
WBC # BLD AUTO: 8.4 K/UL (ref 4–11)

## 2024-04-09 PROCEDURE — 83880 ASSAY OF NATRIURETIC PEPTIDE: CPT

## 2024-04-09 PROCEDURE — 94760 N-INVAS EAR/PLS OXIMETRY 1: CPT

## 2024-04-09 PROCEDURE — 6360000004 HC RX CONTRAST MEDICATION: Performed by: PHYSICIAN ASSISTANT

## 2024-04-09 PROCEDURE — 93010 ELECTROCARDIOGRAM REPORT: CPT | Performed by: INTERNAL MEDICINE

## 2024-04-09 PROCEDURE — 85025 COMPLETE CBC W/AUTO DIFF WBC: CPT

## 2024-04-09 PROCEDURE — 2580000003 HC RX 258: Performed by: PHYSICIAN ASSISTANT

## 2024-04-09 PROCEDURE — 85610 PROTHROMBIN TIME: CPT

## 2024-04-09 PROCEDURE — 80053 COMPREHEN METABOLIC PANEL: CPT

## 2024-04-09 PROCEDURE — 36415 COLL VENOUS BLD VENIPUNCTURE: CPT

## 2024-04-09 PROCEDURE — 6360000002 HC RX W HCPCS: Performed by: PHYSICIAN ASSISTANT

## 2024-04-09 PROCEDURE — 2580000003 HC RX 258: Performed by: INTERNAL MEDICINE

## 2024-04-09 PROCEDURE — 93970 EXTREMITY STUDY: CPT

## 2024-04-09 PROCEDURE — 99285 EMERGENCY DEPT VISIT HI MDM: CPT

## 2024-04-09 PROCEDURE — 96365 THER/PROPH/DIAG IV INF INIT: CPT

## 2024-04-09 PROCEDURE — 93971 EXTREMITY STUDY: CPT

## 2024-04-09 PROCEDURE — 6370000000 HC RX 637 (ALT 250 FOR IP): Performed by: PHYSICIAN ASSISTANT

## 2024-04-09 PROCEDURE — 71260 CT THORAX DX C+: CPT

## 2024-04-09 PROCEDURE — 2060000000 HC ICU INTERMEDIATE R&B

## 2024-04-09 PROCEDURE — 6360000002 HC RX W HCPCS: Performed by: STUDENT IN AN ORGANIZED HEALTH CARE EDUCATION/TRAINING PROGRAM

## 2024-04-09 PROCEDURE — 93005 ELECTROCARDIOGRAM TRACING: CPT | Performed by: PHYSICIAN ASSISTANT

## 2024-04-09 PROCEDURE — 96375 TX/PRO/DX INJ NEW DRUG ADDON: CPT

## 2024-04-09 PROCEDURE — 84484 ASSAY OF TROPONIN QUANT: CPT

## 2024-04-09 PROCEDURE — 6370000000 HC RX 637 (ALT 250 FOR IP): Performed by: INTERNAL MEDICINE

## 2024-04-09 PROCEDURE — 74177 CT ABD & PELVIS W/CONTRAST: CPT

## 2024-04-09 PROCEDURE — 71045 X-RAY EXAM CHEST 1 VIEW: CPT

## 2024-04-09 RX ORDER — MORPHINE SULFATE 2 MG/ML
2 INJECTION, SOLUTION INTRAMUSCULAR; INTRAVENOUS EVERY 6 HOURS PRN
Status: DISCONTINUED | OUTPATIENT
Start: 2024-04-09 | End: 2024-04-12 | Stop reason: HOSPADM

## 2024-04-09 RX ORDER — ACETAMINOPHEN 650 MG/1
650 SUPPOSITORY RECTAL EVERY 6 HOURS PRN
Status: DISCONTINUED | OUTPATIENT
Start: 2024-04-09 | End: 2024-04-12 | Stop reason: HOSPADM

## 2024-04-09 RX ORDER — DEXTROSE MONOHYDRATE 100 MG/ML
INJECTION, SOLUTION INTRAVENOUS CONTINUOUS PRN
Status: DISCONTINUED | OUTPATIENT
Start: 2024-04-09 | End: 2024-04-12 | Stop reason: HOSPADM

## 2024-04-09 RX ORDER — SODIUM CHLORIDE 0.9 % (FLUSH) 0.9 %
5-40 SYRINGE (ML) INJECTION PRN
Status: DISCONTINUED | OUTPATIENT
Start: 2024-04-09 | End: 2024-04-12 | Stop reason: HOSPADM

## 2024-04-09 RX ORDER — INSULIN LISPRO 100 [IU]/ML
0-4 INJECTION, SOLUTION INTRAVENOUS; SUBCUTANEOUS
Status: DISCONTINUED | OUTPATIENT
Start: 2024-04-09 | End: 2024-04-12 | Stop reason: HOSPADM

## 2024-04-09 RX ORDER — ALLOPURINOL 100 MG/1
100 TABLET ORAL
Status: DISCONTINUED | OUTPATIENT
Start: 2024-04-10 | End: 2024-04-12 | Stop reason: HOSPADM

## 2024-04-09 RX ORDER — POTASSIUM CHLORIDE 7.45 MG/ML
10 INJECTION INTRAVENOUS PRN
Status: DISCONTINUED | OUTPATIENT
Start: 2024-04-09 | End: 2024-04-12 | Stop reason: HOSPADM

## 2024-04-09 RX ORDER — DEXAMETHASONE SODIUM PHOSPHATE 4 MG/ML
8 INJECTION, SOLUTION INTRA-ARTICULAR; INTRALESIONAL; INTRAMUSCULAR; INTRAVENOUS; SOFT TISSUE ONCE
Status: COMPLETED | OUTPATIENT
Start: 2024-04-09 | End: 2024-04-09

## 2024-04-09 RX ORDER — GLUCAGON 1 MG/ML
1 KIT INJECTION PRN
Status: DISCONTINUED | OUTPATIENT
Start: 2024-04-09 | End: 2024-04-12 | Stop reason: HOSPADM

## 2024-04-09 RX ORDER — ACETAMINOPHEN 325 MG/1
650 TABLET ORAL EVERY 6 HOURS PRN
Status: DISCONTINUED | OUTPATIENT
Start: 2024-04-09 | End: 2024-04-12 | Stop reason: HOSPADM

## 2024-04-09 RX ORDER — EZETIMIBE 10 MG/1
10 TABLET ORAL DAILY
Status: DISCONTINUED | OUTPATIENT
Start: 2024-04-09 | End: 2024-04-12 | Stop reason: HOSPADM

## 2024-04-09 RX ORDER — SODIUM CHLORIDE 9 MG/ML
INJECTION, SOLUTION INTRAVENOUS CONTINUOUS
Status: DISCONTINUED | OUTPATIENT
Start: 2024-04-09 | End: 2024-04-11

## 2024-04-09 RX ORDER — ONDANSETRON 4 MG/1
4 TABLET, ORALLY DISINTEGRATING ORAL EVERY 8 HOURS PRN
Status: DISCONTINUED | OUTPATIENT
Start: 2024-04-09 | End: 2024-04-12 | Stop reason: HOSPADM

## 2024-04-09 RX ORDER — INSULIN LISPRO 100 [IU]/ML
0-4 INJECTION, SOLUTION INTRAVENOUS; SUBCUTANEOUS NIGHTLY
Status: DISCONTINUED | OUTPATIENT
Start: 2024-04-09 | End: 2024-04-12 | Stop reason: HOSPADM

## 2024-04-09 RX ORDER — MAGNESIUM SULFATE IN WATER 40 MG/ML
2000 INJECTION, SOLUTION INTRAVENOUS PRN
Status: DISCONTINUED | OUTPATIENT
Start: 2024-04-09 | End: 2024-04-12 | Stop reason: HOSPADM

## 2024-04-09 RX ORDER — SODIUM CHLORIDE 9 MG/ML
INJECTION, SOLUTION INTRAVENOUS PRN
Status: DISCONTINUED | OUTPATIENT
Start: 2024-04-09 | End: 2024-04-12 | Stop reason: HOSPADM

## 2024-04-09 RX ORDER — INSULIN GLARGINE 100 [IU]/ML
14 INJECTION, SOLUTION SUBCUTANEOUS NIGHTLY
Status: DISCONTINUED | OUTPATIENT
Start: 2024-04-09 | End: 2024-04-11

## 2024-04-09 RX ORDER — ORPHENADRINE CITRATE 30 MG/ML
60 INJECTION INTRAMUSCULAR; INTRAVENOUS ONCE
Status: COMPLETED | OUTPATIENT
Start: 2024-04-09 | End: 2024-04-09

## 2024-04-09 RX ORDER — POLYETHYLENE GLYCOL 3350 17 G/17G
17 POWDER, FOR SOLUTION ORAL DAILY PRN
Status: DISCONTINUED | OUTPATIENT
Start: 2024-04-09 | End: 2024-04-12 | Stop reason: HOSPADM

## 2024-04-09 RX ORDER — MIRTAZAPINE 30 MG/1
30 TABLET, FILM COATED ORAL NIGHTLY
Status: DISCONTINUED | OUTPATIENT
Start: 2024-04-09 | End: 2024-04-12 | Stop reason: HOSPADM

## 2024-04-09 RX ORDER — POTASSIUM CHLORIDE 20 MEQ/1
40 TABLET, EXTENDED RELEASE ORAL PRN
Status: DISCONTINUED | OUTPATIENT
Start: 2024-04-09 | End: 2024-04-12 | Stop reason: HOSPADM

## 2024-04-09 RX ORDER — SODIUM CHLORIDE 0.9 % (FLUSH) 0.9 %
5-40 SYRINGE (ML) INJECTION EVERY 12 HOURS SCHEDULED
Status: DISCONTINUED | OUTPATIENT
Start: 2024-04-09 | End: 2024-04-12 | Stop reason: HOSPADM

## 2024-04-09 RX ORDER — LIDOCAINE 4 G/G
1 PATCH TOPICAL ONCE
Status: COMPLETED | OUTPATIENT
Start: 2024-04-09 | End: 2024-04-09

## 2024-04-09 RX ORDER — HYDROCODONE BITARTRATE AND ACETAMINOPHEN 5; 325 MG/1; MG/1
1 TABLET ORAL ONCE
Status: COMPLETED | OUTPATIENT
Start: 2024-04-09 | End: 2024-04-09

## 2024-04-09 RX ORDER — HYDROCODONE BITARTRATE AND ACETAMINOPHEN 5; 325 MG/1; MG/1
1 TABLET ORAL EVERY 6 HOURS PRN
Status: DISCONTINUED | OUTPATIENT
Start: 2024-04-09 | End: 2024-04-12 | Stop reason: HOSPADM

## 2024-04-09 RX ORDER — LANOLIN ALCOHOL/MO/W.PET/CERES
1000 CREAM (GRAM) TOPICAL NIGHTLY
Status: DISCONTINUED | OUTPATIENT
Start: 2024-04-09 | End: 2024-04-12 | Stop reason: HOSPADM

## 2024-04-09 RX ORDER — LANOLIN ALCOHOL/MO/W.PET/CERES
400 CREAM (GRAM) TOPICAL DAILY
Status: DISCONTINUED | OUTPATIENT
Start: 2024-04-10 | End: 2024-04-12 | Stop reason: HOSPADM

## 2024-04-09 RX ORDER — LISINOPRIL 5 MG/1
5 TABLET ORAL NIGHTLY
Status: DISCONTINUED | OUTPATIENT
Start: 2024-04-09 | End: 2024-04-12 | Stop reason: HOSPADM

## 2024-04-09 RX ORDER — MORPHINE SULFATE 4 MG/ML
4 INJECTION, SOLUTION INTRAMUSCULAR; INTRAVENOUS ONCE
Status: COMPLETED | OUTPATIENT
Start: 2024-04-09 | End: 2024-04-09

## 2024-04-09 RX ORDER — ROSUVASTATIN CALCIUM 40 MG/1
40 TABLET, COATED ORAL EVERY EVENING
Status: DISCONTINUED | OUTPATIENT
Start: 2024-04-09 | End: 2024-04-12 | Stop reason: HOSPADM

## 2024-04-09 RX ORDER — TAMSULOSIN HYDROCHLORIDE 0.4 MG/1
0.4 CAPSULE ORAL NIGHTLY
Status: DISCONTINUED | OUTPATIENT
Start: 2024-04-09 | End: 2024-04-12 | Stop reason: HOSPADM

## 2024-04-09 RX ORDER — ONDANSETRON 2 MG/ML
4 INJECTION INTRAMUSCULAR; INTRAVENOUS EVERY 6 HOURS PRN
Status: DISCONTINUED | OUTPATIENT
Start: 2024-04-09 | End: 2024-04-12 | Stop reason: HOSPADM

## 2024-04-09 RX ADMIN — ORPHENADRINE CITRATE 60 MG: 60 INJECTION INTRAMUSCULAR; INTRAVENOUS at 14:04

## 2024-04-09 RX ADMIN — Medication 1000 MG: at 20:43

## 2024-04-09 RX ADMIN — SODIUM CHLORIDE: 9 INJECTION, SOLUTION INTRAVENOUS at 16:54

## 2024-04-09 RX ADMIN — METFORMIN HYDROCHLORIDE 1000 MG: 500 TABLET ORAL at 17:52

## 2024-04-09 RX ADMIN — IOPAMIDOL 75 ML: 755 INJECTION, SOLUTION INTRAVENOUS at 08:43

## 2024-04-09 RX ADMIN — PHYTONADIONE 10 MG: 10 INJECTION, EMULSION INTRAMUSCULAR; INTRAVENOUS; SUBCUTANEOUS at 11:52

## 2024-04-09 RX ADMIN — MIRTAZAPINE 30 MG: 30 TABLET, FILM COATED ORAL at 20:42

## 2024-04-09 RX ADMIN — DEXAMETHASONE SODIUM PHOSPHATE 8 MG: 4 INJECTION, SOLUTION INTRAMUSCULAR; INTRAVENOUS at 07:59

## 2024-04-09 RX ADMIN — MORPHINE SULFATE 4 MG: 4 INJECTION, SOLUTION INTRAMUSCULAR; INTRAVENOUS at 10:04

## 2024-04-09 RX ADMIN — HYDROCODONE BITARTRATE AND ACETAMINOPHEN 1 TABLET: 5; 325 TABLET ORAL at 07:59

## 2024-04-09 RX ADMIN — ROSUVASTATIN CALCIUM 40 MG: 40 TABLET, FILM COATED ORAL at 17:52

## 2024-04-09 RX ADMIN — TAMSULOSIN HYDROCHLORIDE 0.4 MG: 0.4 CAPSULE ORAL at 20:42

## 2024-04-09 RX ADMIN — METOPROLOL SUCCINATE 75 MG: 50 TABLET, EXTENDED RELEASE ORAL at 20:42

## 2024-04-09 RX ADMIN — EZETIMIBE 10 MG: 10 TABLET ORAL at 17:52

## 2024-04-09 RX ADMIN — INSULIN GLARGINE 14 UNITS: 100 INJECTION, SOLUTION SUBCUTANEOUS at 20:43

## 2024-04-09 RX ADMIN — LISINOPRIL 5 MG: 5 TABLET ORAL at 20:42

## 2024-04-09 RX ADMIN — HYDROCODONE BITARTRATE AND ACETAMINOPHEN 1 TABLET: 5; 325 TABLET ORAL at 20:42

## 2024-04-09 RX ADMIN — IOPAMIDOL 75 ML: 755 INJECTION, SOLUTION INTRAVENOUS at 09:47

## 2024-04-09 RX ADMIN — SODIUM CHLORIDE, PRESERVATIVE FREE 10 ML: 5 INJECTION INTRAVENOUS at 20:51

## 2024-04-09 ASSESSMENT — PAIN DESCRIPTION - ORIENTATION
ORIENTATION: LEFT

## 2024-04-09 ASSESSMENT — PAIN DESCRIPTION - FREQUENCY: FREQUENCY: CONTINUOUS

## 2024-04-09 ASSESSMENT — PAIN SCALES - GENERAL
PAINLEVEL_OUTOF10: 5
PAINLEVEL_OUTOF10: 7
PAINLEVEL_OUTOF10: 4
PAINLEVEL_OUTOF10: 6
PAINLEVEL_OUTOF10: 9
PAINLEVEL_OUTOF10: 6

## 2024-04-09 ASSESSMENT — PAIN DESCRIPTION - PAIN TYPE: TYPE: ACUTE PAIN

## 2024-04-09 ASSESSMENT — PAIN DESCRIPTION - LOCATION
LOCATION: HIP;LEG
LOCATION: HIP;LEG
LOCATION: HIP
LOCATION: LEG;HIP
LOCATION: HIP

## 2024-04-09 ASSESSMENT — PAIN DESCRIPTION - DESCRIPTORS
DESCRIPTORS: ACHING
DESCRIPTORS: ACHING;DISCOMFORT
DESCRIPTORS: ACHING
DESCRIPTORS: SHARP
DESCRIPTORS: ACHING

## 2024-04-09 ASSESSMENT — PAIN - FUNCTIONAL ASSESSMENT
PAIN_FUNCTIONAL_ASSESSMENT: 0-10
PAIN_FUNCTIONAL_ASSESSMENT: PREVENTS OR INTERFERES SOME ACTIVE ACTIVITIES AND ADLS
PAIN_FUNCTIONAL_ASSESSMENT: PREVENTS OR INTERFERES SOME ACTIVE ACTIVITIES AND ADLS

## 2024-04-09 ASSESSMENT — PAIN DESCRIPTION - ONSET: ONSET: ON-GOING

## 2024-04-09 NOTE — PROGRESS NOTES
Medication Reconciliation    List of medications patient is currently taking is complete.     Source of information: 1. Conversation with patient at bedside                                      2. EPIC records      Notes regarding home medications:   1. Patient did not take any of his home medications prior to arrival to the ER today.  2. Patient is anticoagulated on Warfarin:      [Indication: Hx AVR, Target INR: 2-3, Current dose: 3.75 mg on MON/THUR and 7.5 mg ALL OTHER DAYS] - Patient did not take Warfarin today.  Phytonadione 10 mg IVPB x 1 given today given retroperitoneal hemorrhage  3. Patient takes Ergocalciferol 50,000 IU po Q7Days on Wednesdays  4. Patient takes Trulicity on Fridays  5. Patient takes Allopurinol on MON/WED/FRI    Izaiah Guzman RPH, PharmD, BCPS  4/9/2024 2:21 PM

## 2024-04-09 NOTE — PROGRESS NOTES
4 Eyes Skin Assessment     NAME:  Morro Kevin  YOB: 1950  MEDICAL RECORD NUMBER:  [id number]    The patient is being assessed for  Admission    I agree that at least one RN has performed a thorough Head to Toe Skin Assessment on the patient. ALL assessment sites listed below have been assessed.      Areas assessed by both nurses:    Head, Face, Ears, Shoulders, Back, Chest, Arms, Elbows, Hands, Sacrum. Buttock, Coccyx, Ischium, and Legs. Feet and Heels        Does the Patient have a Wound? No noted wound(s)       Vignesh Prevention initiated by RN: No  Wound Care Orders initiated by RN: No    Pressure Injury (Stage 3,4, Unstageable, DTI, NWPT, and Complex wounds) if present, place Wound referral order by RN under : No    New Ostomies, if present place, Ostomy referral order under : No     Nurse 1 eSignature: Electronically signed by SHALONDA WHITFIELD RN on 4/9/24 at 4:36 PM EDT    **SHARE this note so that the co-signing nurse can place an eSignature**    Nurse 2 eSignature: Electronically signed by Michelle Dorsey RN on 4/9/24 at 4:37 PM EDT

## 2024-04-09 NOTE — PROGRESS NOTES
Pt resting in bed. Pt is alert and oriented x4, VSS on RA. Pt complaining of pain in his right hip/leg 6/10. Norflex given per order. Pt made aware of plan of care. Pt to be transferring to room 5127 once cleaned. Pt stated he will update his son on transfer

## 2024-04-09 NOTE — CONSULTS
Brief IR Note    74 yo on coumadin for aortic valve replacement who was lifting and felt a strain. CT demonstrates left retroperitoneal hematoma with small area of active bleeding. INR elevated at 2.69. VSS. H&H 12.7/37.8.   Denies weakness per ER.     Left spontaneous retroperitoneal hematoma on anticoagulation. As patient is stable, agree with conservative medical management and INR reversal with vitamin K. No current indication for angiography. If patient decompensates or have any questions, please reach out to IR.

## 2024-04-09 NOTE — ED PROVIDER NOTES
monitoring for serial hemoglobin, IR was contacted to see if there is any intervention that they can perform and vitamin K was ordered at this time I do not believe this to be acutely life-threatening and thus have held off on full reversal with Kcentra.    CRITICAL CARE  I personally saw the patient and independently provided 27 minutes of non-concurrent critical care out of the total shared critical care time provided. This excludes seperately billable procedures. Critical care time was provided for retroperitoneal hemorrhage concerns with reversal of anticoagulation that required close evaluation and/or intervention with concern for potential patient decompensation.      For further details of the patient's emergency department visit, please see the advanced practice provider's documentation.    Adonay Duggan MD     This report has been produced using speech recognition software and may contain errors related to that system including errors in grammar, punctuation, and spelling, as well as words and phrases that may be inappropriate. If there are any questions or concerns please feel free to contact the dictating provider for clarification.          Adonay Duggan MD  04/09/24 0464    
97.9 °F (36.6 °C)   TempSrc: Oral  Oral   SpO2: 99%  98%   Weight: 97.4 kg (214 lb 11.7 oz) 88.7 kg (195 lb 8.8 oz)    Height: 1.803 m (5' 11\")         Patient was given the following medications:  Medications   lidocaine 4 % external patch 1 patch (1 patch TransDERmal Patch Applied 4/9/24 0759)   orphenadrine (NORFLEX) injection 60 mg (has no administration in time range)   dexAMETHasone (DECADRON) injection 8 mg (8 mg IntraVENous Given 4/9/24 0759)   HYDROcodone-acetaminophen (NORCO) 5-325 MG per tablet 1 tablet (1 tablet Oral Given 4/9/24 0759)   iopamidol (ISOVUE-370) 76 % injection 75 mL (75 mLs IntraVENous Given 4/9/24 0843)   morphine (PF) injection 4 mg (4 mg IntraVENous Given 4/9/24 1004)   iopamidol (ISOVUE-370) 76 % injection 75 mL (75 mLs IntraVENous Given 4/9/24 0947)   phytonadione (ADULT) (VITAMIN K) 10 mg in sodium chloride 0.9 % 100 mL IVPB (10 mg IntraVENous New Bag 4/9/24 1152)       ED Course as of 04/09/24 1347   Tue Apr 09, 2024   1105 Spoke to the interventional radiologist who advised this will most likely stop on its own especially with reversal.  Will monitor the patient and be available if patient decompensates. [AL]      ED Course User Index  [AL] Adonay Duggan MD        Is this patient to be included in the SEP-1 Core Measure due to severe sepsis or septic shock?   No   Exclusion criteria - the patient is NOT to be included for SEP-1 Core Measure due to:  Infection is not suspected    CONSULTS: (Who and What was discussed)  IP CONSULT TO INTERVENTIONAL RADIOLOGY  IP CONSULT TO HOSPITALIST              CC/HPI Summary, DDx, ED Course, and Reassessment: This is a 73-year-old male with PMH of mechanical valve on warfarin, DM2, HLD, HTN who presents with complaint of left leg pain, shortness of breath, please see HPI.  His vitals here show hypertension, otherwise within normal limits.  He is not hypoxic.  Blood work was performed as he states in the past he has been short of breath

## 2024-04-09 NOTE — H&P
Hospital Medicine History & Physical      PCP: Fareed Marino MD    Date of Admission: 4/9/2024    Date of Service: Pt seen/examined on 4/9/24 and Admitted to Inpatient     Chief Complaint: LE pain    History Of Present Illness:     The patient is a 73 y.o. male who presents to [facility] with LE pain. Pt has a h/o heart valve replacement on coumadin, states in the last few days he has been lifting and moving heavy stuff. Yesterday, he noticed left LE pain, associated with abd pain and hence presented to the ER. Found to have retroperitoneal hge with poss psoas muscle hemorrhage and hence admitted.       Past Medical History:        Diagnosis Date    Aortic stenosis 9/2013    moderate    Arthritis     Asthma     BRONCHIAL ASTHMA  AS A CHILD    Deviated septum     Diabetes mellitus type II 2000    Dr. Goodson     Hashimoto thyroiditis dx 1984    medical treatment    Homocysteinemia     Hyperlipidemia     Hypertension     Hypertriglyceridemia     Hypogonadism male     topical testosterone     Hypothyroid 12/9/2011    Kidney stone 1990s    Passed on own and also surgical excision     Mechanical heart valve present 2016    Murmur since 2005    Nasal polyps     Osteoarthritis     Right thumb     Prolonged emergence from general anesthesia     Sleep apnea 2005    CPAP nightly    Warfarin anticoagulation 2016    Wears glasses        Past Surgical History:        Procedure Laterality Date    AORTIC VALVE REPLACEMENT  09/14/2016    21mm [facility]    CAPSULE ENDOSCOPY N/A 6/30/2023    BOWEL SMALL CAPSULE ENDOSCOPY performed by Nohelia Phan MD at [facility] ENDOSCOPY    COLECTOMY  1996    perforated bowel: colonoscopy    COLONOSCOPY  9/24/2014; 2004    x2 normal colonoscopies/ perforated bowel    COLONOSCOPY N/A 6/30/2023    COLONOSCOPY performed by Nohelia Phan MD at [facility]  input(s): \"CKTOTAL\", \"CKMB\", \"CKMBINDEX\", \"TROPONINI\" in the last 72 hours.    U/A:    Lab Results   Component Value Date/Time    NITRITE NEG 05/29/2019 07:44 AM    COLORU RED 11/02/2021 05:56 PM    WBCUA 3-5 11/02/2021 05:56 PM    RBCUA >100 11/02/2021 05:56 PM    MUCUS 2+ 06/29/2012 07:50 AM    BACTERIA Rare 12/09/2011 07:17 AM    CLARITYU TURBID 11/02/2021 05:56 PM    SPECGRAV 1.015 11/02/2021 05:56 PM    LEUKOCYTESUR Negative 11/02/2021 05:56 PM    BLOODU LARGE 11/02/2021 05:56 PM    GLUCOSEU >=1000 11/02/2021 05:56 PM    GLUCOSEU NEGATIVE 12/09/2011 07:17 AM       ABG    Lab Results   Component Value Date/Time    JPJ4ZBZ 24.5 09/14/2016 11:49 AM    BEART -2 09/14/2016 11:49 AM    E5EYLBOI 99 09/14/2016 11:49 AM    PHART 7.268 09/14/2016 11:49 AM    RIC6BRV 54 09/14/2016 11:49 AM    PO2ART 182 09/14/2016 11:49 AM    UNA1MVQ 26 09/14/2016 11:49 AM           Active Hospital Problems    Diagnosis Date Noted    Retroperitoneal hemorrhage [R58] 04/09/2024         PHYSICIANS CERTIFICATION:    I certify that Morro Kevin is expected to be hospitalized for > than 2 midnights based on the following assessment and plan:      ASSESSMENT/PLAN:    Retroperitoneal hge/Lt iliopsoas muscle hemorrhage - hold coumadin. Received vit K in ED. Monitor INR. Monitor hb. IR consulted in ED, plan conservative mgmt. Will repeat CTA if hb drops or if suspect bleeding. LLE neg for DVT. Pain control with IV morphine and PO norco prn    H/o mitral valve replacement - holding coumadin, monitor INR. If bleeding stable, will need to bridge with lovenox/coumadin when stable    H/o CKD III - creat at baseline at 1.5      DVT Prophylaxis: SCD  Diet: ADULT DIET; Regular; 4 carb choices (60 gm/meal)  Code Status: Full Code  PT/OT Eval Status: ordered    Dispo - cont care       Caitlin Becerra MD    Thank you Fareed Marino MD for the opportunity to be involved in this patient's care. If you have any questions or concerns please feel free to

## 2024-04-09 NOTE — ED TRIAGE NOTES
Pt presents to ED via private vehicle from home with a c/o SOB and left leg pain. Pt states left leg pain started first, around 2300 last night before bed. Pt states the SOB then started ab 1.5 hrs ago. Pt denies hx of DVTs/Pes. Pt states pain started in left hip and has gradually spread to his leg. Pt denies chest pain but reports chest tightness. 8/10 leg pain; sharp.

## 2024-04-09 NOTE — PLAN OF CARE
Problem: Discharge Planning  Goal: Discharge to home or other facility with appropriate resources  Outcome: Progressing     Problem: Pain  Goal: Verbalizes/displays adequate comfort level or baseline comfort level  Outcome: Progressing     Problem: Safety - Adult  Goal: Free from fall injury  Outcome: Progressing     Problem: ABCDS Injury Assessment  Goal: Absence of physical injury  Outcome: Progressing     Problem: Neurosensory - Adult  Goal: Achieves maximal functionality and self care  Outcome: Progressing     Problem: Cardiovascular - Adult  Goal: Maintains optimal cardiac output and hemodynamic stability  Outcome: Progressing  Goal: Absence of cardiac dysrhythmias or at baseline  Outcome: Progressing     Problem: Skin/Tissue Integrity - Adult  Goal: Skin integrity remains intact  Outcome: Progressing     Problem: Musculoskeletal - Adult  Goal: Return mobility to safest level of function  Outcome: Progressing  Goal: Maintain proper alignment of affected body part  Outcome: Progressing  Goal: Return ADL status to a safe level of function  Outcome: Progressing     Problem: Hematologic - Adult  Goal: Maintains hematologic stability  Outcome: Progressing

## 2024-04-10 LAB
ANION GAP SERPL CALCULATED.3IONS-SCNC: 13 MMOL/L (ref 3–16)
BASOPHILS # BLD: 0 K/UL (ref 0–0.2)
BASOPHILS NFR BLD: 0.1 %
BUN SERPL-MCNC: 35 MG/DL (ref 7–20)
CALCIUM SERPL-MCNC: 9.1 MG/DL (ref 8.3–10.6)
CHLORIDE SERPL-SCNC: 106 MMOL/L (ref 99–110)
CO2 SERPL-SCNC: 20 MMOL/L (ref 21–32)
CREAT SERPL-MCNC: 1.8 MG/DL (ref 0.8–1.3)
DEPRECATED RDW RBC AUTO: 15.2 % (ref 12.4–15.4)
EOSINOPHIL # BLD: 0 K/UL (ref 0–0.6)
EOSINOPHIL NFR BLD: 0 %
GFR SERPLBLD CREATININE-BSD FMLA CKD-EPI: 40 ML/MIN/{1.73_M2}
GLUCOSE BLD-MCNC: 209 MG/DL (ref 70–99)
GLUCOSE BLD-MCNC: 222 MG/DL (ref 70–99)
GLUCOSE BLD-MCNC: 222 MG/DL (ref 70–99)
GLUCOSE BLD-MCNC: 239 MG/DL (ref 70–99)
GLUCOSE SERPL-MCNC: 212 MG/DL (ref 70–99)
HCT VFR BLD AUTO: 27.4 % (ref 40.5–52.5)
HCT VFR BLD AUTO: 27.5 % (ref 40.5–52.5)
HCT VFR BLD AUTO: 30.5 % (ref 40.5–52.5)
HGB BLD-MCNC: 10.3 G/DL (ref 13.5–17.5)
HGB BLD-MCNC: 9.1 G/DL (ref 13.5–17.5)
HGB BLD-MCNC: 9.4 G/DL (ref 13.5–17.5)
INR PPP: 1.15 (ref 0.85–1.15)
LYMPHOCYTES # BLD: 1.1 K/UL (ref 1–5.1)
LYMPHOCYTES NFR BLD: 7 %
MCH RBC QN AUTO: 30.1 PG (ref 26–34)
MCHC RBC AUTO-ENTMCNC: 33.6 G/DL (ref 31–36)
MCV RBC AUTO: 89.4 FL (ref 80–100)
MONOCYTES # BLD: 0.7 K/UL (ref 0–1.3)
MONOCYTES NFR BLD: 4.6 %
NEUTROPHILS # BLD: 13.7 K/UL (ref 1.7–7.7)
NEUTROPHILS NFR BLD: 88.3 %
PERFORMED ON: ABNORMAL
PLATELET # BLD AUTO: 236 K/UL (ref 135–450)
PMV BLD AUTO: 8.1 FL (ref 5–10.5)
POTASSIUM SERPL-SCNC: 4.5 MMOL/L (ref 3.5–5.1)
PROTHROMBIN TIME: 14.9 SEC (ref 11.9–14.9)
RBC # BLD AUTO: 3.41 M/UL (ref 4.2–5.9)
SODIUM SERPL-SCNC: 139 MMOL/L (ref 136–145)
WBC # BLD AUTO: 15.5 K/UL (ref 4–11)

## 2024-04-10 PROCEDURE — 85025 COMPLETE CBC W/AUTO DIFF WBC: CPT

## 2024-04-10 PROCEDURE — 94760 N-INVAS EAR/PLS OXIMETRY 1: CPT

## 2024-04-10 PROCEDURE — 36415 COLL VENOUS BLD VENIPUNCTURE: CPT

## 2024-04-10 PROCEDURE — 6370000000 HC RX 637 (ALT 250 FOR IP): Performed by: INTERNAL MEDICINE

## 2024-04-10 PROCEDURE — 2060000000 HC ICU INTERMEDIATE R&B

## 2024-04-10 PROCEDURE — 80048 BASIC METABOLIC PNL TOTAL CA: CPT

## 2024-04-10 PROCEDURE — 85018 HEMOGLOBIN: CPT

## 2024-04-10 PROCEDURE — 85014 HEMATOCRIT: CPT

## 2024-04-10 PROCEDURE — 85610 PROTHROMBIN TIME: CPT

## 2024-04-10 PROCEDURE — 2580000003 HC RX 258: Performed by: INTERNAL MEDICINE

## 2024-04-10 RX ORDER — ENOXAPARIN SODIUM 100 MG/ML
1 INJECTION SUBCUTANEOUS 2 TIMES DAILY
Status: DISCONTINUED | OUTPATIENT
Start: 2024-04-11 | End: 2024-04-12 | Stop reason: HOSPADM

## 2024-04-10 RX ADMIN — TAMSULOSIN HYDROCHLORIDE 0.4 MG: 0.4 CAPSULE ORAL at 20:50

## 2024-04-10 RX ADMIN — ALLOPURINOL 100 MG: 100 TABLET ORAL at 08:55

## 2024-04-10 RX ADMIN — Medication 1000 MG: at 20:50

## 2024-04-10 RX ADMIN — Medication 400 MG: at 08:55

## 2024-04-10 RX ADMIN — INSULIN LISPRO 1 UNITS: 100 INJECTION, SOLUTION INTRAVENOUS; SUBCUTANEOUS at 13:27

## 2024-04-10 RX ADMIN — INSULIN LISPRO 1 UNITS: 100 INJECTION, SOLUTION INTRAVENOUS; SUBCUTANEOUS at 18:34

## 2024-04-10 RX ADMIN — SODIUM CHLORIDE: 9 INJECTION, SOLUTION INTRAVENOUS at 15:41

## 2024-04-10 RX ADMIN — INSULIN GLARGINE 14 UNITS: 100 INJECTION, SOLUTION SUBCUTANEOUS at 20:51

## 2024-04-10 RX ADMIN — INSULIN LISPRO 1 UNITS: 100 INJECTION, SOLUTION INTRAVENOUS; SUBCUTANEOUS at 08:56

## 2024-04-10 RX ADMIN — ROSUVASTATIN CALCIUM 40 MG: 40 TABLET, FILM COATED ORAL at 18:34

## 2024-04-10 RX ADMIN — EZETIMIBE 10 MG: 10 TABLET ORAL at 08:55

## 2024-04-10 RX ADMIN — METOPROLOL SUCCINATE 75 MG: 50 TABLET, EXTENDED RELEASE ORAL at 20:49

## 2024-04-10 RX ADMIN — MIRTAZAPINE 30 MG: 30 TABLET, FILM COATED ORAL at 20:50

## 2024-04-10 RX ADMIN — LEVOTHYROXINE SODIUM 137 MCG: 0.11 TABLET ORAL at 05:55

## 2024-04-10 NOTE — ACP (ADVANCE CARE PLANNING)
Advance Care Planning     Advance Care Planning Activator (Inpatient)  Conversation Note      Date of ACP Conversation: 4/10/2024     Conversation Conducted with: Patient with Decision Making Capacity    ACP Activator: Tila Redding RN    Health Care Decision Maker:     Current Designated Health Care Decision Maker:     Primary Decision Maker: DoritaJoe - Candis - [phone]    Today we documented Decision Maker(s) consistent with Legal Next of Kin hierarchy.    Care Preferences    Ventilation:  \"If you were in your present state of health and suddenly became very ill and were unable to breathe on your own, what would your preference be about the use of a ventilator (breathing machine) if it were available to you?\"      Would the patient desire the use of ventilator (breathing machine)?: yes    \"If your health worsens and it becomes clear that your chance of recovery is unlikely, what would your preference be about the use of a ventilator (breathing machine) if it were available to you?\"     Would the patient desire the use of ventilator (breathing machine)?: Yes      Resuscitation  \"CPR works best to restart the heart when there is a sudden event, like a heart attack, in someone who is otherwise healthy. Unfortunately, CPR does not typically restart the heart for people who have serious health conditions or who are very sick.\"    \"In the event your heart stopped as a result of an underlying serious health condition, would you want attempts to be made to restart your heart (answer \"yes\" for attempt to resuscitate) or would you prefer a natural death (answer \"no\" for do not attempt to resuscitate)?\" yes       [] Yes   [] No   Educated Patient / Decision Maker regarding differences between Advance Directives and portable DNR orders.    Length of ACP Conversation in minutes:      Conversation Outcomes:  ACP discussion completed    Follow-up plan:    [] Schedule follow-up conversation to continue planning  []

## 2024-04-10 NOTE — PLAN OF CARE
Problem: Pain  Goal: Verbalizes/displays adequate comfort level or baseline comfort level  4/10/2024 0056 by Aneudy Lopes RN  Outcome: Progressing     Problem: Safety - Adult  Goal: Free from fall injury  4/10/2024 0056 by Aneudy Lopes RN  Outcome: Progressing     Problem: Neurosensory - Adult  Goal: Achieves maximal functionality and self care  4/10/2024 0056 by Aneudy Lopes RN  Outcome: Progressing     Problem: Cardiovascular - Adult  Goal: Maintains optimal cardiac output and hemodynamic stability  4/10/2024 0056 by Aneudy Lopes RN  Flowsheets (Taken 4/10/2024 0056)  Maintains optimal cardiac output and hemodynamic stability:   Monitor blood pressure and heart rate   Monitor urine output and notify Licensed Independent Practitioner for values outside of normal range   Assess for signs of decreased cardiac output

## 2024-04-10 NOTE — CARE COORDINATION
Case Management Assessment  Initial Evaluation    Date/Time of Evaluation: 4/10/2024 2:08 PM  Assessment Completed by: Tila Redding RN    If patient is discharged prior to next notation, then this note serves as note for discharge by case management.    Patient Name: Morro Kevin                   YOB: 1950  Diagnosis: Retroperitoneal hemorrhage [R58]  Left leg pain [M79.605]  Iliopsoas muscle hematoma, left, initial encounter [S70.12XA]                   Date / Time: 4/9/2024  6:03 AM    Patient Admission Status: Inpatient   Readmission Risk (Low < 19, Mod (19-27), High > 27): Readmission Risk Score: 16.2    Current PCP: Fareed Marino MD  PCP verified by CM? Yes    Chart Reviewed: Yes      History Provided by: Patient  Patient Orientation: Alert and Oriented    Patient Cognition: Alert    Hospitalization in the last 30 days (Readmission):  No    If yes, Readmission Assessment in CM Navigator will be completed.    Advance Directives:      Code Status: Full Code   Patient's Primary Decision Maker is: Legal Next of Kin    Primary Decision Maker: Joe Kevin - Child - [phone]    Discharge Planning:    Patient lives with: Children (son) Type of Home: Apartment  Primary Care Giver: Self  Patient Support Systems include: Children, Family Members   Current Financial resources: Medicare  Current community resources: None  Current services prior to admission: C-pap            Current DME:  None            Type of Home Care services:  None    ADLS  Prior functional level: Independent in ADLs/IADLs  Current functional level: Independent in ADLs/IADLs    No current PT/OT orders    Family can provide assistance at DC: Yes  Would you like Case Management to discuss the discharge plan with any other family members/significant others, and if so, who? No  Plans to Return to Present Housing: Yes  Other Identified Issues/Barriers to RETURNING to current housing: None  Potential Assistance needed at discharge:

## 2024-04-11 LAB
ANION GAP SERPL CALCULATED.3IONS-SCNC: 6 MMOL/L (ref 3–16)
BASOPHILS # BLD: 0 K/UL (ref 0–0.2)
BASOPHILS NFR BLD: 0.2 %
BUN SERPL-MCNC: 30 MG/DL (ref 7–20)
CALCIUM SERPL-MCNC: 8.6 MG/DL (ref 8.3–10.6)
CHLORIDE SERPL-SCNC: 105 MMOL/L (ref 99–110)
CO2 SERPL-SCNC: 23 MMOL/L (ref 21–32)
CREAT SERPL-MCNC: 1.5 MG/DL (ref 0.8–1.3)
DEPRECATED RDW RBC AUTO: 15.6 % (ref 12.4–15.4)
EOSINOPHIL # BLD: 0 K/UL (ref 0–0.6)
EOSINOPHIL NFR BLD: 0.2 %
FERRITIN SERPL IA-MCNC: 65.6 NG/ML (ref 30–400)
GFR SERPLBLD CREATININE-BSD FMLA CKD-EPI: 49 ML/MIN/{1.73_M2}
GLUCOSE BLD-MCNC: 152 MG/DL (ref 70–99)
GLUCOSE BLD-MCNC: 156 MG/DL (ref 70–99)
GLUCOSE BLD-MCNC: 235 MG/DL (ref 70–99)
GLUCOSE BLD-MCNC: 238 MG/DL (ref 70–99)
GLUCOSE SERPL-MCNC: 276 MG/DL (ref 70–99)
HCT VFR BLD AUTO: 22.9 % (ref 40.5–52.5)
HCT VFR BLD AUTO: 23.3 % (ref 40.5–52.5)
HCT VFR BLD AUTO: 23.6 % (ref 40.5–52.5)
HCT VFR BLD AUTO: 25.4 % (ref 40.5–52.5)
HGB BLD-MCNC: 8 G/DL (ref 13.5–17.5)
HGB BLD-MCNC: 8 G/DL (ref 13.5–17.5)
HGB BLD-MCNC: 8.1 G/DL (ref 13.5–17.5)
HGB BLD-MCNC: 8.6 G/DL (ref 13.5–17.5)
INR PPP: 1.15 (ref 0.85–1.15)
IRON SATN MFR SERPL: 16 % (ref 20–50)
IRON SERPL-MCNC: 44 UG/DL (ref 59–158)
LYMPHOCYTES # BLD: 1.2 K/UL (ref 1–5.1)
LYMPHOCYTES NFR BLD: 12.2 %
MCH RBC QN AUTO: 30.6 PG (ref 26–34)
MCHC RBC AUTO-ENTMCNC: 33.9 G/DL (ref 31–36)
MCV RBC AUTO: 90.1 FL (ref 80–100)
MONOCYTES # BLD: 0.7 K/UL (ref 0–1.3)
MONOCYTES NFR BLD: 7.7 %
NEUTROPHILS # BLD: 7.7 K/UL (ref 1.7–7.7)
NEUTROPHILS NFR BLD: 79.7 %
PERFORMED ON: ABNORMAL
PLATELET # BLD AUTO: 174 K/UL (ref 135–450)
PMV BLD AUTO: 7.9 FL (ref 5–10.5)
POTASSIUM SERPL-SCNC: 4.3 MMOL/L (ref 3.5–5.1)
PROTHROMBIN TIME: 14.9 SEC (ref 11.9–14.9)
RBC # BLD AUTO: 2.62 M/UL (ref 4.2–5.9)
SODIUM SERPL-SCNC: 134 MMOL/L (ref 136–145)
TIBC SERPL-MCNC: 273 UG/DL (ref 260–445)
WBC # BLD AUTO: 9.7 K/UL (ref 4–11)

## 2024-04-11 PROCEDURE — 2580000003 HC RX 258: Performed by: INTERNAL MEDICINE

## 2024-04-11 PROCEDURE — 85018 HEMOGLOBIN: CPT

## 2024-04-11 PROCEDURE — 85610 PROTHROMBIN TIME: CPT

## 2024-04-11 PROCEDURE — 6360000002 HC RX W HCPCS: Performed by: INTERNAL MEDICINE

## 2024-04-11 PROCEDURE — 80048 BASIC METABOLIC PNL TOTAL CA: CPT

## 2024-04-11 PROCEDURE — 2060000000 HC ICU INTERMEDIATE R&B

## 2024-04-11 PROCEDURE — 85025 COMPLETE CBC W/AUTO DIFF WBC: CPT

## 2024-04-11 PROCEDURE — 94760 N-INVAS EAR/PLS OXIMETRY 1: CPT

## 2024-04-11 PROCEDURE — 6370000000 HC RX 637 (ALT 250 FOR IP): Performed by: INTERNAL MEDICINE

## 2024-04-11 PROCEDURE — 82728 ASSAY OF FERRITIN: CPT

## 2024-04-11 PROCEDURE — 83540 ASSAY OF IRON: CPT

## 2024-04-11 PROCEDURE — 83550 IRON BINDING TEST: CPT

## 2024-04-11 PROCEDURE — 85014 HEMATOCRIT: CPT

## 2024-04-11 PROCEDURE — 36415 COLL VENOUS BLD VENIPUNCTURE: CPT

## 2024-04-11 RX ORDER — INSULIN GLARGINE 100 [IU]/ML
20 INJECTION, SOLUTION SUBCUTANEOUS NIGHTLY
Status: DISCONTINUED | OUTPATIENT
Start: 2024-04-11 | End: 2024-04-12 | Stop reason: HOSPADM

## 2024-04-11 RX ORDER — ENOXAPARIN SODIUM 100 MG/ML
1 INJECTION SUBCUTANEOUS 2 TIMES DAILY
Qty: 20 ML | Refills: 0 | Status: ON HOLD | OUTPATIENT
Start: 2024-04-11 | End: 2024-04-18

## 2024-04-11 RX ADMIN — INSULIN GLARGINE 20 UNITS: 100 INJECTION, SOLUTION SUBCUTANEOUS at 21:53

## 2024-04-11 RX ADMIN — TAMSULOSIN HYDROCHLORIDE 0.4 MG: 0.4 CAPSULE ORAL at 21:12

## 2024-04-11 RX ADMIN — METOPROLOL SUCCINATE 75 MG: 50 TABLET, EXTENDED RELEASE ORAL at 21:11

## 2024-04-11 RX ADMIN — Medication 1000 MG: at 21:17

## 2024-04-11 RX ADMIN — MIRTAZAPINE 30 MG: 30 TABLET, FILM COATED ORAL at 21:12

## 2024-04-11 RX ADMIN — INSULIN LISPRO 1 UNITS: 100 INJECTION, SOLUTION INTRAVENOUS; SUBCUTANEOUS at 13:33

## 2024-04-11 RX ADMIN — SODIUM CHLORIDE, PRESERVATIVE FREE 10 ML: 5 INJECTION INTRAVENOUS at 08:28

## 2024-04-11 RX ADMIN — SODIUM CHLORIDE: 9 INJECTION, SOLUTION INTRAVENOUS at 05:05

## 2024-04-11 RX ADMIN — Medication 400 MG: at 08:24

## 2024-04-11 RX ADMIN — HYDROCODONE BITARTRATE AND ACETAMINOPHEN 1 TABLET: 5; 325 TABLET ORAL at 08:25

## 2024-04-11 RX ADMIN — ENOXAPARIN SODIUM 90 MG: 100 INJECTION SUBCUTANEOUS at 21:12

## 2024-04-11 RX ADMIN — SODIUM CHLORIDE, PRESERVATIVE FREE 10 ML: 5 INJECTION INTRAVENOUS at 21:13

## 2024-04-11 RX ADMIN — ROSUVASTATIN CALCIUM 40 MG: 40 TABLET, FILM COATED ORAL at 18:42

## 2024-04-11 RX ADMIN — METOPROLOL SUCCINATE 75 MG: 50 TABLET, EXTENDED RELEASE ORAL at 08:25

## 2024-04-11 RX ADMIN — LEVOTHYROXINE SODIUM 137 MCG: 0.11 TABLET ORAL at 06:03

## 2024-04-11 RX ADMIN — EZETIMIBE 10 MG: 10 TABLET ORAL at 08:25

## 2024-04-11 RX ADMIN — HYDROCODONE BITARTRATE AND ACETAMINOPHEN 1 TABLET: 5; 325 TABLET ORAL at 21:11

## 2024-04-11 RX ADMIN — IRON SUCROSE 200 MG: 20 INJECTION, SOLUTION INTRAVENOUS at 12:35

## 2024-04-11 RX ADMIN — HYDROCODONE BITARTRATE AND ACETAMINOPHEN 1 TABLET: 5; 325 TABLET ORAL at 15:19

## 2024-04-11 RX ADMIN — ENOXAPARIN SODIUM 90 MG: 100 INJECTION SUBCUTANEOUS at 08:26

## 2024-04-11 ASSESSMENT — PAIN DESCRIPTION - LOCATION
LOCATION: LEG;HIP
LOCATION: LEG

## 2024-04-11 ASSESSMENT — PAIN SCALES - GENERAL
PAINLEVEL_OUTOF10: 5
PAINLEVEL_OUTOF10: 0
PAINLEVEL_OUTOF10: 0
PAINLEVEL_OUTOF10: 5
PAINLEVEL_OUTOF10: 2
PAINLEVEL_OUTOF10: 0

## 2024-04-11 ASSESSMENT — PAIN DESCRIPTION - FREQUENCY: FREQUENCY: CONTINUOUS

## 2024-04-11 ASSESSMENT — PAIN DESCRIPTION - ORIENTATION
ORIENTATION: LEFT
ORIENTATION: LEFT

## 2024-04-11 ASSESSMENT — PAIN DESCRIPTION - DESCRIPTORS
DESCRIPTORS: ACHING
DESCRIPTORS: ACHING

## 2024-04-11 ASSESSMENT — PAIN DESCRIPTION - ONSET: ONSET: ON-GOING

## 2024-04-11 NOTE — PROGRESS NOTES
Physician Progress Note      PATIENT:               ROXANNE PRESTON  CSN #:                  [id number]  :                       1950  ADMIT DATE:       2024 6:03 AM  DISCH DATE:  RESPONDING  PROVIDER #:        Caitlin Becerra MD          QUERY TEXT:    Pt admitted with retroperitoneal hematoma. Pt noted to have drop in H/H . If   possible, please document in the progress notes and discharge summary if you   are evaluating and/or treating any of the following:      The medical record reflects the following:  Risk Factors: Retroperitoneal hematoma, on coumadin  Clinical Indicators: H/H 12.7/37 then trended down to 10.3/30.5  Treatment: diagnostic labs, monitoring, reversal with Vit K    Thank you,  Connie Long RN BSN  Clinical   [email or web address]  Options provided:  -- Acute blood loss anemia  -- Other - I will add my own diagnosis  -- Disagree - Not applicable / Not valid  -- Disagree - Clinically unable to determine / Unknown  -- Refer to Clinical Documentation Reviewer    PROVIDER RESPONSE TEXT:    This patient has acute blood loss anemia.    Query created by: Connie Long on 4/10/2024 9:42 AM      QUERY TEXT:    Patient admitted with spontaneous retroperitoneal hematoma and is on chronic   anticoagulation.   If possible, please document in the progress notes and   discharge summary if you are evaluating and/or treating any of the following:    The medical record reflects the following:  Risk Factors: Spontaneous Retroperitoneal Hematoma, DM prosthetic Heart valve    Clinical Indicators: INR 2.69, per H/P-Retroperitoneal hge/Lt iliopsoas muscle   hemorrhage, per CT scan- per CT scan- left iliopsoas muscle is markedly   enlarged, compared to the right and there is a tiny area of high attenuation   within it.  There is marked amount of fluid extending along the pericolic   gutter into the pelvis,   The high attenuation within the muscle may represent

## 2024-04-11 NOTE — PROGRESS NOTES
Hospitalist Progress Note      PCP: Fareed Marino MD    Date of Admission: 4/9/2024    Subjective: pain LE slightly worse today, but better with pain meds    Medications:  Reviewed    Infusion Medications    sodium chloride      dextrose       Scheduled Medications    iron sucrose  200 mg IntraVENous Q24H    enoxaparin  1 mg/kg SubCUTAneous BID    allopurinol  100 mg Oral Once per day on Mon Wed Fri    levothyroxine  137 mcg Oral Daily    [Held by provider] lisinopril  5 mg Oral Nightly    magnesium oxide  400 mg Oral Daily    metoprolol succinate  75 mg Oral BID    mirtazapine  30 mg Oral Nightly    niacin  1,000 mg Oral Nightly    rosuvastatin  40 mg Oral QPM    tamsulosin  0.4 mg Oral Nightly    ezetimibe  10 mg Oral Daily    sodium chloride flush  5-40 mL IntraVENous 2 times per day    insulin lispro  0-4 Units SubCUTAneous TID WC    insulin lispro  0-4 Units SubCUTAneous Nightly    insulin glargine  14 Units SubCUTAneous Nightly    [Held by provider] metFORMIN  1,000 mg Oral BID WC     PRN Meds: sodium chloride flush, sodium chloride, potassium chloride **OR** potassium alternative oral replacement **OR** potassium chloride, magnesium sulfate, ondansetron **OR** ondansetron, polyethylene glycol, acetaminophen **OR** acetaminophen, morphine, HYDROcodone 5 mg - acetaminophen, glucose, dextrose bolus **OR** dextrose bolus, glucagon (rDNA), dextrose      Intake/Output Summary (Last 24 hours) at 4/11/2024 1952  Last data filed at 4/11/2024 1909  Gross per 24 hour   Intake 2784.21 ml   Output 4075 ml   Net -1290.79 ml       Physical Exam Performed:    /60   Pulse 75   Temp 98.4 °F (36.9 °C) (Oral)   Resp 15   Ht 1.803 m (5' 11\")   Wt 86.5 kg (190 lb 11.2 oz)   SpO2 95%   BMI 26.60 kg/m²       General appearance: NAD  Lungs: Clear to auscultation, bilaterally without Rales/Wheezes/Rhonchi with good respiratory effort.  Heart: Regular rate and rhythm with Normal S1/S2 without murmurs  Abdomen: Soft,

## 2024-04-11 NOTE — PLAN OF CARE
Problem: Discharge Planning  Goal: Discharge to home or other facility with appropriate resources  4/11/2024 0018 by Isabelle Antonio RN  Outcome: Progressing  Flowsheets (Taken 4/10/2024 2049)  Discharge to home or other facility with appropriate resources:   Identify barriers to discharge with patient and caregiver   Arrange for needed discharge resources and transportation as appropriate   Identify discharge learning needs (meds, wound care, etc)       Problem: Pain  Goal: Verbalizes/displays adequate comfort level or baseline comfort level  4/11/2024 0018 by Isabelle Antonio RN  Outcome: Progressing  Flowsheets (Taken 4/11/2024 0008)  Verbalizes/displays adequate comfort level or baseline comfort level:   Encourage patient to monitor pain and request assistance   Assess pain using appropriate pain scale   Administer analgesics based on type and severity of pain and evaluate response   Implement non-pharmacological measures as appropriate and evaluate response     Problem: Safety - Adult  Goal: Free from fall injury  4/11/2024 0018 by Isabelle Antonio RN  Outcome: Progressing  Flowsheets (Taken 4/11/2024 0017)  Free From Fall Injury: Instruct family/caregiver on patient safety    Problem: ABCDS Injury Assessment  Goal: Absence of physical injury  4/11/2024 0018 by Isabelle Antonio RN  Outcome: Progressing  Flowsheets (Taken 4/11/2024 0018)  Absence of Physical Injury: Implement safety measures based on patient assessment            Problem: Cardiovascular - Adult  Goal: Maintains optimal cardiac output and hemodynamic stability  4/11/2024 0018 by Isabelle Antonio RN  Outcome: Progressing  Flowsheets (Taken 4/10/2024 2049)  Maintains optimal cardiac output and hemodynamic stability: Monitor blood pressure and heart rate    Goal: Absence of cardiac dysrhythmias or at baseline  4/11/2024 0018 by Isabelle Antonio RN  Outcome: Progressing  Flowsheets (Taken 4/10/2024 2049)  Absence of cardiac dysrhythmias or at  baseline: Monitor cardiac rate and rhythm       Problem: Skin/Tissue Integrity - Adult  Goal: Skin integrity remains intact  4/11/2024 0018 by Isabelle Antonio RN  Outcome: Progressing  Flowsheets  Taken 4/10/2024 2049 by Isabelle Antonio RN  Skin Integrity Remains Intact: Monitor for areas of redness and/or skin breakdown       Problem: Musculoskeletal - Adult  Goal: Return mobility to safest level of function  4/11/2024 0018 by Isabelle Antonio RN  Outcome: Progressing  Flowsheets (Taken 4/10/2024 2049)  Return Mobility to Safest Level of Function: Assess patient stability and activity tolerance for standing, transferring and ambulating with or without assistive devices    Goal: Maintain proper alignment of affected body part    Goal: Return ADL status to a safe level of function  4/11/2024 0018 by Isabelle Antonio RN  Outcome: Progressing  Flowsheets (Taken 4/11/2024 0018)  Return ADL Status to a Safe Level of Function: Assist and instruct patient to increase activity and self care as tolerated       Problem: Hematologic - Adult  Goal: Maintains hematologic stability  4/11/2024 0018 by Isabelle Antonio RN  Outcome: Progressing  Flowsheets (Taken 4/10/2024 2049)  Maintains hematologic stability: Assess for signs and symptoms of bleeding or hemorrhage

## 2024-04-11 NOTE — CARE COORDINATION
DISCHARGE PLANNING:    DC plan to return home with son.  Son will transport.  Denied any discharge needs.  Watching H&H.      #[phone]  Electronically signed by Tila Redding RN on 4/11/2024 at 2:11 PM

## 2024-04-11 NOTE — PLAN OF CARE
Problem: Discharge Planning  Goal: Discharge to home or other facility with appropriate resources  4/11/2024 0942 by Nancy Power RN  Outcome: Progressing     Problem: Pain  Goal: Verbalizes/displays adequate comfort level or baseline comfort level  4/11/2024 0942 by Nancy Power RN  Outcome: Progressing     Problem: Safety - Adult  Goal: Free from fall injury  4/11/2024 0942 by Nancy Power RN  Outcome: Progressing     Problem: ABCDS Injury Assessment  Goal: Absence of physical injury  4/11/2024 0942 by Nancy Power RN  Outcome: Progressing     Problem: Neurosensory - Adult  Goal: Achieves maximal functionality and self care  Outcome: Progressing     Problem: Cardiovascular - Adult  Goal: Maintains optimal cardiac output and hemodynamic stability  4/11/2024 0942 by Nancy Power RN  Outcome: Progressing  Flowsheets (Taken 4/11/2024 0728)  Maintains optimal cardiac output and hemodynamic stability: Monitor blood pressure and heart rate     Problem: Cardiovascular - Adult  Goal: Absence of cardiac dysrhythmias or at baseline  4/11/2024 0942 by Nancy Power RN  Outcome: Progressing     Problem: Skin/Tissue Integrity - Adult  Goal: Skin integrity remains intact  4/11/2024 0942 by Nancy Power RN  Outcome: Progressing     Problem: Musculoskeletal - Adult  Goal: Return mobility to safest level of function  4/11/2024 0942 by Nancy Power RN  Outcome: Progressing     Problem: Musculoskeletal - Adult  Goal: Maintain proper alignment of affected body part  Outcome: Progressing     Problem: Musculoskeletal - Adult  Goal: Return ADL status to a safe level of function  4/11/2024 0942 by Nancy Power RN  Outcome: Progressing     Problem: Hematologic - Adult  Goal: Maintains hematologic stability  4/11/2024 0942 by Nancy Power RN  Outcome: Progressing

## 2024-04-11 NOTE — PROGRESS NOTES
Hospitalist Progress Note      PCP: Fareed Marino MD    Date of Admission: 4/9/2024    Subjective: states pain fairly well controlled, ambulating well    Medications:  Reviewed    Infusion Medications    sodium chloride      sodium chloride 75 mL/hr at 04/10/24 2233    dextrose       Scheduled Medications    allopurinol  100 mg Oral Once per day on Mon Wed Fri    levothyroxine  137 mcg Oral Daily    [Held by provider] lisinopril  5 mg Oral Nightly    magnesium oxide  400 mg Oral Daily    metoprolol succinate  75 mg Oral BID    mirtazapine  30 mg Oral Nightly    niacin  1,000 mg Oral Nightly    rosuvastatin  40 mg Oral QPM    tamsulosin  0.4 mg Oral Nightly    ezetimibe  10 mg Oral Daily    sodium chloride flush  5-40 mL IntraVENous 2 times per day    insulin lispro  0-4 Units SubCUTAneous TID WC    insulin lispro  0-4 Units SubCUTAneous Nightly    insulin glargine  14 Units SubCUTAneous Nightly    [Held by provider] metFORMIN  1,000 mg Oral BID WC     PRN Meds: sodium chloride flush, sodium chloride, potassium chloride **OR** potassium alternative oral replacement **OR** potassium chloride, magnesium sulfate, ondansetron **OR** ondansetron, polyethylene glycol, acetaminophen **OR** acetaminophen, morphine, HYDROcodone 5 mg - acetaminophen, glucose, dextrose bolus **OR** dextrose bolus, glucagon (rDNA), dextrose      Intake/Output Summary (Last 24 hours) at 4/10/2024 2247  Last data filed at 4/10/2024 2233  Gross per 24 hour   Intake 1398.95 ml   Output 1350 ml   Net 48.95 ml       Physical Exam Performed:    /67   Pulse 73   Temp 98.4 °F (36.9 °C) (Oral)   Resp 15   Ht 1.803 m (5' 11\")   Wt 88.1 kg (194 lb 3.6 oz)   SpO2 98%   BMI 27.09 kg/m²     General appearance: NAD  Lungs: Clear to auscultation, bilaterally without Rales/Wheezes/Rhonchi with good respiratory effort.  Heart: Regular rate and rhythm with Normal S1/S2 without murmurs  Abdomen: Soft, non-tender or non-distended without rigidity

## 2024-04-12 VITALS
HEIGHT: 71 IN | RESPIRATION RATE: 18 BRPM | WEIGHT: 187.61 LBS | HEART RATE: 75 BPM | TEMPERATURE: 98.8 F | OXYGEN SATURATION: 95 % | DIASTOLIC BLOOD PRESSURE: 69 MMHG | BODY MASS INDEX: 26.27 KG/M2 | SYSTOLIC BLOOD PRESSURE: 117 MMHG

## 2024-04-12 LAB
ANION GAP SERPL CALCULATED.3IONS-SCNC: 10 MMOL/L (ref 3–16)
BASOPHILS # BLD: 0 K/UL (ref 0–0.2)
BASOPHILS NFR BLD: 0.3 %
BUN SERPL-MCNC: 23 MG/DL (ref 7–20)
CALCIUM SERPL-MCNC: 8.8 MG/DL (ref 8.3–10.6)
CHLORIDE SERPL-SCNC: 107 MMOL/L (ref 99–110)
CO2 SERPL-SCNC: 22 MMOL/L (ref 21–32)
CREAT SERPL-MCNC: 1.2 MG/DL (ref 0.8–1.3)
DEPRECATED RDW RBC AUTO: 14.8 % (ref 12.4–15.4)
EOSINOPHIL # BLD: 0 K/UL (ref 0–0.6)
EOSINOPHIL NFR BLD: 0.3 %
GFR SERPLBLD CREATININE-BSD FMLA CKD-EPI: 65 ML/MIN/{1.73_M2}
GLUCOSE BLD-MCNC: 196 MG/DL (ref 70–99)
GLUCOSE BLD-MCNC: 279 MG/DL (ref 70–99)
GLUCOSE SERPL-MCNC: 138 MG/DL (ref 70–99)
HCT VFR BLD AUTO: 22.3 % (ref 40.5–52.5)
HGB BLD-MCNC: 7.8 G/DL (ref 13.5–17.5)
INR PPP: 1.18 (ref 0.85–1.15)
LYMPHOCYTES # BLD: 0.9 K/UL (ref 1–5.1)
LYMPHOCYTES NFR BLD: 10.8 %
MCH RBC QN AUTO: 31.5 PG (ref 26–34)
MCHC RBC AUTO-ENTMCNC: 35.1 G/DL (ref 31–36)
MCV RBC AUTO: 89.8 FL (ref 80–100)
MONOCYTES # BLD: 0.7 K/UL (ref 0–1.3)
MONOCYTES NFR BLD: 8.4 %
NEUTROPHILS # BLD: 7 K/UL (ref 1.7–7.7)
NEUTROPHILS NFR BLD: 80.2 %
PERFORMED ON: ABNORMAL
PERFORMED ON: ABNORMAL
PLATELET # BLD AUTO: 141 K/UL (ref 135–450)
PMV BLD AUTO: 7.9 FL (ref 5–10.5)
POTASSIUM SERPL-SCNC: 4.5 MMOL/L (ref 3.5–5.1)
PROTHROMBIN TIME: 15.2 SEC (ref 11.9–14.9)
RBC # BLD AUTO: 2.48 M/UL (ref 4.2–5.9)
SODIUM SERPL-SCNC: 139 MMOL/L (ref 136–145)
WBC # BLD AUTO: 8.7 K/UL (ref 4–11)

## 2024-04-12 PROCEDURE — 85610 PROTHROMBIN TIME: CPT

## 2024-04-12 PROCEDURE — 6360000002 HC RX W HCPCS: Performed by: INTERNAL MEDICINE

## 2024-04-12 PROCEDURE — 97530 THERAPEUTIC ACTIVITIES: CPT

## 2024-04-12 PROCEDURE — 2580000003 HC RX 258: Performed by: INTERNAL MEDICINE

## 2024-04-12 PROCEDURE — 6370000000 HC RX 637 (ALT 250 FOR IP): Performed by: INTERNAL MEDICINE

## 2024-04-12 PROCEDURE — 97162 PT EVAL MOD COMPLEX 30 MIN: CPT

## 2024-04-12 PROCEDURE — 85025 COMPLETE CBC W/AUTO DIFF WBC: CPT

## 2024-04-12 PROCEDURE — 80048 BASIC METABOLIC PNL TOTAL CA: CPT

## 2024-04-12 PROCEDURE — 36415 COLL VENOUS BLD VENIPUNCTURE: CPT

## 2024-04-12 PROCEDURE — 94760 N-INVAS EAR/PLS OXIMETRY 1: CPT

## 2024-04-12 RX ORDER — TIZANIDINE 4 MG/1
4 TABLET ORAL EVERY 6 HOURS PRN
Qty: 40 TABLET | Refills: 0 | Status: SHIPPED | OUTPATIENT
Start: 2024-04-12 | End: 2024-04-22

## 2024-04-12 RX ORDER — CYCLOBENZAPRINE HCL 10 MG
10 TABLET ORAL 3 TIMES DAILY PRN
Qty: 30 TABLET | Refills: 0 | Status: SHIPPED | OUTPATIENT
Start: 2024-04-12 | End: 2024-04-12 | Stop reason: HOSPADM

## 2024-04-12 RX ORDER — HYDROCODONE BITARTRATE AND ACETAMINOPHEN 5; 325 MG/1; MG/1
1 TABLET ORAL EVERY 6 HOURS PRN
Qty: 12 TABLET | Refills: 0 | Status: ON HOLD | OUTPATIENT
Start: 2024-04-12 | End: 2024-04-18 | Stop reason: HOSPADM

## 2024-04-12 RX ORDER — ASPIRIN 81 MG/1
81 TABLET ORAL NIGHTLY
Qty: 90 TABLET | Refills: 3 | Status: ON HOLD | OUTPATIENT
Start: 2024-04-16 | End: 2024-04-18

## 2024-04-12 RX ORDER — CYCLOBENZAPRINE HCL 10 MG
10 TABLET ORAL 3 TIMES DAILY PRN
Status: DISCONTINUED | OUTPATIENT
Start: 2024-04-12 | End: 2024-04-12 | Stop reason: HOSPADM

## 2024-04-12 RX ADMIN — LEVOTHYROXINE SODIUM 137 MCG: 0.11 TABLET ORAL at 05:05

## 2024-04-12 RX ADMIN — ENOXAPARIN SODIUM 90 MG: 100 INJECTION SUBCUTANEOUS at 08:25

## 2024-04-12 RX ADMIN — METOPROLOL SUCCINATE 75 MG: 50 TABLET, EXTENDED RELEASE ORAL at 08:22

## 2024-04-12 RX ADMIN — SODIUM CHLORIDE, PRESERVATIVE FREE 10 ML: 5 INJECTION INTRAVENOUS at 08:29

## 2024-04-12 RX ADMIN — EZETIMIBE 10 MG: 10 TABLET ORAL at 08:22

## 2024-04-12 RX ADMIN — HYDROCODONE BITARTRATE AND ACETAMINOPHEN 1 TABLET: 5; 325 TABLET ORAL at 03:09

## 2024-04-12 RX ADMIN — HYDROCODONE BITARTRATE AND ACETAMINOPHEN 1 TABLET: 5; 325 TABLET ORAL at 10:25

## 2024-04-12 RX ADMIN — IRON SUCROSE 200 MG: 20 INJECTION, SOLUTION INTRAVENOUS at 10:58

## 2024-04-12 RX ADMIN — ALLOPURINOL 100 MG: 100 TABLET ORAL at 08:22

## 2024-04-12 RX ADMIN — Medication 400 MG: at 08:22

## 2024-04-12 ASSESSMENT — PAIN DESCRIPTION - ORIENTATION
ORIENTATION: LEFT
ORIENTATION: LEFT

## 2024-04-12 ASSESSMENT — PAIN SCALES - GENERAL
PAINLEVEL_OUTOF10: 0
PAINLEVEL_OUTOF10: 6
PAINLEVEL_OUTOF10: 6

## 2024-04-12 ASSESSMENT — PAIN DESCRIPTION - LOCATION
LOCATION: LEG
LOCATION: LEG;HIP

## 2024-04-12 ASSESSMENT — PAIN - FUNCTIONAL ASSESSMENT
PAIN_FUNCTIONAL_ASSESSMENT: PREVENTS OR INTERFERES SOME ACTIVE ACTIVITIES AND ADLS
PAIN_FUNCTIONAL_ASSESSMENT: PREVENTS OR INTERFERES SOME ACTIVE ACTIVITIES AND ADLS

## 2024-04-12 ASSESSMENT — PAIN SCALES - WONG BAKER: WONGBAKER_NUMERICALRESPONSE: NO HURT

## 2024-04-12 ASSESSMENT — PAIN DESCRIPTION - DESCRIPTORS: DESCRIPTORS: ACHING

## 2024-04-12 NOTE — PLAN OF CARE
Problem: Discharge Planning  Goal: Discharge to home or other facility with appropriate resources  Outcome: Progressing  Flowsheets (Taken 4/11/2024 2109)  Discharge to home or other facility with appropriate resources: Identify barriers to discharge with patient and caregiver     Problem: Pain  Goal: Verbalizes/displays adequate comfort level or baseline comfort level  Outcome: Progressing     Problem: Safety - Adult  Goal: Free from fall injury  Outcome: Progressing     Problem: ABCDS Injury Assessment  Goal: Absence of physical injury  Outcome: Progressing     Problem: Neurosensory - Adult  Goal: Achieves maximal functionality and self care  Outcome: Progressing     Problem: Cardiovascular - Adult  Goal: Maintains optimal cardiac output and hemodynamic stability  Outcome: Progressing  Flowsheets (Taken 4/11/2024 2109)  Maintains optimal cardiac output and hemodynamic stability: Monitor blood pressure and heart rate  Goal: Absence of cardiac dysrhythmias or at baseline  Outcome: Progressing  Flowsheets (Taken 4/11/2024 2109)  Absence of cardiac dysrhythmias or at baseline: Monitor cardiac rate and rhythm     Problem: Skin/Tissue Integrity - Adult  Goal: Skin integrity remains intact  Outcome: Progressing  Flowsheets (Taken 4/11/2024 2109)  Skin Integrity Remains Intact: Monitor for areas of redness and/or skin breakdown     Problem: Musculoskeletal - Adult  Goal: Return mobility to safest level of function  Outcome: Progressing  Flowsheets (Taken 4/11/2024 2109)  Return Mobility to Safest Level of Function: Assess patient stability and activity tolerance for standing, transferring and ambulating with or without assistive devices  Goal: Maintain proper alignment of affected body part  Outcome: Progressing  Goal: Return ADL status to a safe level of function  Outcome: Progressing  Flowsheets (Taken 4/11/2024 2109)  Return ADL Status to a Safe Level of Function: Assess activities of daily living deficits and  provide assistive devices as needed     Problem: Hematologic - Adult  Goal: Maintains hematologic stability  Outcome: Progressing  Flowsheets (Taken 4/11/2024 2109)  Maintains hematologic stability: Assess for signs and symptoms of bleeding or hemorrhage

## 2024-04-12 NOTE — CARE COORDINATION
Case Management Discharge Note          Date / Time of Note: 4/12/2024 12:42 PM                  Patient Name: Morro Kevin   YOB: 1950  Diagnosis: Retroperitoneal hemorrhage [R58]  Left leg pain [M79.605]  Iliopsoas muscle hematoma, left, initial encounter [S70.12XA]   Date / Time: 4/9/2024  6:03 AM    Financial:  Payor: MEDICARE / Plan: MEDICARE PART A AND B / Product Type: *No Product type* /      Pharmacy:    [facility] - [place] - [address] [facility] - P [phone] - F [phone]  [facility]  [place] [address]  Phone: [phone] Fax: [phone]    [facility]/pharmacy #[id number] - [place], [place] - [address] [phone] - F [phone]  [address] [facility] [address]  Phone: [phone] Fax: [phone]      Assistance purchasing medications?: Potential Assistance Purchasing Medications: No  Assistance provided by Case Management: None at this time    DISCHARGE Disposition: Home- No Services Needed    Transportation:  Transportation PLAN for discharge: family   Mode of Transport: Private Car  Time of Transport: around 2 pm    IMM Completed:   Yes, Case management has presented and reviewed IMM letter #2.       IMM Letter date given:: 04/12/24  IMM Letter time given:: 1235.   Patient and/or family/POA verbalized understanding of their medicare rights and appeal process if needed. Patient and/or family/POA has signed, initialed and placed the date and time on IMM letter #2 on the the appropriate lines. Copy of letter offered and they are aware that the original copy of IMM letter #2 is available prior to discharge from the paper chart on the unit.  Electronic documentation has been entered into epic for IMM letter #2 and original paper copy has been added to the paper chart at the nurses station.     Additional CM Notes:   DC order noted.  Spoke to patient at bedside.  Patient's son will be here around 2 pm to transport.

## 2024-04-12 NOTE — PROGRESS NOTES
Patient is being discharged. Pharmacy is bringing medications up. Medication regimen reviewed along with follow up appts. Pt awaiting ride from family to discharge to home via private car. Electronically signed by Molly Vaughn RN on 4/12/2024 at 2:17 PM

## 2024-04-12 NOTE — DISCHARGE SUMMARY
AM         Significant Diagnostic Studies    Radiology:   CT ABDOMEN PELVIS W IV CONTRAST Additional Contrast? None   Final Result   Markedly enlarged left iliopsoas muscle with a tiny area of high attenuation   within it and marked amount of surrounding fluid in the retroperitoneum.   This is new compared to the prior study and likely represents a   retroperitoneal hemorrhage although the underlying etiology is not   identified.  The high attenuation within the muscle may represent active   hemorrhage.         CT CHEST PULMONARY EMBOLISM W CONTRAST   Final Result   1. No CT evidence of a pulmonary embolism.   2. Mild dependent atelectasis within both lower lobes, without acute   intrapulmonary findings.   3. Mild amount of nonspecific layering high attenuation fluid within the left   retroperitoneal space, likely left retroperitoneal hemorrhage, of unknown   cause.  Suggest clinical correlation, and consider further characterization   with a dedicated CT abdomen/pelvis, preferably with IV contrast depending   upon the patient's renal function.         VL Extremity Venous Left   Final Result      XR CHEST PORTABLE   Final Result   No acute cardiopulmonary disease.                Consults:     IP CONSULT TO INTERVENTIONAL RADIOLOGY  IP CONSULT TO HOSPITALIST  IP CONSULT TO HOME CARE NEEDS    Disposition:  home    Condition at Discharge: stable    Discharge Instructions/Follow-up:      PCP follow up in 1-2 weeks  Cardiology follow up in 2-3 weeks      Code Status:  Full Code     Activity: activity as tolerated    Diet: low salt diet      Discharge Medications:     Current Discharge Medication List             Details   HYDROcodone-acetaminophen (NORCO) 5-325 MG per tablet Take 1 tablet by mouth every 6 hours as needed for Pain for up to 3 days. Max Daily Amount: 4 tablets  Qty: 12 tablet, Refills: 0    Comments: Reduce doses taken as pain becomes manageable  Associated Diagnoses: Iliopsoas muscle hematoma, left,  initial encounter; Left leg pain      tiZANidine (ZANAFLEX) 4 MG tablet Take 1 tablet by mouth every 6 hours as needed (muscle spasm)  Qty: 40 tablet, Refills: 0      enoxaparin (LOVENOX) 100 MG/ML Inject 0.9 mLs into the skin 2 times daily for 10 days  Qty: 20 mL, Refills: 0                Details   aspirin 81 MG EC tablet Take 1 tablet by mouth nightly  Qty: 90 tablet, Refills: 3                Details   warfarin (JANTOVEN) 7.5 MG tablet TAKE 1 TABLET DAILY OR AS  DIRECTED BY [facility]     COUMADIN SERVICE ([phone])  Qty: 90 tablet, Refills: 2      mirtazapine (REMERON) 30 MG tablet TAKE 1 TABLET BY MOUTH EVERY DAY AT NIGHT  Qty: 90 tablet, Refills: 1      SYNJARDY 12.5-1000 MG TABS Take 1 tablet by mouth in the morning and at bedtime      allopurinol (ZYLOPRIM) 100 MG tablet Take 1 tablet by mouth three times a week      tamsulosin (FLOMAX) 0.4 MG capsule Take 1 capsule by mouth at bedtime      magnesium oxide (MAG-OX) 400 MG tablet Take 1 tablet by mouth daily      vitamin D (CHOLECALCIFEROL) 125 MCG (5000 UT) CAPS capsule Take 1 capsule by mouth once a week      Folinic Acid-Vit B6-Vit B12 4-50-2 MG TABS Take 1 tablet by mouth daily      Menatetrenone (VITAMIN K2) 100 MCG TABS Take 100 mcg by mouth daily      lisinopril (PRINIVIL;ZESTRIL) 5 MG tablet Take 1 tablet by mouth nightly      levothyroxine (SYNTHROID) 137 MCG tablet Take 1 tablet by mouth Daily      Niacin ER 1000 MG TBCR Take 1,000 mg by mouth nightly      dulaglutide (TRULICITY) 1.5 MG/0.5ML SC injection Inject 0.5 mLs into the skin every 7 days Sundays      TOUJEO SOLOSTAR 300 UNIT/ML injection pen Inject 40 Units into the skin daily  Qty: 18 Pen, Refills: 3      rosuvastatin (CRESTOR) 40 MG tablet Take 1 tablet by mouth every evening  Qty: 90 tablet, Refills: 3      ZETIA 10 MG tablet TAKE 1 TABLET DAILY  Qty: 90 tablet, Refills: 2      metoprolol succinate (TOPROL XL) 25 MG extended release tablet Take 3 tablets by mouth 2 times daily 3 tabs po

## 2024-04-12 NOTE — PROGRESS NOTES
Physical Therapy  Facility/Department: [address] PROGRESSIVE CARE  Physical Therapy Initial Assessment    Name: Morro Kevin  : 1950  MRN: [id number]  Date of Service: 2024    Discharge Recommendations:  Home with assist PRN, Home with Home health PT, Continue to assess pending progress          Patient Diagnosis(es): The primary encounter diagnosis was Retroperitoneal hemorrhage. Diagnoses of Iliopsoas muscle hematoma, left, initial encounter and Left leg pain were also pertinent to this visit.  Past Medical History:  has a past medical history of Aortic stenosis, Arthritis, Asthma, Deviated septum, Diabetes mellitus type II, Hashimoto thyroiditis, Homocysteinemia, Hyperlipidemia, Hypertension, Hypertriglyceridemia, Hypogonadism male, Hypothyroid, Kidney stone, Mechanical heart valve present, Murmur, Nasal polyps, Osteoarthritis, Prolonged emergence from general anesthesia, Sleep apnea, Warfarin anticoagulation, and Wears glasses.  Past Surgical History:  has a past surgical history that includes colectomy (); Nasal polyp surgery (, ); knee surgery (Left, ); Rotator cuff repair (Right, ); Colonoscopy (2014; ); cyst removal; other surgical history (); Aortic valve replacement (2016); Upper gastrointestinal endoscopy (N/A, 2023); Colonoscopy (N/A, 2023); Capsule endoscopy (N/A, 2023); and Upper gastrointestinal endoscopy (N/A, 7/3/2023).    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 CT demonstrates left retroperitoneal hematoma with small area of active bleeding and markedly enlarged left iliopsoas muscle.  3-5 steps with a railing?: A Lot  AM-PAC Inpatient Mobility Raw Score : 18  AM-PAC Inpatient T-Scale Score : 43.63  Mobility Inpatient CMS 0-100% Score: 46.58  Mobility Inpatient CMS G-Code Modifier : CK         Goals  Short Term Goals  Time Frame for Short Term Goals: by acute discharge  Short Term Goal 1: bed mobility mod I  Short Term Goal 2: sit<>Stand mod I  Short Term Goal 3: ambulate > 100' mod I with RW  Patient Goals   Patient Goals : none stated       Education  Patient Education  Education Given To: Patient  Education Provided: Role of Therapy;Plan of Care  Education Method: Verbal  Barriers to Learning: None  Education Outcome: Verbalized understanding;Continued education needed      Therapy Time   Individual Concurrent Group Co-treatment   Time In 0950         Time Out 1030         Minutes 40         Timed Code Treatment Minutes: 25 Minutes       Chace Adams, PT

## 2024-04-12 NOTE — PLAN OF CARE
Problem: Discharge Planning  Goal: Discharge to home or other facility with appropriate resources  4/12/2024 1119 by Molly Vaughn RN  Outcome: Progressing  4/12/2024 0016 by Tiffany Obrien RN  Outcome: Progressing  Flowsheets (Taken 4/11/2024 2109)  Discharge to home or other facility with appropriate resources: Identify barriers to discharge with patient and caregiver     Problem: Pain  Goal: Verbalizes/displays adequate comfort level or baseline comfort level  4/12/2024 1119 by Molly Vaughn RN  Outcome: Progressing  4/12/2024 0016 by Tiffany Obrien RN  Outcome: Progressing     Problem: Safety - Adult  Goal: Free from fall injury  4/12/2024 1119 by Molly Vaughn RN  Outcome: Progressing  4/12/2024 0016 by Tiffany Obrien RN  Outcome: Progressing     Problem: ABCDS Injury Assessment  Goal: Absence of physical injury  4/12/2024 1119 by Molly Vaughn RN  Outcome: Progressing  4/12/2024 0016 by Tiffany Obrien RN  Outcome: Progressing     Problem: Neurosensory - Adult  Goal: Achieves maximal functionality and self care  4/12/2024 1119 by Molly Vaughn RN  Outcome: Progressing  4/12/2024 0016 by Tiffany Obrien RN  Outcome: Progressing     Problem: Cardiovascular - Adult  Goal: Maintains optimal cardiac output and hemodynamic stability  4/12/2024 1119 by Molly Vaughn RN  Outcome: Progressing  4/12/2024 0016 by Tiffany Obrien RN  Outcome: Progressing  Flowsheets (Taken 4/11/2024 2109)  Maintains optimal cardiac output and hemodynamic stability: Monitor blood pressure and heart rate  Goal: Absence of cardiac dysrhythmias or at baseline  4/12/2024 1119 by Molly Vaughn RN  Outcome: Progressing  4/12/2024 0016 by Tiffany Obrien RN  Outcome: Progressing  Flowsheets (Taken 4/11/2024 2109)  Absence of cardiac dysrhythmias or at baseline: Monitor cardiac rate and rhythm     Problem: Skin/Tissue Integrity - Adult  Goal: Skin integrity remains intact  4/12/2024 1119 by Roderick

## 2024-04-12 NOTE — DISCHARGE INSTR - COC
Continuity of Care Form    Patient Name: Morro Kevin   :  1950  MRN:  [id number]    Admit date:  2024  Discharge date:  24    Code Status Order: Full Code   Advance Directives:     Admitting Physician:  Caitlin Becerra MD  PCP: Fareed Marino MD    Discharging Nurse: [facility] Unit/Room[facility] O9E-6705/5127-01  Discharging Unit Phone Number: [phone]    Emergency Contact:   Extended Emergency Contact Information  Primary Emergency Contact: Joe Kevin  Home Phone: [phone]  Mobile Phone: [phone]  Relation: Child    Past Surgical History:  Past Surgical History:   Procedure Laterality Date    AORTIC VALVE REPLACEMENT  2016    21mm [facility] [place]    CAPSULE ENDOSCOPY N/A 2023    BOWEL SMALL CAPSULE ENDOSCOPY performed by Nohelia Phan MD at [facility] ENDOSCOPY    COLECTOMY  1996    perforated bowel: colonoscopy    COLONOSCOPY  2014; 2004    x2 normal colonoscopies/ perforated bowel    COLONOSCOPY N/A 2023    COLONOSCOPY performed by Nohelia Phan MD at [facility] ENDOSCOPY    CYST REMOVAL      hairy nevus left biceps as child    KNEE SURGERY Left     torn meniscus     NASAL POLYP SURGERY  ,     OTHER SURGICAL HISTORY      kidney stone removal     ROTATOR CUFF REPAIR Right     UPPER GASTROINTESTINAL ENDOSCOPY N/A 2023    ESOPHAGOGASTRODUODENOSCOPY performed by Nohelia Phan MD at [facility] ENDOSCOPY    UPPER GASTROINTESTINAL ENDOSCOPY N/A 7/3/2023    ENTEROSCOPY PUSH CONTROL HEMORRHAGE performed by Nahid Wills MD at [facility] ENDOSCOPY       Immunization History:   Immunization History   Administered Date(s) Administered    COVID-19, MODERNA BLUE border, Primary or Immunocompromised, (age 12y+), IM, 100 mcg/0.5mL 2021, 2021    COVID-19, PFIZER Bivalent, DO NOT Dilute, (age 12y+), IM, 30 mcg/0.3 mL 2022    COVID-19, PFIZER GRAY top, DO NOT Dilute, (age 12 y+), IM, 30 mcg/0.3 mL 2022    COVID-19, PFIZER PURPLE top,

## 2024-04-12 NOTE — PROGRESS NOTES
CLINICAL PHARMACY NOTE: MEDS TO BEDS    Total # of Prescriptions Filled: 0    Current Discharge Medication List        START taking these medications    Details   enoxaparin (LOVENOX) 100 MG/ML Inject 0.9 mLs into the skin 2 times daily for 10 days  Qty: 20 mL, Refills: 0               Additional Documentation: Out of stock. Transferring to [facility] on [address] per pt request

## 2024-04-15 ENCOUNTER — APPOINTMENT (OUTPATIENT)
Dept: CT IMAGING | Age: 74
DRG: 372 | End: 2024-04-15
Payer: MEDICARE

## 2024-04-15 ENCOUNTER — APPOINTMENT (OUTPATIENT)
Dept: GENERAL RADIOLOGY | Age: 74
DRG: 372 | End: 2024-04-15
Payer: MEDICARE

## 2024-04-15 ENCOUNTER — HOSPITAL ENCOUNTER (INPATIENT)
Age: 74
LOS: 3 days | Discharge: HOME HEALTH | DRG: 372 | End: 2024-04-18
Attending: EMERGENCY MEDICINE | Admitting: HOSPITALIST
Payer: MEDICARE

## 2024-04-15 DIAGNOSIS — K68.3 RETROPERITONEAL HEMATOMA: ICD-10-CM

## 2024-04-15 DIAGNOSIS — N18.9 ACUTE RENAL FAILURE SUPERIMPOSED ON CHRONIC KIDNEY DISEASE, UNSPECIFIED ACUTE RENAL FAILURE TYPE, UNSPECIFIED CKD STAGE (HCC): ICD-10-CM

## 2024-04-15 DIAGNOSIS — N17.9 ACUTE RENAL FAILURE SUPERIMPOSED ON CHRONIC KIDNEY DISEASE, UNSPECIFIED ACUTE RENAL FAILURE TYPE, UNSPECIFIED CKD STAGE (HCC): ICD-10-CM

## 2024-04-15 DIAGNOSIS — R42 DIZZINESS: ICD-10-CM

## 2024-04-15 DIAGNOSIS — D64.9 ANEMIA, UNSPECIFIED TYPE: Primary | ICD-10-CM

## 2024-04-15 LAB
ABO + RH BLD: NORMAL
ALBUMIN SERPL-MCNC: 3.5 G/DL (ref 3.4–5)
ALBUMIN/GLOB SERPL: 1.5 {RATIO} (ref 1.1–2.2)
ALP SERPL-CCNC: 54 U/L (ref 40–129)
ALT SERPL-CCNC: 91 U/L (ref 10–40)
ANION GAP SERPL CALCULATED.3IONS-SCNC: 20 MMOL/L (ref 3–16)
ANISOCYTOSIS BLD QL SMEAR: ABNORMAL
ANISOCYTOSIS BLD QL SMEAR: ABNORMAL
AST SERPL-CCNC: 89 U/L (ref 15–37)
BASOPHILS # BLD: 0 K/UL (ref 0–0.2)
BASOPHILS # BLD: 0 K/UL (ref 0–0.2)
BASOPHILS NFR BLD: 0 %
BASOPHILS NFR BLD: 0 %
BILIRUB SERPL-MCNC: 1.4 MG/DL (ref 0–1)
BLD GP AB SCN SERPL QL: NORMAL
BLOOD GROUP ANTIBODIES SERPL: NORMAL
BUN SERPL-MCNC: 47 MG/DL (ref 7–20)
CALCIUM SERPL-MCNC: 8.7 MG/DL (ref 8.3–10.6)
CHLORIDE SERPL-SCNC: 92 MMOL/L (ref 99–110)
CHP ED QC CHECK: YES
CO2 SERPL-SCNC: 17 MMOL/L (ref 21–32)
CREAT SERPL-MCNC: 2.3 MG/DL (ref 0.8–1.3)
DAT IGG CAPTURE: NORMAL
DEPRECATED RDW RBC AUTO: 15.3 % (ref 12.4–15.4)
DEPRECATED RDW RBC AUTO: 16.7 % (ref 12.4–15.4)
EOSINOPHIL # BLD: 0 K/UL (ref 0–0.6)
EOSINOPHIL # BLD: 0 K/UL (ref 0–0.6)
EOSINOPHIL NFR BLD: 0 %
EOSINOPHIL NFR BLD: 0 %
GFR SERPLBLD CREATININE-BSD FMLA CKD-EPI: 30 ML/MIN/{1.73_M2}
GLUCOSE BLD-MCNC: 279 MG/DL (ref 70–99)
GLUCOSE BLD-MCNC: 361 MG/DL
GLUCOSE BLD-MCNC: 361 MG/DL (ref 70–99)
GLUCOSE SERPL-MCNC: 332 MG/DL (ref 70–99)
HCT VFR BLD AUTO: 21.7 % (ref 40.5–52.5)
HCT VFR BLD AUTO: 22 % (ref 40.5–52.5)
HGB BLD-MCNC: 6.9 G/DL (ref 13.5–17.5)
HGB BLD-MCNC: 7.3 G/DL (ref 13.5–17.5)
HYPOCHROMIA BLD QL SMEAR: ABNORMAL
INR PPP: 1.51 (ref 0.85–1.15)
LACTATE BLDV-SCNC: 1.3 MMOL/L (ref 0.4–2)
LYMPHOCYTES # BLD: 1.3 K/UL (ref 1–5.1)
LYMPHOCYTES # BLD: 2 K/UL (ref 1–5.1)
LYMPHOCYTES NFR BLD: 8 %
LYMPHOCYTES NFR BLD: 9 %
MACROCYTES BLD QL SMEAR: ABNORMAL
MCH RBC QN AUTO: 30.8 PG (ref 26–34)
MCH RBC QN AUTO: 31.8 PG (ref 26–34)
MCHC RBC AUTO-ENTMCNC: 31.8 G/DL (ref 31–36)
MCHC RBC AUTO-ENTMCNC: 33.3 G/DL (ref 31–36)
MCV RBC AUTO: 92.5 FL (ref 80–100)
MCV RBC AUTO: 99.9 FL (ref 80–100)
METAMYELOCYTES NFR BLD MANUAL: 1 %
METAMYELOCYTES NFR BLD MANUAL: 2 %
MONOCYTES # BLD: 0.5 K/UL (ref 0–1.3)
MONOCYTES # BLD: 0.6 K/UL (ref 0–1.3)
MONOCYTES NFR BLD: 2 %
MONOCYTES NFR BLD: 4 %
MYELOCYTES NFR BLD MANUAL: 1 %
NEUTROPHILS # BLD: 12.2 K/UL (ref 1.7–7.7)
NEUTROPHILS # BLD: 22.2 K/UL (ref 1.7–7.7)
NEUTROPHILS NFR BLD: 68 %
NEUTROPHILS NFR BLD: 83 %
NEUTS BAND NFR BLD MANUAL: 17 % (ref 0–7)
NEUTS BAND NFR BLD MANUAL: 5 % (ref 0–7)
NRBC BLD-RTO: 2 /100 WBC
NRBC BLD-RTO: 3 /100 WBC
PERFORMED ON: ABNORMAL
PERFORMED ON: ABNORMAL
PLATELET # BLD AUTO: 218 K/UL (ref 135–450)
PLATELET # BLD AUTO: 290 K/UL (ref 135–450)
PLATELET BLD QL SMEAR: ADEQUATE
PLATELET BLD QL SMEAR: ADEQUATE
PMV BLD AUTO: 7.8 FL (ref 5–10.5)
PMV BLD AUTO: 8 FL (ref 5–10.5)
POLYCHROMASIA BLD QL SMEAR: ABNORMAL
POLYCHROMASIA BLD QL SMEAR: ABNORMAL
POTASSIUM SERPL-SCNC: 4.4 MMOL/L (ref 3.5–5.1)
PROT SERPL-MCNC: 5.8 G/DL (ref 6.4–8.2)
PROTHROMBIN TIME: 18.3 SEC (ref 11.9–14.9)
RBC # BLD AUTO: 2.17 M/UL (ref 4.2–5.9)
RBC # BLD AUTO: 2.38 M/UL (ref 4.2–5.9)
S (BIG) ANTIGEN: NORMAL
SLIDE REVIEW: ABNORMAL
SLIDE REVIEW: ABNORMAL
SODIUM SERPL-SCNC: 129 MMOL/L (ref 136–145)
TOXIC GRANULES BLD QL SMEAR: PRESENT
TROPONIN, HIGH SENSITIVITY: 47 NG/L (ref 0–22)
WBC # BLD AUTO: 14 K/UL (ref 4–11)
WBC # BLD AUTO: 24.7 K/UL (ref 4–11)

## 2024-04-15 PROCEDURE — 96360 HYDRATION IV INFUSION INIT: CPT

## 2024-04-15 PROCEDURE — 85025 COMPLETE CBC W/AUTO DIFF WBC: CPT

## 2024-04-15 PROCEDURE — 80053 COMPREHEN METABOLIC PANEL: CPT

## 2024-04-15 PROCEDURE — 86850 RBC ANTIBODY SCREEN: CPT

## 2024-04-15 PROCEDURE — 86905 BLOOD TYPING RBC ANTIGENS: CPT

## 2024-04-15 PROCEDURE — 86901 BLOOD TYPING SEROLOGIC RH(D): CPT

## 2024-04-15 PROCEDURE — P9016 RBC LEUKOCYTES REDUCED: HCPCS

## 2024-04-15 PROCEDURE — 86922 COMPATIBILITY TEST ANTIGLOB: CPT

## 2024-04-15 PROCEDURE — 83605 ASSAY OF LACTIC ACID: CPT

## 2024-04-15 PROCEDURE — 86900 BLOOD TYPING SEROLOGIC ABO: CPT

## 2024-04-15 PROCEDURE — 86870 RBC ANTIBODY IDENTIFICATION: CPT

## 2024-04-15 PROCEDURE — 74177 CT ABD & PELVIS W/CONTRAST: CPT

## 2024-04-15 PROCEDURE — 2060000000 HC ICU INTERMEDIATE R&B

## 2024-04-15 PROCEDURE — 71045 X-RAY EXAM CHEST 1 VIEW: CPT

## 2024-04-15 PROCEDURE — 6370000000 HC RX 637 (ALT 250 FOR IP): Performed by: HOSPITALIST

## 2024-04-15 PROCEDURE — 36415 COLL VENOUS BLD VENIPUNCTURE: CPT

## 2024-04-15 PROCEDURE — 84484 ASSAY OF TROPONIN QUANT: CPT

## 2024-04-15 PROCEDURE — 2580000003 HC RX 258: Performed by: EMERGENCY MEDICINE

## 2024-04-15 PROCEDURE — 93005 ELECTROCARDIOGRAM TRACING: CPT | Performed by: EMERGENCY MEDICINE

## 2024-04-15 PROCEDURE — 6360000004 HC RX CONTRAST MEDICATION: Performed by: EMERGENCY MEDICINE

## 2024-04-15 PROCEDURE — 70496 CT ANGIOGRAPHY HEAD: CPT

## 2024-04-15 PROCEDURE — 86902 BLOOD TYPE ANTIGEN DONOR EA: CPT

## 2024-04-15 PROCEDURE — 2580000003 HC RX 258: Performed by: HOSPITALIST

## 2024-04-15 PROCEDURE — 85610 PROTHROMBIN TIME: CPT

## 2024-04-15 PROCEDURE — 99285 EMERGENCY DEPT VISIT HI MDM: CPT

## 2024-04-15 PROCEDURE — 70450 CT HEAD/BRAIN W/O DYE: CPT

## 2024-04-15 PROCEDURE — 86880 COOMBS TEST DIRECT: CPT

## 2024-04-15 RX ORDER — POLYETHYLENE GLYCOL 3350 17 G/17G
17 POWDER, FOR SOLUTION ORAL DAILY PRN
Status: DISCONTINUED | OUTPATIENT
Start: 2024-04-15 | End: 2024-04-18 | Stop reason: HOSPADM

## 2024-04-15 RX ORDER — ONDANSETRON 4 MG/1
4 TABLET, ORALLY DISINTEGRATING ORAL EVERY 8 HOURS PRN
Status: DISCONTINUED | OUTPATIENT
Start: 2024-04-15 | End: 2024-04-18 | Stop reason: HOSPADM

## 2024-04-15 RX ORDER — ALLOPURINOL 100 MG/1
100 TABLET ORAL
Status: DISCONTINUED | OUTPATIENT
Start: 2024-04-15 | End: 2024-04-18 | Stop reason: HOSPADM

## 2024-04-15 RX ORDER — MAGNESIUM SULFATE IN WATER 40 MG/ML
2000 INJECTION, SOLUTION INTRAVENOUS PRN
Status: DISCONTINUED | OUTPATIENT
Start: 2024-04-15 | End: 2024-04-18 | Stop reason: HOSPADM

## 2024-04-15 RX ORDER — POTASSIUM CHLORIDE 7.45 MG/ML
10 INJECTION INTRAVENOUS PRN
Status: DISCONTINUED | OUTPATIENT
Start: 2024-04-15 | End: 2024-04-18 | Stop reason: HOSPADM

## 2024-04-15 RX ORDER — TIZANIDINE 4 MG/1
4 TABLET ORAL EVERY 6 HOURS PRN
Status: DISCONTINUED | OUTPATIENT
Start: 2024-04-15 | End: 2024-04-18 | Stop reason: HOSPADM

## 2024-04-15 RX ORDER — ACETAMINOPHEN 650 MG/1
650 SUPPOSITORY RECTAL EVERY 6 HOURS PRN
Status: DISCONTINUED | OUTPATIENT
Start: 2024-04-15 | End: 2024-04-18 | Stop reason: HOSPADM

## 2024-04-15 RX ORDER — TAMSULOSIN HYDROCHLORIDE 0.4 MG/1
0.4 CAPSULE ORAL NIGHTLY
Status: DISCONTINUED | OUTPATIENT
Start: 2024-04-15 | End: 2024-04-18 | Stop reason: HOSPADM

## 2024-04-15 RX ORDER — SODIUM CHLORIDE 9 MG/ML
INJECTION, SOLUTION INTRAVENOUS PRN
Status: DISCONTINUED | OUTPATIENT
Start: 2024-04-15 | End: 2024-04-18 | Stop reason: HOSPADM

## 2024-04-15 RX ORDER — SODIUM CHLORIDE 9 MG/ML
INJECTION, SOLUTION INTRAVENOUS CONTINUOUS
Status: DISCONTINUED | OUTPATIENT
Start: 2024-04-15 | End: 2024-04-16

## 2024-04-15 RX ORDER — INSULIN LISPRO 100 [IU]/ML
0-8 INJECTION, SOLUTION INTRAVENOUS; SUBCUTANEOUS
Status: DISCONTINUED | OUTPATIENT
Start: 2024-04-16 | End: 2024-04-18 | Stop reason: HOSPADM

## 2024-04-15 RX ORDER — DEXTROSE MONOHYDRATE 100 MG/ML
INJECTION, SOLUTION INTRAVENOUS CONTINUOUS PRN
Status: DISCONTINUED | OUTPATIENT
Start: 2024-04-15 | End: 2024-04-18 | Stop reason: HOSPADM

## 2024-04-15 RX ORDER — INSULIN LISPRO 100 [IU]/ML
0-4 INJECTION, SOLUTION INTRAVENOUS; SUBCUTANEOUS NIGHTLY
Status: DISCONTINUED | OUTPATIENT
Start: 2024-04-15 | End: 2024-04-18 | Stop reason: HOSPADM

## 2024-04-15 RX ORDER — 0.9 % SODIUM CHLORIDE 0.9 %
500 INTRAVENOUS SOLUTION INTRAVENOUS ONCE
Status: COMPLETED | OUTPATIENT
Start: 2024-04-15 | End: 2024-04-15

## 2024-04-15 RX ORDER — ONDANSETRON 2 MG/ML
4 INJECTION INTRAMUSCULAR; INTRAVENOUS EVERY 6 HOURS PRN
Status: DISCONTINUED | OUTPATIENT
Start: 2024-04-15 | End: 2024-04-18 | Stop reason: HOSPADM

## 2024-04-15 RX ORDER — GLUCAGON 1 MG/ML
1 KIT INJECTION PRN
Status: DISCONTINUED | OUTPATIENT
Start: 2024-04-15 | End: 2024-04-18 | Stop reason: HOSPADM

## 2024-04-15 RX ORDER — SODIUM CHLORIDE 0.9 % (FLUSH) 0.9 %
5-40 SYRINGE (ML) INJECTION PRN
Status: DISCONTINUED | OUTPATIENT
Start: 2024-04-15 | End: 2024-04-18 | Stop reason: HOSPADM

## 2024-04-15 RX ORDER — SODIUM CHLORIDE 0.9 % (FLUSH) 0.9 %
5-40 SYRINGE (ML) INJECTION EVERY 12 HOURS SCHEDULED
Status: DISCONTINUED | OUTPATIENT
Start: 2024-04-15 | End: 2024-04-18 | Stop reason: HOSPADM

## 2024-04-15 RX ORDER — POTASSIUM CHLORIDE 20 MEQ/1
40 TABLET, EXTENDED RELEASE ORAL PRN
Status: DISCONTINUED | OUTPATIENT
Start: 2024-04-15 | End: 2024-04-18 | Stop reason: HOSPADM

## 2024-04-15 RX ORDER — HYDROCODONE BITARTRATE AND ACETAMINOPHEN 5; 325 MG/1; MG/1
1 TABLET ORAL EVERY 6 HOURS PRN
Status: DISCONTINUED | OUTPATIENT
Start: 2024-04-15 | End: 2024-04-18 | Stop reason: HOSPADM

## 2024-04-15 RX ORDER — SODIUM CHLORIDE 9 MG/ML
INJECTION, SOLUTION INTRAVENOUS CONTINUOUS
Status: DISCONTINUED | OUTPATIENT
Start: 2024-04-15 | End: 2024-04-15 | Stop reason: DRUGHIGH

## 2024-04-15 RX ORDER — IPRATROPIUM BROMIDE AND ALBUTEROL SULFATE 2.5; .5 MG/3ML; MG/3ML
1 SOLUTION RESPIRATORY (INHALATION) EVERY 4 HOURS PRN
Status: DISCONTINUED | OUTPATIENT
Start: 2024-04-15 | End: 2024-04-18 | Stop reason: HOSPADM

## 2024-04-15 RX ORDER — ACETAMINOPHEN 325 MG/1
650 TABLET ORAL EVERY 6 HOURS PRN
Status: DISCONTINUED | OUTPATIENT
Start: 2024-04-15 | End: 2024-04-18 | Stop reason: HOSPADM

## 2024-04-15 RX ADMIN — ALLOPURINOL 100 MG: 100 TABLET ORAL at 20:21

## 2024-04-15 RX ADMIN — TIZANIDINE 4 MG: 4 TABLET ORAL at 20:22

## 2024-04-15 RX ADMIN — IOPAMIDOL 75 ML: 755 INJECTION, SOLUTION INTRAVENOUS at 12:04

## 2024-04-15 RX ADMIN — ACETAMINOPHEN 325MG 650 MG: 325 TABLET ORAL at 20:22

## 2024-04-15 RX ADMIN — SODIUM CHLORIDE: 9 INJECTION, SOLUTION INTRAVENOUS at 17:02

## 2024-04-15 RX ADMIN — SODIUM CHLORIDE 500 ML: 9 INJECTION, SOLUTION INTRAVENOUS at 15:54

## 2024-04-15 RX ADMIN — SODIUM CHLORIDE: 9 INJECTION, SOLUTION INTRAVENOUS at 20:19

## 2024-04-15 RX ADMIN — SODIUM CHLORIDE, PRESERVATIVE FREE 10 ML: 5 INJECTION INTRAVENOUS at 20:23

## 2024-04-15 RX ADMIN — TAMSULOSIN HYDROCHLORIDE 0.4 MG: 0.4 CAPSULE ORAL at 20:22

## 2024-04-15 ASSESSMENT — LIFESTYLE VARIABLES
HOW MANY STANDARD DRINKS CONTAINING ALCOHOL DO YOU HAVE ON A TYPICAL DAY: PATIENT DOES NOT DRINK
HOW OFTEN DO YOU HAVE A DRINK CONTAINING ALCOHOL: NEVER

## 2024-04-15 ASSESSMENT — PAIN SCALES - GENERAL
PAINLEVEL_OUTOF10: 0
PAINLEVEL_OUTOF10: 4

## 2024-04-15 ASSESSMENT — PAIN SCALES - WONG BAKER
WONGBAKER_NUMERICALRESPONSE: HURTS LITTLE MORE
WONGBAKER_NUMERICALRESPONSE: HURTS LITTLE MORE

## 2024-04-15 NOTE — PROGRESS NOTES
EDSBAR report has been reviewed. RN recv'd report from Toribio LUCIA.     All questions answered at this time.       Electronically signed by Kia Astorga RN on 4/15/2024 at 6:18 PM

## 2024-04-15 NOTE — ED PROVIDER NOTES
[facility] EMERGENCY DEPARTMENT  eMERGENCY dEPARTMENT eNCOUnter      Pt Name: Morro Kevin  MRN: [id number]  Birthdate 1950  Date of evaluation: 4/15/2024  Provider: NICHOLAS CHI MD    CHIEF COMPLAINT       Chief Complaint   Patient presents with    Dizziness     Pt reports waking up at 0400 with confusion, dizziness, diarrhea, nausea, SOB. Pt went to bed at 2200 last night with none of these symptoms. Pt denies headache, blurred vision, difficulty urinating. Pt a & o x 4. NIH 0         CRITICAL CARE TIME   Total Critical Care time was a minimum of 35 minutes, excluding separately reportable procedures.  There was a high probability of clinically significant/life threatening deterioration in the patient's condition which required my urgent intervention.  Critical care time includes my initial evaluation, ongoing bedside care and reassessment, review of old records, review of laboratory, EKG, chest x-ray, CT scan, consultation with the stroke team physician, consultation with hospitalist for admission.  No procedure time was included.      HISTORY OF PRESENT ILLNESS  (Location/Symptom, Timing/Onset, Context/Setting, Quality, Duration, Modifying Factors, Severity.)   History From: Patient / Sons  Limitations to history : None    Morro Kevin is a 73 y.o. male who presents to the emergency department with dizziness that he noticed at 4 AM when he woke up this morning.  He went to bed at 10 PM last night and states that he felt okay at that time.  Of note this patient was recently admitted and discharged.  He was hospitalized and discharged on 4/12/2024.  He is anticoagulated, on Coumadin.  He developed some left leg discomfort and flank discomfort.  He had a left retroperitoneal/iliopsoas bleed.  When he was discharged on the 12th he was being bridged on his Coumadin and Lovenox.  His sons actually tell me that since discharge she has been intermittently confused and had some  discharged from the hospital.  Except as noted above the remainder of the review of systems was reviewed and negative.       PAST MEDICAL HISTORY     Past Medical History:   Diagnosis Date    Aortic stenosis 9/2013    moderate    Arthritis     Asthma     BRONCHIAL ASTHMA  AS A CHILD    Deviated septum     Diabetes mellitus type II 2000    Dr. Goodson     Hashimoto thyroiditis dx 1984    medical treatment    Homocysteinemia     Hyperlipidemia     Hypertension     Hypertriglyceridemia     Hypogonadism male     topical testosterone     Hypothyroid 12/9/2011    Kidney stone 1990s    Passed on own and also surgical excision     Mechanical heart valve present 2016    Murmur since 2005    Nasal polyps     Osteoarthritis     Right thumb     Prolonged emergence from general anesthesia     Sleep apnea 2005    CPAP nightly    Warfarin anticoagulation 2016    Wears glasses          SURGICAL HISTORY       Past Surgical History:   Procedure Laterality Date    AORTIC VALVE REPLACEMENT  09/14/2016    21mm [facility] [place]    CAPSULE ENDOSCOPY N/A 6/30/2023    BOWEL SMALL CAPSULE ENDOSCOPY performed by Nohelia Phan MD at [facility] ENDOSCOPY    COLECTOMY  1996    perforated bowel: colonoscopy    COLONOSCOPY  9/24/2014; 2004    x2 normal colonoscopies/ perforated bowel    COLONOSCOPY N/A 6/30/2023    COLONOSCOPY performed by Nohelia Phan MD at [facility] ENDOSCOPY    CYST REMOVAL      hairy nevus left biceps as child    KNEE SURGERY Left 2004    torn meniscus     NASAL POLYP SURGERY  1989, 1987    OTHER SURGICAL HISTORY  1990s    kidney stone removal     ROTATOR CUFF REPAIR Right 2009    UPPER GASTROINTESTINAL ENDOSCOPY N/A 6/30/2023    ESOPHAGOGASTRODUODENOSCOPY performed by Nohelia Phan MD at [facility] ENDOSCOPY    UPPER GASTROINTESTINAL ENDOSCOPY N/A 7/3/2023    ENTEROSCOPY PUSH CONTROL HEMORRHAGE performed by Nahid Wills MD at [facility] ENDOSCOPY         CURRENT MEDICATIONS       Previous Medications    ACETAMINOPHEN (TYLENOL) 325 MG

## 2024-04-15 NOTE — PROGRESS NOTES
Medication Reconciliation    List of medications patient is currently taking is complete.     Source of information: 1. Conversation with patient at bedside                                      2. EPIC records     Notes regarding home medications:   1. Patient received all of his morning home medications prior to arrival to the ER today.    2. Patient is anticoagulated on Warfarin:  Patient was recently hospitalized with retroperitoneal hemorrhage and Warfarin was discontinued.  Upon discharge, Warfarin was resumed at 7.5 mg daily with Lovenox to Warfarin bridge.  Patient remains on Lovenox 90 mg SQ Q12H - last dose 04/14/24 at 2200.    [Warfarin Indication: Hx AVR, Target INR: 2-3, Historic dose: 3.75 mg on MON/THUR and 7.5 mg ALL OTHER DAYS] - Patient did not take Warfarin today.      3. Patient takes Ergocalciferol 50,000 IU po Q7Days on FRIDAYS  4. Patient takes Trulicity on SUNDAYS  5. Patient takes Allopurinol 100 mg po three times weekly on MON/WED/FRI.    Izaiah Guzman, [facility], PharmD, BCPS  4/15/2024 4:13 PM

## 2024-04-15 NOTE — H&P
V2.0  History and Physical      Name:  Morro Kevin /Age/Sex: 1950  (73 y.o. male)   MRN & CSN:  [id number] & [id number] Encounter Date/Time: 4/15/2024 4:46 PM EDT   Location:  [address] [place] [address] PCP: Fareed Marino MD       Hospital Day: 1    Assessment and Plan:   Morro Kevin is a 73 y.o. male with a pmh of  who presents with Dizziness    Hospital Problems             Last Modified POA    * (Principal) Dizziness 4/15/2024 Yes         Dizziness  Generalized weakness  Anemia  Recent h/o left retroperitoneal hematoma  H/o AVR , on anticoagulation   SUKUMAR  hypotension    Plan    Admit inpatient status  Telemetry monitering    Anemia : 1 U PRBC ordered in ED  , moniter H/H , hold Lovenox, hold antiplatelet agent for now    Left iliopsoas hematoma : will consult general surgery     Severe Aortic Stenosis Bicuspid Aortic valve with no evidence of aortic aneurysm  S/p S/p AVR with 21mm [facility] per Dr. DESTINEE Piña 16    Will consult cardiology    SUKUMAR :  IVF  Moniter BMP  Avoid NSAIDs  Avoid contrast  Avoid ACEI or ARB  Keep MAP > 65 mmhg  Nephrology consulted        Chronic conditions :    HTN : hold BP meds    S/p AVR : hold coumadin /lovenox     CKD 3 b : chronic     PT and OT evaluation    DVT ppx  : SCD    Diet : cardiac diet    Code status   Full code      Disposition:   Current Living situation: home  Expected Disposition: TBD  Estimated D/C: 2-3 days    Diet Diet NPO   DVT Prophylaxis [] Lovenox, []  Heparin, [] SCDs, [] Ambulation,  [] Eliquis, [] Xarelto, [] Coumadin   Code Status Prior   Surrogate Decision Maker/ POA      Personally reviewed Lab Studies and Imaging     Discussed management of the case with  ED    who recommended hospital admission    EKG interpreted personally and results     Imaging that was interpreted personally includes  and results     Drugs that require monitoring for toxicity include  and the method of monitoring was         History from:     patient    History of Present  administration of intravenous contrast. Multiplanar reformatted images are provided for review. Automated exposure control, iterative reconstruction, and/or weight based adjustment of the mA/kV was utilized to reduce the radiation dose to as low as reasonably achievable. COMPARISON: None. HISTORY: ORDERING SYSTEM PROVIDED HISTORY: concern for retroperitineal hemorrhage TECHNOLOGIST PROVIDED HISTORY: Reason for exam:->concern for retroperitineal hemorrhage Additional Contrast?->None Decision Support Exception - unselect if not a suspected or confirmed emergency medical condition->Emergency Medical Condition (MA) Reason for Exam: concern for retroperitineal hemorrhage FINDINGS: Lower Chest: Normal bronchovascular markings. No effusion, pneumothorax or airspace process. Organs: Liver: Normal Gallbladder: Normal Pancreas: Normal Adrenal glands: Normal Kidneys: Simple appearing cyst, lower pole right kidney. Spleen: Normal GI/Bowel: Stomach: Unremarkable Small bowel: Unremarkable. Colon: Unremarkable. Pelvis: The bladder is distended with contrast.  The prostate gland demonstrates probable seed implants. Peritoneum/Retroperitoneum: Bones/Soft Tissues: The left iliopsoas muscle is markedly enlarged, compared to the right and there is a tiny area of high attenuation within it.  There is marked amount of fluid extending along the pericolic gutter into the pelvis.     Markedly enlarged left iliopsoas muscle with a tiny area of high attenuation within it and marked amount of surrounding fluid in the retroperitoneum. This is new compared to the prior study and likely represents a retroperitoneal hemorrhage although the underlying etiology is not identified.  The high attenuation within the muscle may represent active hemorrhage.         Electronically signed by Isac Piña MD on 4/15/2024 at 4:46 PM

## 2024-04-15 NOTE — CONSENT
Informed Consent for Blood Component Transfusion Note    I have discussed with the patient the rationale for blood component transfusion; its benefits in treating or preventing fatigue, organ damage, or death; and its risk which includes mild transfusion reactions, rare risk of blood borne infection, or more serious but rare reactions. I have discussed the alternatives to transfusion, including the risk and consequences of not receiving transfusion. The patient had an opportunity to ask questions and had agreed to proceed with transfusion of blood components.    Electronically signed by NICHOLAS CHI MD on 4/15/24 at 12:26 PM EDT

## 2024-04-16 ENCOUNTER — TELEPHONE (OUTPATIENT)
Dept: FAMILY MEDICINE CLINIC | Age: 74
End: 2024-04-16

## 2024-04-16 PROBLEM — I48.0 PAROXYSMAL ATRIAL FIBRILLATION (HCC): Status: ACTIVE | Noted: 2024-04-16

## 2024-04-16 PROBLEM — K68.3 RETROPERITONEAL HEMATOMA: Status: ACTIVE | Noted: 2024-04-16

## 2024-04-16 LAB
ANION GAP SERPL CALCULATED.3IONS-SCNC: 10 MMOL/L (ref 3–16)
ANISOCYTOSIS BLD QL SMEAR: ABNORMAL
BACTERIA URNS QL MICRO: ABNORMAL /HPF
BASOPHILS # BLD: 0 K/UL (ref 0–0.2)
BASOPHILS NFR BLD: 0 %
BILIRUB UR QL STRIP.AUTO: NEGATIVE
BLOOD BANK DISPENSE STATUS: NORMAL
BLOOD BANK DISPENSE STATUS: NORMAL
BLOOD BANK PRODUCT CODE: NORMAL
BLOOD BANK PRODUCT CODE: NORMAL
BPU ID: NORMAL
BPU ID: NORMAL
BUN SERPL-MCNC: 40 MG/DL (ref 7–20)
CALCIUM SERPL-MCNC: 8.2 MG/DL (ref 8.3–10.6)
CHLORIDE SERPL-SCNC: 103 MMOL/L (ref 99–110)
CHLORIDE UR-SCNC: 44 MMOL/L
CLARITY UR: CLEAR
CO2 SERPL-SCNC: 23 MMOL/L (ref 21–32)
COLOR UR: YELLOW
CREAT SERPL-MCNC: 1.7 MG/DL (ref 0.8–1.3)
DEPRECATED RDW RBC AUTO: 15.4 % (ref 12.4–15.4)
DESCRIPTION BLOOD BANK: NORMAL
DESCRIPTION BLOOD BANK: NORMAL
EKG DIAGNOSIS: NORMAL
EKG Q-T INTERVAL: 304 MS
EKG QRS DURATION: 76 MS
EKG QTC CALCULATION (BAZETT): 386 MS
EKG R AXIS: -25 DEGREES
EKG T AXIS: 121 DEGREES
EKG VENTRICULAR RATE: 97 BPM
EOSINOPHIL # BLD: 0.1 K/UL (ref 0–0.6)
EOSINOPHIL NFR BLD: 1 %
EPI CELLS #/AREA URNS AUTO: 0 /HPF (ref 0–5)
GFR SERPLBLD CREATININE-BSD FMLA CKD-EPI: 42 ML/MIN/{1.73_M2}
GLUCOSE BLD-MCNC: 145 MG/DL (ref 70–99)
GLUCOSE BLD-MCNC: 171 MG/DL (ref 70–99)
GLUCOSE BLD-MCNC: 237 MG/DL (ref 70–99)
GLUCOSE BLD-MCNC: 260 MG/DL (ref 70–99)
GLUCOSE BLD-MCNC: 287 MG/DL (ref 70–99)
GLUCOSE SERPL-MCNC: 160 MG/DL (ref 70–99)
GLUCOSE UR STRIP.AUTO-MCNC: >=1000 MG/DL
HCT VFR BLD AUTO: 18.7 % (ref 40.5–52.5)
HCT VFR BLD AUTO: 24.2 % (ref 40.5–52.5)
HGB BLD-MCNC: 6.6 G/DL (ref 13.5–17.5)
HGB BLD-MCNC: 8.4 G/DL (ref 13.5–17.5)
HGB UR QL STRIP.AUTO: ABNORMAL
HYALINE CASTS #/AREA URNS AUTO: 0 /LPF (ref 0–8)
KETONES UR STRIP.AUTO-MCNC: NEGATIVE MG/DL
LEUKOCYTE ESTERASE UR QL STRIP.AUTO: ABNORMAL
LYMPHOCYTES # BLD: 0.7 K/UL (ref 1–5.1)
LYMPHOCYTES NFR BLD: 7 %
MCH RBC QN AUTO: 32.2 PG (ref 26–34)
MCHC RBC AUTO-ENTMCNC: 35.4 G/DL (ref 31–36)
MCV RBC AUTO: 91 FL (ref 80–100)
MONOCYTES # BLD: 0.5 K/UL (ref 0–1.3)
MONOCYTES NFR BLD: 5 %
MYELOCYTES NFR BLD MANUAL: 1 %
NEUTROPHILS # BLD: 8.9 K/UL (ref 1.7–7.7)
NEUTROPHILS NFR BLD: 77 %
NEUTS BAND NFR BLD MANUAL: 9 % (ref 0–7)
NITRITE UR QL STRIP.AUTO: NEGATIVE
OVALOCYTES BLD QL SMEAR: ABNORMAL
PERFORMED ON: ABNORMAL
PH UR STRIP.AUTO: 7 [PH] (ref 5–8)
PLATELET # BLD AUTO: 169 K/UL (ref 135–450)
PMV BLD AUTO: 8.1 FL (ref 5–10.5)
POIKILOCYTOSIS BLD QL SMEAR: ABNORMAL
POLYCHROMASIA BLD QL SMEAR: ABNORMAL
POTASSIUM SERPL-SCNC: 4.4 MMOL/L (ref 3.5–5.1)
POTASSIUM UR-SCNC: 10.2 MMOL/L
PROT UR STRIP.AUTO-MCNC: 30 MG/DL
RBC # BLD AUTO: 2.05 M/UL (ref 4.2–5.9)
RBC CLUMPS #/AREA URNS AUTO: 23 /HPF (ref 0–4)
SODIUM SERPL-SCNC: 136 MMOL/L (ref 136–145)
SODIUM UR-SCNC: 59 MMOL/L
SP GR UR STRIP.AUTO: 1.01 (ref 1–1.03)
UA DIPSTICK W REFLEX MICRO PNL UR: YES
URN SPEC COLLECT METH UR: ABNORMAL
UROBILINOGEN UR STRIP-ACNC: 1 E.U./DL
WBC # BLD AUTO: 10.2 K/UL (ref 4–11)
WBC #/AREA URNS AUTO: 5 /HPF (ref 0–5)

## 2024-04-16 PROCEDURE — 81001 URINALYSIS AUTO W/SCOPE: CPT

## 2024-04-16 PROCEDURE — 2580000003 HC RX 258: Performed by: HOSPITALIST

## 2024-04-16 PROCEDURE — 80048 BASIC METABOLIC PNL TOTAL CA: CPT

## 2024-04-16 PROCEDURE — 85018 HEMOGLOBIN: CPT

## 2024-04-16 PROCEDURE — 94760 N-INVAS EAR/PLS OXIMETRY 1: CPT

## 2024-04-16 PROCEDURE — 6370000000 HC RX 637 (ALT 250 FOR IP): Performed by: HOSPITALIST

## 2024-04-16 PROCEDURE — 85025 COMPLETE CBC W/AUTO DIFF WBC: CPT

## 2024-04-16 PROCEDURE — 82436 ASSAY OF URINE CHLORIDE: CPT

## 2024-04-16 PROCEDURE — APPSS15 APP SPLIT SHARED TIME 0-15 MINUTES: Performed by: PHYSICIAN ASSISTANT

## 2024-04-16 PROCEDURE — 36430 TRANSFUSION BLD/BLD COMPNT: CPT

## 2024-04-16 PROCEDURE — 93010 ELECTROCARDIOGRAM REPORT: CPT | Performed by: INTERNAL MEDICINE

## 2024-04-16 PROCEDURE — 30233N1 TRANSFUSION OF NONAUTOLOGOUS RED BLOOD CELLS INTO PERIPHERAL VEIN, PERCUTANEOUS APPROACH: ICD-10-PCS | Performed by: HOSPITALIST

## 2024-04-16 PROCEDURE — 36415 COLL VENOUS BLD VENIPUNCTURE: CPT

## 2024-04-16 PROCEDURE — 99222 1ST HOSP IP/OBS MODERATE 55: CPT | Performed by: STUDENT IN AN ORGANIZED HEALTH CARE EDUCATION/TRAINING PROGRAM

## 2024-04-16 PROCEDURE — 84300 ASSAY OF URINE SODIUM: CPT

## 2024-04-16 PROCEDURE — 85014 HEMATOCRIT: CPT

## 2024-04-16 PROCEDURE — 84133 ASSAY OF URINE POTASSIUM: CPT

## 2024-04-16 PROCEDURE — 6370000000 HC RX 637 (ALT 250 FOR IP): Performed by: INTERNAL MEDICINE

## 2024-04-16 PROCEDURE — 99223 1ST HOSP IP/OBS HIGH 75: CPT | Performed by: INTERNAL MEDICINE

## 2024-04-16 PROCEDURE — APPNB15 APP NON BILLABLE TIME 0-15 MINS: Performed by: PHYSICIAN ASSISTANT

## 2024-04-16 PROCEDURE — 2060000000 HC ICU INTERMEDIATE R&B

## 2024-04-16 PROCEDURE — 6370000000 HC RX 637 (ALT 250 FOR IP): Performed by: NURSE PRACTITIONER

## 2024-04-16 RX ORDER — INSULIN GLARGINE 100 [IU]/ML
20 INJECTION, SOLUTION SUBCUTANEOUS DAILY
Status: DISCONTINUED | OUTPATIENT
Start: 2024-04-16 | End: 2024-04-18 | Stop reason: HOSPADM

## 2024-04-16 RX ADMIN — SODIUM CHLORIDE, PRESERVATIVE FREE 10 ML: 5 INJECTION INTRAVENOUS at 09:00

## 2024-04-16 RX ADMIN — TIZANIDINE 4 MG: 4 TABLET ORAL at 20:31

## 2024-04-16 RX ADMIN — LEVOTHYROXINE SODIUM 137 MCG: 0.11 TABLET ORAL at 05:46

## 2024-04-16 RX ADMIN — SODIUM CHLORIDE: 9 INJECTION, SOLUTION INTRAVENOUS at 08:36

## 2024-04-16 RX ADMIN — SODIUM CHLORIDE, PRESERVATIVE FREE 10 ML: 5 INJECTION INTRAVENOUS at 20:32

## 2024-04-16 RX ADMIN — ACETAMINOPHEN 325MG 650 MG: 325 TABLET ORAL at 02:25

## 2024-04-16 RX ADMIN — INSULIN LISPRO 4 UNITS: 100 INJECTION, SOLUTION INTRAVENOUS; SUBCUTANEOUS at 14:08

## 2024-04-16 RX ADMIN — TAMSULOSIN HYDROCHLORIDE 0.4 MG: 0.4 CAPSULE ORAL at 20:28

## 2024-04-16 RX ADMIN — INSULIN GLARGINE 20 UNITS: 100 INJECTION, SOLUTION SUBCUTANEOUS at 11:00

## 2024-04-16 RX ADMIN — ACETAMINOPHEN 325MG 650 MG: 325 TABLET ORAL at 20:31

## 2024-04-16 RX ADMIN — TIZANIDINE 4 MG: 4 TABLET ORAL at 02:25

## 2024-04-16 ASSESSMENT — PAIN SCALES - WONG BAKER
WONGBAKER_NUMERICALRESPONSE: HURTS LITTLE MORE

## 2024-04-16 NOTE — PROGRESS NOTES
Critical hmg, and hct results.  Yuliya KOLB, ordered a unit of blood.  Waiting on Blood bank.  Electronically signed by Deirdre Pulido RN on 4/16/2024 at 6:39 AM

## 2024-04-16 NOTE — PROGRESS NOTES
Occupational Therapy  Unable to see pt at this time due to pt with critically Hgb at time of attempt. Plan for unit of blood. Will follow up with pt as time allows and medically appropriate.    Eh Rowland, OTR/L

## 2024-04-16 NOTE — CARE COORDINATION
Case Management Assessment  Initial Evaluation    Date/Time of Evaluation: 4/16/2024 5:23 PM  Assessment Completed by: RODNEY Hsu, BREN    If patient is discharged prior to next notation, then this note serves as note for discharge by case management.    Patient Name: Morro Kevin                   YOB: 1950  Diagnosis: Dizziness [R42]  Retroperitoneal hematoma [K68.3]  Anemia, unspecified type [D64.9]  Acute renal failure superimposed on chronic kidney disease, unspecified acute renal failure type, unspecified CKD stage (HCC) [N17.9, N18.9]                   Date / Time: 4/15/2024 11:25 AM    Patient Admission Status: Inpatient   Readmission Risk (Low < 19, Mod (19-27), High > 27): Readmission Risk Score: 21.2    Current PCP: Fareed Marino MD  PCP verified by CM? Yes    Chart Reviewed: Yes      History Provided by: Patient  Patient Orientation: Alert and Oriented    Patient Cognition: Alert    Hospitalization in the last 30 days (Readmission):  No    If yes, Readmission Assessment in CM Navigator will be completed.    Advance Directives:      Code Status: Full Code   Patient's Primary Decision Maker is: Named in Scanned ACP Document    Primary Decision Maker: OneidamelanieJoe Ning Chirinos - [phone]    Secondary Decision Maker: lara kevin - [phone]    Discharge Planning:    Patient lives with: Children Type of Home: Apartment  Primary Care Giver: Self  Patient Support Systems include: Children, Family Members   Current Financial resources: Medicare  Current community resources: None  Current services prior to admission: C-pap            Current DME:              Type of Home Care services:   (TBD)    ADLS  Prior functional level: Independent in ADLs/IADLs  Current functional level: Independent in ADLs/IADLs    PT AM-PAC:   /24  OT AM-PAC:   /24    Family can provide assistance at DC: Yes  Would you like Case Management to discuss the discharge plan with any other family

## 2024-04-16 NOTE — TELEPHONE ENCOUNTER
Care Transitions Initial Follow Up Call    Outreach made within 2 business days of discharge: Yes    Patient: Morro Kevin Patient : 1950   MRN: [id number]  Reason for Admission: There are no discharge diagnoses documented for the most recent discharge.  Discharge Date: 24       Spoke with: PATIENT    Discharge department/facility: [facility]    TCM Interactive Patient Contact:  Was patient able to fill all prescriptions: Yes  Was patient instructed to bring all medications to the follow-up visit: Yes  Is patient taking all medications as directed in the discharge summary? Yes  Does patient understand their discharge instructions: Yes  Does patient have questions or concerns that need addressed prior to 7-14 day follow up office visit: no, PT READMITTED FOR BLOOD LOSS WITH BLOOD TRANSFUSION. [facility]    Scheduled appointment with PCP within 7-14 days    Follow Up  Future Appointments   Date Time Provider Department Center   2024 10:30 AM SCHEDULE, [facility] ROOM 2 [facility]   2024 10:00 AM Fareed Crowe MD [facility]     SPOKE TO PT, HE WAS READMITTED YESTERDAY FOR DIZZINESS AND FOUND TO HAVE BLOOD LOSS THAT REQUIRED BLOOD TRANSFUSION. I WILL CALL HIM WHEN HE GETS DISCHARGED AGAIN TO SET UP HOSPITAL FOLLOW UP WITH DR CROWE'. PT AGREED WITH THE PLAN. [facility]    Alisa Bonilla MA

## 2024-04-16 NOTE — ACP (ADVANCE CARE PLANNING)
Advance Care Planning     Advance Care Planning Inpatient Note  [facility] Department    Today's Date: 4/16/2024  Unit: STARLA 5N PROGRESSIVE CARE    Received request from IDT Member.  Upon review of chart and communication with care team, patient's decision making abilities are not in question.. Patient was/were present in the room during visit.    Goals of ACP Conversation:  Discuss advance care planning documents    Health Care Decision Makers:       Primary Decision Maker: Joe Kevin - [phone]    Secondary Decision Maker: lara kevin - [phone]  Summary:  Verified Healthcare Decision Maker  Pt says he has AD documents and that information in them is current. Pt agreed to have family member provide a copy for hospital record.  Advance Care Planning Documents (Patient Wishes):  None     Assessment:  Pt understood AD and unique characteristics of AD documents (HCPOA). Pt understood the need for documents and that his current one do have correct information on them    Interventions:  Requested pt submit documents for hospital record; especially HCPOA    Care Preferences Communicated:   No    Outcomes/Plan:  ACP Discussion: Completed    Electronically signed by Chaplain Marin on 4/16/2024 at 3:27 PM

## 2024-04-16 NOTE — CONSULTS
Surgery Consult Note     PHAN Samuels,-C  Pt Name: Morro Kevin  MRN: [id number]  YOB: 1950  Date of evaluation: 4/16/2024  Primary Care Physician: Fareed Marino MD  Referred By: Isac Piña   Reason for Consultation: h/o AVR, on coumadin, retroperitoneal hematoma  Chief Complaint: Dizziness  IMPRESSIONS:   Anemia  Leukocytosis resolved: WBC count 24.7 -->14.0 -->10.2  Retroperitoneal hematoma. No change from prior imaging  Hgb: 6.9--> (received 1 unit PRBC's)--> 7.3 -->6.6 --( receiving 1 unit PRBC's at this time)  On coumadin. Being held at this time  SUKUMAR: Cr: 2.3 -->1.7  PLANS:   Monitor and control any pain  IVF  Monitor Hgb levels  No general surgery needs at this time  Will continue to monitor and give further recommendations as needed.   SUBJECTIVE:   History of Chief Complaint:    Morro Kevin is a 73 y.o. male who presents with dizziness. He was recently in the hospital with LLE pain. He works at the [employer] and believes he had some trauma from possibly carrying heavy items. He was found to have a retroperitoneal hematoma at that time and that pressure was contributing to his LLE pain. He was on coumadin and that was held during his stay but resumed once he left. Upon evaluation yesterday he was found to be anemic, his Hgb was held and he was given 1 unit of PRBC's. He also had a CT scan of his abdomen and that showed stable left iliopsoas intramuscular hematoma measuring up to 6.2 x 5 cm in short axis, and decreased extraperitoneal blood products and hemoperitoneum in the pelvis since prior exam. He denies noticing any bleeding. Denies any blood in stool or hematemesis.   Past Medical History  Reviewed  has a past medical history of Aortic stenosis, Arthritis, Asthma, Deviated septum, Diabetes mellitus type II, Hashimoto thyroiditis, Homocysteinemia, Hyperlipidemia, Hypertension, Hypertriglyceridemia, Hypogonadism male, Hypothyroid, Kidney stone, Mechanical  AST 89 04/15/2024 11:52 AM    ALT 91 04/15/2024 11:52 AM     Urine Culture:  No components found for: \"CURINE\"  Blood Culture:  No components found for: \"CBLOOD\", \"CFUNGUSBL\"  RADIOLOGY:     CT scan abd/pelvis (4/15/24):   1. Stable left iliopsoas intramuscular hematoma measuring up to 6.2 x 5 cm in   short axis.   2. Decreased extraperitoneal blood products and hemoperitoneum in the pelvis   since prior exam.   3. Trace left pleural effusion.  Mild left basilar atelectasis.     Thank you for the interesting evaluation. Further recommendations to follow.    Uriel Logan PA-C  General and Vascular Surgery [phone]  Electronically signed by Uriel Pacheco PA-C on 4/16/2024 at 10:38 AM

## 2024-04-16 NOTE — PROGRESS NOTES
4 Eyes Skin Assessment     NAME:  Morro Kevin  YOB: 1950  MEDICAL RECORD NUMBER:  [id number]    The patient is being assessed for  Admission    I agree that at least one RN has performed a thorough Head to Toe Skin Assessment on the patient. ALL assessment sites listed below have been assessed.      Areas assessed by both nurses:            Does the Patient have a Wound? No noted wound(s)       Vignesh Prevention initiated by RN: yes  Wound Care Orders initiated by RN: No    Pressure Injury (Stage 3,4, Unstageable, DTI, NWPT, and Complex wounds) if present, place Wound referral order by RN under : No    New Ostomies, if present place, Ostomy referral order under : No     Nurse 1 eSignature: Electronically signed by Deirdre Pulido RN on 4/15/24 at 9:28 PM EDT    **SHARE this note so that the co-signing nurse can place an eSignature**    Nurse 2 eSignature: Electronically signed by Aneudy Lopes RN on 4/15/24 at 10:25 PM EDT

## 2024-04-16 NOTE — FLOWSHEET NOTE
04/15/24 2315 04/15/24 2316   Vital Signs   Temp 99.1 °F (37.3 °C)  --    Temp Source Oral  --    Pulse 80  --    Heart Rate Source Monitor  --    Respirations 20  --    BP (!) 91/46 (!) 86/43   MAP (Calculated) 61 57   BP Location Right upper arm  --    BP Method Automatic  --    Patient Position Semi fowlers  --      NP notified no new orders

## 2024-04-16 NOTE — CONSULTS
[facility]  Cardiology Consult    Morro Kevin  1950 April 16, 2024        CC: Dizziness      Subjective:     History of Present Illness:    Morro Kevin is a 73 y.o. patient with a PMH significant for mechanical aortic valve status post aortic stenosis surgery presented with complaints of dizziness weakness.  Found to have RP bleed.  Severe anemia present.  Denies chest pain palpitation loss of consciousness.     Apparently the RP bleed was from lifting and moving heavy objects and being on Coumadin.  Blood pressure was mildly low and hemoglobin is low.  He did receive blood transfusion.    Past Medical History:   has a past medical history of Aortic stenosis, Arthritis, Asthma, Deviated septum, Diabetes mellitus type II, Hashimoto thyroiditis, Homocysteinemia, Hyperlipidemia, Hypertension, Hypertriglyceridemia, Hypogonadism male, Hypothyroid, Kidney stone, Mechanical heart valve present, Murmur, Nasal polyps, Osteoarthritis, Prolonged emergence from general anesthesia, Sleep apnea, Warfarin anticoagulation, and Wears glasses.    Surgical History:   has a past surgical history that includes colectomy (1996); Nasal polyp surgery (1989, 1987); knee surgery (Left, 2004); Rotator cuff repair (Right, 2009); Colonoscopy (9/24/2014; 2004); cyst removal; other surgical history (1990s); Aortic valve replacement (09/14/2016); Upper gastrointestinal endoscopy (N/A, 6/30/2023); Colonoscopy (N/A, 6/30/2023); Capsule endoscopy (N/A, 6/30/2023); and Upper gastrointestinal endoscopy (N/A, 7/3/2023).     Social History:   reports that he has never smoked. He has never used smokeless tobacco. He reports current alcohol use of about 2.0 standard drinks of alcohol per week. He reports that he does not use drugs.     Family History:  family history includes Arthritis in his father; Cancer in his paternal grandmother; Diabetes in his brother and another family member; Heart Disease in his father and maternal  Or  potassium chloride 10 mEq/100 mL IVPB (Peripheral Line), PRN  magnesium sulfate 2000 mg in 50 mL IVPB premix, PRN  ondansetron (ZOFRAN-ODT) disintegrating tablet 4 mg, Q8H PRN   Or  ondansetron (ZOFRAN) injection 4 mg, Q6H PRN  polyethylene glycol (GLYCOLAX) packet 17 g, Daily PRN  acetaminophen (TYLENOL) tablet 650 mg, Q6H PRN   Or  acetaminophen (TYLENOL) suppository 650 mg, Q6H PRN  insulin lispro (HUMALOG) injection vial 0-8 Units, TID WC  insulin lispro (HUMALOG) injection vial 0-4 Units, Nightly  glucose chewable tablet 16 g, PRN  dextrose bolus 10% 125 mL, PRN   Or  dextrose bolus 10% 250 mL, PRN  glucagon injection 1 mg, PRN  dextrose 10 % infusion, Continuous PRN        Allergies:  Ciprofloxacin           Review of Systems: SEE HPI   Constitutional: no unanticipated weight loss. There's been no change in energy level, sleep pattern, or activity level. No fevers, chills.   Eyes: No visual changes or diplopia. No scleral icterus.  ENT: No Headaches, hearing loss or vertigo. No mouth sores or sore throat.  Cardiovascular: No Chest pain, tightness or discomfort.   No Shortness of breath.   No Dyspnea on exertion, Orthopnea, Paroxysmal nocturnal dyspnea or breathlessness at rest.  No Palpitations.  No Syncope ('blackouts', 'faints', 'collapse') or dizziness.   Respiratory: No cough or wheezing, no sputum production. No hematemesis.    Gastrointestinal: No abdominal pain, appetite loss, blood in stools. No change in bowel or bladder habits.  Genitourinary: No dysuria, trouble voiding, or hematuria.  Musculoskeletal:  No gait disturbance, no joint complaints.  Integumentary: No rash or pruritis.  Neurological: No headache, diplopia, change in muscle strength, numbness or tingling.   Psychiatric: No anxiety or depression.  Endocrine: No temperature intolerance. No excessive thirst, fluid intake, or urination. No tremor.  Hematologic/Lymphatic: No abnormal bruising or bleeding, blood clots or swollen lymph

## 2024-04-16 NOTE — ACP (ADVANCE CARE PLANNING)
Advance Care Planning     Advance Care Planning Activator (Inpatient)  Conversation Note      Date of ACP Conversation: 4/16/2024     Conversation Conducted with: Patient with Decision Making Capacity    ACP Activator: RODNEY Hsu, W    Health Care Decision Maker:     Current Designated Health Care Decision Maker:     Primary Decision Maker: Joe Kevin - Child - [phone]    Secondary Decision Maker: lara kevin - Child - [phone]    Care Preferences    Ventilation:  \"If you were in your present state of health and suddenly became very ill and were unable to breathe on your own, what would your preference be about the use of a ventilator (breathing machine) if it were available to you?\"      Would the patient desire the use of ventilator (breathing machine)?: yes    \"If your health worsens and it becomes clear that your chance of recovery is unlikely, what would your preference be about the use of a ventilator (breathing machine) if it were available to you?\"     Would the patient desire the use of ventilator (breathing machine)?: No      Resuscitation  \"CPR works best to restart the heart when there is a sudden event, like a heart attack, in someone who is otherwise healthy. Unfortunately, CPR does not typically restart the heart for people who have serious health conditions or who are very sick.\"    \"In the event your heart stopped as a result of an underlying serious health condition, would you want attempts to be made to restart your heart (answer \"yes\" for attempt to resuscitate) or would you prefer a natural death (answer \"no\" for do not attempt to resuscitate)?\" yes       [] Yes   [] No   Educated Patient / Decision Maker regarding differences between Advance Directives and portable DNR orders.    Length of ACP Conversation in minutes:  2    Conversation Outcomes:  ACP discussion completed    Follow-up plan:    [] Schedule follow-up conversation to continue planning  [] Referred individual to

## 2024-04-16 NOTE — PROGRESS NOTES
04/16/24 1533   Encounter Summary   Encounter Overview/Reason  Advance Care Planning   Service Provided For: Patient   Referral/Consult From: Nurse   Last Encounter  04/16/24  ( provided ACP education and encouraged pt to provide copy of current HCPOA)   Complexity of Encounter Low   Begin Time 1515   End Time  1533   Total Time Calculated 18 min   Advance Care Planning   Type ACP conversation   Assessment/Intervention/Outcome   Assessment Calm   Intervention Active listening   Outcome Expressed Gratitude

## 2024-04-16 NOTE — PROGRESS NOTES
Patient admitted from ED.  Telemetry applied.  Questions answered.  Patient has diabetes, asked NP for new diabetic orders.  Patient had new CBC completed.  See results.  Patient has family at bedside, and oriented to room.  Admission completed and four eyes.  Electronically signed by Deirdre Pulido RN on 4/15/2024 at 9:28 PM

## 2024-04-16 NOTE — PROGRESS NOTES
Physical Therapy  Attempt    Hold d/t low Hgb.     Electronically signed by Flash Alexis, PT [id number] on 4/16/2024 at 10:26 AM

## 2024-04-16 NOTE — CONSULTS
cyanosis.  Skin: normal texture, normal skin turgor, no rash  Neurological: CN intact, no focal motor neurological deficit  Psych: normal affect; AAO x 3  : mayer in place       LAB DATA:    CBC:   Lab Results   Component Value Date/Time    WBC 10.2 04/16/2024 05:02 AM    RBC 2.05 04/16/2024 05:02 AM    HGB 6.6 04/16/2024 05:02 AM    HCT 18.7 04/16/2024 05:02 AM    MCV 91.0 04/16/2024 05:02 AM    MCH 32.2 04/16/2024 05:02 AM    MCHC 35.4 04/16/2024 05:02 AM    RDW 15.4 04/16/2024 05:02 AM     04/16/2024 05:02 AM    MPV 8.1 04/16/2024 05:02 AM     BMP:    Lab Results   Component Value Date/Time     04/16/2024 05:02 AM    K 4.4 04/16/2024 05:02 AM    K 4.4 04/15/2024 11:52 AM     04/16/2024 05:02 AM    CO2 23 04/16/2024 05:02 AM    BUN 40 04/16/2024 05:02 AM    CREATININE 1.7 04/16/2024 05:02 AM    CALCIUM 8.2 04/16/2024 05:02 AM    GFRAA 56 10/11/2022 08:25 AM    GFRAA >60 06/15/2013 07:43 AM    LABGLOM 42 04/16/2024 05:02 AM    GLUCOSE 160 04/16/2024 05:02 AM     Ionized Calcium:  No results found for: \"IONCA\"  Magnesium:    Lab Results   Component Value Date/Time    MG 2.10 08/31/2023 09:20 AM     Phosphorus:    Lab Results   Component Value Date/Time    PHOS 2.7 01/04/2024 09:13 AM     U/A:    Lab Results   Component Value Date/Time    NITRITE NEG 05/29/2019 07:44 AM    COLORU RED 11/02/2021 05:56 PM    PHUR 5.0 11/02/2021 05:56 PM    LABCAST 0-1 Hyaline 06/29/2012 07:50 AM    WBCUA 3-5 11/02/2021 05:56 PM    RBCUA >100 11/02/2021 05:56 PM    MUCUS 2+ 06/29/2012 07:50 AM    BACTERIA Rare 12/09/2011 07:17 AM    CLARITYU TURBID 11/02/2021 05:56 PM    SPECGRAV 1.015 11/02/2021 05:56 PM    LEUKOCYTESUR Negative 11/02/2021 05:56 PM    UROBILINOGEN 0.2 11/02/2021 05:56 PM    BILIRUBINUR Negative 11/02/2021 05:56 PM    BILIRUBINUR NEG 05/29/2019 07:44 AM    BILIRUBINUR NEGATIVE 12/09/2011 07:17 AM    BLOODU LARGE 11/02/2021 05:56 PM    GLUCOSEU >=1000 11/02/2021 05:56 PM    GLUCOSEU NEGATIVE  12/09/2011 07:17 AM         IMPRESSION/RECOMMENDATIONS:      SUKUMAR on CKD 3a: baseline Cr ~ 1.3-1.5 mg/dL. Follows with Dr. Salinas in office. Etiology of CKD 2/2 diabetic nephropathy per office note. Etiology of SUKUMAR 2/2 acute blood loss, hypotension, and poor oral intake.    - Cr 2.3>1.7 mg/dL this AM. Trending down.   - Received IV contrast 4/15/2024 -- monitor for DRE   - No indication for urine studies at this time since Cr is trending back down   - Continue IVF   - Continue to hold lisinopril and synjardy. No NSAIDs.   - Avoid hypotension and avoid nephrotoxic agents   - Daily RFTs    Anemia / Recent retroperitoneal hemorrhage   - Hgb 6.9 g/dL. S/p 1u PRBC 4/15. Receiving 2nd unit of PRBC this AM.    - CTAP with IV contrast (4/15/2024): stable left iliopsoas intramuscular hematoma   - Coumadin on hold   - Iron studies ordered   - General surgery consulted    H/o Aortic valve replacement    Hypertension   - BP soft upon admission; better this AM   - Monitor    H/o uric acid nephrolithiasis   - On allopurinol   - Check uric acid    Will discuss with nephrology attending physician, Dr. Navarro.  See attestation for additional recommendations.    MARITA Carranza - CNP

## 2024-04-16 NOTE — PLAN OF CARE
Problem: Discharge Planning  Goal: Discharge to home or other facility with appropriate resources  4/16/2024 1946 by Deirdre Pulido RN  Outcome: Progressing  4/16/2024 1627 by Gina Mcdermott  Outcome: Progressing     Problem: Pain  Goal: Verbalizes/displays adequate comfort level or baseline comfort level  4/16/2024 1946 by Deirdre Pulido RN  Outcome: Progressing  4/16/2024 1627 by Gina Mcdermott  Outcome: Progressing     Problem: Skin/Tissue Integrity  Goal: Absence of new skin breakdown  Description: 1.  Monitor for areas of redness and/or skin breakdown  2.  Assess vascular access sites hourly  3.  Every 4-6 hours minimum:  Change oxygen saturation probe site  4.  Every 4-6 hours:  If on nasal continuous positive airway pressure, respiratory therapy assess nares and determine need for appliance change or resting period.  4/16/2024 1946 by Deirdre Pulido RN  Outcome: Progressing  4/16/2024 1627 by Gina Mcdermott  Outcome: Progressing     Problem: Safety - Adult  Goal: Free from fall injury  4/16/2024 1946 by Deirdre Pulido RN  Outcome: Progressing  4/16/2024 1627 by Gina Mcdermott  Outcome: Progressing     Problem: ABCDS Injury Assessment  Goal: Absence of physical injury  4/16/2024 1946 by Deirdre Pulido RN  Outcome: Progressing  4/16/2024 1627 by Gina Mcdermott  Outcome: Progressing

## 2024-04-16 NOTE — PROGRESS NOTES
Melgoaz pulled at 12:00 pm, pt voided 250 mL.    Electronically signed by Kia Astorga RN on 4/16/2024 at 1:59 PM

## 2024-04-16 NOTE — CARE COORDINATION
04/16/24 1720   Readmission Assessment   Number of Days since last admission? 1-7 days   Previous Disposition Home with Family   Who is being Interviewed Patient   What was the patient's/caregiver's perception as to why they think they needed to return back to the hospital? Other (Comment)  (not feeling well or like himself so he returned.)   Did you visit your Primary Care Physician after you left the hospital, before you returned this time? No   Why weren't you able to visit your PCP? Did not have an appointment   Did you see a specialist, such as Cardiac, Pulmonary, Orthopedic Physician, etc. after you left the hospital? No   Who advised the patient to return to the hospital? Self-referral;Caregiver   Does the patient report anything that got in the way of taking their medications? No   In our efforts to provide the best possible care to you and others like you, can you think of anything that we could have done to help you after you left the hospital the first time, so that you might not have needed to return so soon? Other (Comment)  (unsure at the present time.)       Respectfully submitted,    SUZY Mohan  [facility] [place]   [phone]    Electronically signed by RODNEY Hsu, BREN on 4/16/2024 at 5:20 PM

## 2024-04-16 NOTE — PROGRESS NOTES
Department of Internal Medicine  Nephrology Progress Note        HISTORY OF PRESENTING ILLNESS: A 73-year-old male with past medical history significant for obesity, hypertension, obstructive sleep apnea, diabetes mellitus type 2, coronary artery disease, prostate cancer, chronic kidney disease stage 3A, follows up with Dr. Salinas, came to ER feeling weak, tired, and dizzy. The patient was confused and disoriented at the time of presentation. At the time of presentation, he was hypotensive with a blood pressure of 85/48 with a hemoglobin of 6.9. The patient was recently discharged from the hospital after being treated for retroperitoneal hemorrhage, Coumadin was held. The patient did not require any blood transfusion. The patient was admitted for further workup and management. Renal consultation has been called for acute kidney injury. Overnight, the patient received some IV fluids and was taken off lisinopril. At the time of consultation, the patient is feeling better with improvement in the blood pressure. Still feeling weak, tired, decreased level of energy. Denies any chest pain, shortness of breath, but has some dyspnea on exertion. Denies any urinary symptoms. The patient did receive IV contrast yesterday to assess retroperitoneal bleed. CAT scan of the abdomen showed decreased extraperitoneal blood product and hemoperitoneum. He was found to have a stable left iliopsoas intramuscular hematoma.         S/p Tx  ,labs reviewed    Over  all feels better .     REVIEW OF SYSTEMS:  No CP/SOB;TOLENTINO .     Physical Exam:    VITALS:  /61   Pulse 98   Temp 99.9 °F (37.7 °C) (Oral)   Resp 19   Ht 1.803 m (5' 11\")   Wt 90.7 kg (199 lb 15.3 oz)   SpO2 95%   BMI 27.89 kg/m²   24HR INTAKE/OUTPUT:    Intake/Output Summary (Last 24 hours) at 4/16/2024 1413  Last data filed at 4/16/2024 1351  Gross per 24 hour   Intake 1855.33 ml   Output 3650 ml   Net -1794.67 ml       Constitutional:  Resting   Respiratory:

## 2024-04-16 NOTE — PROGRESS NOTES
04/16/24 1506   Encounter Summary   Encounter Overview/Reason  Initial Encounter   Service Provided For: Patient   Referral/Consult From: Rounding   Last Encounter  04/16/24  ( visited with pt)   Complexity of Encounter Low   Begin Time 1345   End Time  1345   Total Time Calculated 0 min   Spiritual/Emotional needs   Type Spiritual Support   Assessment/Intervention/Outcome   Assessment Calm   Intervention Nurtured Hope;Active listening   Outcome Expressed Gratitude        04/16/24 1506   Encounter Summary   Encounter Overview/Reason  Initial Encounter   Service Provided For: Patient   Referral/Consult From: Rounding   Last Encounter  04/16/24  ( visited with pt)   Complexity of Encounter Low   Begin Time 1345   End Time  1345   Total Time Calculated 0 min   Spiritual/Emotional needs   Type Spiritual Support   Assessment/Intervention/Outcome   Assessment Calm   Intervention Nurtured Hope;Active listening   Outcome Expressed Gratitude

## 2024-04-16 NOTE — PROGRESS NOTES
V2.0  [facility] Hospitalist Progress Note      Name:  Morro Kevin /Age/Sex: 1950  (73 y.o. male)   MRN & CSN:  [id number] & [id number] Encounter Date/Time: 2024 12:50 PM EDT    Location:  [id number] PCP: Fareed Marino MD       Hospital Day: 2    Assessment and Plan:   Morro Kevin is a 73 y.o. male with pmh of  who presents with Dizziness      Plan:    Acute anemia  Hemoglobin 6.9G/DL on presentation  Received 1 unit PRBC and repeat hemoglobin was 7.3G/DL.  Hemoglobin dropped to 6.6G/DL this morning again.  Receiving second unit PRBC.  Patient has recent history of spontaneous left iliopsoas intramuscular hematoma.  Repeat CT abdomen and pelvis showed stable hematoma and decreased extraperitoneal blood products and hemoperitoneum in the pelvis  Has history of GI bleed.  May need GI evaluation again if hemoglobin continues to drop  Continue to monitor hemoglobin closely and transfuse if hemoglobin drops to less than 7G/DL      Leukocytosis  Likely reactionary in the setting of anemia  Improving without ABX  Continue to monitor    SUKUMAR  Creatinine on presentation 2.3 mg/DL.  Creatinine was 1.2 mg/DL at the time of discharge on 2024.  Now improving with IVF.    History of mechanical aortic valve  On Coumadin  Currently on hold.  Resume as appropriate.    Type II DM  Currently on Lantus 20 units daily and medium dose sliding scale.    BPH on Flomax, continue        Diet ADULT DIET; Regular; Low Sodium (2 gm)   DVT Prophylaxis [] Lovenox, []  Heparin, [] SCDs, [] Ambulation,  [] Eliquis, [] Xarelto  [] Coumadin   Code Status Full Code   Disposition From:   Expected Disposition:   Estimated Date of Discharge:   Patient requires continued admission due to    Surrogate Decision Maker/ POA      Personally reviewed Lab Studies and Imaging     Reviewed CT abdomen pelvis which showed stable iliopsoas hematoma.    Subjective:     Chief Complaint: Dizziness (Pt reports waking up at 0400 with confusion,  Symptoms Initial evaluation FINDINGS: BRAIN/VENTRICLES:  The ventricles and cisternal spaces are prominent consistent with cerebral atrophy.  There is atherosclerotic calcification of the cavernous carotids. There is atherosclerotic disease of the distal vertebral arteries. There is no midline shift or mass effect. No hemorrhage is identified in the brain parenchyma. ORBITS: The visualized portion of the orbits demonstrate no acute abnormality. SINUSES: There has been prior sinus surgery.  There appear to be mucous retention cysts or polyps in the maxillary sinuses and ethmoid air cells. The remainder of the sinuses are clear. The mastoid air cells are clear. SOFT TISSUES/SKULL:  No acute abnormality of the visualized skull or soft tissues. The findings were sent to the Radiology Results Communication [facility] at 12:06 pm on 4/15/2024 to be communicated to a licensed caregiver.  The findings were conveyed to Dr. Vidal by Results Communication, 04/15/2024 at 12:10 p.m.     No acute intracranial abnormality. Cerebral atrophy.  Atherosclerotic calcification of the cavernous carotid arteries and vertebral arteries.       Electronically signed by Betty Robins MD on 4/16/2024 at 12:50 PM

## 2024-04-17 LAB
ALBUMIN SERPL-MCNC: 3.2 G/DL (ref 3.4–5)
ALP SERPL-CCNC: 44 U/L (ref 40–129)
ALT SERPL-CCNC: 116 U/L (ref 10–40)
ANION GAP SERPL CALCULATED.3IONS-SCNC: 9 MMOL/L (ref 3–16)
AST SERPL-CCNC: 128 U/L (ref 15–37)
BASOPHILS # BLD: 0 K/UL (ref 0–0.2)
BASOPHILS NFR BLD: 0.3 %
BILIRUB DIRECT SERPL-MCNC: 0.4 MG/DL (ref 0–0.3)
BILIRUB INDIRECT SERPL-MCNC: 0.8 MG/DL (ref 0–1)
BILIRUB SERPL-MCNC: 1.2 MG/DL (ref 0–1)
BUN SERPL-MCNC: 26 MG/DL (ref 7–20)
CALCIUM SERPL-MCNC: 8.9 MG/DL (ref 8.3–10.6)
CHLORIDE SERPL-SCNC: 105 MMOL/L (ref 99–110)
CO2 SERPL-SCNC: 22 MMOL/L (ref 21–32)
CREAT SERPL-MCNC: 1.3 MG/DL (ref 0.8–1.3)
DEPRECATED RDW RBC AUTO: 15.9 % (ref 12.4–15.4)
EOSINOPHIL # BLD: 0.1 K/UL (ref 0–0.6)
EOSINOPHIL NFR BLD: 1 %
FERRITIN SERPL IA-MCNC: 347.4 NG/ML (ref 30–400)
GFR SERPLBLD CREATININE-BSD FMLA CKD-EPI: 59 ML/MIN/{1.73_M2}
GLUCOSE BLD-MCNC: 143 MG/DL (ref 70–99)
GLUCOSE BLD-MCNC: 150 MG/DL (ref 70–99)
GLUCOSE BLD-MCNC: 196 MG/DL (ref 70–99)
GLUCOSE BLD-MCNC: 209 MG/DL (ref 70–99)
GLUCOSE SERPL-MCNC: 130 MG/DL (ref 70–99)
HCT VFR BLD AUTO: 25 % (ref 40.5–52.5)
HGB BLD-MCNC: 8.5 G/DL (ref 13.5–17.5)
IRON SATN MFR SERPL: 15 % (ref 20–50)
IRON SERPL-MCNC: 42 UG/DL (ref 59–158)
LYMPHOCYTES # BLD: 0.9 K/UL (ref 1–5.1)
LYMPHOCYTES NFR BLD: 11.3 %
MCH RBC QN AUTO: 31.6 PG (ref 26–34)
MCHC RBC AUTO-ENTMCNC: 34 G/DL (ref 31–36)
MCV RBC AUTO: 92.9 FL (ref 80–100)
MONOCYTES # BLD: 0.8 K/UL (ref 0–1.3)
MONOCYTES NFR BLD: 9.3 %
NEUTROPHILS # BLD: 6.5 K/UL (ref 1.7–7.7)
NEUTROPHILS NFR BLD: 78.1 %
PERFORMED ON: ABNORMAL
PHOSPHATE SERPL-MCNC: 2.1 MG/DL (ref 2.5–4.9)
PLATELET # BLD AUTO: 173 K/UL (ref 135–450)
PMV BLD AUTO: 7.6 FL (ref 5–10.5)
POTASSIUM SERPL-SCNC: 4.3 MMOL/L (ref 3.5–5.1)
PROT SERPL-MCNC: 5.6 G/DL (ref 6.4–8.2)
RBC # BLD AUTO: 2.69 M/UL (ref 4.2–5.9)
SODIUM SERPL-SCNC: 136 MMOL/L (ref 136–145)
TIBC SERPL-MCNC: 279 UG/DL (ref 260–445)
TRANSFERRIN SERPL-MCNC: 221 MG/DL (ref 200–360)
URATE SERPL-MCNC: 3.4 MG/DL (ref 3.5–7.2)
WBC # BLD AUTO: 8.4 K/UL (ref 4–11)

## 2024-04-17 PROCEDURE — 97161 PT EVAL LOW COMPLEX 20 MIN: CPT

## 2024-04-17 PROCEDURE — 97165 OT EVAL LOW COMPLEX 30 MIN: CPT

## 2024-04-17 PROCEDURE — 2580000003 HC RX 258: Performed by: HOSPITALIST

## 2024-04-17 PROCEDURE — 6370000000 HC RX 637 (ALT 250 FOR IP)

## 2024-04-17 PROCEDURE — 94760 N-INVAS EAR/PLS OXIMETRY 1: CPT

## 2024-04-17 PROCEDURE — 80069 RENAL FUNCTION PANEL: CPT

## 2024-04-17 PROCEDURE — 2060000000 HC ICU INTERMEDIATE R&B

## 2024-04-17 PROCEDURE — 85025 COMPLETE CBC W/AUTO DIFF WBC: CPT

## 2024-04-17 PROCEDURE — 84550 ASSAY OF BLOOD/URIC ACID: CPT

## 2024-04-17 PROCEDURE — 6370000000 HC RX 637 (ALT 250 FOR IP): Performed by: INTERNAL MEDICINE

## 2024-04-17 PROCEDURE — 82728 ASSAY OF FERRITIN: CPT

## 2024-04-17 PROCEDURE — 83540 ASSAY OF IRON: CPT

## 2024-04-17 PROCEDURE — 97530 THERAPEUTIC ACTIVITIES: CPT

## 2024-04-17 PROCEDURE — 6360000002 HC RX W HCPCS

## 2024-04-17 PROCEDURE — 6370000000 HC RX 637 (ALT 250 FOR IP): Performed by: HOSPITALIST

## 2024-04-17 PROCEDURE — 80076 HEPATIC FUNCTION PANEL: CPT

## 2024-04-17 PROCEDURE — 36415 COLL VENOUS BLD VENIPUNCTURE: CPT

## 2024-04-17 PROCEDURE — 84466 ASSAY OF TRANSFERRIN: CPT

## 2024-04-17 PROCEDURE — 6360000002 HC RX W HCPCS: Performed by: HOSPITALIST

## 2024-04-17 RX ORDER — ENOXAPARIN SODIUM 100 MG/ML
1 INJECTION SUBCUTANEOUS 2 TIMES DAILY
Status: DISCONTINUED | OUTPATIENT
Start: 2024-04-17 | End: 2024-04-18

## 2024-04-17 RX ORDER — WARFARIN SODIUM 5 MG/1
10 TABLET ORAL ONCE
Status: COMPLETED | OUTPATIENT
Start: 2024-04-17 | End: 2024-04-17

## 2024-04-17 RX ADMIN — ENOXAPARIN SODIUM 90 MG: 100 INJECTION SUBCUTANEOUS at 20:11

## 2024-04-17 RX ADMIN — IRON SUCROSE 200 MG: 20 INJECTION, SOLUTION INTRAVENOUS at 10:20

## 2024-04-17 RX ADMIN — WARFARIN SODIUM 10 MG: 5 TABLET ORAL at 15:19

## 2024-04-17 RX ADMIN — SODIUM CHLORIDE, PRESERVATIVE FREE 10 ML: 5 INJECTION INTRAVENOUS at 10:10

## 2024-04-17 RX ADMIN — ALLOPURINOL 100 MG: 100 TABLET ORAL at 20:11

## 2024-04-17 RX ADMIN — ENOXAPARIN SODIUM 90 MG: 100 INJECTION SUBCUTANEOUS at 10:09

## 2024-04-17 RX ADMIN — SODIUM CHLORIDE, PRESERVATIVE FREE 10 ML: 5 INJECTION INTRAVENOUS at 20:12

## 2024-04-17 RX ADMIN — POTASSIUM & SODIUM PHOSPHATES POWDER PACK 280-160-250 MG 250 MG: 280-160-250 PACK at 10:10

## 2024-04-17 RX ADMIN — LEVOTHYROXINE SODIUM 137 MCG: 0.11 TABLET ORAL at 06:05

## 2024-04-17 RX ADMIN — TIZANIDINE 4 MG: 4 TABLET ORAL at 20:11

## 2024-04-17 RX ADMIN — ACETAMINOPHEN 325MG 650 MG: 325 TABLET ORAL at 20:11

## 2024-04-17 RX ADMIN — TAMSULOSIN HYDROCHLORIDE 0.4 MG: 0.4 CAPSULE ORAL at 20:11

## 2024-04-17 RX ADMIN — INSULIN GLARGINE 20 UNITS: 100 INJECTION, SOLUTION SUBCUTANEOUS at 10:10

## 2024-04-17 ASSESSMENT — PAIN SCALES - GENERAL: PAINLEVEL_OUTOF10: 0

## 2024-04-17 NOTE — PROGRESS NOTES
Department of Internal Medicine  Nephrology Progress Note        HISTORY OF PRESENTING ILLNESS: A 73-year-old male with past medical history significant for obesity, hypertension, obstructive sleep apnea, diabetes mellitus type 2, coronary artery disease, prostate cancer, chronic kidney disease stage 3A, follows up with Dr. Salinas, came to ER feeling weak, tired, and dizzy. The patient was confused and disoriented at the time of presentation. At the time of presentation, he was hypotensive with a blood pressure of 85/48 with a hemoglobin of 6.9. The patient was recently discharged from the hospital after being treated for retroperitoneal hemorrhage, Coumadin was held. The patient did not require any blood transfusion. The patient was admitted for further workup and management. Renal consultation has been called for acute kidney injury. Overnight, the patient received some IV fluids and was taken off lisinopril. At the time of consultation, the patient is feeling better with improvement in the blood pressure. Still feeling weak, tired, decreased level of energy. Denies any chest pain, shortness of breath, but has some dyspnea on exertion. Denies any urinary symptoms. The patient did receive IV contrast yesterday to assess retroperitoneal bleed. CAT scan of the abdomen showed decreased extraperitoneal blood product and hemoperitoneum. He was found to have a stable left iliopsoas intramuscular hematoma.         labs reviewed   Over  all feels better .   No acute events last night .   REVIEW OF SYSTEMS:  No CP/SOB;TOLENTINO .     Physical Exam:    VITALS:  BP (!) 143/77   Pulse 99   Temp 98.3 °F (36.8 °C) (Oral)   Resp 21   Ht 1.803 m (5' 11\")   Wt 85 kg (187 lb 6.3 oz)   SpO2 95%   BMI 26.14 kg/m²   24HR INTAKE/OUTPUT:    Intake/Output Summary (Last 24 hours) at 4/17/2024 4499  Last data filed at 4/17/2024 0981  Gross per 24 hour   Intake 720 ml   Output 1450 ml   Net -730 ml         Constitutional:  Resting

## 2024-04-17 NOTE — PROGRESS NOTES
[facility]  Cardiology Consult    Morro Kevin  1950 April 17, 2024        CC: Dizziness      Subjective:     History of Present Illness:    Morro Kevin is a 73 y.o. patient with a PMH significant for mechanical aortic valve status post aortic stenosis surgery presented with complaints of dizziness weakness.  Found to have RP bleed.  Severe anemia present.  Denies chest pain palpitation loss of consciousness.     Apparently the RP bleed was from lifting and moving heavy objects and being on Coumadin.  Blood pressure was mildly low and hemoglobin is low.  He did receive blood transfusion.    Past Medical History:   has a past medical history of Aortic stenosis, Arthritis, Asthma, Deviated septum, Diabetes mellitus type II, Hashimoto thyroiditis, Homocysteinemia, Hyperlipidemia, Hypertension, Hypertriglyceridemia, Hypogonadism male, Hypothyroid, Kidney stone, Mechanical heart valve present, Murmur, Nasal polyps, Osteoarthritis, Prolonged emergence from general anesthesia, Sleep apnea, Warfarin anticoagulation, and Wears glasses.    Surgical History:   has a past surgical history that includes colectomy (1996); Nasal polyp surgery (1989, 1987); knee surgery (Left, 2004); Rotator cuff repair (Right, 2009); Colonoscopy (9/24/2014; 2004); cyst removal; other surgical history (1990s); Aortic valve replacement (09/14/2016); Upper gastrointestinal endoscopy (N/A, 6/30/2023); Colonoscopy (N/A, 6/30/2023); Capsule endoscopy (N/A, 6/30/2023); and Upper gastrointestinal endoscopy (N/A, 7/3/2023).     Social History:   reports that he has never smoked. He has never used smokeless tobacco. He reports current alcohol use of about 2.0 standard drinks of alcohol per week. He reports that he does not use drugs.     Family History:  family history includes Arthritis in his father; Cancer in his paternal grandmother; Diabetes in his brother and another family member; Heart Disease in his father and maternal

## 2024-04-17 NOTE — PLAN OF CARE
Problem: Discharge Planning  Goal: Discharge to home or other facility with appropriate resources  4/17/2024 1936 by Deirdre Pulido RN  Outcome: Progressing  4/17/2024 1615 by Kassidy Lynch RN  Outcome: Progressing     Problem: Pain  Goal: Verbalizes/displays adequate comfort level or baseline comfort level  4/17/2024 1936 by Deirdre Pulido RN  Outcome: Progressing  4/17/2024 1615 by Kassidy Lynch RN  Outcome: Progressing     Problem: Skin/Tissue Integrity  Goal: Absence of new skin breakdown  Description: 1.  Monitor for areas of redness and/or skin breakdown  2.  Assess vascular access sites hourly  3.  Every 4-6 hours minimum:  Change oxygen saturation probe site  4.  Every 4-6 hours:  If on nasal continuous positive airway pressure, respiratory therapy assess nares and determine need for appliance change or resting period.  4/17/2024 1936 by Deirdre Pulido RN  Outcome: Progressing  4/17/2024 1615 by Kassidy Lynch RN  Outcome: Progressing     Problem: Safety - Adult  Goal: Free from fall injury  4/17/2024 1936 by Deirdre Pulido RN  Outcome: Progressing  4/17/2024 1615 by Kassidy Lynch RN  Outcome: Progressing     Problem: ABCDS Injury Assessment  Goal: Absence of physical injury  4/17/2024 1936 by Deirdre Pulido RN  Outcome: Progressing  4/17/2024 1615 by Kassidy Lynch RN  Outcome: Progressing

## 2024-04-17 NOTE — PROGRESS NOTES
Occupational Therapy  Facility/Department: [address] PROGRESSIVE CARE  Occupational Therapy Initial Assessment and Tentative D/C      Name: Morro Kevin  : 1950  MRN: [id number]  Date of Service: 2024    Discharge Recommendations: Morro Kevin scored a 19/24 on the AM-PAC ADL Inpatient form. Current research shows that an AM-PAC score of 18 or greater is typically associated with a discharge to the patient's home setting.     If patient discharges prior to next session this note will serve as a discharge summary.  Please see below for the latest assessment towards goals.     Home with assist PRN  OT Equipment Recommendations  Equipment Needed: No       Patient Diagnosis(es): The primary encounter diagnosis was Anemia, unspecified type. Diagnoses of Acute renal failure superimposed on chronic kidney disease, unspecified acute renal failure type, unspecified CKD stage (HCC), Dizziness, and Retroperitoneal hematoma were also pertinent to this visit.  Past Medical History:  has a past medical history of Aortic stenosis, Arthritis, Asthma, Deviated septum, Diabetes mellitus type II, Hashimoto thyroiditis, Homocysteinemia, Hyperlipidemia, Hypertension, Hypertriglyceridemia, Hypogonadism male, Hypothyroid, Kidney stone, Mechanical heart valve present, Murmur, Nasal polyps, Osteoarthritis, Prolonged emergence from general anesthesia, Sleep apnea, Warfarin anticoagulation, and Wears glasses.  Past Surgical History:  has a past surgical history that includes colectomy (); Nasal polyp surgery (, ); knee surgery (Left, ); Rotator cuff repair (Right, ); Colonoscopy (2014; ); cyst removal; other surgical history (); Aortic valve replacement (2016); Upper gastrointestinal endoscopy (N/A, 2023); Colonoscopy (N/A, 2023); Capsule endoscopy (N/A, 2023); and Upper gastrointestinal endoscopy (N/A, 7/3/2023).           Assessment   Performance deficits / Impairments:

## 2024-04-17 NOTE — PROGRESS NOTES
Clinical Pharmacy Note  Warfarin Consult    Morro Kevin is a 73 y.o. male receiving warfarin managed by pharmacy.  Patient being bridged with none.    Warfarin Indication: AVR  Target INR range: 2-3   Dose prior to admission: 7.5mg qd ex 3.75 Monday and thursday    Current warfarin drug-drug interactions: none    Recent Labs     04/15/24  1152 04/15/24  2016 04/16/24  0502 04/16/24  1324 04/17/24  0448   HGB 6.9*   < > 6.6* 8.4* 8.5*   HCT 21.7*   < > 18.7* 24.2* 25.0*   INR 1.51*  --   --   --   --     < > = values in this interval not displayed.       Assessment/Plan:    Warfarin 10 mg tonight. Daily PT/INR until stable within therapeutic range.     Thank you for the consult.  Will continue to follow.

## 2024-04-17 NOTE — PROGRESS NOTES
V2.0  [facility] [place]      Name:  Morro Kevin /Age/Sex: 1950  (73 y.o. male)   MRN & CSN:  [id number] & [id number] Encounter Date/Time: 2024 12:50 PM EDT    Location:  [id number] PCP: Fareed Marino MD       Hospital Day: 3    Assessment and Plan:   Morro Kevin is a 73 y.o. male with pmh of  who presents with Dizziness      Plan:    Acute anemia  Hemoglobin 6.9G/DL on presentation  Received 1 unit PRBC and repeat hemoglobin was 7.3G/DL.  Hemoglobin dropped to 6.6G/DL this morning again.  Receiving second unit PRBC.  Patient has recent history of spontaneous left iliopsoas intramuscular hematoma.  Repeat CT abdomen and pelvis showed stable hematoma and decreased extraperitoneal blood products and hemoperitoneum in the pelvis  Has history of GI bleed.  May need GI evaluation again if hemoglobin continues to drop  Continue to monitor hemoglobin closely and transfuse if hemoglobin drops to less than 7G/DL       : Hb  improved , will continue to moniter      Leukocytosis  Likely reactionary in the setting of anemia  Improving without ABX  Continue to monitor    SUKUMAR : better  Creatinine on presentation 2.3 mg/DL.  Creatinine was 1.2 mg/DL at the time of discharge on 2024.   S/p IVF    History of mechanical aortic valve  On Coumadin    Will restart anticoagulation , cardiology recommendation noted   Will moniter Hb  Pharmacy consult for coumadin dosing    Type II DM  Currently on Lantus 20 units daily and medium dose sliding scale.    BPH on Flomax, continue        Diet ADULT DIET; Regular; Low Sodium (2 gm)   DVT Prophylaxis [] Lovenox, []  Heparin, [] SCDs, [] Ambulation,  [] Eliquis, [] Xarelto  [] Coumadin   Code Status Full Code   Disposition From:   Expected Disposition:   Estimated Date of Discharge:   Patient requires continued admission due to    Surrogate Decision Maker/ POA      Personally reviewed Lab Studies and Imaging     Reviewed CT abdomen pelvis which

## 2024-04-17 NOTE — PROGRESS NOTES
Nephrology Progress Note   [email or web address]      Reason for consultation: SUKUMAR on CKD 3a -- baseline Cr ~ 1.3-1.5 mg/dL. Follows with Dr. Salinas in office     Subjective:    The patient has been seen and examined. Labs and chart reviewed. Resting in chair. Melgoza catheter removed yesterday without complication. Hgb stable overnight. Cr back to baseline at 1.3 mg/dL this AM.     There were not complications overnight.    Patient review of systems: Denies SOB.      Allergies:  Allergies   Allergen Reactions    Ciprofloxacin Hives        Scheduled Meds:   potassium & sodium phosphates  1 packet Oral Once    enoxaparin  1 mg/kg SubCUTAneous BID    insulin glargine  20 Units SubCUTAneous Daily    allopurinol  100 mg Oral Once per day on     levothyroxine  137 mcg Oral Daily    tamsulosin  0.4 mg Oral Nightly    sodium chloride flush  5-40 mL IntraVENous 2 times per day    insulin lispro  0-8 Units SubCUTAneous TID WC    insulin lispro  0-4 Units SubCUTAneous Nightly        sodium chloride      sodium chloride      dextrose         PRN Meds:sodium chloride, HYDROcodone-acetaminophen, tiZANidine, ipratropium 0.5 mg-albuterol 2.5 mg, sodium chloride flush, sodium chloride, potassium chloride **OR** potassium alternative oral replacement **OR** potassium chloride, magnesium sulfate, ondansetron **OR** ondansetron, polyethylene glycol, acetaminophen **OR** acetaminophen, glucose, dextrose bolus **OR** dextrose bolus, glucagon (rDNA), dextrose    Physical Exam:    TEMPERATURE:  Current - Temp: 98.3 °F (36.8 °C); Max - Temp  Av.2 °F (37.3 °C)  Min: 97.9 °F (36.6 °C)  Max: 100.6 °F (38.1 °C)  RESPIRATIONS RANGE: Resp  Av  Min: 16  Max: 24  PULSE RANGE: Pulse  Av.7  Min: 82  Max: 98  BLOOD PRESSURE RANGE:  Systolic (24hrs), Av , Min:105 , Max:143   ; Diastolic (24hrs), Av, Min:58, Max:77    24HR INTAKE/OUTPUT:    Intake/Output Summary (Last 24 hours) at 2024 1001  Last data filed at 2024  WBCUA 5 04/16/2024 02:00 PM    RBCUA 23 04/16/2024 02:00 PM    MUCUS 2+ 06/29/2012 07:50 AM    BACTERIA None Seen 04/16/2024 02:00 PM    CLARITYU Clear 04/16/2024 02:00 PM    SPECGRAV 1.015 04/16/2024 02:00 PM    LEUKOCYTESUR TRACE 04/16/2024 02:00 PM    UROBILINOGEN 1.0 04/16/2024 02:00 PM    BILIRUBINUR Negative 04/16/2024 02:00 PM    BILIRUBINUR NEG 05/29/2019 07:44 AM    BILIRUBINUR NEGATIVE 12/09/2011 07:17 AM    BLOODU LARGE 04/16/2024 02:00 PM    GLUCOSEU >=1000 04/16/2024 02:00 PM    GLUCOSEU NEGATIVE 12/09/2011 07:17 AM         IMPRESSION/RECOMMENDATIONS:      SUKUMAR on CKD 3a: baseline Cr ~ 1.3-1.5 mg/dL. Follows with Dr. Salinas in office. Etiology of CKD 2/2 diabetic nephropathy per office note. Etiology of SUKUMAR 2/2 acute blood loss, hypotension, and poor oral intake.               - Cr 2.3>1.7>1.1 mg/dL this AM. Back to baseline.               - Off IVF              - Continue to hold lisinopril and synjardy. No NSAIDs.              - Avoid hypotension and avoid nephrotoxic agents              - Daily RFTs     Anemia / Recent retroperitoneal hemorrhage:  S/p 1u PRBC on 4/15 and 1u PRBC on 4/16/2024              - Hgb 8.5 g/dL.              - CTAP with IV contrast (4/15/2024): stable left iliopsoas intramuscular hematoma              - Iron sat 15. Ferritin 65.6. Venofer ordered.              - General surgery consulted -- no indication for acute surgical intervention     H/o Aortic valve replacement   - On Lovenox   - Management per cardiology     Hypertension              - Controlled off antihypertensives              - Monitor     H/o uric acid nephrolithiasis              - On allopurinol    Hypophosphatemia   - PO4 2.1 mg/dL -- replacement ordered     Will discuss with nephrology attending physician, Dr. Navarro.  See attestation for additional recommendations.    Roxana Antonio, MARITA - CNP

## 2024-04-17 NOTE — PROGRESS NOTES
Physical Therapy  Facility/Department: [address] PROGRESSIVE CARE  Physical Therapy Initial Assessment    Name: Morro Kevin  : 1950  MRN: [id number]  Date of Service: 2024    Discharge Recommendations:  Home with assist PRN, Outpatient PT, Patient would benefit from continued therapy after discharge   PT Equipment Recommendations  Equipment Needed: No      Patient Diagnosis(es): The primary encounter diagnosis was Anemia, unspecified type. Diagnoses of Acute renal failure superimposed on chronic kidney disease, unspecified acute renal failure type, unspecified CKD stage (HCC), Dizziness, and Retroperitoneal hematoma were also pertinent to this visit.  Past Medical History:  has a past medical history of Aortic stenosis, Arthritis, Asthma, Deviated septum, Diabetes mellitus type II, Hashimoto thyroiditis, Homocysteinemia, Hyperlipidemia, Hypertension, Hypertriglyceridemia, Hypogonadism male, Hypothyroid, Kidney stone, Mechanical heart valve present, Murmur, Nasal polyps, Osteoarthritis, Prolonged emergence from general anesthesia, Sleep apnea, Warfarin anticoagulation, and Wears glasses.  Past Surgical History:  has a past surgical history that includes colectomy (); Nasal polyp surgery (, ); knee surgery (Left, ); Rotator cuff repair (Right, ); Colonoscopy (2014; ); cyst removal; other surgical history (); Aortic valve replacement (2016); Upper gastrointestinal endoscopy (N/A, 2023); Colonoscopy (N/A, 2023); Capsule endoscopy (N/A, 2023); and Upper gastrointestinal endoscopy (N/A, 7/3/2023).    Assessment   Body Structures, Functions, Activity Limitations Requiring Skilled Therapeutic Intervention: Decreased functional mobility ;Increased pain;Decreased balance;Decreased ROM;Decreased strength  Assessment: Pt is a 73 y.o. M. admitted 4/15 for retroperitoneal hematoma, PAFIB, hx of mechanical valve, SUKUMAR.  Pt presents with c/o LLE pain, mildly  training  Safety Devices  Type of Devices: All fall risk precautions in place, Call light within reach, Chair alarm in place, Left in chair  Restraints  Restraints Initially in Place: No     Restrictions  Restrictions/Precautions  Restrictions/Precautions: Fall Risk     Subjective   General  Chart Reviewed: Yes  Patient assessed for rehabilitation services?: Yes  Additional Pertinent Hx: \"per ED note, Morro Kevin is a 73 y.o. male who presents to the emergency department with dizziness that he noticed at 4 AM when he woke up this morning.  He went to bed at 10 PM last night and states that he felt okay at that time.  Of note this patient was recently admitted and discharged.  He was hospitalized and discharged on 4/12/2024.  He is anticoagulated, on Coumadin.  He developed some left leg discomfort and flank discomfort.  He had a left retroperitoneal/iliopsoas bleed.  When he was discharged on the 12th he was being bridged on his Coumadin and Lovenox.  His sons actually tell me that since discharge she has been intermittently confused and had some lightheadedness.  They state it was worse this morning.  He also developed some diarrhea about an hour prior to coming in today.  He denies headache.  He states his vision is blurred at times but no double vision.  He has left leg pain and weakness due to his recent retroperitoneal bleed which he does not believe is changed.  He states he has not been able to walk because of the pain which has not changed.  He describes it more of a lightheaded feeling, although at times he feels like he is spinning.  It is not necessarily associated with standing or head movement.\"  Response To Previous Treatment: Not applicable  Referring Practitioner: Isac Piña MD  Referral Date : 04/15/24  Diagnosis: Retroperitoneal hematoma; New-onset PAFIB; hx of Severe AS and Mechanical AVR; SUKUMAR  Follows Commands: Within Functional Limits         Social/Functional

## 2024-04-18 VITALS
OXYGEN SATURATION: 95 % | WEIGHT: 185.63 LBS | TEMPERATURE: 99.3 F | BODY MASS INDEX: 25.99 KG/M2 | HEIGHT: 71 IN | RESPIRATION RATE: 16 BRPM | DIASTOLIC BLOOD PRESSURE: 76 MMHG | SYSTOLIC BLOOD PRESSURE: 133 MMHG | HEART RATE: 99 BPM

## 2024-04-18 LAB
ALBUMIN SERPL-MCNC: 3.4 G/DL (ref 3.4–5)
ANION GAP SERPL CALCULATED.3IONS-SCNC: 9 MMOL/L (ref 3–16)
BASOPHILS # BLD: 0 K/UL (ref 0–0.2)
BASOPHILS NFR BLD: 0.5 %
BUN SERPL-MCNC: 19 MG/DL (ref 7–20)
CALCIUM SERPL-MCNC: 9 MG/DL (ref 8.3–10.6)
CHLORIDE SERPL-SCNC: 104 MMOL/L (ref 99–110)
CO2 SERPL-SCNC: 23 MMOL/L (ref 21–32)
CREAT SERPL-MCNC: 1 MG/DL (ref 0.8–1.3)
DEPRECATED RDW RBC AUTO: 16.8 % (ref 12.4–15.4)
EOSINOPHIL # BLD: 0.1 K/UL (ref 0–0.6)
EOSINOPHIL NFR BLD: 1.6 %
GFR SERPLBLD CREATININE-BSD FMLA CKD-EPI: 80 ML/MIN/{1.73_M2}
GLUCOSE BLD-MCNC: 148 MG/DL (ref 70–99)
GLUCOSE BLD-MCNC: 180 MG/DL (ref 70–99)
GLUCOSE BLD-MCNC: 193 MG/DL (ref 70–99)
GLUCOSE SERPL-MCNC: 135 MG/DL (ref 70–99)
HCT VFR BLD AUTO: 26.4 % (ref 40.5–52.5)
HGB BLD-MCNC: 8.9 G/DL (ref 13.5–17.5)
INR PPP: 1.42 (ref 0.85–1.15)
LYMPHOCYTES # BLD: 0.8 K/UL (ref 1–5.1)
LYMPHOCYTES NFR BLD: 12.3 %
MCH RBC QN AUTO: 31.6 PG (ref 26–34)
MCHC RBC AUTO-ENTMCNC: 33.8 G/DL (ref 31–36)
MCV RBC AUTO: 93.5 FL (ref 80–100)
MONOCYTES # BLD: 0.5 K/UL (ref 0–1.3)
MONOCYTES NFR BLD: 7.5 %
NEUTROPHILS # BLD: 5.1 K/UL (ref 1.7–7.7)
NEUTROPHILS NFR BLD: 78.1 %
PERFORMED ON: ABNORMAL
PHOSPHATE SERPL-MCNC: 2.1 MG/DL (ref 2.5–4.9)
PLATELET # BLD AUTO: 196 K/UL (ref 135–450)
PMV BLD AUTO: 7.8 FL (ref 5–10.5)
POTASSIUM SERPL-SCNC: 4.2 MMOL/L (ref 3.5–5.1)
PROTHROMBIN TIME: 17.5 SEC (ref 11.9–14.9)
RBC # BLD AUTO: 2.82 M/UL (ref 4.2–5.9)
SODIUM SERPL-SCNC: 136 MMOL/L (ref 136–145)
WBC # BLD AUTO: 6.6 K/UL (ref 4–11)

## 2024-04-18 PROCEDURE — 80069 RENAL FUNCTION PANEL: CPT

## 2024-04-18 PROCEDURE — 6370000000 HC RX 637 (ALT 250 FOR IP): Performed by: INTERNAL MEDICINE

## 2024-04-18 PROCEDURE — 97535 SELF CARE MNGMENT TRAINING: CPT

## 2024-04-18 PROCEDURE — 6360000002 HC RX W HCPCS: Performed by: HOSPITALIST

## 2024-04-18 PROCEDURE — 85025 COMPLETE CBC W/AUTO DIFF WBC: CPT

## 2024-04-18 PROCEDURE — 97530 THERAPEUTIC ACTIVITIES: CPT

## 2024-04-18 PROCEDURE — 36415 COLL VENOUS BLD VENIPUNCTURE: CPT

## 2024-04-18 PROCEDURE — 6360000002 HC RX W HCPCS

## 2024-04-18 PROCEDURE — 6370000000 HC RX 637 (ALT 250 FOR IP)

## 2024-04-18 PROCEDURE — 85610 PROTHROMBIN TIME: CPT

## 2024-04-18 PROCEDURE — 97116 GAIT TRAINING THERAPY: CPT

## 2024-04-18 PROCEDURE — 6370000000 HC RX 637 (ALT 250 FOR IP): Performed by: HOSPITALIST

## 2024-04-18 PROCEDURE — 2580000003 HC RX 258: Performed by: HOSPITALIST

## 2024-04-18 RX ORDER — WARFARIN SODIUM 7.5 MG/1
7.5 TABLET ORAL ONCE
Status: DISCONTINUED | OUTPATIENT
Start: 2024-04-18 | End: 2024-04-18 | Stop reason: HOSPADM

## 2024-04-18 RX ORDER — ASPIRIN 81 MG/1
81 TABLET ORAL NIGHTLY
Qty: 90 TABLET | Refills: 3 | Status: SHIPPED | OUTPATIENT
Start: 2024-04-23

## 2024-04-18 RX ORDER — ENOXAPARIN SODIUM 100 MG/ML
1 INJECTION SUBCUTANEOUS 2 TIMES DAILY
Status: DISCONTINUED | OUTPATIENT
Start: 2024-04-18 | End: 2024-04-18 | Stop reason: HOSPADM

## 2024-04-18 RX ORDER — HYDROCODONE BITARTRATE AND ACETAMINOPHEN 5; 325 MG/1; MG/1
1 TABLET ORAL EVERY 6 HOURS PRN
Qty: 12 TABLET | Refills: 0 | Status: SHIPPED | OUTPATIENT
Start: 2024-04-18 | End: 2024-04-21

## 2024-04-18 RX ORDER — ENOXAPARIN SODIUM 100 MG/ML
1 INJECTION SUBCUTANEOUS 2 TIMES DAILY
Qty: 24 ML | Refills: 0 | Status: SHIPPED | OUTPATIENT
Start: 2024-04-18 | End: 2024-04-30

## 2024-04-18 RX ADMIN — INSULIN GLARGINE 20 UNITS: 100 INJECTION, SOLUTION SUBCUTANEOUS at 11:17

## 2024-04-18 RX ADMIN — LEVOTHYROXINE SODIUM 137 MCG: 0.11 TABLET ORAL at 06:11

## 2024-04-18 RX ADMIN — SODIUM CHLORIDE, PRESERVATIVE FREE 10 ML: 5 INJECTION INTRAVENOUS at 11:18

## 2024-04-18 RX ADMIN — IRON SUCROSE 200 MG: 20 INJECTION, SOLUTION INTRAVENOUS at 11:17

## 2024-04-18 RX ADMIN — POTASSIUM & SODIUM PHOSPHATES POWDER PACK 280-160-250 MG 250 MG: 280-160-250 PACK at 11:17

## 2024-04-18 RX ADMIN — ENOXAPARIN SODIUM 80 MG: 100 INJECTION SUBCUTANEOUS at 11:17

## 2024-04-18 ASSESSMENT — PAIN SCALES - GENERAL: PAINLEVEL_OUTOF10: 0

## 2024-04-18 NOTE — PROGRESS NOTES
Occupational Therapy  Facility/Department: [address] PROGRESSIVE CARE  Occupational Therapy Treatment and Tentative D/C      Name: Morro Kevin  : 1950  MRN: [id number]  Date of Service: 2024    Discharge Recommendations: Morro Kevin scored a 19/24 on the AM-PAC ADL Inpatient form. Current research shows that an AM-PAC score of 18 or greater is typically associated with a discharge to the patient's home setting.    If patient discharges prior to next session this note will serve as a discharge summary.  Please see below for the latest assessment towards goals.     Home with assist PRN  OT Equipment Recommendations  Equipment Needed: No       Patient Diagnosis(es): The primary encounter diagnosis was Anemia, unspecified type. Diagnoses of Acute renal failure superimposed on chronic kidney disease, unspecified acute renal failure type, unspecified CKD stage (HCC), Dizziness, and Retroperitoneal hematoma were also pertinent to this visit.  Past Medical History:  has a past medical history of Aortic stenosis, Arthritis, Asthma, Deviated septum, Diabetes mellitus type II, Hashimoto thyroiditis, Homocysteinemia, Hyperlipidemia, Hypertension, Hypertriglyceridemia, Hypogonadism male, Hypothyroid, Kidney stone, Mechanical heart valve present, Murmur, Nasal polyps, Osteoarthritis, Prolonged emergence from general anesthesia, Sleep apnea, Warfarin anticoagulation, and Wears glasses.  Past Surgical History:  has a past surgical history that includes colectomy (); Nasal polyp surgery (, ); knee surgery (Left, ); Rotator cuff repair (Right, ); Colonoscopy (2014; ); cyst removal; other surgical history (); Aortic valve replacement (2016); Upper gastrointestinal endoscopy (N/A, 2023); Colonoscopy (N/A, 2023); Capsule endoscopy (N/A, 2023); and Upper gastrointestinal endoscopy (N/A, 7/3/2023).           Assessment   Performance deficits / Impairments: Decreased  weakness due to his recent retroperitoneal bleed which he does not believe is changed.  He states he has not been able to walk because of the pain which has not changed.  He describes it more of a lightheaded feeling, although at times he feels like he is spinning.  It is not necessarily associated with standing or head movement.  Family / Caregiver Present: No  Referring Practitioner: Isac Piña MD  Subjective  Subjective: Pt agreeable to OT treatment. Pt reports mild pain in L groin with no number stated.     Social/Functional History  Social/Functional History  Lives With: Son  Type of Home: Condo  Home Layout: One level  Home Access: Ramped entrance, Elevator  Bathroom Shower/Tub: Walk-in shower (shower chair ordered per pt)  Bathroom Toilet: Standard  Home Equipment: Cane, Walker, 4 wheeled (transport chair)  Has the patient had two or more falls in the past year or any fall with injury in the past year?: No  ADL Assistance: Independent  Homemaking Assistance: Independent (shares with son)  Homemaking Responsibilities: Yes  Ambulation Assistance: Independent (pt reports history of \"leg cramps\")  Transfer Assistance: Independent  Active : Yes  Mode of Transportation: Car  Occupation: Retired  Type of Occupation: retail management, [employer]       Objective        Safety Devices  Type of Devices: All fall risk precautions in place;Call light within reach;Chair alarm in place;Left in chair  Restraints  Restraints Initially in Place: No  Bed Mobility Training  Bed Mobility Training: Yes  Overall Level of Assistance: Independent  Supine to Sit: Independent  Scooting: Independent  Balance  Sitting: Intact  Standing: Impaired (RW)  Standing - Static: Good  Standing - Dynamic: Good;Fair  Transfer Training  Transfer Training: Yes  Overall Level of Assistance: Supervision (RW)  Sit to Stand: Supervision (RW)  Stand to Sit: Supervision  Bed to Chair: Supervision (RW)  Toilet Transfer: Supervision

## 2024-04-18 NOTE — DISCHARGE SUMMARY
Hospital Medicine Discharge Summary    Patient ID: Morro Kevin      Patient's PCP: Fareed Marino MD    Admit Date: 4/15/2024     Discharge Date:   4/18/24    Admitting Physician: Isac Piña MD     Discharge Physician: Isac Piña MD     Discharge Diagnoses:       Active Hospital Problems    Diagnosis     Paroxysmal atrial fibrillation (HCC) [I48.0]     Retroperitoneal hematoma [K68.3]     Dizziness [R42]        The patient was seen and examined on day of discharge and this discharge summary is in conjunction with any daily progress note from day of discharge.    Hospital Course:     Morro Kevin is a 73 y.o. male with pmh of  who presents with   C/o dizziness  C/o generalized weakness  Patient was recently discharged from this hospital on 4/12/24 ,on coumadin + lovenox bridge   He was admitted for anemia , spontaneous retroperitoneal bleeding         ED     Hb 6.9,  1 U PRBC ordered     C/o left leg pain , difficulty in walking due to pain over left hip area     Serum Cr 2.3     CT head no acute pathology     CT abdomen : no change in retroperitoneal hematoma      Acute anemia  Hemoglobin 6.9G/DL on presentation  Received 1 unit PRBC and repeat hemoglobin was 7.3G/DL.  Hemoglobin dropped to 6.6G/DL this morning again.  Receiving second unit PRBC.  Patient has recent history of spontaneous left iliopsoas intramuscular hematoma.  Repeat CT abdomen and pelvis showed stable hematoma and decreased extraperitoneal blood products and hemoperitoneum in the pelvis  Has history of GI bleed.  May need GI evaluation again if hemoglobin continues to drop  Continue to monitor hemoglobin closely and transfuse if hemoglobin drops to less than 7G/DL        Leukocytosis  Likely reactionary in the setting of anemia  Improving without ABX  Continue to monitor     SUKUMAR  Creatinine on presentation 2.3 mg/DL.  Creatinine was 1.2 mg/DL at the time of discharge on 4/12/2024.  Now improving with IVF.     History

## 2024-04-18 NOTE — PROGRESS NOTES
Department of Internal Medicine  Nephrology Progress Note        HISTORY OF PRESENTING ILLNESS: A 73-year-old male with past medical history significant for obesity, hypertension, obstructive sleep apnea, diabetes mellitus type 2, coronary artery disease, prostate cancer, chronic kidney disease stage 3A, follows up with Dr. Salinas, came to ER feeling weak, tired, and dizzy. The patient was confused and disoriented at the time of presentation. At the time of presentation, he was hypotensive with a blood pressure of 85/48 with a hemoglobin of 6.9. The patient was recently discharged from the hospital after being treated for retroperitoneal hemorrhage, Coumadin was held. The patient did not require any blood transfusion. The patient was admitted for further workup and management. Renal consultation has been called for acute kidney injury. Overnight, the patient received some IV fluids and was taken off lisinopril. At the time of consultation, the patient is feeling better with improvement in the blood pressure. Still feeling weak, tired, decreased level of energy. Denies any chest pain, shortness of breath, but has some dyspnea on exertion. Denies any urinary symptoms. The patient did receive IV contrast yesterday to assess retroperitoneal bleed. CAT scan of the abdomen showed decreased extraperitoneal blood product and hemoperitoneum. He was found to have a stable left iliopsoas intramuscular hematoma.         labs reviewed   Over  all feels better .   No acute events last night .   REVIEW OF SYSTEMS:  No CP/SOB;TOLENTINO .     Physical Exam:    VITALS:  BP (!) 142/76   Pulse 99   Temp 97.4 °F (36.3 °C) (Oral)   Resp 16   Ht 1.803 m (5' 11\")   Wt 84.2 kg (185 lb 10 oz)   SpO2 97%   BMI 25.89 kg/m²   24HR INTAKE/OUTPUT:    Intake/Output Summary (Last 24 hours) at 4/18/2024 1411  Last data filed at 4/18/2024 1340  Gross per 24 hour   Intake 850 ml   Output 1450 ml   Net -600 ml         Constitutional:  Resting

## 2024-04-18 NOTE — PROGRESS NOTES
Verbal and typed discharge education provided to patient and patient's son. Both verbalized understanding. Patient to be discharged to home with [facility]. Peripheral IVs and heart monitor removed. Patient was transported off of the Unit via wheelchair by this RN to be taken home by son.

## 2024-04-18 NOTE — PLAN OF CARE
Problem: Discharge Planning  Goal: Discharge to home or other facility with appropriate resources  Outcome: Completed  Flowsheets (Taken 4/18/2024 1122)  Discharge to home or other facility with appropriate resources:   Identify barriers to discharge with patient and caregiver   Arrange for needed discharge resources and transportation as appropriate   Identify discharge learning needs (meds, wound care, etc)     Problem: Pain  Goal: Verbalizes/displays adequate comfort level or baseline comfort level  Outcome: Completed     Problem: Skin/Tissue Integrity  Goal: Absence of new skin breakdown  Description: 1.  Monitor for areas of redness and/or skin breakdown  2.  Assess vascular access sites hourly  3.  Every 4-6 hours minimum:  Change oxygen saturation probe site  4.  Every 4-6 hours:  If on nasal continuous positive airway pressure, respiratory therapy assess nares and determine need for appliance change or resting period.  Outcome: Completed     Problem: Safety - Adult  Goal: Free from fall injury  Outcome: Completed     Problem: ABCDS Injury Assessment  Goal: Absence of physical injury  Outcome: Completed     Problem: Neurosensory - Adult  Goal: Achieves stable or improved neurological status  Outcome: Completed     Problem: Respiratory - Adult  Goal: Achieves optimal ventilation and oxygenation  Outcome: Completed     Problem: Cardiovascular - Adult  Goal: Maintains optimal cardiac output and hemodynamic stability  Outcome: Completed  Flowsheets (Taken 4/18/2024 1122)  Maintains optimal cardiac output and hemodynamic stability: Monitor blood pressure and heart rate     Problem: Skin/Tissue Integrity - Adult  Goal: Skin integrity remains intact  Outcome: Completed  Flowsheets (Taken 4/18/2024 1122)  Skin Integrity Remains Intact: Monitor for areas of redness and/or skin breakdown     Problem: Musculoskeletal - Adult  Goal: Return mobility to safest level of function  Outcome: Completed  Flowsheets (Taken  4/18/2024 1122)  Return Mobility to Safest Level of Function:   Assess patient stability and activity tolerance for standing, transferring and ambulating with or without assistive devices   Assist with transfers and ambulation using safe patient handling equipment as needed   Ensure adequate protection for wounds/incisions during mobilization   Obtain physical therapy/occupational therapy consults as needed  Goal: Return ADL status to a safe level of function  Outcome: Completed  Flowsheets (Taken 4/18/2024 1122)  Return ADL Status to a Safe Level of Function:   Administer medication as ordered   Assess activities of daily living deficits and provide assistive devices as needed   Obtain physical therapy/occupational therapy consults as needed     Problem: Gastrointestinal - Adult  Goal: Minimal or absence of nausea and vomiting  Outcome: Completed     Problem: Genitourinary - Adult  Goal: Absence of urinary retention  Outcome: Completed     Problem: Infection - Adult  Goal: Absence of infection at discharge  Outcome: Completed     Problem: Metabolic/Fluid and Electrolytes - Adult  Goal: Electrolytes maintained within normal limits  Outcome: Completed  Goal: Glucose maintained within prescribed range  Outcome: Completed     Problem: Hematologic - Adult  Goal: Maintains hematologic stability  Outcome: Completed  Flowsheets (Taken 4/18/2024 1122)  Maintains hematologic stability: Assess for signs and symptoms of bleeding or hemorrhage

## 2024-04-18 NOTE — PROGRESS NOTES
Nephrology Progress Note   [facility] [facility] [employer] [employer]      Reason for consultation: SUKUMAR on CKD 3a -- baseline Cr ~ 1.3-1.5 mg/dL. Follows with Dr. Salinas in office     Subjective:    The patient has been seen and examined. Labs and chart reviewed. Resting in chair. Hgb remains stable. Cr down to 1.0 mg/dL this AM.     There were not complications overnight.    Patient review of systems: Denies SOB.      Allergies:  Allergies   Allergen Reactions    Ciprofloxacin Hives        Scheduled Meds:   enoxaparin  1 mg/kg SubCUTAneous BID    potassium & sodium phosphates  1 packet Oral BID    iron sucrose  200 mg IntraVENous Daily    warfarin placeholder: dosing by pharmacy   Other RX Placeholder    insulin glargine  20 Units SubCUTAneous Daily    allopurinol  100 mg Oral Once per day on     levothyroxine  137 mcg Oral Daily    tamsulosin  0.4 mg Oral Nightly    sodium chloride flush  5-40 mL IntraVENous 2 times per day    insulin lispro  0-8 Units SubCUTAneous TID WC    insulin lispro  0-4 Units SubCUTAneous Nightly        sodium chloride      sodium chloride      dextrose         PRN Meds:sodium chloride, HYDROcodone-acetaminophen, tiZANidine, ipratropium 0.5 mg-albuterol 2.5 mg, sodium chloride flush, sodium chloride, potassium chloride **OR** potassium alternative oral replacement **OR** potassium chloride, magnesium sulfate, ondansetron **OR** ondansetron, polyethylene glycol, acetaminophen **OR** acetaminophen, glucose, dextrose bolus **OR** dextrose bolus, glucagon (rDNA), dextrose    Physical Exam:    TEMPERATURE:  Current - Temp: 97.4 °F (36.3 °C); Max - Temp  Av.7 °F (37.1 °C)  Min: 97.4 °F (36.3 °C)  Max: 100.4 °F (38 °C)  RESPIRATIONS RANGE: Resp  Av.9  Min: 11  Max: 21  PULSE RANGE: Pulse  Av.6  Min: 78  Max: 107  BLOOD PRESSURE RANGE:  Systolic (24hrs), Av , Min:106 , Max:142   ; Diastolic (24hrs), Av, Min:66, Max:76    24HR INTAKE/OUTPUT:    Intake/Output Summary (Last 24 hours) at

## 2024-04-18 NOTE — CARE COORDINATION
Case Management Discharge Note          Date / Time of Note: 4/18/2024 1:06 PM                  Patient Name: Morro Kevin   YOB: 1950  Diagnosis: Dizziness [R42]  Retroperitoneal hematoma [K68.3]  Anemia, unspecified type [D64.9]  Acute renal failure superimposed on chronic kidney disease, unspecified acute renal failure type, unspecified CKD stage (HCC) [N17.9, N18.9]   Date / Time: 4/15/2024 11:25 AM    Financial:  Payor: MEDICARE / Plan: MEDICARE PART A AND B / Product Type: *No Product type* /      Pharmacy:    [facility] "[employer] - [place] - [facility] - P [phone] - F [phone]  [facility]  [place] [address]  Phone: [phone] Fax: [phone]    [facility]/pharmacy #[id number] - [place], [place] - [address] [place] [address] [phone] - F [phone]  [address] [address]  Phone: [phone] Fax: [phone]      Assistance purchasing medications?: Potential Assistance Purchasing Medications: No  Assistance provided by Case Management: None at this time    DISCHARGE Disposition: Home with Home Health Care    Home Care:  Home Care ordered at discharge: Yes  Home Care Agency: [facility]  Phone: [phone]  Fax: [phone]  Orders faxed: Yes    Transportation:  Transportation PLAN for discharge: family   Mode of Transport: Private Car  Time of Transport: later today    Transport form completed: Not Indicated    IMM Completed:   Yes, Case management has presented and reviewed IMM letter #2.           .   Patient and/or family/POA verbalized understanding of their medicare rights and appeal process if needed. Patient and/or family/POA has signed, initialed and placed the date and time on IMM letter #2 on the the appropriate lines. Copy of letter offered and they are aware that the original copy of IMM letter #2 is available prior to discharge from the paper chart on the unit.  Electronic documentation has been  entered into epic for IMM letter #2 and original paper copy has been added to the paper chart at the nurses station.     Additional CM Notes: Patient in agreement with discharge home today with home care. Home care options provided. Patient would like [facility] ([facility]). Spoke to Dieudonne with [facility], agency can accept and pull orders via Epic. Family will transport home.    The Plan for Transition of Care is related to the following treatment goals of Dizziness [R42]  Retroperitoneal hematoma [K68.3]  Anemia, unspecified type [D64.9]  Acute renal failure superimposed on chronic kidney disease, unspecified acute renal failure type, unspecified CKD stage (HCC) [N17.9, N18.9]    The Patient and/or patient representative Morro and his family were provided with a choice of provider and agrees with the discharge plan Yes    Freedom of choice list was provided with basic dialogue that supports the patient's individualized plan of care/goals and shares the quality data associated with the providers. Yes    Alana Saunders RN  [facility]   Case Management Department  Ph: [phone]

## 2024-04-18 NOTE — DISCHARGE INSTRUCTIONS
Get CBC get checked every 3 days for next 2 weeks  Discuss CBC result with your PCP  Get INR checked every 3 days till INR get in goal range of between 2 and 3   Stop Lovenox once INR is at goal range of between 2 and 3  Hold Aspirin for now , can resume Aspirin in 4-5 days if Hb is stable

## 2024-04-18 NOTE — PROGRESS NOTES
History  Social/Functional History  Lives With: Son  Type of Home: [facility]  Home Layout: One level  Home Access: Ramped entrance, Elevator  Bathroom Shower/Tub: Walk-in shower (shower chair ordered per pt)  Bathroom Toilet: Standard  Home Equipment: Cane, Walker, 4 wheeled (transport chair)  Has the patient had two or more falls in the past year or any fall with injury in the past year?: No  ADL Assistance: Independent  Homemaking Assistance: Independent (shares with son)  Homemaking Responsibilities: Yes  Ambulation Assistance: Independent (pt reports history of \"leg cramps\")  Transfer Assistance: Independent  Active : Yes  Mode of Transportation: Car  Occupation: Retired  Type of Occupation: retail management, [employer]  Additional Comments: Pt reports that since his last hospital stay, he has been using the rollator walker at home due to the pain in his L LE.    Vision/Hearing  Vision  Vision: Impaired  Vision Exceptions: Wears glasses at all times  Hearing  Hearing: Within functional limits      Cognition   Cognition  Overall Cognitive Status: WFL     Objective   Observation/Palpation  Observation: Pt on RA on arrival.       Bed mobility  Bed Mobility Comments: Not completed as pt seated in recliner on arrival and at the end of the session.  Transfers  Sit to Stand: Modified independent (Use of RW)  Stand to Sit: Modified independent (With RW)  Ambulation  Surface: Level tile  Device: Rolling Walker  Assistance: Modified Independent  Quality of Gait: Slow speed, step-through pattern, mild LLE pain reported, no LOB, no dizziness.  Distance: >200'     Balance  Standing - Static:  (Mod I with use of RW)  Standing - Dynamic:  (Mod I with use of RW)     AM-PAC - Mobility  AM-PAC Basic Mobility - Inpatient   How much help is needed turning from your back to your side while in a flat bed without using bedrails?: None  How much help is needed moving from lying on your back to sitting on the side of a flat bed  without using bedrails?: None  How much help is needed moving to and from a bed to a chair?: None  How much help is needed standing up from a chair using your arms?: None  How much help is needed walking in hospital room?: None  How much help is needed climbing 3-5 steps with a railing?: None  AM-PAC Inpatient Mobility Raw Score : 24  AM-PAC Inpatient T-Scale Score : 61.14  Mobility Inpatient CMS 0-100% Score: 0  Mobility Inpatient CMS G-Code Modifier : CH    Goals  Short Term Goals  Time Frame for Short Term Goals: 2-3 days  Short Term Goal 1: Ambulation 100' without RW, (I)  Short Term Goal 2: Ascend/Descend 4 steps (I)  Patient Goals   Patient Goals : To return home     *No goals met 4/18/24    Education  Patient Education  Education Given To: Patient  Education Provided: Role of Therapy;Plan of Care  Education Method: Demonstration;Verbal  Barriers to Learning: None  Education Outcome: Verbalized understanding;Continued education needed      Therapy Time   Individual Concurrent Group Co-treatment   Time In 1122         Time Out 1147         Minutes 25         Timed Code Treatment Minutes: 25 Minutes       Wanda Galindo, PT  Wanda Galindo PT, DPT [id number]

## 2024-04-18 NOTE — DISCHARGE INSTR - COC
Continuity of Care Form    Patient Name: Morro Kevin   :  1950  MRN:  [id number]    Admit date:  4/15/2024  Discharge date:  ***    Code Status Order: Full Code   Advance Directives:     Admitting Physician:  Isac Piña MD  PCP: Fareed Marino MD    Discharging Nurse: ***  Discharging Hospital Unit/Room#: J4A-9505/5263-01  Discharging Unit Phone Number: ***    Emergency Contact:   Extended Emergency Contact Information  Primary Emergency Contact: Joe Kevin  Home Phone: [phone]  Mobile Phone: [phone]  Relation: Child  Secondary Emergency Contact: lara kevin  Mobile Phone: [phone]  Relation: Child  Preferred language: English   needed? No    Past Surgical History:  Past Surgical History:   Procedure Laterality Date    AORTIC VALVE REPLACEMENT  2016    21mm [facility]    CAPSULE ENDOSCOPY N/A 2023    BOWEL SMALL CAPSULE ENDOSCOPY performed by Nohelia Phan MD at [facility] ENDOSCOPY    COLECTOMY  1996    perforated bowel: colonoscopy    COLONOSCOPY  2014; 2004    x2 normal colonoscopies/ perforated bowel    COLONOSCOPY N/A 2023    COLONOSCOPY performed by Nohelia Phan MD at [facility] ENDOSCOPY    CYST REMOVAL      hairy nevus left biceps as child    KNEE SURGERY Left     torn meniscus     NASAL POLYP SURGERY  ,     OTHER SURGICAL HISTORY  1990s    kidney stone removal     ROTATOR CUFF REPAIR Right     UPPER GASTROINTESTINAL ENDOSCOPY N/A 2023    ESOPHAGOGASTRODUODENOSCOPY performed by Nohelia Phan MD at [facility] ENDOSCOPY    UPPER GASTROINTESTINAL ENDOSCOPY N/A 7/3/2023    ENTEROSCOPY PUSH CONTROL HEMORRHAGE performed by Nahid Wills MD at [facility] ENDOSCOPY       Immunization History:   Immunization History   Administered Date(s) Administered    COVID-19, MODERNA BLUE border, Primary or Immunocompromised, (age 12y+), IM, 100 mcg/0.5mL 2021, 2021    COVID-19, PFIZER Bivalent, DO NOT Dilute, (age 12y+), IM, 30 mcg/0.3 mL  2 and 3   Stop Lovenox once INR is at goal range of between 2 and 3  Hold Aspirin for now , can resume Aspirin in 4-5 days if Hb is stable      Physician Certification: I certify the above information and transfer of Morro Kevin  is necessary for the continuing treatment of the diagnosis listed and that he requires Home Care for greater 30 days.     Update Admission H&P: No change in H&P    PHYSICIAN SIGNATURE:  Electronically signed by Isac Piña MD on 4/18/24 at 12:20 PM EDT

## 2024-04-18 NOTE — PROGRESS NOTES
Clinical Pharmacy Note  Warfarin Consult    Morro Kevin is a 73 y.o. male receiving warfarin managed by pharmacy.  Patient being bridged with Enoxaparin 1mg/kg BID.    Warfarin Indication: AVR  Target INR range: 2-3   Dose prior to admission: 7.5mg qd ex 3.75 Monday and thursday    Current warfarin drug-drug interactions: none    Recent Labs     04/15/24  1152 04/15/24  2016 04/16/24  1324 04/17/24  0448 04/18/24  0536   HGB 6.9*   < > 8.4* 8.5* 8.9*   HCT 21.7*   < > 24.2* 25.0* 26.4*   INR 1.51*  --   --   --  1.42*    < > = values in this interval not displayed.         Assessment/Plan:  Patient received Warfarin 10mg yesterday  Warfarin 7.5 mg tonight. Daily PT/INR until stable within therapeutic range.     Thank you for the consult.  Will continue to follow.   Anthony Back [facility], 4/18/2024 11:20 AM

## 2024-04-20 LAB
BASOPHILS ABSOLUTE: 0 /ΜL
BASOPHILS ABSOLUTE: ABNORMAL
BASOPHILS RELATIVE PERCENT: 1 %
BASOPHILS RELATIVE PERCENT: ABNORMAL
EOSINOPHILS ABSOLUTE: 0 /ΜL
EOSINOPHILS ABSOLUTE: ABNORMAL
EOSINOPHILS RELATIVE PERCENT: 1 %
EOSINOPHILS RELATIVE PERCENT: ABNORMAL
HCT VFR BLD CALC: 28.5 % (ref 41–53)
HCT VFR BLD CALC: 34.4 % (ref 41–53)
HEMOGLOBIN: 10.9 G/DL (ref 13.5–17.5)
HEMOGLOBIN: 9.4 G/DL (ref 13.5–17.5)
LYMPHOCYTES ABSOLUTE: 0.9 /ΜL
LYMPHOCYTES ABSOLUTE: ABNORMAL
LYMPHOCYTES RELATIVE PERCENT: 15 %
LYMPHOCYTES RELATIVE PERCENT: ABNORMAL
MCH RBC QN AUTO: 31.4 PG
MCH RBC QN AUTO: 31.6 PG
MCHC RBC AUTO-ENTMCNC: 31.8 G/DL
MCHC RBC AUTO-ENTMCNC: 32.9 G/DL
MCV RBC AUTO: 95.4 FL
MCV RBC AUTO: 99.1 FL
MONOCYTES ABSOLUTE: 0.4 /ΜL
MONOCYTES ABSOLUTE: ABNORMAL
MONOCYTES RELATIVE PERCENT: 7 %
MONOCYTES RELATIVE PERCENT: ABNORMAL
NEUTROPHILS ABSOLUTE: 4.5 /ΜL
NEUTROPHILS ABSOLUTE: ABNORMAL
NEUTROPHILS RELATIVE PERCENT: 76 %
NEUTROPHILS RELATIVE PERCENT: ABNORMAL
PLATELET # BLD: 242 K/ΜL
PLATELET # BLD: 269 K/ΜL
PMV BLD AUTO: 7.5 FL
PMV BLD AUTO: 7.7 FL
RBC # BLD: 2.99 10^6/ΜL
RBC # BLD: 3.47 10^6/ΜL
WBC # BLD: 5.9 10^3/ML
WBC # BLD: 8.6 10^3/ML

## 2024-04-22 ENCOUNTER — TELEPHONE (OUTPATIENT)
Dept: FAMILY MEDICINE CLINIC | Age: 74
End: 2024-04-22

## 2024-04-22 ENCOUNTER — TELEPHONE (OUTPATIENT)
Dept: PHARMACY | Age: 74
End: 2024-04-22

## 2024-04-22 NOTE — TELEPHONE ENCOUNTER
Care Transitions Initial Follow Up Call    Outreach made within 2 business days of discharge: Yes    Patient: Morro Kevin Patient : 1950   MRN: [id number]  Reason for Admission: There are no discharge diagnoses documented for the most recent discharge.  Discharge Date: 24       Spoke with: PATIENT    Discharge department/facility: [facility]    TCM Interactive Patient Contact:  Was patient able to fill all prescriptions: Yes  Was patient instructed to bring all medications to the follow-up visit: Yes  Is patient taking all medications as directed in the discharge summary? Yes  Does patient understand their discharge instructions: Yes  Does patient have questions or concerns that need addressed prior to 7-14 day follow up office visit: no    Scheduled appointment with PCP within 7-14 days    Follow Up  Future Appointments   Date Time Provider Department Center   5/3/2024  3:00 PM Fareed Marino MD [facility]   2024 10:30 AM SCHEDULE, [facility]   2024 10:00 AM Fareed Marino MD [facility]     SPOKE TO PT AND SCHEDULED A HOSPITAL FOLLOW UP FOR 5/3/2024. NO EARLIER APPT AVAILABLE. Alisa Arteaga MA

## 2024-04-22 NOTE — TELEPHONE ENCOUNTER
Recent hospitalization.  Following up to make sure he has proper f/u for warfarin management.     He reports he is continuing warfarin at this time.  [facility] came and checked his INR . He reports INR was 2.0. [facility] will return .     I expect they called INR results to our pharmacy over the weekend. Expect they will call results to us tomorrow.     Will follow along.     Kacy Jeffries, PharmD  [facility]  [facility]  Anticoagulation  [phone]    For Pharmacy Admin Tracking Only    Intervention Detail: Adherence Monitorin  Total # of Interventions Recommended: 1  Total # of Interventions Accepted: 1  Time Spent (min): 10

## 2024-04-23 LAB — INTERNATIONAL NORMALIZATION RATIO, POC: 2.4

## 2024-04-25 ENCOUNTER — ANTI-COAG VISIT (OUTPATIENT)
Dept: PHARMACY | Age: 74
End: 2024-04-25

## 2024-04-25 DIAGNOSIS — Z95.2 HISTORY OF AORTIC VALVE REPLACEMENT: Primary | ICD-10-CM

## 2024-04-25 NOTE — PROGRESS NOTES
[facility]  Home Care Visit    Lab Results   Component Value Date    INR 2.4 2024    INR 1.42 (H) 2024    INR 1.51 (H) 04/15/2024       Anticoagulation Summary  As of 2024      INR goal:  2.0-3.0   TTR:  69.5 % (7.4 y)   INR used for dosin.4 (2024)   Warfarin maintenance plan:  3.75 mg (7.5 mg x 0.5) every Mon, Thu; 7.5 mg (7.5 mg x 1) all other days   Weekly warfarin total:  45 mg   Plan last modified:  Kacy Jeffries RP (7/10/2023)   Next INR check:  2024   Priority:  Maintenance   Target end date:  Indefinite    Indications    History of aortic valve replacement [Z95.2]                 Anticoagulation Episode Summary       INR check location:      Preferred lab:      Send INR reminders to:  [facility]    Comments:  EPIC          Anticoagulation Care Providers       Provider Role Specialty Phone number    Ton Ray MD Referring Cardiology [phone]              Assessment / Plan:  [facility] home care nurse called with INR results.  No changes to diet and medications reported.  No concerns reported.    Rn states that pt is still on lovenox, will stop this. INR result was from . Will continue on warfarin 7.5mg daily, except for 3.75mg on Mon/u.    Description    Take warfarin 11.25 mg (1.5 tablets) today only, then  CONTINUE:  Warfarin 7.5 mg (1 tablet) daily except 3.75 mg (1/2 tablet) every Monday and Thursday     Call [phone] with signs or symptoms of bleeding or ANY medication changes (including over-the-counter medications or herbal supplements).     Especially if you begin oral steroids and/or antibiotics.    If significant bleeding occurs please seek immediate medical attention.    Keep the number of servings and portion size of vitamin K containing foods (dark green, leafy vegetables) the same each week. Vegetables high in vitamin K : broccoli, spinach, leaf lettuce, krystle lettuce, kale, collards,

## 2024-04-26 LAB
BASOPHILS ABSOLUTE: 0 /ΜL
BASOPHILS RELATIVE PERCENT: 1 %
EOSINOPHILS ABSOLUTE: 0.1 /ΜL
EOSINOPHILS RELATIVE PERCENT: 2 %
HCT VFR BLD CALC: 33.7 % (ref 41–53)
HEMOGLOBIN: 11 G/DL (ref 13.5–17.5)
LYMPHOCYTES ABSOLUTE: 1 /ΜL
LYMPHOCYTES RELATIVE PERCENT: 21 %
MCH RBC QN AUTO: 32.4 PG
MCHC RBC AUTO-ENTMCNC: 32.8 G/DL
MCV RBC AUTO: 98.9 FL
MONOCYTES ABSOLUTE: 0.3 /ΜL
MONOCYTES RELATIVE PERCENT: 6 %
NEUTROPHILS ABSOLUTE: 3.2 /ΜL
NEUTROPHILS RELATIVE PERCENT: 70 %
PLATELET # BLD: 289 K/ΜL
PMV BLD AUTO: 7.5 FL
RBC # BLD: 3.41 10^6/ΜL
WBC # BLD: 4.6 10^3/ML

## 2024-04-29 LAB
BASOPHILS ABSOLUTE: 0 /ΜL
BASOPHILS RELATIVE PERCENT: 1 %
EOSINOPHILS ABSOLUTE: 0.1 /ΜL
EOSINOPHILS RELATIVE PERCENT: 1 %
HCT VFR BLD CALC: 33.2 % (ref 41–53)
HEMOGLOBIN: 10.8 G/DL (ref 13.5–17.5)
LYMPHOCYTES ABSOLUTE: 1.1 /ΜL
LYMPHOCYTES RELATIVE PERCENT: 26 %
MCH RBC QN AUTO: 32.1 PG
MCHC RBC AUTO-ENTMCNC: 32.6 G/DL
MCV RBC AUTO: 98.5 FL
MONOCYTES ABSOLUTE: 0.3 /ΜL
MONOCYTES RELATIVE PERCENT: 6 %
NEUTROPHILS ABSOLUTE: 3 /ΜL
NEUTROPHILS RELATIVE PERCENT: 66 %
PLATELET # BLD: 314 K/ΜL
PMV BLD AUTO: 7.1 FL
RBC # BLD: 3.37 10^6/ΜL
WBC # BLD: 4.5 10^3/ML

## 2024-04-30 SDOH — ECONOMIC STABILITY: FOOD INSECURITY: WITHIN THE PAST 12 MONTHS, THE FOOD YOU BOUGHT JUST DIDN'T LAST AND YOU DIDN'T HAVE MONEY TO GET MORE.: NEVER TRUE

## 2024-04-30 SDOH — ECONOMIC STABILITY: FOOD INSECURITY: WITHIN THE PAST 12 MONTHS, YOU WORRIED THAT YOUR FOOD WOULD RUN OUT BEFORE YOU GOT MONEY TO BUY MORE.: NEVER TRUE

## 2024-04-30 SDOH — ECONOMIC STABILITY: INCOME INSECURITY: HOW HARD IS IT FOR YOU TO PAY FOR THE VERY BASICS LIKE FOOD, HOUSING, MEDICAL CARE, AND HEATING?: NOT HARD AT ALL

## 2024-05-01 NOTE — PATIENT INSTRUCTIONS

## 2024-05-03 ENCOUNTER — OFFICE VISIT (OUTPATIENT)
Dept: FAMILY MEDICINE CLINIC | Age: 74
End: 2024-05-03

## 2024-05-03 ENCOUNTER — TELEPHONE (OUTPATIENT)
Dept: FAMILY MEDICINE CLINIC | Age: 74
End: 2024-05-03

## 2024-05-03 VITALS
WEIGHT: 178 LBS | HEART RATE: 82 BPM | DIASTOLIC BLOOD PRESSURE: 68 MMHG | HEIGHT: 71 IN | OXYGEN SATURATION: 99 % | SYSTOLIC BLOOD PRESSURE: 110 MMHG | BODY MASS INDEX: 24.92 KG/M2

## 2024-05-03 DIAGNOSIS — N18.30 CKD STAGE 3 DUE TO TYPE 2 DIABETES MELLITUS (HCC): ICD-10-CM

## 2024-05-03 DIAGNOSIS — I48.0 PAROXYSMAL ATRIAL FIBRILLATION (HCC): ICD-10-CM

## 2024-05-03 DIAGNOSIS — E11.22 CKD STAGE 3 DUE TO TYPE 2 DIABETES MELLITUS (HCC): ICD-10-CM

## 2024-05-03 DIAGNOSIS — E11.9 DIABETES MELLITUS TYPE 2 IN NONOBESE (HCC): ICD-10-CM

## 2024-05-03 DIAGNOSIS — N18.31 CKD STAGE 3A, GFR 45-59 ML/MIN (HCC): ICD-10-CM

## 2024-05-03 DIAGNOSIS — R74.8 LIVER ENZYME ELEVATION: ICD-10-CM

## 2024-05-03 DIAGNOSIS — F32.5 MAJOR DEPRESSIVE DISORDER IN FULL REMISSION, UNSPECIFIED WHETHER RECURRENT (HCC): ICD-10-CM

## 2024-05-03 DIAGNOSIS — D50.0 ANEMIA, BLOOD LOSS: Primary | ICD-10-CM

## 2024-05-03 DIAGNOSIS — B17.9 ACUTE HEPATITIS: ICD-10-CM

## 2024-05-03 DIAGNOSIS — R58 RETROPERITONEAL HEMORRHAGE: ICD-10-CM

## 2024-05-03 DIAGNOSIS — E72.11 HOMOCYSTINURIA (HCC): ICD-10-CM

## 2024-05-03 RX ORDER — FERROUS GLUCONATE 324(37.5)
TABLET ORAL
Qty: 30 TABLET | Refills: 0 | Status: SHIPPED | OUTPATIENT
Start: 2024-05-03

## 2024-05-03 ASSESSMENT — PATIENT HEALTH QUESTIONNAIRE - PHQ9
4. FEELING TIRED OR HAVING LITTLE ENERGY: NOT AT ALL
SUM OF ALL RESPONSES TO PHQ QUESTIONS 1-9: 0
SUM OF ALL RESPONSES TO PHQ9 QUESTIONS 1 & 2: 0
SUM OF ALL RESPONSES TO PHQ QUESTIONS 1-9: 0
10. IF YOU CHECKED OFF ANY PROBLEMS, HOW DIFFICULT HAVE THESE PROBLEMS MADE IT FOR YOU TO DO YOUR WORK, TAKE CARE OF THINGS AT HOME, OR GET ALONG WITH OTHER PEOPLE: NOT DIFFICULT AT ALL
2. FEELING DOWN, DEPRESSED OR HOPELESS: NOT AT ALL
1. LITTLE INTEREST OR PLEASURE IN DOING THINGS: NOT AT ALL
8. MOVING OR SPEAKING SO SLOWLY THAT OTHER PEOPLE COULD HAVE NOTICED. OR THE OPPOSITE, BEING SO FIGETY OR RESTLESS THAT YOU HAVE BEEN MOVING AROUND A LOT MORE THAN USUAL: NOT AT ALL
7. TROUBLE CONCENTRATING ON THINGS, SUCH AS READING THE NEWSPAPER OR WATCHING TELEVISION: NOT AT ALL
5. POOR APPETITE OR OVEREATING: NOT AT ALL
3. TROUBLE FALLING OR STAYING ASLEEP: NOT AT ALL
SUM OF ALL RESPONSES TO PHQ QUESTIONS 1-9: 0
6. FEELING BAD ABOUT YOURSELF - OR THAT YOU ARE A FAILURE OR HAVE LET YOURSELF OR YOUR FAMILY DOWN: NOT AT ALL
9. THOUGHTS THAT YOU WOULD BE BETTER OFF DEAD, OR OF HURTING YOURSELF: NOT AT ALL
SUM OF ALL RESPONSES TO PHQ QUESTIONS 1-9: 0

## 2024-05-03 NOTE — TELEPHONE ENCOUNTER
CALLED AND SPOKE TO MARTIN FROM [facility] AND GAVE A VERBAL FOR NURSING CARE PER VICKIE. SC

## 2024-05-03 NOTE — TELEPHONE ENCOUNTER
Caio Rankin was calling to get a verbal order to provide Nursing Care     Can we please give them a call

## 2024-05-03 NOTE — PROGRESS NOTES
[facility] Medicine  Clinic Note    Date: 5/3/2024                                               Subjective:     Chief Complaint   Patient presents with    Follow-Up from Hospital     HOSPITAL FOLLOW UP 4/15-4/18 ANEMIA, BLEEDING.     HPI    In hospital for retroperitoneal bleed - went back soon after for one day for add'l blood transfusion  Left leg pain associated w/ adominal pain after heavy lifting - caused retroperitoneal hemorrhage   Throwing left hip into pushing heavy object - -left sciatica developed.  Still some numbness but mobility improved left leg -   Admitted  Then d/c for add'l blood  Feeling stronger every day - some woozy if stands quickly -worse in am.  Trying to increase activity - as tolerated  Going to [place] next week - hopes to walk on beach.  No abdominal pain  INR back to right range.  Hx of heart valve replacement on coumadin  Hgb dropped 12.7 to 8.0  4/29 hgb 10.8  Recent  labs reviewed  Home nurse just checked yesterday  Not on iron supplement.  Occ etoh - had been taking a lot of tylenol but off this for 1 week.  Sees pavithra for dm management - 18 units toujeo down from 40 bid  On synjardy. And trulicity weekly  Psa going up since [facility] 2 years ago for prostate cancer- getting prostate scan per urology  No fever/ achy/ nausea    BP Readings from Last 3 Encounters:   05/03/24 110/68   04/18/24 133/76   04/12/24 117/69     Pulse Readings from Last 3 Encounters:   05/03/24 82   04/18/24 99   04/12/24 75     Wt Readings from Last 3 Encounters:   05/03/24 80.7 kg (178 lb)   04/18/24 84.2 kg (185 lb 10 oz)   04/12/24 85.1 kg (187 lb 9.8 oz)     [facility]        Patient Active Problem List    Diagnosis Date Noted    Nonrheumatic aortic valve stenosis 02/26/2014    Bicuspid aortic valve 02/26/2014    Diabetes mellitus type 2 in nonobese (HCC) 12/09/2011    Essential hypertension 12/09/2011    Homocystinuria ([facility]) 10/19/2022    Paroxysmal atrial fibrillation ([facility])  Unknown if ever smoked

## 2024-05-06 ENCOUNTER — PATIENT MESSAGE (OUTPATIENT)
Dept: FAMILY MEDICINE CLINIC | Age: 74
End: 2024-05-06

## 2024-05-07 NOTE — TELEPHONE ENCOUNTER
Called and spoke with Julieta at [facility], pt was d/c'd on 05/02, no labs were drawn that day. She said the results we rcv'd from 04/29 were the most updated ones, these are entered into lab results

## 2024-05-14 DIAGNOSIS — B17.9 ACUTE HEPATITIS: ICD-10-CM

## 2024-05-14 DIAGNOSIS — R74.8 LIVER ENZYME ELEVATION: ICD-10-CM

## 2024-05-14 DIAGNOSIS — D50.0 ANEMIA, BLOOD LOSS: ICD-10-CM

## 2024-05-14 LAB
ALBUMIN SERPL-MCNC: 3.7 G/DL (ref 3.4–5)
ALBUMIN/GLOB SERPL: 1.8 {RATIO} (ref 1.1–2.2)
ALP SERPL-CCNC: 67 U/L (ref 40–129)
ALT SERPL-CCNC: 31 U/L (ref 10–40)
ANION GAP SERPL CALCULATED.3IONS-SCNC: 8 MMOL/L (ref 3–16)
AST SERPL-CCNC: 24 U/L (ref 15–37)
BASOPHILS # BLD: 0 K/UL (ref 0–0.2)
BASOPHILS NFR BLD: 0.6 %
BILIRUB SERPL-MCNC: 0.5 MG/DL (ref 0–1)
BUN SERPL-MCNC: 20 MG/DL (ref 7–20)
CALCIUM SERPL-MCNC: 9.7 MG/DL (ref 8.3–10.6)
CHLORIDE SERPL-SCNC: 104 MMOL/L (ref 99–110)
CO2 SERPL-SCNC: 26 MMOL/L (ref 21–32)
CREAT SERPL-MCNC: 1.4 MG/DL (ref 0.8–1.3)
DEPRECATED RDW RBC AUTO: 19.6 % (ref 12.4–15.4)
EOSINOPHIL # BLD: 0.1 K/UL (ref 0–0.6)
EOSINOPHIL NFR BLD: 1.9 %
GFR SERPLBLD CREATININE-BSD FMLA CKD-EPI: 54 ML/MIN/{1.73_M2}
GGT SERPL-CCNC: 23 U/L (ref 8–61)
GLUCOSE SERPL-MCNC: 102 MG/DL (ref 70–99)
HAV IGM SERPL QL IA: NORMAL
HBV CORE IGM SERPL QL IA: NORMAL
HBV SURFACE AG SERPL QL IA: NORMAL
HCT VFR BLD AUTO: 31.3 % (ref 40.5–52.5)
HCV AB SERPL QL IA: NORMAL
HGB BLD-MCNC: 10.4 G/DL (ref 13.5–17.5)
LYMPHOCYTES # BLD: 0.8 K/UL (ref 1–5.1)
LYMPHOCYTES NFR BLD: 20 %
MCH RBC QN AUTO: 33.7 PG (ref 26–34)
MCHC RBC AUTO-ENTMCNC: 33.4 G/DL (ref 31–36)
MCV RBC AUTO: 100.9 FL (ref 80–100)
MONOCYTES # BLD: 0.3 K/UL (ref 0–1.3)
MONOCYTES NFR BLD: 7.6 %
NEUTROPHILS # BLD: 2.9 K/UL (ref 1.7–7.7)
NEUTROPHILS NFR BLD: 69.9 %
PLATELET # BLD AUTO: 183 K/UL (ref 135–450)
PMV BLD AUTO: 8.1 FL (ref 5–10.5)
POTASSIUM SERPL-SCNC: 4.2 MMOL/L (ref 3.5–5.1)
PROT SERPL-MCNC: 5.8 G/DL (ref 6.4–8.2)
RBC # BLD AUTO: 3.1 M/UL (ref 4.2–5.9)
SODIUM SERPL-SCNC: 138 MMOL/L (ref 136–145)
WBC # BLD AUTO: 4.1 K/UL (ref 4–11)

## 2024-05-15 ENCOUNTER — ANTI-COAG VISIT (OUTPATIENT)
Dept: PHARMACY | Age: 74
End: 2024-05-15
Payer: MEDICARE

## 2024-05-15 LAB
INR BLD: 1.5
INTERNATIONAL NORMALIZATION RATIO, POC: 1.5

## 2024-05-15 PROCEDURE — 99212 OFFICE O/P EST SF 10 MIN: CPT

## 2024-05-15 PROCEDURE — 85610 PROTHROMBIN TIME: CPT

## 2024-05-15 NOTE — PROGRESS NOTES
INR        INR is below goal today.  Recently admitted in April for retroperitoneal hemorrhage. Last INR from [facility] 3 weeks ago was in range, no changes since then. I will have him take an increased warfarin dose for two days then continue back to usual dosing and check again in 1 week.  Will hold off on increasing dose too much at once d/t his history of bleeds. He also mentioned slight blood in the urine a few weeks ago, but it resolved.    Description    Take 1.5 tablets Wednesday 5/15 and 1 tablet Thurs 5/16    Then CONTINUE:  Warfarin 7.5 mg (1 tablet) daily except 3.75 mg (1/2 tablet) every Monday and Thursday     Call [phone] with signs or symptoms of bleeding or ANY medication changes (including over-the-counter medications or herbal supplements).     Especially if you begin oral steroids and/or antibiotics.    If significant bleeding occurs please seek immediate medical attention.    Keep the number of servings and portion size of vitamin K containing foods (dark green, leafy vegetables) the same each week. Vegetables high in vitamin K : broccoli, spinach, leaf lettuce, krystle lettuce, kale, collards, asparagus, brussel sprouts.  Please call if you routine diet changes.     Limit alcohol intake. Please call if this changes.     Please arrive 15 minutes prior to your appointment.    Allow 3 business days for warfarin refills.       Immunization History   Administered Date(s) Administered    COVID-19, MODERNA BLUE border, Primary or Immunocompromised, (age 12y+), IM, 100 mcg/0.5mL 02/18/2021, 03/18/2021    COVID-19, PFIZER Bivalent, DO NOT Dilute, (age 12y+), IM, 30 mcg/0.3 mL 11/01/2022    COVID-19, PFIZER GRAY top, DO NOT Dilute, (age 12 y+), IM, 30 mcg/0.3 mL 05/04/2022    COVID-19, PFIZER PURPLE top, DILUTE for use, (age 12 y+), 30mcg/0.3mL 11/23/2021    COVID-19, PFIZER, (2023-24 formula), (age 12y+), IM, 30mcg/0.3mL 10/05/2023    Influenza 11/01/2015    Influenza Vaccine, unspecified formulation

## 2024-05-22 ENCOUNTER — ANTI-COAG VISIT (OUTPATIENT)
Dept: PHARMACY | Age: 74
End: 2024-05-22
Payer: MEDICARE

## 2024-05-22 DIAGNOSIS — Z95.2 HISTORY OF AORTIC VALVE REPLACEMENT: Primary | ICD-10-CM

## 2024-05-22 LAB — INTERNATIONAL NORMALIZATION RATIO, POC: 1.4

## 2024-05-22 PROCEDURE — 85610 PROTHROMBIN TIME: CPT

## 2024-05-22 PROCEDURE — 99211 OFF/OP EST MAY X REQ PHY/QHP: CPT

## 2024-05-22 NOTE — PROGRESS NOTES
11/23/2021    COVID-19, PFIZER, (2023-24 formula), (age 12y+), IM, 30mcg/0.3mL 10/05/2023    Influenza 11/01/2015    Influenza Vaccine, unspecified formulation 10/05/2017    Influenza Virus Vaccine 10/04/2011, 10/04/2012    Influenza, AFLURIA (age 3 yrs+), FLUZONE, (age 6 mo+), MDV, 0.5mL 10/25/2016, 10/05/2017    Influenza, FLUAD, (age 65 y+), Adjuvanted, 0.5mL 10/19/2022    Influenza, FLUZONE (age 65 y+), High Dose, 0.7mL 10/08/2021, 10/05/2023    Influenza, High Dose (Fluzone 65 yrs and older) 10/11/2018, 10/08/2021    Influenza, Triv, inactivated, subunit, adjuvanted, IM (Fluad 65 yrs and older) 09/25/2019, 09/24/2020    Pneumococcal, PCV-13, PREVNAR 13, (age 6w+), IM, 0.5mL 10/11/2018    Pneumococcal, PPSV23, PNEUMOVAX 23, (age 2y+), SC/IM, 0.5mL 10/04/2012, 09/24/2020    RSV, AREXVY, (age 60y+), PF, IM, 0.5mL 11/15/2023    TDaP, ADACEL (age 10y-64y), BOOSTRIX (age 10y+), IM, 0.5mL 04/03/2015    Zoster Live (Zostavax) 04/03/2015    Zoster Recombinant (Shingrix) 10/20/2020, 01/22/2021       Orders Placed This Encounter   Procedures    POCT INR     This external order was created through the results console.      No orders of the defined types were placed in this encounter.     Reviewed AVS with patient / caregiver.    Billing Points:  Adjust dosage and/or reconcile meds (fill pill box) </= 5 medications - 2 points     For Pharmacy Admin Tracking Only    Intervention Detail: Dose Adjustment: 1, reason: Therapy Optimization  Total # of Interventions Recommended: 1  Total # of Interventions Accepted: 1  Time Spent (min): 20

## 2024-05-29 ENCOUNTER — ANTI-COAG VISIT (OUTPATIENT)
Dept: PHARMACY | Age: 74
End: 2024-05-29
Payer: MEDICARE

## 2024-05-29 DIAGNOSIS — Z95.2 HISTORY OF AORTIC VALVE REPLACEMENT: Primary | ICD-10-CM

## 2024-05-29 LAB — INTERNATIONAL NORMALIZATION RATIO, POC: 1.6

## 2024-05-29 PROCEDURE — 85610 PROTHROMBIN TIME: CPT

## 2024-05-29 PROCEDURE — 99211 OFF/OP EST MAY X REQ PHY/QHP: CPT

## 2024-05-29 NOTE — PROGRESS NOTES
Morro Kevin is a 73 y.o. here for warfarin management.  Morro had an INR test today. Results were reviewed and appropriate warfarin management was completed.     Patient verifies current warfarin dosing regimen: Yes     Warfarin medication reviewed and updated on the patient 's home medication list: Yes   All other medications reviewed and updated on the patient 's home medication list: Yes     Lab Results   Component Value Date    INR 1.6 2024    INR 1.4 2024    INR 1.5 05/15/2024     Patient Findings       Positives:  Change in activity    Comments:  Activity increased   Will be starting zytiga 1000 mg, prednisone 5 mg daily, orgovyx, calcium and vitamin D            Anticoagulation Summary  As of 2024      INR goal:  2.0-3.0   TTR:  69.0 % (7.5 y)   INR used for dosin.6 (2024)   Warfarin maintenance plan:  11.25 mg (7.5 mg x 1.5) every Wed; 7.5 mg (7.5 mg x 1) all other days   Weekly warfarin total:  56.25 mg   Plan last modified:  [facility] (2024)   Next INR check:  2024   Priority:  Maintenance   Target end date:  Indefinite    Indications    History of aortic valve replacement [Z95.2]                 Anticoagulation Episode Summary       INR check location:      Preferred lab:      Send INR reminders to:  [facility]    Comments:  EPIC          Anticoagulation Care Providers       Provider Role Specialty Phone number    Ton Ray MD Referring Cardiology [phone]          There were no vitals taken for this visit.    Warfarin assessment / plan:     Doing well. INR remains low    No missed doses or increase in vitamin K    Has increased his activity which may be contributing.     Previous 7 day dose 52.5 mg  Will adjust dose as below for now to help get INR into goal range with 7 day total of 56.25 mg    Will be starting prednisone 5 mg daily along with other meds as above for metastatic prostate cancer on . So

## 2024-06-05 ENCOUNTER — ANTI-COAG VISIT (OUTPATIENT)
Dept: PHARMACY | Age: 74
End: 2024-06-05
Payer: MEDICARE

## 2024-06-05 DIAGNOSIS — Z95.2 HISTORY OF AORTIC VALVE REPLACEMENT: Primary | ICD-10-CM

## 2024-06-05 LAB — INTERNATIONAL NORMALIZATION RATIO, POC: 1.8

## 2024-06-05 PROCEDURE — 85610 PROTHROMBIN TIME: CPT

## 2024-06-05 PROCEDURE — 99211 OFF/OP EST MAY X REQ PHY/QHP: CPT

## 2024-06-05 NOTE — PROGRESS NOTES
High Dose, 0.7mL 10/08/2021, 10/05/2023    Influenza, High Dose (Fluzone 65 yrs and older) 10/11/2018, 10/08/2021    Influenza, Triv, inactivated, subunit, adjuvanted, IM (Fluad 65 yrs and older) 2019, 2020    Pneumococcal, PCV-13, PREVNAR 13, (age 6w+), IM, 0.5mL 10/11/2018    Pneumococcal, PPSV23, PNEUMOVAX 23, (age 2y+), SC/IM, 0.5mL 10/04/2012, 2020    RSV, AREXVY, (age 60y+), PF, IM, 0.5mL 11/15/2023    TDaP, ADACEL (age 10y-64y), BOOSTRIX (age 10y+), IM, 0.5mL 2015    Zoster Live (Zostavax) 2015    Zoster Recombinant (Shingrix) 10/20/2020, 2021       Orders Placed This Encounter   Procedures    POCT INR     This external order was created through the results console.      No orders of the defined types were placed in this encounter.     Reviewed AVS with patient / caregiver.    Billing Points:  BASIC ASSESSMENT UNCOMPLICATED (including but not limited to: vital signs; physical assessment screening; review of secondary markers like lab results, allergies, home readings; instructions on treatment plan and basic education; assessment of medication list with one med change and compliance) - 2 points     For Pharmacy Admin Tracking Only    Intervention Detail: Adherence Monitorin and Dose Adjustment: 1, reason: Therapy Optimization  Total # of Interventions Recommended: 1  Total # of Interventions Accepted: 1  Time Spent (min): 20       Bart Hazel PharmD Candidate   Reviewed: Fish Chen RPh, PRS 2024  1:50 PM

## 2024-06-19 ENCOUNTER — ANTI-COAG VISIT (OUTPATIENT)
Dept: PHARMACY | Age: 74
End: 2024-06-19
Payer: MEDICARE

## 2024-06-19 DIAGNOSIS — Z95.2 HISTORY OF AORTIC VALVE REPLACEMENT: Primary | ICD-10-CM

## 2024-06-19 LAB — INR BLD: 3.4

## 2024-06-19 PROCEDURE — 99212 OFFICE O/P EST SF 10 MIN: CPT

## 2024-06-19 PROCEDURE — 85610 PROTHROMBIN TIME: CPT

## 2024-06-19 NOTE — PROGRESS NOTES
Influenza Vaccine, unspecified formulation 10/05/2017    Influenza Virus Vaccine 10/04/2011, 10/04/2012    Influenza, AFLURIA (age 3 yrs+), FLUZONE, (age 6 mo+), MDV, 0.5mL 10/25/2016, 10/05/2017    Influenza, FLUAD, (age 65 y+), Adjuvanted, 0.5mL 10/19/2022    Influenza, FLUZONE (age 65 y+), High Dose, 0.7mL 10/08/2021, 10/05/2023    Influenza, High Dose (Fluzone 65 yrs and older) 10/11/2018, 10/08/2021    Influenza, Triv, inactivated, subunit, adjuvanted, IM (Fluad 65 yrs and older) 2019, 2020    Pneumococcal, PCV-13, PREVNAR 13, (age 6w+), IM, 0.5mL 10/11/2018    Pneumococcal, PPSV23, PNEUMOVAX 23, (age 2y+), SC/IM, 0.5mL 10/04/2012, 2020    RSV, AREXVY, (age 60y+), PF, IM, 0.5mL 11/15/2023    TDaP, ADACEL (age 10y-64y), BOOSTRIX (age 10y+), IM, 0.5mL 2015    Zoster Live (Zostavax) 2015    Zoster Recombinant (Shingrix) 10/20/2020, 2021       Orders Placed This Encounter   Procedures    Protime-INR     This external order was created through the results console.      No orders of the defined types were placed in this encounter.     Reviewed AVS with patient / caregiver.    Billing Points:  BASIC ASSESSMENT UNCOMPLICATED (including but not limited to: vital signs; physical assessment screening; review of secondary markers like lab results, allergies, home readings; instructions on treatment plan and basic education; assessment of medication list with one med change and compliance) - 2 points   Dose Adjustment - 2 points    For Pharmacy Admin Tracking Only    Intervention Detail: Adherence Monitorin and Dose Adjustment: 1, reason: Therapy Optimization  Total # of Interventions Recommended: 1  Total # of Interventions Accepted: 1  Time Spent (min): 20    Bart Hazel PharmD Candidate      I have seen the patient and reviewed the progress note written by the PharmD Candidate. I agree with this assesment and plan.   Lindsey Varghese [facility], PharmD 24 12:11 PM

## 2024-06-26 RX ORDER — FERROUS GLUCONATE 324(37.5)
TABLET ORAL
Qty: 45 TABLET | Refills: 1 | Status: SHIPPED | OUTPATIENT
Start: 2024-06-26

## 2024-07-03 ENCOUNTER — ANTI-COAG VISIT (OUTPATIENT)
Dept: PHARMACY | Age: 74
End: 2024-07-03

## 2024-07-03 DIAGNOSIS — Z95.2 HISTORY OF AORTIC VALVE REPLACEMENT: Primary | ICD-10-CM

## 2024-07-03 LAB
INTERNATIONAL NORMALIZATION RATIO, POC: 1.6
PROTHROMBIN TIME, POC: NORMAL

## 2024-07-03 NOTE — PROGRESS NOTES
Dose (Fluzone 65 yrs and older) 10/11/2018, 10/08/2021    Influenza, Triv, inactivated, subunit, adjuvanted, IM (Fluad 65 yrs and older) 09/25/2019, 09/24/2020    Pneumococcal, PCV-13, PREVNAR 13, (age 6w+), IM, 0.5mL 10/11/2018    Pneumococcal, PPSV23, PNEUMOVAX 23, (age 2y+), SC/IM, 0.5mL 10/04/2012, 09/24/2020    RSV, AREXVY, (age 60y+), PF, IM, 0.5mL 11/15/2023    TDaP, ADACEL (age 10y-64y), BOOSTRIX (age 10y+), IM, 0.5mL 04/03/2015    Zoster Live (Zostavax) 04/03/2015    Zoster Recombinant (Shingrix) 10/20/2020, 01/22/2021       Orders Placed This Encounter   Procedures    POCT INR     This external order was created through the results console.      No orders of the defined types were placed in this encounter.     Reviewed AVS with patient / caregiver.    Billing Points:  Adjust dosage and/or reconcile meds (fill pill box) </= 5 medications - 2 points  BASIC ASSESSMENT UNCOMPLICATED (including but not limited to: vital signs; physical assessment screening; review of secondary markers like lab results, allergies, home readings; instructions on treatment plan and basic education; assessment of medication list with one med change and compliance) - 2 points     For Pharmacy Admin Tracking Only    Intervention Detail: Dose Adjustment: 1, reason: Therapy Optimization  Total # of Interventions Recommended: 1  Total # of Interventions Accepted: 1  Time Spent (min): 10     Lindsey Varghese PharmD 07/03/24  10:43 AM

## 2024-07-09 ENCOUNTER — PATIENT MESSAGE (OUTPATIENT)
Dept: FAMILY MEDICINE CLINIC | Age: 74
End: 2024-07-09

## 2024-07-09 DIAGNOSIS — D64.9 MILD ANEMIA: Primary | ICD-10-CM

## 2024-07-09 NOTE — TELEPHONE ENCOUNTER
From: Morro Kevin  To: Dr. Fareed Marino  Sent: 7/9/2024 3:20 PM EDT  Subject: CBC    Dr. Marino,  Would you request a CBC blood test so we can check where my H  emoglobin and Hematocrit levels are? They have not been checked since early May and just want to make sure they are getting back to normal range. I usually have my blood work done at [facility].    Thanks.  Dez

## 2024-07-10 ENCOUNTER — ANTI-COAG VISIT (OUTPATIENT)
Dept: PHARMACY | Age: 74
End: 2024-07-10
Payer: MEDICARE

## 2024-07-10 DIAGNOSIS — Z95.2 HISTORY OF AORTIC VALVE REPLACEMENT: Primary | ICD-10-CM

## 2024-07-10 DIAGNOSIS — D64.9 MILD ANEMIA: ICD-10-CM

## 2024-07-10 LAB
BASOPHILS # BLD: 0 K/UL (ref 0–0.2)
BASOPHILS NFR BLD: 0.7 %
DEPRECATED RDW RBC AUTO: 14.8 % (ref 12.4–15.4)
EOSINOPHIL # BLD: 0.2 K/UL (ref 0–0.6)
EOSINOPHIL NFR BLD: 3.5 %
HCT VFR BLD AUTO: 40.6 % (ref 40.5–52.5)
HGB BLD-MCNC: 13.8 G/DL (ref 13.5–17.5)
INTERNATIONAL NORMALIZATION RATIO, POC: 1.9
LYMPHOCYTES # BLD: 1.5 K/UL (ref 1–5.1)
LYMPHOCYTES NFR BLD: 26.4 %
MCH RBC QN AUTO: 31.5 PG (ref 26–34)
MCHC RBC AUTO-ENTMCNC: 33.9 G/DL (ref 31–36)
MCV RBC AUTO: 92.9 FL (ref 80–100)
MONOCYTES # BLD: 0.6 K/UL (ref 0–1.3)
MONOCYTES NFR BLD: 10.3 %
NEUTROPHILS # BLD: 3.3 K/UL (ref 1.7–7.7)
NEUTROPHILS NFR BLD: 59.1 %
PLATELET # BLD AUTO: 137 K/UL (ref 135–450)
PMV BLD AUTO: 8.8 FL (ref 5–10.5)
PROTHROMBIN TIME, POC: NORMAL
RBC # BLD AUTO: 4.38 M/UL (ref 4.2–5.9)
WBC # BLD AUTO: 5.6 K/UL (ref 4–11)

## 2024-07-10 PROCEDURE — 99211 OFF/OP EST MAY X REQ PHY/QHP: CPT

## 2024-07-10 PROCEDURE — 85610 PROTHROMBIN TIME: CPT

## 2024-07-10 NOTE — PROGRESS NOTES
Morro Kevin is a 73 y.o. here for warfarin management.  Morro had an INR test today. Results were reviewed and appropriate warfarin management was completed.     Patient verifies current warfarin dosing regimen: Yes     Warfarin medication reviewed and updated on the patient 's home medication list: Yes   All other medications reviewed and updated on the patient 's home medication list: Yes     Lab Results   Component Value Date    INR 1.9 07/10/2024    INR 1.6 2024    INR 3.40 2024       Anticoagulation Summary  As of 7/10/2024      INR goal:  2.0-3.0   TTR:  68.5 % (7.6 y)   INR used for dosin.9 (7/10/2024)   Warfarin maintenance plan:  11.25 mg (7.5 mg x 1.5) every Wed; 7.5 mg (7.5 mg x 1) all other days   Weekly warfarin total:  56.25 mg   Plan last modified:  Lindsey Varghese RP (7/3/2024)   Next INR check:  2024   Priority:  Maintenance   Target end date:  Indefinite    Indications    History of aortic valve replacement [Z95.2]                 Anticoagulation Episode Summary       INR check location:      Preferred lab:      Send INR reminders to:  [facility]    Comments:  EPIC          Anticoagulation Care Providers       Provider Role Specialty Phone number    Ton Ray MD Referring Cardiology [phone]          There were no vitals taken for this visit.    Warfarin assessment / plan:     Appears well  Sub-therapeutic INR     Denies missed doses.  Denies increased vitamin K intake.  Denies medication changes.     Will give 15mg today to get level >2  and hope that it stays till next visit  in 3 weeks    Description    Take 2 tabs today(15mg) then continue   Warfarin 7.5 mg (1 tablet) daily except 11.25 mg on     Will begin Prednisone 5 mg daily tomorrow ()     Call [phone] with signs or symptoms of bleeding or ANY medication changes (including over-the-counter medications or herbal supplements).     Especially if you begin oral

## 2024-07-19 ENCOUNTER — HOSPITAL ENCOUNTER (OUTPATIENT)
Age: 74
End: 2024-07-19
Payer: MEDICARE

## 2024-07-19 DIAGNOSIS — Q23.1 AORTIC STENOSIS DUE TO BICUSPID AORTIC VALVE: ICD-10-CM

## 2024-07-19 DIAGNOSIS — Q23.0 AORTIC STENOSIS DUE TO BICUSPID AORTIC VALVE: ICD-10-CM

## 2024-07-19 DIAGNOSIS — Z95.2 S/P AVR: ICD-10-CM

## 2024-07-19 LAB
ECHO AO ROOT DIAM: 3 CM
ECHO AV AREA PEAK VELOCITY: 1.2 CM2
ECHO AV AREA VTI: 1.4 CM2
ECHO AV MEAN GRADIENT: 14 MMHG
ECHO AV MEAN VELOCITY: 1.8 M/S
ECHO AV PEAK GRADIENT: 29 MMHG
ECHO AV PEAK VELOCITY: 2.7 M/S
ECHO AV VELOCITY RATIO: 0.41
ECHO AV VTI: 45 CM
ECHO LA AREA 2C: 18.1 CM2
ECHO LA AREA 4C: 17.8 CM2
ECHO LA DIAMETER: 3.3 CM
ECHO LA MAJOR AXIS: 5.2 CM
ECHO LA MINOR AXIS: 5.3 CM
ECHO LA TO AORTIC ROOT RATIO: 1.1
ECHO LA VOL BP: 50 ML (ref 18–58)
ECHO LA VOL MOD A2C: 51 ML (ref 18–58)
ECHO LA VOL MOD A4C: 49 ML (ref 18–58)
ECHO LV E' LATERAL VELOCITY: 12 CM/S
ECHO LV E' SEPTAL VELOCITY: 7 CM/S
ECHO LV FRACTIONAL SHORTENING: 34 % (ref 28–44)
ECHO LV INTERNAL DIMENSION DIASTOLIC: 5 CM (ref 4.2–5.9)
ECHO LV INTERNAL DIMENSION SYSTOLIC: 3.3 CM
ECHO LV IVSD: 1.1 CM (ref 0.6–1)
ECHO LV MASS 2D: 207.1 G (ref 88–224)
ECHO LV POSTERIOR WALL DIASTOLIC: 1.1 CM (ref 0.6–1)
ECHO LV RELATIVE WALL THICKNESS RATIO: 0.44
ECHO LVOT AREA: 2.8 CM2
ECHO LVOT AV VTI INDEX: 0.5
ECHO LVOT DIAM: 1.9 CM
ECHO LVOT MEAN GRADIENT: 2 MMHG
ECHO LVOT MEAN GRADIENT: 2 MMHG
ECHO LVOT PEAK GRADIENT: 5 MMHG
ECHO LVOT PEAK VELOCITY: 1.1 M/S
ECHO LVOT SV: 63.2 ML
ECHO LVOT VTI: 22.3 CM
ECHO MV A VELOCITY: 0.73 M/S
ECHO MV E DECELERATION TIME (DT): 177 MS
ECHO MV E VELOCITY: 0.57 M/S
ECHO MV E/A RATIO: 0.78
ECHO MV E/E' LATERAL: 4.75
ECHO MV E/E' RATIO (AVERAGED): 6.45
ECHO MV E/E' SEPTAL: 8.14
ECHO PULMONARY ARTERY END DIASTOLIC PRESSURE: 4 MMHG
ECHO PV MAX VELOCITY: 0.8 M/S
ECHO PV PEAK GRADIENT: 3 MMHG
ECHO PV REGURGITANT END DIASTOLIC MAX VELOCITY: 1 M/S
ECHO RA AREA 4C: 13.2 CM2
ECHO RA VOLUME: 27 ML
ECHO RV FREE WALL PEAK S': 11 CM/S
ECHO RV INTERNAL DIMENSION: 3.4 CM
ECHO RV TAPSE: 2 CM (ref 1.7–?)
ECHO TV REGURGITANT MAX VELOCITY: 2.3 M/S
ECHO TV REGURGITANT MAX VELOCITY: 2.3 M/S
ECHO TV REGURGITANT PEAK GRADIENT: 21 MMHG

## 2024-07-19 PROCEDURE — 93306 TTE W/DOPPLER COMPLETE: CPT

## 2024-07-19 PROCEDURE — 93306 TTE W/DOPPLER COMPLETE: CPT | Performed by: INTERNAL MEDICINE

## 2024-07-31 ENCOUNTER — PATIENT MESSAGE (OUTPATIENT)
Dept: FAMILY MEDICINE CLINIC | Age: 74
End: 2024-07-31

## 2024-07-31 ENCOUNTER — APPOINTMENT (OUTPATIENT)
Dept: CT IMAGING | Age: 74
End: 2024-07-31
Payer: MEDICARE

## 2024-07-31 ENCOUNTER — APPOINTMENT (OUTPATIENT)
Dept: PHARMACY | Age: 74
End: 2024-07-31
Payer: MEDICARE

## 2024-07-31 ENCOUNTER — HOSPITAL ENCOUNTER (INPATIENT)
Age: 74
LOS: 9 days | Discharge: NURSING, REHAB OR LONG-TERM CARE | End: 2024-08-09
Attending: EMERGENCY MEDICINE
Payer: MEDICARE

## 2024-07-31 ENCOUNTER — APPOINTMENT (OUTPATIENT)
Dept: GENERAL RADIOLOGY | Age: 74
End: 2024-07-31
Payer: MEDICARE

## 2024-07-31 ENCOUNTER — APPOINTMENT (OUTPATIENT)
Dept: NUCLEAR MEDICINE | Age: 74
End: 2024-07-31
Payer: MEDICARE

## 2024-07-31 ENCOUNTER — APPOINTMENT (OUTPATIENT)
Dept: ULTRASOUND IMAGING | Age: 74
End: 2024-07-31
Payer: MEDICARE

## 2024-07-31 DIAGNOSIS — R53.1 GENERAL WEAKNESS: ICD-10-CM

## 2024-07-31 DIAGNOSIS — Z95.2 HISTORY OF AORTIC VALVE REPLACEMENT: ICD-10-CM

## 2024-07-31 DIAGNOSIS — R06.02 SHORTNESS OF BREATH: ICD-10-CM

## 2024-07-31 DIAGNOSIS — N18.9 ACUTE KIDNEY INJURY SUPERIMPOSED ON CKD (HCC): ICD-10-CM

## 2024-07-31 DIAGNOSIS — R06.00 DYSPNEA, UNSPECIFIED TYPE: ICD-10-CM

## 2024-07-31 DIAGNOSIS — Z85.46 HISTORY OF PROSTATE CANCER: Primary | ICD-10-CM

## 2024-07-31 DIAGNOSIS — R79.89 ELEVATED TROPONIN: ICD-10-CM

## 2024-07-31 DIAGNOSIS — R79.1 SUPRATHERAPEUTIC INR: ICD-10-CM

## 2024-07-31 DIAGNOSIS — N17.9 ACUTE KIDNEY INJURY SUPERIMPOSED ON CKD (HCC): ICD-10-CM

## 2024-07-31 DIAGNOSIS — R74.01 TRANSAMINITIS: ICD-10-CM

## 2024-07-31 LAB
ALBUMIN SERPL-MCNC: 3.3 G/DL (ref 3.4–5)
ALBUMIN/GLOB SERPL: 1.3 {RATIO} (ref 1.1–2.2)
ALP SERPL-CCNC: 71 U/L (ref 40–129)
ALT SERPL-CCNC: 497 U/L (ref 10–40)
ANION GAP SERPL CALCULATED.3IONS-SCNC: 16 MMOL/L (ref 3–16)
AST SERPL-CCNC: 700 U/L (ref 15–37)
BASOPHILS # BLD: 0 K/UL (ref 0–0.2)
BASOPHILS NFR BLD: 0.6 %
BILIRUB SERPL-MCNC: 0.5 MG/DL (ref 0–1)
BUN SERPL-MCNC: 53 MG/DL (ref 7–20)
CALCIUM SERPL-MCNC: 9.6 MG/DL (ref 8.3–10.6)
CHLORIDE SERPL-SCNC: 103 MMOL/L (ref 99–110)
CO2 SERPL-SCNC: 18 MMOL/L (ref 21–32)
CREAT SERPL-MCNC: 4 MG/DL (ref 0.8–1.3)
DEPRECATED RDW RBC AUTO: 15 % (ref 12.4–15.4)
EKG ATRIAL RATE: 82 BPM
EKG DIAGNOSIS: NORMAL
EKG P AXIS: 55 DEGREES
EKG P-R INTERVAL: 162 MS
EKG Q-T INTERVAL: 404 MS
EKG QRS DURATION: 94 MS
EKG QTC CALCULATION (BAZETT): 472 MS
EKG R AXIS: -67 DEGREES
EKG T AXIS: 50 DEGREES
EKG VENTRICULAR RATE: 82 BPM
EOSINOPHIL # BLD: 0.1 K/UL (ref 0–0.6)
EOSINOPHIL NFR BLD: 1.3 %
GFR SERPLBLD CREATININE-BSD FMLA CKD-EPI: 15 ML/MIN/{1.73_M2}
GLUCOSE BLD-MCNC: 108 MG/DL (ref 70–99)
GLUCOSE BLD-MCNC: 144 MG/DL (ref 70–99)
GLUCOSE SERPL-MCNC: 121 MG/DL (ref 70–99)
HCT VFR BLD AUTO: 40.5 % (ref 40.5–52.5)
HGB BLD-MCNC: 13.9 G/DL (ref 13.5–17.5)
INR PPP: 5.34 (ref 0.85–1.15)
LYMPHOCYTES # BLD: 1.2 K/UL (ref 1–5.1)
LYMPHOCYTES NFR BLD: 14.8 %
MAGNESIUM SERPL-MCNC: 3 MG/DL (ref 1.8–2.4)
MCH RBC QN AUTO: 31.1 PG (ref 26–34)
MCHC RBC AUTO-ENTMCNC: 34.4 G/DL (ref 31–36)
MCV RBC AUTO: 90.6 FL (ref 80–100)
MONOCYTES # BLD: 0.7 K/UL (ref 0–1.3)
MONOCYTES NFR BLD: 8.5 %
NEUTROPHILS # BLD: 5.8 K/UL (ref 1.7–7.7)
NEUTROPHILS NFR BLD: 74.8 %
NT-PROBNP SERPL-MCNC: 315 PG/ML (ref 0–124)
PERFORMED ON: ABNORMAL
PERFORMED ON: ABNORMAL
PLATELET # BLD AUTO: 149 K/UL (ref 135–450)
PMV BLD AUTO: 8.4 FL (ref 5–10.5)
POTASSIUM SERPL-SCNC: 3.5 MMOL/L (ref 3.5–5.1)
PROT SERPL-MCNC: 5.8 G/DL (ref 6.4–8.2)
PROTHROMBIN TIME: 48.1 SEC (ref 11.9–14.9)
RBC # BLD AUTO: 4.48 M/UL (ref 4.2–5.9)
SARS-COV-2 RDRP RESP QL NAA+PROBE: NOT DETECTED
SODIUM SERPL-SCNC: 137 MMOL/L (ref 136–145)
TROPONIN, HIGH SENSITIVITY: 191 NG/L (ref 0–22)
TROPONIN, HIGH SENSITIVITY: 192 NG/L (ref 0–22)
WBC # BLD AUTO: 7.8 K/UL (ref 4–11)

## 2024-07-31 PROCEDURE — 76705 ECHO EXAM OF ABDOMEN: CPT

## 2024-07-31 PROCEDURE — 84484 ASSAY OF TROPONIN QUANT: CPT

## 2024-07-31 PROCEDURE — 80053 COMPREHEN METABOLIC PANEL: CPT

## 2024-07-31 PROCEDURE — 83880 ASSAY OF NATRIURETIC PEPTIDE: CPT

## 2024-07-31 PROCEDURE — 87340 HEPATITIS B SURFACE AG IA: CPT

## 2024-07-31 PROCEDURE — 71045 X-RAY EXAM CHEST 1 VIEW: CPT

## 2024-07-31 PROCEDURE — 5A1D70Z PERFORMANCE OF URINARY FILTRATION, INTERMITTENT, LESS THAN 6 HOURS PER DAY: ICD-10-PCS | Performed by: HOSPITALIST

## 2024-07-31 PROCEDURE — 74176 CT ABD & PELVIS W/O CONTRAST: CPT

## 2024-07-31 PROCEDURE — 93010 ELECTROCARDIOGRAM REPORT: CPT | Performed by: INTERNAL MEDICINE

## 2024-07-31 PROCEDURE — 86705 HEP B CORE ANTIBODY IGM: CPT

## 2024-07-31 PROCEDURE — 3430000000 HC RX DIAGNOSTIC RADIOPHARMACEUTICAL: Performed by: EMERGENCY MEDICINE

## 2024-07-31 PROCEDURE — 83735 ASSAY OF MAGNESIUM: CPT

## 2024-07-31 PROCEDURE — 93005 ELECTROCARDIOGRAM TRACING: CPT | Performed by: EMERGENCY MEDICINE

## 2024-07-31 PROCEDURE — 6370000000 HC RX 637 (ALT 250 FOR IP): Performed by: HOSPITALIST

## 2024-07-31 PROCEDURE — 2580000003 HC RX 258: Performed by: GENERAL ACUTE CARE HOSPITAL

## 2024-07-31 PROCEDURE — 85025 COMPLETE CBC W/AUTO DIFF WBC: CPT

## 2024-07-31 PROCEDURE — 99223 1ST HOSP IP/OBS HIGH 75: CPT | Performed by: STUDENT IN AN ORGANIZED HEALTH CARE EDUCATION/TRAINING PROGRAM

## 2024-07-31 PROCEDURE — 99285 EMERGENCY DEPT VISIT HI MDM: CPT

## 2024-07-31 PROCEDURE — 2580000003 HC RX 258: Performed by: HOSPITALIST

## 2024-07-31 PROCEDURE — 94760 N-INVAS EAR/PLS OXIMETRY 1: CPT

## 2024-07-31 PROCEDURE — 85610 PROTHROMBIN TIME: CPT

## 2024-07-31 PROCEDURE — 2000000000 HC ICU R&B

## 2024-07-31 PROCEDURE — A9540 TC99M MAA: HCPCS | Performed by: EMERGENCY MEDICINE

## 2024-07-31 PROCEDURE — 87635 SARS-COV-2 COVID-19 AMP PRB: CPT

## 2024-07-31 PROCEDURE — 86706 HEP B SURFACE ANTIBODY: CPT

## 2024-07-31 PROCEDURE — 78582 LUNG VENTILAT&PERFUS IMAGING: CPT

## 2024-07-31 RX ORDER — POTASSIUM CHLORIDE 7.45 MG/ML
10 INJECTION INTRAVENOUS PRN
Status: DISCONTINUED | OUTPATIENT
Start: 2024-07-31 | End: 2024-07-31

## 2024-07-31 RX ORDER — MIRTAZAPINE 30 MG/1
30 TABLET, FILM COATED ORAL NIGHTLY
Status: DISCONTINUED | OUTPATIENT
Start: 2024-07-31 | End: 2024-08-09 | Stop reason: HOSPADM

## 2024-07-31 RX ORDER — POLYETHYLENE GLYCOL 3350 17 G/17G
17 POWDER, FOR SOLUTION ORAL DAILY PRN
Status: CANCELLED | OUTPATIENT
Start: 2024-07-31

## 2024-07-31 RX ORDER — INSULIN LISPRO 100 [IU]/ML
0-8 INJECTION, SOLUTION INTRAVENOUS; SUBCUTANEOUS
Status: DISCONTINUED | OUTPATIENT
Start: 2024-07-31 | End: 2024-08-09 | Stop reason: HOSPADM

## 2024-07-31 RX ORDER — INSULIN GLARGINE 100 [IU]/ML
10 INJECTION, SOLUTION SUBCUTANEOUS DAILY
Status: DISCONTINUED | OUTPATIENT
Start: 2024-08-01 | End: 2024-08-09 | Stop reason: HOSPADM

## 2024-07-31 RX ORDER — ACETAMINOPHEN 325 MG/1
650 TABLET ORAL EVERY 6 HOURS PRN
Status: DISCONTINUED | OUTPATIENT
Start: 2024-07-31 | End: 2024-07-31

## 2024-07-31 RX ORDER — ENOXAPARIN SODIUM 100 MG/ML
30 INJECTION SUBCUTANEOUS DAILY
Status: CANCELLED | OUTPATIENT
Start: 2024-07-31

## 2024-07-31 RX ORDER — LISINOPRIL 5 MG/1
5 TABLET ORAL DAILY
Status: ON HOLD | COMMUNITY
End: 2024-08-08 | Stop reason: HOSPADM

## 2024-07-31 RX ORDER — GLUCAGON 1 MG/ML
1 KIT INJECTION PRN
Status: DISCONTINUED | OUTPATIENT
Start: 2024-07-31 | End: 2024-08-09 | Stop reason: HOSPADM

## 2024-07-31 RX ORDER — INSULIN GLARGINE 100 [IU]/ML
18 INJECTION, SOLUTION SUBCUTANEOUS DAILY
Status: DISCONTINUED | OUTPATIENT
Start: 2024-07-31 | End: 2024-07-31

## 2024-07-31 RX ORDER — SODIUM CHLORIDE 9 MG/ML
INJECTION, SOLUTION INTRAVENOUS PRN
Status: CANCELLED | OUTPATIENT
Start: 2024-07-31

## 2024-07-31 RX ORDER — POTASSIUM CHLORIDE 20 MEQ/1
40 TABLET, EXTENDED RELEASE ORAL PRN
Status: DISCONTINUED | OUTPATIENT
Start: 2024-07-31 | End: 2024-07-31

## 2024-07-31 RX ORDER — ACETAMINOPHEN 325 MG/1
650 TABLET ORAL EVERY 6 HOURS PRN
Status: CANCELLED | OUTPATIENT
Start: 2024-07-31

## 2024-07-31 RX ORDER — TAMSULOSIN HYDROCHLORIDE 0.4 MG/1
0.4 CAPSULE ORAL NIGHTLY
Status: DISCONTINUED | OUTPATIENT
Start: 2024-07-31 | End: 2024-08-09 | Stop reason: HOSPADM

## 2024-07-31 RX ORDER — 0.9 % SODIUM CHLORIDE 0.9 %
500 INTRAVENOUS SOLUTION INTRAVENOUS ONCE
Status: COMPLETED | OUTPATIENT
Start: 2024-07-31 | End: 2024-07-31

## 2024-07-31 RX ORDER — POLYETHYLENE GLYCOL 3350 17 G/17G
17 POWDER, FOR SOLUTION ORAL DAILY PRN
Status: DISCONTINUED | OUTPATIENT
Start: 2024-07-31 | End: 2024-08-09 | Stop reason: HOSPADM

## 2024-07-31 RX ORDER — SODIUM CHLORIDE 0.9 % (FLUSH) 0.9 %
5-40 SYRINGE (ML) INJECTION EVERY 12 HOURS SCHEDULED
Status: DISCONTINUED | OUTPATIENT
Start: 2024-07-31 | End: 2024-08-09 | Stop reason: HOSPADM

## 2024-07-31 RX ORDER — SODIUM CHLORIDE 0.9 % (FLUSH) 0.9 %
5-40 SYRINGE (ML) INJECTION PRN
Status: DISCONTINUED | OUTPATIENT
Start: 2024-07-31 | End: 2024-08-09 | Stop reason: HOSPADM

## 2024-07-31 RX ORDER — ONDANSETRON 2 MG/ML
4 INJECTION INTRAMUSCULAR; INTRAVENOUS EVERY 6 HOURS PRN
Status: CANCELLED | OUTPATIENT
Start: 2024-07-31

## 2024-07-31 RX ORDER — ACETAMINOPHEN 650 MG/1
650 SUPPOSITORY RECTAL EVERY 6 HOURS PRN
Status: DISCONTINUED | OUTPATIENT
Start: 2024-07-31 | End: 2024-07-31

## 2024-07-31 RX ORDER — ONDANSETRON 2 MG/ML
4 INJECTION INTRAMUSCULAR; INTRAVENOUS EVERY 6 HOURS PRN
Status: DISCONTINUED | OUTPATIENT
Start: 2024-07-31 | End: 2024-08-09 | Stop reason: HOSPADM

## 2024-07-31 RX ORDER — SODIUM CHLORIDE 9 MG/ML
INJECTION, SOLUTION INTRAVENOUS CONTINUOUS
Status: DISCONTINUED | OUTPATIENT
Start: 2024-07-31 | End: 2024-08-01

## 2024-07-31 RX ORDER — ONDANSETRON 4 MG/1
4 TABLET, ORALLY DISINTEGRATING ORAL EVERY 8 HOURS PRN
Status: CANCELLED | OUTPATIENT
Start: 2024-07-31

## 2024-07-31 RX ORDER — SODIUM CHLORIDE 0.9 % (FLUSH) 0.9 %
5-40 SYRINGE (ML) INJECTION EVERY 12 HOURS SCHEDULED
Status: CANCELLED | OUTPATIENT
Start: 2024-07-31

## 2024-07-31 RX ORDER — SODIUM CHLORIDE 9 MG/ML
INJECTION, SOLUTION INTRAVENOUS PRN
Status: DISCONTINUED | OUTPATIENT
Start: 2024-07-31 | End: 2024-08-09 | Stop reason: HOSPADM

## 2024-07-31 RX ORDER — ONDANSETRON 4 MG/1
4 TABLET, ORALLY DISINTEGRATING ORAL EVERY 8 HOURS PRN
Status: DISCONTINUED | OUTPATIENT
Start: 2024-07-31 | End: 2024-08-09 | Stop reason: HOSPADM

## 2024-07-31 RX ORDER — ACETAMINOPHEN 650 MG/1
650 SUPPOSITORY RECTAL EVERY 6 HOURS PRN
Status: CANCELLED | OUTPATIENT
Start: 2024-07-31

## 2024-07-31 RX ORDER — ASPIRIN 81 MG/1
81 TABLET ORAL NIGHTLY
Status: DISCONTINUED | OUTPATIENT
Start: 2024-07-31 | End: 2024-08-09 | Stop reason: HOSPADM

## 2024-07-31 RX ORDER — DEXTROSE MONOHYDRATE 100 MG/ML
INJECTION, SOLUTION INTRAVENOUS CONTINUOUS PRN
Status: DISCONTINUED | OUTPATIENT
Start: 2024-07-31 | End: 2024-08-09 | Stop reason: HOSPADM

## 2024-07-31 RX ORDER — SODIUM CHLORIDE 0.9 % (FLUSH) 0.9 %
5-40 SYRINGE (ML) INJECTION PRN
Status: CANCELLED | OUTPATIENT
Start: 2024-07-31

## 2024-07-31 RX ORDER — INSULIN LISPRO 100 [IU]/ML
0-4 INJECTION, SOLUTION INTRAVENOUS; SUBCUTANEOUS NIGHTLY
Status: DISCONTINUED | OUTPATIENT
Start: 2024-07-31 | End: 2024-08-09 | Stop reason: HOSPADM

## 2024-07-31 RX ADMIN — MIRTAZAPINE 30 MG: 30 TABLET, FILM COATED ORAL at 21:17

## 2024-07-31 RX ADMIN — TAMSULOSIN HYDROCHLORIDE 0.4 MG: 0.4 CAPSULE ORAL at 21:17

## 2024-07-31 RX ADMIN — SODIUM CHLORIDE: 9 INJECTION, SOLUTION INTRAVENOUS at 16:23

## 2024-07-31 RX ADMIN — SODIUM CHLORIDE: 9 INJECTION, SOLUTION INTRAVENOUS at 23:14

## 2024-07-31 RX ADMIN — KIT FOR THE PREPARATION OF TECHNETIUM TC 99M ALBUMIN AGGREGATED 6 MILLICURIE: 2.5 INJECTION, POWDER, FOR SOLUTION INTRAVENOUS at 11:18

## 2024-07-31 RX ADMIN — ASPIRIN 81 MG: 81 TABLET, COATED ORAL at 21:17

## 2024-07-31 RX ADMIN — SODIUM CHLORIDE, PRESERVATIVE FREE 10 ML: 5 INJECTION INTRAVENOUS at 21:17

## 2024-07-31 RX ADMIN — SODIUM CHLORIDE 500 ML: 9 INJECTION, SOLUTION INTRAVENOUS at 10:39

## 2024-07-31 ASSESSMENT — PAIN SCALES - GENERAL
PAINLEVEL_OUTOF10: 0

## 2024-07-31 ASSESSMENT — LIFESTYLE VARIABLES
HOW OFTEN DO YOU HAVE A DRINK CONTAINING ALCOHOL: NEVER
HOW MANY STANDARD DRINKS CONTAINING ALCOHOL DO YOU HAVE ON A TYPICAL DAY: PATIENT DOES NOT DRINK

## 2024-07-31 NOTE — PROGRESS NOTES
Medication Reconciliation    List of medications patient is currently taking is complete.     Source of information: 1. Conversation with patient at bedside                                      2. EPIC records      Notes regarding home medications:   1. Patient reports taking all morning medications PTA  2. Patient started experiencing leg stiffness, SOB, and dizziness over the weekend. On Monday, patient was instructed to stop taking Zytiga and prednisone x 1 week starting yesterday by Dr. Griffin.   3. Patient is anticoagulated on warfarin for Hx of AVR (goal INR 2-3). Current dose is 11.25mg on Wednesday and 7.5mg ALL OTHER DAYS. Currently managed by [facility].      Darryl Champagne, [facility]   7/31/2024  12:32 PM

## 2024-07-31 NOTE — H&P
V2.0  History and Physical      Name:  Morro Kevin /Age/Sex: 1950  (73 y.o. male)   MRN & CSN:  [id number] & [id number] Encounter Date/Time: 2024 3:12 PM EDT   Location:  [address] [place] [address] PCP: Fareed Marino MD       Hospital Day: 1  History from:   patient  History of Present Illness:   Chief Complaint: leg weakness  Morro Kevin is a 73 y.o. male who presents with complaints of weakness and shortness of breath    Patient is a pleasant 73-year-old gentleman with a past medical history significant for prostate cancer, on treatment for it, beyond that he has a history of a mechanical valve for which she is on anticoagulation, he also has history of diabetes, hypertension, thyroiditis amongst others, comes in with complaints of weakness of his legs going on for the last several days.  Patient feels that his knees have been feeling more weak, he has been having progressively worsening shortness of breath.  He feels that this could be an adverse effect of the chemotherapy agent he was on for prostate cancer, that was recently changed he states.  Patient's family member is at the bedside, assisting with history.  He is also not been eating and drinking as much.  Workup in the ED was concerning for hypotension, he was also noted to have a creatinine of around 4, baseline he has CKD but his kidney function is more or less near near normal.  Patient's INR is supratherapeutic, still patient underwent a VQ scan which did not reveal any acute process, it was indeterminate but PE certainly is very low in the differential given that patient is supratherapeutic on anticoagulation, is not hypoxemic, is not tachycardic or tachypneic.  Chest x-ray was unremarkable, rest of his labs are essentially unremarkable  Patient will be admitted for further evaluation and management     Review of Systems:    Pertinent positives and negatives discussed in HPI   Objective:   No intake or output data in the 24 hours ending 24  right kidney. No hydronephrosis on the right.  Cyst projects posteriorly off the right kidney. No pancreatic calcification.  No peripancreatic fluid No intrahepatic ductal dilatation.  No perihepatic fluid.  No inflammatory stranding surrounding the gallbladder. GI/Bowel: No significant small bowel distention noted.  Moderate stool load seen in colon.  No bowel obstruction noted.  No significant small or large bowel wall thickening.  Appendix identified and normal in caliber. Pelvis: Calcifications seen in the pelvis, compatible with phleboliths. A small internal iliac node, appears similar to prior PET-CT, measuring 6 mm in size. Peritoneum/Retroperitoneum: Atherosclerotic change seen in aorta.  No aneurysm. Metallic densities project in the region of the prostate. Retroperitoneal hematoma on the left seen previously no longer noted Bones/Soft Tissues: Spurring is seen in the spine.  Spurring is seen in the hips. Sclerotic focus is seen in left iliac wing. Small soft tissue nodule is seen in anterior abdominal wall on the left, possibly from prior medication injection     Chest No definite metastatic disease. No pneumonia or edema Abdomen and pelvis Small internal iliac node seen on recent PET-CT has not decreased in size in the interval.  No areas of increasing adenopathy identified.  Moderate stool load in colon.  No bowel obstruction. Resolution of retroperitoneal hematoma Tiny stone right kidney Sclerotic focus left iliac wing, either bone island, or due to the reported history of prostate carcinoma.     XR CHEST PORTABLE    Result Date: 7/31/2024  EXAMINATION: ONE XRAY VIEW OF THE CHEST 7/31/2024 9:51 am COMPARISON: None. HISTORY: ORDERING SYSTEM PROVIDED HISTORY: SOB TECHNOLOGIST PROVIDED HISTORY: Reason for exam:->SOB FINDINGS: Normal cardiomediastinal silhouette.  Status post median sternotomy.  No pneumothorax or pleural effusion.  No acute infiltrates     No acute cardiopulmonary findings     Assessment

## 2024-07-31 NOTE — TELEPHONE ENCOUNTER
From: Morro Kevin  To: Dr. Fareed Marino  Sent: 7/31/2024 12:21 PM EDT  Subject: Handicap placard     Hi Dr. Marino, can I get a certificate signed for a handicap placard?

## 2024-07-31 NOTE — ED PROVIDER NOTES
[facility] EMERGENCY DEPARTMENT  EMERGENCY DEPARTMENT ENCOUNTER        Pt Name: Morro Kevin  MRN: [id number]  Birthdate 1950  Date of evaluation: 7/31/2024  Provider: MARITA Corcoran - SIRISHA  PCP: Fareed Marino MD  Note Started: 9:42 AM EDT 7/31/24       I have seen and evaluated this patient with my supervising physician Thien Alvarado MD.      CHIEF COMPLAINT       Chief Complaint   Patient presents with    Shortness of Breath     Pt arrives with c/o sob, dyspnea at rest. Spo2 99%    Fatigue     Fatigue x 10 days, hx of prostate ca.        HISTORY OF PRESENT ILLNESS: 1 or more Elements     History From: Patient  Limitations to history : None    Morro Kevin is a 73 y.o. male with history of multiple comorbid's including metastatic prostate cancer-currently on hormone therapy, CKD Stage 3, HTN, CAD, JOSÉ MANUEL w/ CPAP (Dx 2000), AoVS w/ AoVR (mechanical in 2016)-anticoagulated on Coumadin, depression, DM type 2, Kidney Stones (Uric Acid), Hypothyroidism, and HLP presents for evaluation of progressive shortness of breath and generalized weakness.  Symptoms initially began approximately 10 days ago.  Patient denies recent travel or known sick contacts.  He lives alone.  Patient denies recent falls.  He is a non-smoker, denies history of COPD or asthma.  Patient denies chest pain.  He states that he feels short of breath even at rest, shortness of breath increases with minimal exertion.  He also reports orthopnea.  He denies any lower extremity pain or swelling.  He states that he feels weak all over but especially in his legs.  Patient states that he can hardly perform his ADLs because of his symptoms.  He reports poor appetite, denies fever, chills, or other symptoms.  His son drove him to the hospital today.    Nursing Notes were all reviewed and agreed with or any disagreements were addressed in the HPI.    REVIEW OF SYSTEMS :      Review of Systems   Constitutional:  Positive for

## 2024-07-31 NOTE — ED NOTES
ED SBAR report provider to KHARI Jones. Patient to be transported to ICU Room 2125 via stretcher by RN.Patient transported with bedside cardiac monitor and with IV medications infusing. IV site clean, dry, and intact. MEWS score and pain assessed and documented. Updated patient and family on plan of care.

## 2024-07-31 NOTE — CONSULTS
[facility]   CONSULTATION        Chief Complaint   Patient presents with    Shortness of Breath     Pt arrives with c/o sob, dyspnea at rest. Spo2 99%    Fatigue     Fatigue x 10 days, hx of prostate ca.             History of Present Illness:  Morro Kevin is a 73 y.o. patient who presented to the hospital with complaints of shortness of breath. I have been asked to provide consultation regarding further management and testing.    This is a 73-year-old male with past medical history of severe aortic stenosis status post mechanical aortic valve replacement.  Hypertension hyperlipidemia, metastatic prostate cancer.    Patient reports to me that he started a new combination chemotherapy plus steroid regiment for his malignancy.  This started about 2 weeks ago and his shortness of breath started a few days after the initiation of the regiment.  Did have a recent transthoracic echo that looked pretty good on 7/19.  Reports generalized fatigue, shortness of breath with exertion that is worsened progressively over the last 10 days.  Biomarkers are moderately elevated around 190 Trop, INR supratherapeutic greater than 5.    Past Medical History:   has a past medical history of Aortic stenosis, Arthritis, Asthma, Deviated septum, Diabetes mellitus type II, Hashimoto thyroiditis, Homocysteinemia, Hyperlipidemia, Hypertension, Hypertriglyceridemia, Hypogonadism male, Hypothyroid, Kidney stone, Mechanical heart valve present, Murmur, Nasal polyps, Osteoarthritis, Prolonged emergence from general anesthesia, Sleep apnea, Warfarin anticoagulation, and Wears glasses.    Surgical History:   has a past surgical history that includes colectomy (1996); Nasal polyp surgery (1989, 1987); knee surgery (Left, 2004); Rotator cuff repair (Right, 2009); Colonoscopy (9/24/2014; 2004); cyst removal; other surgical history (1990s); Aortic valve replacement (09/14/2016); Upper gastrointestinal endoscopy (N/A, 6/30/2023); Colonoscopy  Take 1 tablet by mouth daily    Melinda Morley MD   Menatetrenone (VITAMIN K2) 100 MCG TABS Take 100 mcg by mouth daily    Melinda Morley MD   levothyroxine (SYNTHROID) 137 MCG tablet Take 1 tablet by mouth Daily    Melinda Morley MD   Niacin ER 1000 MG TBCR Take 1,000 mg by mouth nightly    Melinda Morley MD   dulaglutide (TRULICITY) 1.5 MG/0.5ML SC injection Inject 0.5 mLs into the skin every 7 days Sundays    Melinda Morley MD TOUJEO SOLOSTAR 300 UNIT/ML injection pen Inject 40 Units into the skin daily  Patient taking differently: Inject 18 Units into the skin daily 5/23/17   Cintia Goodson MD   rosuvastatin (CRESTOR) 40 MG tablet Take 1 tablet by mouth every evening 5/23/17   Cintia Goodson MD   ZETIA 10 MG tablet TAKE 1 TABLET DAILY  Patient taking differently: Take 1 tablet by mouth daily 11/21/16   Thien Valle APRN - CNP   metoprolol succinate (TOPROL XL) 25 MG extended release tablet Take 3 tablets by mouth 2 times daily 3 tabs po BID  Patient taking differently: Take 3 tablets by mouth 2 times daily 11/7/16   Cintia Goodson MD   acetaminophen (TYLENOL) 325 MG tablet Take 2 tablets by mouth every 4 hours as needed for Pain 9/21/16   Eliana Lackey, APRN - CNP        Current Medications:  Current Facility-Administered Medications   Medication Dose Route Frequency Provider Last Rate Last Admin    sodium chloride flush 0.9 % injection 5-40 mL  5-40 mL IntraVENous 2 times per day Jose Keating MD        sodium chloride flush 0.9 % injection 5-40 mL  5-40 mL IntraVENous PRN Jose Keating MD        0.9 % sodium chloride infusion   IntraVENous PRN Jose Keating MD        ondansetron (ZOFRAN-ODT) disintegrating tablet 4 mg  4 mg Oral Q8H PRN Jose Keating MD        Or    ondansetron (ZOFRAN) injection 4 mg  4 mg IntraVENous Q6H PRN Jose Keating MD        polyethylene glycol (GLYCOLAX) packet 17 g  17 g Oral Daily PRN Jose Keating MD        0.9 % sodium

## 2024-07-31 NOTE — PLAN OF CARE
Problem: Safety - Adult  Goal: Free from fall injury  Outcome: Progressing  Flowsheets (Taken 7/31/2024 1803)  Free From Fall Injury:   Instruct family/caregiver on patient safety   Based on caregiver fall risk screen, instruct family/caregiver to ask for assistance with transferring infant if caregiver noted to have fall risk factors     Problem: Discharge Planning  Goal: Discharge to home or other facility with appropriate resources  Outcome: Progressing     Problem: Pain  Goal: Verbalizes/displays adequate comfort level or baseline comfort level  Outcome: Progressing     Problem: Skin/Tissue Integrity  Goal: Absence of new skin breakdown  Description: 1.  Monitor for areas of redness and/or skin breakdown  2.  Assess vascular access sites hourly  3.  Every 4-6 hours minimum:  Change oxygen saturation probe site  4.  Every 4-6 hours:  If on nasal continuous positive airway pressure, respiratory therapy assess nares and determine need for appliance change or resting period.  Outcome: Progressing     Problem: ABCDS Injury Assessment  Goal: Absence of physical injury  Outcome: Progressing

## 2024-07-31 NOTE — CONSULTS
Clinical Pharmacy Note  Warfarin Consult    Morro Kevin is a 73 y.o. male receiving warfarin managed by pharmacy.  Patient being bridged with none.    Warfarin Indication: AVR  Target INR range: 2-3   Dose prior to admission:   Warfarin maintenance plan: 11.25 mg (7.5 mg x 1.5) every Wed; 7.5 mg (7.5 mg x 1) all other days   Weekly warfarin total: 56.25 mg   Managed at [facility]    Current warfarin drug-drug interactions: -     Recent Labs     07/31/24  0942   HGB 13.9   HCT 40.5   INR 5.34*       Assessment/Plan:    Supratherapeutic  Hold warfarin until closer to goal range.   Daily PT/INR until stable within therapeutic range.     Thank you for the consult.   Uriel Back PharmD  7/31/2024  4:04 PM

## 2024-07-31 NOTE — PROGRESS NOTES
NAME:  Morro Kevin  YOB: 1950  MEDICAL RECORD NUMBER:  [id number]    Shift Summary: Patient admitted to ICU from ED today. Patient VSS on room air. Admitted as ICU observation d/t certain lab values.     Family updated: Yes:  at bedside     Rhythm: Normal Sinus Rhythm     Most recent vitals:   Visit Vitals  /60   Pulse 71   Temp 98.4 °F (36.9 °C) (Oral)   Resp 17   Ht 1.778 m (5' 10\")   Wt 78 kg (171 lb 15.3 oz)   SpO2 99%   BMI 24.67 kg/m²           No data found.    No data found.      Respiratory support needed (if any):  - RA    Admission weight Weight - Scale: 77.1 kg (170 lb) (07/31/24 0930)    Today's weight    Wt Readings from Last 1 Encounters:   07/31/24 78 kg (171 lb 15.3 oz)        Mayer need assessed each shift: N/A - no mayer present  UOP >30ml/hr: YES  Last documented BM (in last 48 hrs):  No data found.             Restraints (in use currently or dc'd in last 12 hrs): No    Order current and documentation up to date? No    Lines/Drains reviewed @ bedside.  Peripheral IV 07/31/24 Right Forearm (Active)   Number of days: 0       Peripheral IV 07/31/24 Left Forearm (Active)   Number of days: 0         Drip rates at handoff:    sodium chloride      sodium chloride 125 mL/hr at 07/31/24 1623    dextrose         Lab Data:   CBC:   Recent Labs     07/31/24  0942   WBC 7.8   HGB 13.9   HCT 40.5   MCV 90.6        BMP:    Recent Labs     07/31/24  0942      K 3.5   CO2 18*   BUN 53*   CREATININE 4.0*     LIVR:   Recent Labs     07/31/24  0942   *   *     PT/INR:   Recent Labs     07/31/24  0942   INR 5.34*     APTT: No results for input(s): \"APTT\" in the last 72 hours.  ABG: No results for input(s): \"PHART\", \"FAP2NLP\", \"PO2ART\" in the last 72 hours.    Any consults during the shift? No    Any signed and held orders to be released?  No        4 Eyes Skin Assessment       The patient is being assessed for  Shift Handoff    I agree that at least one RN has

## 2024-07-31 NOTE — ED NOTES
Pt transferred from [facility] to ED rm 45 at this time. Per report, pt to ED with c/o sob and fatigue. Symptoms started 10 days ago. States the sob is also at rest. Hx of prostate ca. Pt alert and oriented x4, answering questions appropriately, no s/s of distress noted or reported. Pt being placed on cardiac monitor, EKG obtained in [facility]. Family member present at bedside. Call light in reach, will continue to monitor closely.

## 2024-07-31 NOTE — ED NOTES
Pt requested and received turkey sandwich and water. Tolerating po food and fluids without difficulty, no complications noted or reported.

## 2024-07-31 NOTE — PROGRESS NOTES
4 Eyes Skin Assessment     NAME:  Morro Kevin  YOB: 1950  MEDICAL RECORD NUMBER:  [id number]    The patient is being assessed for  Admission    I agree that at least one RN has performed a thorough Head to Toe Skin Assessment on the patient. ALL assessment sites listed below have been assessed.      Areas assessed by both nurses:    Head, Face, Ears, Shoulders, Back, Chest, Arms, Elbows, Hands, Sacrum. Buttock, Coccyx, Ischium, Legs. Feet and Heels, and Under Medical Devices         Does the Patient have a Wound? No noted wound(s)       Vignesh Prevention initiated by RN: Yes  Wound Care Orders initiated by RN: No    Pressure Injury (Stage 3,4, Unstageable, DTI, NWPT, and Complex wounds) if present, place Wound referral order by RN under : No    New Ostomies, if present place, Ostomy referral order under : No     Nurse 1 eSignature: Electronically signed by Karen Cavazos RN on 7/31/24 at 7:28 PM EDT    **SHARE this note so that the co-signing nurse can place an eSignature**    Nurse 2 eSignature: Electronically signed by Nneka Chirinos RN on 7/31/24 at 7:36 PM EDT

## 2024-07-31 NOTE — ED NOTES
Pt resting in bed comfortably with hob elevated. No s/s or c/o pain or distress noted or reported. Respirations easy and even. Call light in reach, will continue to monitor closely.

## 2024-07-31 NOTE — PROGRESS NOTES
1522: Pt arrived to ICU Rm 2125 via stretcher on room air accompanied by ED Rn on monitor in stable condition. Moved into ICU bed. ICU monitors applied. 4 eye skin assessment completed with Nneka, see note.    1500: Report received from ED RN, Kacy CH         Upon arrival to unit VSS on room air. Pt denies pain. NSR on tele.

## 2024-08-01 ENCOUNTER — APPOINTMENT (OUTPATIENT)
Age: 74
End: 2024-08-01
Attending: STUDENT IN AN ORGANIZED HEALTH CARE EDUCATION/TRAINING PROGRAM
Payer: MEDICARE

## 2024-08-01 LAB
ALBUMIN SERPL-MCNC: 3 G/DL (ref 3.4–5)
ALBUMIN/GLOB SERPL: 1.2 {RATIO} (ref 1.1–2.2)
ALP SERPL-CCNC: 76 U/L (ref 40–129)
ALT SERPL-CCNC: 514 U/L (ref 10–40)
ANION GAP SERPL CALCULATED.3IONS-SCNC: 18 MMOL/L (ref 3–16)
AST SERPL-CCNC: 684 U/L (ref 15–37)
BACTERIA URNS QL MICRO: ABNORMAL /HPF
BASOPHILS # BLD: 0 K/UL (ref 0–0.2)
BASOPHILS NFR BLD: 0.4 %
BILIRUB SERPL-MCNC: 0.4 MG/DL (ref 0–1)
BILIRUB UR QL STRIP.AUTO: NEGATIVE
BUN SERPL-MCNC: 64 MG/DL (ref 7–20)
CALCIUM SERPL-MCNC: 8.9 MG/DL (ref 8.3–10.6)
CHLORIDE SERPL-SCNC: 102 MMOL/L (ref 99–110)
CK SERPL-CCNC: ABNORMAL U/L (ref 39–308)
CLARITY UR: ABNORMAL
CO2 SERPL-SCNC: 17 MMOL/L (ref 21–32)
COLOR UR: YELLOW
CREAT SERPL-MCNC: 4.8 MG/DL (ref 0.8–1.3)
CREAT UR-MCNC: 34.7 MG/DL (ref 39–259)
DEPRECATED RDW RBC AUTO: 15.3 % (ref 12.4–15.4)
ECHO BSA: 1.9 M2
ECHO IVC EXP: 1.5 CM
ECHO LV EF PHYSICIAN: 60 %
ECHO LV FRACTIONAL SHORTENING: 34 % (ref 28–44)
ECHO LV INTERNAL DIMENSION DIASTOLE INDEX: 2.3 CM/M2
ECHO LV INTERNAL DIMENSION DIASTOLIC: 4.4 CM (ref 4.2–5.9)
ECHO LV INTERNAL DIMENSION SYSTOLIC INDEX: 1.52 CM/M2
ECHO LV INTERNAL DIMENSION SYSTOLIC: 2.9 CM
ECHO LV IVSD: 1.1 CM (ref 0.6–1)
ECHO LV MASS 2D: 180 G (ref 88–224)
ECHO LV MASS INDEX 2D: 94.2 G/M2 (ref 49–115)
ECHO LV POSTERIOR WALL DIASTOLIC: 1.2 CM (ref 0.6–1)
ECHO LV RELATIVE WALL THICKNESS RATIO: 0.55
ECHO RV BASAL DIMENSION: 4.4 CM
ECHO RV MID DIMENSION: 2.8 CM
ECHO RV TAPSE: 1.9 CM (ref 1.7–?)
EOSINOPHIL # BLD: 0.1 K/UL (ref 0–0.6)
EOSINOPHIL NFR BLD: 1.6 %
EPI CELLS #/AREA URNS AUTO: 1 /HPF (ref 0–5)
GFR SERPLBLD CREATININE-BSD FMLA CKD-EPI: 12 ML/MIN/{1.73_M2}
GLUCOSE BLD-MCNC: 101 MG/DL (ref 70–99)
GLUCOSE BLD-MCNC: 199 MG/DL (ref 70–99)
GLUCOSE BLD-MCNC: 200 MG/DL (ref 70–99)
GLUCOSE BLD-MCNC: 219 MG/DL (ref 70–99)
GLUCOSE SERPL-MCNC: 116 MG/DL (ref 70–99)
GLUCOSE UR STRIP.AUTO-MCNC: 500 MG/DL
HCT VFR BLD AUTO: 39.8 % (ref 40.5–52.5)
HGB BLD-MCNC: 13.8 G/DL (ref 13.5–17.5)
HGB UR QL STRIP.AUTO: ABNORMAL
HYALINE CASTS #/AREA URNS AUTO: 14 /LPF (ref 0–8)
INR PPP: 4.35 (ref 0.85–1.15)
KETONES UR STRIP.AUTO-MCNC: NEGATIVE MG/DL
LEUKOCYTE ESTERASE UR QL STRIP.AUTO: ABNORMAL
LYMPHOCYTES # BLD: 1.2 K/UL (ref 1–5.1)
LYMPHOCYTES NFR BLD: 15.8 %
MAGNESIUM SERPL-MCNC: 3 MG/DL (ref 1.8–2.4)
MCH RBC QN AUTO: 31.1 PG (ref 26–34)
MCHC RBC AUTO-ENTMCNC: 34.7 G/DL (ref 31–36)
MCV RBC AUTO: 89.6 FL (ref 80–100)
MONOCYTES # BLD: 0.6 K/UL (ref 0–1.3)
MONOCYTES NFR BLD: 7.5 %
NEUTROPHILS # BLD: 5.8 K/UL (ref 1.7–7.7)
NEUTROPHILS NFR BLD: 74.7 %
NITRITE UR QL STRIP.AUTO: NEGATIVE
PERFORMED ON: ABNORMAL
PH UR STRIP.AUTO: 7 [PH] (ref 5–8)
PLATELET # BLD AUTO: 142 K/UL (ref 135–450)
PMV BLD AUTO: 8.7 FL (ref 5–10.5)
POTASSIUM SERPL-SCNC: 3.5 MMOL/L (ref 3.5–5.1)
PROT SERPL-MCNC: 5.6 G/DL (ref 6.4–8.2)
PROT UR STRIP.AUTO-MCNC: 300 MG/DL
PROTHROMBIN TIME: 41.2 SEC (ref 11.9–14.9)
RBC # BLD AUTO: 4.44 M/UL (ref 4.2–5.9)
RBC CLUMPS #/AREA URNS AUTO: 85 /HPF (ref 0–4)
SODIUM SERPL-SCNC: 137 MMOL/L (ref 136–145)
SODIUM UR-SCNC: 33 MMOL/L
SP GR UR STRIP.AUTO: 1.01 (ref 1–1.03)
UA COMPLETE W REFLEX CULTURE PNL UR: YES
UA DIPSTICK W REFLEX MICRO PNL UR: YES
URN SPEC COLLECT METH UR: ABNORMAL
UROBILINOGEN UR STRIP-ACNC: 0.2 E.U./DL
WBC # BLD AUTO: 7.7 K/UL (ref 4–11)
WBC #/AREA URNS AUTO: 264 /HPF (ref 0–5)

## 2024-08-01 PROCEDURE — 80053 COMPREHEN METABOLIC PANEL: CPT

## 2024-08-01 PROCEDURE — 82570 ASSAY OF URINE CREATININE: CPT

## 2024-08-01 PROCEDURE — 2580000003 HC RX 258: Performed by: INTERNAL MEDICINE

## 2024-08-01 PROCEDURE — 83735 ASSAY OF MAGNESIUM: CPT

## 2024-08-01 PROCEDURE — 2000000000 HC ICU R&B

## 2024-08-01 PROCEDURE — 2500000003 HC RX 250 WO HCPCS: Performed by: INTERNAL MEDICINE

## 2024-08-01 PROCEDURE — 2580000003 HC RX 258

## 2024-08-01 PROCEDURE — 99233 SBSQ HOSP IP/OBS HIGH 50: CPT | Performed by: INTERNAL MEDICINE

## 2024-08-01 PROCEDURE — 6370000000 HC RX 637 (ALT 250 FOR IP): Performed by: HOSPITALIST

## 2024-08-01 PROCEDURE — 93308 TTE F-UP OR LMTD: CPT

## 2024-08-01 PROCEDURE — 51798 US URINE CAPACITY MEASURE: CPT

## 2024-08-01 PROCEDURE — 2500000003 HC RX 250 WO HCPCS

## 2024-08-01 PROCEDURE — 85610 PROTHROMBIN TIME: CPT

## 2024-08-01 PROCEDURE — 84300 ASSAY OF URINE SODIUM: CPT

## 2024-08-01 PROCEDURE — 6370000000 HC RX 637 (ALT 250 FOR IP): Performed by: INTERNAL MEDICINE

## 2024-08-01 PROCEDURE — 2500000003 HC RX 250 WO HCPCS: Performed by: HOSPITALIST

## 2024-08-01 PROCEDURE — 87086 URINE CULTURE/COLONY COUNT: CPT

## 2024-08-01 PROCEDURE — 94760 N-INVAS EAR/PLS OXIMETRY 1: CPT

## 2024-08-01 PROCEDURE — 82550 ASSAY OF CK (CPK): CPT

## 2024-08-01 PROCEDURE — 2580000003 HC RX 258: Performed by: HOSPITALIST

## 2024-08-01 PROCEDURE — 85025 COMPLETE CBC W/AUTO DIFF WBC: CPT

## 2024-08-01 PROCEDURE — 81001 URINALYSIS AUTO W/SCOPE: CPT

## 2024-08-01 PROCEDURE — P9047 ALBUMIN (HUMAN), 25%, 50ML: HCPCS

## 2024-08-01 PROCEDURE — 36415 COLL VENOUS BLD VENIPUNCTURE: CPT

## 2024-08-01 PROCEDURE — 93308 TTE F-UP OR LMTD: CPT | Performed by: INTERNAL MEDICINE

## 2024-08-01 PROCEDURE — 6360000002 HC RX W HCPCS

## 2024-08-01 RX ORDER — MIDODRINE HYDROCHLORIDE 5 MG/1
5 TABLET ORAL
Status: DISCONTINUED | OUTPATIENT
Start: 2024-08-01 | End: 2024-08-02

## 2024-08-01 RX ORDER — ALBUMIN (HUMAN) 12.5 G/50ML
25 SOLUTION INTRAVENOUS EVERY 8 HOURS
Status: COMPLETED | OUTPATIENT
Start: 2024-08-01 | End: 2024-08-01

## 2024-08-01 RX ADMIN — SODIUM BICARBONATE: 84 INJECTION, SOLUTION INTRAVENOUS at 08:31

## 2024-08-01 RX ADMIN — Medication: at 16:43

## 2024-08-01 RX ADMIN — INSULIN LISPRO 2 UNITS: 100 INJECTION, SOLUTION INTRAVENOUS; SUBCUTANEOUS at 12:29

## 2024-08-01 RX ADMIN — POLYETHYLENE GLYCOL 3350 17 G: 17 POWDER, FOR SOLUTION ORAL at 10:41

## 2024-08-01 RX ADMIN — MIRTAZAPINE 30 MG: 30 TABLET, FILM COATED ORAL at 20:41

## 2024-08-01 RX ADMIN — ALBUMIN (HUMAN) 25 G: 0.25 INJECTION, SOLUTION INTRAVENOUS at 20:45

## 2024-08-01 RX ADMIN — INSULIN GLARGINE 10 UNITS: 100 INJECTION, SOLUTION SUBCUTANEOUS at 10:42

## 2024-08-01 RX ADMIN — LEVOTHYROXINE SODIUM 137 MCG: 0.11 TABLET ORAL at 08:35

## 2024-08-01 RX ADMIN — ALBUMIN (HUMAN) 25 G: 0.25 INJECTION, SOLUTION INTRAVENOUS at 12:56

## 2024-08-01 RX ADMIN — SODIUM BICARBONATE: 84 INJECTION, SOLUTION INTRAVENOUS at 11:15

## 2024-08-01 RX ADMIN — Medication: at 23:30

## 2024-08-01 RX ADMIN — SODIUM CHLORIDE, PRESERVATIVE FREE 10 ML: 5 INJECTION INTRAVENOUS at 08:35

## 2024-08-01 RX ADMIN — SODIUM CHLORIDE: 9 INJECTION, SOLUTION INTRAVENOUS at 07:12

## 2024-08-01 RX ADMIN — ASPIRIN 81 MG: 81 TABLET, COATED ORAL at 20:41

## 2024-08-01 RX ADMIN — TAMSULOSIN HYDROCHLORIDE 0.4 MG: 0.4 CAPSULE ORAL at 20:41

## 2024-08-01 RX ADMIN — MIDODRINE HYDROCHLORIDE 5 MG: 5 TABLET ORAL at 16:43

## 2024-08-01 ASSESSMENT — PAIN SCALES - GENERAL: PAINLEVEL_OUTOF10: 0

## 2024-08-01 NOTE — PROGRESS NOTES
Clinical Pharmacy Note  Warfarin Consult    Morro Kevin is a 73 y.o. male receiving warfarin managed by pharmacy.  Patient being bridged with none.    Warfarin Indication: AVR  Target INR range: 2-3   Dose prior to admission:   Warfarin maintenance plan: 11.25 mg (7.5 mg x 1.5) every Wed; 7.5 mg (7.5 mg x 1) all other days   Weekly warfarin total: 56.25 mg   Managed at [facility]    Current warfarin drug-drug interactions: -     Recent Labs     07/31/24  0942 08/01/24  0333 08/01/24  0335   HGB 13.9 13.8  --    HCT 40.5 39.8*  --    INR 5.34*  --  4.35*         Assessment/Plan:    SupratherapeuticINR  Hold warfarin until closer to goal range.   Daily PT/INR until stable within therapeutic range.     Thank you for the consult.     Roxana Johnson, PharmD.  8/1/2024  1:47 PM

## 2024-08-01 NOTE — PROGRESS NOTES
Cardiovascular Progress Note      Chief Complaint:   Chief Complaint   Patient presents with    Shortness of Breath     Pt arrives with c/o sob, dyspnea at rest. Spo2 99%    Fatigue     Fatigue x 10 days, hx of prostate ca.      Impression/Recommendations:    Morro Kevin is a 73 y.o. patient of Dr. Ton Ray with    Bicuspid aortic valve s/p [facility] mechanical AVR 9/2016, Dr. Piña, on Warfarin (normal valve function by 7/2024 TTE)  CAD: moderate nonobstructive  (40% LM stenosis with FFR 0.83 by 8/2016 [facility]), nonischemic SPECT 7/2021  PAF, newly diagnosed 4/2024  Hypertension, controlled   Hyperlipidemia  Diabetes mellitus, follows with Dr. Nelson  Metastatic prostate cancer  Rhabdomyolysis?  SUKUMAR/CKD stage III, follows with Dr. Salinas  Hx GIB in 2023   Hx RP bleed 4/2024 (felt to be 2/2 trauma)  JOSÉ MANUEL on CPAP      Dyspnea and generalized weakness with onset around the time of new combination chemotherapy (Zytiga). Reports improvement with agent stopped.   Management of elevated CK, Cr, LFTs per team. He is currently on bicarb gtt and compensated.   Fortunately, repeat limited TTE today with preserved biventricular function. Warfarin on hold and dosed per Pharmacy for INR goal 2.0-3.0. Heparin gtt if procedures should arise.   No further cardiac testing. Outpatient follow up with Dr. Ray.     Interval History:  No overnight events.   No chest pain, palpitations.  Feels breathing is much improved. No oxygen requirements.   No edema.     [place] [address]  HS troponin 191, 192  Negative COVID19    8/1/2024 TTE    Left Ventricle: Normal left ventricular systolic function. EF by visual approximation is 60%. Left ventricle size is normal. Normal wall motion.    Right Ventricle: Normal systolic function. TAPSE is 1.9 cm.    EKG 7/31/24  Normal sinus rhythm  Left axis deviation  Inferior infarct   Poor R wave progression    CXR 7/31/24  IMPRESSION:  No acute cardiopulmonary findings    CT C/A/P  of 57 mmHg.  8.  Cardiac output was 6.2 L per minute.  9.  Aortic valve area was calculated around 1.1 to 1.2 cm squared.      Medications:  sodium bicarbonate 75 mEq in sodium chloride 0.45 % 1,000 mL infusion, Continuous  albumin human 25% IV solution 25 g, Q8H  sodium chloride flush 0.9 % injection 5-40 mL, 2 times per day  sodium chloride flush 0.9 % injection 5-40 mL, PRN  0.9 % sodium chloride infusion, PRN  ondansetron (ZOFRAN-ODT) disintegrating tablet 4 mg, Q8H PRN   Or  ondansetron (ZOFRAN) injection 4 mg, Q6H PRN  polyethylene glycol (GLYCOLAX) packet 17 g, Daily PRN  aspirin EC tablet 81 mg, Nightly  levothyroxine (SYNTHROID) tablet 137 mcg, Daily  mirtazapine (REMERON) tablet 30 mg, Nightly  tamsulosin (FLOMAX) capsule 0.4 mg, Nightly  insulin lispro (HUMALOG,ADMELOG) injection vial 0-8 Units, TID WC  insulin lispro (HUMALOG,ADMELOG) injection vial 0-4 Units, Nightly  glucose chewable tablet 16 g, PRN  dextrose bolus 10% 125 mL, PRN   Or  dextrose bolus 10% 250 mL, PRN  glucagon injection 1 mg, PRN  dextrose 10 % infusion, Continuous PRN  insulin glargine (LANTUS) injection vial 10 Units, Daily  warfarin placeholder: dosing by pharmacy, RX Placeholder  perflutren lipid microspheres (DEFINITY) injection 1.5 mL, ONCE PRN        I/O:     Intake/Output Summary (Last 24 hours) at 8/1/2024 1300  Last data filed at 8/1/2024 1116  Gross per 24 hour   Intake 2923.22 ml   Output 575 ml   Net 2348.22 ml       Physical Exam:    BP (!) 107/59   Pulse 71   Temp 97.8 °F (36.6 °C) (Oral)   Resp 18   Ht 1.778 m (5' 10\")   Wt 73.5 kg (162 lb)   SpO2 100%   BMI 23.24 kg/m²   Wt Readings from Last 3 Encounters:   08/01/24 73.5 kg (162 lb)   05/03/24 80.7 kg (178 lb)   04/18/24 84.2 kg (185 lb 10 oz)       GENERAL: Well developed, well nourished, no acute distress  NEUROLOGICAL: Alert and oriented x3  PSYCH: Normal mood and affect   SKIN: Warm and dry, without lesions  HEENT: Normocephalic, atraumatic, Sclera

## 2024-08-01 NOTE — PLAN OF CARE
Problem: Safety - Adult  Goal: Free from fall injury  Outcome: Progressing  Flowsheets (Taken 7/31/2024 1803 by Karen Cavazos, RN)  Free From Fall Injury:   Instruct family/caregiver on patient safety   Based on caregiver fall risk screen, instruct family/caregiver to ask for assistance with transferring infant if caregiver noted to have fall risk factors     Problem: Discharge Planning  Goal: Discharge to home or other facility with appropriate resources  Outcome: Progressing  Flowsheets (Taken 7/31/2024 1522 by Karen Cavazos, RN)  Discharge to home or other facility with appropriate resources: Identify barriers to discharge with patient and caregiver     Problem: Pain  Goal: Verbalizes/displays adequate comfort level or baseline comfort level  Outcome: Progressing  Flowsheets (Taken 8/1/2024 0909)  Verbalizes/displays adequate comfort level or baseline comfort level:   Encourage patient to monitor pain and request assistance   Assess pain using appropriate pain scale     Problem: Skin/Tissue Integrity  Goal: Absence of new skin breakdown  Description: 1.  Monitor for areas of redness and/or skin breakdown  2.  Assess vascular access sites hourly  3.  Every 4-6 hours minimum:  Change oxygen saturation probe site  4.  Every 4-6 hours:  If on nasal continuous positive airway pressure, respiratory therapy assess nares and determine need for appliance change or resting period.  Outcome: Progressing     Problem: ABCDS Injury Assessment  Goal: Absence of physical injury  Outcome: Progressing  Flowsheets (Taken 8/1/2024 0909)  Absence of Physical Injury: Implement safety measures based on patient assessment

## 2024-08-01 NOTE — PROGRESS NOTES
cholelithiasis.  No biliary dilation.     NM LUNG VENT/PERFUSION (VQ)    Result Date: 7/31/2024  EXAMINATION: NUCLEAR MEDICINE PERFUSION SCAN. 7/31/2024 TECHNIQUE: Ventilation not performed.  6.6 millicuries of Tc 99m MAA was administered intravenously prior to planar imaging of the lungs in multiple projections. COMPARISON: Chest radiograph 07/31/2024. HISTORY: ORDERING SYSTEM PROVIDED HISTORY: SOB, malignancy, concern for PE TECHNOLOGIST PROVIDED HISTORY: Reason for exam:->SOB, malignancy, concern for PE Decision Support Exception - unselect if not a suspected or confirmed emergency medical condition->Emergency Medical Condition (MA) Reason for Exam: SOB, malignancy, concern for PE Additional signs and symptoms: bilat leg weakness FINDINGS: PERFUSION: Mild heterogeneity of perfusion is seen.  Diminished activity is seen toward the right apex, seen on the anterior view, without consolidation on radiograph.  No ventilation study is available for correlation. CHEST RADIOGRAPH: No focal consolidation on accompanying radiograph.     Intermediate probability for pulmonary embolism.     CT CHEST ABDOMEN PELVIS WO CONTRAST Additional Contrast? None    Result Date: 7/31/2024  EXAMINATION: CT OF THE CHEST, ABDOMEN, AND PELVIS WITHOUT CONTRAST 7/31/2024 10:35 am TECHNIQUE: CT of the chest, abdomen and pelvis was performed without the administration of intravenous contrast. Multiplanar reformatted images are provided for review. Automated exposure control, iterative reconstruction, and/or weight based adjustment of the mA/kV was utilized to reduce the radiation dose to as low as reasonably achievable. COMPARISON: 04/15/2024 PET-CT 05/14/2024 HISTORY: ORDERING SYSTEM PROVIDED HISTORY: SOB, fatigue, generalized weakness, has metastatic prostate cancer. Concern for occult pneumonia and/or worsening neoplastic disease. TECHNOLOGIST PROVIDED HISTORY: Reason for exam:->SOB, fatigue, generalized weakness, has metastatic prostate  densities project in the region of the prostate. Retroperitoneal hematoma on the left seen previously no longer noted Bones/Soft Tissues: Spurring is seen in the spine.  Spurring is seen in the hips. Sclerotic focus is seen in left iliac wing. Small soft tissue nodule is seen in anterior abdominal wall on the left, possibly from prior medication injection     Chest No definite metastatic disease. No pneumonia or edema Abdomen and pelvis Small internal iliac node seen on recent PET-CT has not decreased in size in the interval.  No areas of increasing adenopathy identified.  Moderate stool load in colon.  No bowel obstruction. Resolution of retroperitoneal hematoma Tiny stone right kidney Sclerotic focus left iliac wing, either bone island, or due to the reported history of prostate carcinoma.     XR CHEST PORTABLE    Result Date: 7/31/2024  EXAMINATION: ONE XRAY VIEW OF THE CHEST 7/31/2024 9:51 am COMPARISON: None. HISTORY: ORDERING SYSTEM PROVIDED HISTORY: SOB TECHNOLOGIST PROVIDED HISTORY: Reason for exam:->SOB FINDINGS: Normal cardiomediastinal silhouette.  Status post median sternotomy.  No pneumothorax or pleural effusion.  No acute infiltrates     No acute cardiopulmonary findings       Assessment and Plan:   Morro Kevin is a 73 y.o. male     Conditions under treatment:    # Acute kidney injury  # Abnormal LFTs  # Prostate cancer  # Clinical dehydration  # Hypertension  # Diabetes mellitus   # Mechanical valve  # Supratherapeutic INR  # Relative hypotension   # Nephrolithiasis  # Subjective shortness of breath       Plan:    -Patient's creatinine has gone up even after IV hydration, nonoliguric, CT done yesterday without contrast did not reveal any obvious obstruction, will change fluids to bicarb given the acidosis, will request a nephrology consultation in the meanwhile I have requested a urine sodium, urine creatinine as well as urine analysis.  Lisinopril is on hold, suspect this could be related to some

## 2024-08-01 NOTE — PROGRESS NOTES
NAME:  Morro Kevin  YOB: 1950  MEDICAL RECORD NUMBER:  [id number]    Shift Summary: Nephro consult, bicarb & Albumin started.  CK- 26,240. Low UOP.  Urine studies sent.  ECHO done.      Family updated: Yes:  at bedside    Rhythm: Normal Sinus Rhythm     Most recent vitals:   Visit Vitals  /64   Pulse 77   Temp 98 °F (36.7 °C) (Oral)   Resp 16   Ht 1.778 m (5' 10\")   Wt 73.5 kg (162 lb)   SpO2 97%   BMI 23.24 kg/m²           No data found.    No data found.      Respiratory support needed (if any):  - RA    Admission weight Weight - Scale: 77.1 kg (170 lb) (07/31/24 0930)    Today's weight    Wt Readings from Last 1 Encounters:   08/01/24 73.5 kg (162 lb)        Mayer need assessed each shift: N/A - no mayer present  UOP >30ml/hr: NO - Nephrology on board  Last documented BM (in last 48 hrs):  Patient Vitals for the past 48 hrs:   Last BM (including prior to admit)   08/01/24 1702 08/01/24                Restraints (in use currently or dc'd in last 12 hrs): No    Order current and documentation up to date? No    Lines/Drains reviewed @ bedside.  Peripheral IV 07/31/24 Right Forearm (Active)   Number of days: 1       Peripheral IV 07/31/24 Left Forearm (Active)   Number of days: 1         Drip rates at handoff:    sodium bicarbonate 150 mEq in sterile water 1,000 mL infusion 150 mL/hr at 08/01/24 1806    sodium chloride      dextrose         Lab Data:   CBC:   Recent Labs     07/31/24  0942 08/01/24  0333   WBC 7.8 7.7   HGB 13.9 13.8   HCT 40.5 39.8*   MCV 90.6 89.6    142     BMP:    Recent Labs     07/31/24  0942 08/01/24  0333    137   K 3.5 3.5   CO2 18* 17*   BUN 53* 64*   CREATININE 4.0* 4.8*     LIVR:   Recent Labs     07/31/24  0942 08/01/24  0333   * 684*   * 514*     PT/INR:   Recent Labs     07/31/24  0942 08/01/24  0335   INR 5.34* 4.35*     APTT: No results for input(s): \"APTT\" in the last 72 hours.  ABG: No results for input(s): \"PHART\", \"CHV8WPR\",  \"PO2ART\" in the last 72 hours.    Any consults during the shift? Yes: Consults received: : Nephro    Any signed and held orders to be released?  No        4 Eyes Skin Assessment       The patient is being assessed for  Shift Handoff    I agree that at least one RN has performed a thorough Head to Toe Skin Assessment on the patient. ALL assessment sites listed below have been assessed.      Areas assessed by both nurses: Head, Face, Ears, Shoulders, Back, Chest, Arms, Elbows, Hands, Sacrum. Buttock, Coccyx, Ischium, Legs. Feet and Heels, and Under Medical Devices         Does the Patient have a Wound? No noted wound(s)    Wound Care Orders initiated by RN: No       Vignesh Prevention initiated by RN: Yes    Pressure Injury (Stage 3,4, Unstageable, DTI, NWPT, and Complex wounds) if present, place Wound referral order by RN under : No    New Ostomies, if present place, Ostomy referral order under : No     Nurse 1 eSignature: Electronically signed by Merle Emmanuel RN on 8/1/24 at 6:23 PM EDT    **SHARE this note so that the co-signing nurse can place an eSignature**    Nurse 2 eSignature: Electronically signed by Lindsey Chavarria RN on 8/1/24 at 9:58 PM EDT

## 2024-08-01 NOTE — CONSULTS
Nephrology Consult Note   [email or web address]      Reason for consultation: SUKUMAR on CKD 3a -- baseline Cr ~ 1.3-1.5 mg/dL. Follows with Dr. Salinas in office (last seen 7/17/2024).    History of Present Illness:  Morro Kevin is a 74 yo male with a PMHx of CKD 3a, HTN, DM, CAD, JOSÉ MANUEL, kidney stones, hypothyroidism, h/o AVR, prostate cancer. Patient presents to  ED on 7/31/2024 with complaints of SOB. States he has also been having issues with his legs feeling stiff and difficult to move. CT chest is negative for findings of PNA or edema. Cardiology was consulted and a repeat limited TTE was ordered revealing normal systolic function. Patient states his breathing is better today.    We have been consulted for SUKUMAR on CKD 3a management. Baseline Cr is 1.3-1.5 mg/dL. Cr began trending up last month with a Cr reading of 2.0 mg/dL on 7/17/2024. Upon admission, Cr was 4.0 mg/dL. Today, Cr is 4.8 mg/dL. CTAP is negative for hydro. Patient states he has reduced oral intake for at least the past few weeks. Denies  symptoms. BP noted to be as low as 84/60 mmHg upon admission. Takes lisionpril and Synjardy PTA. Denies NSAID use. Denies N/V/D. Patient started a new chemotherapy agent (Zytiga) in June 2024.     Subjective:      Patient seen and examined. Labs and chart reviewed. Resting in bed on RA.     There were not complications last night.    Patient review of systems: Improved SOB. See HPI for other pertinent ROS.     Past Medical History:   Diagnosis Date    Aortic stenosis 9/2013    moderate    Arthritis     Asthma     BRONCHIAL ASTHMA  AS A CHILD    Deviated septum     Diabetes mellitus type II 2000    Dr. Goodson     Hashimoto thyroiditis dx 1984    medical treatment    Homocysteinemia     Hyperlipidemia     Hypertension     Hypertriglyceridemia     Hypogonadism male     topical testosterone     Hypothyroid 12/9/2011    Kidney stone 1990s    Passed on own and also surgical excision     Mechanical heart valve present 2016

## 2024-08-01 NOTE — PROGRESS NOTES
NAME:  Morro Kevin  YOB: 1950  MEDICAL RECORD NUMBER:  [id number]    Shift Summary: VSS overnight. SR per monitor. Up to BR twice with walker, no TOLENTINO.     Family updated: No    Rhythm: Normal Sinus Rhythm     Most recent vitals:   Visit Vitals  BP (!) 115/54   Pulse 64   Temp 99.1 °F (37.3 °C) (Oral)   Resp 15   Ht 1.778 m (5' 10\")   Wt 73.5 kg (162 lb 0.6 oz)   SpO2 98%   BMI 23.25 kg/m²          Respiratory support needed (if any):  - RA    Admission weight Weight - Scale: 77.1 kg (170 lb) (07/31/24 0930)    Today's weight    Wt Readings from Last 1 Encounters:   08/01/24 73.5 kg (162 lb 0.6 oz)        Mayer need assessed each shift: N/A - no mayer present  UOP >30ml/hr: YES  Last documented BM (in last 48 hrs):  No data found.             Restraints (in use currently or dc'd in last 12 hrs): No        Lines/Drains reviewed @ bedside.  Peripheral IV 07/31/24 Right Forearm (Active)   Number of days: 0       Peripheral IV 07/31/24 Left Forearm (Active)   Number of days: 0         Drip rates at handoff:    sodium chloride      sodium chloride 125 mL/hr at 08/01/24 0617    dextrose         Lab Data:   CBC:   Recent Labs     07/31/24  0942 08/01/24  0333   WBC 7.8 7.7   HGB 13.9 13.8   HCT 40.5 39.8*   MCV 90.6 89.6    142     BMP:    Recent Labs     07/31/24  0942 08/01/24  0333    137   K 3.5 3.5   CO2 18* 17*   BUN 53* 64*   CREATININE 4.0* 4.8*     LIVR:   Recent Labs     07/31/24  0942 08/01/24  0333   * 684*   * 514*     PT/INR:   Recent Labs     07/31/24 0942 08/01/24  0335   INR 5.34* 4.35*     APTT: No results for input(s): \"APTT\" in the last 72 hours.  ABG: No results for input(s): \"PHART\", \"MVC6MWG\", \"PO2ART\" in the last 72 hours.    Any consults during the shift? No    Any signed and held orders to be released?  No        4 Eyes Skin Assessment       The patient is being assessed for  Shift Handoff    I agree that at least one RN has performed a thorough Head  to Toe Skin Assessment on the patient. ALL assessment sites listed below have been assessed.      Areas assessed by both nurses: Head, Face, Ears, Shoulders, Back, Chest, Arms, Elbows, Hands, Sacrum. Buttock, Coccyx, Ischium, Legs. Feet and Heels, and Under Medical Devices         Does the Patient have a Wound? No noted wound(s)    Wound Care Orders initiated by RN: No       Vignesh Prevention initiated by RN: yes    Pressure Injury (Stage 3,4, Unstageable, DTI, NWPT, and Complex wounds) if present, place Wound referral order by RN under : No    New Ostomies, if present place, Ostomy referral order under : No     Nurse 1 eSignature: Electronically signed by Fernando Amin RN on 8/1/24 at 7:16 AM EDT    **SHARE this note so that the co-signing nurse can place an eSignature**    Nurse 2 eSignature: Electronically signed by Merle Emmanuel RN on 8/1/24 at 7:33 AM EDT

## 2024-08-02 ENCOUNTER — APPOINTMENT (OUTPATIENT)
Dept: GENERAL RADIOLOGY | Age: 74
End: 2024-08-02
Payer: MEDICARE

## 2024-08-02 LAB
ALBUMIN SERPL-MCNC: 2.8 G/DL (ref 3.4–5)
ALBUMIN/GLOB SERPL: 1.5 {RATIO} (ref 1.1–2.2)
ALP SERPL-CCNC: 54 U/L (ref 40–129)
ALT SERPL-CCNC: 376 U/L (ref 10–40)
ANION GAP SERPL CALCULATED.3IONS-SCNC: 14 MMOL/L (ref 3–16)
AST SERPL-CCNC: 497 U/L (ref 15–37)
BACTERIA UR CULT: NORMAL
BILIRUB SERPL-MCNC: 0.4 MG/DL (ref 0–1)
BUN SERPL-MCNC: 72 MG/DL (ref 7–20)
CALCIUM SERPL-MCNC: 8.6 MG/DL (ref 8.3–10.6)
CHLORIDE SERPL-SCNC: 97 MMOL/L (ref 99–110)
CK SERPL-CCNC: ABNORMAL U/L (ref 39–308)
CO2 SERPL-SCNC: 23 MMOL/L (ref 21–32)
CORTIS AM PEAK SERPL-MCNC: 2.6 UG/DL (ref 4.3–22.4)
CREAT SERPL-MCNC: 6.1 MG/DL (ref 0.8–1.3)
DEPRECATED RDW RBC AUTO: 15.1 % (ref 12.4–15.4)
GFR SERPLBLD CREATININE-BSD FMLA CKD-EPI: 9 ML/MIN/{1.73_M2}
GLUCOSE BLD-MCNC: 136 MG/DL (ref 70–99)
GLUCOSE BLD-MCNC: 153 MG/DL (ref 70–99)
GLUCOSE BLD-MCNC: 236 MG/DL (ref 70–99)
GLUCOSE BLD-MCNC: 336 MG/DL (ref 70–99)
GLUCOSE SERPL-MCNC: 115 MG/DL (ref 70–99)
HBV CORE IGM SERPL QL IA: NORMAL
HBV SURFACE AB SERPL IA-ACNC: <3.5 MIU/ML
HBV SURFACE AG SERPL QL IA: NORMAL
HCT VFR BLD AUTO: 30.6 % (ref 40.5–52.5)
HGB BLD-MCNC: 10.7 G/DL (ref 13.5–17.5)
INR PPP: 3.44 (ref 0.85–1.15)
MAGNESIUM SERPL-MCNC: 2.7 MG/DL (ref 1.8–2.4)
MCH RBC QN AUTO: 31 PG (ref 26–34)
MCHC RBC AUTO-ENTMCNC: 34.9 G/DL (ref 31–36)
MCV RBC AUTO: 88.9 FL (ref 80–100)
PERFORMED ON: ABNORMAL
PHOSPHATE SERPL-MCNC: 4.6 MG/DL (ref 2.5–4.9)
PLATELET # BLD AUTO: 110 K/UL (ref 135–450)
PMV BLD AUTO: 8.1 FL (ref 5–10.5)
POTASSIUM SERPL-SCNC: 3.3 MMOL/L (ref 3.5–5.1)
PROT SERPL-MCNC: 4.7 G/DL (ref 6.4–8.2)
PROTHROMBIN TIME: 34.4 SEC (ref 11.9–14.9)
RBC # BLD AUTO: 3.44 M/UL (ref 4.2–5.9)
SODIUM SERPL-SCNC: 134 MMOL/L (ref 136–145)
URATE SERPL-MCNC: 5 MG/DL (ref 3.5–7.2)
WBC # BLD AUTO: 5.3 K/UL (ref 4–11)

## 2024-08-02 PROCEDURE — 2580000003 HC RX 258

## 2024-08-02 PROCEDURE — 36415 COLL VENOUS BLD VENIPUNCTURE: CPT

## 2024-08-02 PROCEDURE — 2500000003 HC RX 250 WO HCPCS: Performed by: INTERNAL MEDICINE

## 2024-08-02 PROCEDURE — P9047 ALBUMIN (HUMAN), 25%, 50ML: HCPCS

## 2024-08-02 PROCEDURE — 99291 CRITICAL CARE FIRST HOUR: CPT | Performed by: INTERNAL MEDICINE

## 2024-08-02 PROCEDURE — 85027 COMPLETE CBC AUTOMATED: CPT

## 2024-08-02 PROCEDURE — 1200000000 HC SEMI PRIVATE

## 2024-08-02 PROCEDURE — 2500000003 HC RX 250 WO HCPCS

## 2024-08-02 PROCEDURE — 94760 N-INVAS EAR/PLS OXIMETRY 1: CPT

## 2024-08-02 PROCEDURE — 6370000000 HC RX 637 (ALT 250 FOR IP): Performed by: HOSPITALIST

## 2024-08-02 PROCEDURE — 84100 ASSAY OF PHOSPHORUS: CPT

## 2024-08-02 PROCEDURE — 71045 X-RAY EXAM CHEST 1 VIEW: CPT

## 2024-08-02 PROCEDURE — 2580000003 HC RX 258: Performed by: INTERNAL MEDICINE

## 2024-08-02 PROCEDURE — 6370000000 HC RX 637 (ALT 250 FOR IP)

## 2024-08-02 PROCEDURE — 90935 HEMODIALYSIS ONE EVALUATION: CPT

## 2024-08-02 PROCEDURE — 02HV33Z INSERTION OF INFUSION DEVICE INTO SUPERIOR VENA CAVA, PERCUTANEOUS APPROACH: ICD-10-PCS | Performed by: INTERNAL MEDICINE

## 2024-08-02 PROCEDURE — 82533 TOTAL CORTISOL: CPT

## 2024-08-02 PROCEDURE — 83735 ASSAY OF MAGNESIUM: CPT

## 2024-08-02 PROCEDURE — 82550 ASSAY OF CK (CPK): CPT

## 2024-08-02 PROCEDURE — 2580000003 HC RX 258: Performed by: HOSPITALIST

## 2024-08-02 PROCEDURE — 6370000000 HC RX 637 (ALT 250 FOR IP): Performed by: INTERNAL MEDICINE

## 2024-08-02 PROCEDURE — 84550 ASSAY OF BLOOD/URIC ACID: CPT

## 2024-08-02 PROCEDURE — 6360000002 HC RX W HCPCS: Performed by: INTERNAL MEDICINE

## 2024-08-02 PROCEDURE — 85610 PROTHROMBIN TIME: CPT

## 2024-08-02 PROCEDURE — 6360000002 HC RX W HCPCS

## 2024-08-02 PROCEDURE — 36556 INSERT NON-TUNNEL CV CATH: CPT | Performed by: INTERNAL MEDICINE

## 2024-08-02 PROCEDURE — 80053 COMPREHEN METABOLIC PANEL: CPT

## 2024-08-02 RX ORDER — WARFARIN SODIUM 2.5 MG/1
3.75 TABLET ORAL
Status: COMPLETED | OUTPATIENT
Start: 2024-08-02 | End: 2024-08-02

## 2024-08-02 RX ORDER — ALBUMIN (HUMAN) 12.5 G/50ML
25 SOLUTION INTRAVENOUS EVERY 8 HOURS
Status: COMPLETED | OUTPATIENT
Start: 2024-08-02 | End: 2024-08-02

## 2024-08-02 RX ORDER — HEPARIN SODIUM 1000 [USP'U]/ML
3200 INJECTION, SOLUTION INTRAVENOUS; SUBCUTANEOUS ONCE
Status: COMPLETED | OUTPATIENT
Start: 2024-08-02 | End: 2024-08-02

## 2024-08-02 RX ORDER — LIDOCAINE HYDROCHLORIDE 10 MG/ML
INJECTION, SOLUTION EPIDURAL; INFILTRATION; INTRACAUDAL; PERINEURAL
Status: COMPLETED
Start: 2024-08-02 | End: 2024-08-02

## 2024-08-02 RX ORDER — MIDODRINE HYDROCHLORIDE 5 MG/1
7.5 TABLET ORAL
Status: DISCONTINUED | OUTPATIENT
Start: 2024-08-02 | End: 2024-08-04

## 2024-08-02 RX ORDER — SODIUM CHLORIDE 9 MG/ML
INJECTION, SOLUTION INTRAVENOUS CONTINUOUS
Status: DISCONTINUED | OUTPATIENT
Start: 2024-08-02 | End: 2024-08-06

## 2024-08-02 RX ADMIN — LIDOCAINE HYDROCHLORIDE: 10 INJECTION, SOLUTION EPIDURAL; INFILTRATION; INTRACAUDAL; PERINEURAL at 09:45

## 2024-08-02 RX ADMIN — SODIUM CHLORIDE, PRESERVATIVE FREE 10 ML: 5 INJECTION INTRAVENOUS at 08:54

## 2024-08-02 RX ADMIN — ALBUMIN (HUMAN) 25 G: 0.25 INJECTION, SOLUTION INTRAVENOUS at 13:47

## 2024-08-02 RX ADMIN — HEPARIN SODIUM 3200 UNITS: 1000 INJECTION INTRAVENOUS; SUBCUTANEOUS at 12:42

## 2024-08-02 RX ADMIN — ALBUMIN (HUMAN) 25 G: 0.25 INJECTION, SOLUTION INTRAVENOUS at 21:59

## 2024-08-02 RX ADMIN — HYDROCORTISONE SODIUM SUCCINATE 50 MG: 100 INJECTION, POWDER, FOR SOLUTION INTRAMUSCULAR; INTRAVENOUS at 20:04

## 2024-08-02 RX ADMIN — INSULIN GLARGINE 10 UNITS: 100 INJECTION, SOLUTION SUBCUTANEOUS at 08:54

## 2024-08-02 RX ADMIN — HYDROCORTISONE SODIUM SUCCINATE 50 MG: 100 INJECTION, POWDER, FOR SOLUTION INTRAMUSCULAR; INTRAVENOUS at 13:41

## 2024-08-02 RX ADMIN — SODIUM CHLORIDE: 9 INJECTION, SOLUTION INTRAVENOUS at 20:03

## 2024-08-02 RX ADMIN — WARFARIN SODIUM 3.75 MG: 2.5 TABLET ORAL at 17:27

## 2024-08-02 RX ADMIN — MIDODRINE HYDROCHLORIDE 7.5 MG: 5 TABLET ORAL at 13:41

## 2024-08-02 RX ADMIN — Medication: at 06:16

## 2024-08-02 RX ADMIN — SODIUM CHLORIDE, PRESERVATIVE FREE 10 ML: 5 INJECTION INTRAVENOUS at 20:53

## 2024-08-02 RX ADMIN — INSULIN LISPRO 4 UNITS: 100 INJECTION, SOLUTION INTRAVENOUS; SUBCUTANEOUS at 20:53

## 2024-08-02 RX ADMIN — TAMSULOSIN HYDROCHLORIDE 0.4 MG: 0.4 CAPSULE ORAL at 20:51

## 2024-08-02 RX ADMIN — INSULIN LISPRO 2 UNITS: 100 INJECTION, SOLUTION INTRAVENOUS; SUBCUTANEOUS at 17:27

## 2024-08-02 RX ADMIN — ASPIRIN 81 MG: 81 TABLET, COATED ORAL at 20:51

## 2024-08-02 RX ADMIN — MIDODRINE HYDROCHLORIDE 5 MG: 5 TABLET ORAL at 08:54

## 2024-08-02 RX ADMIN — LEVOTHYROXINE SODIUM 137 MCG: 0.11 TABLET ORAL at 08:54

## 2024-08-02 RX ADMIN — MIRTAZAPINE 30 MG: 30 TABLET, FILM COATED ORAL at 20:51

## 2024-08-02 RX ADMIN — SODIUM CHLORIDE: 9 INJECTION, SOLUTION INTRAVENOUS at 13:43

## 2024-08-02 RX ADMIN — MIDODRINE HYDROCHLORIDE 7.5 MG: 5 TABLET ORAL at 17:27

## 2024-08-02 NOTE — PROGRESS NOTES
Will notify nephrology.    Lindsey Chavarria RN           Latest Reference Range & Units 08/02/24 03:39   Sodium 136 - 145 mmol/L 134 (L)   Potassium 3.5 - 5.1 mmol/L 3.3 (L)   Chloride 99 - 110 mmol/L 97 (L)   CARBON DIOXIDE 21 - 32 mmol/L 23   BUN,BUNPL 7 - 20 mg/dL 72 (H)   Creatinine 0.8 - 1.3 mg/dL 6.1 (HH)   Anion Gap 3 - 16  14   Est, Glom Filt Rate >60  9 !

## 2024-08-02 NOTE — PLAN OF CARE
Problem: Safety - Adult  Goal: Free from fall injury  8/2/2024 0739 by Merle Emmanuel RN  Outcome: Progressing  Flowsheets (Taken 7/31/2024 1803 by Karen Cavazos, RN)  Free From Fall Injury:   Instruct family/caregiver on patient safety   Based on caregiver fall risk screen, instruct family/caregiver to ask for assistance with transferring infant if caregiver noted to have fall risk factors  8/1/2024 2338 by Lindsey Chavarria RN  Outcome: Progressing     Problem: Discharge Planning  Goal: Discharge to home or other facility with appropriate resources  Outcome: Progressing  Flowsheets (Taken 8/1/2024 1951 by Lindsey Chavarria, RN)  Discharge to home or other facility with appropriate resources: Identify barriers to discharge with patient and caregiver     Problem: Pain  Goal: Verbalizes/displays adequate comfort level or baseline comfort level  8/2/2024 0739 by Merle Emmanuel RN  Outcome: Progressing  Flowsheets (Taken 8/1/2024 1951 by Lindsey Chavarria, RN)  Verbalizes/displays adequate comfort level or baseline comfort level:   Encourage patient to monitor pain and request assistance   Assess pain using appropriate pain scale   Consider cultural and social influences on pain and pain management  8/1/2024 2338 by Lindsey Chavarria RN  Outcome: Progressing  Flowsheets (Taken 8/1/2024 1951)  Verbalizes/displays adequate comfort level or baseline comfort level:   Encourage patient to monitor pain and request assistance   Assess pain using appropriate pain scale   Consider cultural and social influences on pain and pain management     Problem: Skin/Tissue Integrity  Goal: Absence of new skin breakdown  Description: 1.  Monitor for areas of redness and/or skin breakdown  2.  Assess vascular access sites hourly  3.  Every 4-6 hours minimum:  Change oxygen saturation probe site  4.  Every 4-6 hours:  If on nasal continuous positive airway pressure, respiratory therapy assess nares and determine need for appliance change or resting

## 2024-08-02 NOTE — PROGRESS NOTES
Nephrology Progress Note   [employer]      Reason for consultation: SUKUMAR on CKD 3a -- baseline Cr ~ 1.3-1.5 mg/dL. Follows with Dr. Salinas in office (last seen 2024).     Subjective:    The patient has been seen and examined. Labs and chart reviewed. Resting in chair on RA. Cr and BUN continue to increase. CK trending down to 16,626 today. Does have decent UOP with 1 L documented yesterday. [facility].     There were not complications overnight.    Patient review of systems: Denies SOB      Allergies:  Allergies   Allergen Reactions    Ciprofloxacin Hives        Scheduled Meds:   midodrine  5 mg Oral TID WC    sodium chloride flush  5-40 mL IntraVENous 2 times per day    aspirin  81 mg Oral Nightly    levothyroxine  137 mcg Oral Daily    mirtazapine  30 mg Oral Nightly    tamsulosin  0.4 mg Oral Nightly    insulin lispro  0-8 Units SubCUTAneous TID WC    insulin lispro  0-4 Units SubCUTAneous Nightly    insulin glargine  10 Units SubCUTAneous Daily    warfarin placeholder: dosing by pharmacy   Other RX Placeholder        sodium bicarbonate 150 mEq in sterile water 1,000 mL infusion 150 mL/hr at 24 0724    sodium chloride      dextrose         PRN Meds:sodium chloride flush, sodium chloride, ondansetron **OR** ondansetron, polyethylene glycol, glucose, dextrose bolus **OR** dextrose bolus, glucagon (rDNA), dextrose, perflutren lipid microspheres    Physical Exam:    TEMPERATURE:  Current - Temp: 98.2 °F (36.8 °C); Max - Temp  Av.1 °F (36.7 °C)  Min: 98 °F (36.7 °C)  Max: 98.2 °F (36.8 °C)  RESPIRATIONS RANGE: Resp  Av.8  Min: 16  Max: 18  PULSE RANGE: Pulse  Av.2  Min: 60  Max: 77  BLOOD PRESSURE RANGE:  Systolic (24hrs), Av , Min:92 , Max:132   ; Diastolic (24hrs), Av, Min:35, Max:66    24HR INTAKE/OUTPUT:    Intake/Output Summary (Last 24 hours) at 2024 0859  Last data filed at 2024 0854  Gross per 24 hour   Intake 4557.75  ml   Output 1060 ml   Net 3497.75 ml       Patient Vitals for the past 96 hrs (Last 3 readings):   Weight   08/01/24 1016 73.5 kg (162 lb)   08/01/24 0600 73.5 kg (162 lb 0.6 oz)   07/31/24 1522 78 kg (171 lb 15.3 oz)       General: Alert, Awake, NAD  HEENT: Normocephalic, atraumatic  Chest: clear to auscultation, no intercostal retractions  CVS: RRR, no murmur, no rub  Abdomen: soft, non tender  Extremities: no edema, no cyanosis.  Skin: normal texture, normal skin turgor, no rash  Neurological: CN intact, no focal motor neurological deficit  Psych: normal affect; AAO x 3    LAB DATA:    CBC:   Lab Results   Component Value Date/Time    WBC 5.3 08/02/2024 03:39 AM    RBC 3.44 08/02/2024 03:39 AM    HGB 10.7 08/02/2024 03:39 AM    HCT 30.6 08/02/2024 03:39 AM    MCV 88.9 08/02/2024 03:39 AM    MCH 31.0 08/02/2024 03:39 AM    MCHC 34.9 08/02/2024 03:39 AM    RDW 15.1 08/02/2024 03:39 AM     08/02/2024 03:39 AM    MPV 8.1 08/02/2024 03:39 AM     BMP:    Lab Results   Component Value Date/Time     08/02/2024 03:39 AM    K 3.3 08/02/2024 03:39 AM    K 3.5 08/01/2024 03:33 AM    CL 97 08/02/2024 03:39 AM    CO2 23 08/02/2024 03:39 AM    BUN 72 08/02/2024 03:39 AM    CREATININE 6.1 08/02/2024 03:39 AM    CALCIUM 8.6 08/02/2024 03:39 AM    GFRAA 56 10/11/2022 08:25 AM    GFRAA >60 06/15/2013 07:43 AM    LABGLOM 9 08/02/2024 03:39 AM    LABGLOM 59 04/29/2024 10:38 AM    GLUCOSE 115 08/02/2024 03:39 AM     Ionized Calcium:  No components found for: \"IONCA\"  Magnesium:    Lab Results   Component Value Date/Time    MG 2.70 08/02/2024 03:39 AM     Phosphorus:    Lab Results   Component Value Date/Time    PHOS 4.6 08/02/2024 03:39 AM     U/A:    Lab Results   Component Value Date/Time    NITRITE NEG 05/29/2019 07:44 AM    COLORU Yellow 08/01/2024 04:44 PM    PHUR 7.0 08/01/2024 04:44 PM    PHUR 7.0 04/16/2024 02:00 PM    LABCAST 0-1 Hyaline 06/29/2012 07:50 AM    WBCUA 264 08/01/2024 04:44 PM    RBCUA 85 08/01/2024

## 2024-08-02 NOTE — PROGRESS NOTES
Patient bladder scanned for 290ml, and then 296ml.  Patient denies the urge to void.  Will continue to monitor.    Lindsey Chavarria RN

## 2024-08-02 NOTE — PROGRESS NOTES
Treatment time: 2 HOURS    Net UF: 0    Pre weight: 73.5  Post weight: 73.4  EDW: TBD    Access used: Right non tunnelled Catheter just inserted this day  Access function: Had good flow    Medications or blood products given: none    Regular outpatient schedule: SUKUMAR/ First dialysis    Summary of response to treatment: Treatment started with  nothing to removed just clean blood, within 2 hours as ordered via Right non tunneled catheter just inserted. Catheter site was still with blood as well as swelling due to insertion. Both lumen had good flow. Hooked to Hd machine monitored accordingly      Copy of dialysis treatment record placed in chart, to be scanned into EMR.

## 2024-08-02 NOTE — CONSULTS
Clinical Pharmacy Note  Warfarin Consult    Morro Kevin is a 73 y.o. male receiving warfarin managed by pharmacy.  Patient being bridged with none.    Warfarin Indication: AVR  Target INR range: 2-3   Dose prior to admission:   Warfarin maintenance plan: 11.25 mg (7.5 mg x 1.5) every Wed; 7.5 mg (7.5 mg x 1) all other days   Weekly warfarin total: 56.25 mg   Managed at [facility]    Current warfarin drug-drug interactions: none of clinical significance    Recent Labs     07/31/24  0942 08/01/24  0333 08/01/24  0335 08/02/24  0339   HGB 13.9 13.8  --  10.7*   HCT 40.5 39.8*  --  30.6*   INR 5.34*  --  4.35* 3.44*         Assessment/Plan:  Patient's Warfarin has been on hold.  INR now trending down towards therapeutic range:  5.34 > 4.35 > 3.44 today  Will order a reduced Warfarin dose tonight given INR trend.  Warfarin 3.75 mg po x 1 ordered tonight  Daily PT/INR until stable within therapeutic range.     Thank you for the consult. Will continue to follow.    Izaiah Guzman RPH, PharmD, BCPS  8/2/2024 10:05 AM

## 2024-08-02 NOTE — PLAN OF CARE
Problem: Safety - Adult  Goal: Free from fall injury  Outcome: Progressing     Problem: Pain  Goal: Verbalizes/displays adequate comfort level or baseline comfort level  Outcome: Progressing  Flowsheets (Taken 8/1/2024 1951)  Verbalizes/displays adequate comfort level or baseline comfort level:   Encourage patient to monitor pain and request assistance   Assess pain using appropriate pain scale   Consider cultural and social influences on pain and pain management     Problem: Skin/Tissue Integrity  Goal: Absence of new skin breakdown  Description: 1.  Monitor for areas of redness and/or skin breakdown  2.  Assess vascular access sites hourly  3.  Every 4-6 hours minimum:  Change oxygen saturation probe site  4.  Every 4-6 hours:  If on nasal continuous positive airway pressure, respiratory therapy assess nares and determine need for appliance change or resting period.  Outcome: Progressing     Problem: ABCDS Injury Assessment  Goal: Absence of physical injury  Outcome: Progressing

## 2024-08-02 NOTE — PROCEDURES
PATIENT NAME: Morro Kevin  MEDICAL RECORD NO. [id number]  DATE: 8/2/2024      PROCEDURE PERFORMED:  Right Internal Jugular Vein Central Line Insertion  ANESTHESIA:  Local utilizing 1% lidocaine  ESTIMATED BLOOD LOSS:  Less than 5 ml  COMPLICATIONS:  None immediately appreciated.    Consent: obtained explaining indications, benefits, possible complications and alternatives explained.     PROCEDURE:  A timeout was initiated by the bedside nurse and was confirmed by those present.  The patient was placed in a supine position. The skin overlying the Right Internal Jugular Vein was prepped with chlorhexadine and draped in sterile fashion. The skin was infiltrated with local anesthetic. Through the anesthetized region, the introducer needle was inserted into the internal jugular vein returning dark red non pulsatile blood. A guidewire was placed through the center of the needle with no resistance. This is non tunneled catheter. A small incision made in the skin with a #11 scalpel blade. The dilator was inserted into the skin and vein over guidewire using Seldinger technique. The dilator was then removed and 20 cm dialysis catheter was placed in the vein over the guidewire using Seldinger technique terminating in the SVC, right atrium area.The guidewire was then removed and all ports aspirated and flushed appropriately.   The catheter then secured using silk suture and a temporary sterile dressing was applied.  No immediate complication was evident.  All sponge, instrument and needle counts were correct at the completion of the procedure.    Postprocedural chest x-ray showed good position of the catheter with no evidence of hemothorax or pneumothorax. The patient tolerated the procedure well with no immediate complication evident.     Joseph Moulton MD  8/2/2024, 10:47 AM

## 2024-08-02 NOTE — PROGRESS NOTES
NAME:  Morro Kevin  YOB: 1950  MEDICAL RECORD NUMBER:  [id number]    Shift Summary: Kidney function still not improving.  Vas cath placed, HD today. Solucortef started.  Coumadin restarted today, dosed by pharmacy.  Large BM this afternoon, bright red blood noted.  Pt states this has happened before since he has been on blood thinners.  Night shift RN notified and will monitor for more blood.    Family updated: Yes:  son at bedside    Rhythm: Normal Sinus Rhythm     Most recent vitals:   Visit Vitals  /67   Pulse 73   Temp 97.4 °F (36.3 °C) (Oral)   Resp 16   Ht 1.778 m (5' 10\")   Wt 73.4 kg (161 lb 13.1 oz)   SpO2 96%   BMI 23.22 kg/m²           No data found.    No data found.      Respiratory support needed (if any):  - RA    Admission weight Weight - Scale: 77.1 kg (170 lb) (07/31/24 0930)    Today's weight    Wt Readings from Last 1 Encounters:   08/02/24 73.4 kg (161 lb 13.1 oz)        Mayer need assessed each shift: N/A - no mayer present  UOP >30ml/hr: YES  Last documented BM (in last 48 hrs):  Patient Vitals for the past 48 hrs:   Last BM (including prior to admit)   08/01/24 1702 08/01/24 08/01/24 1951 08/01/24 08/02/24 0853 08/01/24                Restraints (in use currently or dc'd in last 12 hrs): No    Order current and documentation up to date? No    Lines/Drains reviewed @ bedside.  Peripheral IV 07/31/24 Right Forearm (Active)   Number of days: 2       Peripheral IV 07/31/24 Left Forearm (Active)   Number of days: 1         Drip rates at handoff:    sodium chloride 150 mL/hr at 08/02/24 1758    sodium chloride      dextrose         Lab Data:   CBC:   Recent Labs     08/01/24 0333 08/02/24 0339   WBC 7.7 5.3   HGB 13.8 10.7*   HCT 39.8* 30.6*   MCV 89.6 88.9    110*     BMP:    Recent Labs     08/01/24 0333 08/02/24 0339    134*   K 3.5 3.3*   CO2 17* 23   BUN 64* 72*   CREATININE 4.8* 6.1*     LIVR:   Recent Labs     08/01/24  0333 08/02/24  0339   AST

## 2024-08-02 NOTE — CONSULTS
Pulmonary Consult Note     Patient's name:  Morro Kevin  Medical Record Number: [id number]  Patient's account/billing number: [id number]  Patient's YOB: 1950  Age: 73 y.o.  Date of Admission: 7/31/2024  9:25 AM  Date of Consult: 8/2/2024      Primary Care Physician: Fareed Marino MD      Code Status: Full Code    Reason for consult: SUKUMAR     Assessment and Plan     SUKUMAR on CKD III  Rhabdomyolysis  Prostatic aortic valve  Metastatic prostate cancer on Zytiga       Plan:  Planning HD today,   Will place dialysis catheter  Continue anticoagulation  IV fluid hydration  Stress dose steroid   Due to the immediate potential for life-threatening deterioration due to above , I spent 31 minutes providing critical care.  This time is excluding time spent performing procedures.  This note was transcribed using Dragon Dictation software. Please disregard any translational errors.        HISTORY OF PRESENT ILLNESS:   Mr./Ms. Morro Kevin is a 73 y.o. gentleman with a past medical history stated below significant for severe aortic stenosis status post mechanical aortic valve, hypertension, hyperlipidemia, metastatic prostate cancer on chemotherapy   Who presented with worsening shortness of breath.  CT chest with no definitive metastatic disease, no infiltrate no effusions.  Next        Past Medical History:        Diagnosis Date    Aortic stenosis 9/2013    moderate    Arthritis     Asthma     BRONCHIAL ASTHMA  AS A CHILD    Deviated septum     Diabetes mellitus type II 2000    Dr. Goodson     Hashimoto thyroiditis dx 1984    medical treatment    Homocysteinemia     Hyperlipidemia     Hypertension     Hypertriglyceridemia     Hypogonadism male     topical testosterone     Hypothyroid 12/9/2011    Kidney stone 1990s    Passed on own and also surgical excision     Mechanical heart valve present 2016    Murmur since 2005    Nasal polyps     Osteoarthritis        Component Value Date/Time    TSH 1.21 01/30/2024 10:10 AM    TSH 2.12 10/08/2014 07:20 AM          Radiology Review:  Pertinent images / reports were reviewed as a part of this visit.    CT Chest w/ contrast: No results found for this or any previous visit.      CT Chest w/o contrast: No results found for this or any previous visit.      CTPA: Results for orders placed during the hospital encounter of 04/09/24    CT CHEST PULMONARY EMBOLISM W CONTRAST    Narrative  EXAMINATION:  CTA OF THE CHEST 4/9/2024 7:52 am    TECHNIQUE:  CTA of the chest was performed after the administration of intravenous  contrast.  Multiplanar reformatted images are provided for review.  MIP  images are provided for review. Automated exposure control, iterative  reconstruction, and/or weight based adjustment of the mA/kV was utilized to  reduce the radiation dose to as low as reasonably achievable.    COMPARISON:  06/27/2023, 07/13/2021, 02/18/2014    HISTORY:  ORDERING SYSTEM PROVIDED HISTORY: sob, leg pain  TECHNOLOGIST PROVIDED HISTORY:  Reason for exam:->sob, leg pain  Additional Contrast?->1  Reason for Exam: SOB leg pain    FINDINGS:  Pulmonary Arteries: Pulmonary arteries are adequately opacified for  evaluation.  No evidence of intraluminal filling defect to suggest pulmonary  embolism.  Main pulmonary artery is normal in caliber.    Mediastinum: The thyroid is unremarkable.  There is no pathologic  lymphadenopathy.  There is atherosclerotic disease of the intrathoracic  aorta, without evidence of aneurysm or dissection.  There is coronary  atherosclerosis.  No pericardial abnormality is identified.    Lungs/pleura: The central airways are patent.  There is mild dependent  atelectasis within both lower lobes, without evidence of acute airspace  consolidation.  There is mild chronic parenchymal scarring within the  lingula.  There is no evidence of a pneumothorax or pleural effusion.  There  are scattered calcified granulomata.

## 2024-08-02 NOTE — PROGRESS NOTES
and symptoms: leg weakness and fatigue FINDINGS: Chest: Mediastinum: Thyroid gland unremarkable.  Aortic valve calcification is seen. Coronary artery calcification is seen.  No pericardial effusion.  No pericardial calcification noted.  No significant mediastinal or hilar adenopathy Lungs/pleura: Respiratory motion artifact limits evaluation of the lungs.  No obstructing endobronchial lesions are seen.  No pneumonia.  No edema.  No pleural effusion.  De pendant opacity is seen at the lung bases bilaterally, likely atelectasis. Punctate calcified granuloma seen on the right. Soft Tissues/Bones: Spurring is seen in the spine.  Spurring is seen in the shoulder joints. Abdomen/Pelvis: Organs: Right adrenal gland normal.  Left adrenal gland.  No splenomegaly. No perisplenic fluid. No stones or hydronephrosis on left 2 mm stone seen in the right kidney. No hydronephrosis on the right.  Cyst projects posteriorly off the right kidney. No pancreatic calcification.  No peripancreatic fluid No intrahepatic ductal dilatation.  No perihepatic fluid.  No inflammatory stranding surrounding the gallbladder. GI/Bowel: No significant small bowel distention noted.  Moderate stool load seen in colon.  No bowel obstruction noted.  No significant small or large bowel wall thickening.  Appendix identified and normal in caliber. Pelvis: Calcifications seen in the pelvis, compatible with phleboliths. A small internal iliac node, appears similar to prior PET-CT, measuring 6 mm in size. Peritoneum/Retroperitoneum: Atherosclerotic change seen in aorta.  No aneurysm. Metallic densities project in the region of the prostate. Retroperitoneal hematoma on the left seen previously no longer noted Bones/Soft Tissues: Spurring is seen in the spine.  Spurring is seen in the hips. Sclerotic focus is seen in left iliac wing. Small soft tissue nodule is seen in anterior abdominal wall on the left, possibly from prior medication injection     Chest No  mechanical valve, pharmacy assisting with Coumadin management    Personally reviewed Lab Studies and Imaging     Tele strip interpreted personally and results sinus rhythm     Imaging that was interpreted personally includes CT Abdo and results no hydronephrosis    Electronically signed by Jose Keating MD on 8/2/2024 at 12:07 PM    This not was generated completely or in part using Dragon, speech recognition software, please excuse any inaccuracies or misstatements.

## 2024-08-02 NOTE — PROGRESS NOTES
NAME:  Morro Kevin  YOB: 1950  MEDICAL RECORD NUMBER:  [id number]    Shift Summary: Slept well, continues to void per urinal, PVR <300ml.  BUN and Creat continue to increase, placed call to nephrology without response, will notify day shift RN to attempt to reach them again     Family updated: No    Rhythm: Normal Sinus Rhythm     Most recent vitals:   Visit Vitals  BP (!) 112/59   Pulse 64   Temp 98.2 °F (36.8 °C) (Oral)   Resp 18   Ht 1.778 m (5' 10\")   Wt 73.5 kg (162 lb)   SpO2 97%   BMI 23.24 kg/m²           No data found.    No data found.      Respiratory support needed (if any):  - RA    Admission weight Weight - Scale: 77.1 kg (170 lb) (07/31/24 0930)    Today's weight    Wt Readings from Last 1 Encounters:   08/01/24 73.5 kg (162 lb)        Mayer need assessed each shift: N/A - no mayer present  UOP >30ml/hr: YES  Last documented BM (in last 48 hrs):  Patient Vitals for the past 48 hrs:   Last BM (including prior to admit)   08/01/24 1702 08/01/24 08/01/24 1951 08/01/24                Restraints (in use currently or dc'd in last 12 hrs): No    Order current and documentation up to date? No    Lines/Drains reviewed @ bedside.  Peripheral IV 07/31/24 Right Forearm (Active)   Number of days: 1       Peripheral IV 07/31/24 Left Forearm (Active)   Number of days: 1         Drip rates at handoff:    sodium bicarbonate 150 mEq in sterile water 1,000 mL infusion 150 mL/hr at 08/02/24 0616    sodium chloride      dextrose         Lab Data:   CBC:   Recent Labs     08/01/24  0333 08/02/24  0339   WBC 7.7 5.3   HGB 13.8 10.7*   HCT 39.8* 30.6*   MCV 89.6 88.9    110*     BMP:    Recent Labs     08/01/24 0333 08/02/24 0339    134*   K 3.5 3.3*   CO2 17* 23   BUN 64* 72*   CREATININE 4.8* 6.1*     LIVR:   Recent Labs     08/01/24  0333 08/02/24  0339   * 497*   * 376*     PT/INR:   Recent Labs     08/01/24 0335 08/02/24 0339   INR 4.35* 3.44*     APTT: No results for  input(s): \"APTT\" in the last 72 hours.  ABG: No results for input(s): \"PHART\", \"OVI0NHF\", \"PO2ART\" in the last 72 hours.    Any consults during the shift? No    Any signed and held orders to be released?  No        4 Eyes Skin Assessment       The patient is being assessed for  Shift Handoff    I agree that at least one RN has performed a thorough Head to Toe Skin Assessment on the patient. ALL assessment sites listed below have been assessed.      Areas assessed by both nurses: Head, Face, Ears, Shoulders, Back, Chest, Arms, Elbows, Hands, Sacrum. Buttock, Coccyx, Ischium, Legs. Feet and Heels, and Under Medical Devices         Does the Patient have a Wound? No noted wound(s)    Wound Care Orders initiated by RN: No       Vignesh Prevention initiated by RN: Yes    Pressure Injury (Stage 3,4, Unstageable, DTI, NWPT, and Complex wounds) if present, place Wound referral order by RN under : No    New Ostomies, if present place, Ostomy referral order under : No     Nurse 1 eSignature: Electronically signed by Lindsey Chavarria RN on 8/2/24 at 6:37 AM EDT    **SHARE this note so that the co-signing nurse can place an eSignature**    Nurse 2 eSignature: Electronically signed by Merle Emmanuel RN on 8/2/24 at 7:21 AM EDT

## 2024-08-03 LAB
ALBUMIN SERPL-MCNC: 3 G/DL (ref 3.4–5)
ANION GAP SERPL CALCULATED.3IONS-SCNC: 12 MMOL/L (ref 3–16)
BUN SERPL-MCNC: 59 MG/DL (ref 7–20)
CALCIUM SERPL-MCNC: 8.4 MG/DL (ref 8.3–10.6)
CHLORIDE SERPL-SCNC: 101 MMOL/L (ref 99–110)
CK SERPL-CCNC: ABNORMAL U/L (ref 39–308)
CO2 SERPL-SCNC: 25 MMOL/L (ref 21–32)
CREAT SERPL-MCNC: 6 MG/DL (ref 0.8–1.3)
DEPRECATED RDW RBC AUTO: 15.1 % (ref 12.4–15.4)
GFR SERPLBLD CREATININE-BSD FMLA CKD-EPI: 9 ML/MIN/{1.73_M2}
GLUCOSE BLD-MCNC: 192 MG/DL (ref 70–99)
GLUCOSE BLD-MCNC: 210 MG/DL (ref 70–99)
GLUCOSE BLD-MCNC: 244 MG/DL (ref 70–99)
GLUCOSE BLD-MCNC: 273 MG/DL (ref 70–99)
GLUCOSE SERPL-MCNC: 203 MG/DL (ref 70–99)
HCT VFR BLD AUTO: 26.5 % (ref 40.5–52.5)
HGB BLD-MCNC: 9.2 G/DL (ref 13.5–17.5)
INR PPP: 2.27 (ref 0.85–1.15)
MAGNESIUM SERPL-MCNC: 2.4 MG/DL (ref 1.8–2.4)
MCH RBC QN AUTO: 31 PG (ref 26–34)
MCHC RBC AUTO-ENTMCNC: 34.7 G/DL (ref 31–36)
MCV RBC AUTO: 89.2 FL (ref 80–100)
PERFORMED ON: ABNORMAL
PHOSPHATE SERPL-MCNC: 4.9 MG/DL (ref 2.5–4.9)
PLATELET # BLD AUTO: 107 K/UL (ref 135–450)
PMV BLD AUTO: 8.2 FL (ref 5–10.5)
POTASSIUM SERPL-SCNC: 3.9 MMOL/L (ref 3.5–5.1)
PROTHROMBIN TIME: 25.1 SEC (ref 11.9–14.9)
RBC # BLD AUTO: 2.97 M/UL (ref 4.2–5.9)
SODIUM SERPL-SCNC: 138 MMOL/L (ref 136–145)
URATE SERPL-MCNC: 4.1 MG/DL (ref 3.5–7.2)
WBC # BLD AUTO: 6.2 K/UL (ref 4–11)

## 2024-08-03 PROCEDURE — 80069 RENAL FUNCTION PANEL: CPT

## 2024-08-03 PROCEDURE — 99233 SBSQ HOSP IP/OBS HIGH 50: CPT | Performed by: INTERNAL MEDICINE

## 2024-08-03 PROCEDURE — 6370000000 HC RX 637 (ALT 250 FOR IP): Performed by: HOSPITALIST

## 2024-08-03 PROCEDURE — 90935 HEMODIALYSIS ONE EVALUATION: CPT

## 2024-08-03 PROCEDURE — 83735 ASSAY OF MAGNESIUM: CPT

## 2024-08-03 PROCEDURE — 36556 INSERT NON-TUNNEL CV CATH: CPT

## 2024-08-03 PROCEDURE — 85027 COMPLETE CBC AUTOMATED: CPT

## 2024-08-03 PROCEDURE — 82550 ASSAY OF CK (CPK): CPT

## 2024-08-03 PROCEDURE — 6370000000 HC RX 637 (ALT 250 FOR IP)

## 2024-08-03 PROCEDURE — 6360000002 HC RX W HCPCS: Performed by: INTERNAL MEDICINE

## 2024-08-03 PROCEDURE — 85610 PROTHROMBIN TIME: CPT

## 2024-08-03 PROCEDURE — 2060000000 HC ICU INTERMEDIATE R&B

## 2024-08-03 PROCEDURE — 2580000003 HC RX 258

## 2024-08-03 PROCEDURE — 2580000003 HC RX 258: Performed by: HOSPITALIST

## 2024-08-03 PROCEDURE — 84550 ASSAY OF BLOOD/URIC ACID: CPT

## 2024-08-03 RX ORDER — WARFARIN SODIUM 3 MG/1
6 TABLET ORAL
Status: COMPLETED | OUTPATIENT
Start: 2024-08-03 | End: 2024-08-03

## 2024-08-03 RX ORDER — HEPARIN SODIUM 1000 [USP'U]/ML
2500 INJECTION, SOLUTION INTRAVENOUS; SUBCUTANEOUS PRN
Status: DISCONTINUED | OUTPATIENT
Start: 2024-08-03 | End: 2024-08-09

## 2024-08-03 RX ADMIN — SODIUM CHLORIDE: 9 INJECTION, SOLUTION INTRAVENOUS at 02:04

## 2024-08-03 RX ADMIN — INSULIN LISPRO 4 UNITS: 100 INJECTION, SOLUTION INTRAVENOUS; SUBCUTANEOUS at 17:23

## 2024-08-03 RX ADMIN — HYDROCORTISONE SODIUM SUCCINATE 50 MG: 100 INJECTION, POWDER, FOR SOLUTION INTRAMUSCULAR; INTRAVENOUS at 04:54

## 2024-08-03 RX ADMIN — HYDROCORTISONE SODIUM SUCCINATE 25 MG: 100 INJECTION, POWDER, FOR SOLUTION INTRAMUSCULAR; INTRAVENOUS at 20:31

## 2024-08-03 RX ADMIN — INSULIN GLARGINE 10 UNITS: 100 INJECTION, SOLUTION SUBCUTANEOUS at 07:41

## 2024-08-03 RX ADMIN — MIRTAZAPINE 30 MG: 30 TABLET, FILM COATED ORAL at 20:31

## 2024-08-03 RX ADMIN — LEVOTHYROXINE SODIUM 137 MCG: 0.11 TABLET ORAL at 07:30

## 2024-08-03 RX ADMIN — SODIUM CHLORIDE, PRESERVATIVE FREE 10 ML: 5 INJECTION INTRAVENOUS at 07:42

## 2024-08-03 RX ADMIN — HEPARIN SODIUM 2500 UNITS: 1000 INJECTION INTRAVENOUS; SUBCUTANEOUS at 11:09

## 2024-08-03 RX ADMIN — SODIUM CHLORIDE, PRESERVATIVE FREE 10 ML: 5 INJECTION INTRAVENOUS at 20:32

## 2024-08-03 RX ADMIN — MIDODRINE HYDROCHLORIDE 7.5 MG: 5 TABLET ORAL at 17:23

## 2024-08-03 RX ADMIN — SODIUM CHLORIDE: 9 INJECTION, SOLUTION INTRAVENOUS at 13:19

## 2024-08-03 RX ADMIN — MIDODRINE HYDROCHLORIDE 7.5 MG: 5 TABLET ORAL at 07:41

## 2024-08-03 RX ADMIN — SODIUM CHLORIDE: 9 INJECTION, SOLUTION INTRAVENOUS at 07:45

## 2024-08-03 RX ADMIN — EPOETIN ALFA-EPBX 10000 UNITS: 10000 INJECTION, SOLUTION INTRAVENOUS; SUBCUTANEOUS at 12:52

## 2024-08-03 RX ADMIN — TAMSULOSIN HYDROCHLORIDE 0.4 MG: 0.4 CAPSULE ORAL at 20:31

## 2024-08-03 RX ADMIN — WARFARIN SODIUM 6 MG: 3 TABLET ORAL at 17:23

## 2024-08-03 RX ADMIN — INSULIN LISPRO 2 UNITS: 100 INJECTION, SOLUTION INTRAVENOUS; SUBCUTANEOUS at 07:41

## 2024-08-03 RX ADMIN — HYDROCORTISONE SODIUM SUCCINATE 50 MG: 100 INJECTION, POWDER, FOR SOLUTION INTRAMUSCULAR; INTRAVENOUS at 12:47

## 2024-08-03 RX ADMIN — ASPIRIN 81 MG: 81 TABLET, COATED ORAL at 20:31

## 2024-08-03 RX ADMIN — MIDODRINE HYDROCHLORIDE 7.5 MG: 5 TABLET ORAL at 12:47

## 2024-08-03 ASSESSMENT — PAIN SCALES - GENERAL
PAINLEVEL_OUTOF10: 0

## 2024-08-03 NOTE — PROGRESS NOTES
Pulmonary Critical Care progress Note     Patient's name:  Morro Kevin  Medical Record Number: [id number]  Patient's account/billing number: [id number]  Patient's YOB: 1950  Age: 73 y.o.  Date of Admission: 7/31/2024  9:25 AM  Date of Consult: 8/3/2024      Primary Care Physician: Fareed Marino MD      Code Status: Full Code    Reason for consult: SUKUMAR     Assessment and Plan     SUKUMAR on CKD III  Rhabdomyolysis  Prostatic aortic valve  Metastatic prostate cancer on Zytiga       Plan:  HD per nephrology  Avoid Zytiga/statin combination in the future   Continue anticoagulation  IV fluid hydration  Stress dose steroid           HISTORY OF PRESENT ILLNESS:   Mr./Ms. Morro Kevin is a 73 y.o. gentleman with a past medical history stated below significant for severe aortic stenosis status post mechanical aortic valve, hypertension, hyperlipidemia, metastatic prostate cancer on chemotherapy   Who presented with worsening shortness of breath.  CT chest with no definitive metastatic disease, no infiltrate no effusions.  Next      REVIEW OF SYSTEMS:  Review of Systems -   General ROS: fatigue  Psychological ROS: negative  Ophthalmic ROS: negative  ENT ROS: negative  Allergy and Immunology ROS: negative  Hematological and Lymphatic ROS: negative  Endocrine ROS: negative  Breast ROS: negative  Respiratory ROS: sob  Cardiovascular ROS: no chest pain or dyspnea on exertion  Gastrointestinal ROS:negative  Genito-Urinary ROS: negative  Musculoskeletal ROS: negative  Neurological ROS: negative  Dermatological ROS: negative        Physical Exam:    Vitals: BP (!) 121/59   Pulse 85   Temp 98 °F (36.7 °C)   Resp 14   Ht 1.778 m (5' 10\")   Wt 87.2 kg (192 lb 3.9 oz)   SpO2 98%   BMI 27.58 kg/m²     Last Body weight:   Wt Readings from Last 3 Encounters:   08/03/24 87.2 kg (192 lb 3.9 oz)   05/03/24 80.7 kg (178 lb)   04/18/24 84.2 kg (185 lb 10 oz)       Body

## 2024-08-03 NOTE — PROGRESS NOTES
MD Keating returned call. Per MD will order GI consult and pt is ok for PCU. BP stable. Izaiah from pharmacy spoke on phone with MD Keating to discuss Coumadin therapy plan.

## 2024-08-03 NOTE — PROGRESS NOTES
Treatment time: 3 hours     Net UF: 0    Pre weight: 87.2 kg  Post weight: 87.1 kgs    EDW: TBD    Access used: Right CVC Non- Tunneled  Access function: Good flow on both port, with mildly soaked with blood, Changed dressing aseptically, CVC site was a little bit swollen due to insertion yesterday.     Medications or blood products given: Retacrit 10,000 unit    Regular outpatient schedule: SUKUMAR     Summary of response to treatment: Treatment started via Right CVC , dressing changed aseptically as it was soaked with blood. CVC site was swollen a little bit due to insertion yesterday. Lumens are intact, wit good flow. Hd set at 3 hours with no uf as ordered. Monitored accordingly 1130: Treatment terminated well without any complications noted.    Copy of dialysis treatment record placed in chart, to be scanned into EMR.

## 2024-08-03 NOTE — PROGRESS NOTES
Nephrology Progress Note   [employer]      Reason for consultation: SUKUMAR on CKD 3a -- baseline Cr ~ 1.3-1.5 mg/dL. Follows with Dr. Salinas in office (last seen 2024).     Subjective:    The patient has been seen and examined. Labs and chart reviewed. Resting in bed.   Initiated HD 24 - tolerated second treatment 8//3/24. HD MWF going forward  There were not complications overnight.  Patient review of systems: Denies SOB      Allergies:  Allergies   Allergen Reactions    Ciprofloxacin Hives        Scheduled Meds:   warfarin  7.5 mg Oral Once    hydrocortisone sodium succinate PF  25 mg IntraVENous Q8H    midodrine  7.5 mg Oral TID WC    sodium chloride flush  5-40 mL IntraVENous 2 times per day    aspirin  81 mg Oral Nightly    levothyroxine  137 mcg Oral Daily    mirtazapine  30 mg Oral Nightly    tamsulosin  0.4 mg Oral Nightly    insulin lispro  0-8 Units SubCUTAneous TID WC    insulin lispro  0-4 Units SubCUTAneous Nightly    insulin glargine  10 Units SubCUTAneous Daily    warfarin placeholder: dosing by pharmacy   Other RX Placeholder        sodium chloride 75 mL/hr at 24 1324    sodium chloride      dextrose         PRN Meds:heparin (porcine), sodium chloride flush, sodium chloride, ondansetron **OR** ondansetron, polyethylene glycol, glucose, dextrose bolus **OR** dextrose bolus, glucagon (rDNA), dextrose, perflutren lipid microspheres    Physical Exam:    TEMPERATURE:  Current - Temp: 98 °F (36.7 °C); Max - Temp  Av.4 °F (36.9 °C)  Min: 97.4 °F (36.3 °C)  Max: 99.2 °F (37.3 °C)  RESPIRATIONS RANGE: Resp  Av.8  Min: 14  Max: 20  PULSE RANGE: Pulse  Av.5  Min: 51  Max: 85  BLOOD PRESSURE RANGE:  Systolic (24hrs), Av , Min:92 , Max:136   ; Diastolic (24hrs), Av, Min:51, Max:87    24HR INTAKE/OUTPUT:    Intake/Output Summary (Last 24 hours) at 8/3/2024 1412  Last data filed at 8/3/2024 1323  Gross per 24 hour   Intake 4517.66 ml   Output 1755 ml   Net 2762.66 ml

## 2024-08-03 NOTE — PROGRESS NOTES
Assisted pt to bathroom. Pt needed two person assist to side of bed and was able to use walker with CGA to the bathroom. Pt had a bowel movement and called RN to assist in jayesh care. When patient stool up water in toilet noted bright red with small clots noted. Pt had already placed regular toilet paper in toilet so difficult to assess stool color. Appears pt has hemorrhoids. Per patient this is what his stool looked like yesterday evening when he had his last bowel movement. Per patient he has been constipated and took medicine to go to the bathroom and when he gets constipated at home this bleeding happens. Jayesh care provided and pt assisted back to bed. Labs reviewed. This RN did send a message to MD Keating at this time to inform MD. Pharmacist Izaiah in unit and updated as pt is on Coumadin. Asked MD to call this RN to discuss and awaiting call back at this time.

## 2024-08-03 NOTE — PROGRESS NOTES
Clinical Pharmacy Note  Warfarin Consult    Morro Kevin is a 73 y.o. male receiving warfarin managed by pharmacy.  Patient being bridged with none.    Warfarin Indication: AVR  Target INR range: 2-3   Dose prior to admission:   Warfarin maintenance plan: 11.25 mg (7.5 mg x 1.5) every Wed; 7.5 mg (7.5 mg x 1) all other days   Weekly warfarin total: 56.25 mg   Managed at [facility]    Current warfarin drug-drug interactions: Hydrocortisone    Recent Labs     08/01/24  0333 08/01/24  0335 08/02/24  0339 08/03/24  0450   HGB 13.8  --  10.7* 9.2*   HCT 39.8*  --  30.6* 26.5*   INR  --  4.35* 3.44* 2.27*         Assessment/Plan:  Warfarin was resumed at a reduced dose last evening (3.75 mg).    INR has now down-trended into therapeutic range:  5.34 > 4.35 > 3.44 > 2.27 today    Warfarin 6 mg po x 1 ordered tonight.       Patient experienced bloody bowel movement - d/w Dr. Keating / Ana, RN.  Will not reverse at this time.  Patient hemodynamically stable.  Will transfuse as needed.  GI consulted.    Daily PT/INR until stable within therapeutic range.     Thank you for the consult. Will continue to follow.    Izaiah Guzman RP, PharmD, BCPS  8/3/2024 8:25 AM

## 2024-08-03 NOTE — PROGRESS NOTES
V2.0  [facility] Hospitalist Progress Note      Name:  Morro Kevin /Age/Sex: 1950  (73 y.o. male)   MRN & CSN:  [id number] & [id number] Encounter Date/Time: 8/3/2024 12:55 PM EDT    Location:  [id number] PCP: Fareed Marino MD       Hospital Day: 4  Subjective:   Chief Complaint: Follow-up extremity weakness    Patient seen and evaluated at the bedside, states that he still feels weak in his leg, making some urine, underwent hemodialysis yesterday, currently getting hemodialysis again today no new issues otherwise    Review of Systems:    Review of Systems  10 point ROS negative except as stated above in \"subjective\" section    Objective:     Intake/Output Summary (Last 24 hours) at 8/3/2024 1255  Last data filed at 8/3/2024 1253  Gross per 24 hour   Intake 4517.66 ml   Output 1675 ml   Net 2842.66 ml      Vitals:   Vitals:    24 1200   BP: (!) 121/59   Pulse: 85   Resp:    Temp:    SpO2: 98%     Physical Exam:   General: Awake, alert and oriented, NAD  Eyes: EOMI  ENT: neck supple  Cardiovascular: S1S2 present, regular rate and rhythm, no murmurs  Respiratory: Clear to auscultation  Gastrointestinal: Soft, non tender, positive bowel sounds   Genitourinary: no suprapubic tenderness  Musculoskeletal: No edema  Skin: warm, dry  Neuro: Alert.  Psych: Mood appropriate.     Medications:   Medications:    warfarin  7.5 mg Oral Once    midodrine  7.5 mg Oral TID WC    hydrocortisone sodium succinate PF  50 mg IntraVENous Q8H    sodium chloride flush  5-40 mL IntraVENous 2 times per day    aspirin  81 mg Oral Nightly    levothyroxine  137 mcg Oral Daily    mirtazapine  30 mg Oral Nightly    tamsulosin  0.4 mg Oral Nightly    insulin lispro  0-8 Units SubCUTAneous TID WC    insulin lispro  0-4 Units SubCUTAneous Nightly    insulin glargine  10 Units SubCUTAneous Daily    warfarin placeholder: dosing by pharmacy   Other RX Placeholder      Infusions:    sodium chloride 150 mL/hr at 24 1253    sodium

## 2024-08-03 NOTE — PROGRESS NOTES
NAME:  Morro Kevin  YOB: 1950  MEDICAL RECORD NUMBER:  [id number]    Shift Summary: HD done-tolerated well. Decreased IVF per Nephrology. PT/OT ordered. Pt with bloody bowel movement today. MD Keating aware and discussed Coumadin dosing with pharmacist Izaiah. No pain today.     Family updated: Yes:  Son Joe at bedside and updated per pt approval.     Rhythm: Normal Sinus Rhythm  vs Bradycardia with prolonged QT at times.     Most recent vitals:   Visit Vitals  /65   Pulse 58   Temp 98.6 °F (37 °C) (Oral)   Resp 17   Ht 1.778 m (5' 10\")   Wt 87.2 kg (192 lb 3.9 oz)   SpO2 96%   BMI 27.58 kg/m²           No data found.    No data found.      Respiratory support needed (if any):  - RA    Admission weight Weight - Scale: 77.1 kg (170 lb) (07/31/24 0930)    Today's weight    Wt Readings from Last 1 Encounters:   08/03/24 87.2 kg (192 lb 3.9 oz)        Mayer need assessed each shift: N/A - no mayer present  UOP >30ml/hr: YES  Last documented BM (in last 48 hrs):  Patient Vitals for the past 48 hrs:   Last BM (including prior to admit) Stool Occurrence   08/01/24 1951 08/01/24 --   08/02/24 0853 08/01/24 --   08/02/24 1930 08/02/24 --   08/03/24 0725 08/02/24 --   08/03/24 1230 08/02/24 --   08/03/24 1448 08/03/24 1                Restraints (in use currently or dc'd in last 12 hrs): No    Order current and documentation up to date? Yes    Lines/Drains reviewed @ bedside.  Peripheral IV 07/31/24 Right Forearm (Active)   Number of days: 3       Peripheral IV 07/31/24 Left Forearm (Active)   Number of days: 3       Hemodialysis Central Access Right Neck (Active)   Number of days: 1         Drip rates at handoff:    sodium chloride 75 mL/hr at 08/03/24 1941    sodium chloride      dextrose         Lab Data:   CBC:   Recent Labs     08/02/24  0339 08/03/24  0450   WBC 5.3 6.2   HGB 10.7* 9.2*   HCT 30.6* 26.5*   MCV 88.9 89.2   * 107*     BMP:    Recent Labs     08/02/24  0339 08/03/24  0450   NA

## 2024-08-03 NOTE — PROGRESS NOTES
NAME:  Morro Kevin  YOB: 1950  MEDICAL RECORD NUMBER:  [id number]    Shift Summary: Uneventful night.  VSS. Slept well.  No BM overnight.  Uses urinal at bedside.  Call light within reach.    Family updated: No    Rhythm: Normal Sinus Rhythm     Most recent vitals:   Visit Vitals  BP (!) 113/59   Pulse 51   Temp 98.4 °F (36.9 °C) (Oral)   Resp 16   Ht 1.778 m (5' 10\")   Wt 73.4 kg (161 lb 13.1 oz)   SpO2 95%   BMI 23.22 kg/m²           No data found.    No data found.      Respiratory support needed (if any):  - RA    Admission weight Weight - Scale: 77.1 kg (170 lb) (07/31/24 0930)    Today's weight    Wt Readings from Last 1 Encounters:   08/02/24 73.4 kg (161 lb 13.1 oz)        Mayer need assessed each shift: N/A - no mayer present  UOP >30ml/hr: YES  Last documented BM (in last 48 hrs):  Patient Vitals for the past 48 hrs:   Last BM (including prior to admit)   08/01/24 1702 08/01/24 08/01/24 1951 08/01/24 08/02/24 0853 08/01/24 08/02/24 1930 08/02/24                Restraints (in use currently or dc'd in last 12 hrs): No    Order current and documentation up to date? No    Lines/Drains reviewed @ bedside.  Peripheral IV 07/31/24 Right Forearm (Active)   Number of days: 2       Peripheral IV 07/31/24 Left Forearm (Active)   Number of days: 2       Hemodialysis Central Access Right Neck (Active)   Number of days: 0         Drip rates at handoff:    sodium chloride 150 mL/hr at 08/03/24 0204    sodium chloride      dextrose         Lab Data:   CBC:   Recent Labs     08/02/24  0339 08/03/24  0450   WBC 5.3 6.2   HGB 10.7* 9.2*   HCT 30.6* 26.5*   MCV 88.9 89.2   * 107*     BMP:    Recent Labs     08/02/24  0339 08/03/24  0450   * 138   K 3.3* 3.9   CO2 23 25   BUN 72* 59*   CREATININE 6.1* 6.0*     LIVR:   Recent Labs     08/01/24  0333 08/02/24  0339   * 497*   * 376*     PT/INR:   Recent Labs     08/02/24  0339 08/03/24  0450   INR 3.44* 2.27*     APTT: No  results for input(s): \"APTT\" in the last 72 hours.  ABG: No results for input(s): \"PHART\", \"ILJ5EQI\", \"PO2ART\" in the last 72 hours.    Any consults during the shift? No    Any signed and held orders to be released?  No        4 Eyes Skin Assessment       The patient is being assessed for  Admission    I agree that at least one RN has performed a thorough Head to Toe Skin Assessment on the patient. ALL assessment sites listed below have been assessed.      Areas assessed by both nurses: Head, Face, Ears, Shoulders, Back, Chest, Arms, Elbows, Hands, Sacrum. Buttock, Coccyx, Ischium, Legs. Feet and Heels, and Under Medical Devices         Does the Patient have a Wound? No noted wound(s)    Wound Care Orders initiated by RN: No       Vignesh Prevention initiated by RN: No    Pressure Injury (Stage 3,4, Unstageable, DTI, NWPT, and Complex wounds) if present, place Wound referral order by RN under : No    New Ostomies, if present place, Ostomy referral order under : No     Nurse 1 eSignature: Electronically signed by Becky Canela RN on 8/3/24 at 5:42 AM EDT    **SHARE this note so that the co-signing nurse can place an eSignature**    Nurse 2 eSignature: Electronically signed by Alana Moore RN on 8/3/24 at 7:41 PM EDT

## 2024-08-04 LAB
ALBUMIN SERPL-MCNC: 2.9 G/DL (ref 3.4–5)
ALBUMIN/GLOB SERPL: 1.6 {RATIO} (ref 1.1–2.2)
ALP SERPL-CCNC: 56 U/L (ref 40–129)
ALT SERPL-CCNC: 441 U/L (ref 10–40)
ANION GAP SERPL CALCULATED.3IONS-SCNC: 10 MMOL/L (ref 3–16)
AST SERPL-CCNC: 502 U/L (ref 15–37)
BASOPHILS # BLD: 0 K/UL (ref 0–0.2)
BASOPHILS NFR BLD: 0 %
BILIRUB SERPL-MCNC: 0.3 MG/DL (ref 0–1)
BUN SERPL-MCNC: 49 MG/DL (ref 7–20)
CALCIUM SERPL-MCNC: 8.3 MG/DL (ref 8.3–10.6)
CHLORIDE SERPL-SCNC: 99 MMOL/L (ref 99–110)
CK SERPL-CCNC: 9610 U/L (ref 39–308)
CO2 SERPL-SCNC: 26 MMOL/L (ref 21–32)
CREAT SERPL-MCNC: 4.9 MG/DL (ref 0.8–1.3)
DEPRECATED RDW RBC AUTO: 15.2 % (ref 12.4–15.4)
EOSINOPHIL # BLD: 0 K/UL (ref 0–0.6)
EOSINOPHIL NFR BLD: 0.1 %
GFR SERPLBLD CREATININE-BSD FMLA CKD-EPI: 12 ML/MIN/{1.73_M2}
GLUCOSE BLD-MCNC: 190 MG/DL (ref 70–99)
GLUCOSE BLD-MCNC: 252 MG/DL (ref 70–99)
GLUCOSE BLD-MCNC: 270 MG/DL (ref 70–99)
GLUCOSE BLD-MCNC: 361 MG/DL (ref 70–99)
GLUCOSE SERPL-MCNC: 198 MG/DL (ref 70–99)
HCT VFR BLD AUTO: 26.2 % (ref 40.5–52.5)
HGB BLD-MCNC: 9.2 G/DL (ref 13.5–17.5)
INR PPP: 2.48 (ref 0.85–1.15)
LYMPHOCYTES # BLD: 0.6 K/UL (ref 1–5.1)
LYMPHOCYTES NFR BLD: 5.8 %
MAGNESIUM SERPL-MCNC: 2.5 MG/DL (ref 1.8–2.4)
MCH RBC QN AUTO: 31.6 PG (ref 26–34)
MCHC RBC AUTO-ENTMCNC: 35.3 G/DL (ref 31–36)
MCV RBC AUTO: 89.7 FL (ref 80–100)
MONOCYTES # BLD: 0.5 K/UL (ref 0–1.3)
MONOCYTES NFR BLD: 4.7 %
NEUTROPHILS # BLD: 8.8 K/UL (ref 1.7–7.7)
NEUTROPHILS NFR BLD: 89.4 %
PERFORMED ON: ABNORMAL
PHOSPHATE SERPL-MCNC: 4.9 MG/DL (ref 2.5–4.9)
PLATELET # BLD AUTO: 131 K/UL (ref 135–450)
PMV BLD AUTO: 8.6 FL (ref 5–10.5)
POTASSIUM SERPL-SCNC: 3.8 MMOL/L (ref 3.5–5.1)
PROT SERPL-MCNC: 4.7 G/DL (ref 6.4–8.2)
PROTHROMBIN TIME: 26.8 SEC (ref 11.9–14.9)
RBC # BLD AUTO: 2.92 M/UL (ref 4.2–5.9)
SODIUM SERPL-SCNC: 135 MMOL/L (ref 136–145)
URATE SERPL-MCNC: 3.3 MG/DL (ref 3.5–7.2)
WBC # BLD AUTO: 9.8 K/UL (ref 4–11)

## 2024-08-04 PROCEDURE — 85610 PROTHROMBIN TIME: CPT

## 2024-08-04 PROCEDURE — 2580000003 HC RX 258: Performed by: HOSPITALIST

## 2024-08-04 PROCEDURE — 97530 THERAPEUTIC ACTIVITIES: CPT

## 2024-08-04 PROCEDURE — 6370000000 HC RX 637 (ALT 250 FOR IP): Performed by: INTERNAL MEDICINE

## 2024-08-04 PROCEDURE — 84550 ASSAY OF BLOOD/URIC ACID: CPT

## 2024-08-04 PROCEDURE — 2060000000 HC ICU INTERMEDIATE R&B

## 2024-08-04 PROCEDURE — 82550 ASSAY OF CK (CPK): CPT

## 2024-08-04 PROCEDURE — 6370000000 HC RX 637 (ALT 250 FOR IP)

## 2024-08-04 PROCEDURE — 83735 ASSAY OF MAGNESIUM: CPT

## 2024-08-04 PROCEDURE — 80053 COMPREHEN METABOLIC PANEL: CPT

## 2024-08-04 PROCEDURE — 85025 COMPLETE CBC W/AUTO DIFF WBC: CPT

## 2024-08-04 PROCEDURE — 84100 ASSAY OF PHOSPHORUS: CPT

## 2024-08-04 PROCEDURE — 97162 PT EVAL MOD COMPLEX 30 MIN: CPT

## 2024-08-04 PROCEDURE — 97116 GAIT TRAINING THERAPY: CPT

## 2024-08-04 PROCEDURE — 2580000003 HC RX 258: Performed by: INTERNAL MEDICINE

## 2024-08-04 PROCEDURE — 94760 N-INVAS EAR/PLS OXIMETRY 1: CPT

## 2024-08-04 PROCEDURE — 6360000002 HC RX W HCPCS: Performed by: INTERNAL MEDICINE

## 2024-08-04 PROCEDURE — 97166 OT EVAL MOD COMPLEX 45 MIN: CPT

## 2024-08-04 PROCEDURE — 99233 SBSQ HOSP IP/OBS HIGH 50: CPT | Performed by: INTERNAL MEDICINE

## 2024-08-04 PROCEDURE — 6370000000 HC RX 637 (ALT 250 FOR IP): Performed by: HOSPITALIST

## 2024-08-04 RX ORDER — WARFARIN SODIUM 3 MG/1
3 TABLET ORAL
Status: COMPLETED | OUTPATIENT
Start: 2024-08-04 | End: 2024-08-04

## 2024-08-04 RX ORDER — HYDROCORTISONE ACETATE 25 MG/1
25 SUPPOSITORY RECTAL 2 TIMES DAILY PRN
Status: DISCONTINUED | OUTPATIENT
Start: 2024-08-04 | End: 2024-08-09 | Stop reason: HOSPADM

## 2024-08-04 RX ORDER — DOCUSATE SODIUM 100 MG/1
100 CAPSULE, LIQUID FILLED ORAL DAILY
Status: DISCONTINUED | OUTPATIENT
Start: 2024-08-04 | End: 2024-08-09 | Stop reason: HOSPADM

## 2024-08-04 RX ORDER — MIDODRINE HYDROCHLORIDE 10 MG/1
10 TABLET ORAL
Status: DISCONTINUED | OUTPATIENT
Start: 2024-08-04 | End: 2024-08-06

## 2024-08-04 RX ADMIN — INSULIN LISPRO 4 UNITS: 100 INJECTION, SOLUTION INTRAVENOUS; SUBCUTANEOUS at 16:24

## 2024-08-04 RX ADMIN — DOCUSATE SODIUM 100 MG: 100 CAPSULE, LIQUID FILLED ORAL at 12:02

## 2024-08-04 RX ADMIN — PSYLLIUM HUSK 1 PACKET: 3.4 POWDER ORAL at 17:00

## 2024-08-04 RX ADMIN — SODIUM CHLORIDE, PRESERVATIVE FREE 10 ML: 5 INJECTION INTRAVENOUS at 21:06

## 2024-08-04 RX ADMIN — INSULIN LISPRO 4 UNITS: 100 INJECTION, SOLUTION INTRAVENOUS; SUBCUTANEOUS at 12:02

## 2024-08-04 RX ADMIN — INSULIN LISPRO 4 UNITS: 100 INJECTION, SOLUTION INTRAVENOUS; SUBCUTANEOUS at 20:17

## 2024-08-04 RX ADMIN — HYDROCORTISONE SODIUM SUCCINATE 25 MG: 100 INJECTION, POWDER, FOR SOLUTION INTRAMUSCULAR; INTRAVENOUS at 04:22

## 2024-08-04 RX ADMIN — SODIUM CHLORIDE, PRESERVATIVE FREE 10 ML: 5 INJECTION INTRAVENOUS at 07:59

## 2024-08-04 RX ADMIN — LEVOTHYROXINE SODIUM 137 MCG: 0.11 TABLET ORAL at 06:25

## 2024-08-04 RX ADMIN — HYDROCORTISONE SODIUM SUCCINATE 25 MG: 100 INJECTION, POWDER, FOR SOLUTION INTRAMUSCULAR; INTRAVENOUS at 12:02

## 2024-08-04 RX ADMIN — SODIUM CHLORIDE: 9 INJECTION, SOLUTION INTRAVENOUS at 02:46

## 2024-08-04 RX ADMIN — SODIUM CHLORIDE: 9 INJECTION, SOLUTION INTRAVENOUS at 16:29

## 2024-08-04 RX ADMIN — TAMSULOSIN HYDROCHLORIDE 0.4 MG: 0.4 CAPSULE ORAL at 20:18

## 2024-08-04 RX ADMIN — MIDODRINE HYDROCHLORIDE 10 MG: 10 TABLET ORAL at 12:02

## 2024-08-04 RX ADMIN — HYDROCORTISONE SODIUM SUCCINATE 25 MG: 100 INJECTION, POWDER, FOR SOLUTION INTRAMUSCULAR; INTRAVENOUS at 20:18

## 2024-08-04 RX ADMIN — INSULIN GLARGINE 10 UNITS: 100 INJECTION, SOLUTION SUBCUTANEOUS at 07:59

## 2024-08-04 RX ADMIN — WARFARIN SODIUM 3 MG: 3 TABLET ORAL at 17:00

## 2024-08-04 RX ADMIN — MIRTAZAPINE 30 MG: 30 TABLET, FILM COATED ORAL at 20:18

## 2024-08-04 RX ADMIN — MIDODRINE HYDROCHLORIDE 7.5 MG: 5 TABLET ORAL at 07:59

## 2024-08-04 RX ADMIN — ASPIRIN 81 MG: 81 TABLET, COATED ORAL at 20:18

## 2024-08-04 ASSESSMENT — PAIN SCALES - GENERAL
PAINLEVEL_OUTOF10: 0

## 2024-08-04 NOTE — PROGRESS NOTES
Pulmonary Critical Care progress Note     Patient's name:  Morro Kevin  Medical Record Number: [id number]  Patient's account/billing number: [id number]  Patient's YOB: 1950  Age: 73 y.o.  Date of Admission: 7/31/2024  9:25 AM  Date of Consult: 8/4/2024      Primary Care Physician: Fareed Marino MD      Code Status: Full Code    Reason for consult: SUKUMAR     Assessment and Plan     SUKUMAR on CKD III  Rhabdomyolysis  Prostatic aortic valve  Metastatic prostate cancer on Zytiga       Plan:  HD per nephrology  Avoid Zytiga/statin combination in the future   Continue anticoagulation  IV fluid hydration  Stress dose steroid wean slowly    Monitor Hb  Will sign off please call with questions           HISTORY OF PRESENT ILLNESS:   Mr./Ms. Morro Kevin is a 73 y.o. gentleman with a past medical history stated below significant for severe aortic stenosis status post mechanical aortic valve, hypertension, hyperlipidemia, metastatic prostate cancer on chemotherapy   Who presented with worsening shortness of breath.  CT chest with no definitive metastatic disease, no infiltrate no effusions.  Next      REVIEW OF SYSTEMS:  Review of Systems -   General ROS: fatigue  Psychological ROS: negative  Ophthalmic ROS: negative  ENT ROS: negative  Allergy and Immunology ROS: negative  Hematological and Lymphatic ROS: negative  Endocrine ROS: negative  Breast ROS: negative  Respiratory ROS: sob  Cardiovascular ROS: no chest pain or dyspnea on exertion  Gastrointestinal ROS:negative  Genito-Urinary ROS: negative  Musculoskeletal ROS: negative  Neurological ROS: negative  Dermatological ROS: negative        Physical Exam:    Vitals: /69   Pulse 70   Temp 98.1 °F (36.7 °C) (Oral)   Resp 21   Ht 1.778 m (5' 10\")   Wt 81.6 kg (179 lb 14.3 oz)   SpO2 96%   BMI 25.81 kg/m²     Last Body weight:   Wt Readings from Last 3 Encounters:   08/04/24 81.6 kg (179 lb 14.3 oz)  is stable.  Prosthetic  cardiac valve is again noted.    PLEURA/LUNGS: There are no focal consolidations or pleural effusions. There  is no appreciable pneumothorax.    BONES/SOFT TISSUE: No acute abnormality.  There are median sternotomy wires.    Impression  No radiographic evidence of acute pulmonary disease.      CXR portable: Results for orders placed during the hospital encounter of 07/31/24    XR CHEST PORTABLE    Narrative  EXAMINATION:  ONE XRAY VIEW OF THE CHEST    7/31/2024 9:51 am    COMPARISON:  None.    HISTORY:  ORDERING SYSTEM PROVIDED HISTORY: SOB  TECHNOLOGIST PROVIDED HISTORY:  Reason for exam:->SOB    FINDINGS:  Normal cardiomediastinal silhouette.  Status post median sternotomy.  No  pneumothorax or pleural effusion.  No acute infiltrates    Impression  No acute cardiopulmonary findings                     Joseph Moulton MD, M.D.            8/4/2024, 10:30 AM

## 2024-08-04 NOTE — PLAN OF CARE
Problem: Safety - Adult  Goal: Free from fall injury  Outcome: Progressing  Flowsheets (Taken 8/3/2024 1940)  Free From Fall Injury: Instruct family/caregiver on patient safety     Problem: Discharge Planning  Goal: Discharge to home or other facility with appropriate resources  Outcome: Progressing  Flowsheets (Taken 8/3/2024 1940)  Discharge to home or other facility with appropriate resources: Identify barriers to discharge with patient and caregiver     Problem: Pain  Goal: Verbalizes/displays adequate comfort level or baseline comfort level  Outcome: Progressing  Flowsheets (Taken 8/3/2024 1941)  Verbalizes/displays adequate comfort level or baseline comfort level:   Assess pain using appropriate pain scale   Encourage patient to monitor pain and request assistance     Problem: Skin/Tissue Integrity  Goal: Absence of new skin breakdown  Description: 1.  Monitor for areas of redness and/or skin breakdown  2.  Assess vascular access sites hourly  3.  Every 4-6 hours minimum:  Change oxygen saturation probe site  4.  Every 4-6 hours:  If on nasal continuous positive airway pressure, respiratory therapy assess nares and determine need for appliance change or resting period.  Outcome: Progressing     Problem: ABCDS Injury Assessment  Goal: Absence of physical injury  Outcome: Progressing  Flowsheets (Taken 8/3/2024 1940)  Absence of Physical Injury: Implement safety measures based on patient assessment     Problem: Metabolic/Fluid and Electrolytes - Adult  Goal: Electrolytes maintained within normal limits  Outcome: Progressing  Flowsheets (Taken 8/4/2024 0034)  Electrolytes maintained within normal limits: Monitor labs and assess patient for signs and symptoms of electrolyte imbalances  Goal: Glucose maintained within prescribed range  Outcome: Progressing  Flowsheets (Taken 8/4/2024 0034)  Glucose maintained within prescribed range: Monitor blood glucose as ordered     Problem: Hematologic - Adult  Goal:

## 2024-08-04 NOTE — PROGRESS NOTES
LABCAST 0-1 Hyaline 06/29/2012 07:50 AM    WBCUA 264 08/01/2024 04:44 PM    RBCUA 85 08/01/2024 04:44 PM    MUCUS 2+ 06/29/2012 07:50 AM    BACTERIA 4+ 08/01/2024 04:44 PM    CLARITYU CLOUDY 08/01/2024 04:44 PM    SPECGRAV 1.020 05/29/2019 07:44 AM    LEUKOCYTESUR LARGE 08/01/2024 04:44 PM    UROBILINOGEN 0.2 08/01/2024 04:44 PM    BILIRUBINUR Negative 08/01/2024 04:44 PM    BLOODU LARGE 08/01/2024 04:44 PM    GLUCOSEU 500 08/01/2024 04:44 PM    GLUCOSEU NEGATIVE 12/09/2011 07:17 AM         IMPRESSION/RECOMMENDATIONS:      SUKUMAR on CKD 3a -- baseline Cr ~ 1.3-1.5 mg/dL. Follows with Dr. Salinas in office (last seen 7/17/2024). Etiology of SUKUMAR potentially 2/2 hypotension and volume depletion. There is a post-marketing report of rhabdomyolysis being an adverse effect of Zytiga -- CK was 26,240  - Cr continuing to trend up  - CK trending down  - HD MWF going forward  - Continue IVF  - Increase midodrine dose  - Continue to hold lisinopril and Synjardy  - Avoid nephrotoxic agents     2. Hypotension              - continue Midodrine     3. Metabolic acidosis              - resolved    4. Dyspnea              - CT Chest (7/31/2024): no pneumonia or edema               - VQ scan (7/31/2024): intermediate probability for pulmonary embolism               - Limited TTE (8/1/2024): LVEF 60%              - Breathing has improved     6. Prostate Cancer              - Started on Zytiga recently      Humera Fong MD

## 2024-08-04 NOTE — PROGRESS NOTES
V2.0  [facility] [place] Hospitalist Progress Note      Name:  Morro Kevin /Age/Sex: 1950  (73 y.o. male)   MRN & CSN:  [id number] & [id number] Encounter Date/Time: 2024 12:18 PM EDT    Location:  [id number] PCP: Fareed Marino MD       Hospital Day: 5  Subjective:   Chief Complaint: Shortness of Breath (Pt arrives with c/o sob, dyspnea at rest. Spo2 99%) and Fatigue (Fatigue x 10 days, hx of prostate ca. )       Review of Systems:    Review of Systems  10 point ROS negative except as stated above in \"subjective\" section    Objective:     Intake/Output Summary (Last 24 hours) at 2024 1218  Last data filed at 2024 0802  Gross per 24 hour   Intake 3378.44 ml   Output 1325 ml   Net 2053.44 ml      Vitals:   Vitals:    24 1200   BP:    Pulse: 59   Resp:    Temp:    SpO2: 95%     Physical Exam:   General: Awake, alert and oriented, NAD  Eyes: EOMI  ENT: neck supple  Cardiovascular: S1S2 present, regular rate and rhythm, no murmurs  Respiratory: Clear to auscultation  Gastrointestinal: Soft, non tender, positive bowel sounds   Genitourinary: no suprapubic tenderness  Musculoskeletal: No edema  Skin: warm, dry  Neuro: Alert.  Psych: Mood appropriate.     Medications:   Medications:    warfarin  3 mg Oral Once    psyllium husk-aspartame  1 packet Oral Daily    docusate sodium  100 mg Oral Daily    midodrine  10 mg Oral TID WC    hydrocortisone sodium succinate PF  25 mg IntraVENous Q8H    sodium chloride flush  5-40 mL IntraVENous 2 times per day    aspirin  81 mg Oral Nightly    levothyroxine  137 mcg Oral Daily    mirtazapine  30 mg Oral Nightly    tamsulosin  0.4 mg Oral Nightly    insulin lispro  0-8 Units SubCUTAneous TID WC    insulin lispro  0-4 Units SubCUTAneous Nightly    insulin glargine  10 Units SubCUTAneous Daily    warfarin placeholder: dosing by pharmacy   Other RX Placeholder      Infusions:    sodium chloride 75 mL/hr at 24 0626    sodium chloride      dextrose       PRN      Intermediate probability for pulmonary embolism.     CT CHEST ABDOMEN PELVIS WO CONTRAST Additional Contrast? None    Result Date: 7/31/2024  EXAMINATION: CT OF THE CHEST, ABDOMEN, AND PELVIS WITHOUT CONTRAST 7/31/2024 10:35 am TECHNIQUE: CT of the chest, abdomen and pelvis was performed without the administration of intravenous contrast. Multiplanar reformatted images are provided for review. Automated exposure control, iterative reconstruction, and/or weight based adjustment of the mA/kV was utilized to reduce the radiation dose to as low as reasonably achievable. COMPARISON: 04/15/2024 PET-CT 05/14/2024 HISTORY: ORDERING SYSTEM PROVIDED HISTORY: SOB, fatigue, generalized weakness, has metastatic prostate cancer. Concern for occult pneumonia and/or worsening neoplastic disease. TECHNOLOGIST PROVIDED HISTORY: Reason for exam:->SOB, fatigue, generalized weakness, has metastatic prostate cancer. Concern for occult pneumonia and/or worsening neoplastic disease. Additional Contrast?->None Decision Support Exception - unselect if not a suspected or confirmed emergency medical condition->Emergency Medical Condition (MA) Reason for Exam: SOB, fatigue, generalized weakness, has metastatic prostate cancer. Concern for occult pneumonia ... Additional signs and symptoms: leg weakness and fatigue FINDINGS: Chest: Mediastinum: Thyroid gland unremarkable.  Aortic valve calcification is seen. Coronary artery calcification is seen.  No pericardial effusion.  No pericardial calcification noted.  No significant mediastinal or hilar adenopathy Lungs/pleura: Respiratory motion artifact limits evaluation of the lungs.  No obstructing endobronchial lesions are seen.  No pneumonia.  No edema.  No pleural effusion.  De pendant opacity is seen at the lung bases bilaterally, likely atelectasis. Punctate calcified granuloma seen on the right. Soft Tissues/Bones: Spurring is seen in the spine.  Spurring is seen in the shoulder joints.

## 2024-08-04 NOTE — PROGRESS NOTES
NAME:  Morro Kevin  YOB: 1950  MEDICAL RECORD NUMBER:  [id number]    Shift Summary: Uneventful shift, pt rested well. VSS. Small bloody smear of stool, GI consulted late yesterday, still needs to see pt.      Family updated: No    Rhythm: Normal Sinus Rhythm -bradycardic at times    Most recent vitals:   Visit Vitals  BP (!) 97/57   Pulse 56   Temp 98.7 °F (37.1 °C) (Oral)   Resp 16   Ht 1.778 m (5' 10\")   Wt 81.6 kg (179 lb 14.3 oz)   SpO2 91%   BMI 25.81 kg/m²           Respiratory support needed (if any):  - RA    Admission weight Weight - Scale: 77.1 kg (170 lb) (07/31/24 0930)    Today's weight    Wt Readings from Last 1 Encounters:   08/04/24 81.6 kg (179 lb 14.3 oz)        Mayer need assessed each shift: N/A - no mayer present  UOP >30ml/hr: YES total voided per urinal 825 ml (avg 69 ml/hr)  Last documented BM (in last 48 hrs):  Patient Vitals for the past 48 hrs:   Last BM (including prior to admit) Stool Occurrence   08/02/24 0853 08/01/24 --   08/02/24 1930 08/02/24 --   08/03/24 0725 08/02/24 --   08/03/24 1230 08/02/24 --   08/03/24 1448 08/03/24 1   08/03/24 1940 08/03/24 --                Restraints (in use currently or dc'd in last 12 hrs): No          Lines/Drains reviewed @ bedside.  Peripheral IV 07/31/24 Right Forearm (Active)   Number of days: 3       Peripheral IV 07/31/24 Left Forearm (Active)   Number of days: 3       Hemodialysis Central Access Right Neck (Active)   Number of days: 1         Drip rates at handoff:    sodium chloride 75 mL/hr at 08/04/24 0626    sodium chloride      dextrose         Lab Data:   CBC:   Recent Labs     08/03/24  0450 08/04/24  0415   WBC 6.2 9.8   HGB 9.2* 9.2*   HCT 26.5* 26.2*   MCV 89.2 89.7   * 131*     BMP:    Recent Labs     08/03/24  0450 08/04/24  0415    135*   K 3.9 3.8   CO2 25 26   BUN 59* 49*   CREATININE 6.0* 4.9*     LIVR:   Recent Labs     08/02/24  0339 08/04/24 0415   * 502*   * 441*     PT/INR:    Recent Labs     08/03/24  0450 08/04/24  0415   INR 2.27* 2.48*     APTT: No results for input(s): \"APTT\" in the last 72 hours.  ABG: No results for input(s): \"PHART\", \"FSH1CHW\", \"PO2ART\" in the last 72 hours.    Any consults during the shift? No    Any signed and held orders to be released?  No        4 Eyes Skin Assessment       The patient is being assessed for  Shift Handoff    I agree that at least one RN has performed a thorough Head to Toe Skin Assessment on the patient. ALL assessment sites listed below have been assessed.      Areas assessed by both nurses: Head, Face, Ears, Shoulders, Back, Chest, Arms, Elbows, Hands, Sacrum. Buttock, Coccyx, Ischium, Legs. Feet and Heels, and Under Medical Devices         Does the Patient have a Wound? No noted wound(s)    Wound Care Orders initiated by RN: No       Vignesh Prevention initiated by RN: Yes    Pressure Injury (Stage 3,4, Unstageable, DTI, NWPT, and Complex wounds) if present, place Wound referral order by RN under : No    New Ostomies, if present place, Ostomy referral order under : No     Nurse 1 eSignature: Electronically signed by Aranza Rodriguez RN on 8/4/24 at 6:43 AM EDT    **SHARE this note so that the co-signing nurse can place an eSignature**    Nurse 2 eSignature: Electronically signed by Alana Moore RN on 8/4/24 at 8:16 AM EDT

## 2024-08-04 NOTE — PROGRESS NOTES
NAME:  Morro Kevin  YOB: 1950  MEDICAL RECORD NUMBER:  [id number]    Shift Summary: Pt up in chair, uneventful shift.     Family updated: Yes:  Updated by patient.  Didier at bedside and updated.     Rhythm: Unchanged SB PVCs rare    Most recent vitals:   Visit Vitals  BP (!) 141/78   Pulse 80   Temp 99.6 °F (37.6 °C) (Oral)   Resp 21   Ht 1.778 m (5' 10\")   Wt 81.6 kg (179 lb 14.3 oz)   SpO2 99%   BMI 25.81 kg/m²           No data found.    No data found.      Respiratory support needed (if any):  - RA    Admission weight Weight - Scale: 77.1 kg (170 lb) (07/31/24 0930)    Today's weight    Wt Readings from Last 1 Encounters:   08/04/24 81.6 kg (179 lb 14.3 oz)        Mayer need assessed each shift: N/A - no mayer present  UOP >30ml/hr: NO - Pt HD patient.   Last documented BM (in last 48 hrs):  Patient Vitals for the past 48 hrs:   Last BM (including prior to admit) Stool Occurrence   08/02/24 1930 08/02/24 --   08/03/24 0725 08/02/24 --   08/03/24 1230 08/02/24 --   08/03/24 1448 08/03/24 1   08/03/24 1940 08/03/24 --   08/04/24 0626 08/04/24 --   08/04/24 0802 -- 1   08/04/24 0809 08/04/24 --                Restraints (in use currently or dc'd in last 12 hrs): No    Order current and documentation up to date? Yes    Lines/Drains reviewed @ bedside.  Peripheral IV 07/31/24 Right Forearm (Active)   Number of days: 4       Peripheral IV 07/31/24 Left Forearm (Active)   Number of days: 4       Hemodialysis Central Access Right Neck (Active)   Number of days: 2         Drip rates at handoff:    sodium chloride 75 mL/hr at 08/04/24 1629    sodium chloride      dextrose         Lab Data:   CBC:   Recent Labs     08/03/24  0450 08/04/24  0415   WBC 6.2 9.8   HGB 9.2* 9.2*   HCT 26.5* 26.2*   MCV 89.2 89.7   * 131*     BMP:    Recent Labs     08/03/24  0450 08/04/24  0415    135*   K 3.9 3.8   CO2 25 26   BUN 59* 49*   CREATININE 6.0* 4.9*     LIVR:   Recent Labs     08/02/24  0339  Cephalexin Counseling: I counseled the patient regarding use of cephalexin as an antibiotic for prophylactic and/or therapeutic purposes. Cephalexin (commonly prescribed under brand name Keflex) is a cephalosporin antibiotic which is active against numerous classes of bacteria, including most skin bacteria. Side effects may include nausea, diarrhea, gastrointestinal upset, rash, hives, yeast infections, and in rare cases, hepatitis, kidney disease, seizures, fever, confusion, neurologic symptoms, and others. Patients with severe allergies to penicillin medications are cautioned that there is about a 10% incidence of cross-reactivity with cephalosporins. When possible, patients with penicillin allergies should use alternatives to cephalosporins for antibiotic therapy.

## 2024-08-04 NOTE — PROGRESS NOTES
Physical Therapy  Facility/Department: [address] ICU  Physical Therapy Initial Assessment    Name: Morro Kevin  : 1950  MRN: [id number]  Date of Service: 2024    Discharge Recommendations:  Continue to assess pending progress, IP Rehab   PT Equipment Recommendations  Other: Will monitor for potential equipt needs.      Morro Kevin scored a 15/24 on the AM-PAC short mobility form. Current research shows that an AM-PAC score of 17 or less is typically not associated with a discharge to the patient's home setting. Based on the patient's AM-PAC score and their current functional mobility deficits, it is recommended that the patient have 5-7 sessions per week of Physical Therapy at d/c to increase the patient's independence.  At this time, this patient demonstrates complex nursing, medical, and rehabilitative needs, and would benefit from intensive rehabilitation services upon discharge from the Inpatient setting.  This patient demonstrates the ability to participate in and benefit from an intensive therapy program with a coordinated interdisciplinary team approach to foster frequent, structured, and documented communication among disciplines, who will work together to establish, prioritize, and achieve treatment goals. Please see assessment section for further patient specific details.    If patient discharges prior to next session this note will serve as a discharge summary.  Please see below for the latest assessment towards goals.      Assessment   Assessment: 72 y/o male admit 2024 with SUKUMAR, Hypotension, PAF, Weak and SOB. GI consult : GI Bleed. Nephrology consult : SUKUMAR, new HD. PMH as noted including Aortic Valve Stenosis, AVR (2016), Prostate Ca (; new reoccur, Chemo), R RTC Repair,  Recent L Iliopsoas Hematoma (2024).  PTA pt lives in [facility] (level entry, 1 level) with son (works out of home 3 day/wk) and was independent with daily care and functional mobility (recent weak over past few  Pt agreeable to PT Eval/Rx.  Reports progress increase weak/debility over past 2 wk pta (attributing to Prostate Chemo Meds).         Social/Functional History  Social/Functional History  Lives With: Son  Type of Home: Condo  Home Layout: One level  Home Access: Ramped entrance, Elevator  Bathroom Shower/Tub: Walk-in shower  Bathroom Toilet: Standard  Bathroom Equipment: Shower chair, Hand-held shower  Bathroom Accessibility: Accessible  Home Equipment: Cane, Walker - 4-Wheeled (Transport chair.)  Has the patient had two or more falls in the past year or any fall with injury in the past year?: No  ADL Assistance: Independent  Homemaking Assistance:  (Son assists homemaking.)  Ambulation Assistance: Independent (Using Cane past wk pta; prior no device.)  Transfer Assistance: Independent  Active : Yes  Occupation: Retired  Vision/Hearing  Vision  Vision: Impaired  Vision Exceptions: Wears glasses at all times  Hearing  Hearing: Within functional limits    Cognition   Orientation  Overall Orientation Status: Within Functional Limits  Cognition  Arousal/Alertness: Appears intact  Following Commands: Appears intact  Attention Span: Appears intact  Memory: Appears intact  Safety Judgement: Decreased awareness of need for safety;Decreased awareness of need for assistance  Insights: Fully aware of deficits  Sequencing: Requires cues for some     Objective                 AROM RLE (degrees)  RLE AROM: WFL  AROM LLE (degrees)  LLE AROM : WFL  AROM RUE (degrees)  RUE AROM : WFL  AROM LUE (degrees)  LUE AROM : WFL  Strength RLE  Comment: Hip 3+/5; Knee Ext 4-/5, Knee Flex 3+ 4-/5; Ankle 5/5  Strength LLE  Comment: Hip 3 3+/5; Knee Ext 4-/5, Knee Flex 3+/5; Ankle 5/5.  Strength RUE  Strength RUE: WFL  Strength LUE  Strength LUE: WFL           Bed mobility  Supine to Sit: Minimal assistance (HOB elevated.  Use of bedrail.)  Bed Mobility Comments: Slow, effortful; difficulty moving LEs to eob.  Transfers  Sit to Stand:

## 2024-08-04 NOTE — CONSULTS
Gastrointestinal Consult Note    Patient: Morro Kevin CSN: [id number]     YOB: 1950  Age: 73 y.o.  Sex: male    Unit: [address] ICU Room/Bed: J5X-8747/2125-01 Location: [facility]     Admitting Physician: KENNY HIGUERA    Date of  Admission: 7/31/2024   Admission type: Emergency  Primary Care Physician: Fareed Marino MD          Referring Physician: [unfilled]    Chief Complaint: rectal bleeding  Consult Date: 8/4/2024     Subjective:     History of Present Illness: Morro Kevin is a 73 y.o. male who is seen at the request of KENNY HIGUERA.  The patient has a history of HTN, DM, CAD, AVR with mechanical valve on Coumadin, metastatic prostate CA on hormone therapy, CKD stage III, sleep apnea on CPAP, hypothyroidism, colon resection remotely for perforation and iron deficiency anemia 6/2024.  Work-up included EGD with minimal gastritis, Colon with diverticulosis adn sub-optimal prep, hemorrhoids and small linear telangiectasias at the anal verge that did not bleed, Capsule endoscopy showing blood and clot in the proximal small bowel.  Push enteroscopy showed a bleeding dieulafoy lesion in the jejunum at the far reach of the scope which which was treated with 2 endoclips with good hemostasis.  The patient was admitted 7/31/2024 with complaints of worsening weakness, fatigue and shortness of breath.  The patient had BUN 53 with creatinine 4.0 and  and  with high troponins and INR 5.34.  The patient also some hypotension and had elevated CK indicating rhabdomyolysis.  Patient was dialyzed.  Right upper quadrant ultrasound was normal.  Transaminases improving.  The patient had a bowel movement yesterday afternoon after having some constipation and straining and had some bright red blood in the toilet water with small clots.  Hgb was stable overnight and rectal bleed is decreasing.  No N/V/abd pain, diarrhea, fever, melena.     Past Medical History:   Diagnosis Date    Aortic

## 2024-08-04 NOTE — PROGRESS NOTES
Clinical Pharmacy Note  Warfarin Consult    Morro Kevin is a 73 y.o. male receiving warfarin managed by pharmacy.  Patient being bridged with none.    Warfarin Indication: AVR  Target INR range: 2-3   Dose prior to admission:   Warfarin maintenance plan: 11.25 mg (7.5 mg x 1.5) every Wed; 7.5 mg (7.5 mg x 1) all other days   Weekly warfarin total: 56.25 mg   Managed at [facility]    Current warfarin drug-drug interactions: Hydrocortisone    Recent Labs     08/02/24  0339 08/03/24  0450 08/04/24  0415   HGB 10.7* 9.2* 9.2*   HCT 30.6* 26.5* 26.2*   INR 3.44* 2.27* 2.48*         Assessment/Plan:  Warfarin 3 mg po x 1 tonight.    Patient remains on Hydrocortisone (likely to further wean today) - note patient was on low-dose Prednisone at home in conjunction with Zytiga    Patient experienced bloody bowel movements over last 2 days - d/w Dr. Keating.  Will not reverse at this time.  Patient hemodynamically stable.  Will transfuse as needed.  GI service has been consulted.   Hemoglobin has remained stable over the last 24 hours.    Daily PT/INR until stable within therapeutic range.     Thank you for the consult. Will continue to follow.    Izaiah Guzman RP, PharmD, BCPS  8/4/2024 6:31 AM

## 2024-08-04 NOTE — PROGRESS NOTES
Occupational Therapy  Facility/Department: [address] ICU  Occupational Therapy Initial Assessment    Name: Morro Kevin  : 1950  MRN: [id number]  Date of Service: 2024    Discharge Recommendations:  Patient would benefit from continued therapy after discharge, 5-7 sessions per week  OT Equipment Recommendations  Other: defer to [place] facility     Morro Kevin scored a 17/24 on the AM-PAC ADL Inpatient form. Current research shows that an AM-PAC score of 17 or less is typically not associated with a discharge to the patient's home setting. Based on the patient's AM-PAC score and their current ADL deficits, it is recommended that the patient have 5-7 sessions per week of Occupational Therapy at d/c to increase the patient's independence.  At this time, this patient demonstrates complex nursing, medical, and rehabilitative needs, and would benefit from intensive rehabilitation services upon discharge from the Inpatient setting.  This patient demonstrates the ability to participate in and benefit from an intensive therapy program with a coordinated interdisciplinary team approach to foster frequent, structured, and documented communication among disciplines, who will work together to establish, prioritize, and achieve treatment goals. Please see assessment section for further patient specific details.    If patient discharges prior to next session this note will serve as a discharge summary.  Please see below for the latest assessment towards goals.      Patient Diagnosis(es): The primary encounter diagnosis was History of prostate cancer. Diagnoses of Dyspnea, unspecified type, General weakness, Acute kidney injury superimposed on CKD (HCC), Elevated troponin, Supratherapeutic INR, History of aortic valve replacement, Transaminitis, and Shortness of breath were also pertinent to this visit.  Past Medical History:  has a past medical history of Aortic stenosis, Arthritis, Asthma, Deviated septum, Diabetes

## 2024-08-05 LAB
ALBUMIN SERPL-MCNC: 2.5 G/DL (ref 3.4–5)
ANION GAP SERPL CALCULATED.3IONS-SCNC: 14 MMOL/L (ref 3–16)
ANION GAP SERPL CALCULATED.3IONS-SCNC: 16 MMOL/L (ref 3–16)
BASOPHILS # BLD: 0 K/UL (ref 0–0.2)
BASOPHILS NFR BLD: 0.2 %
BUN SERPL-MCNC: 43 MG/DL (ref 7–20)
BUN SERPL-MCNC: 66 MG/DL (ref 7–20)
CALCIUM SERPL-MCNC: 8.1 MG/DL (ref 8.3–10.6)
CALCIUM SERPL-MCNC: 8.3 MG/DL (ref 8.3–10.6)
CHLORIDE SERPL-SCNC: 100 MMOL/L (ref 99–110)
CHLORIDE SERPL-SCNC: 97 MMOL/L (ref 99–110)
CK SERPL-CCNC: 7173 U/L (ref 39–308)
CO2 SERPL-SCNC: 20 MMOL/L (ref 21–32)
CO2 SERPL-SCNC: 24 MMOL/L (ref 21–32)
CREAT SERPL-MCNC: 4.2 MG/DL (ref 0.8–1.3)
CREAT SERPL-MCNC: 5.6 MG/DL (ref 0.8–1.3)
DEPRECATED RDW RBC AUTO: 15.2 % (ref 12.4–15.4)
EOSINOPHIL # BLD: 0.1 K/UL (ref 0–0.6)
EOSINOPHIL NFR BLD: 0.7 %
GFR SERPLBLD CREATININE-BSD FMLA CKD-EPI: 10 ML/MIN/{1.73_M2}
GFR SERPLBLD CREATININE-BSD FMLA CKD-EPI: 14 ML/MIN/{1.73_M2}
GLUCOSE BLD-MCNC: 140 MG/DL (ref 70–99)
GLUCOSE BLD-MCNC: 222 MG/DL (ref 70–99)
GLUCOSE BLD-MCNC: 284 MG/DL (ref 70–99)
GLUCOSE BLD-MCNC: 323 MG/DL (ref 70–99)
GLUCOSE SERPL-MCNC: 185 MG/DL (ref 70–99)
GLUCOSE SERPL-MCNC: 321 MG/DL (ref 70–99)
HCT VFR BLD AUTO: 25.8 % (ref 40.5–52.5)
HGB BLD-MCNC: 8.8 G/DL (ref 13.5–17.5)
INR PPP: 3.57 (ref 0.85–1.15)
LYMPHOCYTES # BLD: 0.6 K/UL (ref 1–5.1)
LYMPHOCYTES NFR BLD: 5.5 %
MAGNESIUM SERPL-MCNC: 2.3 MG/DL (ref 1.8–2.4)
MCH RBC QN AUTO: 31.2 PG (ref 26–34)
MCHC RBC AUTO-ENTMCNC: 34.3 G/DL (ref 31–36)
MCV RBC AUTO: 91.1 FL (ref 80–100)
MONOCYTES # BLD: 0.6 K/UL (ref 0–1.3)
MONOCYTES NFR BLD: 6.1 %
NEUTROPHILS # BLD: 9 K/UL (ref 1.7–7.7)
NEUTROPHILS NFR BLD: 87.5 %
PERFORMED ON: ABNORMAL
PHOSPHATE SERPL-MCNC: 5.8 MG/DL (ref 2.5–4.9)
PLATELET # BLD AUTO: 164 K/UL (ref 135–450)
PMV BLD AUTO: 8.9 FL (ref 5–10.5)
POTASSIUM SERPL-SCNC: 4 MMOL/L (ref 3.5–5.1)
POTASSIUM SERPL-SCNC: 4.4 MMOL/L (ref 3.5–5.1)
PROTHROMBIN TIME: 35.4 SEC (ref 11.9–14.9)
RBC # BLD AUTO: 2.83 M/UL (ref 4.2–5.9)
SODIUM SERPL-SCNC: 135 MMOL/L (ref 136–145)
SODIUM SERPL-SCNC: 136 MMOL/L (ref 136–145)
URATE SERPL-MCNC: 4.3 MG/DL (ref 3.5–7.2)
WBC # BLD AUTO: 10.2 K/UL (ref 4–11)

## 2024-08-05 PROCEDURE — 6370000000 HC RX 637 (ALT 250 FOR IP): Performed by: HOSPITALIST

## 2024-08-05 PROCEDURE — 97116 GAIT TRAINING THERAPY: CPT

## 2024-08-05 PROCEDURE — 2060000000 HC ICU INTERMEDIATE R&B

## 2024-08-05 PROCEDURE — 6360000002 HC RX W HCPCS: Performed by: INTERNAL MEDICINE

## 2024-08-05 PROCEDURE — 2580000003 HC RX 258: Performed by: INTERNAL MEDICINE

## 2024-08-05 PROCEDURE — 6370000000 HC RX 637 (ALT 250 FOR IP): Performed by: INTERNAL MEDICINE

## 2024-08-05 PROCEDURE — 84550 ASSAY OF BLOOD/URIC ACID: CPT

## 2024-08-05 PROCEDURE — 80069 RENAL FUNCTION PANEL: CPT

## 2024-08-05 PROCEDURE — 94760 N-INVAS EAR/PLS OXIMETRY 1: CPT

## 2024-08-05 PROCEDURE — 83735 ASSAY OF MAGNESIUM: CPT

## 2024-08-05 PROCEDURE — 90935 HEMODIALYSIS ONE EVALUATION: CPT

## 2024-08-05 PROCEDURE — 82550 ASSAY OF CK (CPK): CPT

## 2024-08-05 PROCEDURE — 85025 COMPLETE CBC W/AUTO DIFF WBC: CPT

## 2024-08-05 PROCEDURE — 85610 PROTHROMBIN TIME: CPT

## 2024-08-05 PROCEDURE — 97530 THERAPEUTIC ACTIVITIES: CPT

## 2024-08-05 PROCEDURE — 2580000003 HC RX 258: Performed by: HOSPITALIST

## 2024-08-05 RX ADMIN — HYDROCORTISONE SODIUM SUCCINATE 25 MG: 100 INJECTION, POWDER, FOR SOLUTION INTRAMUSCULAR; INTRAVENOUS at 12:45

## 2024-08-05 RX ADMIN — SODIUM CHLORIDE: 9 INJECTION, SOLUTION INTRAVENOUS at 18:51

## 2024-08-05 RX ADMIN — INSULIN GLARGINE 10 UNITS: 100 INJECTION, SOLUTION SUBCUTANEOUS at 07:39

## 2024-08-05 RX ADMIN — TAMSULOSIN HYDROCHLORIDE 0.4 MG: 0.4 CAPSULE ORAL at 20:49

## 2024-08-05 RX ADMIN — DOCUSATE SODIUM 100 MG: 100 CAPSULE, LIQUID FILLED ORAL at 07:39

## 2024-08-05 RX ADMIN — INSULIN LISPRO 2 UNITS: 100 INJECTION, SOLUTION INTRAVENOUS; SUBCUTANEOUS at 07:52

## 2024-08-05 RX ADMIN — ASPIRIN 81 MG: 81 TABLET, COATED ORAL at 20:49

## 2024-08-05 RX ADMIN — PSYLLIUM HUSK 1 PACKET: 3.4 POWDER ORAL at 17:36

## 2024-08-05 RX ADMIN — MIRTAZAPINE 30 MG: 30 TABLET, FILM COATED ORAL at 20:49

## 2024-08-05 RX ADMIN — HYDROCORTISONE SODIUM SUCCINATE 25 MG: 100 INJECTION, POWDER, FOR SOLUTION INTRAMUSCULAR; INTRAVENOUS at 20:49

## 2024-08-05 RX ADMIN — MIDODRINE HYDROCHLORIDE 10 MG: 10 TABLET ORAL at 17:35

## 2024-08-05 RX ADMIN — HYDROCORTISONE SODIUM SUCCINATE 25 MG: 100 INJECTION, POWDER, FOR SOLUTION INTRAMUSCULAR; INTRAVENOUS at 03:20

## 2024-08-05 RX ADMIN — SODIUM CHLORIDE: 9 INJECTION, SOLUTION INTRAVENOUS at 04:47

## 2024-08-05 RX ADMIN — LEVOTHYROXINE SODIUM 137 MCG: 0.11 TABLET ORAL at 07:39

## 2024-08-05 RX ADMIN — INSULIN LISPRO 6 UNITS: 100 INJECTION, SOLUTION INTRAVENOUS; SUBCUTANEOUS at 17:35

## 2024-08-05 RX ADMIN — MIDODRINE HYDROCHLORIDE 10 MG: 10 TABLET ORAL at 07:39

## 2024-08-05 RX ADMIN — SODIUM CHLORIDE, PRESERVATIVE FREE 10 ML: 5 INJECTION INTRAVENOUS at 07:39

## 2024-08-05 ASSESSMENT — PAIN SCALES - GENERAL: PAINLEVEL_OUTOF10: 0

## 2024-08-05 NOTE — DIALYSIS
Hemodialysis Treatment Note:     Treatment time: 3.5 hours    Net UF: 1 L    Pre weight: 81.6 kg  Post weight: 80.6 kg       Access used: R external IJ   Access function:  Works well at 300 pump speed     Summary of response to treatment: Tx well tolerated. BP stable throughout. All blood returned w/ rinse back. Pt stable w/ no complaints following session. Report given to primary RN.

## 2024-08-05 NOTE — PROGRESS NOTES
Physical Therapy  Facility/Department: [address] ICU  Physical Therapy Initial Assessment    Name: Morro Kevin  : 1950  MRN: [id number]  Date of Service: 2024    Discharge Recommendations:  Continue to assess pending progress, IP Rehab   PT Equipment Recommendations  Other: Will monitor for potential equipt needs.      Morro Kevin scored a 16/24 on the AM-PAC short mobility form. Current research shows that an AM-PAC score of 17 or less is typically not associated with a discharge to the patient's home setting. Based on the patient's AM-PAC score and their current functional mobility deficits, it is recommended that the patient have 5-7 sessions per week of Physical Therapy at d/c to increase the patient's independence.  At this time, this patient demonstrates complex nursing, medical, and rehabilitative needs, and would benefit from intensive rehabilitation services upon discharge from the Inpatient setting.  This patient demonstrates the ability to participate in and benefit from an intensive therapy program with a coordinated interdisciplinary team approach to foster frequent, structured, and documented communication among disciplines, who will work together to establish, prioritize, and achieve treatment goals. Please see assessment section for further patient specific details.    If patient discharges prior to next session this note will serve as a discharge summary.  Please see below for the latest assessment towards goals.      Assessment   Assessment: 74 y/o male admit 2024 with SUKUMAR, Hypotension, PAF, Weak and SOB. GI consult : GI Bleed : Recent Colonoscopy showing that he has Hemorrhoids as well as some small Telangiectasias in the distal Rectum near the Anus. Hgb stable now and Rectal Bleeding decreasing.   Nephrology consult : SUKUMAR, new HD. PMH as noted including Aortic Valve Stenosis, AVR (Mechanical, Anticoagulant, 2016), Prostate Ca (; new reoccur, Chemo), R RTC Repair,  Recent

## 2024-08-05 NOTE — PLAN OF CARE
Progressing  Flowsheets (Taken 8/4/2024 1945 by Becky Canela, RN)  Hemodynamic stability and optimal renal function maintained:   Monitor labs and assess for signs and symptoms of volume excess or deficit   Monitor intake, output and patient weight   Monitor response to interventions for patient's volume status, including labs, urine output, blood pressure (other measures as available)  8/4/2024 2146 by Becky Canela RN  Outcome: Progressing  Flowsheets (Taken 8/4/2024 1945)  Hemodynamic stability and optimal renal function maintained:   Monitor labs and assess for signs and symptoms of volume excess or deficit   Monitor intake, output and patient weight   Monitor response to interventions for patient's volume status, including labs, urine output, blood pressure (other measures as available)  Goal: Glucose maintained within prescribed range  8/5/2024 0902 by Merle Emmanuel RN  Outcome: Progressing  Flowsheets (Taken 8/4/2024 1945 by Becky Canela, RN)  Glucose maintained within prescribed range:   Monitor blood glucose as ordered   Assess for signs and symptoms of hyperglycemia and hypoglycemia   Administer ordered medications to maintain glucose within target range  8/4/2024 2146 by Becky Canela RN  Outcome: Progressing  Flowsheets (Taken 8/4/2024 1945)  Glucose maintained within prescribed range:   Monitor blood glucose as ordered   Assess for signs and symptoms of hyperglycemia and hypoglycemia   Administer ordered medications to maintain glucose within target range     Problem: Hematologic - Adult  Goal: Maintains hematologic stability  8/5/2024 0902 by Merle Emmanuel RN  Outcome: Progressing  Flowsheets (Taken 8/4/2024 1945 by Becky Canela, RN)  Maintains hematologic stability:   Assess for signs and symptoms of bleeding or hemorrhage   Monitor labs for bleeding or clotting disorders   Administer blood products/factors as ordered  8/4/2024 2146 by Becky Canela RN  Outcome: Progressing  Flowsheets  (Taken 8/4/2024 1945)  Maintains hematologic stability:   Assess for signs and symptoms of bleeding or hemorrhage   Monitor labs for bleeding or clotting disorders   Administer blood products/factors as ordered

## 2024-08-05 NOTE — PROGRESS NOTES
NAME:  Morro Kevin  YOB: 1950  MEDICAL RECORD NUMBER:  [id number]    Shift Summary: HD today.  BM x1, bright red blood noted.    Family updated: Yes:  son at bedside    Rhythm: Normal Sinus Rhythm     Most recent vitals:   Visit Vitals  BP (!) 142/62   Pulse 51   Temp 97.9 °F (36.6 °C) (Oral)   Resp 18   Ht 1.778 m (5' 10\")   Wt 83.2 kg (183 lb 6.8 oz)   SpO2 98%   BMI 26.32 kg/m²           No data found.    No data found.      Respiratory support needed (if any):  - RA    Admission weight Weight - Scale: 77.1 kg (170 lb) (07/31/24 0930)    Today's weight    Wt Readings from Last 1 Encounters:   08/05/24 83.2 kg (183 lb 6.8 oz)        Mayer need assessed each shift: N/A - no mayer present  UOP >30ml/hr: YES  Last documented BM (in last 48 hrs):  Patient Vitals for the past 48 hrs:   Last BM (including prior to admit) Stool Occurrence   08/03/24 1940 08/03/24 --   08/04/24 0626 08/04/24 --   08/04/24 0802 -- 1   08/04/24 0809 08/04/24 --   08/04/24 1945 08/04/24 --   08/05/24 0747 08/04/24 --   08/05/24 1825 08/05/24 1                Restraints (in use currently or dc'd in last 12 hrs): No    Order current and documentation up to date? No    Lines/Drains reviewed @ bedside.  Peripheral IV 07/31/24 Right Forearm (Active)   Number of days: 5       Peripheral IV 07/31/24 Left Forearm (Active)   Number of days: 5       Hemodialysis Central Access Right Neck (Active)   Number of days: 3         Drip rates at handoff:    sodium chloride 50 mL/hr at 08/05/24 1856    sodium chloride      dextrose         Lab Data:   CBC:   Recent Labs     08/04/24  0415 08/05/24  0445   WBC 9.8 10.2   HGB 9.2* 8.8*   HCT 26.2* 25.8*   MCV 89.7 91.1   * 164     BMP:    Recent Labs     08/05/24  0445 08/05/24  1549    135*   K 4.4 4.0   CO2 20* 24   BUN 66* 43*   CREATININE 5.6* 4.2*     LIVR:   Recent Labs     08/04/24 0415   *   *     PT/INR:   Recent Labs     08/04/24 0415 08/05/24 0445   INR

## 2024-08-05 NOTE — PROGRESS NOTES
V2.0  [facility] Hospitalist Progress Note      Name:  Morro Kevin /Age/Sex: 1950  (73 y.o. male)   MRN & CSN:  [id number] & [id number] Encounter Date/Time: 2024 2:17 PM EDT    Location:  [id number] PCP: Fareed Marino MD       Hospital Day: 6  Subjective:   Chief Complaint: f/u shortness of breath, myalgias and lower extremity weakness.    Patient seen and evaluated at bedside, underwent dialysis earlier today, still in the ICU as PCU overflow because of lack of beds.  He is on room air, this afternoon though he had again yet another episode of bloody bowel movement, it was bright red    Review of Systems:    Review of Systems  10 point ROS negative except as stated above in \"subjective\" section    Objective:     Intake/Output Summary (Last 24 hours) at 2024 1417  Last data filed at 2024 1402  Gross per 24 hour   Intake 3765.31 ml   Output 1150 ml   Net 2615.31 ml      Vitals:   Vitals:    24 1240   BP: (!) 142/62   Pulse: 51   Resp: 18   Temp: 98 °F (36.7 °C)   SpO2: 98%     Physical Exam:   General: Awake, alert and oriented, NAD  Eyes: EOMI  ENT: neck supple  Cardiovascular: S1S2 present, regular rate and rhythm, no murmurs  Respiratory: Clear to auscultation  Gastrointestinal: Soft, non tender, positive bowel sounds   Genitourinary: no suprapubic tenderness  Musculoskeletal: No edema  Skin: warm, dry  Neuro: Alert.  Psych: Mood appropriate.     Medications:   Medications:    psyllium husk-aspartame  1 packet Oral Daily    docusate sodium  100 mg Oral Daily    midodrine  10 mg Oral TID WC    hydrocortisone sodium succinate PF  25 mg IntraVENous Q8H    sodium chloride flush  5-40 mL IntraVENous 2 times per day    aspirin  81 mg Oral Nightly    levothyroxine  137 mcg Oral Daily    mirtazapine  30 mg Oral Nightly    tamsulosin  0.4 mg Oral Nightly    insulin lispro  0-8 Units SubCUTAneous TID WC    insulin lispro  0-4 Units SubCUTAneous Nightly    insulin glargine  10 Units  Hemoglobin 8.8 (L) 13.5 - 17.5 g/dL    Hematocrit 25.8 (L) 40.5 - 52.5 %    MCV 91.1 80.0 - 100.0 fL    MCH 31.2 26.0 - 34.0 pg    MCHC 34.3 31.0 - 36.0 g/dL    RDW 15.2 12.4 - 15.4 %    Platelets 164 135 - 450 K/uL    MPV 8.9 5.0 - 10.5 fL    Neutrophils % 87.5 %    Lymphocytes % 5.5 %    Monocytes % 6.1 %    Eosinophils % 0.7 %    Basophils % 0.2 %    Neutrophils Absolute 9.0 (H) 1.7 - 7.7 K/uL    Lymphocytes Absolute 0.6 (L) 1.0 - 5.1 K/uL    Monocytes Absolute 0.6 0.0 - 1.3 K/uL    Eosinophils Absolute 0.1 0.0 - 0.6 K/uL    Basophils Absolute 0.0 0.0 - 0.2 K/uL   CK    Collection Time: 08/05/24  4:45 AM   Result Value Ref Range    Total CK 7,173 (H) 39 - 308 U/L   POCT Glucose    Collection Time: 08/05/24  7:42 AM   Result Value Ref Range    POC Glucose 222 (H) 70 - 99 mg/dl    Performed on ACCU-CHEK    POCT Glucose    Collection Time: 08/05/24 12:44 PM   Result Value Ref Range    POC Glucose 140 (H) 70 - 99 mg/dl    Performed on ACCU-CHEK         Imaging/Diagnostics Last 24 Hours   XR CHEST PORTABLE    Right-sided IJ catheter with tip at the SVC/atrial junction     Echo (TTE) limited (PRN contrast/bubble/strain/3D)    Result Date: 8/1/2024    Left Ventricle: Normal left ventricular systolic function. EF by visual approximation is 60%. Left ventricle size is normal. Normal wall motion.   Right Ventricle: Normal systolic function. TAPSE is 1.9 cm.     US GALLBLADDER RUQ    No cholelithiasis.  No biliary dilation.     NM LUNG VENT/PERFUSION (VQ)  EST RADIOGRAPH: No focal consolidation on accompanying radiograph.     Intermediate probability for pulmonary embolism.     CT CHEST ABDOMEN PELVIS WO CONTRAST Additional Contrast? None  al wall on the left, possibly from prior medication injection     Chest No definite metastatic disease. No pneumonia or edema Abdomen and pelvis Small internal iliac node seen on recent PET-CT has not decreased in size in the interval.  No areas of increasing adenopathy identified.

## 2024-08-05 NOTE — ACP (ADVANCE CARE PLANNING)
Advance Care Planning     Advance Care Planning Activator (Inpatient)  Conversation Note      Date of ACP Conversation: 8/5/2024     Conversation Conducted with: Patient with Decision Making Capacity    ACP Activator: BREN Hudson        Health Care Decision Maker:     Current Designated Health Care Decision Maker:     Primary Decision Maker: Joe Kevin Child - [phone]    Secondary Decision Maker: lara kevin - Child - [phone]    Secondary Decision Maker: whitley kevin - Child - [phone]    Secondary Decision Maker: sheila kevin - [phone]    Today we documented Decision Maker(s) consistent with Legal Next of Kin hierarchy.    Care Preferences    Ventilation:  \"If you were in your present state of health and suddenly became very ill and were unable to breathe on your own, what would your preference be about the use of a ventilator (breathing machine) if it were available to you?\"      Would the patient desire the use of ventilator (breathing machine)?: yes    \"If your health worsens and it becomes clear that your chance of recovery is unlikely, what would your preference be about the use of a ventilator (breathing machine) if it were available to you?\"     Would the patient desire the use of ventilator (breathing machine)?: Yes      Resuscitation  \"CPR works best to restart the heart when there is a sudden event, like a heart attack, in someone who is otherwise healthy. Unfortunately, CPR does not typically restart the heart for people who have serious health conditions or who are very sick.\"    \"In the event your heart stopped as a result of an underlying serious health condition, would you want attempts to be made to restart your heart (answer \"yes\" for attempt to resuscitate) or would you prefer a natural death (answer \"no\" for do not attempt to resuscitate)?\" yes       [x] Yes   [] No   Educated Patient / Decision Maker regarding differences between Advance Directives and portable DNR  orders.    Length of ACP Conversation in minutes:      Conversation Outcomes:  ACP discussion completed    Follow-up plan:    [] Schedule follow-up conversation to continue planning  [] Referred individual to Provider for additional questions/concerns   [] Advised patient/agent/surrogate to review completed ACP document and update if needed with changes in condition, patient preferences or care setting    [x] This note routed to one or more involved healthcare providers

## 2024-08-05 NOTE — CARE COORDINATION
Case Management Assessment  Initial Evaluation    Date/Time of Evaluation: 8/5/2024 7:19 PM  Assessment Completed by: BREN Hudson    If patient is discharged prior to next notation, then this note serves as note for discharge by case management.    Patient Name: Morro Kevin                   YOB: 1950  Diagnosis: Shortness of breath [R06.02]  Transaminitis [R74.01]  General weakness [R53.1]  Elevated troponin [R79.89]  History of aortic valve replacement [Z95.2]  History of prostate cancer [Z85.46]  Supratherapeutic INR [R79.1]  Acute kidney injury superimposed on CKD (HCC) [N17.9, N18.9]  Dyspnea, unspecified type [R06.00]                   Date / Time: 7/31/2024  9:25 AM    Patient Admission Status: Inpatient   Readmission Risk (Low < 19, Mod (19-27), High > 27): Readmission Risk Score: 21.6    Current PCP: Fareed Marino MD  PCP verified by CM? Yes    Chart Reviewed: Yes      History Provided by: Patient  Patient Orientation: Alert and Oriented    Patient Cognition: Alert    Hospitalization in the last 30 days (Readmission):  No    If yes, Readmission Assessment in CM Navigator will be completed.    Advance Directives:      Code Status: Full Code   Patient's Primary Decision Maker is: Legal Next of Kin    Primary Decision Maker: Joe Kevin - [phone]    Secondary Decision Maker: thierrylara Ning Candis - [phone]    Discharge Planning:    Patient lives with: Children (Lives with his son Didier) Type of Home: Apartment  Primary Care Giver: Self  Patient Support Systems include: Children   Current Financial resources: Medicare  Current community resources: None  Current services prior to admission: None            Current DME:              Type of Home Care services:  None    ADLS  Prior functional level: Independent in ADLs/IADLs  Current functional level: Independent in ADLs/IADLs    PT AM-PAC: 16 /24  OT AM-PAC: 17 /24    Family can provide assistance at DC: Yes  Would you

## 2024-08-05 NOTE — PROGRESS NOTES
NAME:  Morro Kevin  YOB: 1950  MEDICAL RECORD NUMBER:  [id number]    Shift Summary: Uneventful night.  No BM. VSS.    Family updated: No    Rhythm:  SB/SR    Most recent vitals:   Visit Vitals  BP (!) 98/51   Pulse 54   Temp 98.2 °F (36.8 °C) (Oral)   Resp 16   Ht 1.778 m (5' 10\")   Wt 81.6 kg (179 lb 14.3 oz)   SpO2 93%   BMI 25.81 kg/m²           No data found.    No data found.      Respiratory support needed (if any):  - RA    Admission weight Weight - Scale: 77.1 kg (170 lb) (07/31/24 0930)    Today's weight    Wt Readings from Last 1 Encounters:   08/04/24 81.6 kg (179 lb 14.3 oz)        Mayer need assessed each shift: N/A - no mayer present  UOP >30ml/hr: YES  Last documented BM (in last 48 hrs):  Patient Vitals for the past 48 hrs:   Last BM (including prior to admit) Stool Occurrence   08/03/24 0725 08/02/24 --   08/03/24 1230 08/02/24 --   08/03/24 1448 08/03/24 1   08/03/24 1940 08/03/24 --   08/04/24 0626 08/04/24 --   08/04/24 0802 -- 1   08/04/24 0809 08/04/24 --   08/04/24 1945 08/04/24 --                Restraints (in use currently or dc'd in last 12 hrs): No    Order current and documentation up to date? No    Lines/Drains reviewed @ bedside.  Peripheral IV 07/31/24 Right Forearm (Active)   Number of days: 4       Peripheral IV 07/31/24 Left Forearm (Active)   Number of days: 4       Hemodialysis Central Access Right Neck (Active)   Number of days: 2         Drip rates at handoff:    sodium chloride 75 mL/hr at 08/05/24 0622    sodium chloride      dextrose         Lab Data:   CBC:   Recent Labs     08/04/24  0415 08/05/24  0445   WBC 9.8 10.2   HGB 9.2* 8.8*   HCT 26.2* 25.8*   MCV 89.7 91.1   * 164     BMP:    Recent Labs     08/04/24  0415 08/05/24 0445   * 136   K 3.8 4.4   CO2 26 20*   BUN 49* 66*   CREATININE 4.9* 5.6*     LIVR:   Recent Labs     08/04/24 0415   *   *     PT/INR:   Recent Labs     08/04/24 0415 08/05/24 0445   INR 2.48* 3.57*

## 2024-08-05 NOTE — PROGRESS NOTES
Clinical Pharmacy Note  Warfarin Consult    Morro Kevin is a 73 y.o. male receiving warfarin managed by pharmacy.      Warfarin Indication: AVR  Target INR range: 2-3   Dose prior to admission: 11.25 mg on Wednesday, 7.5 mg remaining days of the week. Managed at  [facility].     Current warfarin drug-drug interactions: hydrocortisone     Recent Labs     08/03/24  0450 08/04/24  0415 08/05/24  0445   HGB 9.2* 9.2* 8.8*   HCT 26.5* 26.2* 25.8*   INR 2.27* 2.48* 3.57*     Recent Labs     08/04/24  0415   *   *   BILITOT 0.3   ALKPHOS 56       Assessment/Plan:    INR is supratherapeutic. Hold warfarin tonight. Daily PT/INR until stable within therapeutic range.     Thank you for the consult.  Will continue to follow.     Summer Schwab, RPH, PharmD, BCCCP 8/5/2024 11:04 AM

## 2024-08-05 NOTE — CONSULTS
Morro VILLALOBOS [facility]  8/5/2024  [id number]    Chief Complaint: Rhabdomyolysis, hemodialysis    Subjective   HPI: 73-year-old gentleman with a past medical history significant for prostate cancer, on chemotherapy treatment for it.  Patient has past medical history positive for a mechanical heart valve for which he is on Coumadin , he also has history of diabetes, hypertension, thyroiditis, and he came to the hospital with  complaints of weakness of his legs going on for the several days. Patient feels that his knees have been feeling more weak, he has been having progressively worsening shortness of breath. He feels that this could be an adverse effect of the chemotherapy agent he was on for prostate cancer, that was recently changed.  Workup revealed rhabdomyolysis with CK level of 26,000.  He was also found to have creatinine of 4.0.  Consults per nephrology, pulmonology, cardiology, and gastroenterology.  Patient started on hemodialysis, and had vascath placed. Patient started in therapies, and requires min assist for transfers and short distances with a walker, and fatigues quickly. Patient lives with son in a 1 level condo.  Patient uses a four-wheel walker at home.  Patient has regular Medicare insurance. Patient not in his room and not today, off to hemodialysis at this time.         Past Medical History:   Diagnosis Date    Aortic stenosis 9/2013    moderate    Arthritis     Asthma     BRONCHIAL ASTHMA  AS A CHILD    Deviated septum     Diabetes mellitus type II 2000    Dr. Goodson     Hashimoto thyroiditis dx 1984    medical treatment    Homocysteinemia     Hyperlipidemia     Hypertension     Hypertriglyceridemia     Hypogonadism male     topical testosterone     Hypothyroid 12/9/2011    Kidney stone 1990s    Passed on own and also surgical excision     Mechanical heart valve present 2016    Murmur since 2005    Nasal polyps     Osteoarthritis     Right thumb     Prolonged emergence from general anesthesia

## 2024-08-05 NOTE — PROGRESS NOTES
Comprehensive Nutrition Assessment    Type and Reason for Visit:  Initial, Positive Nutrition Screen (decreased po intake, weight loss)    Nutrition Recommendations/Plan:   Carb control, low phosphorus per MD  Monitor need for education pending if dialysis will be needed or not  Recommend nutrition supplements if po intake drops below 50%     Malnutrition Assessment:  Malnutrition Status:  No malnutrition (08/05/24 1501)      Nutrition Assessment:    Patient admitted with SOB.  Currently on a carb control, low phosphorus (added this am, Phos 5.8) diet.  Po intake greater than 75% meals.  Nutrition risk triggered due to weight loss and decreased appetite prior to admission.  Weight loss very hard to assess accurately as weight has fluctuated between 170's to 200's over the past year.  Getting dialysis today, new to patient.  ARU consult ordered today per rounds.  Patient reported appetite improved.  RD discussed phosphorus restriction being added to diet; explaining reason why patient was unable to get cheese on his burger like before.  Patient had no questions at this time.    Nutrition Related Findings:    BG greater than 200 mg/dl; BUN 66, Creat 5.6, last BM on 8/4 Wound Type:  (redness and abrasions)       Current Nutrition Intake & Therapies:    Average Meal Intake: %  Average Supplements Intake: Unable to assess  ADULT DIET; Regular; 4 carb choices (60 gm/meal); Low Phosphorus (Less than 1000 mg)    Anthropometric Measures:  Height: 177.8 cm (5' 10\")  Ideal Body Weight (IBW): 166 lbs (75 kg)       Current Body Weight: 81.6 kg (179 lb 14.3 oz),   IBW. Weight Source: Bed Scale  Current BMI (kg/m2): 25.8                          BMI Categories: Overweight (BMI 25.0-29.9)    Estimated Daily Nutrient Needs:  Energy Requirements Based On: Kcal/kg  Weight Used for Energy Requirements: Current  Energy (kcal/day): 0452-8104 (22-25 kcal/81.6 kg)  Weight Used for Protein Requirements: Current  Protein (g/day): 82-98

## 2024-08-05 NOTE — PROGRESS NOTES
Nephrology Progress Note   [email or web address]      Reason for consultation: SUKUMAR on CKD 3a -- baseline Cr ~ 1.3-1.5 mg/dL. Follows with Dr. Salinas in office (last seen 2024).     Subjective:    The patient has been seen and examined. Labs and chart reviewed. Cr continues to trend up between HD treatments. Good UOP -- 1.3 L yesterday. CK continuing to trend down. Seen on HD -- net 1 L UF and 3 K bath.     There were not complications overnight.    Patient review of systems: Denies SOB      Allergies:  Allergies   Allergen Reactions    Ciprofloxacin Hives        Scheduled Meds:   psyllium husk-aspartame  1 packet Oral Daily    docusate sodium  100 mg Oral Daily    midodrine  10 mg Oral TID WC    hydrocortisone sodium succinate PF  25 mg IntraVENous Q8H    sodium chloride flush  5-40 mL IntraVENous 2 times per day    aspirin  81 mg Oral Nightly    levothyroxine  137 mcg Oral Daily    mirtazapine  30 mg Oral Nightly    tamsulosin  0.4 mg Oral Nightly    insulin lispro  0-8 Units SubCUTAneous TID WC    insulin lispro  0-4 Units SubCUTAneous Nightly    insulin glargine  10 Units SubCUTAneous Daily    warfarin placeholder: dosing by pharmacy   Other RX Placeholder        sodium chloride 75 mL/hr at 24 0622    sodium chloride      dextrose         PRN Meds:hydrocortisone, heparin (porcine), sodium chloride flush, sodium chloride, ondansetron **OR** ondansetron, polyethylene glycol, glucose, dextrose bolus **OR** dextrose bolus, glucagon (rDNA), dextrose, perflutren lipid microspheres    Physical Exam:    TEMPERATURE:  Current - Temp: 97.9 °F (36.6 °C); Max - Temp  Av.4 °F (36.9 °C)  Min: 97.9 °F (36.6 °C)  Max: 99.6 °F (37.6 °C)  RESPIRATIONS RANGE: Resp  Av.6  Min: 15  Max: 21  PULSE RANGE: Pulse  Av.8  Min: 49  Max: 80  BLOOD PRESSURE RANGE:  Systolic (24hrs), Av , Min:98 , Max:141   ; Diastolic (24hrs), Av, Min:51, Max:83    24HR INTAKE/OUTPUT:    Intake/Output Summary (Last 24 hours) at

## 2024-08-05 NOTE — PROGRESS NOTES
Nephrology Progress Note   [email or web address]      Reason for consultation: SUKUMAR on CKD 3a -- baseline Cr ~ 1.3-1.5 mg/dL. Follows with Dr. Salinas in office (last seen 2024).     Subjective:    The patient has been seen and examined on HD ,tolerating well.   Labs and chart reviewed.  There were not complications overnight.  Patient review of systems: Denies SOB/TOLENTINO .     No family present .       Allergies:  Allergies   Allergen Reactions    Ciprofloxacin Hives        Scheduled Meds:   psyllium husk-aspartame  1 packet Oral Daily    docusate sodium  100 mg Oral Daily    midodrine  10 mg Oral TID WC    hydrocortisone sodium succinate PF  25 mg IntraVENous Q8H    sodium chloride flush  5-40 mL IntraVENous 2 times per day    aspirin  81 mg Oral Nightly    levothyroxine  137 mcg Oral Daily    mirtazapine  30 mg Oral Nightly    tamsulosin  0.4 mg Oral Nightly    insulin lispro  0-8 Units SubCUTAneous TID WC    insulin lispro  0-4 Units SubCUTAneous Nightly    insulin glargine  10 Units SubCUTAneous Daily    warfarin placeholder: dosing by pharmacy   Other RX Placeholder        sodium chloride 75 mL/hr at 24 1216    sodium chloride      dextrose         PRN Meds:hydrocortisone, heparin (porcine), sodium chloride flush, sodium chloride, ondansetron **OR** ondansetron, polyethylene glycol, glucose, dextrose bolus **OR** dextrose bolus, glucagon (rDNA), dextrose, perflutren lipid microspheres    Physical Exam:    TEMPERATURE:  Current - Temp: 98 °F (36.7 °C); Max - Temp  Av.3 °F (36.8 °C)  Min: 97.9 °F (36.6 °C)  Max: 99.6 °F (37.6 °C)  RESPIRATIONS RANGE: Resp  Av.3  Min: 15  Max: 21  PULSE RANGE: Pulse  Av.6  Min: 49  Max: 80  BLOOD PRESSURE RANGE:  Systolic (24hrs), Av , Min:98 , Max:160   ; Diastolic (24hrs), Av, Min:51, Max:83    24HR INTAKE/OUTPUT:    Intake/Output Summary (Last 24 hours) at 2024 1300  Last data filed at 2024 1216  Gross per 24 hour   Intake 3655.98 ml   Output

## 2024-08-05 NOTE — PLAN OF CARE
Progressing  Flowsheets (Taken 8/4/2024 1945)  Hemodynamic stability and optimal renal function maintained:   Monitor labs and assess for signs and symptoms of volume excess or deficit   Monitor intake, output and patient weight   Monitor response to interventions for patient's volume status, including labs, urine output, blood pressure (other measures as available)     Problem: Metabolic/Fluid and Electrolytes - Adult  Goal: Hemodynamic stability and optimal renal function maintained  Outcome: Progressing  Flowsheets (Taken 8/4/2024 1945)  Hemodynamic stability and optimal renal function maintained:   Monitor labs and assess for signs and symptoms of volume excess or deficit   Monitor intake, output and patient weight   Monitor response to interventions for patient's volume status, including labs, urine output, blood pressure (other measures as available)     Problem: Metabolic/Fluid and Electrolytes - Adult  Goal: Glucose maintained within prescribed range  Outcome: Progressing  Flowsheets (Taken 8/4/2024 1945)  Glucose maintained within prescribed range:   Monitor blood glucose as ordered   Assess for signs and symptoms of hyperglycemia and hypoglycemia   Administer ordered medications to maintain glucose within target range     Problem: Hematologic - Adult  Goal: Maintains hematologic stability  Outcome: Progressing  Flowsheets (Taken 8/4/2024 1945)  Maintains hematologic stability:   Assess for signs and symptoms of bleeding or hemorrhage   Monitor labs for bleeding or clotting disorders   Administer blood products/factors as ordered

## 2024-08-06 LAB
ALBUMIN SERPL-MCNC: 2.6 G/DL (ref 3.4–5)
ALP SERPL-CCNC: 56 U/L (ref 40–129)
ALT SERPL-CCNC: 379 U/L (ref 10–40)
ANION GAP SERPL CALCULATED.3IONS-SCNC: 10 MMOL/L (ref 3–16)
AST SERPL-CCNC: 204 U/L (ref 15–37)
BILIRUB DIRECT SERPL-MCNC: <0.2 MG/DL (ref 0–0.3)
BILIRUB INDIRECT SERPL-MCNC: ABNORMAL MG/DL (ref 0–1)
BILIRUB SERPL-MCNC: 0.3 MG/DL (ref 0–1)
BUN SERPL-MCNC: 52 MG/DL (ref 7–20)
CALCIUM SERPL-MCNC: 8.4 MG/DL (ref 8.3–10.6)
CHLORIDE SERPL-SCNC: 102 MMOL/L (ref 99–110)
CK SERPL-CCNC: 4291 U/L (ref 39–308)
CO2 SERPL-SCNC: 25 MMOL/L (ref 21–32)
CREAT SERPL-MCNC: 4.9 MG/DL (ref 0.8–1.3)
DEPRECATED RDW RBC AUTO: 15.2 % (ref 12.4–15.4)
FERRITIN SERPL IA-MCNC: 201.5 NG/ML (ref 30–400)
FOLATE SERPL-MCNC: >20 NG/ML (ref 4.78–24.2)
GFR SERPLBLD CREATININE-BSD FMLA CKD-EPI: 12 ML/MIN/{1.73_M2}
GLUCOSE BLD-MCNC: 170 MG/DL (ref 70–99)
GLUCOSE BLD-MCNC: 199 MG/DL (ref 70–99)
GLUCOSE BLD-MCNC: 214 MG/DL (ref 70–99)
GLUCOSE BLD-MCNC: 261 MG/DL (ref 70–99)
GLUCOSE SERPL-MCNC: 188 MG/DL (ref 70–99)
HCT VFR BLD AUTO: 23.8 % (ref 40.5–52.5)
HGB BLD-MCNC: 8.4 G/DL (ref 13.5–17.5)
INR PPP: 2.53 (ref 0.85–1.15)
IRON SATN MFR SERPL: 32 % (ref 20–50)
IRON SERPL-MCNC: 63 UG/DL (ref 59–158)
MAGNESIUM SERPL-MCNC: 2.2 MG/DL (ref 1.8–2.4)
MCH RBC QN AUTO: 31.8 PG (ref 26–34)
MCHC RBC AUTO-ENTMCNC: 35.3 G/DL (ref 31–36)
MCV RBC AUTO: 90.3 FL (ref 80–100)
NT-PROBNP SERPL-MCNC: 3851 PG/ML (ref 0–124)
PERFORMED ON: ABNORMAL
PHOSPHATE SERPL-MCNC: 4.4 MG/DL (ref 2.5–4.9)
PLATELET # BLD AUTO: 158 K/UL (ref 135–450)
PMV BLD AUTO: 7.9 FL (ref 5–10.5)
POTASSIUM SERPL-SCNC: 3.9 MMOL/L (ref 3.5–5.1)
PROT SERPL-MCNC: 4.4 G/DL (ref 6.4–8.2)
PROTHROMBIN TIME: 27.2 SEC (ref 11.9–14.9)
RBC # BLD AUTO: 2.63 M/UL (ref 4.2–5.9)
SODIUM SERPL-SCNC: 137 MMOL/L (ref 136–145)
TIBC SERPL-MCNC: 200 UG/DL (ref 260–445)
TRANSFERRIN SERPL-MCNC: 165 MG/DL (ref 200–360)
URATE SERPL-MCNC: 2.8 MG/DL (ref 3.5–7.2)
VIT B12 SERPL-MCNC: 1632 PG/ML (ref 211–911)
WBC # BLD AUTO: 7 K/UL (ref 4–11)

## 2024-08-06 PROCEDURE — 97110 THERAPEUTIC EXERCISES: CPT

## 2024-08-06 PROCEDURE — 2580000003 HC RX 258: Performed by: INTERNAL MEDICINE

## 2024-08-06 PROCEDURE — 6370000000 HC RX 637 (ALT 250 FOR IP): Performed by: HOSPITALIST

## 2024-08-06 PROCEDURE — 2060000000 HC ICU INTERMEDIATE R&B

## 2024-08-06 PROCEDURE — 85610 PROTHROMBIN TIME: CPT

## 2024-08-06 PROCEDURE — 97530 THERAPEUTIC ACTIVITIES: CPT

## 2024-08-06 PROCEDURE — 84466 ASSAY OF TRANSFERRIN: CPT

## 2024-08-06 PROCEDURE — 82746 ASSAY OF FOLIC ACID SERUM: CPT

## 2024-08-06 PROCEDURE — 85027 COMPLETE CBC AUTOMATED: CPT

## 2024-08-06 PROCEDURE — 83540 ASSAY OF IRON: CPT

## 2024-08-06 PROCEDURE — 80076 HEPATIC FUNCTION PANEL: CPT

## 2024-08-06 PROCEDURE — 82607 VITAMIN B-12: CPT

## 2024-08-06 PROCEDURE — 80069 RENAL FUNCTION PANEL: CPT

## 2024-08-06 PROCEDURE — 82550 ASSAY OF CK (CPK): CPT

## 2024-08-06 PROCEDURE — 6360000002 HC RX W HCPCS: Performed by: INTERNAL MEDICINE

## 2024-08-06 PROCEDURE — 83735 ASSAY OF MAGNESIUM: CPT

## 2024-08-06 PROCEDURE — 6370000000 HC RX 637 (ALT 250 FOR IP)

## 2024-08-06 PROCEDURE — 82728 ASSAY OF FERRITIN: CPT

## 2024-08-06 PROCEDURE — 97116 GAIT TRAINING THERAPY: CPT

## 2024-08-06 PROCEDURE — 6370000000 HC RX 637 (ALT 250 FOR IP): Performed by: INTERNAL MEDICINE

## 2024-08-06 PROCEDURE — 84550 ASSAY OF BLOOD/URIC ACID: CPT

## 2024-08-06 PROCEDURE — 83880 ASSAY OF NATRIURETIC PEPTIDE: CPT

## 2024-08-06 PROCEDURE — 2580000003 HC RX 258: Performed by: HOSPITALIST

## 2024-08-06 RX ORDER — WARFARIN SODIUM 5 MG/1
5 TABLET ORAL
Status: COMPLETED | OUTPATIENT
Start: 2024-08-06 | End: 2024-08-06

## 2024-08-06 RX ORDER — FUROSEMIDE 10 MG/ML
20 INJECTION INTRAMUSCULAR; INTRAVENOUS ONCE
Status: COMPLETED | OUTPATIENT
Start: 2024-08-06 | End: 2024-08-06

## 2024-08-06 RX ORDER — MIDODRINE HYDROCHLORIDE 5 MG/1
7.5 TABLET ORAL
Status: DISCONTINUED | OUTPATIENT
Start: 2024-08-06 | End: 2024-08-09 | Stop reason: HOSPADM

## 2024-08-06 RX ADMIN — ASPIRIN 81 MG: 81 TABLET, COATED ORAL at 20:07

## 2024-08-06 RX ADMIN — INSULIN GLARGINE 10 UNITS: 100 INJECTION, SOLUTION SUBCUTANEOUS at 07:28

## 2024-08-06 RX ADMIN — LEVOTHYROXINE SODIUM 137 MCG: 0.11 TABLET ORAL at 07:28

## 2024-08-06 RX ADMIN — SODIUM CHLORIDE, PRESERVATIVE FREE 10 ML: 5 INJECTION INTRAVENOUS at 07:29

## 2024-08-06 RX ADMIN — FUROSEMIDE 20 MG: 10 INJECTION, SOLUTION INTRAMUSCULAR; INTRAVENOUS at 14:10

## 2024-08-06 RX ADMIN — HYDROCORTISONE SODIUM SUCCINATE 25 MG: 100 INJECTION, POWDER, FOR SOLUTION INTRAMUSCULAR; INTRAVENOUS at 04:21

## 2024-08-06 RX ADMIN — INSULIN LISPRO 2 UNITS: 100 INJECTION, SOLUTION INTRAVENOUS; SUBCUTANEOUS at 08:13

## 2024-08-06 RX ADMIN — SODIUM CHLORIDE, PRESERVATIVE FREE 10 ML: 5 INJECTION INTRAVENOUS at 20:20

## 2024-08-06 RX ADMIN — MIDODRINE HYDROCHLORIDE 10 MG: 10 TABLET ORAL at 07:28

## 2024-08-06 RX ADMIN — WARFARIN SODIUM 5 MG: 5 TABLET ORAL at 17:46

## 2024-08-06 RX ADMIN — TAMSULOSIN HYDROCHLORIDE 0.4 MG: 0.4 CAPSULE ORAL at 20:07

## 2024-08-06 RX ADMIN — HYDROCORTISONE SODIUM SUCCINATE 25 MG: 100 INJECTION, POWDER, FOR SOLUTION INTRAMUSCULAR; INTRAVENOUS at 20:07

## 2024-08-06 RX ADMIN — SODIUM CHLORIDE: 9 INJECTION, SOLUTION INTRAVENOUS at 11:22

## 2024-08-06 RX ADMIN — MIRTAZAPINE 30 MG: 30 TABLET, FILM COATED ORAL at 20:07

## 2024-08-06 RX ADMIN — MIDODRINE HYDROCHLORIDE 7.5 MG: 5 TABLET ORAL at 16:39

## 2024-08-06 RX ADMIN — HYDROCORTISONE SODIUM SUCCINATE 25 MG: 100 INJECTION, POWDER, FOR SOLUTION INTRAMUSCULAR; INTRAVENOUS at 11:15

## 2024-08-06 RX ADMIN — DOCUSATE SODIUM 100 MG: 100 CAPSULE, LIQUID FILLED ORAL at 07:28

## 2024-08-06 RX ADMIN — INSULIN LISPRO 4 UNITS: 100 INJECTION, SOLUTION INTRAVENOUS; SUBCUTANEOUS at 16:38

## 2024-08-06 RX ADMIN — MIDODRINE HYDROCHLORIDE 7.5 MG: 5 TABLET ORAL at 11:15

## 2024-08-06 NOTE — CARE COORDINATION
DISCHARGE PLANNING:    Per Dr. Navarro, no plans for outpatient HD setup at this time. Receiving HD while inpatient MWF. IV Solucortef. Patient is being followed by [facility]. No precert needed for admission.     Donna CAT RN  Case Management  [phone]    Electronically signed by Donna Lopez RN on 8/6/2024 at 5:56 PM

## 2024-08-06 NOTE — PROGRESS NOTES
Department of Physical Medicine & Rehabilitation  Dr. Buitrago Progress Note  8/6/2024  12:14 PM    Patient Name:   Morro Kevin  YOB: 1950    Diagnosis: Shortness of breath        Subjective: 73-year-old gentleman with a past medical history significant for prostate cancer, on chemotherapy treatment for it.  Patient has past medical history positive for a mechanical heart valve for which he is on Coumadin , he also has history of diabetes, hypertension, thyroiditis, and he came to the hospital with  complaints of weakness of his legs going on for the several days. Patient feels that his knees have been feeling more weak, he has been having progressively worsening shortness of breath. He feels that this could be an adverse effect of the chemotherapy agent he was on for prostate cancer, that was recently changed.  Workup revealed rhabdomyolysis with CK level of 26,000.  He was also found to have creatinine of 4.0.  Consults per nephrology, pulmonology, cardiology, and gastroenterology.  Patient started on hemodialysis, and had vascath placed. Patient now feeling better. Patient started in therapies, and requires min assist for transfers and short distances with a walker, and fatigues quickly. Patient lives with son in a 1 level condo.  Patient uses a four-wheel walker at home.  Patient has regular Medicare insurance.      /63   Pulse 62   Temp 99.4 °F (37.4 °C) (Oral)   Resp 18   Ht 1.778 m (5' 10\")   Wt 83 kg (182 lb 15.7 oz)   SpO2 100%   BMI 26.26 kg/m²     Last 24 hour lab  Recent Results (from the past 24 hour(s))   POCT Glucose    Collection Time: 08/05/24 12:44 PM   Result Value Ref Range    POC Glucose 140 (H) 70 - 99 mg/dl    Performed on ACCU-CHEK    POCT Glucose    Collection Time: 08/05/24  3:48 PM   Result Value Ref Range    POC Glucose 323 (H) 70 - 99 mg/dl    Performed on ACCU-CHEK    Basic Metabolic Panel    Collection Time: 08/05/24  3:49 PM   Result Value Ref Range       Result Value Ref Range    Iron 63 59 - 158 ug/dL    TIBC 200 (L) 260 - 445 ug/dL    Iron % Saturation 32 20 - 50 %   Vitamin B12 & Folate    Collection Time: 08/06/24  4:10 AM   Result Value Ref Range    Vitamin B-12 1632 (H) 211 - 911 pg/mL    Folate >20.00 4.78 - 24.20 ng/mL   Brain Natriuretic Peptide    Collection Time: 08/06/24  4:10 AM   Result Value Ref Range    Pro-BNP 3,851 (H) 0 - 124 pg/mL   Hepatic Function Panel    Collection Time: 08/06/24  4:10 AM   Result Value Ref Range    Total Protein 4.4 (L) 6.4 - 8.2 g/dL    Alkaline Phosphatase 56 40 - 129 U/L     (H) 10 - 40 U/L     (H) 15 - 37 U/L    Total Bilirubin 0.3 0.0 - 1.0 mg/dL    Bilirubin, Direct <0.2 0.0 - 0.3 mg/dL    Bilirubin, Indirect see below 0.0 - 1.0 mg/dL   CK    Collection Time: 08/06/24  4:10 AM   Result Value Ref Range    Total CK 4,291 (H) 39 - 308 U/L   POCT Glucose    Collection Time: 08/06/24  7:28 AM   Result Value Ref Range    POC Glucose 214 (H) 70 - 99 mg/dl    Performed on ACCU-CHEK    POCT Glucose    Collection Time: 08/06/24 11:12 AM   Result Value Ref Range    POC Glucose 170 (H) 70 - 99 mg/dl    Performed on ACCU-CHEK          Plan:Will follow. He may need tunnel cath placed.       Dr. Buitrago

## 2024-08-06 NOTE — PLAN OF CARE
Problem: Safety - Adult  Goal: Free from fall injury  8/6/2024 0911 by Livier Antonio RN  Outcome: Progressing  8/5/2024 2057 by Lindsey Chavarria RN  Outcome: Progressing     Problem: Discharge Planning  Goal: Discharge to home or other facility with appropriate resources  8/6/2024 0911 by Livier Antonio RN  Outcome: Progressing  Flowsheets (Taken 8/6/2024 0800)  Discharge to home or other facility with appropriate resources:   Identify barriers to discharge with patient and caregiver   Arrange for needed discharge resources and transportation as appropriate  8/5/2024 2057 by Lindsey Chavarria RN  Outcome: Progressing  Flowsheets (Taken 8/5/2024 2000)  Discharge to home or other facility with appropriate resources: Identify barriers to discharge with patient and caregiver     Problem: Pain  Goal: Verbalizes/displays adequate comfort level or baseline comfort level  8/6/2024 0911 by Livier Antonio RN  Outcome: Progressing  8/5/2024 2057 by Lindsey Chavarria RN  Outcome: Progressing  Flowsheets (Taken 8/5/2024 2000)  Verbalizes/displays adequate comfort level or baseline comfort level:   Encourage patient to monitor pain and request assistance   Consider cultural and social influences on pain and pain management   Implement non-pharmacological measures as appropriate and evaluate response     Problem: Skin/Tissue Integrity  Goal: Absence of new skin breakdown  Description: 1.  Monitor for areas of redness and/or skin breakdown  2.  Assess vascular access sites hourly  3.  Every 4-6 hours minimum:  Change oxygen saturation probe site  4.  Every 4-6 hours:  If on nasal continuous positive airway pressure, respiratory therapy assess nares and determine need for appliance change or resting period.  8/6/2024 0911 by Livier Antonio RN  Outcome: Progressing  8/5/2024 2057 by Lindsey Chavarria RN  Outcome: Progressing     Problem: ABCDS Injury Assessment  Goal: Absence of physical injury  8/6/2024 0911 by Livier Antonio RN  Outcome:  Progressing  8/5/2024 2057 by Lindsey Chavarria RN  Outcome: Progressing     Problem: Metabolic/Fluid and Electrolytes - Adult  Goal: Electrolytes maintained within normal limits  8/6/2024 0911 by Livier Antonio RN  Outcome: Progressing  Flowsheets (Taken 8/6/2024 0800)  Electrolytes maintained within normal limits:   Monitor labs and assess patient for signs and symptoms of electrolyte imbalances   Administer electrolyte replacement as ordered  8/5/2024 2057 by Lindsey Chavarria RN  Outcome: Progressing  Flowsheets (Taken 8/5/2024 2000)  Electrolytes maintained within normal limits:   Monitor labs and assess patient for signs and symptoms of electrolyte imbalances   Administer electrolyte replacement as ordered   Monitor response to electrolyte replacements, including repeat lab results as appropriate   Fluid restriction as ordered   Instruct patient on fluid and nutrition restrictions as appropriate  Goal: Hemodynamic stability and optimal renal function maintained  8/6/2024 0911 by Livier Antonio RN  Outcome: Progressing  Flowsheets (Taken 8/6/2024 0800)  Hemodynamic stability and optimal renal function maintained:   Monitor labs and assess for signs and symptoms of volume excess or deficit   Monitor urine specific gravity, serum osmolarity and serum sodium as indicated or ordered   Monitor intake, output and patient weight  8/5/2024 2057 by Lindsey Chavarria RN  Outcome: Progressing  Flowsheets (Taken 8/5/2024 2000)  Hemodynamic stability and optimal renal function maintained:   Monitor labs and assess for signs and symptoms of volume excess or deficit   Monitor intake, output and patient weight   Encourage oral intake as appropriate   Instruct patient on fluid and nutrition restrictions as appropriate   Monitor response to interventions for patient's volume status, including labs, urine output, blood pressure (other measures as available)  Goal: Glucose maintained within prescribed range  8/6/2024 0911 by Livier Antonio

## 2024-08-06 NOTE — PROGRESS NOTES
Nephrology Progress Note   [employer]      Reason for consultation: SUKUMAR on CKD 3a -- baseline Cr ~ 1.3-1.5 mg/dL. Follows with Dr. Salinas in office (last seen 2024).     Subjective:    The patient has been seen and examined. Labs and chart reviewed. Received HD yesterday with net 1 L UF. 725 mL UOP and 3 occurrences documented yesterday.     There were not complications overnight.    Patient review of systems: Denies SOB      Allergies:  Allergies   Allergen Reactions    Ciprofloxacin Hives        Scheduled Meds:   psyllium husk-aspartame  1 packet Oral Daily    docusate sodium  100 mg Oral Daily    midodrine  10 mg Oral TID WC    hydrocortisone sodium succinate PF  25 mg IntraVENous Q8H    sodium chloride flush  5-40 mL IntraVENous 2 times per day    aspirin  81 mg Oral Nightly    levothyroxine  137 mcg Oral Daily    mirtazapine  30 mg Oral Nightly    tamsulosin  0.4 mg Oral Nightly    insulin lispro  0-8 Units SubCUTAneous TID WC    insulin lispro  0-4 Units SubCUTAneous Nightly    insulin glargine  10 Units SubCUTAneous Daily    warfarin placeholder: dosing by pharmacy   Other RX Placeholder        sodium chloride 50 mL/hr at 24 0616    sodium chloride      dextrose         PRN Meds:hydrocortisone, heparin (porcine), sodium chloride flush, sodium chloride, ondansetron **OR** ondansetron, polyethylene glycol, glucose, dextrose bolus **OR** dextrose bolus, glucagon (rDNA), dextrose, perflutren lipid microspheres    Physical Exam:    TEMPERATURE:  Current - Temp: 98.4 °F (36.9 °C); Max - Temp  Av.1 °F (36.7 °C)  Min: 97.9 °F (36.6 °C)  Max: 98.4 °F (36.9 °C)  RESPIRATIONS RANGE: Resp  Av.5  Min: 17  Max: 18  PULSE RANGE: Pulse  Av.8  Min: 51  Max: 65  BLOOD PRESSURE RANGE:  Systolic (24hrs), Av , Min:104 , Max:142   ; Diastolic (24hrs), Av, Min:56, Max:67    24HR INTAKE/OUTPUT:    Intake/Output Summary (Last 24 hours) at 2024 1017  Last data filed at 2024 0916  Gross

## 2024-08-06 NOTE — PROGRESS NOTES
NAME:  Morro Kevin  YOB: 1950  MEDICAL RECORD NUMBER:  [id number]    Shift Summary: Uneventful shift, VSS, no s/s of distress    Family updated: No    Rhythm: Normal Sinus Rhythm     Most recent vitals:   Visit Vitals  BP (!) 104/56   Pulse 60   Temp 98.1 °F (36.7 °C) (Axillary)   Resp 17   Ht 1.778 m (5' 10\")   Wt 83 kg (182 lb 15.7 oz)   SpO2 96%   BMI 26.26 kg/m²           No data found.    No data found.      Respiratory support needed (if any):  - RA    Admission weight Weight - Scale: 77.1 kg (170 lb) (07/31/24 0930)    Today's weight    Wt Readings from Last 1 Encounters:   08/06/24 83 kg (182 lb 15.7 oz)        Mayer need assessed each shift: N/A - no mayer present  UOP >30ml/hr: YES  Last documented BM (in last 48 hrs):  Patient Vitals for the past 48 hrs:   Last BM (including prior to admit) Stool Occurrence   08/04/24 0626 08/04/24 --   08/04/24 0802 -- 1   08/04/24 0809 08/04/24 --   08/04/24 1945 08/04/24 --   08/05/24 0747 08/04/24 --   08/05/24 1825 08/05/24 1   08/05/24 2000 08/05/24 --                Restraints (in use currently or dc'd in last 12 hrs): No    Order current and documentation up to date? No    Lines/Drains reviewed @ bedside.  Peripheral IV 07/31/24 Right Forearm (Active)   Number of days: 5       Peripheral IV 07/31/24 Left Forearm (Active)   Number of days: 5       Hemodialysis Central Access Right Neck (Active)   Number of days: 3         Drip rates at handoff:    sodium chloride 50 mL/hr at 08/06/24 0421    sodium chloride      dextrose         Lab Data:   CBC:   Recent Labs     08/05/24  0445 08/06/24  0410   WBC 10.2 7.0   HGB 8.8* 8.4*   HCT 25.8* 23.8*   MCV 91.1 90.3    158     BMP:    Recent Labs     08/05/24  1549 08/06/24  0410   * 137   K 4.0 3.9   CO2 24 25   BUN 43* 52*   CREATININE 4.2* 4.9*     LIVR:   Recent Labs     08/04/24  0415 08/06/24  0410   * 204*   * 379*     PT/INR:   Recent Labs     08/05/24  0441  08/06/24  0410   INR 3.57* 2.53*     APTT: No results for input(s): \"APTT\" in the last 72 hours.  ABG: No results for input(s): \"PHART\", \"VWV1EPM\", \"PO2ART\" in the last 72 hours.    Any consults during the shift? No    Any signed and held orders to be released?  No        4 Eyes Skin Assessment       The patient is being assessed for  Shift Handoff    I agree that at least one RN has performed a thorough Head to Toe Skin Assessment on the patient. ALL assessment sites listed below have been assessed.      Areas assessed by both nurses: Head, Face, Ears, Shoulders, Back, Chest, Arms, Elbows, Hands, Sacrum. Buttock, Coccyx, Ischium, Legs. Feet and Heels, and Under Medical Devices         Does the Patient have a Wound? No noted wound(s)    Wound Care Orders initiated by RN: No       Vignesh Prevention initiated by RN: No    Pressure Injury (Stage 3,4, Unstageable, DTI, NWPT, and Complex wounds) if present, place Wound referral order by RN under : No    New Ostomies, if present place, Ostomy referral order under : No     Nurse 1 eSignature: Electronically signed by Lindsey Chavarria RN on 8/6/24 at 5:44 AM EDT    **SHARE this note so that the co-signing nurse can place an eSignature**    Nurse 2 eSignature: Electronically signed by Livier Antonio RN on 8/6/24 at 7:12 AM EDT

## 2024-08-06 NOTE — PLAN OF CARE
monitor pain and request assistance     Problem: Skin/Tissue Integrity  Goal: Absence of new skin breakdown  Description: 1.  Monitor for areas of redness and/or skin breakdown  2.  Assess vascular access sites hourly  3.  Every 4-6 hours minimum:  Change oxygen saturation probe site  4.  Every 4-6 hours:  If on nasal continuous positive airway pressure, respiratory therapy assess nares and determine need for appliance change or resting period.  8/5/2024 2057 by Lindsey Chavarria RN  Outcome: Progressing  8/5/2024 0902 by Merle Emmanuel RN  Outcome: Progressing     Problem: ABCDS Injury Assessment  Goal: Absence of physical injury  8/5/2024 2057 by Lindsey Chavarria RN  Outcome: Progressing  8/5/2024 0902 by Merle Emmanuel RN  Outcome: Progressing  Flowsheets (Taken 8/3/2024 1940 by Aranza Rodriguez, RN)  Absence of Physical Injury: Implement safety measures based on patient assessment     Problem: Metabolic/Fluid and Electrolytes - Adult  Goal: Electrolytes maintained within normal limits  8/5/2024 2057 by Lindsey Chavarria RN  Outcome: Progressing  Flowsheets (Taken 8/5/2024 2000)  Electrolytes maintained within normal limits:   Monitor labs and assess patient for signs and symptoms of electrolyte imbalances   Administer electrolyte replacement as ordered   Monitor response to electrolyte replacements, including repeat lab results as appropriate   Fluid restriction as ordered   Instruct patient on fluid and nutrition restrictions as appropriate  8/5/2024 0902 by Merle Emmanuel RN  Outcome: Progressing  Flowsheets (Taken 8/4/2024 1945 by Becky Canlea, RN)  Electrolytes maintained within normal limits:   Monitor labs and assess patient for signs and symptoms of electrolyte imbalances   Administer electrolyte replacement as ordered   Monitor response to electrolyte replacements, including repeat lab results as appropriate  Goal: Hemodynamic stability and optimal renal function maintained  8/5/2024 2057 by Lindsey Chavarria  stability:   Assess for signs and symptoms of bleeding or hemorrhage   Monitor labs for bleeding or clotting disorders   Administer blood products/factors as ordered     Problem: Nutrition Deficit:  Goal: Optimize nutritional status  Outcome: Progressing

## 2024-08-06 NOTE — PROGRESS NOTES
Nephrology Progress Note   [employer]      Reason for consultation: SUKUMAR on CKD 3a -- baseline Cr ~ 1.3-1.5 mg/dL. Follows with Dr. Salinas in office (last seen 2024).     Subjective:    The patient has been seen and examined .   Labs and chart reviewed. ml/24 hrs .   There were not complications overnight.  Patient review of systems: Some  SOB/TOLENTINO .   LE edema     No family present .       Allergies:  Allergies   Allergen Reactions    Ciprofloxacin Hives        Scheduled Meds:   midodrine  7.5 mg Oral TID WC    psyllium husk-aspartame  1 packet Oral Daily    docusate sodium  100 mg Oral Daily    hydrocortisone sodium succinate PF  25 mg IntraVENous Q8H    sodium chloride flush  5-40 mL IntraVENous 2 times per day    aspirin  81 mg Oral Nightly    levothyroxine  137 mcg Oral Daily    mirtazapine  30 mg Oral Nightly    tamsulosin  0.4 mg Oral Nightly    insulin lispro  0-8 Units SubCUTAneous TID WC    insulin lispro  0-4 Units SubCUTAneous Nightly    insulin glargine  10 Units SubCUTAneous Daily    warfarin placeholder: dosing by pharmacy   Other RX Placeholder        sodium chloride 50 mL/hr at 24 0616    sodium chloride      dextrose         PRN Meds:hydrocortisone, heparin (porcine), sodium chloride flush, sodium chloride, ondansetron **OR** ondansetron, polyethylene glycol, glucose, dextrose bolus **OR** dextrose bolus, glucagon (rDNA), dextrose, perflutren lipid microspheres    Physical Exam:    TEMPERATURE:  Current - Temp: 98.4 °F (36.9 °C); Max - Temp  Av.1 °F (36.7 °C)  Min: 97.9 °F (36.6 °C)  Max: 98.4 °F (36.9 °C)  RESPIRATIONS RANGE: Resp  Av.5  Min: 17  Max: 18  PULSE RANGE: Pulse  Av.8  Min: 51  Max: 65  BLOOD PRESSURE RANGE:  Systolic (24hrs), Av , Min:104 , Max:142   ; Diastolic (24hrs), Av, Min:56, Max:67    24HR INTAKE/OUTPUT:    Intake/Output Summary (Last 24 hours) at 2024 1038  Last data filed at 2024 0916  Gross per 24 hour   Intake 2908 ml

## 2024-08-06 NOTE — PROGRESS NOTES
Clinical Pharmacy Note  Warfarin Consult    Morro Kevin is a 73 y.o. male receiving warfarin managed by pharmacy.      Warfarin Indication: AVR  Target INR range: 2-3   Dose prior to admission: 11.25 mg on Wednesday, 7.5 mg remaining days of the week. Managed at  [facility].     Current warfarin drug-drug interactions: hydrocortisone     Recent Labs     08/04/24  0415 08/05/24  0445 08/06/24  0410   HGB 9.2* 8.8* 8.4*   HCT 26.2* 25.8* 23.8*   INR 2.48* 3.57* 2.53*       Recent Labs     08/04/24  0415 08/06/24  0410   * 204*   * 379*   BILIDIR  --  <0.2   BILITOT 0.3 0.3   ALKPHOS 56 56         Assessment/Plan:    INR within therapeutic range today.     Will resume warfarin at a reduced dose of 5 mg tonight.     Daily PT/INR until stable within therapeutic range.     Thank you for the consult.  Will continue to follow.    Roxana Johnson, PharmD.  8/6/2024  12:15 PM

## 2024-08-06 NOTE — PROGRESS NOTES
V2.0  [facility] [place]      Name:  Morro Kevin /Age/Sex: 1950  (73 y.o. male)   MRN & CSN:  [id number] & [id number] Encounter Date/Time: 2024 2:17 PM EDT    Location:  [id number] PCP: Fareed Marino MD       Hospital Day: 7  Subjective:     Patient continues to feel better, rectal bleeding is now on a mild, no abdominal pain, fevers or chills    Review of Systems:    Review of Systems  10 point ROS negative except as stated above in \"subjective\" section    Objective:     Intake/Output Summary (Last 24 hours) at 2024 0952  Last data filed at 2024 0916  Gross per 24 hour   Intake 2908 ml   Output 1045 ml   Net 1863 ml        Vitals:   Vitals:    24 0800   BP: 126/63   Pulse: 64   Resp:    Temp: 98.4 °F (36.9 °C)   SpO2: 97%     Physical Exam:   General: Awake, alert and oriented, NAD  Cardiovascular: S1S2 present, regular rate and rhythm,   Respiratory: Clear to auscultation  Gastrointestinal: Soft, non tender, positive bowel sounds   Genitourinary: no suprapubic tenderness  Musculoskeletal: No edema  Neuro: Alert.      Medications:   Medications:    psyllium husk-aspartame  1 packet Oral Daily    docusate sodium  100 mg Oral Daily    midodrine  10 mg Oral TID WC    hydrocortisone sodium succinate PF  25 mg IntraVENous Q8H    sodium chloride flush  5-40 mL IntraVENous 2 times per day    aspirin  81 mg Oral Nightly    levothyroxine  137 mcg Oral Daily    mirtazapine  30 mg Oral Nightly    tamsulosin  0.4 mg Oral Nightly    insulin lispro  0-8 Units SubCUTAneous TID WC    insulin lispro  0-4 Units SubCUTAneous Nightly    insulin glargine  10 Units SubCUTAneous Daily    warfarin placeholder: dosing by pharmacy   Other RX Placeholder      Infusions:    sodium chloride 50 mL/hr at 24 0616    sodium chloride      dextrose       PRN Meds: hydrocortisone, 25 mg, BID PRN  heparin (porcine), 2,500 Units, PRN  sodium chloride flush, 5-40 mL, PRN  sodium chloride, ,  RUQ    No cholelithiasis.  No biliary dilation.     NM LUNG VENT/PERFUSION (VQ)  EST RADIOGRAPH: No focal consolidation on accompanying radiograph.     Intermediate probability for pulmonary embolism.     CT CHEST ABDOMEN PELVIS WO CONTRAST Additional Contrast? None  al wall on the left, possibly from prior medication injection     Chest No definite metastatic disease. No pneumonia or edema Abdomen and pelvis Small internal iliac node seen on recent PET-CT has not decreased in size in the interval.  No areas of increasing adenopathy identified.  Moderate stool load in colon.  No bowel obstruction. Resolution of retroperitoneal hematoma Tiny stone right kidney Sclerotic focus left iliac wing, either bone island, or due to the reported history of prostate carcinoma.     XR CHEST PORTABLE    No acute cardiopulmonary findings       Assessment and Plan:   Morro Kevin is a 73 y.o. male     Conditions under treatment:    -SUKUMAR on CKD stage III-baseline creatinine 1.3-1.5-likely prerenal with ATN in the setting of hypotension/volume depletion, rhabdomyolysis--HD initiated 8/2/24--continue management per nephrology  -Acute blood loss anemia due to lower GI bleed-intermittent bright red blood per rectum associated with [id number] by GI, patient had a colonoscopy as well as an EGD within the last 1 year, was noted to have internal hemorrhoids and telangiectasias.  That is likely the source of bleeding in the context of patient being anticoagulated and initially being supratherapeutic.  GI does not want additional interventions-continue to monitor H&H and transfuse as needed  -Prostate cancer-recently started on Zytiga--held given rhabdomyolysis  - Hypertension-stable-BP meds on hold given episodes of hypotension--on Solu-Cortef for stress dosing, midodrine  - Diabetes mellitus type II-Trulicity and Synjardy held on admission--continue basal bolus insulin hospitalized  -Mechanical valve on Coumadin-continue

## 2024-08-06 NOTE — PROGRESS NOTES
Physical Therapy  Facility/Department: [address] ICU  Physical Therapy Treatment Note    Name: Morro Kevin  : 1950  MRN: [id number]  Date of Service: 2024    Discharge Recommendations:  Continue to assess pending progress, IP Rehab   PT Equipment Recommendations  Other: Will monitor for potential equipt needs.      Morro Kevin scored a 17/24 on the AM-PAC short mobility form. Current research shows that an AM-PAC score of 17 or less is typically not associated with a discharge to the patient's home setting. Based on the patient's AM-PAC score and their current functional mobility deficits, it is recommended that the patient have 5-7 sessions per week of Physical Therapy at d/c to increase the patient's independence.  At this time, this patient demonstrates complex nursing, medical, and rehabilitative needs, and would benefit from intensive rehabilitation services upon discharge from the Inpatient setting.  This patient demonstrates the ability to participate in and benefit from an intensive therapy program with a coordinated interdisciplinary team approach to foster frequent, structured, and documented communication among disciplines, who will work together to establish, prioritize, and achieve treatment goals. Please see assessment section for further patient specific details.    If patient discharges prior to next session this note will serve as a discharge summary.  Please see below for the latest assessment towards goals.      Assessment   Body Structures, Functions, Activity Limitations Requiring Skilled Therapeutic Intervention: Decreased functional mobility ;Decreased strength;Decreased safe awareness;Decreased endurance;Decreased balance  Assessment: 72 y/o male admit 2024 with SUKUMAR, Hypotension, PAF, Weak and SOB. GI consult : GI Bleed : Recent Colonoscopy showing that he has Hemorrhoids as well as some small Telangiectasias in the distal Rectum near the Anus. Hgb stable now and Rectal  training, Transfer training, Gait training, Safety education & training, Patient/Caregiver education & training  Safety Devices  Type of Devices: Call light within reach, Chair alarm in place, Gait belt, Left in chair, Nurse notified     Restrictions  Restrictions/Precautions  Restrictions/Precautions: Fall Risk     Subjective   General  Chart Reviewed: Yes  Patient assessed for rehabilitation services?: Yes  Additional Pertinent Hx: 74 y/o male admit 7/31/2024 with SUKUMAR, Hypotension, PAF, Weak and SOB.  GI consult : GI Bleed :   Recent Colonoscopy showing that he has Hemorrhoids as well as some small Telangiectasias in the distal Rectum near the Anus.  Hgb stable now and Rectal Bleeding decreasing.     Nephrology consult : SUKUMAR, new HD.  PMH as noted including Aortic Valve Stenosis, AVR (Mechanical Valve, Anti coagulant, 9/2016), Prostate Ca (2022;  new reoccur, Chemo), R RTC Repair, Recent L Iliopsoas Hematoma (4/2024).  Family / Caregiver Present: No  Referring Practitioner: Dr. Fong  Follows Commands: Within Functional Limits  Subjective  Subjective: Pt agreeable to PT Rx.  Reports progress increase weak/debility over past 2 wk pta (attributing to Prostate Chemo Meds).  Feeling alittle better.         Social/Functional History  Social/Functional History  Lives With: Son  Type of Home: Condo  Home Layout: One level  Home Access: Ramped entrance, Elevator  Bathroom Shower/Tub: Walk-in shower  Bathroom Toilet: Standard  Bathroom Equipment: Shower chair, Hand-held shower  Bathroom Accessibility: Accessible  Home Equipment: Cane, Walker - 4-Wheeled (Transport chair.)  Has the patient had two or more falls in the past year or any fall with injury in the past year?: No  ADL Assistance: Independent  Homemaking Assistance:  (Son assists homemaking.)  Ambulation Assistance: Independent (Using Cane past wk pta; prior no device.)  Transfer Assistance: Independent  Active : Yes  Occupation:

## 2024-08-06 NOTE — PROGRESS NOTES
NAME:  Morro Kevin  YOB: 1950  MEDICAL RECORD NUMBER:  [id number]    Shift Summary: Fluids Dc'd. Received 1x lasix. Up w/ 1 assist.     Family updated: Yes:  by pt    Rhythm:  sinus phong    Most recent vitals:   Visit Vitals  BP (!) 111/57   Pulse 62   Temp 98.4 °F (36.9 °C) (Oral)   Resp 16   Ht 1.778 m (5' 10\")   Wt 83 kg (182 lb 15.7 oz)   SpO2 100%   BMI 26.26 kg/m²           No data found.    No data found.      Respiratory support needed (if any):  - RA    Admission weight Weight - Scale: 77.1 kg (170 lb) (07/31/24 0930)    Today's weight    Wt Readings from Last 1 Encounters:   08/06/24 83 kg (182 lb 15.7 oz)        Mayer need assessed each shift: N/A - no mayer present  UOP >30ml/hr: YES  Last documented BM (in last 48 hrs):  Patient Vitals for the past 48 hrs:   Last BM (including prior to admit) Stool Occurrence   08/04/24 1945 08/04/24 --   08/05/24 0747 08/04/24 --   08/05/24 1825 08/05/24 1 08/05/24 2000 08/05/24 --                Restraints (in use currently or dc'd in last 12 hrs): No    Order current and documentation up to date? No    Lines/Drains reviewed @ bedside.  Peripheral IV 07/31/24 Right Forearm (Active)   Number of days: 6       Peripheral IV 07/31/24 Left Forearm (Active)   Number of days: 6       Hemodialysis Central Access Right Neck (Active)   Number of days: 4         Drip rates at handoff:    sodium chloride      dextrose         Lab Data:   CBC:   Recent Labs     08/05/24  0445 08/06/24  0410   WBC 10.2 7.0   HGB 8.8* 8.4*   HCT 25.8* 23.8*   MCV 91.1 90.3    158     BMP:    Recent Labs     08/05/24  1549 08/06/24  0410   * 137   K 4.0 3.9   CO2 24 25   BUN 43* 52*   CREATININE 4.2* 4.9*     LIVR:   Recent Labs     08/04/24  0415 08/06/24  0410   * 204*   * 379*     PT/INR:   Recent Labs     08/05/24  0445 08/06/24 0410   INR 3.57* 2.53*     APTT: No results for input(s): \"APTT\" in the last 72 hours.  ABG: No results for input(s):  \"PHART\", \"BHV8PFH\", \"PO2ART\" in the last 72 hours.    Any consults during the shift? No    Any signed and held orders to be released?  No        4 Eyes Skin Assessment       The patient is being assessed for  Shift Handoff    I agree that at least one RN has performed a thorough Head to Toe Skin Assessment on the patient. ALL assessment sites listed below have been assessed.      Areas assessed by both nurses: Head, Face, Ears, Shoulders, Back, Chest, Arms, Elbows, Hands, Sacrum. Buttock, Coccyx, Ischium, and Legs. Feet and Heels        Does the Patient have a Wound? No noted wound(s)    Wound Care Orders initiated by RN: No       Vignesh Prevention initiated by RN: No    Pressure Injury (Stage 3,4, Unstageable, DTI, NWPT, and Complex wounds) if present, place Wound referral order by RN under : No    New Ostomies, if present place, Ostomy referral order under : No     Nurse 1 eSignature: Electronically signed by Livier Antonio RN on 8/6/24 at 4:50 PM EDT    **SHARE this note so that the co-signing nurse can place an eSignature**    Nurse 2 eSignature: Electronically signed by Dave Vieira RN on 8/6/24 at 10:26 PM EDT

## 2024-08-06 NOTE — PROGRESS NOTES
Occupational Therapy  Facility/Department: [address] ICU  Occupational Therapy Daily Treatment Note    Name: Morro Kevin  : 1950  MRN: [id number]  Date of Service: 2024    Discharge Recommendations:  Patient would benefit from continued therapy after discharge, 5-7 sessions per week  OT Equipment Recommendations  Other: defer to [place] facility    Morro Kevin scored a 17/24 on the AM-PAC ADL Inpatient form. Current research shows that an AM-PAC score of 17 or less is typically not associated with a discharge to the patient's home setting. Based on the patient's AM-PAC score and their current ADL deficits, it is recommended that the patient have 5-7 sessions per week of Occupational Therapy at d/c to increase the patient's independence.  At this time, this patient demonstrates complex nursing, medical, and rehabilitative needs, and would benefit from intensive rehabilitation services upon discharge from the Inpatient setting.  This patient demonstrates the ability to participate in and benefit from an intensive therapy program with a coordinated interdisciplinary team approach to foster frequent, structured, and documented communication among disciplines, who will work together to establish, prioritize, and achieve treatment goals. Please see assessment section for further patient specific details.    If patient discharges prior to next session this note will serve as a discharge summary.  Please see below for the latest assessment towards goals.       Patient Diagnosis(es): The primary encounter diagnosis was History of prostate cancer. Diagnoses of Dyspnea, unspecified type, General weakness, Acute kidney injury superimposed on CKD (HCC), Elevated troponin, Supratherapeutic INR, History of aortic valve replacement, Transaminitis, and Shortness of breath were also pertinent to this visit.  Past Medical History:  has a past medical history of Aortic stenosis, Arthritis, Asthma, Deviated septum, Diabetes  mellitus type II, Hashimoto thyroiditis, Homocysteinemia, Hyperlipidemia, Hypertension, Hypertriglyceridemia, Hypogonadism male, Hypothyroid, Kidney stone, Mechanical heart valve present, Murmur, Nasal polyps, Osteoarthritis, Prolonged emergence from general anesthesia, Sleep apnea, Warfarin anticoagulation, and Wears glasses.  Past Surgical History:  has a past surgical history that includes colectomy (1996); Nasal polyp surgery (1989, 1987); knee surgery (Left, 2004); Rotator cuff repair (Right, 2009); Colonoscopy (9/24/2014; 2004); cyst removal; other surgical history (1990s); Aortic valve replacement (09/14/2016); Upper gastrointestinal endoscopy (N/A, 6/30/2023); Colonoscopy (N/A, 6/30/2023); Capsule endoscopy (N/A, 6/30/2023); and Upper gastrointestinal endoscopy (N/A, 7/3/2023).       Assessment   Performance deficits / Impairments: Decreased safe awareness;Decreased functional mobility ;Decreased balance;Decreased ADL status;Decreased endurance;Decreased high-level IADLs;Decreased strength  Assessment: 72 y/o male admit 7/31/2024 with SUKUMAR, Hypotension, PAF, Weak and SOB. GI consult : GI Bleed. Nephrology consult : SUKUMAR, new HD. PMH as noted including Aortic Valve Stenosis, AVR (9/2016), Prostate Ca (2022; new reoccur, Chemo), R RTC Repair.  PTA pt lives at home with son and was independent with ADLs and functional mobility. Today, pt with generalized weakness, however making progress towards all therapy goals. Pt required CGA for transfers and for functional mobility with RW household distances. no overt LOB noted. Pt fatigues easily, but motived to get stronger. Pt completes sink side grooming CGA and declines further ADLs this date. Pt is functioning below baseline and benefit from skilled therapy prior to returning home.  Prognosis: Good  REQUIRES OT FOLLOW-UP: Yes  Activity Tolerance  Activity Tolerance: Patient limited by fatigue;Patient Tolerated treatment well  Activity Tolerance Comments: motivated to

## 2024-08-07 LAB
ALBUMIN SERPL-MCNC: 2.5 G/DL (ref 3.4–5)
ANION GAP SERPL CALCULATED.3IONS-SCNC: 14 MMOL/L (ref 3–16)
BUN SERPL-MCNC: 69 MG/DL (ref 7–20)
CALCIUM SERPL-MCNC: 8.7 MG/DL (ref 8.3–10.6)
CHLORIDE SERPL-SCNC: 100 MMOL/L (ref 99–110)
CK SERPL-CCNC: 2683 U/L (ref 39–308)
CO2 SERPL-SCNC: 22 MMOL/L (ref 21–32)
CREAT SERPL-MCNC: 5.8 MG/DL (ref 0.8–1.3)
DEPRECATED RDW RBC AUTO: 15.4 % (ref 12.4–15.4)
GFR SERPLBLD CREATININE-BSD FMLA CKD-EPI: 10 ML/MIN/{1.73_M2}
GLUCOSE BLD-MCNC: 215 MG/DL (ref 70–99)
GLUCOSE BLD-MCNC: 230 MG/DL (ref 70–99)
GLUCOSE SERPL-MCNC: 139 MG/DL (ref 70–99)
HCT VFR BLD AUTO: 24.7 % (ref 40.5–52.5)
HGB BLD-MCNC: 8.3 G/DL (ref 13.5–17.5)
INR PPP: 1.71 (ref 0.85–1.15)
MAGNESIUM SERPL-MCNC: 2.3 MG/DL (ref 1.8–2.4)
MCH RBC QN AUTO: 31 PG (ref 26–34)
MCHC RBC AUTO-ENTMCNC: 33.7 G/DL (ref 31–36)
MCV RBC AUTO: 92.2 FL (ref 80–100)
NT-PROBNP SERPL-MCNC: 3431 PG/ML (ref 0–124)
PERFORMED ON: ABNORMAL
PERFORMED ON: ABNORMAL
PHOSPHATE SERPL-MCNC: 5.8 MG/DL (ref 2.5–4.9)
PLATELET # BLD AUTO: 180 K/UL (ref 135–450)
PMV BLD AUTO: 8.2 FL (ref 5–10.5)
POTASSIUM SERPL-SCNC: 4 MMOL/L (ref 3.5–5.1)
PROTHROMBIN TIME: 20.2 SEC (ref 11.9–14.9)
RBC # BLD AUTO: 2.68 M/UL (ref 4.2–5.9)
SODIUM SERPL-SCNC: 136 MMOL/L (ref 136–145)
URATE SERPL-MCNC: 5 MG/DL (ref 3.5–7.2)
WBC # BLD AUTO: 7 K/UL (ref 4–11)

## 2024-08-07 PROCEDURE — 90935 HEMODIALYSIS ONE EVALUATION: CPT

## 2024-08-07 PROCEDURE — 83735 ASSAY OF MAGNESIUM: CPT

## 2024-08-07 PROCEDURE — 6370000000 HC RX 637 (ALT 250 FOR IP): Performed by: INTERNAL MEDICINE

## 2024-08-07 PROCEDURE — 6360000002 HC RX W HCPCS: Performed by: INTERNAL MEDICINE

## 2024-08-07 PROCEDURE — 97530 THERAPEUTIC ACTIVITIES: CPT

## 2024-08-07 PROCEDURE — 83880 ASSAY OF NATRIURETIC PEPTIDE: CPT

## 2024-08-07 PROCEDURE — 85027 COMPLETE CBC AUTOMATED: CPT

## 2024-08-07 PROCEDURE — 36415 COLL VENOUS BLD VENIPUNCTURE: CPT

## 2024-08-07 PROCEDURE — 6370000000 HC RX 637 (ALT 250 FOR IP): Performed by: HOSPITALIST

## 2024-08-07 PROCEDURE — 9990000010 HC NO CHARGE VISIT

## 2024-08-07 PROCEDURE — 80069 RENAL FUNCTION PANEL: CPT

## 2024-08-07 PROCEDURE — 2580000003 HC RX 258: Performed by: HOSPITALIST

## 2024-08-07 PROCEDURE — 84550 ASSAY OF BLOOD/URIC ACID: CPT

## 2024-08-07 PROCEDURE — 6370000000 HC RX 637 (ALT 250 FOR IP)

## 2024-08-07 PROCEDURE — 2060000000 HC ICU INTERMEDIATE R&B

## 2024-08-07 PROCEDURE — 97110 THERAPEUTIC EXERCISES: CPT

## 2024-08-07 PROCEDURE — 82550 ASSAY OF CK (CPK): CPT

## 2024-08-07 PROCEDURE — 6360000002 HC RX W HCPCS

## 2024-08-07 PROCEDURE — 85610 PROTHROMBIN TIME: CPT

## 2024-08-07 RX ORDER — FUROSEMIDE 20 MG/1
20 TABLET ORAL DAILY
Status: DISCONTINUED | OUTPATIENT
Start: 2024-08-07 | End: 2024-08-09 | Stop reason: HOSPADM

## 2024-08-07 RX ADMIN — DOCUSATE SODIUM 100 MG: 100 CAPSULE, LIQUID FILLED ORAL at 13:32

## 2024-08-07 RX ADMIN — SODIUM CHLORIDE, PRESERVATIVE FREE 10 ML: 5 INJECTION INTRAVENOUS at 13:34

## 2024-08-07 RX ADMIN — FUROSEMIDE 20 MG: 20 TABLET ORAL at 13:32

## 2024-08-07 RX ADMIN — TAMSULOSIN HYDROCHLORIDE 0.4 MG: 0.4 CAPSULE ORAL at 21:03

## 2024-08-07 RX ADMIN — WARFARIN SODIUM 8.5 MG: 7.5 TABLET ORAL at 18:30

## 2024-08-07 RX ADMIN — INSULIN LISPRO 2 UNITS: 100 INJECTION, SOLUTION INTRAVENOUS; SUBCUTANEOUS at 18:30

## 2024-08-07 RX ADMIN — EPOETIN ALFA-EPBX 10000 UNITS: 10000 INJECTION, SOLUTION INTRAVENOUS; SUBCUTANEOUS at 10:19

## 2024-08-07 RX ADMIN — MIRTAZAPINE 30 MG: 30 TABLET, FILM COATED ORAL at 21:03

## 2024-08-07 RX ADMIN — HYDROCORTISONE SODIUM SUCCINATE 25 MG: 100 INJECTION, POWDER, FOR SOLUTION INTRAMUSCULAR; INTRAVENOUS at 13:33

## 2024-08-07 RX ADMIN — HYDROCORTISONE SODIUM SUCCINATE 25 MG: 100 INJECTION, POWDER, FOR SOLUTION INTRAMUSCULAR; INTRAVENOUS at 21:03

## 2024-08-07 RX ADMIN — MIDODRINE HYDROCHLORIDE 7.5 MG: 5 TABLET ORAL at 18:30

## 2024-08-07 RX ADMIN — HYDROCORTISONE SODIUM SUCCINATE 25 MG: 100 INJECTION, POWDER, FOR SOLUTION INTRAMUSCULAR; INTRAVENOUS at 04:30

## 2024-08-07 RX ADMIN — MIDODRINE HYDROCHLORIDE 7.5 MG: 5 TABLET ORAL at 13:32

## 2024-08-07 RX ADMIN — LEVOTHYROXINE SODIUM 137 MCG: 0.11 TABLET ORAL at 07:01

## 2024-08-07 RX ADMIN — INSULIN GLARGINE 10 UNITS: 100 INJECTION, SOLUTION SUBCUTANEOUS at 13:33

## 2024-08-07 RX ADMIN — SODIUM CHLORIDE, PRESERVATIVE FREE 10 ML: 5 INJECTION INTRAVENOUS at 21:03

## 2024-08-07 ASSESSMENT — PAIN SCALES - GENERAL
PAINLEVEL_OUTOF10: 0

## 2024-08-07 NOTE — DISCHARGE INSTR - COC
Continuity of Care Form    Patient Name: Morro Kevin   :  1950  MRN:  [id number]    Admit date:  2024  Discharge date:  ***    Code Status Order: Full Code   Advance Directives:     Admitting Physician:  Jose Keating MD  PCP: Fareed Marino MD    Discharging Nurse: ***  Discharging Hospital Unit/Room#: Q7I-8536/5263-01  Discharging Unit Phone Number: ***    Emergency Contact:   Extended Emergency Contact Information  Primary Emergency Contact: DoritaJoe  Address: [address] [place] [address] [facility]  Home Phone: [phone]  Mobile Phone: [phone]  Relation: Child  Secondary Emergency Contact: doritalara  Address: [address] [place], [place] [address] [place] [address]  Home Phone: [phone]  Mobile Phone: [phone]  Relation: Child  Preferred language: English   needed? No    Past Surgical History:  Past Surgical History:   Procedure Laterality Date    AORTIC VALVE REPLACEMENT  2016    21mm [facility]    CAPSULE ENDOSCOPY N/A 2023    BOWEL SMALL CAPSULE ENDOSCOPY performed by Nohelia Phan MD at [facility] ENDOSCOPY    COLECTOMY  1996    perforated bowel: colonoscopy    COLONOSCOPY  2014; 2004    x2 normal colonoscopies/ perforated bowel    COLONOSCOPY N/A 2023    COLONOSCOPY performed by Nohelia Phan MD at [facility] ENDOSCOPY    CYST REMOVAL      hairy nevus left biceps as child    KNEE SURGERY Left     torn meniscus     NASAL POLYP SURGERY  ,     OTHER SURGICAL HISTORY  1990s    kidney stone removal     ROTATOR CUFF REPAIR Right     UPPER GASTROINTESTINAL ENDOSCOPY N/A 2023    ESOPHAGOGASTRODUODENOSCOPY performed by Nohelia Phan MD at [facility] ENDOSCOPY    UPPER GASTROINTESTINAL ENDOSCOPY N/A 7/3/2023    ENTEROSCOPY PUSH CONTROL HEMORRHAGE performed by Nahid Wills MD at [facility] ENDOSCOPY       Immunization History:   Immunization History   Administered

## 2024-08-07 NOTE — PROGRESS NOTES
Treatment time: 3.5 hours     Net UF: 1 L     Pre weight: 91.2 kg  Post weight: 90.1kg  EDW: TBD     Access used: R neck Vascath   Access function: tolerated 350 BFR     Medications or blood products given: Heparin dwells, retacrit 10,000u     Regular outpatient schedule: MWF     Summary of response to treatment: uncomplicated. BP remained above 100 during HD, 4k bath used.     Copy of dialysis treatment record placed in chart, to be scanned into EMR.

## 2024-08-07 NOTE — CARE COORDINATION
DISCHARGE PLANNING:     Order placed for tunneled dialysis catheter in IR. Once catheter is placed he can discharge to inpatient rehab at [facility].     Donna CAT RN  Case Management  [phone]    Electronically signed by Donna Lopez RN on 8/7/2024 at 2:16 PM

## 2024-08-07 NOTE — PROGRESS NOTES
Nephrology Progress Note   [employer]      Reason for consultation: SUKUMAR on CKD 3a -- baseline Cr ~ 1.3-1.5 mg/dL. Follows with Dr. Salinas in office (last seen 2024).     Subjective:    The patient has been seen and examined .   Labs and chart reviewed.UOP noted   S/p HD .   There were not complications overnight.  Patient review of systems: No   SOB/TOLENTINO . Weakness   LE edema better .     No family present .       Allergies:  Allergies   Allergen Reactions    Ciprofloxacin Hives        Scheduled Meds:   furosemide  20 mg Oral Daily    midodrine  7.5 mg Oral TID WC    psyllium husk-aspartame  1 packet Oral Daily    docusate sodium  100 mg Oral Daily    hydrocortisone sodium succinate PF  25 mg IntraVENous Q8H    sodium chloride flush  5-40 mL IntraVENous 2 times per day    aspirin  81 mg Oral Nightly    levothyroxine  137 mcg Oral Daily    mirtazapine  30 mg Oral Nightly    tamsulosin  0.4 mg Oral Nightly    insulin lispro  0-8 Units SubCUTAneous TID WC    insulin lispro  0-4 Units SubCUTAneous Nightly    insulin glargine  10 Units SubCUTAneous Daily    warfarin placeholder: dosing by pharmacy   Other RX Placeholder        sodium chloride      dextrose         PRN Meds:hydrocortisone, heparin (porcine), sodium chloride flush, sodium chloride, ondansetron **OR** ondansetron, polyethylene glycol, glucose, dextrose bolus **OR** dextrose bolus, glucagon (rDNA), dextrose, perflutren lipid microspheres    Physical Exam:    TEMPERATURE:  Current - Temp: 98.3 °F (36.8 °C); Max - Temp  Av.4 °F (36.9 °C)  Min: 98 °F (36.7 °C)  Max: 99.1 °F (37.3 °C)  RESPIRATIONS RANGE: Resp  Av.5  Min: 16  Max: 18  PULSE RANGE: Pulse  Av.5  Min: 61  Max: 88  BLOOD PRESSURE RANGE:  Systolic (24hrs), Av , Min:102 , Max:133   ; Diastolic (24hrs), Av, Min:53, Max:66    24HR INTAKE/OUTPUT:    Intake/Output Summary (Last 24 hours) at 2024 1317  Last data filed at 2024 0400  Gross per 24 hour   Intake 611.19

## 2024-08-07 NOTE — PROGRESS NOTES
NAME:  Morro Kevin  YOB: 1950  MEDICAL RECORD NUMBER:  [id number]    Shift Summary: transported to HD @ 0745.     Family updated: No    Rhythm: Normal Sinus Rhythm     Most recent vitals:   Visit Vitals  BP (!) 110/53   Pulse 61   Temp 98 °F (36.7 °C)   Resp 18   Ht 1.778 m (5' 10\")   Wt 83 kg (182 lb 15.7 oz)   SpO2 (!) 80%   BMI 26.26 kg/m²           No data found.    No data found.      Respiratory support needed (if any):  - RA    Admission weight Weight - Scale: 77.1 kg (170 lb) (07/31/24 0930)    Today's weight    Wt Readings from Last 1 Encounters:   08/06/24 83 kg (182 lb 15.7 oz)        Mayer need assessed each shift: N/A - no mayer present  UOP >30ml/hr: YES  Last documented BM (in last 48 hrs):  Patient Vitals for the past 48 hrs:   Last BM (including prior to admit) Stool Occurrence   08/05/24 1825 08/05/24 1   08/05/24 2000 08/05/24 --   08/06/24 1200 -- 1   08/06/24 1400 -- 1   08/07/24 0700 08/05/24 --                Restraints (in use currently or dc'd in last 12 hrs): No    Order current and documentation up to date? No    Lines/Drains reviewed @ bedside.  Peripheral IV 07/31/24 Right Forearm (Active)   Number of days: 7       Peripheral IV 07/31/24 Left Forearm (Active)   Number of days: 6       Hemodialysis Central Access Right Neck (Active)   Number of days: 5         Drip rates at handoff:    sodium chloride      dextrose         Lab Data:   CBC:   Recent Labs     08/06/24 0410 08/07/24 0533   WBC 7.0 7.0   HGB 8.4* 8.3*   HCT 23.8* 24.7*   MCV 90.3 92.2    180     BMP:    Recent Labs     08/06/24 0410 08/07/24 0533    136   K 3.9 4.0   CO2 25 22   BUN 52* 69*   CREATININE 4.9* 5.8*     LIVR:   Recent Labs     08/06/24 0410   *   *     PT/INR:   Recent Labs     08/05/24  0445 08/06/24  0410   INR 3.57* 2.53*     APTT: No results for input(s): \"APTT\" in the last 72 hours.  ABG: No results for input(s): \"PHART\", \"KDG6LBO\", \"PO2ART\" in the last 72

## 2024-08-07 NOTE — PROGRESS NOTES
Occupational Therapy  Facility/Department: [address] PROGRESSIVE CARE  Occupational Therapy Treatment and Tentative D/C      Name: Morro Kevin  : 1950  MRN: [id number]  Date of Service: 2024    Discharge Recommendations: Morro Kevin scored a 17/24 on the AM-PAC ADL Inpatient form. Current research shows that an AM-PAC score of 17 or less is typically not associated with a discharge to the patient's home setting. Based on the patient's AM-PAC score and their current ADL deficits, it is recommended that the patient have 5-7 sessions per week of Occupational Therapy at d/c to increase the patient's independence.  At this time, this patient demonstrates complex nursing, medical, and rehabilitative needs, and would benefit from intensive rehabilitation services upon discharge from the Inpatient setting.  This patient demonstrates the ability to participate in and benefit from an intensive therapy program with a coordinated interdisciplinary team approach to foster frequent, structured, and documented communication among disciplines, who will work together to establish, prioritize, and achieve treatment goals. Please see assessment section for further patient specific details.    If patient discharges prior to next session this note will serve as a discharge summary.  Please see below for the latest assessment towards goals.     Patient would benefit from continued therapy after discharge, 5-7 sessions per week  OT Equipment Recommendations  Equipment Needed: No       Patient Diagnosis(es): The primary encounter diagnosis was History of prostate cancer. Diagnoses of Dyspnea, unspecified type, General weakness, Acute kidney injury superimposed on CKD (HCC), Elevated troponin, Supratherapeutic INR, History of aortic valve replacement, Transaminitis, and Shortness of breath were also pertinent to this visit.  Past Medical History:  has a past medical history of Aortic stenosis, Arthritis, Asthma, Deviated

## 2024-08-07 NOTE — PROGRESS NOTES
Report called to 5N RN. All questions answered. Pt going from ICU bed 2125 to PCU bed 5263. Pt is currently in HD. Plan is to transport from HD to PCU bed.

## 2024-08-07 NOTE — PLAN OF CARE
Problem: Safety - Adult  Goal: Free from fall injury  Outcome: Progressing     Problem: Discharge Planning  Goal: Discharge to home or other facility with appropriate resources  Outcome: Progressing     Problem: Pain  Goal: Verbalizes/displays adequate comfort level or baseline comfort level  Outcome: Progressing     Problem: Skin/Tissue Integrity  Goal: Absence of new skin breakdown  Description: 1.  Monitor for areas of redness and/or skin breakdown  2.  Assess vascular access sites hourly  3.  Every 4-6 hours minimum:  Change oxygen saturation probe site  4.  Every 4-6 hours:  If on nasal continuous positive airway pressure, respiratory therapy assess nares and determine need for appliance change or resting period.  Outcome: Progressing     Problem: ABCDS Injury Assessment  Goal: Absence of physical injury  Outcome: Progressing     Problem: Metabolic/Fluid and Electrolytes - Adult  Goal: Electrolytes maintained within normal limits  Outcome: Progressing  Goal: Hemodynamic stability and optimal renal function maintained  Outcome: Progressing  Goal: Glucose maintained within prescribed range  Outcome: Progressing     Problem: Hematologic - Adult  Goal: Maintains hematologic stability  Outcome: Progressing     Problem: Nutrition Deficit:  Goal: Optimize nutritional status  Outcome: Progressing

## 2024-08-07 NOTE — PROGRESS NOTES
Nephrology Progress Note   [employer]      Reason for consultation: SUKUMAR on CKD 3a -- baseline Cr ~ 1.3-1.5 mg/dL. Follows with Dr. Salinas in office (last seen 2024).     Subjective:    The patient has been seen and examined. Labs and chart reviewed. Cr continues to trend up between HD treatments. Still has good UOP -- 1.6 L yesterday. Seen on HD -- net 1 L UF and 3 K bath.     There were not complications overnight.    Patient review of systems: Denies SOB      Allergies:  Allergies   Allergen Reactions    Ciprofloxacin Hives        Scheduled Meds:   epoetin linda-epbx  10,000 Units IntraVENous Once per day on     midodrine  7.5 mg Oral TID WC    psyllium husk-aspartame  1 packet Oral Daily    docusate sodium  100 mg Oral Daily    hydrocortisone sodium succinate PF  25 mg IntraVENous Q8H    sodium chloride flush  5-40 mL IntraVENous 2 times per day    aspirin  81 mg Oral Nightly    levothyroxine  137 mcg Oral Daily    mirtazapine  30 mg Oral Nightly    tamsulosin  0.4 mg Oral Nightly    insulin lispro  0-8 Units SubCUTAneous TID WC    insulin lispro  0-4 Units SubCUTAneous Nightly    insulin glargine  10 Units SubCUTAneous Daily    warfarin placeholder: dosing by pharmacy   Other RX Placeholder        sodium chloride      dextrose         PRN Meds:hydrocortisone, heparin (porcine), sodium chloride flush, sodium chloride, ondansetron **OR** ondansetron, polyethylene glycol, glucose, dextrose bolus **OR** dextrose bolus, glucagon (rDNA), dextrose, perflutren lipid microspheres    Physical Exam:    TEMPERATURE:  Current - Temp: 98 °F (36.7 °C); Max - Temp  Av.6 °F (37 °C)  Min: 98 °F (36.7 °C)  Max: 99.4 °F (37.4 °C)  RESPIRATIONS RANGE: Resp  Av.3  Min: 16  Max: 18  PULSE RANGE: Pulse  Av.7  Min: 61  Max: 68  BLOOD PRESSURE RANGE:  Systolic (24hrs), Av , Min:102 , Max:117   ; Diastolic (24hrs), Av, Min:53, Max:58    24HR INTAKE/OUTPUT:    Intake/Output Summary (Last 24 hours)

## 2024-08-07 NOTE — PROGRESS NOTES
V2.0  [facility] Hospitalist Progress Note      Name:  Morro Kevin /Age/Sex: 1950  (73 y.o. male)   MRN & CSN:  [id number] & [id number] Encounter Date/Time: 2024 2:17 PM EDT    Location:  [id number] PCP: Fareed Marino MD       Hospital Day: 8  Subjective:     Patient continues to feel well, states his stools are more formed, reports minimal blood in stools although he is not seeing the stools himself    Review of Systems:    Review of Systems  10 point ROS negative except as stated above in \"subjective\" section    Objective:     Intake/Output Summary (Last 24 hours) at 2024 1346  Last data filed at 2024 0400  Gross per 24 hour   Intake 611.19 ml   Output 1200 ml   Net -588.81 ml        Vitals:   Vitals:    24 1200   BP: 133/66   Pulse: 88   Resp: 18   Temp: 98.3 °F (36.8 °C)   SpO2: 99%     Physical Exam:   General: Awake, alert and oriented, NAD  Cardiovascular: S1S2 present, regular rate and rhythm,   Respiratory: Clear to auscultation  Gastrointestinal: Soft, non tender, positive bowel sounds   Genitourinary: no suprapubic tenderness  Musculoskeletal: No edema  Neuro: Alert.      Medications:   Medications:    furosemide  20 mg Oral Daily    midodrine  7.5 mg Oral TID WC    psyllium husk-aspartame  1 packet Oral Daily    docusate sodium  100 mg Oral Daily    hydrocortisone sodium succinate PF  25 mg IntraVENous Q8H    sodium chloride flush  5-40 mL IntraVENous 2 times per day    aspirin  81 mg Oral Nightly    levothyroxine  137 mcg Oral Daily    mirtazapine  30 mg Oral Nightly    tamsulosin  0.4 mg Oral Nightly    insulin lispro  0-8 Units SubCUTAneous TID WC    insulin lispro  0-4 Units SubCUTAneous Nightly    insulin glargine  10 Units SubCUTAneous Daily    warfarin placeholder: dosing by pharmacy   Other RX Placeholder      Infusions:    sodium chloride      dextrose       PRN Meds: hydrocortisone, 25 mg, BID PRN  heparin (porcine), 2,500 Units, PRN  sodium chloride flush,

## 2024-08-07 NOTE — PROGRESS NOTES
Clinical Pharmacy Note  Warfarin Consult    Morro Kevin is a 73 y.o. male receiving warfarin managed by pharmacy.      Warfarin Indication: AVR  Target INR range: 2-3   Dose prior to admission: 11.25 mg on Wednesday, 7.5 mg remaining days of the week. Managed at  Anticoagulation Clinic.     Current warfarin drug-drug interactions: hydrocortisone     Recent Labs     08/05/24  0445 08/06/24  0410 08/07/24  0533 08/07/24  1242   HGB 8.8* 8.4* 8.3*  --    HCT 25.8* 23.8* 24.7*  --    INR 3.57* 2.53*  --  1.71*       Recent Labs     08/06/24  0410   *   *   BILIDIR <0.2   BILITOT 0.3   ALKPHOS 56         Assessment/Plan:  INR has decreased into subtherapeutic range today (2.53 > 1.71)    Discussed initiating weight-based IV Heparin bridging with Dr. Cui - he would like to hold off for now given patient's ongoing intermittent rectal bleeding.    Will give Warfarin 8.5 mg po x 1 tonight and reassess PT/INR in the AM    Thank you for the consult.  Will continue to follow.    Izaiah Guzman RP, PharmD, BCPS  8/7/2024 2:03 PM

## 2024-08-07 NOTE — PROGRESS NOTES
Department of Physical Medicine & Rehabilitation  Dr. Buitrago Progress Note  8/7/2024  11:29 AM    Patient Name:   Morro Kevin  YOB: 1950    Diagnosis: Rhabdomyolysis        Subjective: 73-year-old gentleman with a past medical history significant for prostate cancer, on chemotherapy treatment for it.  Patient has past medical history positive for a mechanical heart valve for which he is on Coumadin , he also has history of diabetes, hypertension, thyroiditis, and he came to the hospital with  complaints of weakness of his legs going on for the several days. Patient feels that his knees have been feeling more weak, he has been having progressively worsening shortness of breath. He feels that this could be an adverse effect of the chemotherapy agent he was on for prostate cancer, that was recently changed.  Workup revealed rhabdomyolysis with CK level of 26,000.  He was also found to have creatinine of 4.0.  Consults per nephrology, pulmonology, cardiology, and gastroenterology.  Patient started on hemodialysis, and had vascath placed. Patient now feeling better. Patient started in therapies, and requires min assist for transfers and short distances with a walker, and fatigues quickly. Patient lives with son in a 1 level condo.  Patient uses a four-wheel walker at home.  Patient has regular Medicare insurance.      BP (!) 110/53   Pulse 61   Temp 98 °F (36.7 °C)   Resp 18   Ht 1.778 m (5' 10\")   Wt 83 kg (182 lb 15.7 oz)   SpO2 (!) 80%   BMI 26.26 kg/m²     Last 24 hour lab  Recent Results (from the past 24 hour(s))   POCT Glucose    Collection Time: 08/06/24  3:23 PM   Result Value Ref Range    POC Glucose 261 (H) 70 - 99 mg/dl    Performed on ACCU-CHEK    POCT Glucose    Collection Time: 08/06/24  8:14 PM   Result Value Ref Range    POC Glucose 199 (H) 70 - 99 mg/dl    Performed on ACCU-CHEK    Brain Natriuretic Peptide    Collection Time: 08/07/24  5:33 AM   Result Value Ref Range    Pro-BNP

## 2024-08-07 NOTE — PROGRESS NOTES
NAME:  Morro Kevin  YOB: 1950  MEDICAL RECORD NUMBER:  [id number]    Shift Summary: uneventful shift overall pt up to bathroom to urinate with no difficulties, HD today    Family updated: No    Rhythm: Normal Sinus Rhythm     Most recent vitals:   Visit Vitals  BP (!) 102/54   Pulse 62   Temp 98.6 °F (37 °C) (Oral)   Resp 16   Ht 1.778 m (5' 10\")   Wt 83 kg (182 lb 15.7 oz)   SpO2 100%   BMI 26.26 kg/m²           No data found.    No data found.      Respiratory support needed (if any):  - RA    Admission weight Weight - Scale: 77.1 kg (170 lb) (07/31/24 0930)    Today's weight    Wt Readings from Last 1 Encounters:   08/06/24 83 kg (182 lb 15.7 oz)        Mayer need assessed each shift: N/A - no mayer present  UOP >30ml/hr: YES  Last documented BM (in last 48 hrs):  Patient Vitals for the past 48 hrs:   Last BM (including prior to admit) Stool Occurrence   08/05/24 0747 08/04/24 --   08/05/24 1825 08/05/24 1   08/05/24 2000 08/05/24 --   08/06/24 1200 -- 1   08/06/24 1400 -- 1                Restraints (in use currently or dc'd in last 12 hrs): No    Order current and documentation up to date? No    Lines/Drains reviewed @ bedside.  Peripheral IV 07/31/24 Right Forearm (Active)   Number of days: 6       Peripheral IV 07/31/24 Left Forearm (Active)   Number of days: 6       Hemodialysis Central Access Right Neck (Active)   Number of days: 4         Drip rates at handoff:    sodium chloride      dextrose         Lab Data:   CBC:   Recent Labs     08/06/24  0410 08/07/24  0533   WBC 7.0 7.0   HGB 8.4* 8.3*   HCT 23.8* 24.7*   MCV 90.3 92.2    180     BMP:    Recent Labs     08/05/24  1549 08/06/24  0410   * 137   K 4.0 3.9   CO2 24 25   BUN 43* 52*   CREATININE 4.2* 4.9*     LIVR:   Recent Labs     08/06/24  0410   *   *     PT/INR:   Recent Labs     08/05/24  0445 08/06/24  0410   INR 3.57* 2.53*     APTT: No results for input(s): \"APTT\" in the last 72 hours.  ABG: No  results for input(s): \"PHART\", \"[id number]\", \"PO2ART\" in the last 72 hours.    Any consults during the shift? No    Any signed and held orders to be released?  No        4 Eyes Skin Assessment       The patient is being assessed for  Shift Handoff    I agree that at least one RN has performed a thorough Head to Toe Skin Assessment on the patient. ALL assessment sites listed below have been assessed.      Areas assessed by both nurses: Head, Face, Ears, Shoulders, Back, Chest, Arms, Elbows, Hands, Sacrum. Buttock, Coccyx, Ischium, Legs. Feet and Heels, and Under Medical Devices         Does the Patient have a Wound? No noted wound(s)    Wound Care Orders initiated by RN: No       Vignesh Prevention initiated by RN: No    Pressure Injury (Stage 3,4, Unstageable, DTI, NWPT, and Complex wounds) if present, place Wound referral order by RN under : No    New Ostomies, if present place, Ostomy referral order under : No     Nurse 1 eSignature: Electronically signed by Dave Vieira RN on 8/7/24 at 7:22 AM EDT    **SHARE this note so that the co-signing nurse can place an eSignature**    Nurse 2 eSignature: Electronically signed by Valerie Sarkar RN on 8/7/24 at 7:51 AM EDT

## 2024-08-08 ENCOUNTER — APPOINTMENT (OUTPATIENT)
Dept: INTERVENTIONAL RADIOLOGY/VASCULAR | Age: 74
End: 2024-08-08
Payer: MEDICARE

## 2024-08-08 LAB
ALBUMIN SERPL-MCNC: 2.5 G/DL (ref 3.4–5)
ALP SERPL-CCNC: 50 U/L (ref 40–129)
ALT SERPL-CCNC: 278 U/L (ref 10–40)
ANION GAP SERPL CALCULATED.3IONS-SCNC: 10 MMOL/L (ref 3–16)
AST SERPL-CCNC: 108 U/L (ref 15–37)
BILIRUB DIRECT SERPL-MCNC: <0.2 MG/DL (ref 0–0.3)
BILIRUB INDIRECT SERPL-MCNC: ABNORMAL MG/DL (ref 0–1)
BILIRUB SERPL-MCNC: 0.3 MG/DL (ref 0–1)
BUN SERPL-MCNC: 44 MG/DL (ref 7–20)
CALCIUM SERPL-MCNC: 8.6 MG/DL (ref 8.3–10.6)
CHLORIDE SERPL-SCNC: 100 MMOL/L (ref 99–110)
CK SERPL-CCNC: 1442 U/L (ref 39–308)
CO2 SERPL-SCNC: 25 MMOL/L (ref 21–32)
CREAT SERPL-MCNC: 4.6 MG/DL (ref 0.8–1.3)
DEPRECATED RDW RBC AUTO: 15.3 % (ref 12.4–15.4)
GFR SERPLBLD CREATININE-BSD FMLA CKD-EPI: 13 ML/MIN/{1.73_M2}
GLUCOSE BLD-MCNC: 148 MG/DL (ref 70–99)
GLUCOSE BLD-MCNC: 177 MG/DL (ref 70–99)
GLUCOSE BLD-MCNC: 250 MG/DL (ref 70–99)
GLUCOSE BLD-MCNC: 258 MG/DL (ref 70–99)
GLUCOSE SERPL-MCNC: 175 MG/DL (ref 70–99)
HCT VFR BLD AUTO: 23.6 % (ref 40.5–52.5)
HCT VFR BLD AUTO: 24.7 % (ref 40.5–52.5)
HGB BLD-MCNC: 8.1 G/DL (ref 13.5–17.5)
HGB BLD-MCNC: 8.4 G/DL (ref 13.5–17.5)
INR PPP: 2.13 (ref 0.85–1.15)
MAGNESIUM SERPL-MCNC: 2.2 MG/DL (ref 1.8–2.4)
MCH RBC QN AUTO: 31.4 PG (ref 26–34)
MCHC RBC AUTO-ENTMCNC: 34.5 G/DL (ref 31–36)
MCV RBC AUTO: 91.1 FL (ref 80–100)
PERFORMED ON: ABNORMAL
PHOSPHATE SERPL-MCNC: 3.8 MG/DL (ref 2.5–4.9)
PLATELET # BLD AUTO: 193 K/UL (ref 135–450)
PMV BLD AUTO: 7.6 FL (ref 5–10.5)
POTASSIUM SERPL-SCNC: 4.1 MMOL/L (ref 3.5–5.1)
PROT SERPL-MCNC: 4.4 G/DL (ref 6.4–8.2)
PROTHROMBIN TIME: 23.9 SEC (ref 11.9–14.9)
RBC # BLD AUTO: 2.59 M/UL (ref 4.2–5.9)
SODIUM SERPL-SCNC: 135 MMOL/L (ref 136–145)
URATE SERPL-MCNC: 3.9 MG/DL (ref 3.5–7.2)
WBC # BLD AUTO: 7.2 K/UL (ref 4–11)

## 2024-08-08 PROCEDURE — 84550 ASSAY OF BLOOD/URIC ACID: CPT

## 2024-08-08 PROCEDURE — 6370000000 HC RX 637 (ALT 250 FOR IP): Performed by: HOSPITALIST

## 2024-08-08 PROCEDURE — 85014 HEMATOCRIT: CPT

## 2024-08-08 PROCEDURE — 82550 ASSAY OF CK (CPK): CPT

## 2024-08-08 PROCEDURE — 2709999900 IR TUNNELED CVC PLACE WO SQ PORT/PUMP > 5 YEARS

## 2024-08-08 PROCEDURE — 80076 HEPATIC FUNCTION PANEL: CPT

## 2024-08-08 PROCEDURE — 6370000000 HC RX 637 (ALT 250 FOR IP): Performed by: INTERNAL MEDICINE

## 2024-08-08 PROCEDURE — 6360000002 HC RX W HCPCS: Performed by: INTERNAL MEDICINE

## 2024-08-08 PROCEDURE — 97535 SELF CARE MNGMENT TRAINING: CPT

## 2024-08-08 PROCEDURE — 36415 COLL VENOUS BLD VENIPUNCTURE: CPT

## 2024-08-08 PROCEDURE — 77001 FLUOROGUIDE FOR VEIN DEVICE: CPT

## 2024-08-08 PROCEDURE — 2580000003 HC RX 258: Performed by: HOSPITALIST

## 2024-08-08 PROCEDURE — 0JH63XZ INSERTION OF TUNNELED VASCULAR ACCESS DEVICE INTO CHEST SUBCUTANEOUS TISSUE AND FASCIA, PERCUTANEOUS APPROACH: ICD-10-PCS | Performed by: HOSPITALIST

## 2024-08-08 PROCEDURE — 83735 ASSAY OF MAGNESIUM: CPT

## 2024-08-08 PROCEDURE — 80069 RENAL FUNCTION PANEL: CPT

## 2024-08-08 PROCEDURE — 6360000002 HC RX W HCPCS: Performed by: RADIOLOGY

## 2024-08-08 PROCEDURE — 6370000000 HC RX 637 (ALT 250 FOR IP)

## 2024-08-08 PROCEDURE — 97530 THERAPEUTIC ACTIVITIES: CPT

## 2024-08-08 PROCEDURE — 85018 HEMOGLOBIN: CPT

## 2024-08-08 PROCEDURE — 76937 US GUIDE VASCULAR ACCESS: CPT

## 2024-08-08 PROCEDURE — 2060000000 HC ICU INTERMEDIATE R&B

## 2024-08-08 PROCEDURE — 97116 GAIT TRAINING THERAPY: CPT

## 2024-08-08 PROCEDURE — 85027 COMPLETE CBC AUTOMATED: CPT

## 2024-08-08 PROCEDURE — 02H633Z INSERTION OF INFUSION DEVICE INTO RIGHT ATRIUM, PERCUTANEOUS APPROACH: ICD-10-PCS | Performed by: HOSPITALIST

## 2024-08-08 PROCEDURE — 94760 N-INVAS EAR/PLS OXIMETRY 1: CPT

## 2024-08-08 PROCEDURE — 36558 INSERT TUNNELED CV CATH: CPT

## 2024-08-08 PROCEDURE — 85610 PROTHROMBIN TIME: CPT

## 2024-08-08 RX ORDER — INSULIN GLARGINE 300 U/ML
10 INJECTION, SOLUTION SUBCUTANEOUS NIGHTLY
DISCHARGE
Start: 2024-08-08

## 2024-08-08 RX ORDER — WARFARIN SODIUM 5 MG/1
5 TABLET ORAL
Status: COMPLETED | OUTPATIENT
Start: 2024-08-08 | End: 2024-08-08

## 2024-08-08 RX ORDER — FENTANYL CITRATE 50 UG/ML
INJECTION, SOLUTION INTRAMUSCULAR; INTRAVENOUS PRN
Status: COMPLETED | OUTPATIENT
Start: 2024-08-08 | End: 2024-08-08

## 2024-08-08 RX ORDER — MIDODRINE HYDROCHLORIDE 2.5 MG/1
7.5 TABLET ORAL
Qty: 90 TABLET | Refills: 3 | Status: ON HOLD
Start: 2024-08-08 | End: 2024-08-16 | Stop reason: HOSPADM

## 2024-08-08 RX ORDER — FUROSEMIDE 20 MG/1
20 TABLET ORAL DAILY
Qty: 60 TABLET | Refills: 3 | Status: ON HOLD | DISCHARGE
Start: 2024-08-09 | End: 2024-08-16

## 2024-08-08 RX ADMIN — FUROSEMIDE 20 MG: 20 TABLET ORAL at 08:20

## 2024-08-08 RX ADMIN — SODIUM CHLORIDE, PRESERVATIVE FREE 10 ML: 5 INJECTION INTRAVENOUS at 08:23

## 2024-08-08 RX ADMIN — SODIUM CHLORIDE, PRESERVATIVE FREE 10 ML: 5 INJECTION INTRAVENOUS at 20:50

## 2024-08-08 RX ADMIN — INSULIN GLARGINE 10 UNITS: 100 INJECTION, SOLUTION SUBCUTANEOUS at 08:20

## 2024-08-08 RX ADMIN — INSULIN LISPRO 4 UNITS: 100 INJECTION, SOLUTION INTRAVENOUS; SUBCUTANEOUS at 18:36

## 2024-08-08 RX ADMIN — TAMSULOSIN HYDROCHLORIDE 0.4 MG: 0.4 CAPSULE ORAL at 20:48

## 2024-08-08 RX ADMIN — DOCUSATE SODIUM 100 MG: 100 CAPSULE, LIQUID FILLED ORAL at 08:20

## 2024-08-08 RX ADMIN — MIRTAZAPINE 30 MG: 30 TABLET, FILM COATED ORAL at 20:48

## 2024-08-08 RX ADMIN — HYDROCORTISONE SODIUM SUCCINATE 25 MG: 100 INJECTION, POWDER, FOR SOLUTION INTRAMUSCULAR; INTRAVENOUS at 11:39

## 2024-08-08 RX ADMIN — MIDODRINE HYDROCHLORIDE 7.5 MG: 5 TABLET ORAL at 15:56

## 2024-08-08 RX ADMIN — PSYLLIUM HUSK 1 PACKET: 3.4 POWDER ORAL at 18:40

## 2024-08-08 RX ADMIN — FENTANYL CITRATE 50 MCG: 50 INJECTION INTRAMUSCULAR; INTRAVENOUS at 13:34

## 2024-08-08 RX ADMIN — WARFARIN SODIUM 5 MG: 5 TABLET ORAL at 18:36

## 2024-08-08 RX ADMIN — MIDODRINE HYDROCHLORIDE 7.5 MG: 5 TABLET ORAL at 11:39

## 2024-08-08 RX ADMIN — FENTANYL CITRATE 50 MCG: 50 INJECTION INTRAMUSCULAR; INTRAVENOUS at 13:30

## 2024-08-08 RX ADMIN — HYDROCORTISONE SODIUM SUCCINATE 25 MG: 100 INJECTION, POWDER, FOR SOLUTION INTRAMUSCULAR; INTRAVENOUS at 05:04

## 2024-08-08 RX ADMIN — LEVOTHYROXINE SODIUM 137 MCG: 0.11 TABLET ORAL at 05:04

## 2024-08-08 ASSESSMENT — PAIN SCALES - GENERAL: PAINLEVEL_OUTOF10: 0

## 2024-08-08 NOTE — PROGRESS NOTES
Pt's new HD catheter leaking blood. Pt cleaned up and IR nurse now at bedside to change dressing. Pt has also had a couple bloody Bms today. He said he is starting to feel lightheaded at times. Attending notified via secure message.

## 2024-08-08 NOTE — PROGRESS NOTES
Clinical Pharmacy Note  Warfarin Consult    Morro Kevin is a 73 y.o. male receiving warfarin managed by pharmacy.      Warfarin Indication: AVR  Target INR range: 2-3   Dose prior to admission: 11.25 mg on Wednesday, 7.5 mg remaining days of the week. Managed at  Anticoagulation Clinic.     Current warfarin drug-drug interactions: hydrocortisone     Recent Labs     08/06/24  0410 08/07/24  0533 08/07/24  1242 08/08/24  0458   HGB 8.4* 8.3*  --  8.1*   HCT 23.8* 24.7*  --  23.6*   INR 2.53*  --  1.71* 2.13*       Recent Labs     08/06/24  0410 08/08/24  0458   * 108*   * 278*   BILIDIR <0.2 <0.2   BILITOT 0.3 0.3   ALKPHOS 56 50         Assessment/Plan:  INR is at a therapeutic range today at 2.13    Will give Warfarin 5 mg po x 1 tonight and reassess PT/INR in the AM    Thank you for the consult.  Will continue to follow.    Ping Walsh, PharmD 8/8/2024 1:22 PM

## 2024-08-08 NOTE — PROGRESS NOTES
Physical Therapy  Facility/Department: [address] PROGRESSIVE CARE  Physical Therapy Treatment Note    Name: Morro Kevin  : 1950  MRN: [id number]  Date of Service: 2024    Discharge Recommendations:  Continue to assess pending progress, IP Rehab   PT Equipment Recommendations  Other: Will monitor for potential equipt needs.      Morro Kevin scored a 18/24 on the AM-PAC short mobility form. Current research shows that an AM-PAC score of 17 or less is typically not associated with a discharge to the patient's home setting. Based on the patient's AM-PAC score and their current functional mobility deficits, it is recommended that the patient have 5-7 sessions per week of Physical Therapy at d/c to increase the patient's independence.  At this time, this patient demonstrates complex nursing, medical, and rehabilitative needs, and would benefit from intensive rehabilitation services upon discharge from the Inpatient setting.  This patient demonstrates the ability to participate in and benefit from an intensive therapy program with a coordinated interdisciplinary team approach to foster frequent, structured, and documented communication among disciplines, who will work together to establish, prioritize, and achieve treatment goals. Please see assessment section for further patient specific details.    If patient discharges prior to next session this note will serve as a discharge summary.  Please see below for the latest assessment towards goals.      Assessment   Body Structures, Functions, Activity Limitations Requiring Skilled Therapeutic Intervention: Decreased functional mobility ;Decreased strength;Decreased endurance;Decreased balance  Assessment: 72 y/o male admit 2024 with SUKUMAR, Hypotension, PAF, Weak and SOB. GI consult : GI Bleed : Recent Colonoscopy showing that he has Hemorrhoids as well as some small Telangiectasias in the distal Rectum near the Anus. Hgb stable now and Rectal Bleeding  : Yes  Occupation: Retired  Vision/Hearing  Vision  Vision: Impaired  Vision Exceptions: Wears glasses at all times  Hearing  Hearing: Within functional limits    Cognition   Orientation  Overall Orientation Status: Within Functional Limits  Cognition  Overall Cognitive Status: WFL     Objective                                Bed mobility  Supine to Sit: Stand by assistance  Sit to Supine: Stand by assistance  Bed Mobility Comments: Pt to/from eob (flat); use of bedrail moving to eob.  Effortful LEs to/from eob.  Transfers  Sit to Stand: Stand by assistance  Stand to Sit: Stand by assistance  Comment: Transfers completed from recliner,  bedside chair  with Walker SBA.    Pt tends to brace back of legs against furniture to stabilize move sit to stand.  When moving away from back of chair, pt requiring increase assist/sign more effortful moving to upright (Min assist).  Attempts Transfer sit to stand with hands on thighs (no assist); pt unable to obtain upright stand.  Ambulation  Surface: Level tile  Device: Rolling Walker  Distance: Pt amb 80' x 2 with Walker SBA.  Diminished step length/clearance; L LE Ext Rot slight (baseline prior).  Tends to lean heavily on walker for support at times.  More Ambulation?: Yes  Ambulation 2  Surface - 2: level tile  Device 2: Single point cane  Distance: Pt amb 40' with Standard Cane CGA.  Slower zafar; guarded.  Unsteady although no specific LOB.        Exercise Treatment: Transfers sit to/from stand with Walker x 5 reps (slow ascend/descend to chair for strengthening).                 AM-PAC - Mobility    AM-PAC Basic Mobility - Inpatient   How much help is needed turning from your back to your side while in a flat bed without using bedrails?: None  How much help is needed moving from lying on your back to sitting on the side of a flat bed without using bedrails?: A Little  How much help is needed moving to and from a bed to a chair?: A Little  How much help is needed

## 2024-08-08 NOTE — PROGRESS NOTES
Call received from rehab RN who states rehab MD would like to wait to transfer this pt until after he gets first HD treatment with new tunneled catheter. Attending notified.

## 2024-08-08 NOTE — PROGRESS NOTES
Pt's HD catheter leaked blood again after IR nurse changed dressing. Dressing changed again and quick clot applied to site.

## 2024-08-08 NOTE — PROGRESS NOTES
Nephrology Progress Note   [employer]      Reason for consultation: SUKUMAR on CKD 3a -- baseline Cr ~ 1.3-1.5 mg/dL. Follows with Dr. Salinas in office (last seen 2024).     Subjective:    The patient has been seen and examined .   Labs and chart reviewed.  There were not complications overnight.  Patient review of systems: No   SOB/TOLENTINO .  LE edema better .     No family present .       Allergies:  Allergies   Allergen Reactions    Ciprofloxacin Hives        Scheduled Meds:   warfarin  5 mg Oral Once    furosemide  20 mg Oral Daily    midodrine  7.5 mg Oral TID WC    psyllium husk-aspartame  1 packet Oral Daily    docusate sodium  100 mg Oral Daily    hydrocortisone sodium succinate PF  25 mg IntraVENous Q8H    sodium chloride flush  5-40 mL IntraVENous 2 times per day    aspirin  81 mg Oral Nightly    levothyroxine  137 mcg Oral Daily    mirtazapine  30 mg Oral Nightly    tamsulosin  0.4 mg Oral Nightly    insulin lispro  0-8 Units SubCUTAneous TID WC    insulin lispro  0-4 Units SubCUTAneous Nightly    insulin glargine  10 Units SubCUTAneous Daily    warfarin placeholder: dosing by pharmacy   Other RX Placeholder        sodium chloride      dextrose         PRN Meds:hydrocortisone, heparin (porcine), sodium chloride flush, sodium chloride, ondansetron **OR** ondansetron, polyethylene glycol, glucose, dextrose bolus **OR** dextrose bolus, glucagon (rDNA), dextrose, perflutren lipid microspheres    Physical Exam:    TEMPERATURE:  Current - Temp: 98.5 °F (36.9 °C); Max - Temp  Av.5 °F (36.9 °C)  Min: 97.9 °F (36.6 °C)  Max: 98.9 °F (37.2 °C)  RESPIRATIONS RANGE: Resp  Av.7  Min: 16  Max: 18  PULSE RANGE: Pulse  Av  Min: 62  Max: 82  BLOOD PRESSURE RANGE:  Systolic (24hrs), Av , Min:116 , Max:153   ; Diastolic (24hrs), Av, Min:65, Max:76    24HR INTAKE/OUTPUT:    Intake/Output Summary (Last 24 hours) at 2024 1248  Last data filed at 2024 1139  Gross per 24 hour   Intake 600 ml

## 2024-08-08 NOTE — DISCHARGE SUMMARY
V2.0  Discharge Summary    Name:  Morro Kevin /Age/Sex: 1950 (73 y.o. male)   Admit Date: 2024  Discharge Date: 24    MRN & CSN:  [id number] & [id number] Encounter Date and Time 24 2:50 PM EDT    Attending:  Dong Cui MD Discharging Provider: Dong Cui MD       Hospital Course:     Addendum..  This note will act as a progress note for  as the discharge was canceled-patient needed to have his new tunneled dialysis catheter used prior to discharge diarrhea        Brief HPI: Morro Kevin is a 73 y.o. male with history of multiple comorbid's including metastatic prostate cancer-currently on hormone therapy, CKD Stage 3, HTN, CAD, JOSÉ MANUEL w/ CPAP (Dx ), AoVS w/ AoVR (mechanical in )-anticoagulated on Coumadin, depression, DM type 2, Kidney Stones (Uric Acid), Hypothyroidism, and HLP presents for evaluation of progressive shortness of breath and generalized weakness.  Symptoms initially began approximately 10 days ago.  Patient denies recent travel or known sick contacts.  He lives alone.  Patient denies recent falls.  He is a non-smoker, denies history of COPD or asthma.  Patient denies chest pain.  He states that he feels short of breath even at rest, shortness of breath increases with minimal exertion.  He also reports orthopnea.  He denies any lower extremity pain or swelling.  He states that he feels weak all over but especially in his legs.  Patient states that he can hardly perform his ADLs because of his symptoms.  He reports poor appetite, denies fever, chills, or other symptoms     Brief Problem Based Course:     He was treated for the following    -SUKUMAR on CKD stage III-baseline creatinine 1.3-1.5-likely prerenal with ATN in the setting of hypotension/volume depletion, rhabdomyolysis--HD initiated 24---tunneled dialysis catheter placed -continue to monitor signs of recovery--follow-up with nephrology  -Acute blood loss anemia due to lower GI  extended release tablet  Commonly known as: TOPROL XL     rosuvastatin 40 MG tablet  Commonly known as: Crestor     Synjardy 12.5-1000 MG Tabs  Generic drug: Empagliflozin-metFORMIN HCl               Where to Get Your Medications        Information about where to get these medications is not yet available    Ask your nurse or doctor about these medications  furosemide 20 MG tablet  midodrine 2.5 MG tablet  Toujeo SoloStar 300 UNIT/ML concentrated injection pen        Objective Findings at Discharge:   /70   Pulse 57   Temp 98.5 °F (36.9 °C) (Oral)   Resp 16   Ht 1.778 m (5' 10\")   Wt 90.6 kg (199 lb 11.8 oz)   SpO2 100%   BMI 28.66 kg/m²       Physical Exam:     General: NAD  Eyes: EOMI  ENT: neck supple  Cardiovascular: Regular rate.  Respiratory: Clear to auscultation  Gastrointestinal: Soft, non tender  Genitourinary: no suprapubic tenderness  Musculoskeletal: No edema  Skin: warm, dry  Neuro: Alert.  Psych: Mood appropriate.         Labs and Imaging   XR CHEST PORTABLE    Result Date: 8/2/2024  EXAMINATION: ONE XRAY VIEW OF THE CHEST 8/2/2024 9:53 am COMPARISON: None. HISTORY: ORDERING SYSTEM PROVIDED HISTORY: vas cath placement TECHNOLOGIST PROVIDED HISTORY: Reason for exam:->vas cath placement FINDINGS: There is a right-sided IJ catheter with the tip at the SVC/atrial junction. No pneumothorax or pleural effusion.  No acute infiltrate.  Normal cardiomediastinal silhouette status post median sternotomy     Right-sided IJ catheter with tip at the SVC/atrial junction       CBC:   Recent Labs     08/06/24  0410 08/07/24  0533 08/08/24  0458   WBC 7.0 7.0 7.2   HGB 8.4* 8.3* 8.1*    180 193     BMP:    Recent Labs     08/06/24  0410 08/07/24  0533 08/08/24  0458    136 135*   K 3.9 4.0 4.1    100 100   CO2 25 22 25   BUN 52* 69* 44*   CREATININE 4.9* 5.8* 4.6*   GLUCOSE 188* 139* 175*     Hepatic:   Recent Labs     08/06/24  0410 08/08/24  0458   * 108*   * 278*

## 2024-08-08 NOTE — PROGRESS NOTES
Occupational Therapy  Facility/Department: [address] PROGRESSIVE CARE  Daily Treatment Note  NAME: Morro Kevin  : 1950  MRN: [id number]    Date of Service: 2024    Discharge Recommendations:  Patient would benefit from continued therapy after discharge, 5-7 sessions per week  OT Equipment Recommendations  Other: TBD    Morro Kevin scored a 17/24 on the AM-PAC ADL Inpatient form. Current research shows that an AM-PAC score of 17 or less is typically not associated with a discharge to the patient's home setting. Based on the patient's AM-PAC score and their current ADL deficits, it is recommended that the patient have 5-7 sessions per week of Occupational Therapy at d/c to increase the patient's independence.  At this time, this patient demonstrates complex nursing, medical, and rehabilitative needs, and would benefit from intensive rehabilitation services upon discharge from the Inpatient setting.  This patient demonstrates the ability to participate in and benefit from an intensive therapy program with a coordinated interdisciplinary team approach to foster frequent, structured, and documented communication among disciplines, who will work together to establish, prioritize, and achieve treatment goals. Please see assessment section for further patient specific details.    If patient discharges prior to next session this note will serve as a discharge summary.  Please see below for the latest assessment towards goals.    Patient Diagnosis(es): The primary encounter diagnosis was History of prostate cancer. Diagnoses of Dyspnea, unspecified type, General weakness, Acute kidney injury superimposed on CKD (HCC), Elevated troponin, Supratherapeutic INR, History of aortic valve replacement, Transaminitis, and Shortness of breath were also pertinent to this visit.     Assessment    Assessment: Met pt in room, he just had Perm Cath placed this PM but asking to use toilet. He used RW from recliner into bathrm,

## 2024-08-08 NOTE — CARE COORDINATION
Received call from [place] with  [facility] requesting patient undergo HD with new catheter prior to discharging to them. Scheduled for HD on Friday.     Donna CAT RN  Case Management  [phone]    Electronically signed by Donna Lopez RN on 8/8/2024 at 4:43 PM

## 2024-08-08 NOTE — PROGRESS NOTES
Nephrology Progress Note   [employer]      Reason for consultation: SUKUMAR on CKD 3a -- baseline Cr ~ 1.3-1.5 mg/dL. Follows with Dr. Salinas in office (last seen 2024).     Subjective:    The patient has been seen and examined. Labs and chart reviewed. Received HD yesterday with net 1 L UF. Will have TDC placed today by IR ~ 1300. D/c plans to [facility] noted.     There were not complications overnight.    Patient review of systems: Denies SOB      Allergies:  Allergies   Allergen Reactions    Ciprofloxacin Hives        Scheduled Meds:   furosemide  20 mg Oral Daily    midodrine  7.5 mg Oral TID WC    psyllium husk-aspartame  1 packet Oral Daily    docusate sodium  100 mg Oral Daily    hydrocortisone sodium succinate PF  25 mg IntraVENous Q8H    sodium chloride flush  5-40 mL IntraVENous 2 times per day    aspirin  81 mg Oral Nightly    levothyroxine  137 mcg Oral Daily    mirtazapine  30 mg Oral Nightly    tamsulosin  0.4 mg Oral Nightly    insulin lispro  0-8 Units SubCUTAneous TID WC    insulin lispro  0-4 Units SubCUTAneous Nightly    insulin glargine  10 Units SubCUTAneous Daily    warfarin placeholder: dosing by pharmacy   Other RX Placeholder        sodium chloride      dextrose         PRN Meds:hydrocortisone, heparin (porcine), sodium chloride flush, sodium chloride, ondansetron **OR** ondansetron, polyethylene glycol, glucose, dextrose bolus **OR** dextrose bolus, glucagon (rDNA), dextrose, perflutren lipid microspheres    Physical Exam:    TEMPERATURE:  Current - Temp: 97.9 °F (36.6 °C); Max - Temp  Av.4 °F (36.9 °C)  Min: 97.9 °F (36.6 °C)  Max: 98.9 °F (37.2 °C)  RESPIRATIONS RANGE: Resp  Av.1  Min: 16  Max: 18  PULSE RANGE: Pulse  Av  Min: 61  Max: 88  BLOOD PRESSURE RANGE:  Systolic (24hrs), Av , Min:110 , Max:153   ; Diastolic (24hrs), Av, Min:63, Max:76    24HR INTAKE/OUTPUT:    Intake/Output Summary (Last 24 hours) at 2024 1119  Last data filed at 2024 3068  Gross

## 2024-08-08 NOTE — PRE SEDATION
Sedation Pre-Procedure Note    Patient Name: Morro Kevin   YOB: 1950  Room/Bed: R5W-5779/5263-01  Medical Record Number: [id number]  Date: 8/8/2024   Time: 1:22 PM       Indication:  Needs Permcath, current vascath    Consent: I have discussed with the patient and/or the patient representative the indication, alternatives, and the possible risks and/or complications of the planned procedure and the anesthesia methods. The patient and/or patient representative appear to understand and agree to proceed.    Vital Signs:   Vitals:    08/08/24 1130   BP: 125/71   Pulse: 65   Resp: 16   Temp: 98.5 °F (36.9 °C)   SpO2: 100%       Past Medical History:   has a past medical history of Aortic stenosis, Arthritis, Asthma, Deviated septum, Diabetes mellitus type II, Hashimoto thyroiditis, Homocysteinemia, Hyperlipidemia, Hypertension, Hypertriglyceridemia, Hypogonadism male, Hypothyroid, Kidney stone, Mechanical heart valve present, Murmur, Nasal polyps, Osteoarthritis, Prolonged emergence from general anesthesia, Sleep apnea, Warfarin anticoagulation, and Wears glasses.    Past Surgical History:   has a past surgical history that includes colectomy (1996); Nasal polyp surgery (1989, 1987); knee surgery (Left, 2004); Rotator cuff repair (Right, 2009); Colonoscopy (9/24/2014; 2004); cyst removal; other surgical history (1990s); Aortic valve replacement (09/14/2016); Upper gastrointestinal endoscopy (N/A, 6/30/2023); Colonoscopy (N/A, 6/30/2023); Capsule endoscopy (N/A, 6/30/2023); and Upper gastrointestinal endoscopy (N/A, 7/3/2023).    Medications:   Scheduled Meds:    warfarin  5 mg Oral Once    furosemide  20 mg Oral Daily    midodrine  7.5 mg Oral TID WC    psyllium husk-aspartame  1 packet Oral Daily    docusate sodium  100 mg Oral Daily    hydrocortisone sodium succinate PF  25 mg IntraVENous Q8H    sodium chloride flush  5-40 mL IntraVENous 2 times per day    aspirin  81 mg Oral Nightly    levothyroxine

## 2024-08-08 NOTE — PROGRESS NOTES
Department of Physical Medicine & Rehabilitation  Dr. Buitrago Progress Note  8/8/2024  10:09 AM    Patient Name:   Morro Kevin  YOB: 1950    Diagnosis: Rhabdomyolysis        Subjective: 73-year-old gentleman with a past medical history significant for prostate cancer, on chemotherapy treatment for it.  Patient has past medical history positive for a mechanical heart valve for which he is on Coumadin , he also has history of diabetes, hypertension, thyroiditis, and he came to the hospital with  complaints of weakness of his legs going on for the several days. Patient feels that his knees have been feeling more weak, he has been having progressively worsening shortness of breath. He feels that this could be an adverse effect of the chemotherapy agent he was on for prostate cancer, that was recently changed.  Workup revealed rhabdomyolysis with CK level of 26,000.  He was also found to have creatinine of 4.0.  Consults per nephrology, pulmonology, cardiology, and gastroenterology.  Patient started on hemodialysis, and had vascath placed.  Patient is set up to have a tunneled cath placement today for longer-term hemodialysis.    BP (!) 153/76 Comment: rn notified  Pulse 82   Temp 97.9 °F (36.6 °C) (Oral)   Resp 18   Ht 1.778 m (5' 10\")   Wt 90.6 kg (199 lb 11.8 oz)   SpO2 93%   BMI 28.66 kg/m²     Last 24 hour lab  Recent Results (from the past 24 hour(s))   Protime-INR    Collection Time: 08/07/24 12:42 PM   Result Value Ref Range    Protime 20.2 (H) 11.9 - 14.9 sec    INR 1.71 (H) 0.85 - 1.15   POCT Glucose    Collection Time: 08/07/24  6:12 PM   Result Value Ref Range    POC Glucose 215 (H) 70 - 99 mg/dl    Performed on ACCU-CHEK    POCT Glucose    Collection Time: 08/07/24  9:21 PM   Result Value Ref Range    POC Glucose 230 (H) 70 - 99 mg/dl    Performed on ACCU-CHEK    CBC    Collection Time: 08/08/24  4:58 AM   Result Value Ref Range    WBC 7.2 4.0 - 11.0 K/uL    RBC 2.59 (L) 4.20 - 5.90

## 2024-08-09 ENCOUNTER — HOSPITAL ENCOUNTER (INPATIENT)
Age: 74
LOS: 8 days | Discharge: HOME | DRG: 558 | End: 2024-08-17
Attending: PHYSICAL MEDICINE & REHABILITATION | Admitting: PHYSICAL MEDICINE & REHABILITATION
Payer: MEDICARE

## 2024-08-09 VITALS
WEIGHT: 193.56 LBS | DIASTOLIC BLOOD PRESSURE: 80 MMHG | OXYGEN SATURATION: 99 % | BODY MASS INDEX: 27.71 KG/M2 | HEART RATE: 94 BPM | RESPIRATION RATE: 18 BRPM | HEIGHT: 70 IN | TEMPERATURE: 98.4 F | SYSTOLIC BLOOD PRESSURE: 129 MMHG

## 2024-08-09 DIAGNOSIS — M62.82 NON-TRAUMATIC RHABDOMYOLYSIS: Primary | ICD-10-CM

## 2024-08-09 LAB
ALBUMIN SERPL-MCNC: 2.7 G/DL (ref 3.4–5)
ANION GAP SERPL CALCULATED.3IONS-SCNC: 11 MMOL/L (ref 3–16)
BUN SERPL-MCNC: 55 MG/DL (ref 7–20)
CALCIUM SERPL-MCNC: 8.6 MG/DL (ref 8.3–10.6)
CHLORIDE SERPL-SCNC: 101 MMOL/L (ref 99–110)
CK SERPL-CCNC: 902 U/L (ref 39–308)
CO2 SERPL-SCNC: 26 MMOL/L (ref 21–32)
CREAT SERPL-MCNC: 5.7 MG/DL (ref 0.8–1.3)
DEPRECATED RDW RBC AUTO: 15.9 % (ref 12.4–15.4)
DEPRECATED RDW RBC AUTO: 15.9 % (ref 12.4–15.4)
GFR SERPLBLD CREATININE-BSD FMLA CKD-EPI: 10 ML/MIN/{1.73_M2}
GLUCOSE BLD-MCNC: 101 MG/DL (ref 70–99)
GLUCOSE BLD-MCNC: 229 MG/DL (ref 70–99)
GLUCOSE BLD-MCNC: 263 MG/DL (ref 70–99)
GLUCOSE BLD-MCNC: 92 MG/DL (ref 70–99)
GLUCOSE SERPL-MCNC: 106 MG/DL (ref 70–99)
HCT VFR BLD AUTO: 22.7 % (ref 40.5–52.5)
HCT VFR BLD AUTO: 27.6 % (ref 40.5–52.5)
HGB BLD-MCNC: 7.9 G/DL (ref 13.5–17.5)
HGB BLD-MCNC: 9.2 G/DL (ref 13.5–17.5)
INR PPP: 2.79 (ref 0.85–1.15)
MCH RBC QN AUTO: 30.8 PG (ref 26–34)
MCH RBC QN AUTO: 31.7 PG (ref 26–34)
MCHC RBC AUTO-ENTMCNC: 33.2 G/DL (ref 31–36)
MCHC RBC AUTO-ENTMCNC: 34.7 G/DL (ref 31–36)
MCV RBC AUTO: 91.4 FL (ref 80–100)
MCV RBC AUTO: 92.6 FL (ref 80–100)
PERFORMED ON: ABNORMAL
PERFORMED ON: NORMAL
PHOSPHATE SERPL-MCNC: 4.4 MG/DL (ref 2.5–4.9)
PLATELET # BLD AUTO: 195 K/UL (ref 135–450)
PLATELET # BLD AUTO: 268 K/UL (ref 135–450)
PMV BLD AUTO: 7.2 FL (ref 5–10.5)
PMV BLD AUTO: 7.2 FL (ref 5–10.5)
POTASSIUM SERPL-SCNC: 3.9 MMOL/L (ref 3.5–5.1)
PROTHROMBIN TIME: 29.3 SEC (ref 11.9–14.9)
RBC # BLD AUTO: 2.49 M/UL (ref 4.2–5.9)
RBC # BLD AUTO: 2.99 M/UL (ref 4.2–5.9)
SODIUM SERPL-SCNC: 138 MMOL/L (ref 136–145)
WBC # BLD AUTO: 6.9 K/UL (ref 4–11)
WBC # BLD AUTO: 7.7 K/UL (ref 4–11)

## 2024-08-09 PROCEDURE — 85027 COMPLETE CBC AUTOMATED: CPT

## 2024-08-09 PROCEDURE — 90935 HEMODIALYSIS ONE EVALUATION: CPT

## 2024-08-09 PROCEDURE — 94760 N-INVAS EAR/PLS OXIMETRY 1: CPT

## 2024-08-09 PROCEDURE — 97116 GAIT TRAINING THERAPY: CPT

## 2024-08-09 PROCEDURE — 80069 RENAL FUNCTION PANEL: CPT

## 2024-08-09 PROCEDURE — 82550 ASSAY OF CK (CPK): CPT

## 2024-08-09 PROCEDURE — 36415 COLL VENOUS BLD VENIPUNCTURE: CPT

## 2024-08-09 PROCEDURE — 6370000000 HC RX 637 (ALT 250 FOR IP): Performed by: PHYSICAL MEDICINE & REHABILITATION

## 2024-08-09 PROCEDURE — 1280000000 HC REHAB R&B

## 2024-08-09 PROCEDURE — 6370000000 HC RX 637 (ALT 250 FOR IP): Performed by: HOSPITALIST

## 2024-08-09 PROCEDURE — 6370000000 HC RX 637 (ALT 250 FOR IP): Performed by: INTERNAL MEDICINE

## 2024-08-09 PROCEDURE — 97530 THERAPEUTIC ACTIVITIES: CPT

## 2024-08-09 PROCEDURE — 2580000003 HC RX 258: Performed by: PHYSICAL MEDICINE & REHABILITATION

## 2024-08-09 PROCEDURE — 85610 PROTHROMBIN TIME: CPT

## 2024-08-09 PROCEDURE — 6370000000 HC RX 637 (ALT 250 FOR IP)

## 2024-08-09 PROCEDURE — 2580000003 HC RX 258: Performed by: HOSPITALIST

## 2024-08-09 RX ORDER — HYDROCORTISONE ACETATE 25 MG/1
25 SUPPOSITORY RECTAL 2 TIMES DAILY PRN
Status: DISCONTINUED | OUTPATIENT
Start: 2024-08-09 | End: 2024-08-17 | Stop reason: HOSPADM

## 2024-08-09 RX ORDER — INSULIN LISPRO 100 [IU]/ML
0-4 INJECTION, SOLUTION INTRAVENOUS; SUBCUTANEOUS NIGHTLY
Status: DISCONTINUED | OUTPATIENT
Start: 2024-08-09 | End: 2024-08-17 | Stop reason: HOSPADM

## 2024-08-09 RX ORDER — BISACODYL 10 MG
10 SUPPOSITORY, RECTAL RECTAL DAILY PRN
Status: CANCELLED | OUTPATIENT
Start: 2024-08-09

## 2024-08-09 RX ORDER — ONDANSETRON 2 MG/ML
4 INJECTION INTRAMUSCULAR; INTRAVENOUS EVERY 6 HOURS PRN
Status: DISCONTINUED | OUTPATIENT
Start: 2024-08-09 | End: 2024-08-17 | Stop reason: HOSPADM

## 2024-08-09 RX ORDER — DEXTROSE MONOHYDRATE 100 MG/ML
INJECTION, SOLUTION INTRAVENOUS CONTINUOUS PRN
Status: DISCONTINUED | OUTPATIENT
Start: 2024-08-09 | End: 2024-08-17 | Stop reason: HOSPADM

## 2024-08-09 RX ORDER — FUROSEMIDE 20 MG/1
20 TABLET ORAL DAILY
Status: CANCELLED | OUTPATIENT
Start: 2024-08-10

## 2024-08-09 RX ORDER — HEPARIN SODIUM 1000 [USP'U]/ML
3600 INJECTION, SOLUTION INTRAVENOUS; SUBCUTANEOUS PRN
Status: DISCONTINUED | OUTPATIENT
Start: 2024-08-09 | End: 2024-08-09 | Stop reason: HOSPADM

## 2024-08-09 RX ORDER — INSULIN LISPRO 100 [IU]/ML
0-4 INJECTION, SOLUTION INTRAVENOUS; SUBCUTANEOUS NIGHTLY
Status: CANCELLED | OUTPATIENT
Start: 2024-08-09

## 2024-08-09 RX ORDER — INSULIN LISPRO 100 [IU]/ML
0-8 INJECTION, SOLUTION INTRAVENOUS; SUBCUTANEOUS
Status: DISCONTINUED | OUTPATIENT
Start: 2024-08-10 | End: 2024-08-17 | Stop reason: HOSPADM

## 2024-08-09 RX ORDER — ASPIRIN 81 MG/1
81 TABLET ORAL NIGHTLY
Status: CANCELLED | OUTPATIENT
Start: 2024-08-09

## 2024-08-09 RX ORDER — SODIUM CHLORIDE 0.9 % (FLUSH) 0.9 %
5-40 SYRINGE (ML) INJECTION PRN
Status: DISCONTINUED | OUTPATIENT
Start: 2024-08-09 | End: 2024-08-17 | Stop reason: HOSPADM

## 2024-08-09 RX ORDER — POLYETHYLENE GLYCOL 3350 17 G/17G
17 POWDER, FOR SOLUTION ORAL DAILY PRN
Status: DISCONTINUED | OUTPATIENT
Start: 2024-08-09 | End: 2024-08-17 | Stop reason: HOSPADM

## 2024-08-09 RX ORDER — ONDANSETRON 4 MG/1
4 TABLET, ORALLY DISINTEGRATING ORAL EVERY 8 HOURS PRN
Status: DISCONTINUED | OUTPATIENT
Start: 2024-08-09 | End: 2024-08-17 | Stop reason: HOSPADM

## 2024-08-09 RX ORDER — SODIUM CHLORIDE 0.9 % (FLUSH) 0.9 %
5-40 SYRINGE (ML) INJECTION PRN
Status: CANCELLED | OUTPATIENT
Start: 2024-08-09

## 2024-08-09 RX ORDER — INSULIN LISPRO 100 [IU]/ML
2 INJECTION, SOLUTION INTRAVENOUS; SUBCUTANEOUS ONCE
Status: COMPLETED | OUTPATIENT
Start: 2024-08-09 | End: 2024-08-09

## 2024-08-09 RX ORDER — DOCUSATE SODIUM 100 MG/1
100 CAPSULE, LIQUID FILLED ORAL DAILY
Status: CANCELLED | OUTPATIENT
Start: 2024-08-10

## 2024-08-09 RX ORDER — MIDODRINE HYDROCHLORIDE 5 MG/1
7.5 TABLET ORAL
Status: DISCONTINUED | OUTPATIENT
Start: 2024-08-09 | End: 2024-08-10

## 2024-08-09 RX ORDER — ONDANSETRON 4 MG/1
4 TABLET, ORALLY DISINTEGRATING ORAL EVERY 8 HOURS PRN
Status: CANCELLED | OUTPATIENT
Start: 2024-08-09

## 2024-08-09 RX ORDER — SODIUM CHLORIDE 0.9 % (FLUSH) 0.9 %
5-40 SYRINGE (ML) INJECTION EVERY 12 HOURS SCHEDULED
Status: DISCONTINUED | OUTPATIENT
Start: 2024-08-09 | End: 2024-08-17 | Stop reason: HOSPADM

## 2024-08-09 RX ORDER — GLUCAGON 1 MG/ML
1 KIT INJECTION PRN
Status: CANCELLED | OUTPATIENT
Start: 2024-08-09

## 2024-08-09 RX ORDER — BISACODYL 5 MG/1
5 TABLET, DELAYED RELEASE ORAL DAILY PRN
Status: DISCONTINUED | OUTPATIENT
Start: 2024-08-09 | End: 2024-08-17 | Stop reason: HOSPADM

## 2024-08-09 RX ORDER — GLUCAGON 1 MG/ML
1 KIT INJECTION PRN
Status: DISCONTINUED | OUTPATIENT
Start: 2024-08-09 | End: 2024-08-17 | Stop reason: HOSPADM

## 2024-08-09 RX ORDER — INSULIN LISPRO 100 [IU]/ML
0-8 INJECTION, SOLUTION INTRAVENOUS; SUBCUTANEOUS
Status: CANCELLED | OUTPATIENT
Start: 2024-08-09

## 2024-08-09 RX ORDER — MIDODRINE HYDROCHLORIDE 5 MG/1
7.5 TABLET ORAL
Status: CANCELLED | OUTPATIENT
Start: 2024-08-09

## 2024-08-09 RX ORDER — MIRTAZAPINE 30 MG/1
30 TABLET, FILM COATED ORAL NIGHTLY
Status: CANCELLED | OUTPATIENT
Start: 2024-08-09

## 2024-08-09 RX ORDER — BISACODYL 10 MG
10 SUPPOSITORY, RECTAL RECTAL DAILY PRN
Status: DISCONTINUED | OUTPATIENT
Start: 2024-08-09 | End: 2024-08-17 | Stop reason: HOSPADM

## 2024-08-09 RX ORDER — DOCUSATE SODIUM 100 MG/1
100 CAPSULE, LIQUID FILLED ORAL DAILY
Status: DISCONTINUED | OUTPATIENT
Start: 2024-08-10 | End: 2024-08-17 | Stop reason: HOSPADM

## 2024-08-09 RX ORDER — SODIUM CHLORIDE 0.9 % (FLUSH) 0.9 %
5-40 SYRINGE (ML) INJECTION EVERY 12 HOURS SCHEDULED
Status: CANCELLED | OUTPATIENT
Start: 2024-08-09

## 2024-08-09 RX ORDER — FUROSEMIDE 20 MG/1
20 TABLET ORAL DAILY
Status: DISCONTINUED | OUTPATIENT
Start: 2024-08-10 | End: 2024-08-10

## 2024-08-09 RX ORDER — DEXTROSE MONOHYDRATE 100 MG/ML
INJECTION, SOLUTION INTRAVENOUS CONTINUOUS PRN
Status: CANCELLED | OUTPATIENT
Start: 2024-08-09

## 2024-08-09 RX ORDER — ACETAMINOPHEN 325 MG/1
650 TABLET ORAL EVERY 4 HOURS PRN
Status: CANCELLED | OUTPATIENT
Start: 2024-08-09

## 2024-08-09 RX ORDER — HEPARIN SODIUM 1000 [USP'U]/ML
2500 INJECTION, SOLUTION INTRAVENOUS; SUBCUTANEOUS PRN
Status: CANCELLED | OUTPATIENT
Start: 2024-08-09

## 2024-08-09 RX ORDER — ACETAMINOPHEN 325 MG/1
650 TABLET ORAL EVERY 4 HOURS PRN
Status: DISCONTINUED | OUTPATIENT
Start: 2024-08-09 | End: 2024-08-17 | Stop reason: HOSPADM

## 2024-08-09 RX ORDER — BISACODYL 5 MG/1
5 TABLET, DELAYED RELEASE ORAL DAILY PRN
Status: CANCELLED | OUTPATIENT
Start: 2024-08-09

## 2024-08-09 RX ORDER — SENNA AND DOCUSATE SODIUM 50; 8.6 MG/1; MG/1
2 TABLET, FILM COATED ORAL 2 TIMES DAILY
Status: CANCELLED | OUTPATIENT
Start: 2024-08-09

## 2024-08-09 RX ORDER — WARFARIN SODIUM 2.5 MG/1
2.5 TABLET ORAL
Status: CANCELLED | OUTPATIENT
Start: 2024-08-09 | End: 2024-08-10

## 2024-08-09 RX ORDER — INSULIN GLARGINE 100 [IU]/ML
10 INJECTION, SOLUTION SUBCUTANEOUS DAILY
Status: DISCONTINUED | OUTPATIENT
Start: 2024-08-10 | End: 2024-08-17 | Stop reason: HOSPADM

## 2024-08-09 RX ORDER — WARFARIN SODIUM 2.5 MG/1
2.5 TABLET ORAL
Status: COMPLETED | OUTPATIENT
Start: 2024-08-09 | End: 2024-08-09

## 2024-08-09 RX ORDER — ONDANSETRON 2 MG/ML
4 INJECTION INTRAMUSCULAR; INTRAVENOUS EVERY 6 HOURS PRN
Status: CANCELLED | OUTPATIENT
Start: 2024-08-09

## 2024-08-09 RX ORDER — TAMSULOSIN HYDROCHLORIDE 0.4 MG/1
0.4 CAPSULE ORAL NIGHTLY
Status: DISCONTINUED | OUTPATIENT
Start: 2024-08-09 | End: 2024-08-17 | Stop reason: HOSPADM

## 2024-08-09 RX ORDER — WARFARIN SODIUM 2.5 MG/1
2.5 TABLET ORAL
Status: DISCONTINUED | OUTPATIENT
Start: 2024-08-09 | End: 2024-08-09 | Stop reason: HOSPADM

## 2024-08-09 RX ORDER — POLYETHYLENE GLYCOL 3350 17 G/17G
17 POWDER, FOR SOLUTION ORAL DAILY PRN
Status: CANCELLED | OUTPATIENT
Start: 2024-08-09

## 2024-08-09 RX ORDER — TAMSULOSIN HYDROCHLORIDE 0.4 MG/1
0.4 CAPSULE ORAL NIGHTLY
Status: CANCELLED | OUTPATIENT
Start: 2024-08-09

## 2024-08-09 RX ORDER — SENNA AND DOCUSATE SODIUM 50; 8.6 MG/1; MG/1
2 TABLET, FILM COATED ORAL 2 TIMES DAILY
Status: DISCONTINUED | OUTPATIENT
Start: 2024-08-09 | End: 2024-08-17 | Stop reason: HOSPADM

## 2024-08-09 RX ORDER — INSULIN GLARGINE 100 [IU]/ML
10 INJECTION, SOLUTION SUBCUTANEOUS DAILY
Status: CANCELLED | OUTPATIENT
Start: 2024-08-10

## 2024-08-09 RX ORDER — ASPIRIN 81 MG/1
81 TABLET ORAL NIGHTLY
Status: DISCONTINUED | OUTPATIENT
Start: 2024-08-09 | End: 2024-08-17 | Stop reason: HOSPADM

## 2024-08-09 RX ORDER — MIRTAZAPINE 30 MG/1
30 TABLET, FILM COATED ORAL NIGHTLY
Status: DISCONTINUED | OUTPATIENT
Start: 2024-08-09 | End: 2024-08-17 | Stop reason: HOSPADM

## 2024-08-09 RX ORDER — HYDROCORTISONE ACETATE 25 MG/1
25 SUPPOSITORY RECTAL 2 TIMES DAILY PRN
Status: CANCELLED | OUTPATIENT
Start: 2024-08-09

## 2024-08-09 RX ADMIN — MIDODRINE HYDROCHLORIDE 7.5 MG: 5 TABLET ORAL at 15:36

## 2024-08-09 RX ADMIN — ASPIRIN 81 MG: 81 TABLET, COATED ORAL at 21:16

## 2024-08-09 RX ADMIN — WARFARIN SODIUM 2.5 MG: 2.5 TABLET ORAL at 18:58

## 2024-08-09 RX ADMIN — MIRTAZAPINE 30 MG: 30 TABLET, FILM COATED ORAL at 21:16

## 2024-08-09 RX ADMIN — SODIUM CHLORIDE, PRESERVATIVE FREE 10 ML: 5 INJECTION INTRAVENOUS at 10:00

## 2024-08-09 RX ADMIN — LEVOTHYROXINE SODIUM 137 MCG: 0.11 TABLET ORAL at 06:20

## 2024-08-09 RX ADMIN — PSYLLIUM HUSK 1 PACKET: 3.4 POWDER ORAL at 19:11

## 2024-08-09 RX ADMIN — SENNOSIDES AND DOCUSATE SODIUM 2 TABLET: 50; 8.6 TABLET ORAL at 21:16

## 2024-08-09 RX ADMIN — FUROSEMIDE 20 MG: 20 TABLET ORAL at 09:26

## 2024-08-09 RX ADMIN — TAMSULOSIN HYDROCHLORIDE 0.4 MG: 0.4 CAPSULE ORAL at 21:16

## 2024-08-09 RX ADMIN — INSULIN LISPRO 2 UNITS: 100 INJECTION, SOLUTION INTRAVENOUS; SUBCUTANEOUS at 18:58

## 2024-08-09 RX ADMIN — DOCUSATE SODIUM 100 MG: 100 CAPSULE, LIQUID FILLED ORAL at 09:25

## 2024-08-09 RX ADMIN — INSULIN GLARGINE 10 UNITS: 100 INJECTION, SOLUTION SUBCUTANEOUS at 09:28

## 2024-08-09 RX ADMIN — MIDODRINE HYDROCHLORIDE 7.5 MG: 5 TABLET ORAL at 09:25

## 2024-08-09 RX ADMIN — SODIUM CHLORIDE, PRESERVATIVE FREE 10 ML: 5 INJECTION INTRAVENOUS at 19:22

## 2024-08-09 ASSESSMENT — PAIN SCALES - GENERAL: PAINLEVEL_OUTOF10: 0

## 2024-08-09 NOTE — PROGRESS NOTES
Department of Physical Medicine & Rehabilitation  Dr. Buitrago Progress Note  8/9/2024  12:17 PM    Patient Name:   Morro Kevin  YOB: 1950    Diagnosis: Rhabdomyolysis        Subjective: 73-year-old gentleman with a past medical history significant for prostate cancer, on chemotherapy treatment for it.  Patient has past medical history positive for a mechanical heart valve for which he is on Coumadin , he also has history of diabetes, hypertension, thyroiditis, and he came to the hospital with  complaints of weakness of his legs going on for the several days. Patient feels that his knees have been feeling more weak, he has been having progressively worsening shortness of breath. He feels that this could be an adverse effect of the chemotherapy agent he was on for prostate cancer, that was recently changed.  Workup revealed rhabdomyolysis with CK level of 26,000.  He was also found to have creatinine of 4.0.  Consults per nephrology, pulmonology, cardiology, and gastroenterology.  Patient started on hemodialysis, and had vascath placed.    Patient had his tunneled catheter placed yesterday for hemodialysis, and patient was seen in hemodialysis this morning.  His catheter is working well, and he will be ready for admission to our rehabilitation unit later today.  I discussed this with the hospitalist, and he is in agreement.        /62   Pulse 70   Temp 98.1 °F (36.7 °C) (Oral)   Resp 16   Ht 1.778 m (5' 10\")   Wt 89.8 kg (197 lb 15.6 oz)   SpO2 95%   BMI 28.41 kg/m²     Last 24 hour lab  Recent Results (from the past 24 hour(s))   Hemoglobin and Hematocrit    Collection Time: 08/08/24  4:34 PM   Result Value Ref Range    Hemoglobin 8.4 (L) 13.5 - 17.5 g/dL    Hematocrit 24.7 (L) 40.5 - 52.5 %   POCT Glucose    Collection Time: 08/08/24  5:51 PM   Result Value Ref Range    POC Glucose 250 (H) 70 - 99 mg/dl    Performed on ACCU-CHEK    POCT Glucose    Collection Time: 08/08/24  7:55 PM    Result Value Ref Range    POC Glucose 258 (H) 70 - 99 mg/dl    Performed on ACCU-CHEK    CBC    Collection Time: 08/09/24  4:37 AM   Result Value Ref Range    WBC 6.9 4.0 - 11.0 K/uL    RBC 2.49 (L) 4.20 - 5.90 M/uL    Hemoglobin 7.9 (L) 13.5 - 17.5 g/dL    Hematocrit 22.7 (L) 40.5 - 52.5 %    MCV 91.4 80.0 - 100.0 fL    MCH 31.7 26.0 - 34.0 pg    MCHC 34.7 31.0 - 36.0 g/dL    RDW 15.9 (H) 12.4 - 15.4 %    Platelets 195 135 - 450 K/uL    MPV 7.2 5.0 - 10.5 fL   CK    Collection Time: 08/09/24  4:37 AM   Result Value Ref Range    Total  (H) 39 - 308 U/L   Renal Function Panel    Collection Time: 08/09/24  4:37 AM   Result Value Ref Range    Sodium 138 136 - 145 mmol/L    Potassium 3.9 3.5 - 5.1 mmol/L    Chloride 101 99 - 110 mmol/L    CO2 26 21 - 32 mmol/L    Anion Gap 11 3 - 16    Glucose 106 (H) 70 - 99 mg/dL    BUN 55 (H) 7 - 20 mg/dL    Creatinine 5.7 (HH) 0.8 - 1.3 mg/dL    Est, Glom Filt Rate 10 (A) >60    Calcium 8.6 8.3 - 10.6 mg/dL    Phosphorus 4.4 2.5 - 4.9 mg/dL    Albumin 2.7 (L) 3.4 - 5.0 g/dL   Protime-INR    Collection Time: 08/09/24  4:37 AM   Result Value Ref Range    Protime 29.3 (H) 11.9 - 14.9 sec    INR 2.79 (H) 0.85 - 1.15   POCT Glucose    Collection Time: 08/09/24  8:09 AM   Result Value Ref Range    POC Glucose 101 (H) 70 - 99 mg/dl    Performed on ACCU-CHEK          Plan: I will put his admission orders in now, and we will get him ready for admission to our rehabilitation unit this afternoon.       Dr. Buitrago

## 2024-08-09 NOTE — PROGRESS NOTES
Clinical Pharmacy Note  Warfarin Consult    Morro Kevin is a 73 y.o. male receiving warfarin managed by pharmacy.      Warfarin Indication: AVR  Target INR range: 2-3   Dose prior to admission: 11.25 mg on Wednesday, 7.5 mg remaining days of the week. Managed at  [facility].     Current warfarin drug-drug interactions: hydrocortisone     Recent Labs     08/07/24  1242 08/08/24  0458 08/08/24  1634 08/09/24  0437   HGB  --  8.1* 8.4* 7.9*   HCT  --  23.6* 24.7* 22.7*   INR 1.71* 2.13*  --  2.79*       Recent Labs     08/08/24  0458   *   *   BILIDIR <0.2   BILITOT 0.3   ALKPHOS 50         Assessment/Plan:  INR 2.13--> 2.79 past 24hr      Will give small dose of Warfarin 2.5 mg po x 1 tonight and reassess PT/INR in the AM    Thank you for the consult.  Will continue to follow.    Electronically signed by Michael Redd RPH on 8/9/2024 at 8:17 AM

## 2024-08-09 NOTE — PROGRESS NOTES
Patient admitted to room 3262 per w/c. Transferred to chair with one assist and gait belt. Patient was oriented to the Call Light, Phone, TV, Thermostat, Bed Controls, Bathroom and Emergency Cord.  Patient verbalized and demonstrated understanding of all.  Patient was also given an over view of Unit Routines for Acute Rehab, including what to wear for therapy. The patient's role in goal setting was reviewed along with an explanation of the Interdisciplinary Team meeting, the 's role in coordinating services and the Discharge Planning/Continuum of Care process. Patient Rights and Responsibilities were reviewed. Meal times were explained, including how to order food.  The white board (used for communication) was pointed out emphasizing  the 3 hours/day Therapy Schedule (posted most evenings), the number (and process) for reporting grievances, and the Doctor's, Nurse's, and PCA's names. It was recommended that any family that will be care givers or any care givers the patient has, take part in therapy. There are no set visiting hours, and it was suggested that non-caregiver friends and family visitors come after therapy (at 4 PM or later) to allow patient to rest in between sessions.

## 2024-08-09 NOTE — CARE COORDINATION
Case Management Discharge Note          Date / Time of Note: 8/9/2024 12:05 PM                  Patient Name: Morro Kevin   YOB: 1950  Diagnosis: Shortness of breath [R06.02]  Transaminitis [R74.01]  General weakness [R53.1]  Elevated troponin [R79.89]  History of aortic valve replacement [Z95.2]  History of prostate cancer [Z85.46]  Supratherapeutic INR [R79.1]  Acute kidney injury superimposed on CKD (HCC) [N17.9, N18.9]  Dyspnea, unspecified type [R06.00]   Date / Time: 7/31/2024  9:25 AM    Financial:  Payor: MEDICARE / Plan: MEDICARE PART A AND B / Product Type: *No Product type* /      Pharmacy:    [facility] - [place] - [facility] - P [phone] - F [phone]  [facility]  [address] [id number]  Phone: [phone] Fax: [phone]    [facility]/pharmacy #[id number] [place], [place] - [address] [place] [address] [phone] -  [phone]  [address] [place] [address]  Phone: [phone] Fax: [phone]      DISCHARGE Disposition: Inpatient Rehab: [facility] Phone: [phone] Fax:      Dialysis:  Dialysis patient: Yes    Transportation:  Transportation PLAN for discharge:  Staff    Mode of Transport: Other: Wheel chair/bed    IMM Completed:   Yes, Case management has presented and reviewed IMM letter #2.   IMM Letter given to Patient/Family/Significant other/Guardian/POA/by:: Education  provided , declined 4 hours   IMM Letter date given:: 08/09/24  IMM Letter time given:: 1530.   Patient and/or family/POA verbalized understanding of their medicare rights and appeal process if needed. Patient and/or family/POA has signed, initialed and placed the date and time on IMM letter #2 on the the appropriate lines. Copy of letter offered and they are aware that the original copy of IMM letter #2 is available prior to discharge from the paper chart on the unit.  Electronic documentation has been entered into epic for IMM letter #2 and

## 2024-08-09 NOTE — PROGRESS NOTES
Ethnicity  \"Are you of , /a, or Dutch origin?\"  Check all that apply:  [x] A.  No, not of , /a, or Dutch Origin  [] B.  Yes, Indonesian, Indonesian American, Chicano/a  [] C.  Yes, Citizen of [place]  [] D.  Yes, Ta  [] E.  Yes, another , , or Dutch origin  [] X.  Patient unable to respond    Race  \"What is your race?\"  Check all that apply:  [x] A.  White  [] B.  Black or   [] C.   or   [] D.     [] E.  Chinese  [] F.  Malagasy  [] G. [place]er  [] H.  Ukrainian  [] I.  Comoran  [] J.  Other   [] K.    [] L.  Uruguayan or Gerri  [] M.  Croatian  [] N.  Other   [] X.  Patient unable to respond    High Risk Drug Classes:  Use and Indication    Is taking: Check if the pt is taking any medications by pharmacological classification, not how it is used, in the following classes  Indication noted: If column 1 is checked, check if there is an indication noted for all meds in the drug class Is taking  (check all that apply) Indication noted (check all that apply)   Antipsychotic [] []   Anticoagulant [x] [x]   Antibiotic [] []   Opioid [] []   Antiplatelet [x] [x]   Hypoglycemic (including insulin) [x] [x]   None of the above []     Special Treatments, Procedures, and Programs    Check all of the following treatments, procedures, and programs that apply on admission. On admission (check all that apply)   Cancer Treatments   A1. Chemotherapy []           A2. IV []           A3. Oral []           A10. Other []   B1. Radiation []   Respiratory Therapies   C1. Oxygen Therapy []           C2. Continuous []           C3. Intermittent []           C4. High-concentration []   D1. Suctioning []           D2. Scheduled []           D3. As needed []   E1. Tracheostomy Care []   F1. Invasive Mechanical Ventilator (ventilator or respirator) []   G1. Non-invasive Mechanical Ventilator []           G2. BiPAP []

## 2024-08-09 NOTE — PROGRESS NOTES
Discharged patient to ARU. Report given to ARU nurse. Patient transported in wheelchair with all belongings.     Electronically signed by MONY STATON RN on 8/9/24 at 4:56 PM EDT

## 2024-08-09 NOTE — PROGRESS NOTES
Physical Therapy  Facility/Department: [address] PROGRESSIVE CARE  Physical Therapy Treatment Note    Name: Morro Kevin  : 1950  MRN: [id number]  Date of Service: 2024    Discharge Recommendations:  Continue to assess pending progress, IP Rehab   PT Equipment Recommendations  Other: Will monitor for potential equipt needs.      Morro Kevin scored a 18/24 on the AM-PAC short mobility form. Current research shows that an AM-PAC score of 17 or less is typically not associated with a discharge to the patient's home setting. Based on the patient's AM-PAC score and their current functional mobility deficits, it is recommended that the patient have 5-7 sessions per week of Physical Therapy at d/c to increase the patient's independence.  At this time, this patient demonstrates complex nursing, medical, and rehabilitative needs, and would benefit from intensive rehabilitation services upon discharge from the Inpatient setting.  This patient demonstrates the ability to participate in and benefit from an intensive therapy program with a coordinated interdisciplinary team approach to foster frequent, structured, and documented communication among disciplines, who will work together to establish, prioritize, and achieve treatment goals. Please see assessment section for further patient specific details.    If patient discharges prior to next session this note will serve as a discharge summary.  Please see below for the latest assessment towards goals.      Assessment   Body Structures, Functions, Activity Limitations Requiring Skilled Therapeutic Intervention: Decreased functional mobility ;Decreased strength;Decreased endurance;Decreased balance  Assessment: 74 y/o male admit 2024 with SUKUMAR, Hypotension, PAF, Weak and SOB. GI consult : GI Bleed : Recent Colonoscopy showing that he has Hemorrhoids as well as some small Telangiectasias in the distal Rectum near the Anus. Hgb stable now and Rectal Bleeding  homemaking.)  Ambulation Assistance: Independent (Using Cane past wk pta; prior no device.)  Transfer Assistance: Independent  Active : Yes  Occupation: Retired  Vision/Hearing  Vision  Vision: Impaired  Vision Exceptions: Wears glasses at all times  Hearing  Hearing: Within functional limits    Cognition   Orientation  Overall Orientation Status: Within Functional Limits  Cognition  Overall Cognitive Status: WFL     Objective                       Bed mobility  Supine to Sit: Stand by assistance  Bed Mobility Comments: Pt to/from eob (flat); use of bedrail moving to eob.  Did not use R UE to assist due to recent line place ([facility]).  Transfers  Sit to Stand: Stand by assistance  Stand to Sit: Stand by assistance  Comment: Transfers completed from recliner,  bedside chair  with Walker SBA.    Pt tends to brace back of legs against furniture to stabilize move sit to stand.  When moving away from back of chair, pt requiring increase assist/sign more effortful moving to upright (Min assist).  Attempts Transfer sit to stand with hands on thighs (no assist); pt unable to obtain upright stand.  Ambulation  Surface: Level tile  Device: Rolling Walker  Distance: Pt amb 15', 25'  to/from bathroom with Walker SBA.  Diminished step length/clearance; L LE Ext Rot slight (baseline prior).  Tends to lean heavily on walker for support at times.  Amb additional 25' with Cane CGA.  Comments: Pt hesitant further activity this morning following issues with bleeding from TDC site overnight.                 AM-PAC - Mobility    AM-PAC Basic Mobility - Inpatient   How much help is needed turning from your back to your side while in a flat bed without using bedrails?: None  How much help is needed moving from lying on your back to sitting on the side of a flat bed without using bedrails?: A Little  How much help is needed moving to and from a bed to a chair?: A Little  How much help is needed standing up from a chair using your arms?:

## 2024-08-09 NOTE — PROGRESS NOTES
Nephrology Progress Note   [email or web address]      Reason for consultation: SUKUMAR on CKD 3a -- baseline Cr ~ 1.3-1.5 mg/dL. Follows with Dr. Salinas in office (last seen 2024).     Subjective:    The patient has been seen and examined. Labs and chart reviewed. TDC placed yesterday. There were some issues with the site oozing yesterday evening. A pressure dressing is currently in place. Plans for HD today with net 2 L UF.     There were not complications overnight.    Patient review of systems: Denies SOB      Allergies:  Allergies   Allergen Reactions    Ciprofloxacin Hives        Scheduled Meds:   warfarin  2.5 mg Oral Once    furosemide  20 mg Oral Daily    midodrine  7.5 mg Oral TID WC    psyllium husk-aspartame  1 packet Oral Daily    docusate sodium  100 mg Oral Daily    sodium chloride flush  5-40 mL IntraVENous 2 times per day    aspirin  81 mg Oral Nightly    levothyroxine  137 mcg Oral Daily    mirtazapine  30 mg Oral Nightly    tamsulosin  0.4 mg Oral Nightly    insulin lispro  0-8 Units SubCUTAneous TID WC    insulin lispro  0-4 Units SubCUTAneous Nightly    insulin glargine  10 Units SubCUTAneous Daily    warfarin placeholder: dosing by pharmacy   Other RX Placeholder        sodium chloride      dextrose         PRN Meds:hydrocortisone, heparin (porcine), sodium chloride flush, sodium chloride, ondansetron **OR** ondansetron, polyethylene glycol, glucose, dextrose bolus **OR** dextrose bolus, glucagon (rDNA), dextrose, perflutren lipid microspheres    Physical Exam:    TEMPERATURE:  Current - Temp: 98.1 °F (36.7 °C); Max - Temp  Av.1 °F (36.7 °C)  Min: 97.9 °F (36.6 °C)  Max: 98.3 °F (36.8 °C)  RESPIRATIONS RANGE: Resp  Av.3  Min: 15  Max: 18  PULSE RANGE: Pulse  Av  Min: 57  Max: 70  BLOOD PRESSURE RANGE:  Systolic (24hrs), Av , Min:113 , Max:154   ; Diastolic (24hrs), Av, Min:54, Max:78    24HR INTAKE/OUTPUT:    Intake/Output Summary (Last 24 hours) at 2024 1136  Last data

## 2024-08-09 NOTE — DISCHARGE SUMMARY
V2.0  Discharge Summary    Name:  Morro Kevin /Age/Sex: 1950 (73 y.o. male)   Admit Date: 2024  Discharge Date: 24    MRN & CSN:  [id number] & [id number] Encounter Date and Time 24 9.00AM EDT    Attending:  Dong Cui MD Discharging Provider: Dong Cui MD       Hospital Course:     Brief HPI: Morro Kevin is a 73 y.o. male with history of multiple comorbid's including metastatic prostate cancer-currently on hormone therapy, CKD Stage 3, HTN, CAD, JOSÉ MANUEL w/ CPAP (Dx ), AoVS w/ AoVR (mechanical in )-anticoagulated on Coumadin, depression, DM type 2, Kidney Stones (Uric Acid), Hypothyroidism, and HLP presents for evaluation of progressive shortness of breath and generalized weakness.  Symptoms initially began approximately 10 days ago.  Patient denies recent travel or known sick contacts.  He lives alone.  Patient denies recent falls.  He is a non-smoker, denies history of COPD or asthma.  Patient denies chest pain.  He states that he feels short of breath even at rest, shortness of breath increases with minimal exertion.  He also reports orthopnea.  He denies any lower extremity pain or swelling.  He states that he feels weak all over but especially in his legs.  Patient states that he can hardly perform his ADLs because of his symptoms.  He reports poor appetite, denies fever, chills, or other symptoms     Brief Problem Based Course:     He was treated for the following    -SUKUMAR on CKD stage III-baseline creatinine 1.3-1.5-likely prerenal with ATN in the setting of hypotension/volume depletion, rhabdomyolysis--HD initiated 24---tunneled dialysis catheter placed -continue to monitor signs of recovery--follow-up with nephrology  -Acute blood loss anemia due to lower GI bleed-intermittent bright red blood per rectum associated with wuxdozfkjgjorzj-yjrlngpfc-qrde by GI, patient had a colonoscopy as well as an EGD within the last 1 year, was noted to have internal

## 2024-08-09 NOTE — PROGRESS NOTES
Pt has a new tunneled HD cath site that was placed 8/8/24 at 1340. Dressing has been slowly oozing blood and has been redressed multiple times throughout day according to day shift RN. Pressure dressing placed over site before shift change. Site assessed at shift change and it is still oozing slowly. Messaged NP Heide Marie about ASA medication due at 2100 r/t high bleeding risk. Medication held with orders per JOI Marie. Care continues    Electronically signed by Josue Schmidt RN on 8/8/2024 at 11:38 PM

## 2024-08-09 NOTE — DIALYSIS
Treatment time: 3.5 hours  Net UF: 2000 ml     Pre weight: 89.8 kg  Post weight: 87.8 kg  EDW: SUKUMAR kg    Access used: R TDC    Access function: Well with  ml/min     Medications or blood products given: n/a     Regular outpatient schedule: MWF      Summary of response to treatment: Patient tolerated treatment well and without any complications. Patient remained stable throughout entire treatment and upon the exiting the dialysis suite via transport.     Report given to Brianna Domínguez RN and copy of dialysis treatment record placed in chart, to be scanned into EMR.

## 2024-08-09 NOTE — PROGRESS NOTES
Nephrology Progress Note   [employer]      Reason for consultation: SUKUMAR on CKD 3a -- baseline Cr ~ 1.3-1.5 mg/dL. Follows with Dr. Salinas in office (last seen 2024).     Subjective:    The patient has been seen and examined on HD  .   Labs and chart reviewed.  There were not complications overnight.  Patient review of systems: No   SOB/TOLENTINO .  LE edema better .     No family present .       Allergies:  Allergies   Allergen Reactions    Ciprofloxacin Hives        Scheduled Meds:   warfarin  2.5 mg Oral Once    furosemide  20 mg Oral Daily    midodrine  7.5 mg Oral TID WC    psyllium husk-aspartame  1 packet Oral Daily    docusate sodium  100 mg Oral Daily    sodium chloride flush  5-40 mL IntraVENous 2 times per day    aspirin  81 mg Oral Nightly    levothyroxine  137 mcg Oral Daily    mirtazapine  30 mg Oral Nightly    tamsulosin  0.4 mg Oral Nightly    insulin lispro  0-8 Units SubCUTAneous TID WC    insulin lispro  0-4 Units SubCUTAneous Nightly    insulin glargine  10 Units SubCUTAneous Daily    warfarin placeholder: dosing by pharmacy   Other RX Placeholder        sodium chloride      dextrose         PRN Meds:hydrocortisone, heparin (porcine), sodium chloride flush, sodium chloride, ondansetron **OR** ondansetron, polyethylene glycol, glucose, dextrose bolus **OR** dextrose bolus, glucagon (rDNA), dextrose, perflutren lipid microspheres    Physical Exam:    TEMPERATURE:  Current - Temp: 98.1 °F (36.7 °C); Max - Temp  Av.1 °F (36.7 °C)  Min: 97.9 °F (36.6 °C)  Max: 98.3 °F (36.8 °C)  RESPIRATIONS RANGE: Resp  Av.3  Min: 15  Max: 18  PULSE RANGE: Pulse  Av  Min: 57  Max: 70  BLOOD PRESSURE RANGE:  Systolic (24hrs), Av , Min:113 , Max:154   ; Diastolic (24hrs), Av, Min:54, Max:78    24HR INTAKE/OUTPUT:    Intake/Output Summary (Last 24 hours) at 2024 1318  Last data filed at 2024 1045  Gross per 24 hour   Intake 250 ml   Output 500 ml   Net -250 ml         Patient Vitals for  the past 96 hrs (Last 3 readings):   Weight   08/09/24 0545 89.8 kg (197 lb 15.6 oz)   08/08/24 0211 90.6 kg (199 lb 11.8 oz)   08/07/24 1123 90.1 kg (198 lb 10.2 oz)         General: Alert, Awake, NAD  HEENT: Normocephalic, atraumatic  Chest: clear to auscultation   CVS: RRR, no murmur   Abdomen: soft, non tender  Extremities: ++ edema  Skin: normal texture, normal skin turgor, no rash  Neurological: CN intact, no focal motor neurological deficit  Psych: normal affect; O x 3    LAB DATA:    CBC:   Lab Results   Component Value Date/Time    WBC 6.9 08/09/2024 04:37 AM    RBC 2.49 08/09/2024 04:37 AM    HGB 7.9 08/09/2024 04:37 AM    HCT 22.7 08/09/2024 04:37 AM    MCV 91.4 08/09/2024 04:37 AM    MCH 31.7 08/09/2024 04:37 AM    MCHC 34.7 08/09/2024 04:37 AM    RDW 15.9 08/09/2024 04:37 AM     08/09/2024 04:37 AM    MPV 7.2 08/09/2024 04:37 AM     BMP:    Lab Results   Component Value Date/Time     08/09/2024 04:37 AM    K 3.9 08/09/2024 04:37 AM    K 3.5 08/01/2024 03:33 AM     08/09/2024 04:37 AM    CO2 26 08/09/2024 04:37 AM    BUN 55 08/09/2024 04:37 AM    CREATININE 5.7 08/09/2024 04:37 AM    CALCIUM 8.6 08/09/2024 04:37 AM    GFRAA 56 10/11/2022 08:25 AM    GFRAA >60 06/15/2013 07:43 AM    LABGLOM 10 08/09/2024 04:37 AM    LABGLOM 59 04/29/2024 10:38 AM    GLUCOSE 106 08/09/2024 04:37 AM     Ionized Calcium:  No components found for: \"IONCA\"  Magnesium:    Lab Results   Component Value Date/Time    MG 2.20 08/08/2024 04:58 AM     Phosphorus:    Lab Results   Component Value Date/Time    PHOS 4.4 08/09/2024 04:37 AM     U/A:    Lab Results   Component Value Date/Time    NITRITE NEG 05/29/2019 07:44 AM    COLORU Yellow 08/01/2024 04:44 PM    PHUR 7.0 08/01/2024 04:44 PM    PHUR 7.0 04/16/2024 02:00 PM    LABCAST 0-1 Hyaline 06/29/2012 07:50 AM    WBCUA 264 08/01/2024 04:44 PM    RBCUA 85 08/01/2024 04:44 PM    MUCUS 2+ 06/29/2012 07:50 AM    BACTERIA 4+ 08/01/2024 04:44 PM    CLARITYU CLOUDY

## 2024-08-09 NOTE — PROGRESS NOTES
4 Eyes Skin Assessment     NAME:  Morro Kevin  YOB: 1950  MEDICAL RECORD NUMBER:  [id number]    The patient is being assessed for  Admission    I agree that at least one RN has performed a thorough Head to Toe Skin Assessment on the patient. ALL assessment sites listed below have been assessed.      Areas assessed by both nurses:    Head, Face, Ears, Shoulders, Back, Chest, Arms, Elbows, Hands, Sacrum. Buttock, Coccyx, Ischium, and Legs. Feet and Heels        Does the Patient have a Wound? No noted wound(s)       Vignesh Prevention initiated by RN: Yes  Wound Care Orders initiated by RN: No    Pressure Injury (Stage 3,4, Unstageable, DTI, NWPT, and Complex wounds) if present, place Wound referral order by RN under : No  Coccyx reddened, blanchable  New Ostomies, if present place, Ostomy referral order under : No     Nurse 1 eSignature: Electronically signed by Darcy Aparicio RN on 8/9/24 at 6:18 PM EDT    **SHARE this note so that the co-signing nurse can place an eSignature**    Nurse 2 eSignature: Electronically signed by Jennie Weir RN on 8/9/24 at 6:47 PM EDT

## 2024-08-09 NOTE — PROGRESS NOTES
Occupational Therapy  Facility/Department: [address] PROGRESSIVE CARE  Occupational Therapy Treatment and Tentative D/C      Name: Morro Kevin  : 1950  MRN: [id number]  Date of Service: 2024    Discharge Recommendations: Morro Kevin scored a 17/24 on the AM-PAC ADL Inpatient form. Current research shows that an AM-PAC score of 17 or less is typically not associated with a discharge to the patient's home setting. Based on the patient's AM-PAC score and their current ADL deficits, it is recommended that the patient have 5-7 sessions per week of Occupational Therapy at d/c to increase the patient's independence.  At this time, this patient demonstrates complex nursing, medical, and rehabilitative needs, and would benefit from intensive rehabilitation services upon discharge from the Inpatient setting.  This patient demonstrates the ability to participate in and benefit from an intensive therapy program with a coordinated interdisciplinary team approach to foster frequent, structured, and documented communication among disciplines, who will work together to establish, prioritize, and achieve treatment goals. Please see assessment section for further patient specific details.    If patient discharges prior to next session this note will serve as a discharge summary.  Please see below for the latest assessment towards goals.     Patient would benefit from continued therapy after discharge, 5-7 sessions per week  OT Equipment Recommendations  Equipment Needed: No       Patient Diagnosis(es): The primary encounter diagnosis was History of prostate cancer. Diagnoses of Dyspnea, unspecified type, General weakness, Acute kidney injury superimposed on CKD (HCC), Elevated troponin, Supratherapeutic INR, History of aortic valve replacement, Transaminitis, and Shortness of breath were also pertinent to this visit.  Past Medical History:  has a past medical history of Aortic stenosis, Arthritis, Asthma, Deviated

## 2024-08-09 NOTE — PLAN OF CARE
Problem: Safety - Adult  Goal: Free from fall injury  Outcome: Progressing  Flowsheets (Taken 8/9/2024 0104)  Free From Fall Injury: Based on caregiver fall risk screen, instruct family/caregiver to ask for assistance with transferring infant if caregiver noted to have fall risk factors     Problem: Discharge Planning  Goal: Discharge to home or other facility with appropriate resources  Outcome: Progressing  Flowsheets (Taken 8/8/2024 2045)  Discharge to home or other facility with appropriate resources: Identify barriers to discharge with patient and caregiver     Problem: Pain  Goal: Verbalizes/displays adequate comfort level or baseline comfort level  Outcome: Progressing  Flowsheets  Taken 8/9/2024 0104  Verbalizes/displays adequate comfort level or baseline comfort level:   Encourage patient to monitor pain and request assistance   Assess pain using appropriate pain scale   Administer analgesics based on type and severity of pain and evaluate response   Implement non-pharmacological measures as appropriate and evaluate response  Taken 8/8/2024 2346  Verbalizes/displays adequate comfort level or baseline comfort level: Encourage patient to monitor pain and request assistance     Problem: Skin/Tissue Integrity  Goal: Absence of new skin breakdown  Description: 1.  Monitor for areas of redness and/or skin breakdown  2.  Assess vascular access sites hourly  3.  Every 4-6 hours minimum:  Change oxygen saturation probe site  4.  Every 4-6 hours:  If on nasal continuous positive airway pressure, respiratory therapy assess nares and determine need for appliance change or resting period.  Outcome: Progressing     Problem: ABCDS Injury Assessment  Goal: Absence of physical injury  Outcome: Progressing  Flowsheets (Taken 8/3/2024 1940 by Aranza Rodriguez RN)  Absence of Physical Injury: Implement safety measures based on patient assessment     Problem: Metabolic/Fluid and Electrolytes - Adult  Goal: Electrolytes  maintained within normal limits  Outcome: Progressing  Flowsheets (Taken 8/8/2024 2045)  Electrolytes maintained within normal limits: Monitor labs and assess patient for signs and symptoms of electrolyte imbalances  Goal: Hemodynamic stability and optimal renal function maintained  Outcome: Progressing  Flowsheets (Taken 8/8/2024 2045)  Hemodynamic stability and optimal renal function maintained: Monitor labs and assess for signs and symptoms of volume excess or deficit  Goal: Glucose maintained within prescribed range  Outcome: Progressing  Flowsheets  Taken 8/9/2024 0104  Glucose maintained within prescribed range:   Monitor blood glucose as ordered   Assess for signs and symptoms of hyperglycemia and hypoglycemia   Administer ordered medications to maintain glucose within target range  Taken 8/8/2024 2045  Glucose maintained within prescribed range: Monitor blood glucose as ordered     Problem: Hematologic - Adult  Goal: Maintains hematologic stability  Outcome: Progressing  Flowsheets (Taken 8/8/2024 2045)  Maintains hematologic stability: Assess for signs and symptoms of bleeding or hemorrhage     Problem: Nutrition Deficit:  Goal: Optimize nutritional status  Outcome: Progressing  Flowsheets (Taken 8/9/2024 0104)  Nutrient intake appropriate for improving, restoring, or maintaining nutritional needs:   Assess nutritional status and recommend course of action   Monitor oral intake, labs, and treatment plans

## 2024-08-09 NOTE — PROGRESS NOTES
A complete drug regimen review was completed for this patient.     [x]  No clinically significant medication issue was identified    []   Yes, a clinically significant medication issue was identified     []  Adverse Drug Event:      []  Allergy:      []  Side Effect:      []  Ineffective Therapy:      []  Drug Interaction:     []  Duplicated Therapy:     []  Untreated Indication:      []  Non-adherence:     []  Other:          Electronically signed by Darcy Aparicio RN on 8/9/24 at 6:14 PM EDT

## 2024-08-09 NOTE — CARE COORDINATION
08/09/24 1534   IMM Letter   IMM Letter given to Patient/Family/Significant other/Guardian/POA/by: Education  provided , declined 4 hours   IMM Letter date given: 08/09/24   IMM Letter time given: 1530

## 2024-08-10 LAB
ALBUMIN SERPL-MCNC: 2.8 G/DL (ref 3.4–5)
ANION GAP SERPL CALCULATED.3IONS-SCNC: 9 MMOL/L (ref 3–16)
ANISOCYTOSIS BLD QL SMEAR: ABNORMAL
ANISOCYTOSIS BLD QL SMEAR: ABNORMAL
BASOPHILS # BLD: 0 K/UL (ref 0–0.2)
BASOPHILS # BLD: 0.1 K/UL (ref 0–0.2)
BASOPHILS NFR BLD: 0 %
BASOPHILS NFR BLD: 1 %
BUN SERPL-MCNC: 34 MG/DL (ref 7–20)
CALCIUM SERPL-MCNC: 8.7 MG/DL (ref 8.3–10.6)
CHLORIDE SERPL-SCNC: 101 MMOL/L (ref 99–110)
CK SERPL-CCNC: 527 U/L (ref 39–308)
CO2 SERPL-SCNC: 28 MMOL/L (ref 21–32)
CREAT SERPL-MCNC: 4.7 MG/DL (ref 0.8–1.3)
DEPRECATED RDW RBC AUTO: 16.2 % (ref 12.4–15.4)
DEPRECATED RDW RBC AUTO: 16.3 % (ref 12.4–15.4)
EOSINOPHIL # BLD: 0 K/UL (ref 0–0.6)
EOSINOPHIL # BLD: 0.2 K/UL (ref 0–0.6)
EOSINOPHIL NFR BLD: 0 %
EOSINOPHIL NFR BLD: 3 %
FERRITIN SERPL IA-MCNC: 198 NG/ML (ref 30–400)
GFR SERPLBLD CREATININE-BSD FMLA CKD-EPI: 13 ML/MIN/{1.73_M2}
GLUCOSE BLD-MCNC: 118 MG/DL (ref 70–99)
GLUCOSE BLD-MCNC: 134 MG/DL (ref 70–99)
GLUCOSE BLD-MCNC: 139 MG/DL (ref 70–99)
GLUCOSE BLD-MCNC: 162 MG/DL (ref 70–99)
GLUCOSE SERPL-MCNC: 107 MG/DL (ref 70–99)
HCT VFR BLD AUTO: 21.3 % (ref 40.5–52.5)
HCT VFR BLD AUTO: 22.3 % (ref 40.5–52.5)
HEMOCCULT SP1 STL QL: ABNORMAL
HGB BLD-MCNC: 7.1 G/DL (ref 13.5–17.5)
HGB BLD-MCNC: 7.5 G/DL (ref 13.5–17.5)
HYPOCHROMIA BLD QL SMEAR: ABNORMAL
HYPOCHROMIA BLD QL SMEAR: ABNORMAL
INR PPP: 2.09 (ref 0.85–1.15)
IRON SATN MFR SERPL: 20 % (ref 20–50)
IRON SERPL-MCNC: 47 UG/DL (ref 59–158)
LYMPHOCYTES # BLD: 0.9 K/UL (ref 1–5.1)
LYMPHOCYTES # BLD: 1 K/UL (ref 1–5.1)
LYMPHOCYTES NFR BLD: 11 %
LYMPHOCYTES NFR BLD: 15 %
MAGNESIUM SERPL-MCNC: 2 MG/DL (ref 1.8–2.4)
MCH RBC QN AUTO: 31.1 PG (ref 26–34)
MCH RBC QN AUTO: 31.1 PG (ref 26–34)
MCHC RBC AUTO-ENTMCNC: 33.4 G/DL (ref 31–36)
MCHC RBC AUTO-ENTMCNC: 33.7 G/DL (ref 31–36)
MCV RBC AUTO: 92.4 FL (ref 80–100)
MCV RBC AUTO: 93.2 FL (ref 80–100)
METAMYELOCYTES NFR BLD MANUAL: 3 %
MONOCYTES # BLD: 0.4 K/UL (ref 0–1.3)
MONOCYTES # BLD: 0.5 K/UL (ref 0–1.3)
MONOCYTES NFR BLD: 7 %
MONOCYTES NFR BLD: 8 %
NEUTROPHILS # BLD: 3.7 K/UL (ref 1.7–7.7)
NEUTROPHILS # BLD: 5.2 K/UL (ref 1.7–7.7)
NEUTROPHILS NFR BLD: 49 %
NEUTROPHILS NFR BLD: 50 %
NEUTS BAND NFR BLD MANUAL: 23 % (ref 0–7)
NEUTS BAND NFR BLD MANUAL: 24 % (ref 0–7)
OVALOCYTES BLD QL SMEAR: ABNORMAL
OVALOCYTES BLD QL SMEAR: ABNORMAL
PERFORMED ON: ABNORMAL
PHOSPHATE SERPL-MCNC: 4.5 MG/DL (ref 2.5–4.9)
PLATELET # BLD AUTO: 198 K/UL (ref 135–450)
PLATELET # BLD AUTO: 220 K/UL (ref 135–450)
PLATELET BLD QL SMEAR: ADEQUATE
PLATELET BLD QL SMEAR: ADEQUATE
PMV BLD AUTO: 7 FL (ref 5–10.5)
PMV BLD AUTO: 7.2 FL (ref 5–10.5)
POIKILOCYTOSIS BLD QL SMEAR: ABNORMAL
POIKILOCYTOSIS BLD QL SMEAR: ABNORMAL
POLYCHROMASIA BLD QL SMEAR: ABNORMAL
POLYCHROMASIA BLD QL SMEAR: ABNORMAL
POTASSIUM SERPL-SCNC: 3.7 MMOL/L (ref 3.5–5.1)
PROTHROMBIN TIME: 23.5 SEC (ref 11.9–14.9)
RBC # BLD AUTO: 2.28 M/UL (ref 4.2–5.9)
RBC # BLD AUTO: 2.42 M/UL (ref 4.2–5.9)
SLIDE REVIEW: ABNORMAL
SLIDE REVIEW: ABNORMAL
SODIUM SERPL-SCNC: 138 MMOL/L (ref 136–145)
TIBC SERPL-MCNC: 231 UG/DL (ref 260–445)
VARIANT LYMPHS NFR BLD MANUAL: 3 % (ref 0–6)
VARIANT LYMPHS NFR BLD MANUAL: 3 % (ref 0–6)
WBC # BLD AUTO: 5.2 K/UL (ref 4–11)
WBC # BLD AUTO: 6.8 K/UL (ref 4–11)

## 2024-08-10 PROCEDURE — 85610 PROTHROMBIN TIME: CPT

## 2024-08-10 PROCEDURE — 85025 COMPLETE CBC W/AUTO DIFF WBC: CPT

## 2024-08-10 PROCEDURE — 82550 ASSAY OF CK (CPK): CPT

## 2024-08-10 PROCEDURE — 94760 N-INVAS EAR/PLS OXIMETRY 1: CPT

## 2024-08-10 PROCEDURE — 82728 ASSAY OF FERRITIN: CPT

## 2024-08-10 PROCEDURE — 82270 OCCULT BLOOD FECES: CPT

## 2024-08-10 PROCEDURE — 97110 THERAPEUTIC EXERCISES: CPT

## 2024-08-10 PROCEDURE — 97161 PT EVAL LOW COMPLEX 20 MIN: CPT

## 2024-08-10 PROCEDURE — 97116 GAIT TRAINING THERAPY: CPT

## 2024-08-10 PROCEDURE — 6360000002 HC RX W HCPCS: Performed by: INTERNAL MEDICINE

## 2024-08-10 PROCEDURE — 83550 IRON BINDING TEST: CPT

## 2024-08-10 PROCEDURE — 6370000000 HC RX 637 (ALT 250 FOR IP): Performed by: INTERNAL MEDICINE

## 2024-08-10 PROCEDURE — 80069 RENAL FUNCTION PANEL: CPT

## 2024-08-10 PROCEDURE — 83540 ASSAY OF IRON: CPT

## 2024-08-10 PROCEDURE — 97166 OT EVAL MOD COMPLEX 45 MIN: CPT

## 2024-08-10 PROCEDURE — 6370000000 HC RX 637 (ALT 250 FOR IP): Performed by: PHYSICAL MEDICINE & REHABILITATION

## 2024-08-10 PROCEDURE — 97535 SELF CARE MNGMENT TRAINING: CPT

## 2024-08-10 PROCEDURE — 97530 THERAPEUTIC ACTIVITIES: CPT

## 2024-08-10 PROCEDURE — 83735 ASSAY OF MAGNESIUM: CPT

## 2024-08-10 PROCEDURE — 2580000003 HC RX 258: Performed by: PHYSICAL MEDICINE & REHABILITATION

## 2024-08-10 PROCEDURE — 36415 COLL VENOUS BLD VENIPUNCTURE: CPT

## 2024-08-10 PROCEDURE — 1280000000 HC REHAB R&B

## 2024-08-10 RX ORDER — WARFARIN SODIUM 5 MG/1
5 TABLET ORAL
Status: ACTIVE | OUTPATIENT
Start: 2024-08-10 | End: 2024-08-11

## 2024-08-10 RX ORDER — MIDODRINE HYDROCHLORIDE 10 MG/1
10 TABLET ORAL
Status: DISCONTINUED | OUTPATIENT
Start: 2024-08-10 | End: 2024-08-17 | Stop reason: HOSPADM

## 2024-08-10 RX ADMIN — MIDODRINE HYDROCHLORIDE 10 MG: 10 TABLET ORAL at 16:33

## 2024-08-10 RX ADMIN — DOCUSATE SODIUM 100 MG: 100 CAPSULE, LIQUID FILLED ORAL at 09:14

## 2024-08-10 RX ADMIN — SENNOSIDES AND DOCUSATE SODIUM 2 TABLET: 50; 8.6 TABLET ORAL at 20:57

## 2024-08-10 RX ADMIN — LEVOTHYROXINE SODIUM 137 MCG: 0.11 TABLET ORAL at 06:27

## 2024-08-10 RX ADMIN — INSULIN GLARGINE 10 UNITS: 100 INJECTION, SOLUTION SUBCUTANEOUS at 09:15

## 2024-08-10 RX ADMIN — EPOETIN ALFA-EPBX 10000 UNITS: 10000 INJECTION, SOLUTION INTRAVENOUS; SUBCUTANEOUS at 16:22

## 2024-08-10 RX ADMIN — SENNOSIDES AND DOCUSATE SODIUM 2 TABLET: 50; 8.6 TABLET ORAL at 09:14

## 2024-08-10 RX ADMIN — ASPIRIN 81 MG: 81 TABLET, COATED ORAL at 20:57

## 2024-08-10 RX ADMIN — MIDODRINE HYDROCHLORIDE 7.5 MG: 5 TABLET ORAL at 12:08

## 2024-08-10 RX ADMIN — MIDODRINE HYDROCHLORIDE 7.5 MG: 5 TABLET ORAL at 09:14

## 2024-08-10 RX ADMIN — SODIUM CHLORIDE, PRESERVATIVE FREE 10 ML: 5 INJECTION INTRAVENOUS at 09:15

## 2024-08-10 RX ADMIN — MIRTAZAPINE 30 MG: 30 TABLET, FILM COATED ORAL at 20:57

## 2024-08-10 RX ADMIN — TAMSULOSIN HYDROCHLORIDE 0.4 MG: 0.4 CAPSULE ORAL at 20:57

## 2024-08-10 RX ADMIN — SODIUM CHLORIDE, PRESERVATIVE FREE 10 ML: 5 INJECTION INTRAVENOUS at 20:57

## 2024-08-10 ASSESSMENT — PAIN SCALES - GENERAL
PAINLEVEL_OUTOF10: 0

## 2024-08-10 NOTE — FLOWSHEET NOTE
Patient admitted to ARU after d/c'd from [facility]N on 8/9/24. His admitting dx is rhabdomyolosis and new HD scheduled MWF. He is alert and oriented x 4. Slightly anixous but pleasant. He is on lasix but he is continent of bladder and also of bowel, had LBM on 8/9.     He is on regular diet. Takes pills whole with thin liquid.     He is on RA, takes coumadin for his Afib. Also on ASA for DVT prophylaxis. He walks to the bathroom, bed and recliner using walker x 1 with gaitbelt. He calls appropriately for any needs. HE is not to use his right arm for weight bearing d/t risk for bledding from his HD vas cath on his RUC. Bandage and pressure dressing intact. No copious bleeding noted at this time. Hx of hemorrhoids but has no bleeding from rectum at this time.    Has 2 PIV on bilateral forearms and flushed for patency. Patient denies pain. Reminded to use call light (within reach) for any needs. Susana and Bed alarms in use.

## 2024-08-10 NOTE — PLAN OF CARE
ARU PATIENT TREATMENT PLAN  [facility]   [address] [place]  [address]  [phone]    Morro Kevin    : 1950  [id number]  MRN: [id number]   PHYSICIAN:  Matt Buitrago MD  Primary Problem    Patient Active Problem List   Diagnosis    Hypothyroid    Diabetes mellitus type 2 in nonobese (HCC)    Kidney stones    Essential hypertension    Vitamin D deficiency    Homocysteinemia    Hypogonadism male    Nonrheumatic aortic valve stenosis    Bicuspid aortic valve    CMC DJD(carpometacarpal degenerative joint disease), localized primary    Colitis    Hyperuricemia    Mixed hyperlipidemia    Benign prostatic hyperplasia    Coronary artery disease involving native coronary artery of native heart without angina pectoris    History of aortic valve replacement    JOSÉ MANUEL on CPAP    Type II or unspecified type diabetes mellitus with renal manifestations, uncontrolled(250.42)    Disturbance in sleep behavior    Mechanical heart valve present    On anticoagulant therapy    Major depressive disorder in full remission, unspecified whether recurrent (HCC)    Homocystinuria (HCC)    Dizziness    Anemia    Coagulation defect (HCC)    Retroperitoneal hemorrhage    Paroxysmal atrial fibrillation (HCC)    Retroperitoneal hematoma    Shortness of breath    Rhabdomyolysis       Rehabilitation Diagnosis:     Rhabdomyolysis [M62.82]       ADMIT DATE:2024    Patient Goals: get strong enough to return home     Admitting Impairments: habdomyolysis - likely related to Zytiga and statins,CK peaked at 26--2.6k- improving, HD initiated 24---tunneled dialysis catheter placed -continue to monitor signs of recovery--follow-up with nephrology     CKD Stage 3 - now on HD MWF,. Placed TDC, Lasix, nephrology      Acute blood loss anemia due to lower GI bleed--seen by GI, patient had a colonoscopy as well as an EGD within the last 1 year, was noted to have internal hemorrhoids and  Self     Upper Body Dressing     Lower Body Dressing     Putting on/Taking off Footwear     Roll Left and Right     Sit to Lying     Lying to Sitting on Side of Bed     Sit to Stand     Chair/Bed to Chair Transfer     Toilet Transfer     Car Transfer     Walk 10 feet     Walk 50 feet with 2 Turns     Walk 150 feet with 2 turns     Walk 10 feet on Uneven Surfaces     1 Step     4 Steps     12 Steps     Picking up Object     Wheel 50 feet with 2 Turns   Type?     Wheel 150 feet with 2 Turns   Type?            Morro Kevin will be seen a minimum of 3 hours of therapy per day, a minimum of 5 out of 7 days per week.    [] In this rare instance due to the nature of this patient's medical involvement, this patient will be seen 15 hours per week (900 minutes within a 7 day period).    Treatments may include therapeutic exercises, gait training, neuromuscular re-ed, transfer training, community reintegration, bed mobility, w/c mobility and training, self care, home mgmt, cognitive training, energy conservation,dysphagia tx, speech/language/communication therapy, group therapy, and patient/family education. In addition, dietician/nutritionist may monitor calorie count as well as intake and collaboratively work with SLP on dietary upgrades.  Neuropsychology/Psychology may evaluate and provide necessary support.    Medical issues being managed closely and that require 24 hour availability of a physician:   [] Swallowing Precautions  [x] Bowel/Bladder Fx  [] Weight bearing precautions   [x] Wound Care    [x] Pain Mgmt   [x] Infection Protection   [x] DVT Prophylaxis   [x] Fall Precautions  [x] Fluid/Electrolyte/Nutrition Balance   [] Voice Protection   [] Respiratory  [] Other:    Medical Prognosis: [x] Good  [] Fair    [] Guarded   Total expected IRF days 13 days  Anticipated discharge destination:    [] Home Independently   [x] Home Modified Independent  [] Home with supervision    []SNF     [] Other

## 2024-08-10 NOTE — PROGRESS NOTES
D: pt's BP was 95/61, HR 98 this am; midodrine given and BP checked one hour later (95/54; HR 83). pt denies dizziness. pt ambulating well with PT now and PT checked orthostatic BPs, (negative), last BP checked with therapy during ambulation was around 101 systolic. I held lasix this am because of low BP, but as pt is asymptomatic and still has some swelling in legs, I want to check with nephrology if we should administer lasix or hold for today. A: this RN sent secure message to Dr. Jude Navarro, nephrologist, with assessment/communication findings R: Dr. Navarro ordered to hold lasix for today, will continue to monitor pt.

## 2024-08-10 NOTE — PROGRESS NOTES
D: pt's Hemoglobin has been dropping. 9.2 yesterday, 7.5 this am. pt has been having some bright red blood in stool, but per pt, this has been ongoing for more than a year. nephrology ordered occult stool test and repeat H/H. hemoglobin now is 7.1. nephrology ordered Retacrit and I asked if coumadin should still be given and Dr. Navarro, with nephrology, asked me to refer to medical team concerning this. INR was 2.09 today and pt should receive 5 mg of coumadin. per pt, he has been taking this since 2016. A: this RN sent secure message to Dr. Buitrago with assessment/communication findings, asking if pt should receive coumadin today or if this should be held R: Dr. Buitrago ordered to hold coumadin for today and we will recheck H/H tomorrow to determine how to proceed tomorrow

## 2024-08-10 NOTE — PROGRESS NOTES
D: occult stool test is positive A: this RN sent secure message to Dr. Buitrago R: will continue to monitor pt. Labwork ordered for am.

## 2024-08-10 NOTE — PROGRESS NOTES
Nephrology Progress Note   [facility].[employer]      Reason for consultation: SUKUMAR on CKD 3a -- baseline Cr ~ 1.3-1.5 mg/dL. Follows with Dr. Salinas in office (last seen 2024).     Subjective:    The patient has been seen and examined .   Labs and chart reviewed.Feels weak/tired .   Patient review of systems: No   SOB/TOLENTINO .  LE edema better .     No family present .       Allergies:  Allergies   Allergen Reactions    Ciprofloxacin Hives        Scheduled Meds:   warfarin  5 mg Oral Once    midodrine  10 mg Oral TID WC    epoetin linda-epbx  10,000 Units SubCUTAneous Once per day on     sodium chloride flush  5-40 mL IntraVENous 2 times per day    aspirin  81 mg Oral Nightly    levothyroxine  137 mcg Oral Daily    mirtazapine  30 mg Oral Nightly    tamsulosin  0.4 mg Oral Nightly    insulin lispro  0-8 Units SubCUTAneous TID WC    insulin lispro  0-4 Units SubCUTAneous Nightly    insulin glargine  10 Units SubCUTAneous Daily    warfarin placeholder: dosing by pharmacy   Other RX Placeholder    psyllium husk-aspartame  1 packet Oral Daily    docusate sodium  100 mg Oral Daily    sennosides-docusate sodium  2 tablet Oral BID        dextrose         PRN Meds:sodium chloride flush, ondansetron **OR** ondansetron, polyethylene glycol, glucose, dextrose bolus **OR** dextrose bolus, glucagon (rDNA), dextrose, hydrocortisone, acetaminophen, bisacodyl, bisacodyl    Physical Exam:    TEMPERATURE:  Current - Temp: 98 °F (36.7 °C); Max - Temp  Av.2 °F (36.8 °C)  Min: 97.5 °F (36.4 °C)  Max: 99.2 °F (37.3 °C)  RESPIRATIONS RANGE: Resp  Av  Min: 16  Max: 18  PULSE RANGE: Pulse  Av.1  Min: 71  Max: 102  BLOOD PRESSURE RANGE:  Systolic (24hrs), Av , Min:86 , Max:141   ; Diastolic (24hrs), Av, Min:51, Max:80    24HR INTAKE/OUTPUT:    Intake/Output Summary (Last 24 hours) at 8/10/2024 1358  Last data filed at 8/10/2024 1347  Gross per 24 hour   Intake 600 ml   Output 1350 ml   Net -750 ml

## 2024-08-10 NOTE — PLAN OF CARE
Problem: Discharge Planning  Goal: Discharge to home or other facility with appropriate resources  8/10/2024 1158 by Graciela Agustin, RN  Outcome: Progressing

## 2024-08-10 NOTE — PROGRESS NOTES
Physical Therapy  Facility/Department: [address] REHAB  Rehabilitation Physical Therapy Initial Assessment    NAME: Morro Kevin  : 1950 (73 y.o.)  MRN: [id number]  CODE STATUS: Full Code    Date of Service: 8/10/24      Past Medical History:   Diagnosis Date    Aortic stenosis 2013    moderate    Arthritis     Asthma     BRONCHIAL ASTHMA  AS A CHILD    Deviated septum     Diabetes mellitus type II     Dr. Goodson     Hashimoto thyroiditis dx 1984    medical treatment    Homocysteinemia     Hyperlipidemia     Hypertension     Hypertriglyceridemia     Hypogonadism male     topical testosterone     Hypothyroid 2011    Kidney stone 1990s    Passed on own and also surgical excision     Mechanical heart valve present 2016    Murmur since     Nasal polyps     Osteoarthritis     Right thumb     Prolonged emergence from general anesthesia     Prostate CA (HCC) 10/01/2021    Sleep apnea     CPAP nightly    Warfarin anticoagulation 2016    Wears glasses      Past Surgical History:   Procedure Laterality Date    AORTIC VALVE REPLACEMENT  2016    21mm [facility]    CAPSULE ENDOSCOPY N/A 2023    BOWEL SMALL CAPSULE ENDOSCOPY performed by Nohelia Phan MD at [facility] ENDOSCOPY    COLECTOMY  1996    perforated bowel: colonoscopy    COLONOSCOPY  09/24/2014    x2 normal colonoscopies/ perforated bowel    COLONOSCOPY N/A 2023    COLONOSCOPY performed by Nohelia Phan MD at [facility] ENDOSCOPY    CYST REMOVAL      hairy nevus left biceps as child    IR INSERT TUNNELED CV CATH WO PORT < 5YRS Right 2024    RIJ Permacath 23 cm placed per Dr. Oumar Orlando    IR TUNNELED CATHETER PLACEMENT GREATER THAN 5 YEARS  2024    IR TUNNELED CATHETER PLACEMENT GREATER THAN 5 YEARS 2024 [facility] SPECIAL PROCEDURES    KNEE SURGERY Left     torn meniscus     NASAL POLYP SURGERY  ,     OTHER SURGICAL HISTORY      kidney stone removal     ROTATOR CUFF REPAIR Right     UPPER  Stand by assistance  Stand to Sit: Stand by assistance  Car Transfer: Minimal Assistance (to assist LE's in/out of car)  Comment: transfers performed from b/s chair, mat, bench outside, and mock car  Balance  Posture: Good  Sitting - Static: Good  Sitting - Dynamic: Good  Standing - Static: Good  Standing - Dynamic: Fair;+    Environmental Mobility  Ambulation  WB Status: NWB RUE  Ambulation  Surface: Level tile  Device: Rolling Walker  Assistance: Stand by assistance  Quality of Gait: Nice pace and pattern, slightly wide MIKHAIL with limited clearance at times.  Distance: 220' x2,  130 ' x2, mulitple shorter walks in gym.  Brought rollator to room, pt wants to stick to using current walker.  Comments: No bleeding from TDC site throughout treatment.  Seated /63, HR 82, and O2 sat 97%, Standing /70, , O2 sat 98%,  vitals after walking /64, , O2 sat 98%.  More Ambulation?: No  Stairs/Curb  Stairs?: Yes  Stairs  # Steps : 4  Stairs Height: 6\"  Rails: Left ascending  Device: No Device  Assistance: Contact guard assistance (v.c.'s for technique)    PT Exercises  Exercise Treatment: Pt performed NuStep with legs onl, seat at 11, resistance at 1, for 5 minutes at 65 spm.  Pt performed 10 reps standing toe/heel raises, mini-squat, march, hip abduction, and hip extension.    ASSESSMENT       Activity Tolerance  Activity Tolerance: Patient tolerated evaluation without incident;Patient tolerated treatment well    Assessment  Assessment: Pt is a 73 y.o. male admitted to IP rehab on 8-9-24 with Rhabdomyolysis and SUKUMAR.  PTA pt lives in a condo (level entry, 1 level) with son.  Pt was Independent with daily care and functional mobility, did have to use a cane the week prior to coming into the hospital.  Pt is functioning well below baseline (currently requires Min A for bed mobility, SBA-Min A for transfers, SBA for gait with rolling walker, and CGA for stairs).  Pt will benefit from continued skilled PT

## 2024-08-10 NOTE — H&P
Nasal polyp surgery (1989, 1987); knee surgery (Left, 2004); Rotator cuff repair (Right, 2009); Colonoscopy (09/24/2014); cyst removal; other surgical history (1990s); Aortic valve replacement (09/14/2016); Upper gastrointestinal endoscopy (N/A, 06/30/2023); Colonoscopy (N/A, 06/30/2023); Capsule endoscopy (N/A, 06/30/2023); Upper gastrointestinal endoscopy (N/A, 07/03/2023); IR INSERT TUNNELED CV CATH WO SQ PORT/PUMP < 5YRS (Right, 08/08/2024); and IR TUNNELED CVC PLACE WO SQ PORT/PUMP > 5 YEARS (8/8/2024).     reports that he has never smoked. He has never used smokeless tobacco. He reports current alcohol use of about 2.0 standard drinks of alcohol per week. He reports that he does not use drugs.    family history includes Arthritis in his father; Cancer in his paternal grandmother; Diabetes in his brother and another family member; Heart Disease in his father and maternal grandfather; High Blood Pressure in his father; High Cholesterol in his brother and mother; Kidney Disease in his father; Other in his brother; Stroke in his mother.    Allergies: Ciprofloxacin    Current Facility-Administered Medications   Medication Dose Route Frequency Provider Last Rate Last Admin    sodium chloride flush 0.9 % injection 5-40 mL  5-40 mL IntraVENous 2 times per day Matt Buitrago MD   10 mL at 08/09/24 1922    sodium chloride flush 0.9 % injection 5-40 mL  5-40 mL IntraVENous PRN Matt Buitrago MD        ondansetron (ZOFRAN-ODT) disintegrating tablet 4 mg  4 mg Oral Q8H PRN Matt Buitrago MD        Or    ondansetron (ZOFRAN) injection 4 mg  4 mg IntraVENous Q6H PRN Matt Buitrago MD        polyethylene glycol (GLYCOLAX) packet 17 g  17 g Oral Daily PRN Matt Buirtago MD        aspirin EC tablet 81 mg  81 mg Oral Nightly Matt Buitrago MD   81 mg at 08/09/24 2116    levothyroxine (SYNTHROID) tablet 137 mcg  137 mcg Oral Daily Matt Buitrago MD   137 mcg at 08/10/24 0627    mirtazapine (REMERON) tablet 30 mg  30  prosthetics and orthotics. Given the patients complex condition  and risk of further medical complications, rehabilitation services cannot be  safely provided at a lower level of care such as a skilled nursing facility.  I have compared the patients medical and functional status at the time of the  preadmission screening and the same on this date, and there are no significant   changes except as documented below in the assessment and plan.    By signing this document, I acknowledge that I have personally performed a  full physical examination on this patient within 24 hours of admission to  this inpatient rehabilitation facility and have determined the patient to be  able to tolerate the above course of treatment at an intensive level for a  reasonable period of time. I will be completing a detailed individualized  Plan of Care for this patient by day four of the patients stay based upon the  Preadmission Screen, this Post-Admission Evaluation, and the therapy  evaluations.       Rehabilitation Diagnosis:   IGC: Rhabdomyolysis       Assessment and Plan:    Rhabdomyolysis - likely related to Zytiga and statins,CK peaked at 26--2.6k- improving, HD initiated 8/2/24---tunneled dialysis catheter placed 8/8-continue to monitor signs of recovery--follow-up with nephrology    CKD Stage 3 - now on HD MWF,. Placed TDC, Lasix, nephrology     Acute blood loss anemia due to lower GI bleed--seen by GI, patient had a colonoscopy as well as an EGD within the last 1 year, was noted to have internal hemorrhoids and telangiectasias.  GI did not want additional interventions-H&H was stable, rectal bleeding resolved    Prostate cancer- recently started on Zytiga, held given rhabdomyolysis    Hypertension- stable-now with episodes of hypotension--on  midodrine    CAD- ASA    Diabetes mellitus type II-Trulicity , Synjardy held -continue Lantus    Aortic Mechanical valve - on Coumadin, continue with INR    Nephrolithiasis-

## 2024-08-10 NOTE — PROGRESS NOTES
D: student nurse reported pt having blood in stool. Pt has been having some blood in stool and as pt has been very constipated, so pt has been receiving stool softeners/metamucil. Per pt, issue of some blood in stool has been ongoing and continues to take coumadin since 2016. Per pt, blood was scant and is resolving. A: this RN reported new orders per nephrology to get another occult stool sample and this RN to continue to monitor pt's intake/output R: pt agreed to plan of care

## 2024-08-10 NOTE — CONSULTS
Clinical Pharmacy Note  Warfarin Consult    Morro Kevin is a 73 y.o. male receiving warfarin managed by pharmacy.    Warfarin Indication: AVR  Target INR range: 2-3   Dose prior to admission: 11.25 mg on Wednesday, 7.5 mg remaining days of the week. Managed at  [facility].     Current warfarin drug-drug interactions: hydrocortisone    Recent Labs     08/08/24  0458 08/08/24  1634 08/09/24  0437 08/09/24  1801 08/10/24  0635   HGB 8.1*   < > 7.9* 9.2* 7.5*   HCT 23.6*   < > 22.7* 27.6* 22.3*   INR 2.13*  --  2.79*  --  2.09*    < > = values in this interval not displayed.       Assessment/Plan:    Warfarin 5 mg tonight. Daily PT/INR until stable within therapeutic range.     Thank you for the consult.  Will continue to follow.    Darryl Champagne [facility]  8/10/2024 11:01 AM

## 2024-08-10 NOTE — PROGRESS NOTES
Occupational Therapy  Facility/Department: [address] REHAB  Rehabilitation Occupational Therapy Evaluation       Date: 8/10/24  Patient Name: Morro Kevin       Room: K3N-4492/3262-01  MRN: [id number]  Account: [id number]   : 1950  (73 y.o.) Gender: male     Referring Practitioner: Dr Buitrago  Diagnosis: rhabdomylosis, SUKUMAR  Additional Pertinent Hx: 74 y/o male admit 2024 with SUKUMAR, Hypotension, PAF, Weak and SOB. GI consult : GI Bleed. Nephrology consult : SUKUMAR, new HD. PMH as noted including Aortic Valve Stenosis, AVR (2016), Prostate Ca (; new reoccur, Chemo), R RTC Repair.    Restrictions  Restrictions/Precautions: Fall Risk;Weight Bearing  Other position/activity restrictions: New R TDC Cath (placed 2024).HD MWF, reg diet  Right Upper Extremity Weight Bearing: Non Weight Bearing  Equipment Used: Wheelchair    Subjective  Subjective: met in room, he's seated recliner, just finished eating breakfast  Comments: agreeable for OT evaluation          Vitals  Temp: 98.3 °F (36.8 °C)  Pulse: 98  Respirations: 16  BP: 95/61  Weight - Scale: 87.9 kg (193 lb 12.6 oz)  BMI (Calculated): 27.9  Oxygen Therapy  SpO2: 95 %  Pulse Oximetry Type: Intermittent  Pulse Oximeter Device Mode: Intermittent  Pulse Oximeter Device Location: Finger  O2 Device: None (Room air)       Objective  Vision - Basic Assessment  Prior Vision: Wears glasses all the time  Cognition  Overall Cognitive Status: WNL  Following Commands: Appears intact  Safety Judgement: Decreased awareness of need for safety  Insights: Decreased awareness of deficits  Sequencing: Requires cues for some  Orientation  Overall Orientation Status: Within Normal Limits   Sensation  Overall Sensation Status: WNL (sensation intact to light touch thru UEs)   ROM  LUE AROM (degrees)  LUE AROM : WFL  Left Hand AROM (degrees)  Left Hand AROM: WFL  RUE AROM (degrees)  RUE AROM : WFL  Right Hand AROM (degrees)  Right Hand AROM: WFL  LUE Strength  Gross LUE

## 2024-08-10 NOTE — PROGRESS NOTES
D: pt's BP was 95/61, HR 98 this am (midodrine given); BP rechecked one hour later, BP still soft, (95/54, HR 83). I held lasix. pt denies dizziness and is ambulating to bathroom without symptoms. A: this RN sent secure message to Dr. Buitrago concerning low BP and actions taken R: will continue to monitor pt and await any further orders from Dr. Buitrago

## 2024-08-10 NOTE — PROGRESS NOTES
Dr. Buitrago noted assessment findings and nurse's actions concerning lasix and asked this RN to ask nephrologist about holding lasix.

## 2024-08-10 NOTE — PROGRESS NOTES
Hat placed in bathroom and plan of care explained to pt and pt's PCA. Will attempt to collect occult stool sample with next bm. Communication with physicians discussed with pt and pt verbalized understanding of plan of care.

## 2024-08-11 LAB
ABO + RH BLD: NORMAL
ALBUMIN SERPL-MCNC: 2.7 G/DL (ref 3.4–5)
ANION GAP SERPL CALCULATED.3IONS-SCNC: 11 MMOL/L (ref 3–16)
BLD GP AB SCN SERPL QL: NORMAL
BLOOD GROUP ANTIBODIES SERPL: NORMAL
BUN SERPL-MCNC: 48 MG/DL (ref 7–20)
CALCIUM SERPL-MCNC: 8.6 MG/DL (ref 8.3–10.6)
CHLORIDE SERPL-SCNC: 103 MMOL/L (ref 99–110)
CK SERPL-CCNC: 433 U/L (ref 39–308)
CO2 SERPL-SCNC: 27 MMOL/L (ref 21–32)
CREAT SERPL-MCNC: 5.9 MG/DL (ref 0.8–1.3)
DAT IGG CAPTURE: NORMAL
DEPRECATED RDW RBC AUTO: 16.6 % (ref 12.4–15.4)
GFR SERPLBLD CREATININE-BSD FMLA CKD-EPI: 10 ML/MIN/{1.73_M2}
GLUCOSE BLD-MCNC: 105 MG/DL (ref 70–99)
GLUCOSE BLD-MCNC: 199 MG/DL (ref 70–99)
GLUCOSE BLD-MCNC: 242 MG/DL (ref 70–99)
GLUCOSE SERPL-MCNC: 100 MG/DL (ref 70–99)
HCT VFR BLD AUTO: 19.3 % (ref 40.5–52.5)
HCT VFR BLD AUTO: 22.8 % (ref 40.5–52.5)
HGB BLD-MCNC: 6.7 G/DL (ref 13.5–17.5)
HGB BLD-MCNC: 7.9 G/DL (ref 13.5–17.5)
INR PPP: 1.63 (ref 0.85–1.15)
MCH RBC QN AUTO: 32.1 PG (ref 26–34)
MCHC RBC AUTO-ENTMCNC: 34.7 G/DL (ref 31–36)
MCV RBC AUTO: 92.5 FL (ref 80–100)
PERFORMED ON: ABNORMAL
PHOSPHATE SERPL-MCNC: 6.5 MG/DL (ref 2.5–4.9)
PLATELET # BLD AUTO: 166 K/UL (ref 135–450)
PMV BLD AUTO: 7 FL (ref 5–10.5)
POTASSIUM SERPL-SCNC: 3.9 MMOL/L (ref 3.5–5.1)
PROTHROMBIN TIME: 19.5 SEC (ref 11.9–14.9)
RBC # BLD AUTO: 2.09 M/UL (ref 4.2–5.9)
SODIUM SERPL-SCNC: 141 MMOL/L (ref 136–145)
WBC # BLD AUTO: 4.3 K/UL (ref 4–11)

## 2024-08-11 PROCEDURE — 1280000000 HC REHAB R&B

## 2024-08-11 PROCEDURE — 36430 TRANSFUSION BLD/BLD COMPNT: CPT

## 2024-08-11 PROCEDURE — 86900 BLOOD TYPING SEROLOGIC ABO: CPT

## 2024-08-11 PROCEDURE — 85027 COMPLETE CBC AUTOMATED: CPT

## 2024-08-11 PROCEDURE — 6370000000 HC RX 637 (ALT 250 FOR IP): Performed by: INTERNAL MEDICINE

## 2024-08-11 PROCEDURE — 86850 RBC ANTIBODY SCREEN: CPT

## 2024-08-11 PROCEDURE — 80069 RENAL FUNCTION PANEL: CPT

## 2024-08-11 PROCEDURE — 85018 HEMOGLOBIN: CPT

## 2024-08-11 PROCEDURE — 94760 N-INVAS EAR/PLS OXIMETRY 1: CPT

## 2024-08-11 PROCEDURE — 86922 COMPATIBILITY TEST ANTIGLOB: CPT

## 2024-08-11 PROCEDURE — 86870 RBC ANTIBODY IDENTIFICATION: CPT

## 2024-08-11 PROCEDURE — 85014 HEMATOCRIT: CPT

## 2024-08-11 PROCEDURE — 85610 PROTHROMBIN TIME: CPT

## 2024-08-11 PROCEDURE — P9016 RBC LEUKOCYTES REDUCED: HCPCS

## 2024-08-11 PROCEDURE — 86880 COOMBS TEST DIRECT: CPT

## 2024-08-11 PROCEDURE — 2580000003 HC RX 258: Performed by: PHYSICAL MEDICINE & REHABILITATION

## 2024-08-11 PROCEDURE — 86901 BLOOD TYPING SEROLOGIC RH(D): CPT

## 2024-08-11 PROCEDURE — 36415 COLL VENOUS BLD VENIPUNCTURE: CPT

## 2024-08-11 PROCEDURE — 30233N1 TRANSFUSION OF NONAUTOLOGOUS RED BLOOD CELLS INTO PERIPHERAL VEIN, PERCUTANEOUS APPROACH: ICD-10-PCS | Performed by: PHYSICAL MEDICINE & REHABILITATION

## 2024-08-11 PROCEDURE — 6370000000 HC RX 637 (ALT 250 FOR IP): Performed by: PHYSICAL MEDICINE & REHABILITATION

## 2024-08-11 PROCEDURE — 82550 ASSAY OF CK (CPK): CPT

## 2024-08-11 PROCEDURE — 86902 BLOOD TYPE ANTIGEN DONOR EA: CPT

## 2024-08-11 RX ORDER — SODIUM CHLORIDE 9 MG/ML
INJECTION, SOLUTION INTRAVENOUS PRN
Status: DISCONTINUED | OUTPATIENT
Start: 2024-08-11 | End: 2024-08-17 | Stop reason: HOSPADM

## 2024-08-11 RX ADMIN — MIDODRINE HYDROCHLORIDE 10 MG: 10 TABLET ORAL at 12:54

## 2024-08-11 RX ADMIN — MIDODRINE HYDROCHLORIDE 10 MG: 10 TABLET ORAL at 07:57

## 2024-08-11 RX ADMIN — SODIUM CHLORIDE, PRESERVATIVE FREE 10 ML: 5 INJECTION INTRAVENOUS at 21:14

## 2024-08-11 RX ADMIN — SODIUM CHLORIDE, PRESERVATIVE FREE 10 ML: 5 INJECTION INTRAVENOUS at 07:59

## 2024-08-11 RX ADMIN — INSULIN GLARGINE 10 UNITS: 100 INJECTION, SOLUTION SUBCUTANEOUS at 08:09

## 2024-08-11 RX ADMIN — SENNOSIDES AND DOCUSATE SODIUM 2 TABLET: 50; 8.6 TABLET ORAL at 07:57

## 2024-08-11 RX ADMIN — ASPIRIN 81 MG: 81 TABLET, COATED ORAL at 21:14

## 2024-08-11 RX ADMIN — MIRTAZAPINE 30 MG: 30 TABLET, FILM COATED ORAL at 21:14

## 2024-08-11 RX ADMIN — LEVOTHYROXINE SODIUM 137 MCG: 0.11 TABLET ORAL at 06:32

## 2024-08-11 RX ADMIN — DOCUSATE SODIUM 100 MG: 100 CAPSULE, LIQUID FILLED ORAL at 07:57

## 2024-08-11 RX ADMIN — MIDODRINE HYDROCHLORIDE 10 MG: 10 TABLET ORAL at 17:40

## 2024-08-11 RX ADMIN — TAMSULOSIN HYDROCHLORIDE 0.4 MG: 0.4 CAPSULE ORAL at 21:14

## 2024-08-11 ASSESSMENT — PAIN SCALES - GENERAL
PAINLEVEL_OUTOF10: 0

## 2024-08-11 NOTE — PROGRESS NOTES
Nephrology Progress Note   [facility]      Reason for consultation: SUKUMAR on CKD 3a -- baseline Cr ~ 1.3-1.5 mg/dL. Follows with Dr. Salinas in office (last seen 2024).     Subjective:    The patient has been seen and examined .   Labs and chart reviewed.Feels weak/tired .   Patient review of systems: No   SOB/TOLENTINO .  LE edema better .     No family present .       Allergies:  Allergies   Allergen Reactions    Ciprofloxacin Hives        Scheduled Meds:   midodrine  10 mg Oral TID WC    epoetin linda-epbx  10,000 Units SubCUTAneous Once per day on     sodium chloride flush  5-40 mL IntraVENous 2 times per day    aspirin  81 mg Oral Nightly    levothyroxine  137 mcg Oral Daily    mirtazapine  30 mg Oral Nightly    tamsulosin  0.4 mg Oral Nightly    insulin lispro  0-8 Units SubCUTAneous TID WC    insulin lispro  0-4 Units SubCUTAneous Nightly    insulin glargine  10 Units SubCUTAneous Daily    psyllium husk-aspartame  1 packet Oral Daily    docusate sodium  100 mg Oral Daily    sennosides-docusate sodium  2 tablet Oral BID        sodium chloride      dextrose         PRN Meds:sodium chloride, sodium chloride flush, ondansetron **OR** ondansetron, polyethylene glycol, glucose, dextrose bolus **OR** dextrose bolus, glucagon (rDNA), dextrose, hydrocortisone, acetaminophen, bisacodyl, bisacodyl    Physical Exam:    TEMPERATURE:  Current - Temp: 98.3 °F (36.8 °C); Max - Temp  Av.4 °F (36.9 °C)  Min: 98.3 °F (36.8 °C)  Max: 98.8 °F (37.1 °C)  RESPIRATIONS RANGE: Resp  Av.2  Min: 16  Max: 17  PULSE RANGE: Pulse  Av  Min: 59  Max: 85  BLOOD PRESSURE RANGE:  Systolic (24hrs), Av , Min:104 , Max:144   ; Diastolic (24hrs), Av, Min:60, Max:75    24HR INTAKE/OUTPUT:    Intake/Output Summary (Last 24 hours) at 2024 1536  Last data filed at 2024 1515  Gross per 24 hour   Intake 1308 ml   Output 1960 ml   Net -652 ml       Patient Vitals for the past 96 hrs (Last 3  readings):   Weight   08/11/24 0615 87.1 kg (192 lb 0.3 oz)   08/10/24 0615 87.9 kg (193 lb 12.6 oz)   08/09/24 2059 87.8 kg (193 lb 9 oz)       General: Alert, Awake, NAD  HEENT: Normocephalic, atraumatic  Chest: clear to auscultation   CVS: RRR, no murmur   Abdomen: soft, non tender  Extremities: ++ edema  Skin: normal texture, normal skin turgor, no rash  Neurological: CN intact, no focal motor neurological deficit  Psych: normal affect; O x 3    LAB DATA:    CBC:   Lab Results   Component Value Date/Time    WBC 4.3 08/11/2024 05:52 AM    RBC 2.09 08/11/2024 05:52 AM    HGB 6.7 08/11/2024 05:52 AM    HCT 19.3 08/11/2024 05:52 AM    MCV 92.5 08/11/2024 05:52 AM    MCH 32.1 08/11/2024 05:52 AM    MCHC 34.7 08/11/2024 05:52 AM    RDW 16.6 08/11/2024 05:52 AM     08/11/2024 05:52 AM    MPV 7.0 08/11/2024 05:52 AM     BMP:    Lab Results   Component Value Date/Time     08/11/2024 05:52 AM    K 3.9 08/11/2024 05:52 AM    K 3.5 08/01/2024 03:33 AM     08/11/2024 05:52 AM    CO2 27 08/11/2024 05:52 AM    BUN 48 08/11/2024 05:52 AM    CREATININE 5.9 08/11/2024 05:52 AM    CALCIUM 8.6 08/11/2024 05:52 AM    GFRAA 56 10/11/2022 08:25 AM    GFRAA >60 06/15/2013 07:43 AM    LABGLOM 10 08/11/2024 05:52 AM    LABGLOM 59 04/29/2024 10:38 AM    GLUCOSE 100 08/11/2024 05:52 AM     Ionized Calcium:  No components found for: \"IONCA\"  Magnesium:    Lab Results   Component Value Date/Time    MG 2.00 08/10/2024 06:35 AM     Phosphorus:    Lab Results   Component Value Date/Time    PHOS 6.5 08/11/2024 05:52 AM     U/A:    Lab Results   Component Value Date/Time    NITRITE NEG 05/29/2019 07:44 AM    COLORU Yellow 08/01/2024 04:44 PM    PHUR 7.0 08/01/2024 04:44 PM    PHUR 7.0 04/16/2024 02:00 PM    LABCAST 0-1 Hyaline 06/29/2012 07:50 AM    WBCUA 264 08/01/2024 04:44 PM    RBCUA 85 08/01/2024 04:44 PM    MUCUS 2+ 06/29/2012 07:50 AM    BACTERIA 4+ 08/01/2024 04:44 PM    CLARITYU CLOUDY 08/01/2024 04:44 PM    SPECGRAV

## 2024-08-11 NOTE — PROGRESS NOTES
Blood Bank called to notify this RN that pt's blood may take longer to receive as they have to call [facility] [place] to obtain blood and weekend staff is available at [facility] only as needed. Pt notified of delay in receiving blood. Pt verbalized understanding of this.

## 2024-08-11 NOTE — PROGRESS NOTES
Department of Physical Medicine & Rehabilitation  Progress Note    Patient Identification:  Morro Kevin  [id number]  : 1950  Admit date: 2024    Chief Complaint: Rhabdomyolysis    Subjective:   No acute events overnight. Patient seen this am.  He overall feels well but he does have a drop in hemoglobin today.  He does have a history of past GI bleed but this is likely due to a stitch breaking from his central line.  He is hopeful to get out of the hospital soon as possible as he has been here for 5 weeks.  No new pain this morning.    ROS: No f/c, n/v, cp     Objective:  Patient Vitals for the past 24 hrs:   BP Temp Temp src Pulse Resp SpO2 Weight   24 0750 109/65 98.5 °F (36.9 °C) Oral 85 16 97 % --   24 0615 -- -- -- -- -- -- 87.1 kg (192 lb 0.3 oz)   08/10/24 2050 128/71 98.8 °F (37.1 °C) Oral 64 17 98 % --   08/10/24 1621 117/60 -- -- 62 16 96 % --   08/10/24 1148 103/68 98 °F (36.7 °C) Oral 96 -- 96 % --     Const: Alert. No distress, pleasant.   HEENT: Normocephalic, atraumatic. Normal sclera/conjunctiva. MMM.   CV: Regular rate and rhythm.   Resp: No respiratory distress. Lungs CTAB.   Abd: Soft, nontender, nondistended, NABS+   Ext: + edema.   Neuro: Alert, oriented, appropriately interactive.   Psych: Cooperative, appropriate mood and affect    Laboratory data: Available via EMR.   Last 24 hour lab  Recent Results (from the past 24 hour(s))   POCT Glucose    Collection Time: 08/10/24 11:11 AM   Result Value Ref Range    POC Glucose 134 (H) 70 - 99 mg/dl    Performed on ACCU-CHEK    CBC with Auto Differential    Collection Time: 08/10/24  2:22 PM   Result Value Ref Range    WBC 6.8 4.0 - 11.0 K/uL    RBC 2.28 (L) 4.20 - 5.90 M/uL    Hemoglobin 7.1 (L) 13.5 - 17.5 g/dL    Hematocrit 21.3 (L) 40.5 - 52.5 %    MCV 93.2 80.0 - 100.0 fL    MCH 31.1 26.0 - 34.0 pg    MCHC 33.4 31.0 - 36.0 g/dL    RDW 16.3 (H) 12.4 - 15.4 %    Platelets 220 135 - 450 K/uL    MPV 7.2 5.0 - 10.5 fL

## 2024-08-11 NOTE — PROGRESS NOTES
Per nursing policy, 22 g iv ok to use for blood transfusions. This RN attempted to place 20 g without success, but flushed 22 iv and is working well at this time.

## 2024-08-11 NOTE — PLAN OF CARE
Problem: Safety - Adult  Goal: Free from fall injury  8/11/2024 0041 by Isa Branch, RN  Outcome: Progressing  Flowsheets (Taken 8/11/2024 0041)  Free From Fall Injury:   Instruct family/caregiver on patient safety   Based on caregiver fall risk screen, instruct family/caregiver to ask for assistance with transferring infant if caregiver noted to have fall risk factors  Note: Pt educated on falls precautions and safety. Call light and personal belongings within reach at all times. Non skid socks in use when up. Hourly rounding and alarms active.

## 2024-08-11 NOTE — ED PROVIDER NOTES
EMERGENCY DEPARTMENT SUPERVISING PHYSICIAN NOTE    I have seen this patient & have reviewed history and findings with the PA, NP, or resident physician and provided direct supervision. I was present for key portions of any procedures performed. I've participated in medical management, monitoring, and treatment of this patient with the provider. I have reviewed currently available documentation, test results, and laboratory results in the interim. Care plan has been developed collaboratively. I take responsibility for the patient's management from when I was asked to get involved in this patient's care.    Brief HPI:  73yrs M reports that he has felt increasingly fatigued for the last 1-2 weeks  More recently he has been feeling short of breath while at rest and getting out of breath quite easily when attempting to exert himself    Pertinent Exam Findings:  Awake, alert, not acutely ill-appearing, not distressed  CTAB, RRR, 2+ radial pulses  Abdomen soft, NT, ND    Plan:  He looks fairly well clinically but unfortunately his lab testing is suspicious for mult-system organ failure  Would most strongly suspect adverse effects of his cancer treatments and/or worsening neoplastic disease as the etiology of such  V/Q is indeterminate risk for PE; as his INR is currently supratherapeutic can hold off on administration of any anticoagulants in the ED  Will need hospitalization and inpatient management of his conditions    Final Impression(s)  1. History of prostate cancer    2. Dyspnea, unspecified type    3. General weakness    4. Acute kidney injury superimposed on CKD (HCC)    5. Elevated troponin    6. Supratherapeutic INR    7. History of aortic valve replacement    8. Transaminitis    9. Shortness of breath           Thien Alvarado MD  08/11/24 0754

## 2024-08-11 NOTE — PROGRESS NOTES
D: Dr. Matt Buitrago, DO is on call today A: this RN called him concerning pt's critical H/H R: Dr. Matt Buitrago coming to hospital shortly to round on patients and ordered this RN to administer 1 unit of RBCs. This RN to place orders as given. Charge RN notified.

## 2024-08-11 NOTE — PROGRESS NOTES
D: pt's H/H were 6.7/19.3 this am. pt has hx of prostate cancer and has received blood transfusions in past. started on Retacrit yesterday per nephrology. pt denies dizziness/SOB at this time. ambulating to bathroom with steady gait. A: this RN sent secure message to Dr. Matt Buitrago with assessment/communication findings R: will continue to monitor pt

## 2024-08-11 NOTE — PLAN OF CARE
Problem: Discharge Planning  Goal: Discharge to home or other facility with appropriate resources  8/11/2024 1354 by Graciela Agustin, RN  Outcome: Progressing

## 2024-08-11 NOTE — PROGRESS NOTES
Resting quietly in bed, respirations even/easy. Patient admitted with debility and rhabdomyolysis. Newly requiring HD. Vas cath in place to RUE, some old drainage seen. Outer dressing is CD&I. Patient is oriented x4, ambulating with a walker x1, SBA. Tolerating well. Lungs clear but diminished. HR Regular. Abdomen non tender with active bowel sounds. Has been continent of urine. Had BMx2, small amount of blood noted. Has denied pain. Encouraged to call for all needs, call light remains in reach at all times. Bed alarm active. Isa Branch RN

## 2024-08-11 NOTE — PROGRESS NOTES
Dr. Matt Buitrago, DO came to room and updated this RN and pt about plan of care. Consent for blood administration signed with pt and placed in pt's soft chart. This RN to attempt to place 20 g iv as pt has 22 g iv at this time. Lab parvin blood and pt understands that preparation of blood may take longer as pt has \"history of irregular antibodies.\" Pt verbalized understanding of this.

## 2024-08-11 NOTE — PROGRESS NOTES
Pt receiving 1 unit of RBCs. Pt observed by this RN for more than 20 minutes. Pt doing well. Vitals stable. Pt denies SOB and pain. Will continue to monitor pt.

## 2024-08-11 NOTE — PROGRESS NOTES
D: pt informed about receiving one unit of RBCs per physician's order A: pt agreeable to plan of care R: orders placed

## 2024-08-12 ENCOUNTER — TELEPHONE (OUTPATIENT)
Dept: FAMILY MEDICINE CLINIC | Age: 74
End: 2024-08-12

## 2024-08-12 LAB
ALBUMIN SERPL-MCNC: 2.7 G/DL (ref 3.4–5)
ANION GAP SERPL CALCULATED.3IONS-SCNC: 13 MMOL/L (ref 3–16)
BUN SERPL-MCNC: 54 MG/DL (ref 7–20)
CALCIUM SERPL-MCNC: 8.4 MG/DL (ref 8.3–10.6)
CHLORIDE SERPL-SCNC: 103 MMOL/L (ref 99–110)
CO2 SERPL-SCNC: 25 MMOL/L (ref 21–32)
CREAT SERPL-MCNC: 7.2 MG/DL (ref 0.8–1.3)
DEPRECATED RDW RBC AUTO: 16.3 % (ref 12.4–15.4)
GFR SERPLBLD CREATININE-BSD FMLA CKD-EPI: 7 ML/MIN/{1.73_M2}
GLUCOSE BLD-MCNC: 114 MG/DL (ref 70–99)
GLUCOSE BLD-MCNC: 155 MG/DL (ref 70–99)
GLUCOSE BLD-MCNC: 273 MG/DL (ref 70–99)
GLUCOSE BLD-MCNC: 96 MG/DL (ref 70–99)
GLUCOSE SERPL-MCNC: 102 MG/DL (ref 70–99)
HCT VFR BLD AUTO: 21.6 % (ref 40.5–52.5)
HGB BLD-MCNC: 7.5 G/DL (ref 13.5–17.5)
INR PPP: 1.33 (ref 0.85–1.15)
MCH RBC QN AUTO: 31.7 PG (ref 26–34)
MCHC RBC AUTO-ENTMCNC: 34.7 G/DL (ref 31–36)
MCV RBC AUTO: 91.2 FL (ref 80–100)
PERFORMED ON: ABNORMAL
PERFORMED ON: NORMAL
PHOSPHATE SERPL-MCNC: 7.3 MG/DL (ref 2.5–4.9)
PLATELET # BLD AUTO: 157 K/UL (ref 135–450)
PMV BLD AUTO: 7.2 FL (ref 5–10.5)
POTASSIUM SERPL-SCNC: 3.7 MMOL/L (ref 3.5–5.1)
PROTHROMBIN TIME: 16.7 SEC (ref 11.9–14.9)
RBC # BLD AUTO: 2.37 M/UL (ref 4.2–5.9)
SODIUM SERPL-SCNC: 141 MMOL/L (ref 136–145)
WBC # BLD AUTO: 4.5 K/UL (ref 4–11)

## 2024-08-12 PROCEDURE — 97535 SELF CARE MNGMENT TRAINING: CPT

## 2024-08-12 PROCEDURE — 80069 RENAL FUNCTION PANEL: CPT

## 2024-08-12 PROCEDURE — 97530 THERAPEUTIC ACTIVITIES: CPT

## 2024-08-12 PROCEDURE — 90935 HEMODIALYSIS ONE EVALUATION: CPT

## 2024-08-12 PROCEDURE — 94760 N-INVAS EAR/PLS OXIMETRY 1: CPT

## 2024-08-12 PROCEDURE — 85610 PROTHROMBIN TIME: CPT

## 2024-08-12 PROCEDURE — 97116 GAIT TRAINING THERAPY: CPT

## 2024-08-12 PROCEDURE — 2580000003 HC RX 258: Performed by: PHYSICAL MEDICINE & REHABILITATION

## 2024-08-12 PROCEDURE — 1280000000 HC REHAB R&B

## 2024-08-12 PROCEDURE — 97110 THERAPEUTIC EXERCISES: CPT

## 2024-08-12 PROCEDURE — 6360000002 HC RX W HCPCS: Performed by: INTERNAL MEDICINE

## 2024-08-12 PROCEDURE — 85027 COMPLETE CBC AUTOMATED: CPT

## 2024-08-12 PROCEDURE — 36415 COLL VENOUS BLD VENIPUNCTURE: CPT

## 2024-08-12 PROCEDURE — 5A1D70Z PERFORMANCE OF URINARY FILTRATION, INTERMITTENT, LESS THAN 6 HOURS PER DAY: ICD-10-PCS | Performed by: INTERNAL MEDICINE

## 2024-08-12 PROCEDURE — 6370000000 HC RX 637 (ALT 250 FOR IP): Performed by: INTERNAL MEDICINE

## 2024-08-12 PROCEDURE — 6370000000 HC RX 637 (ALT 250 FOR IP): Performed by: PHYSICAL MEDICINE & REHABILITATION

## 2024-08-12 RX ADMIN — TAMSULOSIN HYDROCHLORIDE 0.4 MG: 0.4 CAPSULE ORAL at 20:42

## 2024-08-12 RX ADMIN — MIRTAZAPINE 30 MG: 30 TABLET, FILM COATED ORAL at 20:42

## 2024-08-12 RX ADMIN — MIDODRINE HYDROCHLORIDE 10 MG: 10 TABLET ORAL at 08:17

## 2024-08-12 RX ADMIN — INSULIN GLARGINE 10 UNITS: 100 INJECTION, SOLUTION SUBCUTANEOUS at 08:17

## 2024-08-12 RX ADMIN — MIDODRINE HYDROCHLORIDE 10 MG: 10 TABLET ORAL at 14:06

## 2024-08-12 RX ADMIN — LEVOTHYROXINE SODIUM 137 MCG: 0.11 TABLET ORAL at 06:19

## 2024-08-12 RX ADMIN — SODIUM CHLORIDE, PRESERVATIVE FREE 10 ML: 5 INJECTION INTRAVENOUS at 20:42

## 2024-08-12 RX ADMIN — SODIUM CHLORIDE, PRESERVATIVE FREE 10 ML: 5 INJECTION INTRAVENOUS at 08:19

## 2024-08-12 RX ADMIN — EPOETIN ALFA-EPBX 10000 UNITS: 10000 INJECTION, SOLUTION INTRAVENOUS; SUBCUTANEOUS at 17:38

## 2024-08-12 RX ADMIN — DOCUSATE SODIUM 100 MG: 100 CAPSULE, LIQUID FILLED ORAL at 08:18

## 2024-08-12 RX ADMIN — ASPIRIN 81 MG: 81 TABLET, COATED ORAL at 20:42

## 2024-08-12 ASSESSMENT — PAIN SCALES - GENERAL: PAINLEVEL_OUTOF10: 0

## 2024-08-12 NOTE — CARE COORDINATION
Chart Reviewed.  Met with patient to introduce  role, initiate discussion regarding DC planning and to inform of weekly Team Conference on Tuesdays.    Pt currently is on HD; monitoring for HD recovery.  Pt reports if he needs ongoing HD, he will want [place] [facility].                                    SOCIAL WORK ASSESSMENT      GOAL:   To return home with son      HOME SITUATION:  Pt and adult son live in a condo with ramped entry and then elevator access.  It is a one floor living space.   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  He is an active .  He is retired.    Pcp:   Dr Fareed Marino     Pharmacy:   [facility] [address]         PRIOR LEVEL OF FUNCTIONING:       PERSONAL CARE:  totally independent                                                                         DRIVES:   yes                                                                     FINANCES:  ind                                                                     MEALS:   ind                                                                                                 GROCERY SHOPS: ind      DME CURRENTLY AT HOME:  shower chair, HH Shower held mount, Cane, 4 wh walker, transport chair.       CURRENT HOME CARE/SERVICES:Informed him of possible post acute services to continue his progress with either home care or out patient.  He reports he has used [facility] in the recent path and would choose them again.       PREFERRED HOME CARE:  [facility]      TEAM CONFERENCE DAY:  Tuesdays.        LSW informed patient of preferred  time on date of discharge which is between 10 - 12 noon.      LSW informed patient of recommendation for PCP visit within 7 days post discharge.      TRANSPORTATION:   He denied having missed any appts or needs of daily living due to lack of transportation in the last 12 months due to lack of

## 2024-08-12 NOTE — PLAN OF CARE
Problem: Safety - Adult  Goal: Free from fall injury  8/12/2024 0313 by Isa Branch, RN  Outcome: Progressing  Flowsheets (Taken 8/12/2024 0313)  Free From Fall Injury:   Instruct family/caregiver on patient safety   Based on caregiver fall risk screen, instruct family/caregiver to ask for assistance with transferring infant if caregiver noted to have fall risk factors  Note: Pt educated on falls precautions and safety. Call light and personal belongings within reach at all times. Non skid socks in use when up. Hourly rounding and alarms active.      Problem: Pain  Goal: Verbalizes/displays adequate comfort level or baseline comfort level  8/12/2024 0313 by Isa Branch, RN  Outcome: Progressing  Flowsheets (Taken 8/12/2024 0313)  Verbalizes/displays adequate comfort level or baseline comfort level:   Encourage patient to monitor pain and request assistance   Administer analgesics based on type and severity of pain and evaluate response   Consider cultural and social influences on pain and pain management  Note: Able to rate pain using a 1-10 scale, medicated per prn orders, see MAR. Able to verbalize a reduction in pain and/or able to fall asleep and remain asleep without any s/s of pain

## 2024-08-12 NOTE — PROGRESS NOTES
Resting quietly in bed, respirations even/easy. Patient admitted with weakness s/p rhabdomyolysis. Newly requiring HD on MWF. Vas cath in place to right chest. Transferring with a walker x1, CGA. Lungs CTA, HR regular. Abdomen non tender with active bowel sounds. Has been continent of urine. Denies pain. Encouraged to call for all needs, call light remains in reach at all times. Bed alarm active. Isa Branch RN

## 2024-08-12 NOTE — PROGRESS NOTES
Treatment time: 3 hours and 30 minutes  Net UF: 3 liters as increased by Doctor Adrian    Pre weight: 86.4 kg  Post weight: 83.3 kg    Access used: Right CVC  Access function: Had good flow on both Ports, Had clean, dry and intact dressing. Changed dressing aseptically    Medications or blood products given: Retacrit    Regular outpatient schedule: M,W,F    Summary of response to treatment: 14:20: Treatment set at 3 liters as ordered with 3 hours and 30 minutes, Right CVC had good flow on both ports. It was clean dry, and intact dressing, Changed dressing aseptically. Hooked to Hd machine. Hd tolerated well. Terminated without any problem noticed.      Copy of dialysis treatment record placed in chart, to be scanned into EMR.

## 2024-08-12 NOTE — PATIENT CARE CONFERENCE
[facility]  Inpatient Rehabilitation  Weekly Team Conference Note      Date: 2024  Patient Name:  Morro Kevin    MRN: [id number]  : 1950  Gender: Male  Physician: Dr. Buitrago  Diagnosis: Rhabdomyolysis [M62.82]    CASE MANAGEMENT  Assessment: Goal is home with son, agreeable to home care.       PHYSICAL THERAPY    Bed Mobility:  Overall Assistance Level: Modified Independent, Supervision  Additional Factors: Head of bed flat, Without handrails (bed mobility done on flat therapy mat)  Sit>supine:  Assistance Level: Modified independent  Supine>sit:  Assistance Level: Supervision    Transfers:  Surface: Wheelchair, To mat, From mat  Device: Walker  Sit>stand:  Assistance Level: Supervision  Stand>sit:  Assistance Level: Supervision  Bed<>chair  Technique: Stand step  Assistance Level: Supervision  Skilled Clinical Factors: with rollator  Car transfer:  Assistance Level: Stand by assist  Skilled Clinical Factors: Used rollator for approach and was safe with the brakes. Patient requires use of bilateral UE to assist with bringing left LE in and out of the car.  He did pull on door to stand from the car but was receptive to education to push from the dashboard or something solid instead.    Ambulation:  Surface: Level surface  Device: Rollator  Distance: 200', short distances in therapy gym, 230' x2  Activity: Within Unit  Activity Comments: Patient is steady with use of rollator. He has slight lateral sway with mobility but no LOB occurs. He uses brakes appropriately 90% of the time.  Gait Deviations: Slow zafar    Stairs:  Stair Height: 6''  Device: Bilateral handrails  Number of Stairs: 4  Additional Factors: Increased time to complete, Non-reciprocal going up, Non-reciprocal going down  Assistance Level: Supervision  Skilled Clinical Factors: patient steady with use of rail in left hand. He does not use right hand due to bleed that occured on that side following TDC  placement    Curb:         Assessment:  Assessment: Mr. \"Dez\" Dorita is improving daily and is now ambulating community distance with rollator with supervision. He is becoming aware of correct use of brakes as this is his first time using a rollator.  He is able to complete transfers with supervision and bed mobility.  Patient continues to be below baseline and will benefit from continued skilled therapy for strengthening, gait training, and balance training to allow for safe return home. It is anticipated he will be a short stay and be safe to return home by end of week.  Activity Tolerance: Patient tolerated treatment well  Discharge Recommendations: Home with assist PRN, Outpatient PT        SPEECH THERAPY (intentionally left blank if not actively being seen by this service):      OCCUPATIONAL THERAPY    ADL  Feeding: Independent  Feeding Skilled Clinical Factors: able to open all containers  Grooming: Contact guard assistance  Grooming Skilled Clinical Factors: stood to wash hands & complete oral care, mild posterior sway; seated for remainder of ADL tasks  UE Bathing: Minimal assistance, Verbal cueing, Setup, Stand by assistance  UE Bathing Skilled Clinical Factors: per RN ok to doff ace wrap @ chest, he is able to sponge bathe his belly, arms & armpits; OT used CHG wipe @ R upper chest CATH site, dried blood noted  LE Bathing: Minimal assistance  LE Bathing Skilled Clinical Factors: seated to sponge bathe his thighs<>shins, OT washed & dried feet; he stood w/ up to min A to wash jayesh areas  UE Dressing: Minimal assistance  UE Dressing Skilled Clinical Factors: min A to don shirt down his back  LE Dressing: Maximum assistance  LE Dressing Skilled Clinical Factors: needed A to doff/don isra hose, non skid socks * B LE edema noted; needed A to place brief & pants @ his feet, he pulls them up to thighs; stood w/ up to min A to don clothing over hips & buttocks  Toileting: Minimal assistance  Toileting Skilled

## 2024-08-12 NOTE — PROGRESS NOTES
Nephrology Progress Note   [facility]      Reason for consultation: SUKUMAR on CKD 3a -- baseline Cr ~ 1.3-1.5 mg/dL. Follows with Dr. Salinas in office (last seen 2024).     Subjective:    The patient has been seen and examined. Labs and chart reviewed. Pt seen working with therapy in ARU. Cr continues to trend up between HD treatments. Plans for HD today with net 2 L UF.     There were not complications overnight.    Patient review of systems: Denies SOB      Allergies:  Allergies   Allergen Reactions    Ciprofloxacin Hives        Scheduled Meds:   midodrine  10 mg Oral TID WC    epoetin linda-epbx  10,000 Units SubCUTAneous Once per day on     sodium chloride flush  5-40 mL IntraVENous 2 times per day    aspirin  81 mg Oral Nightly    levothyroxine  137 mcg Oral Daily    mirtazapine  30 mg Oral Nightly    tamsulosin  0.4 mg Oral Nightly    insulin lispro  0-8 Units SubCUTAneous TID WC    insulin lispro  0-4 Units SubCUTAneous Nightly    insulin glargine  10 Units SubCUTAneous Daily    psyllium husk-aspartame  1 packet Oral Daily    docusate sodium  100 mg Oral Daily    sennosides-docusate sodium  2 tablet Oral BID        sodium chloride      dextrose         PRN Meds:sodium chloride, sodium chloride flush, ondansetron **OR** ondansetron, polyethylene glycol, glucose, dextrose bolus **OR** dextrose bolus, glucagon (rDNA), dextrose, hydrocortisone, acetaminophen, bisacodyl, bisacodyl    Physical Exam:    TEMPERATURE:  Current - Temp: 98.3 °F (36.8 °C); Max - Temp  Av.4 °F (36.9 °C)  Min: 98.3 °F (36.8 °C)  Max: 98.6 °F (37 °C)  RESPIRATIONS RANGE: Resp  Av.4  Min: 16  Max: 18  PULSE RANGE: Pulse  Av.4  Min: 57  Max: 86  BLOOD PRESSURE RANGE:  Systolic (24hrs), Av , Min:104 , Max:144   ; Diastolic (24hrs), Av, Min:61, Max:76    24HR INTAKE/OUTPUT:    Intake/Output Summary (Last 24 hours) at 2024 1240  Last data filed at 2024 0819  Gross per 24 hour   Intake

## 2024-08-12 NOTE — PROGRESS NOTES
Comprehensive Nutrition Assessment    Type and Reason for Visit:  Consult    Nutrition Recommendations/Plan:   Continue current diet.     Malnutrition Assessment:  Malnutrition Status:  No malnutrition (08/12/24 1058)    Context:  Acute Illness     Findings of the 6 clinical characteristics of malnutrition:  Energy Intake:  No significant decrease in energy intake  Weight Loss:  Unable to assess (Dialysis Pt.)       Nutrition Assessment:    RD consult for IP rehab. Pt. admitted for Rhabdomyolysis. Currently receiving a low Phos diet. Diet acceptance is adequate so far. Pt. was started on HD on 8/2 for SUKUMAR on CKF. Nephrology monitoring for renal improvements. Nutrition labs reviewed. Nurse reports some confusion with meals, he felt he was missing items but the foods provided seemed to match the ticket. Pt. appears to be maintaining estimated needs to support dialysis. Will monitor diet acceptance and nutritional adequacy while inpatient.    Nutrition Related Findings:    BUN 54, Cr 7.2, GFR 7. Phos 7.3, -242. LBM 8/11.         Current Nutrition Intake & Therapies:    Average Meal Intake: %  Average Supplements Intake: None Ordered  ADULT DIET; Regular; Low Phosphorus (Less than 1000 mg)    Anthropometric Measures:  Height: 177.8 cm (5' 10\")  Ideal Body Weight (IBW): 166 lbs (75 kg)       Current Body Weight: 86.4 kg (190 lb 7.6 oz), 114.7 % IBW.    Current BMI (kg/m2): 27.3                          BMI Categories: Overweight (BMI 25.0-29.9)    Estimated Daily Nutrient Needs:  Energy Requirements Based On: Kcal/kg  Weight Used for Energy Requirements: Current  Energy (kcal/day): 7183-5742 kcal (20-25 kcal/kg)  Weight Used for Protein Requirements: Current  Protein (g/day):  g (1-1.2g/kg)  Method Used for Fluid Requirements: 1 ml/kcal  Fluid (ml/day): 360-1300    Nutrition Diagnosis:   Increased nutrient needs related to increase demand for energy/nutrients as evidenced by dialysis    Nutrition

## 2024-08-12 NOTE — TELEPHONE ENCOUNTER
Care Transitions Initial Follow Up Call    Outreach made within 2 business days of discharge: Yes    Patient: Morro Kevin Patient : 1950   MRN: [id number]  Reason for Admission: SUKUMAR ON CKD STAGE II  Discharge Date: 24       Spoke with: PATIENT    Discharge department/facility: [facility]    TCM Interactive Patient Contact:  Was patient able to fill all prescriptions: Yes  Was patient instructed to bring all medications to the follow-up visit: Yes  Is patient taking all medications as directed in the discharge summary? Yes  Does patient understand their discharge instructions: Yes  Does patient have questions or concerns that need addressed prior to 7-14 day follow up office visit: no    Additional needs identified to be addressed with provider  No needs identified             Scheduled appointment with PCP within 7-14 days    Follow Up  Future Appointments   Date Time Provider Department Center   2024  9:30 AM Ton Ray MD [facility]   2024 10:00 AM Fareed Marino MD [facility] DEP     SPOKE TO PT, HE IS STILL BEING TREATED WITH INPATIENT REHAB. HE WILL CALL ME WHEN DISCHARGED TO SET UP HOSPITAL FOLLOW UP. Alisa Arteaga MA

## 2024-08-12 NOTE — PROGRESS NOTES
Physical Therapy  Facility/Department: [address] REHAB  Rehabilitation Physical Therapy Treatment Note    NAME: Morro Kevin  : 1950 (73 y.o.)  MRN: [id number]  CODE STATUS: Full Code    Date of Service: 24       Restrictions:  Restrictions/Precautions: Fall Risk, Weight Bearing  Upper Extremity Weight Bearing Restrictions  Right Upper Extremity Weight Bearing: Non Weight Bearing  Other: has new Permcath R upper chest  Position Activity Restriction  Other position/activity restrictions: New R TDC Cath (placed 2024).HD MWF, reg diet     Pertinent medical information:  Additional Pertinent Hx: Pt is a 73 y.o. male admitted to  rehab unit on 24 with Rhabdomyolysis and SUKUMAR.  Admitted to  hospital on 2024 with SUKUMAR, Hypotension, PAF, Weak and SOB. GI consult : GI Bleed. Nephrology consult : SUKUMAR, new HD.    SUBJECTIVE  Subjective  Subjective: Patient arrived in wheelchair for AM session. Patient is in good spirits and reports he is feeling better.  Pain: no complaints of pain upon arrival.    Social/Functional History  Lives With: Son  Type of Home: Condo  Home Layout: One level  Home Access: Ramped entrance, Elevator  Bathroom Shower/Tub: Walk-in shower  Bathroom Toilet: Standard  Bathroom Equipment: Shower chair, Hand-held shower  Bathroom Accessibility: Accessible  Home Equipment: Cane, Walker - 4-Wheeled (Transport chair.)  Has the patient had two or more falls in the past year or any fall with injury in the past year?: No  ADL Assistance: Independent  Homemaking Assistance: Independent (Son assists homemaking.)  Homemaking Responsibilities: Yes  Ambulation Assistance: Independent (Using Cane past wk pta; prior no device. Pt had L hip/buttock injury back in April which increased sciatic pain, then used RW)  Transfer Assistance: Independent  Active : Yes  Mode of Transportation: Car  Occupation: Retired  Type of Occupation: [employer], [employer]  manager  Leisure & Hobbies:

## 2024-08-12 NOTE — PLAN OF CARE
Problem: Discharge Planning  Goal: Discharge to home or other facility with appropriate resources  8/12/2024 0954 by Jennie Weir RN  Outcome: Progressing  8/12/2024 0313 by Isa Branch RN  Outcome: Progressing     Problem: Safety - Adult  Goal: Free from fall injury  8/12/2024 0954 by Jennie Weir RN  Outcome: Progressing  Flowsheets (Taken 8/12/2024 0945)  Free From Fall Injury:   Instruct family/caregiver on patient safety   Based on caregiver fall risk screen, instruct family/caregiver to ask for assistance with transferring infant if caregiver noted to have fall risk factors  8/12/2024 0313 by Isa Branch RN  Outcome: Progressing  Flowsheets (Taken 8/12/2024 0313)  Free From Fall Injury:   Instruct family/caregiver on patient safety   Based on caregiver fall risk screen, instruct family/caregiver to ask for assistance with transferring infant if caregiver noted to have fall risk factors  Note: Pt educated on falls precautions and safety. Call light and personal belongings within reach at all times. Non skid socks in use when up. Hourly rounding and alarms active.      Problem: ABCDS Injury Assessment  Goal: Absence of physical injury  8/12/2024 0954 by Jennie Weir RN  Outcome: Progressing  Flowsheets (Taken 8/12/2024 0945)  Absence of Physical Injury: Implement safety measures based on patient assessment  8/12/2024 0313 by Isa Branch RN  Outcome: Progressing     Problem: Pain  Goal: Verbalizes/displays adequate comfort level or baseline comfort level  8/12/2024 0954 by Jennie Weir RN  Outcome: Progressing  8/12/2024 0313 by Isa Branch RN  Outcome: Progressing  Flowsheets (Taken 8/12/2024 0313)  Verbalizes/displays adequate comfort level or baseline comfort level:   Encourage patient to monitor pain and request assistance   Administer analgesics based on type and severity of pain and evaluate response   Consider cultural and social influences on pain and

## 2024-08-12 NOTE — PROGRESS NOTES
Patient admitted to rehab with rhabdomyolysis.  A/Ox4. Transfers with walker. Mobility restrictions: avoid using R arm for transfers/lifting d/t new vas cath on RUE. On regular diet, tolerating well. Medications taken whole with thin. On lovenox for DVT prophylaxis. Oxygen: RA. LDA: vas cath RUE. Has been continent of bowel and of bladder. LBM 8/11. Chair/bed alarms in use and call light in reach.

## 2024-08-12 NOTE — PROGRESS NOTES
Department of Physical Medicine & Rehabilitation  Progress Note    Patient Identification:  Morro Kevin  [id number]  : 1950  Admit date: 2024    Chief Complaint: Rhabdomyolysis    Subjective:   Seen in his room this morning.  No new complaints overnight.  He feels much better after receiving blood yesterday and is going to continue working with therapy.  He slept well last night and has no new pain.  Continues to be very motivated.  ROS: No f/c, n/v, cp     Objective:  Patient Vitals for the past 24 hrs:   BP Temp Temp src Pulse Resp SpO2 Height Weight   24 1049 -- -- -- -- -- -- 1.778 m (5' 10\") --   24 0815 116/70 98.3 °F (36.8 °C) Oral 86 18 98 % -- --   24 0654 -- -- -- -- -- -- -- 86.4 kg (190 lb 7.6 oz)   24 2018 109/63 98.6 °F (37 °C) Oral 58 18 97 % -- --   24 1734 116/69 98.5 °F (36.9 °C) Oral 65 16 98 % -- --   24 1613 134/76 98.3 °F (36.8 °C) Oral 57 16 99 % -- --   24 1603 117/62 98.3 °F (36.8 °C) Oral 57 16 96 % -- --   24 1551 125/69 98.5 °F (36.9 °C) Oral 57 16 96 % -- --   24 1543 123/73 98.5 °F (36.9 °C) Oral 59 16 98 % -- --   24 1521 127/75 98.3 °F (36.8 °C) Oral 60 16 97 % -- --   24 1513 (!) 144/75 98.3 °F (36.8 °C) Oral 59 16 98 % -- --   24 1250 104/61 98.3 °F (36.8 °C) Oral 66 16 97 % -- --     Const: Alert. No distress, pleasant.   HEENT: Normocephalic, atraumatic. Normal sclera/conjunctiva. MMM.   CV: Regular rate and rhythm.   Resp: No respiratory distress. Lungs CTAB.   Abd: Soft, nontender, nondistended, NABS+   Ext: + edema.   Neuro: Alert, oriented, appropriately interactive.   Psych: Cooperative, appropriate mood and affect    Laboratory data: Available via EMR.   Last 24 hour lab  Recent Results (from the past 24 hour(s))   POCT Glucose    Collection Time: 24  4:01 PM   Result Value Ref Range    POC Glucose 105 (H) 70 - 99 mg/dl    Performed on ACCU-CHEK    Hemoglobin and Hematocrit

## 2024-08-12 NOTE — DISCHARGE INSTR - COC
Continuity of Care Form    Patient Name: Morro Kevin   :  1950  MRN:  [id number]    Admit date:  2024  Discharge date:      Code Status Order: Full Code   Advance Directives:   Advance Care Flowsheet Documentation             Admitting Physician:  Matt Buitrago MD  PCP: Fareed Marino MD    Discharging Nurse: Graciela Agustin RN  Discharging Hospital Unit/Room#: M4D-6972/3262-01  Discharging Unit Phone Number: [phone]    Emergency Contact:   Extended Emergency Contact Information  Primary Emergency Contact: OneidamelanieJoe  Address: [address] [place] [address] [facility]  Home Phone: [phone]  Mobile Phone: [phone]  Relation: Child  Secondary Emergency Contact: oneidamelanielara  Address: [address] [place] [address] [facility]  Home Phone: [phone]  Mobile Phone: [phone]  Relation: Child  Preferred language: English   needed? No    Past Surgical History:  Past Surgical History:   Procedure Laterality Date    AORTIC VALVE REPLACEMENT  2016    21mm [facility]    CAPSULE ENDOSCOPY N/A 2023    BOWEL SMALL CAPSULE ENDOSCOPY performed by Nohelia Phan MD at [facility] ENDOSCOPY    COLECTOMY  1996    perforated bowel: colonoscopy    COLONOSCOPY  09/24/2014    x2 normal colonoscopies/ perforated bowel    COLONOSCOPY N/A 2023    COLONOSCOPY performed by Nohelia Phan MD at [facility] ENDOSCOPY    CYST REMOVAL      hairy nevus left biceps as child    IR INSERT TUNNELED CV CATH WO PORT < 5YRS Right 2024    RIJ Permacath 23 cm placed per Dr. Oumar Orlando    IR TUNNELED CATHETER PLACEMENT GREATER THAN 5 YEARS  2024    IR TUNNELED CATHETER PLACEMENT GREATER THAN 5 YEARS 2024 [facility] SPECIAL PROCEDURES    KNEE SURGERY Left     torn meniscus     NASAL POLYP SURGERY  1987    OTHER SURGICAL HISTORY  1990s    kidney stone removal     ROTATOR CUFF REPAIR Right     UPPER    Colostomy/Ileostomy/Ileal Conduit: No       Date of Last BM: 8/16    Intake/Output Summary (Last 24 hours) at 8/12/2024 1035  Last data filed at 8/12/2024 0819  Gross per 24 hour   Intake 1318.83 ml   Output 1775 ml   Net -456.17 ml     I/O last 3 completed shifts:  In: 1901.8 [P.O.:1540; I.V.:16; Blood:345.8]  Out: 2635 [Urine:2635]    Safety Concerns:     At Risk for Falls    Impairments/Disabilities:      None    Nutrition Therapy:  Current Nutrition Therapy:   - Oral Diet:  General    Routes of Feeding: Oral  Liquids: Thin Liquids  Daily Fluid Restriction: no  Last Modified Barium Swallow with Video (Video Swallowing Test): not done    Treatments at the Time of Hospital Discharge:   Respiratory Treatments:   Oxygen Therapy:  is not on home oxygen therapy.  Ventilator:    - No ventilator support    Rehab Therapies: Physical Therapy and Occupational Therapy  Weight Bearing Status/Restrictions: No weight bearing restrictions  Other Medical Equipment (for information only, NOT a DME order):  walker  Other Treatments:     Patient's personal belongings (please select all that are sent with patient):  Glasses, Dentures lower, shirt, pants, footwear, cell phone, , wallet    RN SIGNATURE:  Electronically signed by Silvana Lazo RN on 8/17/24 at 1:32 AM EDT    CASE MANAGEMENT/SOCIAL WORK SECTION    Inpatient Status Date: 8-9-2024    Readmission Risk Assessment Score:  Readmission Risk              Risk of Unplanned Readmission:  26       [facility] Out Patient Physical Therapy  [address] [facility]  [address]  [phone]      [address] [place]  [address]  [facility]  6:00 am  [phone]        / signature: Electronically signed by BREN Rao on 8/16/24 at 9:55 AM EDT    PHYSICIAN SECTION    Prognosis: Good    Condition at Discharge: Stable    Rehab Potential (if transferring to Rehab): Good    Recommended Labs or

## 2024-08-12 NOTE — PROGRESS NOTES
Occupational Therapy  Facility/Department: [address] REHAB  Rehabilitation Occupational Therapy Daily Treatment Note    Date: 24  Patient Name: Morro Kevin       Room: U8O-7694/3262-01  MRN: [id number]  Account: [id number]   : 1950  (73 y.o.) Gender: male         Past Medical History:  has a past medical history of Aortic stenosis, Arthritis, Asthma, Deviated septum, Diabetes mellitus type II, Hashimoto thyroiditis, Homocysteinemia, Hyperlipidemia, Hypertension, Hypertriglyceridemia, Hypogonadism male, Hypothyroid, Kidney stone, Mechanical heart valve present, Murmur, Nasal polyps, Osteoarthritis, Prolonged emergence from general anesthesia, Prostate CA (HCC), Sleep apnea, Warfarin anticoagulation, and Wears glasses.  Past Surgical History:   has a past surgical history that includes colectomy (); Nasal polyp surgery (, ); knee surgery (Left, ); Rotator cuff repair (Right, ); Colonoscopy (2014); cyst removal; other surgical history (); Aortic valve replacement (2016); Upper gastrointestinal endoscopy (N/A, 2023); Colonoscopy (N/A, 2023); Capsule endoscopy (N/A, 2023); Upper gastrointestinal endoscopy (N/A, 2023); IR INSERT TUNNELED CV CATH WO SQ PORT/PUMP < 5YRS (Right, 2024); and IR TUNNELED CVC PLACE WO SQ PORT/PUMP > 5 YEARS (2024).    Restrictions  Restrictions/Precautions: Fall Risk, Weight Bearing  Other position/activity restrictions: New R TDC Cath (placed 2024).HD MWF, reg diet  Right Upper Extremity Weight Bearing: Non Weight Bearing  Equipment Used: Wheelchair    Subjective  Subjective: Pt seated in chair with urgency for bowel movement, agreeable to therapy session. Pt denies pain.  Restrictions/Precautions: Fall Risk;Weight Bearing             Objective     Cognition  Overall Cognitive Status: WNL  Orientation  Overall Orientation Status: Within Normal Limits         ADL  Grooming/Oral Hygiene  Assistance  Level: Modified independent  Toileting  Assistance Level: Modified independent  Toilet Transfers  Equipment:  (ambulating)  Assistance Level: Supervision          Functional Mobility  Device: Rolling walker  Activity: To/From bathroom;To/From therapy gym  Assistance Level: Supervision  Sit to Stand  Assistance Level: Supervision  Stand to Sit  Assistance Level: Supervision       SECOND THERAPY SESSION  Pt seated in recliner, denies pain and agreeable to therapy session. Pt sit to stand Supervision, ambulated to bathroom Supervision with rw ~20ft. Pt Supervision for toilet transfer/toileting/doffing and donning LB clothing (brief/hospital pants) using reacher. Pt used sock aid to don  socks, required assist for compression stockings.Pt Supervision with ambulation with rw to therapy gym. Pt with heavy duty rollator, completed simple meal prep (PB[employer]) including hand washing twice and transporting.Supervision for backing up in kitchen/maneuvering rollator. Pt ambulated with rollator outside to patio and on patio ~30ft Supervision. Pt completed 10 reps x 3 sets in stance Supervision: mini squats, bilateral supination/pronation and bilateral bicep curls. Pt with multiple sit to stands, turn and stand to sit with heavy duty rollator Supervision. Pt in therapy gym with PT.    Assessment  Assessment  Assessment: Pt progressing with therapy. Pt completed functional mobility/transfers/toielting with Supervision and grooming Mod I in stance. Therapy session focusing on increasing pt's endurance and safety. Pt continues to benefit from intensive inpt therapy to maximzie functional independence and safety. Continue with POC.  Activity Tolerance: Patient tolerated treatment well  Discharge Recommendations: Home with assist PRN;Home with Home health OT  OT Equipment Recommendations  Other: None - pt owns a reacher, SA, rollator, cane  Safety Devices  Safety Devices in place: Yes  Type of devices: All fall risk precautions in

## 2024-08-13 LAB
ALBUMIN SERPL-MCNC: 3 G/DL (ref 3.4–5)
ANION GAP SERPL CALCULATED.3IONS-SCNC: 11 MMOL/L (ref 3–16)
BUN SERPL-MCNC: 32 MG/DL (ref 7–20)
CALCIUM SERPL-MCNC: 8.4 MG/DL (ref 8.3–10.6)
CHLORIDE SERPL-SCNC: 103 MMOL/L (ref 99–110)
CK SERPL-CCNC: 350 U/L (ref 39–308)
CO2 SERPL-SCNC: 27 MMOL/L (ref 21–32)
CREAT SERPL-MCNC: 4.9 MG/DL (ref 0.8–1.3)
GFR SERPLBLD CREATININE-BSD FMLA CKD-EPI: 12 ML/MIN/{1.73_M2}
GLUCOSE BLD-MCNC: 109 MG/DL (ref 70–99)
GLUCOSE BLD-MCNC: 123 MG/DL (ref 70–99)
GLUCOSE BLD-MCNC: 215 MG/DL (ref 70–99)
GLUCOSE BLD-MCNC: 85 MG/DL (ref 70–99)
GLUCOSE SERPL-MCNC: 119 MG/DL (ref 70–99)
INR PPP: 1.06 (ref 0.85–1.15)
PERFORMED ON: ABNORMAL
PERFORMED ON: NORMAL
PHOSPHATE SERPL-MCNC: 4.7 MG/DL (ref 2.5–4.9)
POTASSIUM SERPL-SCNC: 3.6 MMOL/L (ref 3.5–5.1)
PROTHROMBIN TIME: 14 SEC (ref 11.9–14.9)
SODIUM SERPL-SCNC: 141 MMOL/L (ref 136–145)

## 2024-08-13 PROCEDURE — 1280000000 HC REHAB R&B

## 2024-08-13 PROCEDURE — 6370000000 HC RX 637 (ALT 250 FOR IP): Performed by: PHYSICAL MEDICINE & REHABILITATION

## 2024-08-13 PROCEDURE — 97535 SELF CARE MNGMENT TRAINING: CPT

## 2024-08-13 PROCEDURE — 80069 RENAL FUNCTION PANEL: CPT

## 2024-08-13 PROCEDURE — 2580000003 HC RX 258: Performed by: PHYSICAL MEDICINE & REHABILITATION

## 2024-08-13 PROCEDURE — 97116 GAIT TRAINING THERAPY: CPT

## 2024-08-13 PROCEDURE — 82550 ASSAY OF CK (CPK): CPT

## 2024-08-13 PROCEDURE — 97530 THERAPEUTIC ACTIVITIES: CPT

## 2024-08-13 PROCEDURE — 85610 PROTHROMBIN TIME: CPT

## 2024-08-13 PROCEDURE — 97110 THERAPEUTIC EXERCISES: CPT

## 2024-08-13 PROCEDURE — 36415 COLL VENOUS BLD VENIPUNCTURE: CPT

## 2024-08-13 PROCEDURE — 6370000000 HC RX 637 (ALT 250 FOR IP): Performed by: INTERNAL MEDICINE

## 2024-08-13 RX ORDER — WARFARIN SODIUM 7.5 MG/1
7.5 TABLET ORAL
Status: COMPLETED | OUTPATIENT
Start: 2024-08-13 | End: 2024-08-13

## 2024-08-13 RX ADMIN — MIDODRINE HYDROCHLORIDE 10 MG: 10 TABLET ORAL at 11:16

## 2024-08-13 RX ADMIN — MIDODRINE HYDROCHLORIDE 10 MG: 10 TABLET ORAL at 07:45

## 2024-08-13 RX ADMIN — WARFARIN SODIUM 7.5 MG: 7.5 TABLET ORAL at 11:17

## 2024-08-13 RX ADMIN — MIRTAZAPINE 30 MG: 30 TABLET, FILM COATED ORAL at 20:23

## 2024-08-13 RX ADMIN — LEVOTHYROXINE SODIUM 137 MCG: 0.11 TABLET ORAL at 05:05

## 2024-08-13 RX ADMIN — SODIUM CHLORIDE, PRESERVATIVE FREE 8 ML: 5 INJECTION INTRAVENOUS at 20:28

## 2024-08-13 RX ADMIN — TAMSULOSIN HYDROCHLORIDE 0.4 MG: 0.4 CAPSULE ORAL at 20:23

## 2024-08-13 RX ADMIN — MIDODRINE HYDROCHLORIDE 10 MG: 10 TABLET ORAL at 16:23

## 2024-08-13 RX ADMIN — INSULIN GLARGINE 10 UNITS: 100 INJECTION, SOLUTION SUBCUTANEOUS at 07:46

## 2024-08-13 RX ADMIN — ASPIRIN 81 MG: 81 TABLET, COATED ORAL at 20:23

## 2024-08-13 RX ADMIN — SENNOSIDES AND DOCUSATE SODIUM 2 TABLET: 50; 8.6 TABLET ORAL at 20:23

## 2024-08-13 ASSESSMENT — PAIN SCALES - GENERAL: PAINLEVEL_OUTOF10: 0

## 2024-08-13 NOTE — PROGRESS NOTES
Nephrology Progress Note   [email or web address]      Reason for consultation: SUKUMAR on CKD 3a -- baseline Cr ~ 1.3-1.5 mg/dL. Follows with Dr. Salinas in office (last seen 2024).     Subjective:    The patient has been seen and examined. Labs and chart reviewed. Pt seen in ARU. Received HD yesterday with net 3 L -- reported some cramping in his legs. Still with good UOP -- 1.8 L yesterday.     There were not complications overnight.    Patient review of systems: Denies SOB      Allergies:  Allergies   Allergen Reactions    Ciprofloxacin Hives        Scheduled Meds:   midodrine  10 mg Oral TID WC    epoetin linda-epbx  10,000 Units SubCUTAneous Once per day on     sodium chloride flush  5-40 mL IntraVENous 2 times per day    aspirin  81 mg Oral Nightly    levothyroxine  137 mcg Oral Daily    mirtazapine  30 mg Oral Nightly    tamsulosin  0.4 mg Oral Nightly    insulin lispro  0-8 Units SubCUTAneous TID WC    insulin lispro  0-4 Units SubCUTAneous Nightly    insulin glargine  10 Units SubCUTAneous Daily    psyllium husk-aspartame  1 packet Oral Daily    docusate sodium  100 mg Oral Daily    sennosides-docusate sodium  2 tablet Oral BID        sodium chloride      dextrose         PRN Meds:sodium chloride, sodium chloride flush, ondansetron **OR** ondansetron, polyethylene glycol, glucose, dextrose bolus **OR** dextrose bolus, glucagon (rDNA), dextrose, hydrocortisone, acetaminophen, bisacodyl, bisacodyl    Physical Exam:    TEMPERATURE:  Current - Temp: 98.2 °F (36.8 °C); Max - Temp  Av.2 °F (36.8 °C)  Min: 98.1 °F (36.7 °C)  Max: 98.4 °F (36.9 °C)  RESPIRATIONS RANGE: Resp  Av.3  Min: 16  Max: 18  PULSE RANGE: Pulse  Av.3  Min: 75  Max: 83  BLOOD PRESSURE RANGE:  Systolic (24hrs), Av , Min:100 , Max:152   ; Diastolic (24hrs), Av, Min:63, Max:78    24HR INTAKE/OUTPUT:    Intake/Output Summary (Last 24 hours) at 2024 1107  Last data filed at 2024 0748  Gross per 24

## 2024-08-13 NOTE — PROGRESS NOTES
Physical Therapy  Facility/Department: [address] REHAB  Rehabilitation Physical Therapy Treatment Note    NAME: Morro Kevin  : 1950 (73 y.o.)  MRN: [id number]  CODE STATUS: Full Code    Date of Service: 24       Restrictions:  Restrictions/Precautions: Fall Risk, Weight Bearing  Upper Extremity Weight Bearing Restrictions  Right Upper Extremity Weight Bearing: Non Weight Bearing  Other: has new Permcath R upper chest  Position Activity Restriction  Other position/activity restrictions: New R TDC Cath (placed 2024).HD MWF, reg diet     Pertinent medical information:  Additional Pertinent Hx: Pt is a 73 y.o. male admitted to  rehab unit on 24 with Rhabdomyolysis and SUKUMAR.  Admitted to  hospital on 2024 with SUKUMAR, Hypotension, PAF, Weak and SOB. GI consult : GI Bleed. Nephrology consult : SUKUMAR, new HD.    SUBJECTIVE  Subjective  Subjective: Patient arrived in wheelchair and is in good spirits. PT reviewed conference discussion with patient and his son.  They are okay with discharge on Saturday.  Pain: no complaints of pain upon arrival.    Social/Functional History  Lives With: Son  Type of Home: [facility]  Home Layout: One level  Home Access: Ramped entrance, Elevator  Bathroom Shower/Tub: Walk-in shower  Bathroom Toilet: Standard  Bathroom Equipment: Shower chair, Hand-held shower  Bathroom Accessibility: Accessible  Home Equipment: Cane, Walker - 4-Wheeled (Transport chair.)  Has the patient had two or more falls in the past year or any fall with injury in the past year?: No  ADL Assistance: Independent  Homemaking Assistance: Independent (Son assists homemaking.)  Homemaking Responsibilities: Yes  Ambulation Assistance: Independent (Using Cane past wk pta; prior no device. Pt had L hip/buttock injury back in April which increased sciatic pain, then used RW)  Transfer Assistance: Independent  Active : Yes  Mode of Transportation: Car  Occupation: Retired  Type of Occupation:  Dorita is improving daily and is now ambulating community distance with rollator with supervision. He is aware of correct use of brakes on rollator and following consistently.  He is able to complete transfers with supervision-modified independence and bed mobility.  Patient continues to be below baseline and will benefit from continued skilled therapy for strengthening, gait training, and balance training to allow for safe return home. Patient's stay is planned for discharge home on Saturday due to dialysis needs and ensuring medical stability.  Plan for outpatient PT upon discharge.  Activity Tolerance: Patient tolerated treatment well  Discharge Recommendations: Home with assist PRN;Outpatient PT  PT Equipment Recommendations  Equipment Needed: Yes  Mobility Devices: Walker  Walker: Rollator (4 Wheeled) (PT educated patient that insurance will only cover amount of wheeled walker and patient is okay with extra cost.)    Goals  Patient Goals   Patient Goals : get strong enough to return home  Short Term Goals  Time Frame for Short Term Goals: 5-7 days  Short Term Goal 1: Bed mobility Independent in flat bed, and no bedrail  Short Term Goal 2: Transfers with/without AD Mod I  Short Term Goal 3: Ambulate with/without  ft Mod I  Additional Goals?: No    PLAN OF CARE/SAFETY  Physical Therapy Plan  General Plan:  minutes of therapy at least 5 out of 7 days a week  Hours Per Day: 1.5 hours  Therapy Duration: 4-7 Days  Current Treatment Recommendations: Strengthening;Balance training;Functional mobility training;Transfer training;Endurance training;Gait training;Stair training;Home exercise program;Safety education & training;Patient/Caregiver education & training;Equipment evaluation, education, & procurement;Therapeutic activities  Safety Devices  Type of Devices: All fall risk precautions in place;Gait belt;Left in chair (in wheelchair to return to room with transportation)    EDUCATION  Education  Education

## 2024-08-13 NOTE — PLAN OF CARE
Problem: Discharge Planning  Goal: Discharge to home or other facility with appropriate resources  8/13/2024 0902 by Dimple Vega RN  Outcome: Progressing  Flowsheets (Taken 8/13/2024 0902)  Discharge to home or other facility with appropriate resources: Identify barriers to discharge with patient and caregiver  8/13/2024 0119 by Isa Branch RN  Outcome: Progressing     Problem: Safety - Adult  Goal: Free from fall injury  8/13/2024 0902 by Dimple Vega RN  Outcome: Progressing  Flowsheets (Taken 8/13/2024 0902)  Free From Fall Injury: Instruct family/caregiver on patient safety  8/13/2024 0119 by Isa Branch RN  Outcome: Progressing  Flowsheets (Taken 8/13/2024 0119)  Free From Fall Injury:   Based on caregiver fall risk screen, instruct family/caregiver to ask for assistance with transferring infant if caregiver noted to have fall risk factors   Instruct family/caregiver on patient safety  Note: Pt educated on falls precautions and safety. Call light and personal belongings within reach at all times. Non skid socks in use when up. Hourly rounding and alarms active.      Problem: ABCDS Injury Assessment  Goal: Absence of physical injury  8/13/2024 0902 by Dimple Vega RN  Outcome: Progressing  Flowsheets (Taken 8/13/2024 0902)  Absence of Physical Injury: Implement safety measures based on patient assessment  8/13/2024 0119 by Isa Branch RN  Outcome: Progressing     Problem: Pain  Goal: Verbalizes/displays adequate comfort level or baseline comfort level  8/13/2024 0902 by Dimple Vega RN  Outcome: Progressing  Flowsheets (Taken 8/13/2024 0902)  Verbalizes/displays adequate comfort level or baseline comfort level: Encourage patient to monitor pain and request assistance  8/13/2024 0119 by Isa Branch RN  Outcome: Progressing  Flowsheets (Taken 8/13/2024 0119)  Verbalizes/displays adequate comfort level or baseline comfort level:   Encourage patient to

## 2024-08-13 NOTE — PROGRESS NOTES
Occupational Therapy  OCCUPATIONAL THERAPY  Progress Note   Second Session    Patient Name: Morro Kevin  Medical Record Number: [id number]    Treatment Diagnosis: Impaired functional mobility, balance, and endurance.    General  Chart Reviewed: Yes  Patient assessed for rehabilitation services?: Yes  Additional Pertinent Hx: 72 y/o male admit 7/31/2024 with SUKUMAR, Hypotension, PAF, Weak and SOB. GI consult : GI Bleed. Nephrology consult : SUKUMAR, new HD. PMH as noted including Aortic Valve Stenosis, AVR (9/2016), Prostate Ca (2022; new reoccur, Chemo), R RTC Repair.  Family / Caregiver Present: No  Referring Practitioner: Dr Buitrago  Diagnosis: rhabdomylosis, SUKUMAR     Restrictions/Precautions  Restrictions/Precautions: Fall Risk, Weight Bearing     Upper Extremity Weight Bearing Restrictions  Right Upper Extremity Weight Bearing: Non Weight Bearing  Other: has new Permcath R upper chest  Position Activity Restriction  Other position/activity restrictions: New R TDC Cath (placed 8/8/2024).HD MWF, reg diet    Subjective: met in therapy gym, reporting L knee/leg \"stiffness\" but no pain    Objective: agreeable for simple meal prep, household t/f, standing tolerance    Assessment: pt used rollator thru therapy gym & retrieved 4 items from floor using reacher w/ MI; he was able to ambulate another x 50 ft into kitchen w/ MI; able to retrieve milk, bread & butter, placed on seat & transported over to counter, backs up into corner. Retrieved cereal and placed on counter. He was able to stand for duration to make toast & cereal; placed food items on seat & transported out of kitchen over to table w/ MI; stood a total of 10 minutes without difficulty. Discussed DME--he will \"wait\" to see if a TSF will fit, aware he can order on [employer]. Reviewed diabetes management at home --he uses the [employer] 2 system, parameters set between  to manage BS. Discussed carb control diet however he has to watch certain green vegetables. Several  family members present at end of session. Cont w/ POC    Safety Device - Type of devices:  []  All fall risk precautions in place [] Bed alarm in place  [] Call light within reach [] Chair alarm in place [] Positioning belt [x] Gait belt [] Patient at risk for falls [] Left in bed [] Left in chair [] Telesitter in use [] Sitter present [] Nurse notified []  None      Therapy Time   Individual Co-treatment   Time In 1400     Time Out 1440     Minutes 40       Electronically signed by JAIMIE Hickman/L CNS # [id number] on 8/13/2024 at 2:32 PM

## 2024-08-13 NOTE — CARE COORDINATION
Uploaded new referral to [facility] for new HD spot in the community.  Call to Dr Krishna's office who reports he does not go to any HD clinic but someone from his practice will follow him at any [facility].    Spoke with pt who preferred [place] or [place] as his preferred location.    Initiated new HD referral in [facility] portal.    BREN Khalil     Case Management   [phone]    8/13/2024  12:18 PM

## 2024-08-13 NOTE — PLAN OF CARE
Problem: Safety - Adult  Goal: Free from fall injury  Outcome: Progressing  Flowsheets (Taken 8/13/2024 0119)  Free From Fall Injury:   Based on caregiver fall risk screen, instruct family/caregiver to ask for assistance with transferring infant if caregiver noted to have fall risk factors   Instruct family/caregiver on patient safety  Note: Pt educated on falls precautions and safety. Call light and personal belongings within reach at all times. Non skid socks in use when up. Hourly rounding and alarms active.      Problem: Pain  Goal: Verbalizes/displays adequate comfort level or baseline comfort level  Outcome: Progressing  Flowsheets (Taken 8/13/2024 0119)  Verbalizes/displays adequate comfort level or baseline comfort level:   Encourage patient to monitor pain and request assistance   Administer analgesics based on type and severity of pain and evaluate response   Consider cultural and social influences on pain and pain management  Note: Able to rate pain using a 1-10 scale, medicated per prn orders, see MAR. Able to verbalize a reduction in pain and/or able to fall asleep and remain asleep without any s/s of pain

## 2024-08-13 NOTE — PROGRESS NOTES
Patient up to chair this am during bedside report, A/Ox4 and vital signs stable, has not complaints of pain. Patient refused stool softeners due to increased stools the last few days, denies need for pain medication. Voices no additional needs at this time.

## 2024-08-13 NOTE — PROGRESS NOTES
Resting quietly in bed, respirations even/easy. Patient admitted with debility and SUKUMAR, newly requiring dialysis. Vas cath in place to right chest. Ambulating with a walker x1, tolerating well. Lungs CTA, HR regular. Abdomen non tender with active bowel sounds. Has denied pain. Encouraged to call for all needs, call light remains in reach at all times, bed alarm active. Isa Branch RN

## 2024-08-13 NOTE — PROGRESS NOTES
Clinical Pharmacy Note  Warfarin Consult    Morro Kevin is a 73 y.o. male receiving warfarin managed by pharmacy.    Warfarin Indication: AVR  Target INR range: 2-3   Dose prior to admission: 11.25 mg on Wednesday, 7.5 mg remaining days of the week. Managed at  [facility].     Current warfarin drug-drug interactions: --    Recent Labs     08/11/24  0552 08/11/24  1929 08/12/24  0555 08/13/24  0458   HGB 6.7* 7.9* 7.5*  --    HCT 19.3* 22.8* 21.6*  --    INR 1.63*  --  1.33* 1.06       Assessment/Plan:    Warfarin held past 3 days for H/H drop requiring blood transfusion.    Resuming warfarin today. Will give warfarin 7.5 mg x 1.    Daily PT/INR until stable within therapeutic range.     Thank you for the consult.  Will continue to follow.    Nya Koch RPH  8/13/2024 12:38 PM

## 2024-08-13 NOTE — PROGRESS NOTES
Occupational Therapy  Facility/Department: [address] REHAB  Rehabilitation Occupational Therapy Daily Treatment Note    Date: 24  Patient Name: Morro Kevin       Room: L4C-2188/3262-01  MRN: [id number]  Account: [id number]   : 1950  (73 y.o.) Gender: male             Past Medical History:  has a past medical history of Aortic stenosis, Arthritis, Asthma, Deviated septum, Diabetes mellitus type II, Hashimoto thyroiditis, Homocysteinemia, Hyperlipidemia, Hypertension, Hypertriglyceridemia, Hypogonadism male, Hypothyroid, Kidney stone, Mechanical heart valve present, Murmur, Nasal polyps, Osteoarthritis, Prolonged emergence from general anesthesia, Prostate CA (HCC), Sleep apnea, Warfarin anticoagulation, and Wears glasses.  Past Surgical History:   has a past surgical history that includes colectomy (); Nasal polyp surgery (, ); knee surgery (Left, ); Rotator cuff repair (Right, ); Colonoscopy (2014); cyst removal; other surgical history (); Aortic valve replacement (2016); Upper gastrointestinal endoscopy (N/A, 2023); Colonoscopy (N/A, 2023); Capsule endoscopy (N/A, 2023); Upper gastrointestinal endoscopy (N/A, 2023); IR INSERT TUNNELED CV CATH WO SQ PORT/PUMP < 5YRS (Right, 2024); and IR TUNNELED CVC PLACE WO SQ PORT/PUMP > 5 YEARS (2024).    Restrictions  Restrictions/Precautions: Fall Risk, Weight Bearing  Other position/activity restrictions: New R TDC Cath (placed 2024).HD MWF, reg diet  Right Upper Extremity Weight Bearing: Non Weight Bearing  Equipment Used: Wheelchair    Subjective  Subjective: Pt met Bs, in recliner. Pt agreeable to OT for ADL session. Pt denied pain. Pt's son present, yet left room at start of tx.  Restrictions/Precautions: Fall Risk;Weight Bearing             Objective     Cognition  Overall Cognitive Status: WNL  Orientation  Overall Orientation Status: Within Normal Limits

## 2024-08-13 NOTE — PROGRESS NOTES
Department of Physical Medicine & Rehabilitation  Progress Note    Patient Identification:  Morro Kevin  [id number]  : 1950  Admit date: 2024    Chief Complaint: Rhabdomyolysis    Subjective:   Seen in his room this morning.  No new complaints overnight.  He is happy with his stay so far on the rehab unit but is asking for when he will be able to use his right arm again.  This will likely at least be a week.  Will double check with medical team.  He is otherwise motivated and feeling well this morning.  Team conference today.  ROS: No f/c, n/v, cp     Objective:  Patient Vitals for the past 24 hrs:   BP Temp Temp src Pulse Resp SpO2 Height Weight   24 0744 100/63 98.2 °F (36.8 °C) Oral 80 16 98 % -- --   24 0507 -- -- -- -- -- -- -- 83 kg (182 lb 15.7 oz)   24 102/63 98.4 °F (36.9 °C) Oral 83 18 97 % -- --   24 1830 (!) 152/78 -- -- -- -- -- -- --   24 1420 129/64 98.1 °F (36.7 °C) -- 75 18 -- -- 86.4 kg (190 lb 7.6 oz)   24 1049 -- -- -- -- -- -- 1.778 m (5' 10\") --     Const: Alert. No distress, pleasant.   HEENT: Normocephalic, atraumatic. Normal sclera/conjunctiva. MMM.   CV: Regular rate and rhythm.   Resp: No respiratory distress. Lungs CTAB.   Abd: Soft, nontender, nondistended, NABS+   Ext: + edema.   Neuro: Alert, oriented, appropriately interactive.   Psych: Cooperative, appropriate mood and affect    Laboratory data: Available via EMR.   Last 24 hour lab  Recent Results (from the past 24 hour(s))   POCT Glucose    Collection Time: 24 11:21 AM   Result Value Ref Range    POC Glucose 155 (H) 70 - 99 mg/dl    Performed on ACCU-CHEK    POCT Glucose    Collection Time: 24  6:36 PM   Result Value Ref Range    POC Glucose 96 70 - 99 mg/dl    Performed on ACCU-CHEK    POCT Glucose    Collection Time: 24  8:15 PM   Result Value Ref Range    POC Glucose 273 (H) 70 - 99 mg/dl    Performed on ACCU-CHEK    Protime-INR    Collection Time:  08/13/24  4:58 AM   Result Value Ref Range    Protime 14.0 11.9 - 14.9 sec    INR 1.06 0.85 - 1.15   Renal Function Panel    Collection Time: 08/13/24  4:59 AM   Result Value Ref Range    Sodium 141 136 - 145 mmol/L    Potassium 3.6 3.5 - 5.1 mmol/L    Chloride 103 99 - 110 mmol/L    CO2 27 21 - 32 mmol/L    Anion Gap 11 3 - 16    Glucose 119 (H) 70 - 99 mg/dL    BUN 32 (H) 7 - 20 mg/dL    Creatinine 4.9 (H) 0.8 - 1.3 mg/dL    Est, Glom Filt Rate 12 (A) >60    Calcium 8.4 8.3 - 10.6 mg/dL    Phosphorus 4.7 2.5 - 4.9 mg/dL    Albumin 3.0 (L) 3.4 - 5.0 g/dL   CK    Collection Time: 08/13/24  4:59 AM   Result Value Ref Range    Total  (H) 39 - 308 U/L   POCT Glucose    Collection Time: 08/13/24  7:07 AM   Result Value Ref Range    POC Glucose 123 (H) 70 - 99 mg/dl    Performed on ACCU-CHEK        Therapy progress:  PT  Upper Extremity Weight Bearing Restrictions  Right Upper Extremity Weight Bearing: Non Weight Bearing  Other: has new Permcath R upper chest  Position Activity Restriction  Other position/activity restrictions: New R TDC Cath (placed 8/8/2024).HD MWF, reg diet  Objective     Sit to Stand: Stand by assistance  Stand to Sit: Stand by assistance  Device: Rolling Walker  Assistance: Stand by assistance  Distance: 220' x2,  130 ' x2, mulitple shorter walks in gym.  Brought rollator to room, pt wants to stick to using current walker.  OT  PT Equipment Recommendations  Equipment Needed: Yes  Mobility Devices: Walker  Walker: Rollator (4 Wheeled) (PT educated patient that insurance will only cover amount of wheeled walker and patient is okay with extra cost.)  Other: Will monitor for potential equipt needs.  Toilet - Technique: Ambulating  Equipment Used: Standard toilet  Toilet Transfers Comments: L hand to GB, tactile cues to keep R arm tucked in  Assessment        SLP          Body mass index is 26.26 kg/m².    Assessment and Plan:  Rhabdomyolysis - likely related to Zytiga and statins,CK peaked at

## 2024-08-14 LAB
ALBUMIN SERPL-MCNC: 2.9 G/DL (ref 3.4–5)
ANION GAP SERPL CALCULATED.3IONS-SCNC: 11 MMOL/L (ref 3–16)
BUN SERPL-MCNC: 37 MG/DL (ref 7–20)
CALCIUM SERPL-MCNC: 8.9 MG/DL (ref 8.3–10.6)
CHLORIDE SERPL-SCNC: 101 MMOL/L (ref 99–110)
CK SERPL-CCNC: 309 U/L (ref 39–308)
CO2 SERPL-SCNC: 25 MMOL/L (ref 21–32)
CREAT SERPL-MCNC: 6.2 MG/DL (ref 0.8–1.3)
DEPRECATED RDW RBC AUTO: 16.7 % (ref 12.4–15.4)
GFR SERPLBLD CREATININE-BSD FMLA CKD-EPI: 9 ML/MIN/{1.73_M2}
GLUCOSE BLD-MCNC: 109 MG/DL (ref 70–99)
GLUCOSE BLD-MCNC: 195 MG/DL (ref 70–99)
GLUCOSE BLD-MCNC: 306 MG/DL (ref 70–99)
GLUCOSE BLD-MCNC: 96 MG/DL (ref 70–99)
GLUCOSE SERPL-MCNC: 95 MG/DL (ref 70–99)
HCT VFR BLD AUTO: 21.3 % (ref 40.5–52.5)
HGB BLD-MCNC: 7.4 G/DL (ref 13.5–17.5)
INR PPP: 1.13 (ref 0.85–1.15)
MCH RBC QN AUTO: 31.8 PG (ref 26–34)
MCHC RBC AUTO-ENTMCNC: 34.6 G/DL (ref 31–36)
MCV RBC AUTO: 92.2 FL (ref 80–100)
PERFORMED ON: ABNORMAL
PERFORMED ON: NORMAL
PHOSPHATE SERPL-MCNC: 6.2 MG/DL (ref 2.5–4.9)
PLATELET # BLD AUTO: 141 K/UL (ref 135–450)
PMV BLD AUTO: 7.3 FL (ref 5–10.5)
POTASSIUM SERPL-SCNC: 3.2 MMOL/L (ref 3.5–5.1)
PROTHROMBIN TIME: 14.8 SEC (ref 11.9–14.9)
RBC # BLD AUTO: 2.31 M/UL (ref 4.2–5.9)
SODIUM SERPL-SCNC: 137 MMOL/L (ref 136–145)
WBC # BLD AUTO: 4.3 K/UL (ref 4–11)

## 2024-08-14 PROCEDURE — 97535 SELF CARE MNGMENT TRAINING: CPT

## 2024-08-14 PROCEDURE — 94760 N-INVAS EAR/PLS OXIMETRY 1: CPT

## 2024-08-14 PROCEDURE — 6370000000 HC RX 637 (ALT 250 FOR IP): Performed by: PHYSICAL MEDICINE & REHABILITATION

## 2024-08-14 PROCEDURE — 85610 PROTHROMBIN TIME: CPT

## 2024-08-14 PROCEDURE — 1280000000 HC REHAB R&B

## 2024-08-14 PROCEDURE — 6360000002 HC RX W HCPCS: Performed by: INTERNAL MEDICINE

## 2024-08-14 PROCEDURE — 97129 THER IVNTJ 1ST 15 MIN: CPT

## 2024-08-14 PROCEDURE — 97116 GAIT TRAINING THERAPY: CPT

## 2024-08-14 PROCEDURE — 2580000003 HC RX 258: Performed by: PHYSICAL MEDICINE & REHABILITATION

## 2024-08-14 PROCEDURE — 36415 COLL VENOUS BLD VENIPUNCTURE: CPT

## 2024-08-14 PROCEDURE — 80069 RENAL FUNCTION PANEL: CPT

## 2024-08-14 PROCEDURE — 97110 THERAPEUTIC EXERCISES: CPT

## 2024-08-14 PROCEDURE — 82550 ASSAY OF CK (CPK): CPT

## 2024-08-14 PROCEDURE — 6370000000 HC RX 637 (ALT 250 FOR IP): Performed by: INTERNAL MEDICINE

## 2024-08-14 PROCEDURE — 90935 HEMODIALYSIS ONE EVALUATION: CPT

## 2024-08-14 PROCEDURE — 36556 INSERT NON-TUNNEL CV CATH: CPT

## 2024-08-14 PROCEDURE — 85027 COMPLETE CBC AUTOMATED: CPT

## 2024-08-14 PROCEDURE — 97530 THERAPEUTIC ACTIVITIES: CPT

## 2024-08-14 RX ORDER — WARFARIN SODIUM 7.5 MG/1
7.5 TABLET ORAL
Status: COMPLETED | OUTPATIENT
Start: 2024-08-14 | End: 2024-08-14

## 2024-08-14 RX ADMIN — INSULIN GLARGINE 10 UNITS: 100 INJECTION, SOLUTION SUBCUTANEOUS at 07:29

## 2024-08-14 RX ADMIN — WARFARIN SODIUM 7.5 MG: 7.5 TABLET ORAL at 12:19

## 2024-08-14 RX ADMIN — EPOETIN ALFA-EPBX 10000 UNITS: 10000 INJECTION, SOLUTION INTRAVENOUS; SUBCUTANEOUS at 17:38

## 2024-08-14 RX ADMIN — MIRTAZAPINE 30 MG: 30 TABLET, FILM COATED ORAL at 20:02

## 2024-08-14 RX ADMIN — ASPIRIN 81 MG: 81 TABLET, COATED ORAL at 20:02

## 2024-08-14 RX ADMIN — SODIUM CHLORIDE, PRESERVATIVE FREE 10 ML: 5 INJECTION INTRAVENOUS at 20:03

## 2024-08-14 RX ADMIN — MIDODRINE HYDROCHLORIDE 10 MG: 10 TABLET ORAL at 07:28

## 2024-08-14 RX ADMIN — SENNOSIDES AND DOCUSATE SODIUM 2 TABLET: 50; 8.6 TABLET ORAL at 20:03

## 2024-08-14 RX ADMIN — SODIUM CHLORIDE, PRESERVATIVE FREE 10 ML: 5 INJECTION INTRAVENOUS at 07:29

## 2024-08-14 RX ADMIN — LEVOTHYROXINE SODIUM 137 MCG: 0.11 TABLET ORAL at 05:38

## 2024-08-14 RX ADMIN — INSULIN LISPRO 4 UNITS: 100 INJECTION, SOLUTION INTRAVENOUS; SUBCUTANEOUS at 22:12

## 2024-08-14 RX ADMIN — TAMSULOSIN HYDROCHLORIDE 0.4 MG: 0.4 CAPSULE ORAL at 20:02

## 2024-08-14 ASSESSMENT — PAIN SCALES - GENERAL: PAINLEVEL_OUTOF10: 0

## 2024-08-14 NOTE — PROGRESS NOTES
Patient admitted to rehab with rhabdomyolysis.  A/Ox4. Transfers with walker. Mobility restrictions: avoid using right arm for transfers/lifting d/t new vas cath on RUC. On regular, low phosphorus diet, tolerating well. Medications taken whole with thins. On lovenox, aspirin, warfarin for DVT prophylaxis.  Skin: intact. Oxygen: RA. LDA: PIV LFA, vas cath RUC. Has been continent of bowel and continent of bladder. LBM 8/13/24. Chair/bed alarms in use and call light in reach. Will monitor for safety. Electronically signed by MARTY MERCADO RN on 8/14/2024 at 4:33 PM

## 2024-08-14 NOTE — CARE COORDINATION
Team Conference held on Tuesday, 8-  Team reviewed progress.  Team recommends No DME at this time.  Team recommends a Driving Evaluation to be cleared for driving.  Team recommends DC to home on Saturday, 8-.  Team recommends Out Patient PT.  Met with patient today to review.  He agrees with this plan.  Informed him that Referral has been made for HD and waiting on final clearance as to a schedule.  He is hoping for [facility].  He agrees with Out Patient Physical Therapy and would like to come to [facility] for this.  He would like for his scripts to be filled at [facility].  His family will transport him after 10 am and he will start talking with them about that.  He denied having any transportation issues post discharge.  BREN Khalil     Case Management   [phone]    8/14/2024  11:36 AM

## 2024-08-14 NOTE — PROGRESS NOTES
Department of Physical Medicine & Rehabilitation  Progress Note    Patient Identification:  Morro Kevin  [id number]  : 1950  Admit date: 2024    Chief Complaint: Rhabdomyolysis    Subjective:   No acute events overnight.   Patient seen this afternoon at . He reports making good progress with PT/OT. He is hopeful for discharge this weekend. Asks appropriate questions about medications and follow-up plan. Education provided.   Labs reviewed.     ROS: No f/c, n/v, cp     Objective:  Patient Vitals for the past 24 hrs:   BP Temp Temp src Pulse Resp SpO2 Weight   24 1430 125/70 97.8 °F (36.6 °C) -- 78 17 -- 83.1 kg (183 lb 3.2 oz)   24 1215 (!) 150/73 -- -- 72 -- -- --   24 0726 134/73 98.8 °F (37.1 °C) Oral 85 16 97 % --   24 0530 -- -- -- -- -- -- 83.1 kg (183 lb 3.2 oz)   24 1930 127/65 98.4 °F (36.9 °C) Oral 60 16 97 % --     Const: Alert. No distress, pleasant.   HEENT: Normocephalic, atraumatic. Normal sclera/conjunctiva. MMM.   CV: Regular rate and rhythm.   Resp: No respiratory distress.   Abd: Soft, nontender, nondistended  Ext: Trace edema  Neuro: Alert, oriented, appropriately interactive.   Psych: Cooperative, appropriate mood and affect    Laboratory data: Available via EMR.   Last 24 hour lab  Recent Results (from the past 24 hour(s))   POCT Glucose    Collection Time: 24  4:24 PM   Result Value Ref Range    POC Glucose 85 70 - 99 mg/dl    Performed on ACCU-CHEK    POCT Glucose    Collection Time: 24  8:22 PM   Result Value Ref Range    POC Glucose 215 (H) 70 - 99 mg/dl    Performed on ACCU-CHEK    Renal Function Panel    Collection Time: 24  3:52 AM   Result Value Ref Range    Sodium 137 136 - 145 mmol/L    Potassium 3.2 (L) 3.5 - 5.1 mmol/L    Chloride 101 99 - 110 mmol/L    CO2 25 21 - 32 mmol/L    Anion Gap 11 3 - 16    Glucose 95 70 - 99 mg/dL    BUN 37 (H) 7 - 20 mg/dL    Creatinine 6.2 (HH) 0.8 - 1.3 mg/dL    Est, Glom Filt Rate 9 (A)

## 2024-08-14 NOTE — PROGRESS NOTES
Treatment time: 3 hours and 30 minutes     Net UF: 2.5 liters    Pre weight: 83.1 kg  Post weight: 80.6 kg    Access used: Right CVC  Access function: CVC had good flow on both lumen, Acces site had clean, dry and intact Dressing. To be changed on Friday.    Medications or blood products given: Retacrit 10 000 units Hgb of 7.4    Regular outpatient schedule: M,W,F    Summary of response to treatment:14:30Treatment set at 2.5 liters within 3 hours and 30 minutes as ordered. Right CVC has clean, dry and intact dressing. No signs of infection noted. No redness, hematoma nor swelling was observed. Monitored accordingly. 18:02: Treatment terminated. Blood returned. H.D tolerated Well.    Copy of dialysis treatment record placed in chart, to be scanned into EMR.

## 2024-08-14 NOTE — PROGRESS NOTES
Occupational Therapy  Facility/Department: [address] [facility]  Rehabilitation Occupational Therapy Daily Treatment Note    Date: 24  Patient Name: Morro Kevin       Room: V7V-5549/3262-01  MRN: [id number]  Account: [id number]   : 1950  (73 y.o.) Gender: male                    Past Medical History:  has a past medical history of Aortic stenosis, Arthritis, Asthma, Deviated septum, Diabetes mellitus type II, Hashimoto thyroiditis, Homocysteinemia, Hyperlipidemia, Hypertension, Hypertriglyceridemia, Hypogonadism male, Hypothyroid, Kidney stone, Mechanical heart valve present, Murmur, Nasal polyps, Osteoarthritis, Prolonged emergence from general anesthesia, Prostate CA (HCC), Sleep apnea, Warfarin anticoagulation, and Wears glasses.  Past Surgical History:   has a past surgical history that includes colectomy (); Nasal polyp surgery (, ); knee surgery (Left, ); Rotator cuff repair (Right, ); Colonoscopy (2014); cyst removal; other surgical history (); Aortic valve replacement (2016); Upper gastrointestinal endoscopy (N/A, 2023); Colonoscopy (N/A, 2023); Capsule endoscopy (N/A, 2023); Upper gastrointestinal endoscopy (N/A, 2023); IR INSERT TUNNELED CV CATH WO SQ PORT/PUMP < 5YRS (Right, 2024); and IR TUNNELED CVC PLACE WO SQ PORT/PUMP > 5 YEARS (2024).    Restrictions  Restrictions/Precautions: Fall Risk, Weight Bearing  Other position/activity restrictions: New R TDC Cath (placed 2024).HD MWF, reg diet  Right Upper Extremity Weight Bearing: Non Weight Bearing  Equipment Used: Wheelchair    Subjective  Subjective: Pt seen in the department and agreed to OT treatment.  Restrictions/Precautions: Fall Risk;Weight Bearing             Objective               ADL  Tub/Shower Transfers  Type: Shower  Transfer To: Shower chair with back  Assistance Level: Stand by assist  Skilled Clinical Factors: Completed simulated shower transfer.  He

## 2024-08-14 NOTE — PROGRESS NOTES
medium resistance band  Exercise Equipment: NuStep BLEs only, 10 min, resistance level 3    PM session:  Pt pleasant and agreeable to PT treatment.  Denies pain.  In recliner chair upon arrival.  Pt ambulated short distance recliner chair to w/c without an AD with SBA.  Standing exercises with UE support on parallel bar and supervision: x15 heel raises kymberly, alternating marches, alternating hamstring curls, alternating hip ABD, alternating hip ext, alternating hip flex with knee ext  Pt ambulated approx 230'x2  and short distances in therapy gym without an AD with SBA.  Decreased zafar with fatigue and mild lateral sway but no LOB.  NuStep BLEs only for 10 min, resistance level 3.  Transfers throughout the session mod I.  Care transitioned to OT at end of session.    PM assessment: Pt able to ambulate community distances without an AD with SBA this PM but does fatigue more quickly without an AD.  He will benefit from continued PT to improve activity tolerance and to return to PLOF.    Safety Device - Type of devices:  [x]  All fall risk precautions in place [] Bed alarm in place  [] Call light within reach [] Chair alarm in place [] Positioning belt [x] Gait belt [] Patient at risk for falls [] Left in bed [] Left in chair [] Telesitter in use [] Sitter present [] Nurse notified []  None    Electronically signed by Eladia Peralta PT [id number] on 8/14/2024 at 1:16 PM        ASSESSMENT/PROGRESS TOWARDS GOALS       Assessment  Assessment: . \"Dez\" Dorita is improving daily and is now ambulating community distance with rollator with supervision. He is aware of correct use of brakes on rollatore and following consistently.  He is able to complete transfers with modified independence.  Patient continues to be below baseline and will benefit from continued skilled therapy for strengthening, gait training, and balance training to allow for safe return home. Patient's stay is planned for discharge home on Saturday due to

## 2024-08-14 NOTE — PLAN OF CARE
Problem: Discharge Planning  Goal: Discharge to home or other facility with appropriate resources  8/14/2024 1125 by Gricel Bazan RN  Outcome: Progressing  Flowsheets (Taken 8/14/2024 0726)  Discharge to home or other facility with appropriate resources:   Identify barriers to discharge with patient and caregiver   Arrange for needed discharge resources and transportation as appropriate   Identify discharge learning needs (meds, wound care, etc)   Refer to discharge planning if patient needs post-hospital services based on physician order or complex needs related to functional status, cognitive ability or social support system     Problem: Safety - Adult  Goal: Free from fall injury  8/14/2024 1125 by Gricel Bazan RN  Outcome: Progressing  Flowsheets (Taken 8/14/2024 0727)  Free From Fall Injury: Instruct family/caregiver on patient safety     Problem: ABCDS Injury Assessment  Goal: Absence of physical injury  8/14/2024 1125 by Gricel Bazan RN  Outcome: Progressing  Flowsheets (Taken 8/14/2024 0727)  Absence of Physical Injury: Implement safety measures based on patient assessment     Problem: Pain  Goal: Verbalizes/displays adequate comfort level or baseline comfort level  8/14/2024 1125 by Gricel Bazan RN  Outcome: Progressing  Flowsheets (Taken 8/14/2024 0726)  Verbalizes/displays adequate comfort level or baseline comfort level:   Encourage patient to monitor pain and request assistance   Assess pain using appropriate pain scale     Problem: Nutrition Deficit:  Goal: Optimize nutritional status  8/14/2024 1125 by Gricel Bazan RN  Outcome: Progressing

## 2024-08-14 NOTE — PROGRESS NOTES
Occupational Therapy  OCCUPATIONAL THERAPY  Progress Note   Second Session    Patient Name: Morro Kevin  Medical Record Number: [id number]    Treatment Diagnosis: Impaired functional mobility, balance, and endurance.    General  Chart Reviewed: Yes  Patient assessed for rehabilitation services?: Yes  Additional Pertinent Hx: 74 y/o male admit 7/31/2024 with SUKUMAR, Hypotension, PAF, Weak and SOB. GI consult : GI Bleed. Nephrology consult : SUKUMAR, new HD. PMH as noted including Aortic Valve Stenosis, AVR (9/2016), Prostate Ca (2022; new reoccur, Chemo), R RTC Repair.  Family / Caregiver Present: No  Referring Practitioner: Dr Buitrago  Diagnosis: rhabdomylosis, SUKUMAR     Restrictions/Precautions  Restrictions/Precautions: Fall Risk, Weight Bearing     Upper Extremity Weight Bearing Restrictions  Right Upper Extremity Weight Bearing: Non Weight Bearing  Other: has new Permcath R upper chest  Position Activity Restriction  Other position/activity restrictions: New R TDC Cath (placed 8/8/2024).HD MWF, reg diet    Subjective: met in therapy dept, reports L knee \"tightness\"--now trying to walk w/o AD    Objective: agreeable for fxl mobility, car t/f and [place] Making    Assessment: He completed the Trail Making A--completed in 31 seconds but made 1 error (forgot to connect # 23). He completed [place] B--1:19 but made 3 errors (he was aware of all errors). Eye chart testing showed 20/20 vision in each eye. OT measured his calves L 15\", R 15.5\"--has edema but it doesn't seem to be responding to wearing compression stockings. Pt is new to HD, staff monitoring I&O. He was  able to ambulate from therapy gym<>ortho gym w/ supervision, no AD; able to enter 's side of car IND'ly. He ambulated to/from bathrm no AD w/ close Supervision. Pt able to complete bed t/f IND'ly w/o difficulty. Pt changed his shirt into gown in preparation of HD, cont w/ POC Pinky Moses, OTR/L, CNS #[id number]     Safety Device - Type of devices:  []  All

## 2024-08-14 NOTE — PROGRESS NOTES
Clinical Pharmacy Note  Warfarin Consult    Morro Kevin is a 73 y.o. male receiving warfarin managed by pharmacy.    Warfarin Indication: AVR  Target INR range: 2-3   Dose prior to admission: 11.25 mg on Wednesday, 7.5 mg remaining days of the week. Managed at  [facility].     Current warfarin drug-drug interactions: --    Recent Labs     08/11/24  1929 08/12/24  0555 08/13/24  0458 08/14/24  0352   HGB 7.9* 7.5*  --  7.4*   HCT 22.8* 21.6*  --  21.3*   INR  --  1.33* 1.06 1.13       Assessment/Plan:    Warfarin held 8/10-8/12 for H/H drop requiring blood transfusion.    Warfarin resumed yesterday. Will another dose of vancomycin 7.5 mg today.    Daily PT/INR until stable within therapeutic range.     Thank you for the consult.  Will continue to follow.    Nya Koch RPH  8/14/2024 10:33 AM

## 2024-08-14 NOTE — PROGRESS NOTES
Nephrology Progress Note   [employer]      Reason for consultation: SUKUMAR on CKD 3a -- baseline Cr ~ 1.3-1.5 mg/dL. Follows with Dr. Salinas in office (last seen 2024).     Subjective:    The patient has been seen and examined. Labs and chart reviewed. Pt seen in ARU. HD today with net 2 L UF. Cr still trending up between treatments. Good UOP.     There were not complications overnight.    Patient review of systems: Denies SOB      Allergies:  Allergies   Allergen Reactions    Ciprofloxacin Hives        Scheduled Meds:   warfarin  7.5 mg Oral Once    warfarin placeholder: dosing by pharmacy   Other RX Placeholder    midodrine  10 mg Oral TID WC    epoetin linda-epbx  10,000 Units SubCUTAneous Once per day on     sodium chloride flush  5-40 mL IntraVENous 2 times per day    aspirin  81 mg Oral Nightly    levothyroxine  137 mcg Oral Daily    mirtazapine  30 mg Oral Nightly    tamsulosin  0.4 mg Oral Nightly    insulin lispro  0-8 Units SubCUTAneous TID WC    insulin lispro  0-4 Units SubCUTAneous Nightly    insulin glargine  10 Units SubCUTAneous Daily    psyllium husk-aspartame  1 packet Oral Daily    docusate sodium  100 mg Oral Daily    sennosides-docusate sodium  2 tablet Oral BID        sodium chloride      dextrose         PRN Meds:sodium chloride, sodium chloride flush, ondansetron **OR** ondansetron, polyethylene glycol, glucose, dextrose bolus **OR** dextrose bolus, glucagon (rDNA), dextrose, hydrocortisone, acetaminophen, bisacodyl, bisacodyl    Physical Exam:    TEMPERATURE:  Current - Temp: 98.8 °F (37.1 °C); Max - Temp  Av.6 °F (37 °C)  Min: 98.4 °F (36.9 °C)  Max: 98.8 °F (37.1 °C)  RESPIRATIONS RANGE: Resp  Av  Min: 16  Max: 16  PULSE RANGE: Pulse  Av.5  Min: 60  Max: 85  BLOOD PRESSURE RANGE:  Systolic (24hrs), Av , Min:127 , Max:134   ; Diastolic (24hrs), Av, Min:65, Max:73    24HR INTAKE/OUTPUT:    Intake/Output Summary (Last 24 hours) at 2024

## 2024-08-15 LAB
ALBUMIN SERPL-MCNC: 3 G/DL (ref 3.4–5)
ANION GAP SERPL CALCULATED.3IONS-SCNC: 10 MMOL/L (ref 3–16)
BLOOD BANK DISPENSE STATUS: NORMAL
BLOOD BANK DISPENSE STATUS: NORMAL
BLOOD BANK PRODUCT CODE: NORMAL
BLOOD BANK PRODUCT CODE: NORMAL
BPU ID: NORMAL
BPU ID: NORMAL
BUN SERPL-MCNC: 23 MG/DL (ref 7–20)
CALCIUM SERPL-MCNC: 9 MG/DL (ref 8.3–10.6)
CHLORIDE SERPL-SCNC: 105 MMOL/L (ref 99–110)
CO2 SERPL-SCNC: 26 MMOL/L (ref 21–32)
CREAT SERPL-MCNC: 4.5 MG/DL (ref 0.8–1.3)
DEPRECATED RDW RBC AUTO: 16.8 % (ref 12.4–15.4)
DESCRIPTION BLOOD BANK: NORMAL
DESCRIPTION BLOOD BANK: NORMAL
GFR SERPLBLD CREATININE-BSD FMLA CKD-EPI: 13 ML/MIN/{1.73_M2}
GLUCOSE BLD-MCNC: 111 MG/DL (ref 70–99)
GLUCOSE BLD-MCNC: 126 MG/DL (ref 70–99)
GLUCOSE BLD-MCNC: 184 MG/DL (ref 70–99)
GLUCOSE BLD-MCNC: 189 MG/DL (ref 70–99)
GLUCOSE SERPL-MCNC: 98 MG/DL (ref 70–99)
HCT VFR BLD AUTO: 22.1 % (ref 40.5–52.5)
HGB BLD-MCNC: 7.6 G/DL (ref 13.5–17.5)
INR PPP: 1.13 (ref 0.85–1.15)
MCH RBC QN AUTO: 32.1 PG (ref 26–34)
MCHC RBC AUTO-ENTMCNC: 34.3 G/DL (ref 31–36)
MCV RBC AUTO: 93.6 FL (ref 80–100)
PERFORMED ON: ABNORMAL
PHOSPHATE SERPL-MCNC: 4.4 MG/DL (ref 2.5–4.9)
PLATELET # BLD AUTO: 151 K/UL (ref 135–450)
PMV BLD AUTO: 7.2 FL (ref 5–10.5)
POTASSIUM SERPL-SCNC: 3.7 MMOL/L (ref 3.5–5.1)
PROTHROMBIN TIME: 14.7 SEC (ref 11.9–14.9)
RBC # BLD AUTO: 2.37 M/UL (ref 4.2–5.9)
SODIUM SERPL-SCNC: 141 MMOL/L (ref 136–145)
WBC # BLD AUTO: 4.3 K/UL (ref 4–11)

## 2024-08-15 PROCEDURE — 80069 RENAL FUNCTION PANEL: CPT

## 2024-08-15 PROCEDURE — 97530 THERAPEUTIC ACTIVITIES: CPT | Performed by: PHYSICAL THERAPIST

## 2024-08-15 PROCEDURE — 36415 COLL VENOUS BLD VENIPUNCTURE: CPT

## 2024-08-15 PROCEDURE — 6370000000 HC RX 637 (ALT 250 FOR IP): Performed by: PHYSICAL MEDICINE & REHABILITATION

## 2024-08-15 PROCEDURE — 1280000000 HC REHAB R&B

## 2024-08-15 PROCEDURE — 97530 THERAPEUTIC ACTIVITIES: CPT

## 2024-08-15 PROCEDURE — 97116 GAIT TRAINING THERAPY: CPT | Performed by: PHYSICAL THERAPIST

## 2024-08-15 PROCEDURE — 94760 N-INVAS EAR/PLS OXIMETRY 1: CPT

## 2024-08-15 PROCEDURE — 2580000003 HC RX 258: Performed by: PHYSICAL MEDICINE & REHABILITATION

## 2024-08-15 PROCEDURE — 97110 THERAPEUTIC EXERCISES: CPT | Performed by: PHYSICAL THERAPIST

## 2024-08-15 PROCEDURE — 85027 COMPLETE CBC AUTOMATED: CPT

## 2024-08-15 PROCEDURE — 97535 SELF CARE MNGMENT TRAINING: CPT

## 2024-08-15 PROCEDURE — 6370000000 HC RX 637 (ALT 250 FOR IP): Performed by: INTERNAL MEDICINE

## 2024-08-15 PROCEDURE — 6370000000 HC RX 637 (ALT 250 FOR IP)

## 2024-08-15 PROCEDURE — 85610 PROTHROMBIN TIME: CPT

## 2024-08-15 RX ORDER — FUROSEMIDE 20 MG/1
20 TABLET ORAL DAILY
Status: DISCONTINUED | OUTPATIENT
Start: 2024-08-15 | End: 2024-08-17 | Stop reason: HOSPADM

## 2024-08-15 RX ORDER — WARFARIN SODIUM 7.5 MG/1
7.5 TABLET ORAL
Status: COMPLETED | OUTPATIENT
Start: 2024-08-15 | End: 2024-08-15

## 2024-08-15 RX ADMIN — MIDODRINE HYDROCHLORIDE 10 MG: 10 TABLET ORAL at 11:24

## 2024-08-15 RX ADMIN — MIDODRINE HYDROCHLORIDE 10 MG: 10 TABLET ORAL at 09:06

## 2024-08-15 RX ADMIN — SODIUM CHLORIDE, PRESERVATIVE FREE 9 ML: 5 INJECTION INTRAVENOUS at 20:22

## 2024-08-15 RX ADMIN — SODIUM CHLORIDE, PRESERVATIVE FREE 10 ML: 5 INJECTION INTRAVENOUS at 09:08

## 2024-08-15 RX ADMIN — TAMSULOSIN HYDROCHLORIDE 0.4 MG: 0.4 CAPSULE ORAL at 20:22

## 2024-08-15 RX ADMIN — MIRTAZAPINE 30 MG: 30 TABLET, FILM COATED ORAL at 20:22

## 2024-08-15 RX ADMIN — FUROSEMIDE 20 MG: 20 TABLET ORAL at 11:24

## 2024-08-15 RX ADMIN — SENNOSIDES AND DOCUSATE SODIUM 2 TABLET: 50; 8.6 TABLET ORAL at 20:22

## 2024-08-15 RX ADMIN — MIDODRINE HYDROCHLORIDE 10 MG: 10 TABLET ORAL at 16:37

## 2024-08-15 RX ADMIN — WARFARIN SODIUM 7.5 MG: 7.5 TABLET ORAL at 16:37

## 2024-08-15 RX ADMIN — INSULIN GLARGINE 10 UNITS: 100 INJECTION, SOLUTION SUBCUTANEOUS at 09:05

## 2024-08-15 RX ADMIN — DOCUSATE SODIUM 100 MG: 100 CAPSULE, LIQUID FILLED ORAL at 09:06

## 2024-08-15 RX ADMIN — ASPIRIN 81 MG: 81 TABLET, COATED ORAL at 20:21

## 2024-08-15 RX ADMIN — LEVOTHYROXINE SODIUM 137 MCG: 0.11 TABLET ORAL at 05:53

## 2024-08-15 RX ADMIN — SENNOSIDES AND DOCUSATE SODIUM 2 TABLET: 50; 8.6 TABLET ORAL at 09:06

## 2024-08-15 NOTE — PROGRESS NOTES
Clinical Pharmacy Note  Warfarin Consult    Morro Kevin is a 73 y.o. male receiving warfarin managed by pharmacy.    Warfarin Indication: AVR  Target INR range: 2-3   Dose prior to admission: 11.25 mg on Wednesday, 7.5 mg remaining days of the week. Managed at  [facility].     Current warfarin drug-drug interactions: --    Recent Labs     08/13/24  0458 08/14/24  0352 08/15/24  0542   HGB  --  7.4* 7.6*   HCT  --  21.3* 22.1*   INR 1.06 1.13 1.13       Assessment/Plan:    Warfarin held 8/10-8/12 for H/H drop requiring blood transfusion.    Warfarin resumed 8/13. Will another dose of vancomycin 7.5 mg today.    Daily PT/INR until stable within therapeutic range.     Thank you for the consult.  Will continue to follow.    Rizwana Laurent [facility]  8/15/2024 12:19 PM

## 2024-08-15 NOTE — PROGRESS NOTES
4 Eyes Skin Assessment     NAME:  Morro Kevin  YOB: 1950  MEDICAL RECORD NUMBER:  [id number]    The patient is being assessed for  Other return from dialysis    I agree that at least one RN has performed a thorough Head to Toe Skin Assessment on the patient. ALL assessment sites listed below have been assessed.      Areas assessed by both nurses:    Shoulders, Back, Chest, Arms, Elbows, Hands, Sacrum. Buttock, Coccyx, Ischium, and Legs. Feet and Heels        Does the Patient have a Wound? No noted wound(s)       Vignesh Prevention initiated by RN: Yes  Wound Care Orders initiated by RN: No    Pressure Injury (Stage 3,4, Unstageable, DTI, NWPT, and Complex wounds) if present, place Wound referral order by RN under : No    New Ostomies, if present place, Ostomy referral order under : No     Nurse 1 eSignature: Electronically signed by Moon Jackson RN on 8/15/24 at 9:40 AM EDT    **SHARE this note so that the co-signing nurse can place an eSignature**    Nurse 2 eSignature: Electronically signed by Sammy Massey RN on 8/15/24 at 9:43 AM EDT

## 2024-08-15 NOTE — PLAN OF CARE
Problem: Discharge Planning  Goal: Discharge to home or other facility with appropriate resources  8/14/2024 2119 by Kenya Alvarado RN  Outcome: Progressing  8/14/2024 1125 by Gricel Bazan RN  Outcome: Progressing  Flowsheets (Taken 8/14/2024 0726)  Discharge to home or other facility with appropriate resources:   Identify barriers to discharge with patient and caregiver   Arrange for needed discharge resources and transportation as appropriate   Identify discharge learning needs (meds, wound care, etc)   Refer to discharge planning if patient needs post-hospital services based on physician order or complex needs related to functional status, cognitive ability or social support system     Problem: Safety - Adult  Goal: Free from fall injury  8/14/2024 2119 by Kenya Alvarado RN  Outcome: Progressing  8/14/2024 1125 by Gricel Bazan RN  Outcome: Progressing  Flowsheets (Taken 8/14/2024 0727)  Free From Fall Injury: Instruct family/caregiver on patient safety     Problem: ABCDS Injury Assessment  Goal: Absence of physical injury  8/14/2024 2119 by Kenya Alvarado RN  Outcome: Progressing  8/14/2024 1125 by Gricel Bazan RN  Outcome: Progressing  Flowsheets (Taken 8/14/2024 0727)  Absence of Physical Injury: Implement safety measures based on patient assessment     Problem: Pain  Goal: Verbalizes/displays adequate comfort level or baseline comfort level  8/14/2024 2119 by Kenya Alvarado RN  Outcome: Progressing  8/14/2024 1125 by Gricel Bazan RN  Outcome: Progressing  Flowsheets (Taken 8/14/2024 0726)  Verbalizes/displays adequate comfort level or baseline comfort level:   Encourage patient to monitor pain and request assistance   Assess pain using appropriate pain scale     Problem: Nutrition Deficit:  Goal: Optimize nutritional status  8/14/2024 2119 by Kenya Alvarado RN  Outcome: Progressing  8/14/2024 1125 by Gricel Bazan RN  Outcome: Progressing     Problem: Skin/Tissue Integrity -

## 2024-08-15 NOTE — PROGRESS NOTES
Occupational Therapy  Facility/Department: [address] [facility]  Rehabilitation Occupational Therapy Daily Treatment Note    Date: 8/15/24  Patient Name: Morro Kevin       Room: E2Z-6024/3262-01  MRN: [id number]  Account: [id number]   : 1950  (73 y.o.) Gender: male            PM session: met in therapy gym; OT measured his calves for swelling since wearing isra hose this AM. His L calf stayed the same @ 15'' and his R calf went down to 14.5 ( down 1 inch). He ordered compression socks from [employer]. Dr Rodriguez in to examine, discussed blood in stool, will need to monitor H&H. Pt ambulated back to his room by pushing the wheelchair. He is able to ambulate @ his room, use toilet & manage clothing IND'ly. OT provided him w/ shampoo cap to wash hair, placed towel @ his neck--he ambulated to/from bathrm & combed hair IND'ly. Pt allowed rest break. He was seated in a 17\" high chair w/o arms for the 30 second Sit to Stand test. He was able to perform 10 in 30 seconds w/o difficulty. His score is 2 points below average for age group, anticipate fall risk is @ 20%. Pt left in his recliner, call light in reach. Tx time: 30 min, cont w/ POC Pinky Moses, OTR/L, CNS #[id number]         Past Medical History:  has a past medical history of Aortic stenosis, Arthritis, Asthma, Deviated septum, Diabetes mellitus type II, Hashimoto thyroiditis, Homocysteinemia, Hyperlipidemia, Hypertension, Hypertriglyceridemia, Hypogonadism male, Hypothyroid, Kidney stone, Mechanical heart valve present, Murmur, Nasal polyps, Osteoarthritis, Prolonged emergence from general anesthesia, Prostate CA (HCC), Sleep apnea, Warfarin anticoagulation, and Wears glasses.  Past Surgical History:   has a past surgical history that includes colectomy (); Nasal polyp surgery (, ); knee surgery (Left, ); Rotator cuff repair (Right, ); Colonoscopy (2014); cyst removal; other surgical history (); Aortic valve replacement

## 2024-08-15 NOTE — PROGRESS NOTES
Nephrology Progress Note   [employer]      Reason for consultation: SUKUMAR on CKD 3a -- baseline Cr ~ 1.3-1.5 mg/dL. Follows with Dr. Salinas in office (last seen 2024).     Subjective:    The patient has been seen and examined. Labs and chart reviewed. Pt seen in ARU. Received HD yesterday with net 2.5 L UF. 950 mL UOP and 5 urinary occurrences yesterday.     There were not complications overnight.    Patient review of systems: Denies SOB      Allergies:  Allergies   Allergen Reactions    Ciprofloxacin Hives        Scheduled Meds:   warfarin placeholder: dosing by pharmacy   Other RX Placeholder    midodrine  10 mg Oral TID WC    epoetin linda-epbx  10,000 Units SubCUTAneous Once per day on     sodium chloride flush  5-40 mL IntraVENous 2 times per day    aspirin  81 mg Oral Nightly    levothyroxine  137 mcg Oral Daily    mirtazapine  30 mg Oral Nightly    tamsulosin  0.4 mg Oral Nightly    insulin lispro  0-8 Units SubCUTAneous TID WC    insulin lispro  0-4 Units SubCUTAneous Nightly    insulin glargine  10 Units SubCUTAneous Daily    psyllium husk-aspartame  1 packet Oral Daily    docusate sodium  100 mg Oral Daily    sennosides-docusate sodium  2 tablet Oral BID        sodium chloride      dextrose         PRN Meds:sodium chloride, sodium chloride flush, ondansetron **OR** ondansetron, polyethylene glycol, glucose, dextrose bolus **OR** dextrose bolus, glucagon (rDNA), dextrose, hydrocortisone, acetaminophen, bisacodyl, bisacodyl    Physical Exam:    TEMPERATURE:  Current - Temp: 98.5 °F (36.9 °C); Max - Temp  Av.3 °F (36.8 °C)  Min: 97.8 °F (36.6 °C)  Max: 98.8 °F (37.1 °C)  RESPIRATIONS RANGE: Resp  Av  Min: 16  Max: 18  PULSE RANGE: Pulse  Av.2  Min: 72  Max: 96  BLOOD PRESSURE RANGE:  Systolic (24hrs), Av , Min:111 , Max:150   ; Diastolic (24hrs), Av, Min:68, Max:73    24HR INTAKE/OUTPUT:    Intake/Output Summary (Last 24 hours) at 8/15/2024 1005  Last data  Hyaline 06/29/2012 07:50 AM    WBCUA 264 08/01/2024 04:44 PM    RBCUA 85 08/01/2024 04:44 PM    MUCUS 2+ 06/29/2012 07:50 AM    BACTERIA 4+ 08/01/2024 04:44 PM    CLARITYU CLOUDY 08/01/2024 04:44 PM    SPECGRAV 1.020 05/29/2019 07:44 AM    LEUKOCYTESUR LARGE 08/01/2024 04:44 PM    UROBILINOGEN 0.2 08/01/2024 04:44 PM    BILIRUBINUR Negative 08/01/2024 04:44 PM    BLOODU LARGE 08/01/2024 04:44 PM    GLUCOSEU 500 08/01/2024 04:44 PM    GLUCOSEU NEGATIVE 12/09/2011 07:17 AM         IMPRESSION/RECOMMENDATIONS:      SUKUMAR on CKD 3a -- baseline Cr ~ 1.3-1.5 mg/dL. Follows with Dr. Salinas in office (last seen 7/17/2024). Etiology of SUKUMAR potentially 2/2 hypotension and volume depletion. There is a post-marketing report of rhabdomyolysis being an adverse effect of Zytiga -- CK was 26,240  - HD initiated 8/2/2024  - Access: R TDC (placed by IR 8/8/2024)  - Received HD yesterday with post-HD wt of 80.6 kg  - Continue HD MWF while monitoring for renal recovery  - Restart lasix 20 mg daily  - CK continuing to trend down  - Continue midodrine. Continue to hold lisinopril and Synjardy  - Avoid nephrotoxic agents     2. Hypotension   - BP adequate              - Continue Midodrine 10 mg TID with hold parameters      3. Metabolic acidosis   - Resolved    4. Dyspnea              - CT Chest (7/31/2024): no pneumonia or edema               - VQ scan (7/31/2024): intermediate probability for pulmonary embolism               - Limited TTE (8/1/2024): LVEF 60%              - Breathing has improved     6. Prostate Cancer              - Started on Zytiga recently    7. Anemia: s/p 1u PRBC 8/11/2024   - Hgb below goal   - On MATEO with HD    8. CKD-MBD   - Phos WNL today   - Low phos diet    Will discuss with nephrology attending physician, Dr. Campbell.  See attestation for additional recommendations.    Roxana Antonio, MARITA - CNP

## 2024-08-15 NOTE — PROGRESS NOTES
Physician Progress Note      PATIENT:               ROXANNE PRESTON  CSN #:                  [id number]  :                       1950  ADMIT DATE:       2024 9:25 AM  DISCH DATE:        2024 5:14 PM  RESPONDING  PROVIDER #:        Dong Cui MD          QUERY TEXT:    Pt admitted with SUKUMAR.  Noted documentation of possible shock liver per GI   consult note.  If possible, please document in progress notes and discharge   summary:    The medical record reflects the following:  Risk Factors: SUKUMAR, dehydration, hypotension  Clinical Indicators: Per GI consult note \"Elevated transaminases.  This is   likely due to rhabdomyolysis and possibly mild shock liver.  They are slowly   improving\".  Per discharge summary \"Transaminases due to   rhabdomyolysis-improving\".  Treatment: GI consult, seral labs, CT scan, supportive care, IVF  Options provided:  -- Transaminitis due to rhabdomyolysis, shock liver ruled out  -- Transaminitis due to possible rhabdomyolysis and shock liver  -- Transaminitis due to shock liver  -- Other - I will add my own diagnosis  -- Disagree - Not applicable / Not valid  -- Disagree - Clinically unable to determine / Unknown  -- Refer to Clinical Documentation Reviewer    PROVIDER RESPONSE TEXT:    Transaminitis due to rhabdomyolysis, shock liver ruled out    Query created by: My Lance on 2024 3:46 PM      Electronically signed by:  Dong Cui MD 8/15/2024 4:06 PM

## 2024-08-15 NOTE — PROGRESS NOTES
4 Eyes Skin Assessment     NAME:  Morro Kevin  YOB: 1950  MEDICAL RECORD NUMBER:  [id number]    The patient is being assessed for  Other return from dialysis    I agree that at least one RN has performed a thorough Head to Toe Skin Assessment on the patient. ALL assessment sites listed below have been assessed.      Areas assessed by both nurses:    Shoulders, Back, Chest, Arms, Elbows, Hands, Sacrum. Buttock, Coccyx, Ischium, and Legs. Feet and Heels        Does the Patient have a Wound? No noted wound(s)       Vignesh Prevention initiated by RN: Yes  Wound Care Orders initiated by RN: No    Pressure Injury (Stage 3,4, Unstageable, DTI, NWPT, and Complex wounds) if present, place Wound referral order by RN under : No    New Ostomies, if present place, Ostomy referral order under : No     Nurse 1 eSignature: Electronically signed by Moon Jackson RN on 8/15/24 at 9:40 AM EDT    **SHARE this note so that the co-signing nurse can place an eSignature**    Nurse 2 eSignature: {Esignature:[id number]}

## 2024-08-15 NOTE — CARE COORDINATION
Referral [facility] for Physical Therapy needs.   Order has not been written as yet but will fax over when written.  BREN Khalil     Case Management   [phone]    8/15/2024  11:59 AM

## 2024-08-15 NOTE — PROGRESS NOTES
Physical Therapy  Facility/Department: [address] REHAB  Rehabilitation Physical Therapy Treatment Note  - AM and PM Sessions      NAME: Morro Kevin  : 1950 (73 y.o.)  MRN: [id number]  CODE STATUS: Full Code    Date of Service: 8/15/24     Pertinent medical information:  Additional Pertinent Hx: Pt is a 73 y.o. male admitted to  rehab unit on 24 with Rhabdomyolysis and SUKUMAR.  Admitted to  hospital on 2024 with SUKUMAR, Hypotension, PAF, Weak and SOB. GI consult : GI Bleed. Nephrology consult : SUKUMAR, new HD.    Restrictions:  Restrictions/Precautions: Fall Risk, Weight Bearing  Upper Extremity Weight Bearing Restrictions  Right Upper Extremity Weight Bearing: Weight Bearing As Tolerated  Other: has new Permcath R upper chest  Position Activity Restriction  Other position/activity restrictions: New R TDC Cath (placed 2024).HD MWF, reg diet     Social/Functional History  Lives With: Son  Type of Home: [facility]  Home Layout: One level  Home Access: Ramped entrance, Elevator  Bathroom Shower/Tub: Walk-in shower  Bathroom Toilet: Standard  Bathroom Equipment: Shower chair, Hand-held shower  Bathroom Accessibility: Accessible  Home Equipment: Cane, Walker - 4-Wheeled (Transport chair.)  Has the patient had two or more falls in the past year or any fall with injury in the past year?: No  ADL Assistance: Independent  Homemaking Assistance: Independent (Son assists homemaking.)  Homemaking Responsibilities: Yes  Ambulation Assistance: Independent (Using Cane past wk pta; prior no device. 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      ASSESSMENT/PROGRESS TOWARDS GOALS       Assessment  Assessment:  \"Dez\" Dorita is improving daily and is now ambulating community distance with rollator or no device with supervision. When using the rollator, he is aware of  and consistently using brakes correctly.  However, he intends to use rollator only with walking community distances and use no device inside his condo. He is able to complete transfers with modified independence.  Patient continues to be below baseline and will benefit from continued skilled therapy for strengthening, gait training, and balance training to allow for safe return home. Patient's stay is planned for discharge home on Saturday, 8/17, due to dialysis needs and ensuring medical stability.  Plan for outpatient PT upon discharge. Has rides planned and knows that he needs to have 's evaluation prior to resuming driving.  Activity Tolerance: Patient tolerated treatment well  Discharge Recommendations: Home with assist PRN;Outpatient PT  PT Equipment Recommendations  Equipment Needed: Yes  Mobility Devices: Walker  Walker: Rollator (4 Wheeled)  Other: owns 2-wh walker and cane, family ordered rollator from [employer] to be delivered 8/16    Goals  Patient Goals   Patient Goals : get strong enough to return home  Short Term Goals  Time Frame for Short Term Goals: 5-7 days  Short Term Goal 1: Bed mobility Independent in flat bed, and no bedrail  Short Term Goal 2: Transfers with/without AD Mod I - met - 8/15/2024  Short Term Goal 3: Ambulate with/without  ft Mod I  Additional Goals?: No    PLAN OF CARE/SAFETY  Physical Therapy Plan  General Plan:  minutes of therapy at least 5 out of 7 days a week  Hours

## 2024-08-15 NOTE — PLAN OF CARE
Problem: Discharge Planning  Goal: Discharge to home or other facility with appropriate resources  Outcome: Progressing  Flowsheets (Taken 8/15/2024 0721)  Discharge to home or other facility with appropriate resources: Identify barriers to discharge with patient and caregiver     Problem: Safety - Adult  Goal: Free from fall injury  Outcome: Progressing  Flowsheets (Taken 8/15/2024 0721)  Free From Fall Injury: Instruct family/caregiver on patient safety     Problem: ABCDS Injury Assessment  Goal: Absence of physical injury  Outcome: Progressing     Problem: Pain  Goal: Verbalizes/displays adequate comfort level or baseline comfort level  Outcome: Progressing     Problem: Nutrition Deficit:  Goal: Optimize nutritional status  Outcome: Progressing     Problem: Skin/Tissue Integrity - Adult  Goal: Skin integrity remains intact  Outcome: Progressing  Flowsheets (Taken 8/15/2024 0721)  Skin Integrity Remains Intact: Monitor for areas of redness and/or skin breakdown  Goal: Oral mucous membranes remain intact  Outcome: Progressing     Problem: Musculoskeletal - Adult  Goal: Return mobility to safest level of function  Outcome: Progressing  Flowsheets (Taken 8/15/2024 0721)  Return Mobility to Safest Level of Function: Assess patient stability and activity tolerance for standing, transferring and ambulating with or without assistive devices  Goal: Return ADL status to a safe level of function  Outcome: Progressing  Flowsheets (Taken 8/15/2024 0721)  Return ADL Status to a Safe Level of Function: Administer medication as ordered     Problem: Genitourinary - Adult  Goal: Absence of urinary retention  Outcome: Progressing     Problem: Hematologic - Adult  Goal: Maintains hematologic stability  Outcome: Progressing  Flowsheets (Taken 8/15/2024 0721)  Maintains hematologic stability: Assess for signs and symptoms of bleeding or hemorrhage

## 2024-08-15 NOTE — PROGRESS NOTES
Department of Physical Medicine & Rehabilitation  Progress Note    Patient Identification:  Morro Kevin  [id number]  : 1950  Admit date: 2024    Chief Complaint: Rhabdomyolysis    Subjective:   No acute events overnight.   Patient seen this afternoon sitting up in gym. Reports ongoing progress. We discussed discharge planning and his preference is for outpatient PT. OT does not feel he will need additional OT at discharge. Patient has purchased rollator. His goal is to return to walking daily in his neighborhood.    Labs reviewed.     ROS: No f/c, n/v, cp     Objective:  Patient Vitals for the past 24 hrs:   BP Temp Temp src Pulse Resp SpO2 Weight   08/15/24 1601 136/74 -- -- -- -- -- --   08/15/24 1114 125/71 -- -- -- -- -- --   08/15/24 0750 -- -- -- -- -- 99 % --   08/15/24 0721 121/70 98.5 °F (36.9 °C) Oral 87 16 98 % --   08/15/24 0150 -- -- -- -- -- -- 81.1 kg (178 lb 12.7 oz)   24 2001 111/68 98.8 °F (37.1 °C) Oral 96 18 97 % --   24 1802 138/68 98 °F (36.7 °C) -- 88 17 -- 80.6 kg (177 lb 11.1 oz)     Const: Alert. No distress, pleasant.   HEENT: Normocephalic, atraumatic. Normal sclera/conjunctiva. MMM.   CV: Regular rate and rhythm.   Resp: No respiratory distress. Lungs CTAB.   Abd: Soft, nontender, nondistended  Ext: Trace edema  Neuro: Alert, oriented, appropriately interactive.   Psych: Cooperative, appropriate mood and affect    Laboratory data: Available via EMR.   Last 24 hour lab  Recent Results (from the past 24 hour(s))   POCT Glucose    Collection Time: 24  7:14 PM   Result Value Ref Range    POC Glucose 96 70 - 99 mg/dl    Performed on ACCU-CHEK    POCT Glucose    Collection Time: 24 10:08 PM   Result Value Ref Range    POC Glucose 306 (H) 70 - 99 mg/dl    Performed on ACCU-CHEK    Protime-INR    Collection Time: 08/15/24  5:42 AM   Result Value Ref Range    Protime 14.7 11.9 - 14.9 sec    INR 1.13 0.85 - 1.15   CBC    Collection Time: 08/15/24  5:42 AM  improving    DM2  -continue lantus 10 daily, ISS -- monitor trend, overall controlled  -Note, was on Trulicity at home    JOSÉ MANUEL  -CPAP    Hypothyroidism  -continue levothyroxine    Bladder  -Prostate cancer as above, high risk retention  -Monitor PVRs, straight cath prn >400  -continue Flomax    Bowel  -High risk constipation  -senna+colace BID, prn miralax,  bisacodyl supp    DVT ppx  -continue warfarin    Rehab Progress: Making progress. Overall supervision for mobility, Nanda-Bhavik for ADLs. Barriers include: weakness, endurance.   Anticipated Dispo: home with son  Services: outpatient PT  DME: medical alert button, rollator  ELOS: 8/17      Jennie Rodriguez MD 8/15/2024, 4:51 PM

## 2024-08-15 NOTE — PROGRESS NOTES
Patient admitted to rehab with BLE weakness.  A/Ox4. Transfers with contact guard, gait belt, rolator. Mobility restrictions: WBAT. On regular, low phos diet, tolerating well. Medications taken whole with thins. On isra hose for DVT prophylaxis.  Skin: scattered bruising. Oxygen: RA. LDA: LFA. Has been continent of bowel and continent of bladder. LBM 8/13. Chair/bed alarms in use and call light in reach. Will monitor for safety.

## 2024-08-16 ENCOUNTER — TELEPHONE (OUTPATIENT)
Dept: FAMILY MEDICINE CLINIC | Age: 74
End: 2024-08-16

## 2024-08-16 LAB
ALBUMIN SERPL-MCNC: 3 G/DL (ref 3.4–5)
ANION GAP SERPL CALCULATED.3IONS-SCNC: 13 MMOL/L (ref 3–16)
BUN SERPL-MCNC: 34 MG/DL (ref 7–20)
CALCIUM SERPL-MCNC: 8.9 MG/DL (ref 8.3–10.6)
CHLORIDE SERPL-SCNC: 105 MMOL/L (ref 99–110)
CK SERPL-CCNC: 196 U/L (ref 39–308)
CO2 SERPL-SCNC: 25 MMOL/L (ref 21–32)
CREAT SERPL-MCNC: 5.7 MG/DL (ref 0.8–1.3)
DEPRECATED RDW RBC AUTO: 17.1 % (ref 12.4–15.4)
GFR SERPLBLD CREATININE-BSD FMLA CKD-EPI: 10 ML/MIN/{1.73_M2}
GLUCOSE BLD-MCNC: 104 MG/DL (ref 70–99)
GLUCOSE BLD-MCNC: 151 MG/DL (ref 70–99)
GLUCOSE BLD-MCNC: 259 MG/DL (ref 70–99)
GLUCOSE BLD-MCNC: 82 MG/DL (ref 70–99)
GLUCOSE SERPL-MCNC: 99 MG/DL (ref 70–99)
HCT VFR BLD AUTO: 21.1 % (ref 40.5–52.5)
HGB BLD-MCNC: 7.1 G/DL (ref 13.5–17.5)
INR PPP: 1.29 (ref 0.85–1.15)
MCH RBC QN AUTO: 31.4 PG (ref 26–34)
MCHC RBC AUTO-ENTMCNC: 33.7 G/DL (ref 31–36)
MCV RBC AUTO: 93.2 FL (ref 80–100)
PERFORMED ON: ABNORMAL
PERFORMED ON: NORMAL
PHOSPHATE SERPL-MCNC: 5.4 MG/DL (ref 2.5–4.9)
PLATELET # BLD AUTO: 159 K/UL (ref 135–450)
PMV BLD AUTO: 7.4 FL (ref 5–10.5)
POTASSIUM SERPL-SCNC: 3.7 MMOL/L (ref 3.5–5.1)
PROTHROMBIN TIME: 16.3 SEC (ref 11.9–14.9)
RBC # BLD AUTO: 2.26 M/UL (ref 4.2–5.9)
SODIUM SERPL-SCNC: 143 MMOL/L (ref 136–145)
WBC # BLD AUTO: 4 K/UL (ref 4–11)

## 2024-08-16 PROCEDURE — 97110 THERAPEUTIC EXERCISES: CPT

## 2024-08-16 PROCEDURE — 6360000002 HC RX W HCPCS: Performed by: INTERNAL MEDICINE

## 2024-08-16 PROCEDURE — 97116 GAIT TRAINING THERAPY: CPT

## 2024-08-16 PROCEDURE — 36415 COLL VENOUS BLD VENIPUNCTURE: CPT

## 2024-08-16 PROCEDURE — 6370000000 HC RX 637 (ALT 250 FOR IP): Performed by: INTERNAL MEDICINE

## 2024-08-16 PROCEDURE — 97530 THERAPEUTIC ACTIVITIES: CPT

## 2024-08-16 PROCEDURE — 80069 RENAL FUNCTION PANEL: CPT

## 2024-08-16 PROCEDURE — 85610 PROTHROMBIN TIME: CPT

## 2024-08-16 PROCEDURE — 2580000003 HC RX 258: Performed by: PHYSICAL MEDICINE & REHABILITATION

## 2024-08-16 PROCEDURE — 97535 SELF CARE MNGMENT TRAINING: CPT

## 2024-08-16 PROCEDURE — 6370000000 HC RX 637 (ALT 250 FOR IP): Performed by: PHYSICAL MEDICINE & REHABILITATION

## 2024-08-16 PROCEDURE — 1280000000 HC REHAB R&B

## 2024-08-16 PROCEDURE — 85027 COMPLETE CBC AUTOMATED: CPT

## 2024-08-16 PROCEDURE — 82550 ASSAY OF CK (CPK): CPT

## 2024-08-16 PROCEDURE — 6370000000 HC RX 637 (ALT 250 FOR IP)

## 2024-08-16 PROCEDURE — 90935 HEMODIALYSIS ONE EVALUATION: CPT

## 2024-08-16 RX ORDER — FUROSEMIDE 20 MG/1
20 TABLET ORAL DAILY
Qty: 30 TABLET | Refills: 0 | Status: SHIPPED | OUTPATIENT
Start: 2024-08-16

## 2024-08-16 RX ORDER — MIDODRINE HYDROCHLORIDE 5 MG/1
10 TABLET ORAL
Qty: 180 TABLET | Refills: 0 | Status: SHIPPED | OUTPATIENT
Start: 2024-08-16

## 2024-08-16 RX ORDER — MIRTAZAPINE 30 MG/1
30 TABLET, FILM COATED ORAL NIGHTLY
Qty: 90 TABLET | Refills: 1 | OUTPATIENT
Start: 2024-08-16

## 2024-08-16 RX ORDER — SENNA AND DOCUSATE SODIUM 50; 8.6 MG/1; MG/1
2 TABLET, FILM COATED ORAL 2 TIMES DAILY
COMMUNITY
Start: 2024-08-16

## 2024-08-16 RX ORDER — POLYETHYLENE GLYCOL 3350 17 G/17G
17 POWDER, FOR SOLUTION ORAL DAILY PRN
COMMUNITY
Start: 2024-08-16 | End: 2024-09-15

## 2024-08-16 RX ORDER — WARFARIN SODIUM 7.5 MG/1
7.5 TABLET ORAL
Status: COMPLETED | OUTPATIENT
Start: 2024-08-16 | End: 2024-08-16

## 2024-08-16 RX ADMIN — TAMSULOSIN HYDROCHLORIDE 0.4 MG: 0.4 CAPSULE ORAL at 20:54

## 2024-08-16 RX ADMIN — ASPIRIN 81 MG: 81 TABLET, COATED ORAL at 20:55

## 2024-08-16 RX ADMIN — MIDODRINE HYDROCHLORIDE 10 MG: 10 TABLET ORAL at 12:32

## 2024-08-16 RX ADMIN — SENNOSIDES AND DOCUSATE SODIUM 2 TABLET: 50; 8.6 TABLET ORAL at 20:54

## 2024-08-16 RX ADMIN — MIDODRINE HYDROCHLORIDE 10 MG: 10 TABLET ORAL at 18:24

## 2024-08-16 RX ADMIN — SODIUM CHLORIDE, PRESERVATIVE FREE 10 ML: 5 INJECTION INTRAVENOUS at 07:35

## 2024-08-16 RX ADMIN — SODIUM CHLORIDE, PRESERVATIVE FREE 7 ML: 5 INJECTION INTRAVENOUS at 20:56

## 2024-08-16 RX ADMIN — WARFARIN SODIUM 7.5 MG: 7.5 TABLET ORAL at 18:24

## 2024-08-16 RX ADMIN — LEVOTHYROXINE SODIUM 137 MCG: 0.11 TABLET ORAL at 05:37

## 2024-08-16 RX ADMIN — SENNOSIDES AND DOCUSATE SODIUM 2 TABLET: 50; 8.6 TABLET ORAL at 07:34

## 2024-08-16 RX ADMIN — DOCUSATE SODIUM 100 MG: 100 CAPSULE, LIQUID FILLED ORAL at 07:34

## 2024-08-16 RX ADMIN — INSULIN GLARGINE 10 UNITS: 100 INJECTION, SOLUTION SUBCUTANEOUS at 07:34

## 2024-08-16 RX ADMIN — MIDODRINE HYDROCHLORIDE 10 MG: 10 TABLET ORAL at 07:34

## 2024-08-16 RX ADMIN — EPOETIN ALFA-EPBX 10000 UNITS: 10000 INJECTION, SOLUTION INTRAVENOUS; SUBCUTANEOUS at 17:16

## 2024-08-16 RX ADMIN — FUROSEMIDE 20 MG: 20 TABLET ORAL at 07:34

## 2024-08-16 RX ADMIN — MIRTAZAPINE 30 MG: 30 TABLET, FILM COATED ORAL at 20:54

## 2024-08-16 NOTE — TELEPHONE ENCOUNTER
----- Message from Héctor VILLALOBOS sent at 8/16/2024  3:07 PM EDT -----  Regarding: E[facility] Appointment Request  E[facility] Appointment Request    Patient needs appointment for E[facility] Appointment Type: Hospital Follow Up.    The patient is wanted to have an appointment for his hospital follow up appointment     Patient Requested Dates(s): any day that have available  Patient Requested Time: any time  Provider Name: Fareed Marino MD    Reason for Appointment Request: Other The practice office is currently unavailable  --------------------------------------------------------------------------------------------------------------------------    Relationship to Patient: Self     Call Back Information: OK to leave message on voicemail  Preferred Call Back Number: Phone: [phone]

## 2024-08-16 NOTE — CARE COORDINATION
Checked the Portal of [facility] to ensure he does have a spot.  She will call me back.    BREN Khalil     Case Management   [phone]    8/16/2024  9:54 AM

## 2024-08-16 NOTE — PROGRESS NOTES
Clinical Pharmacy Note  Warfarin Consult    Morro Kevin is a 73 y.o. male receiving warfarin managed by pharmacy.    Warfarin Indication: AVR  Target INR range: 2-3   Dose prior to admission: 11.25 mg on Wednesday, 7.5 mg remaining days of the week. Managed at  [facility].     Current warfarin drug-drug interactions: --    Recent Labs     08/14/24  0352 08/15/24  0542 08/16/24  0518   HGB 7.4* 7.6* 7.1*   HCT 21.3* 22.1* 21.1*   INR 1.13 1.13 1.29*       Assessment/Plan:    Warfarin held 8/10-8/12 for H/H drop requiring blood transfusion.    Warfarin resumed 8/13.   Patient showing response today after 3 days of 7.5mg dosing. Will repeat 7.5mg dose today.   Daily PT/INR until stable within therapeutic range.     Thank you for the consult.  Will continue to follow.    Anthony Back RPH  8/16/2024 8:44 AM

## 2024-08-16 NOTE — CARE COORDINATION
Faxed Run sheets to [facility].    BREN Khalil     Case Management   [phone]    8/16/2024  2:38 PM

## 2024-08-16 NOTE — CARE COORDINATION
Rec'd confirmation letter from [facility] that HD has been arranged starting Monday 8- at 6:00 am at [facility].    Met with patient to review.  Provided him with Confirmation Letter from [facility].  IMM letter presented; he verbalized understanding and signed form.  His family will be transporting him home; will tell the Bedside RN the time.  He verbalized understanding it is after 10 am.    He denied transportation issues.     Call placed to [facility]  to inform them order has been written for PT services and to inform them he is in HD MWF.    BREN Khalil     Case Management   [phone]    8/16/2024  2:28 PM

## 2024-08-16 NOTE — CARE COORDINATION
Chart Reviewed.  Chart reflects need for an emergency response button.  Spoke with pt who denies need for this as he has an apple watch and is out of the apt a lot of the time.  He denies desire for Jaimee Response button.  BREN Khalil     Case Management   [phone]    8/16/2024  10:02 AM

## 2024-08-16 NOTE — PROGRESS NOTES
4 Eyes Skin Assessment     NAME:  Morro Kevin  YOB: 1950  MEDICAL RECORD NUMBER:  [id number]    The patient is being assessed for  Other return from dialysis    I agree that at least one RN has performed a thorough Head to Toe Skin Assessment on the patient. ALL assessment sites listed below have been assessed.      Areas assessed by both nurses:    Head, Face, Ears, Shoulders, Back, Chest, Arms, Elbows, Hands, Sacrum. Buttock, Coccyx, Ischium, and Legs. Feet and Heels        Does the Patient have a Wound? No noted wound(s)       Vignesh Prevention initiated by RN: Yes  Wound Care Orders initiated by RN: No    Pressure Injury (Stage 3,4, Unstageable, DTI, NWPT, and Complex wounds) if present, place Wound referral order by RN under : No    New Ostomies, if present place, Ostomy referral order under : No     Nurse 1 eSignature: Electronically signed by Moon Jackson RN on 8/16/24 at 6:28 PM EDT    **SHARE this note so that the co-signing nurse can place an eSignature**    Nurse 2 eSignature: Electronically signed by Sammy Massey RN on 8/16/24 at 6:29 PM EDT

## 2024-08-16 NOTE — PROGRESS NOTES
Department of Physical Medicine & Rehabilitation  Progress Note    Patient Identification:  Morro Kevin  [id number]  : 1950  Admit date: 2024    Chief Complaint: Rhabdomyolysis    Subjective:   No acute events overnight.   Patient seen this afternoon at HD. He reports feeling well and looking forward to discharge home tomorrow. We discussed plans for outpatient HD and PT, medications, and follow-ups.   Labs reviewed.     ROS: No f/c, n/v, cp     Objective:  Patient Vitals for the past 24 hrs:   BP Temp Temp src Pulse Resp SpO2 Weight   24 1206 132/69 -- -- -- -- -- --   24 0724 (!) 145/71 98.7 °F (37.1 °C) Oral 76 16 96 % --   24 0530 -- -- -- -- -- -- 81 kg (178 lb 9.2 oz)   08/15/24 2000 134/67 98.2 °F (36.8 °C) Oral 64 16 95 % --   08/15/24 1601 136/74 -- -- -- -- -- --     Const: Alert. No distress, pleasant.   HEENT: Normocephalic, atraumatic. Normal sclera/conjunctiva. MMM.   CV: Regular rate and rhythm.   Resp: No respiratory distress. Lungs CTAB.   Abd: Soft, nontender, nondistended  Ext: Trace edema  Neuro: Alert, oriented, appropriately interactive.   Psych: Cooperative, appropriate mood and affect    Laboratory data: Available via EMR.   Last 24 hour lab  Recent Results (from the past 24 hour(s))   POCT Glucose    Collection Time: 08/15/24  4:00 PM   Result Value Ref Range    POC Glucose 111 (H) 70 - 99 mg/dl    Performed on ACCU-CHEK    POCT Glucose    Collection Time: 08/15/24  8:15 PM   Result Value Ref Range    POC Glucose 184 (H) 70 - 99 mg/dl    Performed on ACCU-CHEK    Renal Function Panel    Collection Time: 24  5:18 AM   Result Value Ref Range    Sodium 143 136 - 145 mmol/L    Potassium 3.7 3.5 - 5.1 mmol/L    Chloride 105 99 - 110 mmol/L    CO2 25 21 - 32 mmol/L    Anion Gap 13 3 - 16    Glucose 99 70 - 99 mg/dL    BUN 34 (H) 7 - 20 mg/dL    Creatinine 5.7 (HH) 0.8 - 1.3 mg/dL    Est, Glom Filt Rate 10 (A) >60    Calcium 8.9 8.3 - 10.6 mg/dL    Phosphorus

## 2024-08-16 NOTE — CARE COORDINATION
Call placed to [place], Rep with [facility] [phone] Ext [phone].  She reports they do have a spot for him at [facility] but has not been cleared as yet.  Reminded her of DC for Saturday, 8- and we will need to have a spot for him.  She will confirm and then call me back.  BREN Khalil     Case Management   [phone]    8/16/2024  1:17 PM

## 2024-08-16 NOTE — PROGRESS NOTES
Occupational Therapy  Facility/Department: [address] IP REHAB  Rehabilitation Occupational Therapy Daily Treatment Note  DC SUMMARY  Date: 24  Patient Name: Morro Kevin       Room: R6I-4359/3262-01  MRN: [id number]  Account: [id number]   : 1950  (73 y.o.) Gender: male         PM session: met in room, he denies pain or dizziness. Pt agreeable to use rollator thru hallways, take elevator down to lobby area. Able to complete t/f on/off 17'' couch, improved quad strength & control for sit to stand transitions from lower surfaces. He was able to use rollator on/off elevators & walk in lobby, transferred to reg chair for rest break--no SOB noted. Pt able to ambulate thru kitchen, retrieve juice from fridge & transport on seat of rollator back into the gym IND'ly. Pt completed 1 set of 10-15 for UE strengthening ( 2 lb in L hand, 1 lb in R). Discussed having outpt PT services, likely 2xs a wk --pt familiar w/ having PT for cardiac rehab and shld issues. Tx time: 40 min DC from OT standpoint --he will monitor vital signs using pulse ox, BP cuff & daily wts. Pinky Moses OTR/L, CNS #[id number]            Past Medical History:  has a past medical history of Aortic stenosis, Arthritis, Asthma, Deviated septum, Diabetes mellitus type II, Hashimoto thyroiditis, Homocysteinemia, Hyperlipidemia, Hypertension, Hypertriglyceridemia, Hypogonadism male, Hypothyroid, Kidney stone, Mechanical heart valve present, Murmur, Nasal polyps, Osteoarthritis, Prolonged emergence from general anesthesia, Prostate CA (HCC), Sleep apnea, Warfarin anticoagulation, and Wears glasses.  Past Surgical History:   has a past surgical history that includes colectomy (); Nasal polyp surgery (, ); knee surgery (Left, ); Rotator cuff repair (Right, ); Colonoscopy (2014); cyst removal; other surgical history (); Aortic valve replacement (2016); Upper gastrointestinal endoscopy (N/A, 2023); Colonoscopy  Level: Independent  Skilled Clinical Factors: did not use reacher to doff or don his brief or pants; stood IND'ly to pull over hips & buttocks  Putting On/Taking Off Footwear  Equipment Provided: Reachers;Sock aid  Assistance Level: Modified independent  Skilled Clinical Factors: improved flexibility to bend over & cross legs during isra hose & sock management; used wide SA to don isra hose w/ extra time  Toileting  Assistance Level: Modified independent  Skilled Clinical Factors: Voided urine on commode and managed clothing over hips  Toilet Transfers  Equipment: Standard toilet;Grab bars  Assistance Level: Modified independent  Skilled Clinical Factors: Pt amb to/from commode, used GBs prn  Tub/Shower Transfers  Skilled Clinical Factors: unable to shower per nephrology report due to Tunnel Cath R upper chest          Functional Mobility  Activity: Retrieve items;Transport items;To/From bathroom  Assistance Level: Modified independent  Skilled Clinical Factors: no AD used in his room, to/from bathrm & to complete recliner & toilet t/f; he does pause upon standing prior to walking (has rollator available to use if feeling unsteady; has low H&H)  Bed Mobility  Overall Assistance Level: Independent  Sit to Supine  Assistance Level: Independent  Skilled Clinical Factors: flat bed, no rail used  Supine to Sit  Assistance Level: Independent  Transfers  Surface: To bed;To chair with arms;From chair with arms;From bed;Standard toilet  Sit to Stand  Assistance Level: Modified independent  Skilled Clinical Factors: pauses briefly prior to walking; is not using AD  Stand to Sit  Assistance Level: Modified independent  Bed To/From Chair  Assistance Level: Modified independent  Skilled Clinical Factors: Transfers to multiple surfaces including bed, toilet, shower chair & recliner, reg chair without AD; improved recall for safe hand placement  Stand Pivot  Assistance Level: Modified independent  Skilled Clinical Factors: used GBs

## 2024-08-16 NOTE — CARE COORDINATION
Terrie from [facility] Physical therapy dept has scheduled for Tues, 8- for 10:00 am  but will need to arrive at 9:30 am.    BREN Khalil     Case Management   [phone]    8/16/2024  2:48 PM

## 2024-08-16 NOTE — PROGRESS NOTES
Discharge medications are ready in inpatient pharmacy for weekend delivery.    08/16/24 3:41 PM

## 2024-08-16 NOTE — PLAN OF CARE
Problem: Discharge Planning  Goal: Discharge to home or other facility with appropriate resources  8/16/2024 0834 by Selma Jackson RN  Outcome: Progressing  Flowsheets (Taken 8/16/2024 0724)  Discharge to home or other facility with appropriate resources: Identify barriers to discharge with patient and caregiver  8/16/2024 0158 by Silvana Lazo RN  Outcome: Progressing  8/16/2024 0149 by Silvana Lazo RN  Outcome: Progressing  Flowsheets (Taken 8/15/2024 2000)  Discharge to home or other facility with appropriate resources: Identify barriers to discharge with patient and caregiver     Problem: Safety - Adult  Goal: Free from fall injury  8/16/2024 0834 by Selma Jackson RN  Outcome: Progressing  Flowsheets (Taken 8/16/2024 0724)  Free From Fall Injury: Instruct family/caregiver on patient safety  8/16/2024 0158 by Silvana Lazo RN  Outcome: Progressing  Note: Fall risk band on patient. Orange light on outside of room. Non skid footwear in place. Alarms used appropriately. Patient instructed to call and wait for staff before getting up. Rounding done to anticipate needs. Appropriate safety devices used for transfers.  8/16/2024 0149 by Silvana Lazo RN  Outcome: Progressing  Note: Fall risk band on patient. Orange light on outside of room. Non skid footwear in place. Alarms used appropriately. Patient instructed to call and wait for staff before getting up. Rounding done to anticipate needs. Appropriate safety devices used for transfers.     Problem: ABCDS Injury Assessment  Goal: Absence of physical injury  8/16/2024 0834 by Selma Jackson RN  Outcome: Progressing  8/16/2024 0158 by Silvana Lazo RN  Outcome: Progressing  8/16/2024 0149 by Silvana Lazo RN  Outcome: Progressing     Problem: Pain  Goal: Verbalizes/displays adequate comfort level or baseline comfort level  8/16/2024 0834 by Selma Jackson RN  Outcome: Progressing  8/16/2024 0158 by Silvana Lazo RN  Outcome: Progressing  8/16/2024 0149 by Violet  KHARI Pina  Outcome: Progressing     Problem: Nutrition Deficit:  Goal: Optimize nutritional status  8/16/2024 0834 by Selma Jackson RN  Outcome: Progressing  8/16/2024 0158 by Silvana Lazo RN  Outcome: Progressing  8/16/2024 0149 by Silvana Lazo RN  Outcome: Progressing     Problem: Skin/Tissue Integrity - Adult  Goal: Skin integrity remains intact  8/16/2024 0834 by Selma Jackson RN  Outcome: Progressing  Flowsheets (Taken 8/16/2024 0724)  Skin Integrity Remains Intact: Monitor for areas of redness and/or skin breakdown  8/16/2024 0158 by Silvana Lazo RN  Outcome: Progressing  8/16/2024 0149 by Silvana Lazo RN  Outcome: Progressing  Flowsheets (Taken 8/15/2024 2000)  Skin Integrity Remains Intact: Monitor for areas of redness and/or skin breakdown  Goal: Oral mucous membranes remain intact  8/16/2024 0834 by Selma Jackson RN  Outcome: Progressing  8/16/2024 0158 by Silvana Lazo RN  Outcome: Progressing  8/16/2024 0149 by Silvana Lazo RN  Outcome: Progressing     Problem: Musculoskeletal - Adult  Goal: Return mobility to safest level of function  8/16/2024 0834 by Selma Jackson RN  Outcome: Progressing  Flowsheets (Taken 8/16/2024 0724)  Return Mobility to Safest Level of Function: Assess patient stability and activity tolerance for standing, transferring and ambulating with or without assistive devices  8/16/2024 0158 by Silvana Lazo RN  Outcome: Progressing  8/16/2024 0149 by Silvana Lazo RN  Outcome: Progressing  Goal: Return ADL status to a safe level of function  8/16/2024 0834 by Selma Jackson RN  Outcome: Progressing  Flowsheets (Taken 8/16/2024 0724)  Return ADL Status to a Safe Level of Function: Administer medication as ordered  8/16/2024 0158 by Silvana Lazo RN  Outcome: Progressing  8/16/2024 0149 by Silvana Lazo RN  Outcome: Progressing     Problem: Genitourinary - Adult  Goal: Absence of urinary retention  8/16/2024 0834 by Selma Jackson, RN  Outcome: Progressing  8/16/2024 0158 by

## 2024-08-16 NOTE — PROGRESS NOTES
Nephrology Progress Note   [email or web address]      Reason for consultation: SUKUMAR on CKD 3a -- baseline Cr ~ 1.3-1.5 mg/dL. Follows with Dr. Salinas in office (last seen 2024).     Subjective:    The patient has been seen and examined. Labs and chart reviewed. Pt seen in ARU. HD today with net 2 L UF as tolerated.     There were not complications overnight.    Patient review of systems: Denies SOB      Allergies:  Allergies   Allergen Reactions    Ciprofloxacin Hives        Scheduled Meds:   warfarin  7.5 mg Oral Once    furosemide  20 mg Oral Daily    warfarin placeholder: dosing by pharmacy   Other RX Placeholder    midodrine  10 mg Oral TID WC    epoetin linda-epbx  10,000 Units SubCUTAneous Once per day on     sodium chloride flush  5-40 mL IntraVENous 2 times per day    aspirin  81 mg Oral Nightly    levothyroxine  137 mcg Oral Daily    mirtazapine  30 mg Oral Nightly    tamsulosin  0.4 mg Oral Nightly    insulin lispro  0-8 Units SubCUTAneous TID WC    insulin lispro  0-4 Units SubCUTAneous Nightly    insulin glargine  10 Units SubCUTAneous Daily    psyllium husk-aspartame  1 packet Oral Daily    docusate sodium  100 mg Oral Daily    sennosides-docusate sodium  2 tablet Oral BID        sodium chloride      dextrose         PRN Meds:sodium chloride, sodium chloride flush, ondansetron **OR** ondansetron, polyethylene glycol, glucose, dextrose bolus **OR** dextrose bolus, glucagon (rDNA), dextrose, hydrocortisone, acetaminophen, bisacodyl, bisacodyl    Physical Exam:    TEMPERATURE:  Current - Temp: 98.7 °F (37.1 °C); Max - Temp  Av.5 °F (36.9 °C)  Min: 98.2 °F (36.8 °C)  Max: 98.7 °F (37.1 °C)  RESPIRATIONS RANGE: Resp  Av  Min: 16  Max: 16  PULSE RANGE: Pulse  Av  Min: 64  Max: 76  BLOOD PRESSURE RANGE:  Systolic (24hrs), Av , Min:125 , Max:145   ; Diastolic (24hrs), Av, Min:67, Max:74    24HR INTAKE/OUTPUT:    Intake/Output Summary (Last 24 hours) at 2024  02:00 PM    LABCAST 0-1 Hyaline 06/29/2012 07:50 AM    WBCUA 264 08/01/2024 04:44 PM    RBCUA 85 08/01/2024 04:44 PM    MUCUS 2+ 06/29/2012 07:50 AM    BACTERIA 4+ 08/01/2024 04:44 PM    CLARITYU CLOUDY 08/01/2024 04:44 PM    SPECGRAV 1.020 05/29/2019 07:44 AM    LEUKOCYTESUR LARGE 08/01/2024 04:44 PM    UROBILINOGEN 0.2 08/01/2024 04:44 PM    BILIRUBINUR Negative 08/01/2024 04:44 PM    BLOODU LARGE 08/01/2024 04:44 PM    GLUCOSEU 500 08/01/2024 04:44 PM    GLUCOSEU NEGATIVE 12/09/2011 07:17 AM         IMPRESSION/RECOMMENDATIONS:      SUKUMAR on CKD 3a -- baseline Cr ~ 1.3-1.5 mg/dL. Follows with Dr. Salinas in office (last seen 7/17/2024). Etiology of SUKUMAR potentially 2/2 hypotension and volume depletion. There is a post-marketing report of rhabdomyolysis being an adverse effect of Zytiga -- CK was 26,240  - HD initiated 8/2/2024  - Access: R TDC (placed by IR 8/8/2024)  - HD today with net 2 L UF as tolerated  - Continue HD MWF while monitoring for renal recovery  - Continue lasix 20 mg daily  - CK now within normal range  - Continue midodrine. Continue to hold lisinopril and Synjardy  - Avoid nephrotoxic agents     2. Hypotension   - BP adequate              - Continue Midodrine 10 mg TID with hold parameters      3. Metabolic acidosis   - Resolved    4. Dyspnea              - CT Chest (7/31/2024): no pneumonia or edema               - VQ scan (7/31/2024): intermediate probability for pulmonary embolism               - Limited TTE (8/1/2024): LVEF 60%              - Breathing has improved     6. Prostate Cancer              - Started on Zytiga recently    7. Anemia: s/p 1u PRBC 8/11/2024   - Hgb below goal   - On MATEO with HD    8. CKD-MBD   - Phos within target   - Low phos diet    Will discuss with nephrology attending physician, Dr. Campbell.  See attestation for additional recommendations.    Roxana Antonio, APRBOB - CNP

## 2024-08-16 NOTE — PROGRESS NOTES
Patient admitted to rehab with rhabdo, BLE weakness.  A/Ox4. Transfers with gait belt, rolator. Mobility restrictions: WBAT. On regular, low phos diet, tolerating well. Medications taken whole with thins. On coumadin for DVT prophylaxis.  Skin: scattered bruising. Oxygen: RA. LDA: LFA. Has been continent of bowel and continent of bladder. LBM 8/15. Chair/bed alarms in use and call light in reach. Will monitor for safety.

## 2024-08-16 NOTE — PROGRESS NOTES
In 1315     Time Out 1400     Minutes 45         Electronically signed by Shola Foss, PT on 8/16/2024 at 1:36 PM        Therapy Time   Individual Concurrent Group Co-treatment   Time In 1045         Time Out 1135         Minutes 50           Timed Code Treatment Minutes: 50 Minutes       Shola Foss, PT, 08/16/24 at 1:36 PM

## 2024-08-16 NOTE — PLAN OF CARE
Problem: Safety - Adult  Goal: Free from fall injury  8/16/2024 0158 by Silvana Lazo RN  Outcome: Progressing  Note: Fall risk band on patient. Orange light on outside of room. Non skid footwear in place. Alarms used appropriately. Patient instructed to call and wait for staff before getting up. Rounding done to anticipate needs. Appropriate safety devices used for transfers.

## 2024-08-16 NOTE — CARE COORDINATION
SOCIAL WORK DISCHARGE SUMMARY        DATE OF DISCHARGE:    Saturday, 8-      LOCATION:    Home      [facility] Out Patient Therapy Dept  [address] [place]  [address]  [phone]      Darline Gr  MW  6:00 am   [phone]  [phone]         TIME:   Family    after 10 am        PHARMACY:   [address]        DME:    none      Transportation Question 'no'      IMM:  8-    Referrals made:  Out Pt therapy and HD.    BREN Khalil     Case Management   [phone]    8/16/2024  2:37 PM

## 2024-08-16 NOTE — DIALYSIS
Treatment time: 3 hours  Net UF: 2000 ml     Pre weight: 81 kg  Post weight:79.1 kg  EDW: 79 kg    Access used: R TDC    Access function: Well with  ml/min     Medications or blood products given: Retacrit     Regular outpatient schedule: NAOMI Gr     Summary of response to treatment: Patient tolerated treatment well and without any complications. Patient remained stable throughout entire treatment and upon the exiting the dialysis suite via transport.     Report given to Selma Jackson RN and copy of dialysis treatment record placed in chart, to be scanned into EMR.

## 2024-08-16 NOTE — PLAN OF CARE
Problem: Safety - Adult  Goal: Free from fall injury  8/16/2024 0149 by Silvana Lazo RN  Outcome: Progressing  Note: Fall risk band on patient. Orange light on outside of room. Non skid footwear in place. Alarms used appropriately. Patient instructed to call and wait for staff before getting up. Rounding done to anticipate needs. Appropriate safety devices used for transfers.

## 2024-08-17 VITALS
SYSTOLIC BLOOD PRESSURE: 113 MMHG | TEMPERATURE: 98.2 F | WEIGHT: 174.38 LBS | OXYGEN SATURATION: 98 % | BODY MASS INDEX: 24.97 KG/M2 | HEIGHT: 70 IN | HEART RATE: 87 BPM | DIASTOLIC BLOOD PRESSURE: 66 MMHG | RESPIRATION RATE: 16 BRPM

## 2024-08-17 LAB
ALBUMIN SERPL-MCNC: 3.1 G/DL (ref 3.4–5)
ANION GAP SERPL CALCULATED.3IONS-SCNC: 13 MMOL/L (ref 3–16)
BUN SERPL-MCNC: 25 MG/DL (ref 7–20)
CALCIUM SERPL-MCNC: 8.8 MG/DL (ref 8.3–10.6)
CHLORIDE SERPL-SCNC: 105 MMOL/L (ref 99–110)
CO2 SERPL-SCNC: 24 MMOL/L (ref 21–32)
CREAT SERPL-MCNC: 4.5 MG/DL (ref 0.8–1.3)
DEPRECATED RDW RBC AUTO: 17.2 % (ref 12.4–15.4)
GFR SERPLBLD CREATININE-BSD FMLA CKD-EPI: 13 ML/MIN/{1.73_M2}
GLUCOSE SERPL-MCNC: 107 MG/DL (ref 70–99)
HCT VFR BLD AUTO: 21.3 % (ref 40.5–52.5)
HGB BLD-MCNC: 7.4 G/DL (ref 13.5–17.5)
INR PPP: 1.4 (ref 0.85–1.15)
MCH RBC QN AUTO: 32.6 PG (ref 26–34)
MCHC RBC AUTO-ENTMCNC: 34.9 G/DL (ref 31–36)
MCV RBC AUTO: 93.6 FL (ref 80–100)
PHOSPHATE SERPL-MCNC: 4.3 MG/DL (ref 2.5–4.9)
PLATELET # BLD AUTO: 162 K/UL (ref 135–450)
PMV BLD AUTO: 7.3 FL (ref 5–10.5)
POTASSIUM SERPL-SCNC: 4.3 MMOL/L (ref 3.5–5.1)
PROTHROMBIN TIME: 17.3 SEC (ref 11.9–14.9)
RBC # BLD AUTO: 2.28 M/UL (ref 4.2–5.9)
SODIUM SERPL-SCNC: 142 MMOL/L (ref 136–145)
WBC # BLD AUTO: 3.6 K/UL (ref 4–11)

## 2024-08-17 PROCEDURE — 36415 COLL VENOUS BLD VENIPUNCTURE: CPT

## 2024-08-17 PROCEDURE — 80069 RENAL FUNCTION PANEL: CPT

## 2024-08-17 PROCEDURE — 6370000000 HC RX 637 (ALT 250 FOR IP): Performed by: PHYSICAL MEDICINE & REHABILITATION

## 2024-08-17 PROCEDURE — 6370000000 HC RX 637 (ALT 250 FOR IP): Performed by: INTERNAL MEDICINE

## 2024-08-17 PROCEDURE — 85610 PROTHROMBIN TIME: CPT

## 2024-08-17 PROCEDURE — 6370000000 HC RX 637 (ALT 250 FOR IP)

## 2024-08-17 PROCEDURE — 85027 COMPLETE CBC AUTOMATED: CPT

## 2024-08-17 RX ORDER — WARFARIN SODIUM 5 MG/1
10 TABLET ORAL
Status: DISCONTINUED | OUTPATIENT
Start: 2024-08-17 | End: 2024-08-17 | Stop reason: HOSPADM

## 2024-08-17 RX ADMIN — FUROSEMIDE 20 MG: 20 TABLET ORAL at 08:00

## 2024-08-17 RX ADMIN — LEVOTHYROXINE SODIUM 137 MCG: 0.11 TABLET ORAL at 05:57

## 2024-08-17 RX ADMIN — SENNOSIDES AND DOCUSATE SODIUM 2 TABLET: 50; 8.6 TABLET ORAL at 08:15

## 2024-08-17 RX ADMIN — INSULIN GLARGINE 10 UNITS: 100 INJECTION, SOLUTION SUBCUTANEOUS at 08:01

## 2024-08-17 RX ADMIN — DOCUSATE SODIUM 100 MG: 100 CAPSULE, LIQUID FILLED ORAL at 08:15

## 2024-08-17 RX ADMIN — MIDODRINE HYDROCHLORIDE 10 MG: 10 TABLET ORAL at 08:01

## 2024-08-17 ASSESSMENT — PAIN SCALES - GENERAL: PAINLEVEL_OUTOF10: 0

## 2024-08-17 NOTE — PROGRESS NOTES
Pt discharged to home with son. Pt discharged with documented belongings. Iv removed earlier this am. Prescription Medications given to pt from Retail pharmacy. Discharge paperwork reviewed with pt and pt's son and given to pt's son. Pt transferred by wheelchair to discharge bridge and transferred into pt's son's car. Pt and pt's son verbalized understanding of discharge instructions.

## 2024-08-17 NOTE — PLAN OF CARE
Problem: Discharge Planning  Goal: Discharge to home or other facility with appropriate resources  8/17/2024 0946 by Graciela Agustin RN  Outcome: Adequate for Discharge

## 2024-08-17 NOTE — PROGRESS NOTES
Clinical Pharmacy Note  Warfarin Consult    Morro Kevin is a 73 y.o. male receiving warfarin managed by pharmacy.    Warfarin Indication: AVR  Target INR range: 2-3   Dose prior to admission: 11.25 mg on Wednesday, 7.5 mg remaining days of the week. Managed at  Anticoagulation Clinic.     Current warfarin drug-drug interactions: --    Recent Labs     08/15/24  0542 08/16/24  0518 08/17/24  0611   HGB 7.6* 7.1* 7.4*   HCT 22.1* 21.1* 21.3*   INR 1.13 1.29* 1.40*       Assessment/Plan:    Warfarin held 8/10-8/12 for H/H drop requiring blood transfusion.    Warfarin resumed 8/13.   Patient showing response today after 4 days of 7.5mg dosing. Will give 10mg dose today.   Daily PT/INR until stable within therapeutic range.     Thank you for the consult.  Will continue to follow.    Micahel Redd RPH  8/17/2024 10:24 AM

## 2024-08-17 NOTE — PROGRESS NOTES
Provision of Current Reconciled Medication List to Subsequent Provider at Discharge  [x]No, current reconciled medication list not provided to the subsequent provider.  []Yes, current reconciled medication list provided to the subsequent provider. (**Select route of transmission below**)   [] Via Electronic Health Record   []Via Health Information Exchange Organization  [] Verbal (e.g. in person, telephone, video conferencing)  []Paper-based (e.g. fax, copies, printouts)   []Other Methods (e.g. texting, email, CDs)    Provision of Current Reconciled Medication List to Patient at Discharge  []No, current reconciled medication list not provided to the patient, family and/or caregiver.   []Yes, current reconciled medication list provided to the patient, family and/or caregiver.  (**Select route of transmission below**)   [] Via Electronic Health Record (e.g., electronic access to patient portal)   [] Via Health Information Exchange Organization  [] Verbal (e.g. in person, telephone, video conferencing)  [x]Paper-based (e.g. fax, copies, printouts)   []Other Methods (e.g. texting, email, CDs)    High Risk Drug Classes:  Use and Indication    Is taking: Check if the pt is taking any medications by pharmacological classification, not how it is used, in the following classes  Indication noted: If column 1 is checked, check if there is an indication noted for all meds in the drug class Is taking  (check all that apply) Indication noted (check all that apply)   Antipsychotic [] []   Anticoagulant [x] [x]   Antibiotic [] []   Opioid [] []   Antiplatelet [x] [x]   Hypoglycemic (including insulin) [x] [x]   None of the above []     Special Treatments, Procedures, and Programs    Check all of the following treatments, procedures, and programs that apply at discharge. At Discharge (check all that apply)   Cancer Treatments   A1. Chemotherapy []           A2. IV []           A3. Oral []           A10. Other []   B1. Radiation []

## 2024-08-19 NOTE — DISCHARGE SUMMARY
Physical Medicine & Rehabilitation  Discharge Summary     Patient Identification:  Morro Kevin  : 1950  Admit date: 2024  Discharge date: 2024   Attending provider: Matt Buitrago MD        Primary care provider: Fareed Marino MD     Discharge Diagnoses:   Patient Active Problem List   Diagnosis    Hypothyroid    Diabetes mellitus type 2 in nonobese (HCC)    Kidney stones    Essential hypertension    Vitamin D deficiency    Homocysteinemia    Hypogonadism male    Nonrheumatic aortic valve stenosis    Bicuspid aortic valve    CMC DJD(carpometacarpal degenerative joint disease), localized primary    Colitis    Hyperuricemia    Mixed hyperlipidemia    Benign prostatic hyperplasia    Coronary artery disease involving native coronary artery of native heart without angina pectoris    History of aortic valve replacement    JOSÉ MANUEL on CPAP    Type II or unspecified type diabetes mellitus with renal manifestations, uncontrolled(250.42)    Disturbance in sleep behavior    Mechanical heart valve present    On anticoagulant therapy    Major depressive disorder in full remission, unspecified whether recurrent (HCC)    Homocystinuria (HCC)    Dizziness    Anemia    Coagulation defect (HCC)    Retroperitoneal hemorrhage    Paroxysmal atrial fibrillation (HCC)    Retroperitoneal hematoma    Shortness of breath    Rhabdomyolysis       History of Present Illness/Acute Hospital Course:  73-year-old gentleman with a past medical history significant for prostate cancer, on chemotherapy treatment for it. Patient has past medical history positive for a mechanical heart valve for which he is on Coumadin , he also has history of diabetes, hypertension, thyroiditis, and he came to the hospital with complaints of weakness of his legs going on for the several days. Patient feels that his knees have been feeling more weak, he has been having progressively worsening shortness of breath. He feels that this could be an adverse

## 2024-08-19 NOTE — PLAN OF CARE
[facility]- Outpatient Rehabilitation and Therapy [address], [place] [address] office: [phone] fax: [phone]     Physical Therapy Initial Evaluation Certification      Dear Fareed Marino MD,    We had the pleasure of evaluating the following patient for physical therapy services at [facility].  A summary of our findings can be found in the initial assessment below.  This includes our plan of care.  If you have any questions or concerns regarding these findings, please do not hesitate to contact me at the office phone number listed above.  Thank you for the referral.     Physician Signature:_______________________________Date:__________________  By signing above (or electronic signature), therapist’s plan is approved by physician       Physical Therapy: TREATMENT/PROGRESS NOTE   Patient: Morro Kevin (73 y.o. male)   Examination Date: 2024   :  1950 MRN: [id number]   Visit #: 1/MEDICARE  Insurance Allowable Auth Needed   MEDICARE []Yes    [x]No    Insurance: Payor: MEDICARE / Plan: MEDICARE PART A AND B / Product Type: *No Product type* /   Insurance ID: [id number] - (Medicare)  Secondary Insurance (if applicable): BCBS   Treatment Diagnosis:     ICD-10-CM    1. Decreased functional mobility  R26.89       2. Impaired mobility and ADLs  Z74.09     Z78.9       3. Decreased activity tolerance  R68.89       4. Functional weakness  R53.1       5. Need for home exercise program  Z78.9          Medical Diagnosis:  Non-traumatic rhabdomyolysis [M62.82]   Referring Physician: Jennie Rodriguez MD  PCP: Fareed Marino MD     Plan of care signed (Y/N): NO    Date of Patient follow up with Physician:      Progress Report/POC: EVAL today  Progress note due: 2024 (OR 10 visits /OR AUTH LIMITS, whichever is less)  POC update due: 10/1/24                                            Medical

## 2024-08-20 ENCOUNTER — FOLLOWUP TELEPHONE ENCOUNTER (OUTPATIENT)
Dept: REHABILITATION | Age: 74
End: 2024-08-20

## 2024-08-20 ENCOUNTER — OFFICE VISIT (OUTPATIENT)
Dept: FAMILY MEDICINE CLINIC | Age: 74
End: 2024-08-20

## 2024-08-20 ENCOUNTER — COMMUNITY CARE MANAGEMENT (OUTPATIENT)
Facility: CLINIC | Age: 74
End: 2024-08-20

## 2024-08-20 ENCOUNTER — HOSPITAL ENCOUNTER (OUTPATIENT)
Dept: PHYSICAL THERAPY | Age: 74
Setting detail: THERAPIES SERIES
Discharge: HOME | End: 2024-08-20
Attending: PHYSICAL MEDICINE & REHABILITATION
Payer: MEDICARE

## 2024-08-20 VITALS
HEIGHT: 70 IN | BODY MASS INDEX: 25.05 KG/M2 | DIASTOLIC BLOOD PRESSURE: 80 MMHG | OXYGEN SATURATION: 98 % | HEART RATE: 92 BPM | SYSTOLIC BLOOD PRESSURE: 140 MMHG | WEIGHT: 175 LBS

## 2024-08-20 DIAGNOSIS — Z09 HOSPITAL DISCHARGE FOLLOW-UP: Primary | ICD-10-CM

## 2024-08-20 DIAGNOSIS — Z78.9 IMPAIRED MOBILITY AND ADLS: ICD-10-CM

## 2024-08-20 DIAGNOSIS — M62.82 NON-TRAUMATIC RHABDOMYOLYSIS: ICD-10-CM

## 2024-08-20 DIAGNOSIS — Z78.9 NEED FOR HOME EXERCISE PROGRAM: ICD-10-CM

## 2024-08-20 DIAGNOSIS — N18.4 ANEMIA DUE TO STAGE 4 CHRONIC KIDNEY DISEASE (HCC): ICD-10-CM

## 2024-08-20 DIAGNOSIS — R26.89 DECREASED FUNCTIONAL MOBILITY: Primary | ICD-10-CM

## 2024-08-20 DIAGNOSIS — R68.89 DECREASED ACTIVITY TOLERANCE: ICD-10-CM

## 2024-08-20 DIAGNOSIS — I48.0 PAROXYSMAL ATRIAL FIBRILLATION (HCC): ICD-10-CM

## 2024-08-20 DIAGNOSIS — C61 PROSTATE CANCER (HCC): ICD-10-CM

## 2024-08-20 DIAGNOSIS — R53.1 FUNCTIONAL WEAKNESS: ICD-10-CM

## 2024-08-20 DIAGNOSIS — I10 ESSENTIAL HYPERTENSION: ICD-10-CM

## 2024-08-20 DIAGNOSIS — Z74.09 IMPAIRED MOBILITY AND ADLS: ICD-10-CM

## 2024-08-20 DIAGNOSIS — D63.1 ANEMIA DUE TO STAGE 4 CHRONIC KIDNEY DISEASE (HCC): ICD-10-CM

## 2024-08-20 DIAGNOSIS — R79.83 HOMOCYSTEINEMIA: ICD-10-CM

## 2024-08-20 PROBLEM — D68.9 COAGULATION DEFECT (HCC): Status: RESOLVED | Noted: 2023-07-21 | Resolved: 2024-08-20

## 2024-08-20 PROCEDURE — 97161 PT EVAL LOW COMPLEX 20 MIN: CPT

## 2024-08-20 PROCEDURE — 97110 THERAPEUTIC EXERCISES: CPT

## 2024-08-20 PROCEDURE — 97112 NEUROMUSCULAR REEDUCATION: CPT

## 2024-08-20 NOTE — PATIENT INSTRUCTIONS

## 2024-08-20 NOTE — PROGRESS NOTES
Medication List            Accurate as of August 20, 2024 11:58 AM. If you have any questions, ask your nurse or doctor.                CHANGE how you take these medications      warfarin 7.5 MG tablet  Commonly known as: Jantoven  Take as directed by the anticoagulation clinic. If you are unsure how to take this medication, talk to your nurse or doctor.  Original instructions: TAKE 1 TABLET DAILY OR AS  DIRECTED BY [facility]     COUMADIN SERVICE ([phone])  What changed:   how much to take  how to take this  when to take this  additional instructions            CONTINUE taking these medications      acetaminophen 325 MG tablet  Commonly known as: TYLENOL  Take 2 tablets by mouth every 4 hours as needed for Pain     aspirin 81 MG EC tablet  Take 1 tablet by mouth nightly     furosemide 20 MG tablet  Commonly known as: LASIX  Take 1 tablet by mouth daily     levothyroxine 137 MCG tablet  Commonly known as: SYNTHROID     midodrine 5 MG tablet  Commonly known as: PROAMATINE  Take 2 tablets by mouth 3 times daily (with meals) Do not give after 1800 or within 4 hrs of bedtime. Hold if systolic blood pressure is > 140     mirtazapine 30 MG tablet  Commonly known as: REMERON  TAKE 1 TABLET BY MOUTH EVERY DAY AT NIGHT     polyethylene glycol 17 g packet  Commonly known as: GLYCOLAX  Take 1 packet by mouth daily as needed for Constipation     psyllium husk-aspartame 51.7 % Pack packet  Commonly known as: METAMUCIL FIBER  Take 1 packet by mouth daily     sennosides-docusate sodium 8.6-50 MG tablet  Commonly known as: SENOKOT-S  Take 2 tablets by mouth 2 times daily     tamsulosin 0.4 MG capsule  Commonly known as: FLOMAX     Toujeo SoloStar 300 UNIT/ML concentrated injection pen  Generic drug: insulin glargine (1 unit dial)  Inject 10 Units into the skin nightly                Medications marked \"taking\" at this time  Outpatient Medications Marked as Taking for the 8/20/24 encounter (Office Visit) with Jaimee Smart

## 2024-08-21 ENCOUNTER — ANTI-COAG VISIT (OUTPATIENT)
Dept: PHARMACY | Age: 74
End: 2024-08-21
Payer: MEDICARE

## 2024-08-21 DIAGNOSIS — Z95.2 HISTORY OF AORTIC VALVE REPLACEMENT: Primary | ICD-10-CM

## 2024-08-21 LAB
INTERNATIONAL NORMALIZATION RATIO, POC: 2.5
PROTHROMBIN TIME, POC: 0

## 2024-08-21 PROCEDURE — 85610 PROTHROMBIN TIME: CPT

## 2024-08-21 PROCEDURE — 99211 OFF/OP EST MAY X REQ PHY/QHP: CPT

## 2024-08-21 NOTE — PROGRESS NOTES
Morro Kevin is a 73 y.o. here for warfarin management.  Morro had an INR test today. Results were reviewed and appropriate warfarin management was completed.     Patient verifies current warfarin dosing regimen: Yes     Warfarin medication reviewed and updated on the patient 's home medication list: Yes   All other medications reviewed and updated on the patient 's home medication list: Yes     Lab Results   Component Value Date    INR 2.5 2024    INR 1.40 (H) 2024    INR 1.29 (H) 2024       Anticoagulation Summary  As of 2024      INR goal:  2.0-3.0   TTR:  68.2% (7.7 y)   INR used for dosin.5 (2024)   Warfarin maintenance plan:  11.25 mg (7.5 mg x 1.5) every Wed; 7.5 mg (7.5 mg x 1) all other days   Weekly warfarin total:  56.25 mg   Plan last modified:  Lindsey Varghese RPH (7/3/2024)   Next INR check:  2024   Priority:  Maintenance   Target end date:  Indefinite    Indications    History of aortic valve replacement [Z95.2]                 Anticoagulation Episode Summary       INR check location:  --    Preferred lab:  --    Send INR reminders to:  [facility]    Comments:  EPIC          Anticoagulation Care Providers       Provider Role Specialty Phone number    Ton Ray MD Referring Cardiology [phone]          There were no vitals taken for this visit.    Warfarin assessment / plan:     Patient appears well today.      No changes affecting warfarin therapy were noted.   No acute findings with regards to warfarin therapy.  INR today is within goal range.     Instructed to continue the same weekly warfarin dose.    Patient just discharged from hospital after 2+ weeks with CKD(now on HD).  INR up to 2.5 with it being 1.44 days ago. Will have him take 7.5mg today and resume former dose. Patient says he is back to eating well and no longer on his back.  See again in 2 weeks    Description    Take 7.5mg today then   Continue  Warfarin 7.5

## 2024-08-27 ENCOUNTER — HOSPITAL ENCOUNTER (OUTPATIENT)
Dept: PHYSICAL THERAPY | Age: 74
Setting detail: THERAPIES SERIES
Discharge: HOME | End: 2024-08-27
Attending: PHYSICAL MEDICINE & REHABILITATION
Payer: MEDICARE

## 2024-08-27 PROCEDURE — 97110 THERAPEUTIC EXERCISES: CPT

## 2024-08-27 PROCEDURE — 97112 NEUROMUSCULAR REEDUCATION: CPT

## 2024-08-27 NOTE — FLOWSHEET NOTE
Progression is slowed due to complexities/Impairments listed.  [] Progression has been slowed due to co-morbidities.  [x] Plan just implemented, too soon (<30days) to assess goals progression   [] Goals require adjustment due to lack of progress  [] Patient is not progressing as expected and requires additional follow up with physician  [] Other:     TREATMENT PLAN     Frequency/Duration: Up to 1x/week for 4-6 weeks for the following treatment interventions:    Interventions:  Therapeutic Exercise (40686) including: strength training, ROM, and functional mobility  Therapeutic Activities (13030) including: functional mobility training and education.  Neuromuscular Re-education (13052) activation and proprioception, including postural re-education.    Manual Therapy (33813) as indicated to include: Gr I-IV mobilizations, Soft Tissue Mobilization, and Trigger Point Release  Modalities as needed that may include: Cryotherapy and Thermal Agents  Patient education on joint protection, postural re-education, activity modification, and progression of HEP    Plan: Cont POC- Continue emphasis/focus on exercise progression, improving proper muscle recruitment and activation/motor control patterns, reduce/eliminate soft tissue swelling/inflammation/restriction, and improving soft tissue extensibility. Next visit plan to progress weights and add new exercises       Electronically Signed by Kassidy Wilson PT, DPT     Date: 08/27/2024     Note: Portions of this note have been templated and/or copied from initial evaluation, reassessments and prior notes for documentation efficiency.    Note: If patient does not return for scheduled/recommended follow up visits, this note will serve as a discharge from care along with the most recent update on progress.

## 2024-08-28 ENCOUNTER — COMMUNITY CARE MANAGEMENT (OUTPATIENT)
Facility: CLINIC | Age: 74
End: 2024-08-28

## 2024-08-28 NOTE — PROGRESS NOTES
Patient name and  and address verified, advised recorded line. Patient states he is feeling good.  States he is attending dialysis three days a week. Patient has an appointment with Dr. Salinas on .   BP: 135/72, states his BP drops sometimes at dialysis, but has Midodrine if needed.  Blood Sugar: 92 this morning.  Weight: 172 this morning.  Patient states he is making \"plenty\" of urine.   Dr. Salinas reached out to patient, states he is not taking Lisinopril, states he is taking Rosuvastatin. Patient was told not to take lasix at this time.   Patient states he wears a CGM and knows what is blood sugar is at all times.     · Educated patient on daily weight checks and to report to doctor a gain of 2 lbs overnight or 5 lbs in one week.  · S/S of Hypoglycemia Reviewed with patient S&S of hypoglycemia: Fast heart rate, shaking or trembling, extreme hunger, sweating, confusion/difficulty concentrating, dizziness, slurred speech  · -Reviewed S/S of Rhabdomyolysis: muscle pain in the back, shoulders, thigs or lower back, muscle weakness or trouble moving arms and legs, red or brown urine or decreased urination.  -[facility] number given to patient for any questions or needs, advised it is a 24/ on call line.   -Educated patient to call 911 or go to ED with any emergent symptoms including sudden or severe  SOB, chest pain, weakness on one side of the body.      Advised patient to watch for diabetes stoplight tool in the mail. Patient denies any further questions or concerns, agreeable to TCM follow up.         PLAN:   -Offer Doximity  -Reinforce S/S of Rhabdomyolysis  -Daily Logs: BP, daily weights, blood sugar  -Dialysis- going ok?   -CC to mail Diabetes Stoplight tool  -F/U with Dr. Salinas      CKnight, RN [facility] TCM

## 2024-09-03 RX ORDER — MIRTAZAPINE 30 MG/1
30 TABLET, FILM COATED ORAL NIGHTLY
Qty: 90 TABLET | Refills: 1 | Status: SHIPPED | OUTPATIENT
Start: 2024-09-03

## 2024-09-05 ENCOUNTER — HOSPITAL ENCOUNTER (OUTPATIENT)
Dept: PHYSICAL THERAPY | Age: 74
Setting detail: THERAPIES SERIES
Discharge: HOME | End: 2024-09-05
Attending: PHYSICAL MEDICINE & REHABILITATION
Payer: MEDICARE

## 2024-09-05 PROCEDURE — 97110 THERAPEUTIC EXERCISES: CPT

## 2024-09-05 PROCEDURE — 97112 NEUROMUSCULAR REEDUCATION: CPT

## 2024-09-05 NOTE — PLAN OF CARE
has been slowed due to co-morbidities.  [] Plan just implemented, too soon (<30days) to assess goals progression   [] Goals require adjustment due to lack of progress  [] Patient is not progressing as expected and requires additional follow up with physician  [] Other:     TREATMENT PLAN     Frequency/Duration: Up to 1x/week for 4-6 weeks for the following treatment interventions:    Interventions:  Therapeutic Exercise (44392) including: strength training, ROM, and functional mobility  Therapeutic Activities (17294) including: functional mobility training and education.  Neuromuscular Re-education (45799) activation and proprioception, including postural re-education.    Manual Therapy (53328) as indicated to include: Gr I-IV mobilizations, Soft Tissue Mobilization, and Trigger Point Release  Modalities as needed that may include: Cryotherapy and Thermal Agents  Patient education on joint protection, postural re-education, activity modification, and progression of HEP    Plan: Discharge      Electronically Signed by Kassidy Wilson PT, DPT     Date: 09/05/2024     Note: Portions of this note have been templated and/or copied from initial evaluation, reassessments and prior notes for documentation efficiency.    Note: If patient does not return for scheduled/recommended follow up visits, this note will serve as a discharge from care along with the most recent update on progress.

## 2024-09-09 ENCOUNTER — ANTI-COAG VISIT (OUTPATIENT)
Dept: PHARMACY | Age: 74
End: 2024-09-09
Payer: MEDICARE

## 2024-09-09 DIAGNOSIS — Z95.2 HISTORY OF AORTIC VALVE REPLACEMENT: Primary | ICD-10-CM

## 2024-09-09 LAB
INTERNATIONAL NORMALIZATION RATIO, POC: 2.2
PROTHROMBIN TIME, POC: 0

## 2024-09-09 PROCEDURE — 99211 OFF/OP EST MAY X REQ PHY/QHP: CPT

## 2024-09-09 PROCEDURE — 85610 PROTHROMBIN TIME: CPT

## 2024-10-07 ENCOUNTER — ANTI-COAG VISIT (OUTPATIENT)
Dept: PHARMACY | Age: 74
End: 2024-10-07
Payer: MEDICARE

## 2024-10-07 DIAGNOSIS — Z95.2 HISTORY OF AORTIC VALVE REPLACEMENT: Primary | ICD-10-CM

## 2024-10-07 LAB
INTERNATIONAL NORMALIZATION RATIO, POC: 2.6
PROTHROMBIN TIME, POC: 0

## 2024-10-07 PROCEDURE — 99211 OFF/OP EST MAY X REQ PHY/QHP: CPT | Performed by: INTERNAL MEDICINE

## 2024-10-07 PROCEDURE — 85610 PROTHROMBIN TIME: CPT | Performed by: INTERNAL MEDICINE

## 2024-10-07 NOTE — PROGRESS NOTES
Morro Kevin is a 73 y.o. here for warfarin management.  Morro had an INR test today. Results were reviewed and appropriate warfarin management was completed.     Patient verifies current warfarin dosing regimen: Yes     Warfarin medication reviewed and updated on the patient 's home medication list: Yes   All other medications reviewed and updated on the patient 's home medication list: No: no changes     Lab Results   Component Value Date    INR 2.6 10/07/2024    INR 2.2 2024    INR 2.5 2024       Anticoagulation Summary  As of 10/7/2024      INR goal:  2.0-3.0   TTR:  68.8% (7.8 y)   INR used for dosin.6 (10/7/2024)   Warfarin maintenance plan:  11.25 mg (7.5 mg x 1.5) every Wed; 7.5 mg (7.5 mg x 1) all other days   Weekly warfarin total:  56.25 mg   Plan last modified:  Lindsey Varghese [facility] (7/3/2024)   Next INR check:  2024   Priority:  Maintenance   Target end date:  Indefinite    Indications    History of aortic valve replacement [Z95.2]                 Anticoagulation Episode Summary       INR check location:  --    Preferred lab:  --    Send INR reminders to:  [facility]    Comments:  EPIC          Anticoagulation Care Providers       Provider Role Specialty Phone number    Ton Ray MD Referring Cardiology [phone]          There were no vitals taken for this visit.    Warfarin assessment / plan:     Patient appears well today.      No changes affecting warfarin therapy were noted.   No acute findings with regards to warfarin therapy.  INR today is within goal range.     Instructed to continue the same weekly warfarin dose.    RTC in 4 weeks    Description    Continue  Warfarin 7.5 mg (1 tablet) daily except 11.25 mg on        Call [phone] with signs or symptoms of bleeding or ANY medication changes (including over-the-counter medications or herbal supplements).     Especially if you begin oral steroids and/or antibiotics.    If

## 2024-10-13 NOTE — PROGRESS NOTES
Clinical Pharmacy Note  Warfarin Consult    Laura Cardona is a 81 y.o. male on warfarin bridged with a heparin drip    [facility]  Target INR range: 2-3: Clarified INR goal with Dr. Alyse Polk. Dose prior to admission: 3.75 mg (7.5 mg x 0.5) every Thu; 7.5 mg (7.5 mg x 1) all other days - this will need to be changed given his change in INR goal    Current warfarin drug-drug interactions: -    Recent Labs     06/30/23  0629 07/01/23  0627 07/01/23  1533 07/02/23  0619   HGB 7.3* 6.9* 7.8* 7.4*   HCT 21.8* 20.8* 22.9* 22.3*   INR 1.76* 1.50*  --  1.37*         Assessment/Plan:    No source of bleeding identified, still pending capsule study results. Warfarin resumed 2 days ago  Regular diet resumed  INR down further today as anticipated since warfarin will take 24-48 hours to see effect. Will increase to 7.5 mg tonight per previously established home regimen to help get INR to goal range since he is requiring a heparin bridge. Home dose will likely need to be reduced given the change in his INR goal.   Also had a recent INR of 5 as an outpatient while on that dose. Will need close monitoring because it would be preferable not to overshoot his INR goal given recent issues with bleeding. Schedule a follow up in outpatient anticoagulation clinic prior to discharge. Thank you for the consult.    Bteh FloresD  7/2/2023  7:46 AM Imaging Studies/Medications

## 2024-10-13 NOTE — PROGRESS NOTES
10/13/24 1520   Appointment Info   Signing Clinician's Name / Credentials (PT) Shanice Abreu, PT, DPT   Living Environment   People in Home alone   Current Living Arrangements house   Home Accessibility stairs to enter home;stairs within home   Number of Stairs, Main Entrance 3   Stair Railings, Main Entrance railings safe and in good condition   Number of Stairs, Within Home, Primary greater than 10 stairs   Stair Railings, Within Home, Primary railings safe and in good condition   Transportation Anticipated car, drives self;family or friend will provide   Living Environment Comments Patient will be discharging to her son's home where she will have 2 steps to enter with a railing, all needs will be met on main level.   Self-Care   Usual Activity Tolerance good   Current Activity Tolerance moderate   Regular Exercise Yes   Activity/Exercise Type other (see comments);walking  (chair yoga)   Exercise Amount/Frequency 3-5 times/wk   Equipment Currently Used at Home none   Fall history within last six months yes   Number of times patient has fallen within last six months 2   Activity/Exercise/Self-Care Comment Patient reports independence with mobility and ADLs at baseline. She drives short distances locally. Goes down to her basement to do the laundry.   General Information   Onset of Illness/Injury or Date of Surgery 10/13/24   Referring Physician Susan Baeza MD   Patient/Family Therapy Goals Statement (PT) to go home with son   Pertinent History of Current Problem (include personal factors and/or comorbidities that impact the POC) Patient is 82 YO F who presented to hospital after fall with loss of consciousness, diagnosed with the left facial hematoma. 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   Existing Precautions/Restrictions fall   General  Melvin Palencia is a 73 y.o. here for warfarin management. Olga Rios had an INR test today. Results were reviewed and appropriate warfarin management was completed. This visit was performed as: An in person visit. Protocols were followed with precautions to reduce the spread of COVID-19. Patient verifies current dosing regimen: Yes     Warfarin medication reviewed and updated on the patient 's home medication list: Yes   All other medications reviewed and updated on the patient 's home medication list: No: No changes     Lab Results   Component Value Date    INR 2.70 2021    INR 3.20 2021    INR 1.9 2021       Patient Findings     Positives:  Upcoming invasive procedure    Negatives:  Signs/symptoms of bleeding, Missed doses, Change in medications, Change in diet/appetite, Bruising    Comments:  Upcoming procedure where he will have to hold warfarin. Unsure of the exact date but will be within the next 4 weeks          Anticoagulation Summary  As of 2021    INR goal:  2.5-3.5   TTR:  74.4 % (5.1 y)   INR used for dosin.70 (2021)   Warfarin maintenance plan:  3.75 mg (7.5 mg x 0.5) every Mon; 7.5 mg (7.5 mg x 1) all other days   Weekly warfarin total:  48.75 mg   Plan last modified:  [facility] (2021)   Next INR check:  2022   Priority:  Maintenance   Target end date: Indefinite    Indications    S/P AVR (aortic valve replacement) [Z95.2]             Anticoagulation Episode Summary     INR check location:      Preferred lab:      Send INR reminders to:  [facility]    Comments:  EPIC      Anticoagulation Care Providers     Provider Role Specialty Phone number    Lu Oliva MD Referring Cardiology [phone]          Warfarin assessment / plan:     Appears well. No changes   No acute findings     No change to warfarin therapy today. Patient looks and feels well today. INR was therapeutic at 2.7.  Patient was instructed to Observations Patient in supine, agreeable to PT.   Cognition   Affect/Mental Status (Cognition) WFL   Orientation Status (Cognition) oriented x 4   Follows Commands (Cognition) WFL   Pain Assessment   Patient Currently in Pain Yes, see Vital Sign flowsheet  (mild headache)   Integumentary/Edema   Integumentary/Edema Comments Large hematoma on L eye, mild bruising around R eye   Posture    Posture Forward head position;Protracted shoulders   Range of Motion (ROM)   Range of Motion ROM is WFL   Strength (Manual Muscle Testing)   Strength (Manual Muscle Testing) Deficits observed during functional mobility   Strength Comments Functional weakness during transfers   Bed Mobility   Bed Mobility no deficits identified   Comment, (Bed Mobility) Independent supine <> sit   Transfers   Comment, (Transfers) Sit to stand from EOB with FWW and CGA   Gait/Stairs (Locomotion)   Bailey Level (Gait) contact guard   Assistive Device (Gait) walker, front-wheeled   Distance in Feet (Gait) 15' during eval   Pattern (Gait) step-through   Comment, (Gait/Stairs) Patient requesting to use walker due to feleing weaker, guarded posture when ambulating   Balance   Balance Comments SBA for sitting on EOB; Mild unsteadiness turning with FWW   Sensory Examination   Sensory Perception patient reports no sensory changes   Coordination   Coordination no deficits were identified   Clinical Impression   Criteria for Skilled Therapeutic Intervention Yes, treatment indicated   PT Diagnosis (PT) Impaired mobility   Influenced by the following impairments Generalized weakness, decreased activity tolerance, impaired balance, pain   Functional limitations due to impairments Increased difficulty with functional transfers and ambulation   Clinical Presentation (PT Evaluation Complexity) stable   Clinical Presentation Rationale Medical status, clinical judgement   Clinical Decision Making (Complexity) low complexity   Planned Therapy Interventions (PT)  continue current warfarin regimen. He has a procedure planned where he will be holding warfarin but does not have an exact date yet. He will call us with the date and bridging instructions when they are determined and we will adjust his appointment if needed. Next INR check scheduled for 1/26/222. Description    CONTINUE: Warfarin 7.5 mg daily except 3.75 mg (1/2 tablet) on Monday    Call [phone] with signs or symptoms of bleeding or ANY medication changes (including over-the-counter medications or herbal supplements). If significant bleeding occurs please seek immediate medical attention. Keep the number of servings of vitamin K containing foods (dark green, leafy vegetables) the same each week. Please call if this changes. Limit alcohol intake. Please call if this changes. Immunization History   Administered Date(s) Administered    COVID-19, Moderna, Primary or Immunocompromised, PF, 100mcg/0.5mL 02/18/2021, 03/18/2021    COVID-19, Pfizer, PF, 30mcg/0.3mL 11/23/2021    Influenza 11/01/2015    Influenza Vaccine, unspecified formulation 10/05/2017    Influenza Virus Vaccine 10/04/2011, 10/04/2012    Influenza, High Dose (Fluzone 65 yrs and older) 10/11/2018, 10/08/2021    Influenza, High-dose, Quadv, 65 yrs +, IM (Fluzone) 10/08/2021    Influenza, Quadv, IM, (6 mo and older Fluzone, Flulaval, Fluarix and 3 yrs and older Afluria) 10/25/2016, 10/05/2017    Influenza, Triv, inactivated, subunit, adjuvanted, IM (Fluad 65 yrs and older) 09/25/2019, 09/24/2020    Pneumococcal Conjugate 13-valent ([id number]) 10/11/2018    Pneumococcal Polysaccharide ([id number]) 10/04/2012, 09/24/2020    Tdap (Boostrix, Adacel) 04/03/2015    Zoster Live (Zostavax) 04/03/2015    Zoster Recombinant (Shingrix) 10/20/2020, 01/22/2021             Reviewed AVS with patient / caregiver.       CLINICAL PHARMACY CONSULT: MED RECONCILIATION/REVIEW ADDENDUM    For Pharmacy Admin Tracking Only     Intervention Detail:    Total # of Interventions Recommended: 0   Total # of Interventions Accepted: 0   Time Spent (min): 20 balance training;bed mobility training;gait training;home exercise program;patient/family education;stair training;strengthening;transfer training;progressive activity/exercise;home program guidelines   Risk & Benefits of therapy have been explained evaluation/treatment results reviewed;care plan/treatment goals reviewed;risks/benefits reviewed;current/potential barriers reviewed;participants voiced agreement with care plan;participants included;patient   PT Total Evaluation Time   PT Eval, Low Complexity Minutes (86399) 10   Physical Therapy Goals   PT Frequency 3x/week   PT Predicted Duration/Target Date for Goal Attainment 10/19/24   PT Goals Transfers;Gait;Stairs   PT: Transfers Modified independent;Sit to/from stand;Bed to/from chair;Assistive device  (FWW)   PT: Gait Modified independent;Rolling walker;Greater than 200 feet   PT: Stairs Supervision/stand-by assist;3 stairs;Rail on left   Interventions   Interventions Quick Adds Gait Training   Gait Training   Gait Training Minutes (72178) 12   Symptoms Noted During/After Treatment (Gait Training) fatigue   Treatment Detail/Skilled Intervention Patient requesting to use walker due to feeling weaker than normal. She ambulated ~180' to rehab gym with FWW and CGA progressing to SBA following cues for walker proximity to body. Patient ambulated up/down 3 stairs with cues for improved sequencing, using B hands on 1 railing, CGA with mild unsteadiness descending. Adjusted height of walker for improved fit. Patient ambulated ~180' with FWW back to room with SBA. She returned to supine independently, bed alarm activated.   Distance in Feet ~180' x 2   PT Discharge Planning   PT Plan Progress stairs, walker safety, general strengthening   PT Discharge Recommendation (DC Rec) home with assist;home with home care physical therapy   PT Rationale for DC Rec Patient lives alone and is independent with mobility at baseline. She is currently requiring support of a FWW and SBA  during mobility. She will  be discharging to her son's house where she will only have 3 stairs to enter, all needs met on one level. She would benefit from HHPT for home safety assessment, strengthening and balance training to progress independence with mobility as her own  home is multi-level and she will not be able to use a walker there.   PT Brief overview of current status SBA with FWWGoals of therapy will be to address safe mobility and make recs for d/c to next level of care. Pt and RN will continue to follow all falls risk precautions as documented by RN staff while hospitalized.   PT Equipment Needed at Discharge walker, rolling   Total Session Time   Timed Code Treatment Minutes 12   Total Session Time (sum of timed and untimed services) 22

## 2024-10-14 ENCOUNTER — TELEPHONE (OUTPATIENT)
Dept: CARDIOLOGY CLINIC | Age: 74
End: 2024-10-14

## 2024-10-14 DIAGNOSIS — Z95.2 S/P AVR: Primary | ICD-10-CM

## 2024-10-14 NOTE — TELEPHONE ENCOUNTER
----- Message from Dr. Ton Ray MD sent at 10/13/2024  8:14 PM EDT -----  Regarding: FW: Anticoagulation Referral Renewal Request    ----- Message -----  From: Lindsey Varghese, [facility]  Sent: 10/11/2024   3:55 PM EDT  To: Ton Ray MD  Subject: Anticoagulation Referral Renewal Request         Hi Dr. Ray,    The referral for Mr. Morro Kevin for warfarin management has .  Can you please send a new referral?  You can use [id number] to send electronically or please call [phone] if you need a paper referral.    They were previously referred by you in 2023 and current regulations require a new referral every year.     Thank you,  Lindsey Varghese, PharmD  [facility]  Anticoagulation Service  [phone]

## 2024-10-30 ENCOUNTER — PATIENT MESSAGE (OUTPATIENT)
Dept: FAMILY MEDICINE CLINIC | Age: 74
End: 2024-10-30

## 2024-10-30 NOTE — PROGRESS NOTES
[facility]      Cardiology follow up    Morro Kevin  1950 November 7, 2024    CC: : \"I am doing okay.\"     HPI:  Morro Kevin is a 62 y.o. male with PMH significant for type II diabetes and hypothyroid, vitamin D deficiency.      Today, he is here for follow up. He has been doing okay. He says that his pulse has been in the low. He does not feel bad but it does set thing the alarms off at dialysis. Patient denies exertional chest pain/pressure, dyspnea at rest, TOLENTINO, PND, orthopnea, palpitations, lightheadedness, weight changes, changes in LE edema, and syncope. Patient reports compliance to his medications.     Past Medical History:   Diagnosis Date    Aortic stenosis 09/2013    moderate    Arthritis     Asthma     BRONCHIAL ASTHMA  AS A CHILD    Deviated septum     Diabetes mellitus type II 2000    Dr. Goodson     Hashimoto thyroiditis dx 1984    medical treatment    Homocysteinemia     Hyperlipidemia     Hypertension     Hypertriglyceridemia     Hypogonadism male     topical testosterone     Hypothyroid 12/09/2011    Kidney stone 1990s    Passed on own and also surgical excision     Mechanical heart valve present 2016    Murmur since 2005    Nasal polyps     Osteoarthritis     Right thumb     Prolonged emergence from general anesthesia     Prostate CA (HCC) 10/01/2021    Prostate cancer (HCC)     Sleep apnea 2005    CPAP nightly    Warfarin anticoagulation 2016    Wears glasses      Past Surgical History:   Procedure Laterality Date    AORTIC VALVE REPLACEMENT  09/14/2016    21mm [facility]    CAPSULE ENDOSCOPY N/A 06/30/2023    BOWEL SMALL CAPSULE ENDOSCOPY performed by Nohelia Phan MD at [facility] ENDOSCOPY    COLECTOMY  1996    perforated bowel: colonoscopy    COLONOSCOPY  09/24/2014    x2 normal colonoscopies/ perforated bowel    COLONOSCOPY N/A 06/30/2023    COLONOSCOPY performed by Nohelia Phan MD at [facility] ENDOSCOPY    CYST REMOVAL      hairy nevus left biceps as child

## 2024-11-04 ENCOUNTER — ANTI-COAG VISIT (OUTPATIENT)
Dept: PHARMACY | Age: 74
End: 2024-11-04
Payer: MEDICARE

## 2024-11-04 DIAGNOSIS — Z95.2 HISTORY OF AORTIC VALVE REPLACEMENT: Primary | ICD-10-CM

## 2024-11-04 LAB
INTERNATIONAL NORMALIZATION RATIO, POC: 2.1
PROTHROMBIN TIME, POC: 0

## 2024-11-04 PROCEDURE — 99211 OFF/OP EST MAY X REQ PHY/QHP: CPT

## 2024-11-04 PROCEDURE — 85610 PROTHROMBIN TIME: CPT

## 2024-11-04 NOTE — PROGRESS NOTES
Morro Kevin is a 73 y.o. here for warfarin management.  Morro had an INR test today. Results were reviewed and appropriate warfarin management was completed.     Patient verifies current warfarin dosing regimen: Yes     Warfarin medication reviewed and updated on the patient 's home medication list: Yes   All other medications reviewed and updated on the patient 's home medication list: Yes     Lab Results   Component Value Date    INR 2.1 2024    INR 2.6 10/07/2024    INR 2.2 2024       Anticoagulation Summary  As of 2024      INR goal:  2.0-3.0   TTR:  69.1% (7.9 y)   INR used for dosin.1 (2024)   Warfarin maintenance plan:  11.25 mg (7.5 mg x 1.5) every Wed; 7.5 mg (7.5 mg x 1) all other days   Weekly warfarin total:  56.25 mg   Plan last modified:  Lindsey Varghese RP (7/3/2024)   Next INR check:  2024   Priority:  Maintenance   Target end date:  Indefinite    Indications    History of aortic valve replacement [Z95.2]                 Anticoagulation Episode Summary       INR check location:  --    Preferred lab:  --    Send INR reminders to:  [facility]FF    Comments:  EPIC          Anticoagulation Care Providers       Provider Role Specialty Phone number    Ton Ray MD Referring Cardiology [phone]          There were no vitals taken for this visit.    Warfarin assessment / plan:     Patient appears well today.      No changes affecting warfarin therapy were noted.   No acute findings with regards to warfarin therapy.  INR today is within goal range.     Instructed to continue the same weekly warfarin dose.    See in 4 weeks    Description    Continue Warfarin 7.5 mg (1 tablet) daily except 11.25 mg on        Call [phone] with signs or symptoms of bleeding or ANY medication changes (including over-the-counter medications or herbal supplements).     Especially if you begin oral steroids and/or antibiotics.    If significant

## 2024-11-07 ENCOUNTER — OFFICE VISIT (OUTPATIENT)
Dept: CARDIOLOGY CLINIC | Age: 74
End: 2024-11-07

## 2024-11-07 VITALS
BODY MASS INDEX: 27.83 KG/M2 | OXYGEN SATURATION: 98 % | HEIGHT: 70 IN | HEART RATE: 52 BPM | DIASTOLIC BLOOD PRESSURE: 62 MMHG | SYSTOLIC BLOOD PRESSURE: 132 MMHG | RESPIRATION RATE: 15 BRPM | WEIGHT: 194.4 LBS

## 2024-11-07 DIAGNOSIS — I10 ESSENTIAL HYPERTENSION: ICD-10-CM

## 2024-11-07 DIAGNOSIS — I25.10 CORONARY ARTERY DISEASE INVOLVING NATIVE CORONARY ARTERY OF NATIVE HEART WITHOUT ANGINA PECTORIS: ICD-10-CM

## 2024-11-07 DIAGNOSIS — Z95.2 S/P AVR: ICD-10-CM

## 2024-11-07 DIAGNOSIS — Q23.81 AORTIC STENOSIS DUE TO BICUSPID AORTIC VALVE: Primary | ICD-10-CM

## 2024-11-07 DIAGNOSIS — E78.2 MIXED HYPERLIPIDEMIA: ICD-10-CM

## 2024-11-07 DIAGNOSIS — Q23.0 AORTIC STENOSIS DUE TO BICUSPID AORTIC VALVE: Primary | ICD-10-CM

## 2024-11-07 RX ORDER — LISINOPRIL 5 MG/1
TABLET ORAL
COMMUNITY
Start: 2024-11-02 | End: 2024-11-07

## 2024-11-07 RX ORDER — SEVELAMER CARBONATE 800 MG/1
TABLET, FILM COATED ORAL
COMMUNITY

## 2024-11-07 RX ORDER — METOPROLOL SUCCINATE 50 MG/1
50 TABLET, EXTENDED RELEASE ORAL DAILY
Qty: 90 TABLET | Refills: 3
Start: 2024-11-07

## 2024-11-07 RX ORDER — LINAGLIPTIN 5 MG/1
5 TABLET, FILM COATED ORAL DAILY
COMMUNITY

## 2024-11-07 RX ORDER — MIDODRINE HYDROCHLORIDE 10 MG/1
TABLET ORAL
COMMUNITY
Start: 2024-08-16

## 2024-11-07 RX ORDER — METOPROLOL SUCCINATE 25 MG/1
TABLET, EXTENDED RELEASE ORAL
COMMUNITY
Start: 2024-08-13 | End: 2024-11-07 | Stop reason: ALTCHOICE

## 2024-11-07 RX ORDER — EMPAGLIFLOZIN 10 MG/1
10 TABLET, FILM COATED ORAL DAILY
COMMUNITY
Start: 2024-10-07

## 2024-11-07 RX ORDER — ROSUVASTATIN CALCIUM 40 MG/1
40 TABLET, COATED ORAL DAILY
COMMUNITY
Start: 2024-09-27 | End: 2024-11-07

## 2024-11-07 SDOH — HEALTH STABILITY: PHYSICAL HEALTH: ON AVERAGE, HOW MANY DAYS PER WEEK DO YOU ENGAGE IN MODERATE TO STRENUOUS EXERCISE (LIKE A BRISK WALK)?: 1 DAY

## 2024-11-07 ASSESSMENT — LIFESTYLE VARIABLES
HOW OFTEN DO YOU HAVE A DRINK CONTAINING ALCOHOL: 3
HOW MANY STANDARD DRINKS CONTAINING ALCOHOL DO YOU HAVE ON A TYPICAL DAY: 1 OR 2
HOW MANY STANDARD DRINKS CONTAINING ALCOHOL DO YOU HAVE ON A TYPICAL DAY: 1
HOW OFTEN DO YOU HAVE A DRINK CONTAINING ALCOHOL: 2-4 TIMES A MONTH
HOW OFTEN DO YOU HAVE SIX OR MORE DRINKS ON ONE OCCASION: 1

## 2024-11-07 ASSESSMENT — PATIENT HEALTH QUESTIONNAIRE - PHQ9
9. THOUGHTS THAT YOU WOULD BE BETTER OFF DEAD, OR OF HURTING YOURSELF: NOT AT ALL
3. TROUBLE FALLING OR STAYING ASLEEP: NOT AT ALL
4. FEELING TIRED OR HAVING LITTLE ENERGY: SEVERAL DAYS
10. IF YOU CHECKED OFF ANY PROBLEMS, HOW DIFFICULT HAVE THESE PROBLEMS MADE IT FOR YOU TO DO YOUR WORK, TAKE CARE OF THINGS AT HOME, OR GET ALONG WITH OTHER PEOPLE: NOT DIFFICULT AT ALL
SUM OF ALL RESPONSES TO PHQ QUESTIONS 1-9: 1
SUM OF ALL RESPONSES TO PHQ9 QUESTIONS 1 & 2: 0
6. FEELING BAD ABOUT YOURSELF - OR THAT YOU ARE A FAILURE OR HAVE LET YOURSELF OR YOUR FAMILY DOWN: NOT AT ALL
SUM OF ALL RESPONSES TO PHQ QUESTIONS 1-9: 1
7. TROUBLE CONCENTRATING ON THINGS, SUCH AS READING THE NEWSPAPER OR WATCHING TELEVISION: NOT AT ALL
2. FEELING DOWN, DEPRESSED OR HOPELESS: NOT AT ALL
8. MOVING OR SPEAKING SO SLOWLY THAT OTHER PEOPLE COULD HAVE NOTICED. OR THE OPPOSITE, BEING SO FIGETY OR RESTLESS THAT YOU HAVE BEEN MOVING AROUND A LOT MORE THAN USUAL: NOT AT ALL
5. POOR APPETITE OR OVEREATING: NOT AT ALL
1. LITTLE INTEREST OR PLEASURE IN DOING THINGS: NOT AT ALL

## 2024-11-13 NOTE — PATIENT INSTRUCTIONS
meats and poultry, eggs, beans, peas, nuts, seeds, and soy products.     Limit drinks and foods with added sugar. These include candy, desserts, and soda pop.   Heart-healthy lifestyle    If your doctor recommends it, get more exercise. For many people, walking is a good choice. Or you may want to swim, bike, or do other activities. Bit by bit, increase the time you're active every day. Try for at least 30 minutes on most days of the week.     Try to quit or cut back on using tobacco and other nicotine products. This includes smoking and vaping. If you need help quitting, talk to your doctor about stop-smoking programs and medicines. These can increase your chances of quitting for good. Quitting is one of the most important things you can do to protect your heart. It is never too late to quit. Try to avoid secondhand smoke too.     Stay at a weight that's healthy for you. Talk to your doctor if you need help losing weight.     Try to get 7 to 9 hours of sleep each night.     Limit alcohol to 2 drinks a day for men and 1 drink a day for women. Too much alcohol can cause health problems.     Manage other health problems such as diabetes, high blood pressure, and high cholesterol. If you think you may have a problem with alcohol or drug use, talk to your doctor.   Medicines    Take your medicines exactly as prescribed. Call your doctor if you think you are having a problem with your medicine.     If your doctor recommends aspirin, take the amount directed each day. Make sure you take aspirin and not another kind of pain reliever, such as acetaminophen (Tylenol).   When should you call for help?   Call 911 if you have symptoms of a heart attack. These may include:    Chest pain or pressure, or a strange feeling in the chest.     Sweating.     Shortness of breath.     Pain, pressure, or a strange feeling in the back, neck, jaw, or upper belly or in one or both shoulders or arms.     Lightheadedness or sudden weakness.

## 2024-11-14 ENCOUNTER — OFFICE VISIT (OUTPATIENT)
Dept: FAMILY MEDICINE CLINIC | Age: 74
End: 2024-11-14

## 2024-11-14 VITALS
DIASTOLIC BLOOD PRESSURE: 80 MMHG | BODY MASS INDEX: 28.23 KG/M2 | OXYGEN SATURATION: 98 % | HEIGHT: 70 IN | SYSTOLIC BLOOD PRESSURE: 110 MMHG | WEIGHT: 197.2 LBS | HEART RATE: 63 BPM

## 2024-11-14 DIAGNOSIS — Z00.00 MEDICARE ANNUAL WELLNESS VISIT, SUBSEQUENT: Primary | ICD-10-CM

## 2024-11-14 DIAGNOSIS — F51.01 PRIMARY INSOMNIA: ICD-10-CM

## 2024-11-14 RX ORDER — HYDROXYZINE HYDROCHLORIDE 25 MG/1
12.5-25 TABLET, FILM COATED ORAL NIGHTLY
Qty: 90 TABLET | Refills: 0 | Status: SHIPPED | OUTPATIENT
Start: 2024-11-14 | End: 2025-02-12

## 2024-11-14 NOTE — PROGRESS NOTES
MD as Consulting Physician (Pulmonary Disease)  Ton Ray MD as Consulting Physician (Cardiology)      Reviewed and updated this visit:  Tobacco  Allergies  Meds  Problems  Med Hx  Surg Hx  Soc Hx  Fam Hx           --Luke A Glischinski, APRN - CNP      Please note that this chart was generated using dragon dictation software.  Although every effort was made to ensure the accuracy of this automated transcription, some errors in transcription may have occurred.  
spouse

## 2024-11-21 RX ORDER — METOPROLOL SUCCINATE 50 MG/1
50 TABLET, EXTENDED RELEASE ORAL DAILY
Qty: 90 TABLET | Refills: 3 | OUTPATIENT
Start: 2024-11-21

## 2024-12-02 ENCOUNTER — ANTI-COAG VISIT (OUTPATIENT)
Dept: PHARMACY | Age: 74
End: 2024-12-02
Payer: MEDICARE

## 2024-12-02 DIAGNOSIS — Z95.2 HISTORY OF AORTIC VALVE REPLACEMENT: Primary | ICD-10-CM

## 2024-12-02 LAB
INTERNATIONAL NORMALIZATION RATIO, POC: 2.6
PROTHROMBIN TIME, POC: 0

## 2024-12-02 PROCEDURE — 85610 PROTHROMBIN TIME: CPT

## 2024-12-02 PROCEDURE — 99211 OFF/OP EST MAY X REQ PHY/QHP: CPT

## 2024-12-02 NOTE — PROGRESS NOTES
Morro Kevin is a 73 y.o. here for warfarin management.  Morro had an INR test today. Results were reviewed and appropriate warfarin management was completed.     Patient verifies current warfarin dosing regimen: Yes     Warfarin medication reviewed and updated on the patient 's home medication list: Yes   All other medications reviewed and updated on the patient 's home medication list: yes; metoprolol dose reduced to 50mg daily. He started hydroxyzine for sleep.    Lab Results   Component Value Date    INR 2.6 2024    INR 2.1 2024    INR 2.6 10/07/2024     Patient Findings       Positives:  Change in medications    Negatives:  Signs/symptoms of bleeding, Change in health, Missed doses, Change in diet/appetite, Bruising    Comments:  Metoprolol dose reduced to 50mg daily. Now taking hydroxyzine for sleep          Anticoagulation Summary  As of 2024      INR goal:  2.0-3.0   TTR:  69.4% (7.9 y)   INR used for dosin.6 (2024)   Warfarin maintenance plan:  11.25 mg (7.5 mg x 1.5) every Wed; 7.5 mg (7.5 mg x 1) all other days   Weekly warfarin total:  56.25 mg   Plan last modified:  Lindsey Varghese [facility] (7/3/2024)   Next INR check:  2024   Priority:  Maintenance   Target end date:  Indefinite    Indications    History of aortic valve replacement [Z95.2]                 Anticoagulation Episode Summary       INR check location:  --    Preferred lab:  --    Send INR reminders to:  [facility]AFF    Comments:  EPIC          Anticoagulation Care Providers       Provider Role Specialty Phone number    Ton Ray MD Referring Cardiology [phone]          There were no vitals taken for this visit.    Warfarin assessment / plan:     Patient appears well today. He states that his metoprolol dose was reduced to 50mg daily due to bradycardia. He also started hydroxyzine for sleep. Neither of these should impact the INR dosing.     No changes affecting warfarin

## 2024-12-13 ENCOUNTER — TELEPHONE (OUTPATIENT)
Dept: PHARMACY | Age: 74
End: 2024-12-13

## 2024-12-13 RX ORDER — VIBEGRON 75 MG/1
75 TABLET, FILM COATED ORAL DAILY
COMMUNITY

## 2024-12-13 NOTE — TELEPHONE ENCOUNTER
Outpatient Anticoagulation Clinic Note:        Patient called to let us know that he will be taking the following medication prescribed by Dr. Hope (urology) for the net 28 days:  Gemtesa 75 mg once daily.    I let the patient know that there is no documented interaction between the warfarin and the Gemtesa.  Also confirmed his 12/30/24 appointment with us.  Medication added to his home medication list.      Chantel Morejon, aMrkD, BCPS  12/13/2024  4:02 PM

## 2024-12-30 ENCOUNTER — ANTI-COAG VISIT (OUTPATIENT)
Dept: PHARMACY | Age: 74
End: 2024-12-30
Payer: MEDICARE

## 2024-12-30 DIAGNOSIS — Z95.2 HISTORY OF AORTIC VALVE REPLACEMENT: Primary | ICD-10-CM

## 2024-12-30 LAB
INR BLD: 2.2
PROTIME: NORMAL

## 2024-12-30 PROCEDURE — 99211 OFF/OP EST MAY X REQ PHY/QHP: CPT

## 2024-12-30 PROCEDURE — 85610 PROTHROMBIN TIME: CPT

## 2024-12-30 NOTE — PROGRESS NOTES
2020    RSV, AREXVY, (age 60y+), PF, IM, 0.5mL 11/15/2023    TDaP, ADACEL (age 10y-64y), BOOSTRIX (age 10y+), IM, 0.5mL 2015    Zoster Live (Zostavax) 2015    Zoster Recombinant (Shingrix) 10/20/2020, 2021       Orders Placed This Encounter   Procedures    Protime-INR     This external order was created through the results console.      No orders of the defined types were placed in this encounter.     Reviewed AVS with patient / caregiver.    Billing Points:  BASIC ASSESSMENT UNCOMPLICATED (including but not limited to: vital signs; physical assessment screening; review of secondary markers like lab results, allergies, home readings; instructions on treatment plan and basic education; assessment of medication list with one med change and compliance) - 2 points     For Pharmacy Admin Tracking Only    Intervention Detail: Adherence Monitorin  Total # of Interventions Recommended: 0  Total # of Interventions Accepted: 0  Time Spent (min): 15   Nena Landaverde  PharmD Candidate

## 2025-01-07 NOTE — CARE COORDINATION
CASE MANAGEMENT DISCHARGE SUMMARY: Discharge order noted. No needs identified. Returning to home w/son.     DISCHARGE DATE: 7/7/23    DISCHARGED TO HOME     TRANSPORTATION: Family             TIME: Afternoon     PREFERRED PHARMACY: [address] [place] [address]       Carol CAT, RN  Case Management  [phone]             Electronically signed by Carol Perez RN on 7/7/2023 at 1:58 PM
Per chart review underwent a Esophagogastroduodenoscopy and push enteroscopy today. Hemoclips placed for bleeding lesion in the jejunum. Currently in PACU. Patient to start on a liquid diet. Discharge plan is to return home with his son. No needs identified at this time. Son will provide transportation at discharge. Will continue to monitor for discharge needs.      Cheryle Grandchild BSN RN  Case Management  [phone]    Electronically signed by Cheryle Grandchild, RN on 7/3/2023 at 6:01 PM
Previously Negative (within the last year)

## 2025-01-27 ENCOUNTER — ANTI-COAG VISIT (OUTPATIENT)
Dept: PHARMACY | Age: 75
End: 2025-01-27
Payer: MEDICARE

## 2025-01-27 DIAGNOSIS — Z95.2 HISTORY OF AORTIC VALVE REPLACEMENT: Primary | ICD-10-CM

## 2025-01-27 LAB
INTERNATIONAL NORMALIZATION RATIO, POC: 2.2
PROTHROMBIN TIME, POC: 0

## 2025-01-27 PROCEDURE — 85610 PROTHROMBIN TIME: CPT

## 2025-01-27 PROCEDURE — 99211 OFF/OP EST MAY X REQ PHY/QHP: CPT

## 2025-01-27 NOTE — PROGRESS NOTES
Morro Kevin is a 74 y.o. here for warfarin management.  Morro had an INR test today. Results were reviewed and appropriate warfarin management was completed.     Patient verifies current warfarin dosing regimen: Yes     Warfarin medication reviewed and updated on the patient 's home medication list: Yes   All other medications reviewed and updated on the patient 's home medication list: No: no changes     Lab Results   Component Value Date    INR 2.2 2025    INR 2.20 2024    INR 2.6 2024     Patient Findings       Negatives:  Signs/symptoms of thrombosis, Signs/symptoms of bleeding, Change in medications, Change in diet/appetite, Bruising          Anticoagulation Summary  As of 2025      INR goal:  2.0-3.0   TTR:  69.9% (8.1 y)   INR used for dosin.2 (2025)   Warfarin maintenance plan:  11.25 mg (7.5 mg x 1.5) every Wed; 7.5 mg (7.5 mg x 1) all other days   Weekly warfarin total:  56.25 mg   Plan last modified:  Lindsey Varghese RPH (7/3/2024)   Next INR check:  2025   Priority:  Maintenance   Target end date:  Indefinite    Indications    History of aortic valve replacement [Z95.2]                 Anticoagulation Episode Summary       INR check location:  --    Preferred lab:  --    Send INR reminders to:  [facility]INICAL STAFF    Comments:  EPIC          Anticoagulation Care Providers       Provider Role Specialty Phone number    Ton Ray MD Referring Cardiology [phone]          There were no vitals taken for this visit.    Warfarin assessment / plan:     Patient appears well today.      No changes affecting warfarin therapy were noted.   No acute findings with regards to warfarin therapy.  INR today is within goal range.     Instructed to continue the same weekly warfarin dose.      Description    Continue Warfarin 7.5 mg (1 tablet) daily except 11.25 mg on      Call [phone] with signs or symptoms of bleeding or ANY medication

## 2025-02-03 RX ORDER — WARFARIN SODIUM 7.5 MG/1
TABLET ORAL
Qty: 90 TABLET | Refills: 2 | Status: SHIPPED | OUTPATIENT
Start: 2025-02-03

## 2025-02-09 DIAGNOSIS — F51.01 PRIMARY INSOMNIA: ICD-10-CM

## 2025-02-10 RX ORDER — HYDROXYZINE HYDROCHLORIDE 25 MG/1
12.5-25 TABLET, FILM COATED ORAL NIGHTLY
Qty: 90 TABLET | Refills: 2 | Status: SHIPPED | OUTPATIENT
Start: 2025-02-10 | End: 2025-11-07

## 2025-02-24 ENCOUNTER — ANTI-COAG VISIT (OUTPATIENT)
Dept: PHARMACY | Age: 75
End: 2025-02-24
Payer: MEDICARE

## 2025-02-24 DIAGNOSIS — Z95.2 HISTORY OF AORTIC VALVE REPLACEMENT: Primary | ICD-10-CM

## 2025-02-24 LAB
INTERNATIONAL NORMALIZATION RATIO, POC: 1.6
PROTHROMBIN TIME, POC: 0

## 2025-02-24 PROCEDURE — 85610 PROTHROMBIN TIME: CPT

## 2025-02-24 PROCEDURE — 99211 OFF/OP EST MAY X REQ PHY/QHP: CPT

## 2025-02-27 RX ORDER — MIRTAZAPINE 30 MG/1
30 TABLET, FILM COATED ORAL NIGHTLY
Qty: 90 TABLET | Refills: 1 | Status: ON HOLD | OUTPATIENT
Start: 2025-02-27

## 2025-03-02 ENCOUNTER — APPOINTMENT (OUTPATIENT)
Dept: CT IMAGING | Age: 75
DRG: 391 | End: 2025-03-02
Payer: MEDICARE

## 2025-03-02 ENCOUNTER — HOSPITAL ENCOUNTER (INPATIENT)
Age: 75
LOS: 3 days | Discharge: HOME | DRG: 391 | End: 2025-03-05
Attending: INTERNAL MEDICINE | Admitting: STUDENT IN AN ORGANIZED HEALTH CARE EDUCATION/TRAINING PROGRAM
Payer: MEDICARE

## 2025-03-02 ENCOUNTER — APPOINTMENT (OUTPATIENT)
Dept: GENERAL RADIOLOGY | Age: 75
DRG: 391 | End: 2025-03-02
Payer: MEDICARE

## 2025-03-02 DIAGNOSIS — Z79.01 ANTICOAGULATED ON WARFARIN: ICD-10-CM

## 2025-03-02 DIAGNOSIS — A41.9 SEPSIS WITHOUT ACUTE ORGAN DYSFUNCTION, DUE TO UNSPECIFIED ORGANISM (HCC): ICD-10-CM

## 2025-03-02 DIAGNOSIS — D63.1 ANEMIA DUE TO STAGE 4 CHRONIC KIDNEY DISEASE (HCC): ICD-10-CM

## 2025-03-02 DIAGNOSIS — R10.31 ABDOMINAL PAIN, RIGHT LOWER QUADRANT: Primary | ICD-10-CM

## 2025-03-02 DIAGNOSIS — N18.9 CHRONIC KIDNEY DISEASE, UNSPECIFIED CKD STAGE: ICD-10-CM

## 2025-03-02 DIAGNOSIS — N18.4 ANEMIA DUE TO STAGE 4 CHRONIC KIDNEY DISEASE (HCC): ICD-10-CM

## 2025-03-02 PROBLEM — R79.1 SUBTHERAPEUTIC INTERNATIONAL NORMALIZED RATIO (INR): Status: ACTIVE | Noted: 2025-03-02

## 2025-03-02 PROBLEM — R10.30 LOWER ABDOMINAL PAIN: Status: ACTIVE | Noted: 2025-03-02

## 2025-03-02 PROBLEM — R65.20 SEVERE SEPSIS (HCC): Status: ACTIVE | Noted: 2025-03-02

## 2025-03-02 PROBLEM — K59.00 CONSTIPATION: Status: ACTIVE | Noted: 2025-03-02

## 2025-03-02 PROBLEM — E11.65 DIABETES MELLITUS WITH HYPERGLYCEMIA (HCC): Status: ACTIVE | Noted: 2025-03-02

## 2025-03-02 LAB
ALBUMIN SERPL-MCNC: 4.4 G/DL (ref 3.4–5)
ALBUMIN/GLOB SERPL: 1.6 {RATIO} (ref 1.1–2.2)
ALP SERPL-CCNC: 91 U/L (ref 40–129)
ALT SERPL-CCNC: 29 U/L (ref 10–40)
ANION GAP SERPL CALCULATED.3IONS-SCNC: 18 MMOL/L (ref 3–16)
AST SERPL-CCNC: 28 U/L (ref 15–37)
BACTERIA URNS QL MICRO: NORMAL /HPF
BASOPHILS # BLD: 0 K/UL (ref 0–0.2)
BASOPHILS NFR BLD: 0.2 %
BILIRUB SERPL-MCNC: 0.3 MG/DL (ref 0–1)
BILIRUB UR QL STRIP.AUTO: NEGATIVE
BUN SERPL-MCNC: 50 MG/DL (ref 7–20)
CALCIUM SERPL-MCNC: 10.2 MG/DL (ref 8.3–10.6)
CHLORIDE SERPL-SCNC: 106 MMOL/L (ref 99–110)
CLARITY UR: CLEAR
CO2 SERPL-SCNC: 17 MMOL/L (ref 21–32)
COLOR UR: YELLOW
CREAT SERPL-MCNC: 2.7 MG/DL (ref 0.8–1.3)
DEPRECATED RDW RBC AUTO: 19 % (ref 12.4–15.4)
EOSINOPHIL # BLD: 0 K/UL (ref 0–0.6)
EOSINOPHIL NFR BLD: 0.1 %
EPI CELLS #/AREA URNS AUTO: 0 /HPF (ref 0–5)
FLUAV + FLUBV AG NOSE IA.RAPID: NOT DETECTED
FLUAV + FLUBV AG NOSE IA.RAPID: NOT DETECTED
GFR SERPLBLD CREATININE-BSD FMLA CKD-EPI: 24 ML/MIN/{1.73_M2}
GLUCOSE BLD-MCNC: 226 MG/DL (ref 70–99)
GLUCOSE SERPL-MCNC: 236 MG/DL (ref 70–99)
GLUCOSE UR STRIP.AUTO-MCNC: >=1000 MG/DL
HCT VFR BLD AUTO: 26.9 % (ref 40.5–52.5)
HGB BLD-MCNC: 8.9 G/DL (ref 13.5–17.5)
HGB UR QL STRIP.AUTO: ABNORMAL
HYALINE CASTS #/AREA URNS AUTO: 2 /LPF (ref 0–8)
INR PPP: 1.8 (ref 0.85–1.15)
KETONES UR STRIP.AUTO-MCNC: NEGATIVE MG/DL
LACTATE BLDV-SCNC: 2.4 MMOL/L (ref 0.4–1.9)
LACTATE BLDV-SCNC: 3.7 MMOL/L (ref 0.4–2)
LEUKOCYTE ESTERASE UR QL STRIP.AUTO: NEGATIVE
LIPASE SERPL-CCNC: 56 U/L (ref 13–60)
LYMPHOCYTES # BLD: 1.1 K/UL (ref 1–5.1)
LYMPHOCYTES NFR BLD: 6.1 %
MCH RBC QN AUTO: 30.1 PG (ref 26–34)
MCHC RBC AUTO-ENTMCNC: 33.3 G/DL (ref 31–36)
MCV RBC AUTO: 90.4 FL (ref 80–100)
MONOCYTES # BLD: 0.8 K/UL (ref 0–1.3)
MONOCYTES NFR BLD: 4.1 %
NEUTROPHILS # BLD: 16.3 K/UL (ref 1.7–7.7)
NEUTROPHILS NFR BLD: 89.5 %
NITRITE UR QL STRIP.AUTO: NEGATIVE
NT-PROBNP SERPL-MCNC: 335 PG/ML (ref 0–124)
PERFORMED ON: ABNORMAL
PH UR STRIP.AUTO: 5 [PH] (ref 5–8)
PLATELET # BLD AUTO: 223 K/UL (ref 135–450)
PMV BLD AUTO: 8.1 FL (ref 5–10.5)
POTASSIUM SERPL-SCNC: 4 MMOL/L (ref 3.5–5.1)
PROT SERPL-MCNC: 7.2 G/DL (ref 6.4–8.2)
PROT UR STRIP.AUTO-MCNC: ABNORMAL MG/DL
PROTHROMBIN TIME: 21 SEC (ref 11.9–14.9)
RBC # BLD AUTO: 2.97 M/UL (ref 4.2–5.9)
RBC CLUMPS #/AREA URNS AUTO: 0 /HPF (ref 0–4)
SARS-COV-2 RDRP RESP QL NAA+PROBE: NOT DETECTED
SODIUM SERPL-SCNC: 141 MMOL/L (ref 136–145)
SP GR UR STRIP.AUTO: 1.02 (ref 1–1.03)
TROPONIN, HIGH SENSITIVITY: 53 NG/L (ref 0–22)
TROPONIN, HIGH SENSITIVITY: 53 NG/L (ref 0–22)
UA COMPLETE W REFLEX CULTURE PNL UR: ABNORMAL
UA DIPSTICK W REFLEX MICRO PNL UR: YES
URN SPEC COLLECT METH UR: ABNORMAL
UROBILINOGEN UR STRIP-ACNC: 0.2 E.U./DL
WBC # BLD AUTO: 18.2 K/UL (ref 4–11)
WBC #/AREA URNS AUTO: 0 /HPF (ref 0–5)

## 2025-03-02 PROCEDURE — 81001 URINALYSIS AUTO W/SCOPE: CPT

## 2025-03-02 PROCEDURE — 80053 COMPREHEN METABOLIC PANEL: CPT

## 2025-03-02 PROCEDURE — 36415 COLL VENOUS BLD VENIPUNCTURE: CPT

## 2025-03-02 PROCEDURE — 83605 ASSAY OF LACTIC ACID: CPT

## 2025-03-02 PROCEDURE — 2500000003 HC RX 250 WO HCPCS: Performed by: NURSE PRACTITIONER

## 2025-03-02 PROCEDURE — 2580000003 HC RX 258: Performed by: NURSE PRACTITIONER

## 2025-03-02 PROCEDURE — 96375 TX/PRO/DX INJ NEW DRUG ADDON: CPT

## 2025-03-02 PROCEDURE — 85025 COMPLETE CBC W/AUTO DIFF WBC: CPT

## 2025-03-02 PROCEDURE — 1200000000 HC SEMI PRIVATE

## 2025-03-02 PROCEDURE — 6370000000 HC RX 637 (ALT 250 FOR IP): Performed by: NURSE PRACTITIONER

## 2025-03-02 PROCEDURE — 82270 OCCULT BLOOD FECES: CPT

## 2025-03-02 PROCEDURE — 96374 THER/PROPH/DIAG INJ IV PUSH: CPT

## 2025-03-02 PROCEDURE — 74176 CT ABD & PELVIS W/O CONTRAST: CPT

## 2025-03-02 PROCEDURE — 99285 EMERGENCY DEPT VISIT HI MDM: CPT

## 2025-03-02 PROCEDURE — 87502 INFLUENZA DNA AMP PROBE: CPT

## 2025-03-02 PROCEDURE — 6360000002 HC RX W HCPCS: Performed by: NURSE PRACTITIONER

## 2025-03-02 PROCEDURE — 93005 ELECTROCARDIOGRAM TRACING: CPT | Performed by: PHYSICIAN ASSISTANT

## 2025-03-02 PROCEDURE — 84484 ASSAY OF TROPONIN QUANT: CPT

## 2025-03-02 PROCEDURE — 83690 ASSAY OF LIPASE: CPT

## 2025-03-02 PROCEDURE — 6360000002 HC RX W HCPCS: Performed by: PHYSICIAN ASSISTANT

## 2025-03-02 PROCEDURE — 85610 PROTHROMBIN TIME: CPT

## 2025-03-02 PROCEDURE — 6370000000 HC RX 637 (ALT 250 FOR IP): Performed by: INTERNAL MEDICINE

## 2025-03-02 PROCEDURE — 87635 SARS-COV-2 COVID-19 AMP PRB: CPT

## 2025-03-02 PROCEDURE — 83880 ASSAY OF NATRIURETIC PEPTIDE: CPT

## 2025-03-02 PROCEDURE — 2580000003 HC RX 258: Performed by: PHYSICIAN ASSISTANT

## 2025-03-02 PROCEDURE — 71045 X-RAY EXAM CHEST 1 VIEW: CPT

## 2025-03-02 RX ORDER — ENOXAPARIN SODIUM 100 MG/ML
30 INJECTION SUBCUTANEOUS DAILY
Status: DISCONTINUED | OUTPATIENT
Start: 2025-03-03 | End: 2025-03-02

## 2025-03-02 RX ORDER — SODIUM CHLORIDE 9 MG/ML
INJECTION, SOLUTION INTRAVENOUS PRN
Status: DISCONTINUED | OUTPATIENT
Start: 2025-03-02 | End: 2025-03-05 | Stop reason: HOSPADM

## 2025-03-02 RX ORDER — SEVELAMER CARBONATE 800 MG/1
800 TABLET, FILM COATED ORAL
Status: DISCONTINUED | OUTPATIENT
Start: 2025-03-03 | End: 2025-03-05 | Stop reason: HOSPADM

## 2025-03-02 RX ORDER — SODIUM CHLORIDE 0.9 % (FLUSH) 0.9 %
5-40 SYRINGE (ML) INJECTION PRN
Status: DISCONTINUED | OUTPATIENT
Start: 2025-03-02 | End: 2025-03-05 | Stop reason: HOSPADM

## 2025-03-02 RX ORDER — METRONIDAZOLE 500 MG/100ML
500 INJECTION, SOLUTION INTRAVENOUS ONCE
Status: COMPLETED | OUTPATIENT
Start: 2025-03-02 | End: 2025-03-02

## 2025-03-02 RX ORDER — MORPHINE SULFATE 4 MG/ML
4 INJECTION, SOLUTION INTRAMUSCULAR; INTRAVENOUS ONCE
Status: COMPLETED | OUTPATIENT
Start: 2025-03-02 | End: 2025-03-02

## 2025-03-02 RX ORDER — ACETAMINOPHEN 650 MG/1
650 SUPPOSITORY RECTAL EVERY 6 HOURS PRN
Status: DISCONTINUED | OUTPATIENT
Start: 2025-03-02 | End: 2025-03-05 | Stop reason: HOSPADM

## 2025-03-02 RX ORDER — INSULIN LISPRO 100 [IU]/ML
0-4 INJECTION, SOLUTION INTRAVENOUS; SUBCUTANEOUS
Status: DISCONTINUED | OUTPATIENT
Start: 2025-03-02 | End: 2025-03-05 | Stop reason: HOSPADM

## 2025-03-02 RX ORDER — ACETAMINOPHEN 325 MG/1
650 TABLET ORAL EVERY 6 HOURS PRN
Status: DISCONTINUED | OUTPATIENT
Start: 2025-03-02 | End: 2025-03-05 | Stop reason: HOSPADM

## 2025-03-02 RX ORDER — MORPHINE SULFATE 4 MG/ML
4 INJECTION, SOLUTION INTRAMUSCULAR; INTRAVENOUS EVERY 4 HOURS PRN
Status: DISCONTINUED | OUTPATIENT
Start: 2025-03-02 | End: 2025-03-05 | Stop reason: HOSPADM

## 2025-03-02 RX ORDER — SEMAGLUTIDE 1.34 MG/ML
INJECTION, SOLUTION SUBCUTANEOUS
COMMUNITY

## 2025-03-02 RX ORDER — TAMSULOSIN HYDROCHLORIDE 0.4 MG/1
0.4 CAPSULE ORAL NIGHTLY
Status: DISCONTINUED | OUTPATIENT
Start: 2025-03-02 | End: 2025-03-05 | Stop reason: HOSPADM

## 2025-03-02 RX ORDER — MIRTAZAPINE 30 MG/1
30 TABLET, FILM COATED ORAL NIGHTLY
Status: DISCONTINUED | OUTPATIENT
Start: 2025-03-02 | End: 2025-03-05 | Stop reason: HOSPADM

## 2025-03-02 RX ORDER — WARFARIN SODIUM 7.5 MG/1
7.5 TABLET ORAL
Status: COMPLETED | OUTPATIENT
Start: 2025-03-02 | End: 2025-03-02

## 2025-03-02 RX ORDER — PANTOPRAZOLE SODIUM 40 MG/10ML
40 INJECTION, POWDER, LYOPHILIZED, FOR SOLUTION INTRAVENOUS DAILY
Status: DISCONTINUED | OUTPATIENT
Start: 2025-03-03 | End: 2025-03-05 | Stop reason: HOSPADM

## 2025-03-02 RX ORDER — INSULIN GLARGINE 100 [IU]/ML
22 INJECTION, SOLUTION SUBCUTANEOUS NIGHTLY
Status: DISCONTINUED | OUTPATIENT
Start: 2025-03-03 | End: 2025-03-02

## 2025-03-02 RX ORDER — SENNA AND DOCUSATE SODIUM 50; 8.6 MG/1; MG/1
2 TABLET, FILM COATED ORAL 2 TIMES DAILY
Status: DISCONTINUED | OUTPATIENT
Start: 2025-03-03 | End: 2025-03-05 | Stop reason: HOSPADM

## 2025-03-02 RX ORDER — DEXTROSE MONOHYDRATE 100 MG/ML
INJECTION, SOLUTION INTRAVENOUS CONTINUOUS PRN
Status: DISCONTINUED | OUTPATIENT
Start: 2025-03-02 | End: 2025-03-05 | Stop reason: HOSPADM

## 2025-03-02 RX ORDER — ROSUVASTATIN CALCIUM 40 MG/1
40 TABLET, COATED ORAL EVERY EVENING
COMMUNITY

## 2025-03-02 RX ORDER — LEVOTHYROXINE SODIUM 112 UG/1
112 TABLET ORAL DAILY
Status: DISCONTINUED | OUTPATIENT
Start: 2025-03-03 | End: 2025-03-05 | Stop reason: HOSPADM

## 2025-03-02 RX ORDER — ONDANSETRON 2 MG/ML
4 INJECTION INTRAMUSCULAR; INTRAVENOUS ONCE
Status: COMPLETED | OUTPATIENT
Start: 2025-03-02 | End: 2025-03-02

## 2025-03-02 RX ORDER — FUROSEMIDE 40 MG/1
40 TABLET ORAL DAILY PRN
COMMUNITY

## 2025-03-02 RX ORDER — ALLOPURINOL 100 MG/1
100 TABLET ORAL DAILY
Status: ON HOLD | COMMUNITY
End: 2025-03-02

## 2025-03-02 RX ORDER — PANTOPRAZOLE SODIUM 40 MG/10ML
40 INJECTION, POWDER, LYOPHILIZED, FOR SOLUTION INTRAVENOUS ONCE
Status: COMPLETED | OUTPATIENT
Start: 2025-03-02 | End: 2025-03-02

## 2025-03-02 RX ORDER — ASPIRIN 81 MG/1
81 TABLET ORAL NIGHTLY
Status: DISCONTINUED | OUTPATIENT
Start: 2025-03-02 | End: 2025-03-05 | Stop reason: HOSPADM

## 2025-03-02 RX ORDER — SODIUM CHLORIDE 0.9 % (FLUSH) 0.9 %
5-40 SYRINGE (ML) INJECTION EVERY 12 HOURS SCHEDULED
Status: DISCONTINUED | OUTPATIENT
Start: 2025-03-02 | End: 2025-03-05 | Stop reason: HOSPADM

## 2025-03-02 RX ORDER — LEVOTHYROXINE SODIUM 112 UG/1
112 TABLET ORAL DAILY
COMMUNITY

## 2025-03-02 RX ORDER — INSULIN GLARGINE 100 [IU]/ML
22 INJECTION, SOLUTION SUBCUTANEOUS EVERY MORNING
Status: DISCONTINUED | OUTPATIENT
Start: 2025-03-03 | End: 2025-03-05 | Stop reason: HOSPADM

## 2025-03-02 RX ORDER — LISINOPRIL 5 MG/1
5 TABLET ORAL DAILY
Status: ON HOLD | COMMUNITY
End: 2025-03-02

## 2025-03-02 RX ORDER — METRONIDAZOLE 500 MG/100ML
500 INJECTION, SOLUTION INTRAVENOUS EVERY 8 HOURS
Status: DISCONTINUED | OUTPATIENT
Start: 2025-03-03 | End: 2025-03-05 | Stop reason: HOSPADM

## 2025-03-02 RX ORDER — 0.9 % SODIUM CHLORIDE 0.9 %
1000 INTRAVENOUS SOLUTION INTRAVENOUS ONCE
Status: COMPLETED | OUTPATIENT
Start: 2025-03-02 | End: 2025-03-02

## 2025-03-02 RX ORDER — METOPROLOL SUCCINATE 50 MG/1
50 TABLET, EXTENDED RELEASE ORAL DAILY
Status: DISCONTINUED | OUTPATIENT
Start: 2025-03-03 | End: 2025-03-05 | Stop reason: HOSPADM

## 2025-03-02 RX ORDER — SODIUM CHLORIDE, SODIUM LACTATE, POTASSIUM CHLORIDE, CALCIUM CHLORIDE 600; 310; 30; 20 MG/100ML; MG/100ML; MG/100ML; MG/100ML
INJECTION, SOLUTION INTRAVENOUS ONCE
Status: COMPLETED | OUTPATIENT
Start: 2025-03-02 | End: 2025-03-02

## 2025-03-02 RX ORDER — GLUCAGON 1 MG/ML
1 KIT INJECTION PRN
Status: DISCONTINUED | OUTPATIENT
Start: 2025-03-02 | End: 2025-03-05 | Stop reason: HOSPADM

## 2025-03-02 RX ADMIN — CEFEPIME 2000 MG: 2 INJECTION, POWDER, FOR SOLUTION INTRAVENOUS at 19:19

## 2025-03-02 RX ADMIN — SODIUM BICARBONATE 50 MEQ: 84 INJECTION INTRAVENOUS at 20:17

## 2025-03-02 RX ADMIN — SODIUM CHLORIDE 1000 ML: 9 INJECTION, SOLUTION INTRAVENOUS at 19:19

## 2025-03-02 RX ADMIN — TAMSULOSIN HYDROCHLORIDE 0.4 MG: 0.4 CAPSULE ORAL at 23:46

## 2025-03-02 RX ADMIN — MIRTAZAPINE 30 MG: 30 TABLET, FILM COATED ORAL at 23:46

## 2025-03-02 RX ADMIN — SODIUM CHLORIDE, POTASSIUM CHLORIDE, SODIUM LACTATE AND CALCIUM CHLORIDE: 600; 310; 30; 20 INJECTION, SOLUTION INTRAVENOUS at 23:41

## 2025-03-02 RX ADMIN — MORPHINE SULFATE 4 MG: 4 INJECTION, SOLUTION INTRAMUSCULAR; INTRAVENOUS at 23:33

## 2025-03-02 RX ADMIN — METRONIDAZOLE 500 MG: 500 INJECTION, SOLUTION INTRAVENOUS at 19:53

## 2025-03-02 RX ADMIN — PANTOPRAZOLE SODIUM 40 MG: 40 INJECTION, POWDER, FOR SOLUTION INTRAVENOUS at 23:33

## 2025-03-02 RX ADMIN — SODIUM CHLORIDE, PRESERVATIVE FREE 20 MG: 5 INJECTION INTRAVENOUS at 23:33

## 2025-03-02 RX ADMIN — ASPIRIN 81 MG: 81 TABLET, COATED ORAL at 23:46

## 2025-03-02 RX ADMIN — MORPHINE SULFATE 4 MG: 4 INJECTION, SOLUTION INTRAMUSCULAR; INTRAVENOUS at 17:34

## 2025-03-02 RX ADMIN — WARFARIN SODIUM 7.5 MG: 7.5 TABLET ORAL at 23:34

## 2025-03-02 RX ADMIN — ONDANSETRON 4 MG: 2 INJECTION, SOLUTION INTRAMUSCULAR; INTRAVENOUS at 17:34

## 2025-03-02 RX ADMIN — SODIUM CHLORIDE, PRESERVATIVE FREE 10 ML: 5 INJECTION INTRAVENOUS at 23:43

## 2025-03-02 ASSESSMENT — PAIN SCALES - GENERAL
PAINLEVEL_OUTOF10: 7
PAINLEVEL_OUTOF10: 7

## 2025-03-02 ASSESSMENT — PAIN DESCRIPTION - LOCATION: LOCATION: ABDOMEN

## 2025-03-02 ASSESSMENT — PAIN - FUNCTIONAL ASSESSMENT: PAIN_FUNCTIONAL_ASSESSMENT: ACTIVITIES ARE NOT PREVENTED

## 2025-03-02 ASSESSMENT — PAIN DESCRIPTION - DESCRIPTORS: DESCRIPTORS: CRAMPING

## 2025-03-02 NOTE — ED PROVIDER NOTES
I did not perform an evaluation of Morro S Igel but was asked to review his EKG.     EKG interpreted by myself.   Normal sinus rhythm with a rate of 100, normal axis, , , QTc 469, no ST elevations, no ST depressions, slight ST depression V2, age-indeterminate inferior infarct, right bundle branch block present, when compared to EKG from 11/7/2024 rhythm change from sinus bradycardia to normal sinus rhythm     Chandan Sims MD  03/02/25 5207

## 2025-03-03 ENCOUNTER — ANESTHESIA (OUTPATIENT)
Dept: ENDOSCOPY | Age: 75
DRG: 391 | End: 2025-03-03
Payer: MEDICARE

## 2025-03-03 ENCOUNTER — ANESTHESIA EVENT (OUTPATIENT)
Dept: ENDOSCOPY | Age: 75
DRG: 391 | End: 2025-03-03
Payer: MEDICARE

## 2025-03-03 LAB
ALBUMIN SERPL-MCNC: 3.6 G/DL (ref 3.4–5)
ALBUMIN/GLOB SERPL: 1.5 {RATIO} (ref 1.1–2.2)
ALP SERPL-CCNC: 56 U/L (ref 40–129)
ALT SERPL-CCNC: 21 U/L (ref 10–40)
ANION GAP SERPL CALCULATED.3IONS-SCNC: 11 MMOL/L (ref 3–16)
AST SERPL-CCNC: 18 U/L (ref 15–37)
BASOPHILS # BLD: 0 K/UL (ref 0–0.2)
BASOPHILS NFR BLD: 0.1 %
BILIRUB SERPL-MCNC: <0.2 MG/DL (ref 0–1)
BUN SERPL-MCNC: 44 MG/DL (ref 7–20)
CALCIUM SERPL-MCNC: 9.2 MG/DL (ref 8.3–10.6)
CHLORIDE SERPL-SCNC: 113 MMOL/L (ref 99–110)
CO2 SERPL-SCNC: 19 MMOL/L (ref 21–32)
CREAT SERPL-MCNC: 2.5 MG/DL (ref 0.8–1.3)
DEPRECATED RDW RBC AUTO: 19.1 % (ref 12.4–15.4)
EKG ATRIAL RATE: 100 BPM
EKG DIAGNOSIS: NORMAL
EKG P AXIS: -16 DEGREES
EKG P-R INTERVAL: 134 MS
EKG Q-T INTERVAL: 364 MS
EKG QRS DURATION: 108 MS
EKG QTC CALCULATION (BAZETT): 469 MS
EKG R AXIS: -29 DEGREES
EKG T AXIS: 21 DEGREES
EKG VENTRICULAR RATE: 100 BPM
EOSINOPHIL # BLD: 0 K/UL (ref 0–0.6)
EOSINOPHIL NFR BLD: 0 %
EST. AVERAGE GLUCOSE BLD GHB EST-MCNC: 177.2 MG/DL
FERRITIN SERPL IA-MCNC: 644 NG/ML (ref 30–400)
FOLATE SERPL-MCNC: >40 NG/ML (ref 4.78–24.2)
GFR SERPLBLD CREATININE-BSD FMLA CKD-EPI: 27 ML/MIN/{1.73_M2}
GLUCOSE BLD-MCNC: 180 MG/DL (ref 70–99)
GLUCOSE BLD-MCNC: 182 MG/DL (ref 70–99)
GLUCOSE BLD-MCNC: 200 MG/DL (ref 70–99)
GLUCOSE BLD-MCNC: 200 MG/DL (ref 70–99)
GLUCOSE BLD-MCNC: 220 MG/DL (ref 70–99)
GLUCOSE BLD-MCNC: 223 MG/DL (ref 70–99)
GLUCOSE BLD-MCNC: 248 MG/DL (ref 70–99)
GLUCOSE SERPL-MCNC: 207 MG/DL (ref 70–99)
HBA1C MFR BLD: 7.8 %
HCT VFR BLD AUTO: 21.8 % (ref 40.5–52.5)
HEMOCCULT STL QL: ABNORMAL
HGB BLD-MCNC: 7.5 G/DL (ref 13.5–17.5)
INR PPP: 2.01 (ref 0.85–1.15)
IRON SATN MFR SERPL: 7 % (ref 20–50)
IRON SERPL-MCNC: 17 UG/DL (ref 59–158)
LACTATE BLDV-SCNC: 0.9 MMOL/L (ref 0.4–1.9)
LYMPHOCYTES # BLD: 0.7 K/UL (ref 1–5.1)
LYMPHOCYTES NFR BLD: 6.3 %
MCH RBC QN AUTO: 30.4 PG (ref 26–34)
MCHC RBC AUTO-ENTMCNC: 34.6 G/DL (ref 31–36)
MCV RBC AUTO: 87.9 FL (ref 80–100)
MONOCYTES # BLD: 0.6 K/UL (ref 0–1.3)
MONOCYTES NFR BLD: 5.5 %
NEUTROPHILS # BLD: 9.6 K/UL (ref 1.7–7.7)
NEUTROPHILS NFR BLD: 88.1 %
PERFORMED ON: ABNORMAL
PLATELET # BLD AUTO: 153 K/UL (ref 135–450)
PMV BLD AUTO: 8.4 FL (ref 5–10.5)
POTASSIUM SERPL-SCNC: 4.3 MMOL/L (ref 3.5–5.1)
PROCALCITONIN SERPL IA-MCNC: 0.62 NG/ML (ref 0–0.15)
PROT SERPL-MCNC: 6 G/DL (ref 6.4–8.2)
PROTHROMBIN TIME: 22.8 SEC (ref 11.9–14.9)
RBC # BLD AUTO: 2.48 M/UL (ref 4.2–5.9)
SODIUM SERPL-SCNC: 143 MMOL/L (ref 136–145)
TIBC SERPL-MCNC: 238 UG/DL (ref 260–445)
TROPONIN, HIGH SENSITIVITY: 59 NG/L (ref 0–22)
TSH SERPL DL<=0.005 MIU/L-ACNC: 2.4 UIU/ML (ref 0.27–4.2)
VIT B12 SERPL-MCNC: 3851 PG/ML (ref 211–911)
WBC # BLD AUTO: 10.9 K/UL (ref 4–11)

## 2025-03-03 PROCEDURE — 84145 PROCALCITONIN (PCT): CPT

## 2025-03-03 PROCEDURE — 80053 COMPREHEN METABOLIC PANEL: CPT

## 2025-03-03 PROCEDURE — 6360000002 HC RX W HCPCS

## 2025-03-03 PROCEDURE — 3609014100 HC ENTEROSCOPY > 2ND PRTN W/CONTROL BLEEDING: Performed by: INTERNAL MEDICINE

## 2025-03-03 PROCEDURE — 3700000001 HC ADD 15 MINUTES (ANESTHESIA): Performed by: INTERNAL MEDICINE

## 2025-03-03 PROCEDURE — 2500000003 HC RX 250 WO HCPCS: Performed by: NURSE PRACTITIONER

## 2025-03-03 PROCEDURE — 6360000002 HC RX W HCPCS: Performed by: INTERNAL MEDICINE

## 2025-03-03 PROCEDURE — 85025 COMPLETE CBC W/AUTO DIFF WBC: CPT

## 2025-03-03 PROCEDURE — 83550 IRON BINDING TEST: CPT

## 2025-03-03 PROCEDURE — 6360000002 HC RX W HCPCS: Performed by: NURSE PRACTITIONER

## 2025-03-03 PROCEDURE — 3700000000 HC ANESTHESIA ATTENDED CARE: Performed by: INTERNAL MEDICINE

## 2025-03-03 PROCEDURE — 6370000000 HC RX 637 (ALT 250 FOR IP): Performed by: INTERNAL MEDICINE

## 2025-03-03 PROCEDURE — 2580000003 HC RX 258: Performed by: INTERNAL MEDICINE

## 2025-03-03 PROCEDURE — 2709999900 HC NON-CHARGEABLE SUPPLY: Performed by: INTERNAL MEDICINE

## 2025-03-03 PROCEDURE — 36415 COLL VENOUS BLD VENIPUNCTURE: CPT

## 2025-03-03 PROCEDURE — 6370000000 HC RX 637 (ALT 250 FOR IP): Performed by: NURSE PRACTITIONER

## 2025-03-03 PROCEDURE — 2720000010 HC SURG SUPPLY STERILE: Performed by: INTERNAL MEDICINE

## 2025-03-03 PROCEDURE — 84484 ASSAY OF TROPONIN QUANT: CPT

## 2025-03-03 PROCEDURE — 1200000000 HC SEMI PRIVATE

## 2025-03-03 PROCEDURE — 2500000003 HC RX 250 WO HCPCS: Performed by: INTERNAL MEDICINE

## 2025-03-03 PROCEDURE — 84443 ASSAY THYROID STIM HORMONE: CPT

## 2025-03-03 PROCEDURE — 85610 PROTHROMBIN TIME: CPT

## 2025-03-03 PROCEDURE — 82728 ASSAY OF FERRITIN: CPT

## 2025-03-03 PROCEDURE — 82607 VITAMIN B-12: CPT

## 2025-03-03 PROCEDURE — 7100000001 HC PACU RECOVERY - ADDTL 15 MIN: Performed by: INTERNAL MEDICINE

## 2025-03-03 PROCEDURE — 7100000000 HC PACU RECOVERY - FIRST 15 MIN: Performed by: INTERNAL MEDICINE

## 2025-03-03 PROCEDURE — 83605 ASSAY OF LACTIC ACID: CPT

## 2025-03-03 PROCEDURE — 2580000003 HC RX 258: Performed by: NURSE PRACTITIONER

## 2025-03-03 PROCEDURE — 2580000003 HC RX 258

## 2025-03-03 PROCEDURE — 93010 ELECTROCARDIOGRAM REPORT: CPT | Performed by: INTERNAL MEDICINE

## 2025-03-03 PROCEDURE — 83540 ASSAY OF IRON: CPT

## 2025-03-03 PROCEDURE — 82746 ASSAY OF FOLIC ACID SERUM: CPT

## 2025-03-03 PROCEDURE — 94760 N-INVAS EAR/PLS OXIMETRY 1: CPT

## 2025-03-03 PROCEDURE — 83036 HEMOGLOBIN GLYCOSYLATED A1C: CPT

## 2025-03-03 PROCEDURE — 0W3P8ZZ CONTROL BLEEDING IN GASTROINTESTINAL TRACT, VIA NATURAL OR ARTIFICIAL OPENING ENDOSCOPIC: ICD-10-PCS | Performed by: INTERNAL MEDICINE

## 2025-03-03 RX ORDER — SODIUM CHLORIDE 9 MG/ML
INJECTION, SOLUTION INTRAVENOUS
Status: DISCONTINUED | OUTPATIENT
Start: 2025-03-03 | End: 2025-03-03 | Stop reason: SDUPTHER

## 2025-03-03 RX ORDER — SODIUM CHLORIDE 0.9 % (FLUSH) 0.9 %
5-40 SYRINGE (ML) INJECTION EVERY 12 HOURS SCHEDULED
Status: DISCONTINUED | OUTPATIENT
Start: 2025-03-03 | End: 2025-03-03

## 2025-03-03 RX ORDER — SODIUM CHLORIDE 0.9 % (FLUSH) 0.9 %
5-40 SYRINGE (ML) INJECTION PRN
Status: DISCONTINUED | OUTPATIENT
Start: 2025-03-03 | End: 2025-03-03

## 2025-03-03 RX ORDER — GLUCAGON 1 MG/ML
KIT INJECTION
Status: DISCONTINUED | OUTPATIENT
Start: 2025-03-03 | End: 2025-03-03 | Stop reason: SDUPTHER

## 2025-03-03 RX ORDER — WARFARIN SODIUM 7.5 MG/1
7.5 TABLET ORAL
Status: COMPLETED | OUTPATIENT
Start: 2025-03-03 | End: 2025-03-03

## 2025-03-03 RX ORDER — SODIUM CHLORIDE, SODIUM LACTATE, POTASSIUM CHLORIDE, CALCIUM CHLORIDE 600; 310; 30; 20 MG/100ML; MG/100ML; MG/100ML; MG/100ML
INJECTION, SOLUTION INTRAVENOUS
Status: DISCONTINUED | OUTPATIENT
Start: 2025-03-03 | End: 2025-03-03 | Stop reason: SDUPTHER

## 2025-03-03 RX ORDER — SODIUM CHLORIDE 9 MG/ML
INJECTION, SOLUTION INTRAVENOUS PRN
Status: DISCONTINUED | OUTPATIENT
Start: 2025-03-03 | End: 2025-03-03

## 2025-03-03 RX ORDER — LIDOCAINE HYDROCHLORIDE 20 MG/ML
INJECTION, SOLUTION EPIDURAL; INFILTRATION; INTRACAUDAL; PERINEURAL
Status: DISCONTINUED | OUTPATIENT
Start: 2025-03-03 | End: 2025-03-03 | Stop reason: SDUPTHER

## 2025-03-03 RX ORDER — ONDANSETRON 2 MG/ML
4 INJECTION INTRAMUSCULAR; INTRAVENOUS
Status: DISCONTINUED | OUTPATIENT
Start: 2025-03-03 | End: 2025-03-03

## 2025-03-03 RX ORDER — POLYETHYLENE GLYCOL 3350 17 G/17G
17 POWDER, FOR SOLUTION ORAL 2 TIMES DAILY
Status: DISCONTINUED | OUTPATIENT
Start: 2025-03-03 | End: 2025-03-05 | Stop reason: HOSPADM

## 2025-03-03 RX ORDER — NALOXONE HYDROCHLORIDE 0.4 MG/ML
INJECTION, SOLUTION INTRAMUSCULAR; INTRAVENOUS; SUBCUTANEOUS PRN
Status: DISCONTINUED | OUTPATIENT
Start: 2025-03-03 | End: 2025-03-03

## 2025-03-03 RX ORDER — PROPOFOL 10 MG/ML
INJECTION, EMULSION INTRAVENOUS
Status: DISCONTINUED | OUTPATIENT
Start: 2025-03-03 | End: 2025-03-03 | Stop reason: SDUPTHER

## 2025-03-03 RX ADMIN — INSULIN LISPRO 1 UNITS: 100 INJECTION, SOLUTION INTRAVENOUS; SUBCUTANEOUS at 18:50

## 2025-03-03 RX ADMIN — ASPIRIN 81 MG: 81 TABLET, COATED ORAL at 21:51

## 2025-03-03 RX ADMIN — PROPOFOL 100 MG: 10 INJECTION, EMULSION INTRAVENOUS at 15:28

## 2025-03-03 RX ADMIN — METRONIDAZOLE 500 MG: 500 INJECTION, SOLUTION INTRAVENOUS at 18:49

## 2025-03-03 RX ADMIN — TAMSULOSIN HYDROCHLORIDE 0.4 MG: 0.4 CAPSULE ORAL at 21:53

## 2025-03-03 RX ADMIN — MIRTAZAPINE 30 MG: 30 TABLET, FILM COATED ORAL at 21:51

## 2025-03-03 RX ADMIN — METRONIDAZOLE 500 MG: 500 INJECTION, SOLUTION INTRAVENOUS at 02:37

## 2025-03-03 RX ADMIN — PROPOFOL 150 MCG/KG/MIN: 10 INJECTION, EMULSION INTRAVENOUS at 15:29

## 2025-03-03 RX ADMIN — LIDOCAINE HYDROCHLORIDE 50 MG: 20 INJECTION, SOLUTION EPIDURAL; INFILTRATION; INTRACAUDAL; PERINEURAL at 15:28

## 2025-03-03 RX ADMIN — WARFARIN SODIUM 7.5 MG: 7.5 TABLET ORAL at 18:46

## 2025-03-03 RX ADMIN — LEVOTHYROXINE SODIUM 112 MCG: 0.11 TABLET ORAL at 05:11

## 2025-03-03 RX ADMIN — INSULIN LISPRO 1 UNITS: 100 INJECTION, SOLUTION INTRAVENOUS; SUBCUTANEOUS at 21:55

## 2025-03-03 RX ADMIN — SODIUM CHLORIDE, PRESERVATIVE FREE 10 ML: 5 INJECTION INTRAVENOUS at 21:53

## 2025-03-03 RX ADMIN — SENNOSIDES AND DOCUSATE SODIUM 2 TABLET: 50; 8.6 TABLET ORAL at 21:51

## 2025-03-03 RX ADMIN — GLUCAGON 1 MG: KIT at 15:36

## 2025-03-03 RX ADMIN — SODIUM CHLORIDE, SODIUM LACTATE, POTASSIUM CHLORIDE, AND CALCIUM CHLORIDE: .6; .31; .03; .02 INJECTION, SOLUTION INTRAVENOUS at 15:23

## 2025-03-03 RX ADMIN — METRONIDAZOLE 500 MG: 500 INJECTION, SOLUTION INTRAVENOUS at 11:38

## 2025-03-03 RX ADMIN — SODIUM CHLORIDE, PRESERVATIVE FREE 10 ML: 5 INJECTION INTRAVENOUS at 09:12

## 2025-03-03 RX ADMIN — METOPROLOL SUCCINATE 50 MG: 50 TABLET, EXTENDED RELEASE ORAL at 09:06

## 2025-03-03 RX ADMIN — EMPAGLIFLOZIN 10 MG: 10 TABLET, FILM COATED ORAL at 09:06

## 2025-03-03 RX ADMIN — SODIUM CHLORIDE: 9 INJECTION, SOLUTION INTRAVENOUS at 15:29

## 2025-03-03 RX ADMIN — CEFEPIME 1000 MG: 1 INJECTION, POWDER, FOR SOLUTION INTRAMUSCULAR; INTRAVENOUS at 21:51

## 2025-03-03 RX ADMIN — CEFEPIME 1000 MG: 1 INJECTION, POWDER, FOR SOLUTION INTRAMUSCULAR; INTRAVENOUS at 09:20

## 2025-03-03 ASSESSMENT — PAIN SCALES - GENERAL: PAINLEVEL_OUTOF10: 4

## 2025-03-03 ASSESSMENT — PAIN DESCRIPTION - DESCRIPTORS: DESCRIPTORS: TENDER

## 2025-03-03 ASSESSMENT — PAIN - FUNCTIONAL ASSESSMENT: PAIN_FUNCTIONAL_ASSESSMENT: 0-10

## 2025-03-03 NOTE — ED NOTES
1734)   ondansetron (ZOFRAN) injection 4 mg (4 mg IntraVENous Given 3/2/25 1734)   ceFEPIme (MAXIPIME) 2,000 mg in sodium chloride 0.9 % 100 mL IVPB (addEASE) (0 mg IntraVENous Stopped 3/2/25 2007)     Last documented pain medication administration: None  Pertinent or High Risk Medications/Drips: no   If Yes, please provide details: None  Blood Product Administration: no  If Yes, please provide details: None  Process Protocols/Bundles: N/A    Recommendation  Incomplete STAT orders: None  Overdue Medications: None  Patient Belongings:    Additional Comments: None    If any further questions, please call Sending RN at [phone]       Admitting Unit Notification  Name of person notified and time: NA      Electronically signed by: Electronically signed by Chandrakant Orozco RN on 3/2/2025 at 8:11 PM

## 2025-03-03 NOTE — OP NOTE
secondary to significant constipation as this is resolved completely with good bowel movements.  Would discharge on Linzess 145 mcg daily and as needed MiraLAX to prevent recurrent constipation.  Linzess is not available in the hospital so we will treat with MiraLAX at this point.    2.  Sigmoid colitis.  I suspect stercoral colitis based on the significant constipation.  His symptoms have resolved.  3.  Acute anemia.  Prior history of jejunal dieulafoy lesion.  GD with push enteroscopy March 3, 2025 showed 2cm sliding hiatal hernia.  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  He had a colonoscopy in 2023 so I do not think this needs repeated.  He gets mild hematochezia which may be from internal hemorrhoids or radiation proctitis.     Plan  1.  observe stool output and hemoglobin and reserve colonoscopy and possible capsule endoscopy for persistent drops or active bleeding.  2.  MiraLAX twice daily here.  Would discharge on Linzess 145 mcg daily with MiraLAX as needed.  3.  Regular diet.  4.  OK to continue warfarin for the aortic valve.      Chace Morgan MD,   [facility]

## 2025-03-03 NOTE — PLAN OF CARE
Problem: Chronic Conditions and Co-morbidities  Goal: Patient's chronic conditions and co-morbidity symptoms are monitored and maintained or improved  3/3/2025 1458 by Elisabeth Astorga RN  Outcome: Progressing  3/3/2025 0316 by Jenny Pinto RN  Outcome: Progressing     Problem: Discharge Planning  Goal: Discharge to home or other facility with appropriate resources  3/3/2025 1458 by Elisabeth Astorga RN  Outcome: Progressing  3/3/2025 0316 by Jenny Pinto RN  Outcome: Progressing     Problem: ABCDS Injury Assessment  Goal: Absence of physical injury  3/3/2025 1458 by Elisabeth Astorga RN  Outcome: Progressing  3/3/2025 0316 by Jenny Pinto RN  Outcome: Progressing     Problem: Skin/Tissue Integrity - Adult  Goal: Skin integrity remains intact  3/3/2025 1458 by Elisabeth Astorga RN  Outcome: Progressing  3/3/2025 0316 by Jenny Pinto RN  Reactivated     Problem: Gastrointestinal - Adult  Goal: Maintains or returns to baseline bowel function  3/3/2025 1458 by Elisabeth Astorga RN  Outcome: Progressing  3/3/2025 0316 by Jenny Pinto RN  Reactivated     Problem: Genitourinary - Adult  Goal: Absence of urinary retention  3/3/2025 1458 by Elisabeth Astorga RN  Outcome: Progressing  3/3/2025 0316 by Jenny Pinto RN  Reactivated     Problem: Metabolic/Fluid and Electrolytes - Adult  Goal: Hemodynamic stability and optimal renal function maintained  3/3/2025 1458 by Elisabeth Astorga RN  Outcome: Progressing  3/3/2025 0316 by Jenny Pinto RN  Reactivated     Problem: Safety - Adult  Goal: Free from fall injury  Outcome: Progressing     Problem: Pain  Goal: Verbalizes/displays adequate comfort level or baseline comfort level  Outcome: Progressing

## 2025-03-03 NOTE — ED PROVIDER NOTES
[facility] EMERGENCY DEPARTMENT  EMERGENCY DEPARTMENT ENCOUNTER        Pt Name: Morro Kevin  MRN: [id number]  Birthdate 1950  Date of evaluation: 3/2/2025  Provider: Axel Lopez PA-C  PCP: Fareed Marino MD  Note Started: 7:16 PM EST 3/2/25      ROLANDO. I have evaluated this patient.        CHIEF COMPLAINT       Chief Complaint   Patient presents with    Abdominal Pain     Pt into ED from home with c/o abd pain x1 day       HISTORY OF PRESENT ILLNESS: 1 or more Elements     History From: Patient            Chief Complaint: right lower quadrant abdominal pain gradually worse throughout the day, nausea and feeling chills    Morro Kevin is a 74 y.o. male who presents stating that he did pass stool a couple days ago.  He has not been doing too bad.  States that he does use medications daily to help his stools be all right.  However today he started noticing that he was having increasing pain and tenderness that he thought was just maybe some constipation.  Tried to go to the bathroom and has not able to pass any stool today.  However in the meantime he has continued to feel worse throughout the day with right lower quadrant abdominal pain.  Does have a appendix still.  Is concerned for possible acute appendicitis.  Pain is worse with movement and better at rest.  Also appetites been low today.  Patient reports that he does have a history of heart valve replacement and is on Eliquis daily.  He has been feeling some increased heartburn over the last week or so without other pain in the chest.  Has been feeling a little short of breath as well and is not certain why.  No particular cough.  No measured fevers at home.  Nursing Notes were all reviewed and agreed with or any disagreements were addressed in the HPI.    REVIEW OF SYSTEMS :      Review of Systems  Positive as above  Positives and Pertinent negatives as per HPI.     SURGICAL HISTORY     Past Surgical History:   Procedure Laterality Date

## 2025-03-03 NOTE — ANESTHESIA POSTPROCEDURE EVALUATION
Department of Anesthesiology  Postprocedure Note    Patient: Morro Kevin  MRN: [id number]  YOB: 1950  Date of evaluation: 3/3/2025    Procedure Summary       Date: 03/03/25 Room / Location: [place] [address] / [facility]    Anesthesia Start: 1523 Anesthesia Stop: 1547    Procedure: ENTEROSCOPY PUSH CONTROL HEMORRHAGE with APC to jejunal AVM Diagnosis:       Anemia, unspecified type      (Anemia, unspecified type [D64.9])    Surgeons: Chace Morgan MD Responsible Provider: Anila Grayson MD    Anesthesia Type: MAC ASA Status: 3            Anesthesia Type: No value filed.    Luis Alberto Phase I: Luis Alberto Score: 9    Luis Alberto Phase II:      Anesthesia Post Evaluation    Patient location during evaluation: PACU  Patient participation: complete - patient participated  Level of consciousness: awake and alert  Airway patency: patent  Nausea & Vomiting: no nausea and no vomiting  Cardiovascular status: hemodynamically stable  Respiratory status: acceptable  Hydration status: stable  Pain management: adequate        No notable events documented.

## 2025-03-03 NOTE — ANESTHESIA PRE PROCEDURE
diastolic function.   Mitral annular calcification is present.   A 21 mm [employer] [place] mechanical aortic valve appears well seated with a   maximum velocity of 2.45 m/s and a mean gradient of 13mmHg.   Normal right ventricular size and function.            Neuro/Psych:   (+) depression/anxiety    (-) seizures           GI/Hepatic/Renal:   (+) renal disease (off dialysis in Jan): CRI     (-) liver disease       Endo/Other:    (+) DiabetesType II DM, hypothyroidism, blood dyscrasia: anemia and anticoagulation therapy:., malignancy/cancer (prostate).                 Abdominal:             Vascular:          Other Findings:             Anesthesia Plan      MAC     ASA 3       Induction: intravenous.      Anesthetic plan and risks discussed with patient.      Plan discussed with CRNA.                  This pre-anesthesia assessment may be used as a history and physical.    DOS STAFF ADDENDUM:    Pt seen and examined, chart reviewed (including anesthesia, drug and allergy history).  No interval changes to history and physical examination.  Anesthetic plan, risks, benefits, alternatives, and personnel involved discussed with patient.  Patient verbalized an understanding and agrees to proceed.      Anila Grayson MD  March 3, 2025  2:55 PM

## 2025-03-03 NOTE — CONSULTS
EGD. Possible bowel regimen.     Sepsis  -WBC elevated at 18 (3/2). Trended down to normal.   -on IV antibiotics    Hx of diabetes  -primary team following    Subtherapeutic INR   -on warfarin. INR 1.8.   -Hold anticoagulation      RECOMMENDATIONS:      If you have any questions or need any further information, please feel free to contact our consult team.  Thank you for allowing us to participate in the care of Morro Kevin.    The note was completed using Dragon voice recognition transcription. Every effort was made to ensure accuracy; however, inadvertent transcription errors may be present despite my best efforts to edit errors.      Mary Carmen Moore PA-C 3/3/2025 at 6:44 AM

## 2025-03-04 LAB
ABO + RH BLD: NORMAL
ALBUMIN SERPL-MCNC: 3.4 G/DL (ref 3.4–5)
ALBUMIN/GLOB SERPL: 1.5 {RATIO} (ref 1.1–2.2)
ALP SERPL-CCNC: 47 U/L (ref 40–129)
ALT SERPL-CCNC: 26 U/L (ref 10–40)
ANION GAP SERPL CALCULATED.3IONS-SCNC: 10 MMOL/L (ref 3–16)
AST SERPL-CCNC: 25 U/L (ref 15–37)
BASOPHILS # BLD: 0 K/UL (ref 0–0.2)
BASOPHILS NFR BLD: 0.3 %
BILIRUB SERPL-MCNC: <0.2 MG/DL (ref 0–1)
BLD GP AB SCN SERPL QL: NORMAL
BLOOD GROUP ANTIBODIES SERPL: NORMAL
BUN SERPL-MCNC: 43 MG/DL (ref 7–20)
CALCIUM SERPL-MCNC: 9.3 MG/DL (ref 8.3–10.6)
CHLORIDE SERPL-SCNC: 115 MMOL/L (ref 99–110)
CO2 SERPL-SCNC: 19 MMOL/L (ref 21–32)
CREAT SERPL-MCNC: 2.5 MG/DL (ref 0.8–1.3)
DAT IGG CAPTURE: NORMAL
DEPRECATED RDW RBC AUTO: 20 % (ref 12.4–15.4)
EOSINOPHIL # BLD: 0 K/UL (ref 0–0.6)
EOSINOPHIL NFR BLD: 0.3 %
GFR SERPLBLD CREATININE-BSD FMLA CKD-EPI: 27 ML/MIN/{1.73_M2}
GLUCOSE BLD-MCNC: 138 MG/DL (ref 70–99)
GLUCOSE BLD-MCNC: 141 MG/DL (ref 70–99)
GLUCOSE BLD-MCNC: 171 MG/DL (ref 70–99)
GLUCOSE BLD-MCNC: 189 MG/DL (ref 70–99)
GLUCOSE SERPL-MCNC: 140 MG/DL (ref 70–99)
HCT VFR BLD AUTO: 20.2 % (ref 40.5–52.5)
HGB BLD-MCNC: 6.7 G/DL (ref 13.5–17.5)
INR PPP: 2.79 (ref 0.85–1.15)
LYMPHOCYTES # BLD: 0.9 K/UL (ref 1–5.1)
LYMPHOCYTES NFR BLD: 10.9 %
MCH RBC QN AUTO: 29.8 PG (ref 26–34)
MCHC RBC AUTO-ENTMCNC: 33 G/DL (ref 31–36)
MCV RBC AUTO: 90.4 FL (ref 80–100)
MONOCYTES # BLD: 0.4 K/UL (ref 0–1.3)
MONOCYTES NFR BLD: 5.6 %
NEUTROPHILS # BLD: 6.6 K/UL (ref 1.7–7.7)
NEUTROPHILS NFR BLD: 82.9 %
PERFORMED ON: ABNORMAL
PLATELET # BLD AUTO: 150 K/UL (ref 135–450)
PMV BLD AUTO: 8.4 FL (ref 5–10.5)
POTASSIUM SERPL-SCNC: 3.9 MMOL/L (ref 3.5–5.1)
PROT SERPL-MCNC: 5.6 G/DL (ref 6.4–8.2)
PROTHROMBIN TIME: 29.3 SEC (ref 11.9–14.9)
RBC # BLD AUTO: 2.24 M/UL (ref 4.2–5.9)
SODIUM SERPL-SCNC: 144 MMOL/L (ref 136–145)
WBC # BLD AUTO: 8 K/UL (ref 4–11)

## 2025-03-04 PROCEDURE — 6370000000 HC RX 637 (ALT 250 FOR IP): Performed by: INTERNAL MEDICINE

## 2025-03-04 PROCEDURE — 86922 COMPATIBILITY TEST ANTIGLOB: CPT

## 2025-03-04 PROCEDURE — 36415 COLL VENOUS BLD VENIPUNCTURE: CPT

## 2025-03-04 PROCEDURE — 2500000003 HC RX 250 WO HCPCS: Performed by: INTERNAL MEDICINE

## 2025-03-04 PROCEDURE — 6360000002 HC RX W HCPCS: Performed by: INTERNAL MEDICINE

## 2025-03-04 PROCEDURE — 86850 RBC ANTIBODY SCREEN: CPT

## 2025-03-04 PROCEDURE — 2580000003 HC RX 258: Performed by: INTERNAL MEDICINE

## 2025-03-04 PROCEDURE — 86880 COOMBS TEST DIRECT: CPT

## 2025-03-04 PROCEDURE — 84153 ASSAY OF PSA TOTAL: CPT

## 2025-03-04 PROCEDURE — 6360000002 HC RX W HCPCS: Performed by: STUDENT IN AN ORGANIZED HEALTH CARE EDUCATION/TRAINING PROGRAM

## 2025-03-04 PROCEDURE — P9016 RBC LEUKOCYTES REDUCED: HCPCS

## 2025-03-04 PROCEDURE — 2580000003 HC RX 258: Performed by: STUDENT IN AN ORGANIZED HEALTH CARE EDUCATION/TRAINING PROGRAM

## 2025-03-04 PROCEDURE — 86900 BLOOD TYPING SEROLOGIC ABO: CPT

## 2025-03-04 PROCEDURE — 85014 HEMATOCRIT: CPT

## 2025-03-04 PROCEDURE — 80053 COMPREHEN METABOLIC PANEL: CPT

## 2025-03-04 PROCEDURE — 1200000000 HC SEMI PRIVATE

## 2025-03-04 PROCEDURE — 36430 TRANSFUSION BLD/BLD COMPNT: CPT

## 2025-03-04 PROCEDURE — 85025 COMPLETE CBC W/AUTO DIFF WBC: CPT

## 2025-03-04 PROCEDURE — 85610 PROTHROMBIN TIME: CPT

## 2025-03-04 PROCEDURE — 86902 BLOOD TYPE ANTIGEN DONOR EA: CPT

## 2025-03-04 PROCEDURE — 86870 RBC ANTIBODY IDENTIFICATION: CPT

## 2025-03-04 PROCEDURE — 84403 ASSAY OF TOTAL TESTOSTERONE: CPT

## 2025-03-04 PROCEDURE — 85018 HEMOGLOBIN: CPT

## 2025-03-04 PROCEDURE — 86901 BLOOD TYPING SEROLOGIC RH(D): CPT

## 2025-03-04 PROCEDURE — 30233N1 TRANSFUSION OF NONAUTOLOGOUS RED BLOOD CELLS INTO PERIPHERAL VEIN, PERCUTANEOUS APPROACH: ICD-10-PCS | Performed by: STUDENT IN AN ORGANIZED HEALTH CARE EDUCATION/TRAINING PROGRAM

## 2025-03-04 RX ORDER — SODIUM CHLORIDE 9 MG/ML
INJECTION, SOLUTION INTRAVENOUS PRN
Status: DISCONTINUED | OUTPATIENT
Start: 2025-03-04 | End: 2025-03-05 | Stop reason: HOSPADM

## 2025-03-04 RX ORDER — FERROUS SULFATE 324(65)MG
324 TABLET, DELAYED RELEASE (ENTERIC COATED) ORAL
Status: DISCONTINUED | OUTPATIENT
Start: 2025-03-04 | End: 2025-03-05 | Stop reason: HOSPADM

## 2025-03-04 RX ADMIN — LEVOTHYROXINE SODIUM 112 MCG: 0.11 TABLET ORAL at 05:21

## 2025-03-04 RX ADMIN — SENNOSIDES AND DOCUSATE SODIUM 2 TABLET: 50; 8.6 TABLET ORAL at 09:33

## 2025-03-04 RX ADMIN — INSULIN GLARGINE 22 UNITS: 100 INJECTION, SOLUTION SUBCUTANEOUS at 09:34

## 2025-03-04 RX ADMIN — METOPROLOL SUCCINATE 50 MG: 50 TABLET, EXTENDED RELEASE ORAL at 09:32

## 2025-03-04 RX ADMIN — METRONIDAZOLE 500 MG: 500 INJECTION, SOLUTION INTRAVENOUS at 18:23

## 2025-03-04 RX ADMIN — POLYETHYLENE GLYCOL 3350 17 G: 17 POWDER, FOR SOLUTION ORAL at 09:33

## 2025-03-04 RX ADMIN — METRONIDAZOLE 500 MG: 500 INJECTION, SOLUTION INTRAVENOUS at 10:53

## 2025-03-04 RX ADMIN — PANTOPRAZOLE SODIUM 40 MG: 40 INJECTION, POWDER, FOR SOLUTION INTRAVENOUS at 09:33

## 2025-03-04 RX ADMIN — MIRTAZAPINE 30 MG: 30 TABLET, FILM COATED ORAL at 20:37

## 2025-03-04 RX ADMIN — PSYLLIUM HUSK 1 PACKET: 3.4 POWDER ORAL at 09:33

## 2025-03-04 RX ADMIN — INSULIN LISPRO 1 UNITS: 100 INJECTION, SOLUTION INTRAVENOUS; SUBCUTANEOUS at 20:37

## 2025-03-04 RX ADMIN — CEFEPIME 1000 MG: 1 INJECTION, POWDER, FOR SOLUTION INTRAMUSCULAR; INTRAVENOUS at 20:42

## 2025-03-04 RX ADMIN — FERROUS SULFATE TAB EC 324 MG (65 MG FE EQUIVALENT) 324 MG: 324 (65 FE) TABLET DELAYED RESPONSE at 09:32

## 2025-03-04 RX ADMIN — ASPIRIN 81 MG: 81 TABLET, COATED ORAL at 20:36

## 2025-03-04 RX ADMIN — METRONIDAZOLE 500 MG: 500 INJECTION, SOLUTION INTRAVENOUS at 02:23

## 2025-03-04 RX ADMIN — CEFEPIME 1000 MG: 1 INJECTION, POWDER, FOR SOLUTION INTRAMUSCULAR; INTRAVENOUS at 09:43

## 2025-03-04 RX ADMIN — EMPAGLIFLOZIN 10 MG: 10 TABLET, FILM COATED ORAL at 09:32

## 2025-03-04 RX ADMIN — SODIUM CHLORIDE, PRESERVATIVE FREE 10 ML: 5 INJECTION INTRAVENOUS at 09:36

## 2025-03-04 RX ADMIN — SENNOSIDES AND DOCUSATE SODIUM 2 TABLET: 50; 8.6 TABLET ORAL at 20:37

## 2025-03-04 RX ADMIN — SODIUM CHLORIDE: 9 INJECTION, SOLUTION INTRAVENOUS at 18:23

## 2025-03-04 RX ADMIN — TAMSULOSIN HYDROCHLORIDE 0.4 MG: 0.4 CAPSULE ORAL at 20:37

## 2025-03-04 ASSESSMENT — PAIN SCALES - GENERAL
PAINLEVEL_OUTOF10: 0
PAINLEVEL_OUTOF10: 0

## 2025-03-04 NOTE — ACP (ADVANCE CARE PLANNING)
Advance Care Planning   Healthcare Decision Maker:    Primary Decision Maker: Joe Kevin - [phone]    Secondary Decision Maker: lara kevin - Child - [phone]    Secondary Decision Maker: whitley kevin Child - [phone]    Secondary Decision Maker: sheila kevin - [phone]      Today we documented Decision Maker(s) consistent with Legal Next of Kin hierarchy.       RODNEY Russell [facility]  [phone]  Electronically signed by RODNEY Garcia on 3/4/2025 at 3:35 PM

## 2025-03-04 NOTE — PLAN OF CARE
Problem: Chronic Conditions and Co-morbidities  Goal: Patient's chronic conditions and co-morbidity symptoms are monitored and maintained or improved  3/4/2025 0001 by Jenny Pinto RN  Outcome: Progressing  3/3/2025 1458 by Elisabeth Astorga RN  Outcome: Progressing     Problem: Discharge Planning  Goal: Discharge to home or other facility with appropriate resources  3/4/2025 0001 by Jenny Pinto RN  Outcome: Progressing  3/3/2025 1458 by Elisabeth Astorga RN  Outcome: Progressing     Problem: ABCDS Injury Assessment  Goal: Absence of physical injury  3/4/2025 0001 by Jenny Pinto RN  Outcome: Progressing  3/3/2025 1458 by Elisabeth Astorga RN  Outcome: Progressing     Problem: Skin/Tissue Integrity - Adult  Goal: Skin integrity remains intact  3/4/2025 0001 by Jenny Pinto RN  Outcome: Progressing  3/3/2025 1458 by Elisabeth Astorga RN  Outcome: Progressing     Problem: Gastrointestinal - Adult  Goal: Maintains or returns to baseline bowel function  3/4/2025 0001 by Jenny Pinto RN  Outcome: Progressing  3/3/2025 1458 by Elisabeth Astorga RN  Outcome: Progressing     Problem: Genitourinary - Adult  Goal: Absence of urinary retention  3/4/2025 0001 by Jenny Pinto RN  Outcome: Progressing  3/3/2025 1458 by Elisabeth Astorga RN  Outcome: Progressing     Problem: Metabolic/Fluid and Electrolytes - Adult  Goal: Hemodynamic stability and optimal renal function maintained  3/4/2025 0001 by Jenny Pinto RN  Outcome: Progressing  3/3/2025 1458 by Elisabeth Astorga RN  Outcome: Progressing     Problem: Safety - Adult  Goal: Free from fall injury  3/4/2025 0001 by Jenny Pinto RN  Outcome: Progressing  3/3/2025 1458 by Elisabeth Astorga RN  Outcome: Progressing     Problem: Pain  Goal: Verbalizes/displays adequate comfort level or baseline comfort level  3/4/2025 0001 by Jenny Pinto RN  Outcome: Progressing  3/3/2025 1458 by Elisabeth Astorga

## 2025-03-04 NOTE — PLAN OF CARE
Problem: Chronic Conditions and Co-morbidities  Goal: Patient's chronic conditions and co-morbidity symptoms are monitored and maintained or improved  Outcome: Progressing     Problem: Discharge Planning  Goal: Discharge to home or other facility with appropriate resources  Outcome: Progressing     Problem: ABCDS Injury Assessment  Goal: Absence of physical injury  Outcome: Progressing     Problem: Skin/Tissue Integrity - Adult  Goal: Skin integrity remains intact  Outcome: Progressing     Problem: Gastrointestinal - Adult  Goal: Maintains or returns to baseline bowel function  Outcome: Progressing     Problem: Genitourinary - Adult  Goal: Absence of urinary retention  Outcome: Progressing     Problem: Metabolic/Fluid and Electrolytes - Adult  Goal: Hemodynamic stability and optimal renal function maintained  Outcome: Progressing     Problem: Safety - Adult  Goal: Free from fall injury  Outcome: Progressing     Problem: Pain  Goal: Verbalizes/displays adequate comfort level or baseline comfort level  Outcome: Progressing

## 2025-03-04 NOTE — CONSENT
Informed Consent for Blood Component Transfusion Note    I have discussed with the patient the rationale for blood component transfusion; its benefits in treating or preventing fatigue, organ damage, or death; and its risk which includes mild transfusion reactions, rare risk of blood borne infection, or more serious but rare reactions. I have discussed the alternatives to transfusion, including the risk and consequences of not receiving transfusion. The patient had an opportunity to ask questions and had agreed to proceed with transfusion of blood components.    Electronically signed by Moises Gandhi MD on 3/4/25 at 12:10 PM EST   No

## 2025-03-05 ENCOUNTER — TELEPHONE (OUTPATIENT)
Dept: PHARMACY | Age: 75
End: 2025-03-05

## 2025-03-05 VITALS
HEART RATE: 61 BPM | DIASTOLIC BLOOD PRESSURE: 64 MMHG | RESPIRATION RATE: 16 BRPM | HEIGHT: 71 IN | TEMPERATURE: 97.9 F | OXYGEN SATURATION: 99 % | SYSTOLIC BLOOD PRESSURE: 130 MMHG | WEIGHT: 207.1 LBS | BODY MASS INDEX: 28.99 KG/M2

## 2025-03-05 LAB
ALBUMIN SERPL-MCNC: 3.3 G/DL (ref 3.4–5)
ALBUMIN/GLOB SERPL: 1.5 {RATIO} (ref 1.1–2.2)
ALP SERPL-CCNC: 43 U/L (ref 40–129)
ALT SERPL-CCNC: 28 U/L (ref 10–40)
ANION GAP SERPL CALCULATED.3IONS-SCNC: 10 MMOL/L (ref 3–16)
AST SERPL-CCNC: 25 U/L (ref 15–37)
BASOPHILS # BLD: 0 K/UL (ref 0–0.2)
BASOPHILS NFR BLD: 0.5 %
BILIRUB SERPL-MCNC: <0.2 MG/DL (ref 0–1)
BUN SERPL-MCNC: 42 MG/DL (ref 7–20)
CALCIUM SERPL-MCNC: 9.1 MG/DL (ref 8.3–10.6)
CHLORIDE SERPL-SCNC: 112 MMOL/L (ref 99–110)
CO2 SERPL-SCNC: 18 MMOL/L (ref 21–32)
CREAT SERPL-MCNC: 2.3 MG/DL (ref 0.8–1.3)
DEPRECATED RDW RBC AUTO: 18.5 % (ref 12.4–15.4)
EOSINOPHIL # BLD: 0.1 K/UL (ref 0–0.6)
EOSINOPHIL NFR BLD: 2 %
GFR SERPLBLD CREATININE-BSD FMLA CKD-EPI: 29 ML/MIN/{1.73_M2}
GLUCOSE BLD-MCNC: 123 MG/DL (ref 70–99)
GLUCOSE BLD-MCNC: 200 MG/DL (ref 70–99)
GLUCOSE SERPL-MCNC: 119 MG/DL (ref 70–99)
HCT VFR BLD AUTO: 21.4 % (ref 40.5–52.5)
HCT VFR BLD AUTO: 22.7 % (ref 40.5–52.5)
HGB BLD-MCNC: 7.1 G/DL (ref 13.5–17.5)
HGB BLD-MCNC: 7.7 G/DL (ref 13.5–17.5)
INR PPP: 2.61 (ref 0.85–1.15)
LYMPHOCYTES # BLD: 1 K/UL (ref 1–5.1)
LYMPHOCYTES NFR BLD: 17.5 %
MCH RBC QN AUTO: 30.3 PG (ref 26–34)
MCHC RBC AUTO-ENTMCNC: 33.9 G/DL (ref 31–36)
MCV RBC AUTO: 89.4 FL (ref 80–100)
MONOCYTES # BLD: 0.4 K/UL (ref 0–1.3)
MONOCYTES NFR BLD: 6.6 %
NEUTROPHILS # BLD: 4 K/UL (ref 1.7–7.7)
NEUTROPHILS NFR BLD: 73.4 %
PERFORMED ON: ABNORMAL
PERFORMED ON: ABNORMAL
PLATELET # BLD AUTO: 143 K/UL (ref 135–450)
PMV BLD AUTO: 8 FL (ref 5–10.5)
POTASSIUM SERPL-SCNC: 3.7 MMOL/L (ref 3.5–5.1)
PROCALCITONIN SERPL IA-MCNC: 0.67 NG/ML (ref 0–0.15)
PROT SERPL-MCNC: 5.5 G/DL (ref 6.4–8.2)
PROTHROMBIN TIME: 27.9 SEC (ref 11.9–14.9)
PSA SERPL DL<=0.01 NG/ML-MCNC: 0.07 NG/ML (ref 0–4)
RBC # BLD AUTO: 2.54 M/UL (ref 4.2–5.9)
SODIUM SERPL-SCNC: 140 MMOL/L (ref 136–145)
WBC # BLD AUTO: 5.5 K/UL (ref 4–11)

## 2025-03-05 PROCEDURE — 2580000003 HC RX 258: Performed by: STUDENT IN AN ORGANIZED HEALTH CARE EDUCATION/TRAINING PROGRAM

## 2025-03-05 PROCEDURE — 6370000000 HC RX 637 (ALT 250 FOR IP): Performed by: INTERNAL MEDICINE

## 2025-03-05 PROCEDURE — 6360000002 HC RX W HCPCS: Performed by: STUDENT IN AN ORGANIZED HEALTH CARE EDUCATION/TRAINING PROGRAM

## 2025-03-05 PROCEDURE — 85610 PROTHROMBIN TIME: CPT

## 2025-03-05 PROCEDURE — 84145 PROCALCITONIN (PCT): CPT

## 2025-03-05 PROCEDURE — 80053 COMPREHEN METABOLIC PANEL: CPT

## 2025-03-05 PROCEDURE — 6360000002 HC RX W HCPCS: Performed by: INTERNAL MEDICINE

## 2025-03-05 PROCEDURE — 36415 COLL VENOUS BLD VENIPUNCTURE: CPT

## 2025-03-05 PROCEDURE — 85025 COMPLETE CBC W/AUTO DIFF WBC: CPT

## 2025-03-05 PROCEDURE — 2500000003 HC RX 250 WO HCPCS: Performed by: INTERNAL MEDICINE

## 2025-03-05 RX ORDER — METRONIDAZOLE 500 MG/1
500 TABLET ORAL 3 TIMES DAILY
Qty: 6 TABLET | Refills: 0 | Status: SHIPPED | OUTPATIENT
Start: 2025-03-05 | End: 2025-03-07

## 2025-03-05 RX ORDER — POLYETHYLENE GLYCOL 3350 17 G/17G
17 POWDER, FOR SOLUTION ORAL DAILY PRN
Qty: 30 PACKET | Refills: 0 | Status: SHIPPED | OUTPATIENT
Start: 2025-03-05 | End: 2025-04-04

## 2025-03-05 RX ORDER — FERROUS SULFATE 325(65) MG
325 TABLET ORAL
Qty: 90 TABLET | Refills: 0 | Status: SHIPPED | OUTPATIENT
Start: 2025-03-05

## 2025-03-05 RX ORDER — WARFARIN SODIUM 7.5 MG/1
7.5 TABLET ORAL DAILY
Status: DISCONTINUED | OUTPATIENT
Start: 2025-03-05 | End: 2025-03-05 | Stop reason: HOSPADM

## 2025-03-05 RX ORDER — CEFDINIR 300 MG/1
300 CAPSULE ORAL 2 TIMES DAILY
Qty: 4 CAPSULE | Refills: 0 | Status: SHIPPED | OUTPATIENT
Start: 2025-03-05 | End: 2025-03-07

## 2025-03-05 RX ADMIN — INSULIN GLARGINE 22 UNITS: 100 INJECTION, SOLUTION SUBCUTANEOUS at 08:54

## 2025-03-05 RX ADMIN — FERROUS SULFATE TAB EC 324 MG (65 MG FE EQUIVALENT) 324 MG: 324 (65 FE) TABLET DELAYED RESPONSE at 08:54

## 2025-03-05 RX ADMIN — INSULIN LISPRO 1 UNITS: 100 INJECTION, SOLUTION INTRAVENOUS; SUBCUTANEOUS at 12:15

## 2025-03-05 RX ADMIN — METRONIDAZOLE 500 MG: 500 INJECTION, SOLUTION INTRAVENOUS at 03:33

## 2025-03-05 RX ADMIN — EMPAGLIFLOZIN 10 MG: 10 TABLET, FILM COATED ORAL at 08:54

## 2025-03-05 RX ADMIN — PANTOPRAZOLE SODIUM 40 MG: 40 INJECTION, POWDER, FOR SOLUTION INTRAVENOUS at 08:55

## 2025-03-05 RX ADMIN — SENNOSIDES AND DOCUSATE SODIUM 2 TABLET: 50; 8.6 TABLET ORAL at 08:54

## 2025-03-05 RX ADMIN — SODIUM CHLORIDE, PRESERVATIVE FREE 10 ML: 5 INJECTION INTRAVENOUS at 09:06

## 2025-03-05 RX ADMIN — LEVOTHYROXINE SODIUM 112 MCG: 0.11 TABLET ORAL at 06:17

## 2025-03-05 RX ADMIN — CEFEPIME 1000 MG: 1 INJECTION, POWDER, FOR SOLUTION INTRAMUSCULAR; INTRAVENOUS at 09:03

## 2025-03-05 RX ADMIN — METOPROLOL SUCCINATE 50 MG: 50 TABLET, EXTENDED RELEASE ORAL at 08:54

## 2025-03-05 RX ADMIN — PSYLLIUM HUSK 1 PACKET: 3.4 POWDER ORAL at 08:54

## 2025-03-05 NOTE — TELEPHONE ENCOUNTER
Caller: Unspecified (Today, 12:09 PM)  Tiff (pharmacist) called to let you know that pt is getting d/c'd. She decreased his warfarin dose for 2 days, he's going home w/ 2 days of flagyl ( this has messed w/ his INR). Wanted to know if pt should come in earlier than his next appt scheduled on 3/24/25.    Spoke with Dez.  Instructed he take warfarin 7.5 mg tonight, then continue his routine weekly warfarin dosing of 7.5 mg daily except 11.25 mg on .  Keep INR appointment on 3-24-25.     Hospital INR = 2.61 (3-5-25)    Fish Chen [facility], PRS  [facility]  Anticoagulation Clinic  [address], [place] [address] [phone]    For Pharmacy Admin Tracking Only    Intervention Detail: Adherence Monitorin and Dose Adjustment: 1, reason: Therapy Optimization  Total # of Interventions Recommended: 1  Total # of Interventions Accepted: 1  Time Spent (min): 20

## 2025-03-05 NOTE — TELEPHONE ENCOUNTER
Tiff (clinical pharmacist) called [facility] stating pt is currently admitted, will be d/cing today w/ 2 days of flagyl. She decreased pts warfarin dose for the next 2 days. Pt has an appt w/ [facility] for anticoagulation on 3/24 wanted to know if pt needs to come in earlier. Pool message asking pharmacist to contact pt to discuss.

## 2025-03-05 NOTE — CARE COORDINATION
Case Management Discharge Note          Date / Time of Note: 3/5/2025 8:21 AM                  Patient Name: Morro Kevin   YOB: 1950  Diagnosis: Abdominal pain, right lower quadrant [R10.31]  Anticoagulated on warfarin [Z79.01]  Severe sepsis (HCC) [A41.9, R65.20]  Chronic kidney disease, unspecified CKD stage [N18.9]  Sepsis without acute organ dysfunction, due to unspecified organism (HCC) [A41.9]   Date / Time: 3/2/2025  4:10 PM    Financial:  Payor: MEDICARE / Plan: MEDICARE PART A AND B / Product Type: *No Product type* /      Pharmacy:    [facility]/pharmacy #[id number] - [place], [place] - [address] -  [phone] - F [phone]  [address] [address]  Phone: [phone] Fax: [phone]    [facility] - [place] - [facility] -  [phone] -  [phone]  [facility]  [place] [address]  Phone: [phone] Fax: [phone]      Assistance purchasing medications?: Potential Assistance Purchasing Medications: No (Pt has all meds filled at [facility] on [address])  Assistance provided by Case Management: None at this time    DISCHARGE Disposition: Home- No Services Needed      Transportation:  Transportation PLAN for discharge: family   Mode of Transport: Private Car      IMM Completed:   Not Indicated    Additional CM Notes: Home today via son.  No d/c needs noted.     The Plan for Transition of Care is related to the following treatment goals of Abdominal pain, right lower quadrant [R10.31]  Anticoagulated on warfarin [Z79.01]  Severe sepsis (HCC) [A41.9, R65.20]  Chronic kidney disease, unspecified CKD stage [N18.9]  Sepsis without acute organ dysfunction, due to unspecified organism (HCC) [A41.9]        RODNEY Garcia  [facility]   Case Management Department  Ph: [phone]  Fax: [phone]  Electronically signed by RODNEY Garcia on 3/5/2025 at 8:22 AM      
Residence One story   History of falls? 0   Services At/After Discharge   Transition of Care Consult (CM Consult) N/A   Services At/After Discharge None   Confirm Follow Up Transport Family

## 2025-03-05 NOTE — PLAN OF CARE
Problem: Chronic Conditions and Co-morbidities  Goal: Patient's chronic conditions and co-morbidity symptoms are monitored and maintained or improved  3/5/2025 1138 by Kandy Wakefield RN  Outcome: Adequate for Discharge  3/5/2025 0228 by Lynette Lieberman RN  Outcome: Progressing     Problem: Discharge Planning  Goal: Discharge to home or other facility with appropriate resources  3/5/2025 1138 by Kandy Wakefield RN  Outcome: Adequate for Discharge  3/5/2025 0228 by Lynette Lieberman RN  Outcome: Progressing     Problem: ABCDS Injury Assessment  Goal: Absence of physical injury  3/5/2025 1138 by Kandy Wakefield RN  Outcome: Adequate for Discharge  3/5/2025 0228 by Lynette Lieberman RN  Outcome: Progressing     Problem: Skin/Tissue Integrity - Adult  Goal: Skin integrity remains intact  3/5/2025 1138 by Kandy Wakefield RN  Outcome: Adequate for Discharge  3/5/2025 0228 by Lynette Lieberman RN  Outcome: Progressing     Problem: Gastrointestinal - Adult  Goal: Maintains or returns to baseline bowel function  3/5/2025 1138 by Kandy Wakefield RN  Outcome: Adequate for Discharge  3/5/2025 0228 by Lynette Lieberman RN  Outcome: Progressing     Problem: Genitourinary - Adult  Goal: Absence of urinary retention  3/5/2025 1138 by Kandy Wakefield RN  Outcome: Adequate for Discharge  3/5/2025 0228 by Lynette Lieberman RN  Outcome: Progressing     Problem: Metabolic/Fluid and Electrolytes - Adult  Goal: Hemodynamic stability and optimal renal function maintained  3/5/2025 1138 by Kandy Wakefield RN  Outcome: Adequate for Discharge  3/5/2025 0228 by Lynette Lieberman RN  Outcome: Progressing     Problem: Safety - Adult  Goal: Free from fall injury  3/5/2025 1138 by Kandy Wakefield RN  Outcome: Adequate for Discharge  3/5/2025 0228 by Lynette Lieberman RN  Outcome: Progressing     Problem: Pain  Goal: Verbalizes/displays adequate comfort level or baseline comfort level  3/5/2025 1138 by

## 2025-03-05 NOTE — PLAN OF CARE
Problem: Chronic Conditions and Co-morbidities  Goal: Patient's chronic conditions and co-morbidity symptoms are monitored and maintained or improved  3/5/2025 0228 by Lynette Lieberman RN  Outcome: Progressing  3/4/2025 1655 by Marianna Moulton RN  Outcome: Progressing     Problem: Discharge Planning  Goal: Discharge to home or other facility with appropriate resources  3/5/2025 0228 by Lynette Lieberman RN  Outcome: Progressing  3/4/2025 1655 by Marianna Moulton RN  Outcome: Progressing     Problem: ABCDS Injury Assessment  Goal: Absence of physical injury  3/5/2025 0228 by Lynette Lieberman RN  Outcome: Progressing  3/4/2025 1655 by Marianna Moulton RN  Outcome: Progressing     Problem: Skin/Tissue Integrity - Adult  Goal: Skin integrity remains intact  3/5/2025 0228 by Lynette Lieberman RN  Outcome: Progressing  3/4/2025 1655 by Marianna Moulton RN  Outcome: Progressing     Problem: Gastrointestinal - Adult  Goal: Maintains or returns to baseline bowel function  3/5/2025 0228 by Lynette Lieberman RN  Outcome: Progressing  3/4/2025 1655 by Marianna Moulton RN  Outcome: Progressing     Problem: Genitourinary - Adult  Goal: Absence of urinary retention  3/5/2025 0228 by Lynette Lieberman RN  Outcome: Progressing  3/4/2025 1655 by Marianna Moulton RN  Outcome: Progressing     Problem: Metabolic/Fluid and Electrolytes - Adult  Goal: Hemodynamic stability and optimal renal function maintained  3/5/2025 0228 by Lynette Lieberman RN  Outcome: Progressing  3/4/2025 1655 by Marianna Moulton RN  Outcome: Progressing     Problem: Safety - Adult  Goal: Free from fall injury  3/5/2025 0228 by Lynette Lieberman RN  Outcome: Progressing  3/4/2025 1655 by Marianna Moulton RN  Outcome: Progressing     Problem: Pain  Goal: Verbalizes/displays adequate comfort level or baseline comfort level  3/5/2025 0228 by Lynette Lieberman RN  Outcome: Progressing  3/4/2025 1655 by Marianna Moulton RN  Outcome: Progressing

## 2025-03-06 ENCOUNTER — TELEPHONE (OUTPATIENT)
Dept: FAMILY MEDICINE CLINIC | Age: 75
End: 2025-03-06

## 2025-03-06 LAB
BLOOD BANK DISPENSE STATUS: NORMAL
BLOOD BANK DISPENSE STATUS: NORMAL
BLOOD BANK PRODUCT CODE: NORMAL
BLOOD BANK PRODUCT CODE: NORMAL
BPU ID: NORMAL
BPU ID: NORMAL
DESCRIPTION BLOOD BANK: NORMAL
DESCRIPTION BLOOD BANK: NORMAL
TESTOST SERPL-MCNC: <3 NG/DL (ref 193–740)

## 2025-03-06 NOTE — DISCHARGE SUMMARY
03/02/2025 05:36 PM    GLUCOSEU >=1000 03/02/2025 05:36 PM    GLUCOSEU NEGATIVE 12/09/2011 07:17 AM    KETUA Negative 03/02/2025 05:36 PM     Urine Cultures:   Lab Results   Component Value Date/Time    LABURIN  08/01/2024 04:44 PM     <50,000 CFU/ml mixed skin/urogenital elie. No further workup     Blood Cultures: No results found for: \"BC\"  No results found for: \"BLOODCULT2\"  Organism: No results found for: \"ORG\"    Time Spent Discharging patient 32 minutes    Electronically signed by Moises Gandhi MD on 3/5/2025 at 9:49 PM

## 2025-03-06 NOTE — PROGRESS NOTES
V2.0  [facility] [place] [facility] Progress Note      Name:  Morro Kevin /Age/Sex: 1950  (74 y.o. male)   MRN & CSN:  [id number] & [id number] Encounter Date/Time: 3/4/2025 8:39 AM EST    Location:  [place] [address] PCP: Fareed Marino MD       Hospital Day: 3    Assessment and Plan:   Morro Kevin is a 74 y.o. male with mechanical aortic valve presenting with      Plan:  Severe sepsis vs. SIRS due to stercoral colitis - resolved  -evidenced by leukocytosis,tachycardia, tachypnea with elevated initial lactate  -GI consulted. Note 3/4 reviewed: Suspect stercoral colitis.  Recommend Linzess 145 mcg daily and as needed MiraLAX to prevent recurrent constipation  Anemia  -Hb down to 6.7 today.  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  -PPI  -GI note 3/4 reviewed: Patient gets mild hematochezia which may be from internal hemorrhoids radiation proctitis.  Renal failure likely contributing to anemia.  Oral iron supplements.  Okay to continue warfarin for mechanical aortic valve.  Observe stool output and hemoglobin.  Mechanical Aortic valve on coumadin  Subtherapeutic INR  -pharmacy to dose warfarin.  Monitor toxicity of warfarin with INR for bleeding risk  T2DM w/ hyperglycemia  -lantus + LDSSI  -A1C 7.8  Troponin elevation  -stable, in setting of kidney disease.  -very low suspicion of ACS.    Ppx: SCDs; AC with warfarin  Dispo: home    Subjective:     Chief Complaint: Abdominal Pain (Pt into ED from home with c/o abd pain x1 day)     Interval Hx:  Patient feels very well since having bowel movements.  Denies further abdominal pain.  Denies chest pain shortness of breath, lightheadedness or dizziness.  Denies hematochezia or melena     Review of Systems:    Review of Systems    10 point ROS negative except as stated above in \"subjective\" section    Objective:     Intake/Output Summary (Last 24 hours) at 3/4/2025 0737  Last data 
4 Eyes Skin Assessment     NAME:  Morro Kevin  YOB: 1950  MEDICAL RECORD NUMBER:  [id number]    The patient is being assessed for  Admission    I agree that at least one RN has performed a thorough Head to Toe Skin Assessment on the patient. ALL assessment sites listed below have been assessed.      Areas assessed by both nurses:    Head, Face, Ears, Shoulders, Back, Chest, Arms, Elbows, Hands, Sacrum. Buttock, Coccyx, Ischium, and Legs. Feet and Heels        Does the Patient have a Wound? No noted wound(s)       Vignesh Prevention initiated by RN: No  Wound Care Orders initiated by RN: No    Pressure Injury (Stage 3,4, Unstageable, DTI, NWPT, and Complex wounds) if present, place Wound referral order by RN under : No    New Ostomies, if present place, Ostomy referral order under : No     Nurse 1 eSignature: Electronically signed by Jenny Pinto RN on 3/3/25 at 2:47 AM EST    **SHARE this note so that the co-signing nurse can place an eSignature**    Nurse 2 eSignature: Electronically signed by Girish Anders RN on 3/3/25 at 4:55 AM EST   
Arrived pt to room via stretcher pt able to ambulate to bed with slow steady gait. Oriented to room call light bathroom. No s/s distress.   
Blood transfusion completed. No any adverse reactions noted at this time. Due medications given, well tolerated. Pt. A&O x 4. VSS. Bedside table and call light within reach. Encouraged to call for any needs.   
Blood transfusion delayed today due to delay from blood bank. Began admin at 1830.   
CLINICAL PHARMACY NOTE: MEDS TO BEDS    Total # of Prescriptions Filled: 4   The following medications were delivered to the patient:  Current Discharge Medication List        START taking these medications    Details          polyethylene glycol (GLYCOLAX) 17 g packet Take 1 packet by mouth daily as needed for Constipation  Qty: 30 packet, Refills: 0      ferrous sulfate (IRON 325) 325 (65 Fe) MG tablet Take 1 tablet by mouth daily (with breakfast)  Qty: 90 tablet, Refills: 0      metroNIDAZOLE (FLAGYL) 500 MG tablet Take 1 tablet by mouth 3 times daily for 2 days  Qty: 6 tablet, Refills: 0      cefdinir (OMNICEF) 300 MG capsule Take 1 capsule by mouth 2 times daily for 2 days  Qty: 4 capsule, Refills: 0           Linzess transferred to [facility] on [address] per pt's request      Additional Documentation: Went over instructions with patient and son in whose care the meds were left    
Clinical Pharmacy Note  Warfarin Consult    Morro Kevin is a 74 y.o. male receiving warfarin managed by pharmacy.     Warfarin Indication: Heart Valve Replacement  Target INR range: 2-3   Dose prior to admission: 11.25mg every Wednesday and 7.5mg all other days    Current warfarin drug-drug interactions: Flagyl (monitor for increased INR/bleeding), cefepime (monitor for increased INR/bleeding), levothyroxine (taking together at home), mirtazipine (taking together at home)    Recent Labs     03/02/25  1706 03/02/25  1707   HGB  --  8.9*   HCT  --  26.9*   INR 1.80*  --        Assessment/Plan:    Warfarin 7.5 mg tonight - Patient confirmed he has not taken a dose today. Daily PT/INR until stable within therapeutic range.     Thank you for the consult.  Will continue to follow.    Chacha Fofana, PharmD    
Clinical Pharmacy Note  Warfarin Consult    Morro Kevin is a 74 y.o. male receiving warfarin managed by pharmacy.     Warfarin Indication: Heart Valve Replacement  Target INR range: 2-3   Dose prior to admission: 11.25mg every Wednesday and 7.5mg all other days  Follows at [facility] for INR management       Current warfarin drug-drug interactions: Flagyl (monitor for increased INR/bleeding), cefepime (monitor for increased INR/bleeding), levothyroxine (taking together at home), mirtazipine (taking together at home)    Recent Labs     03/02/25  1706 03/02/25  1707 03/03/25  0448   HGB  --  8.9* 7.5*   HCT  --  26.9* 21.8*   INR 1.80*  --  2.01*       Assessment/Plan:    Warfarin 7.5 mg tonight.  Watch for interaction with Metronidazole.  Daily PT/INR until stable within therapeutic range.     Thank you for the consult.  Will continue to follow.    Tiff Goodrich [facility]     
Clinical Pharmacy Note  Warfarin Consult    Morro Kevin is a 74 y.o. male receiving warfarin managed by pharmacy.     Warfarin Indication: Heart Valve Replacement  Target INR range: 2-3   Dose prior to admission: 11.25mg every Wednesday and 7.5mg all other days  Follows at [facility] for INR management       Current warfarin drug-drug interactions: Flagyl (monitor for increased INR/bleeding), cefepime (monitor for increased INR/bleeding), levothyroxine (taking together at home), mirtazipine (taking together at home)    Recent Labs     03/02/25  1706 03/02/25  1707 03/03/25  0448 03/04/25  0554   HGB  --  8.9* 7.5*  --    HCT  --  26.9* 21.8*  --    INR 1.80*  --  2.01* 2.79*       Assessment/Plan:    No Warfarin tonight. INR has moved 2.01 -> 2.79  Suspect this is due to interaction with Metronidazole.  Daily PT/INR until stable within therapeutic range.     Thank you for the consult.  Will continue to follow.    Tiff Goodrich [facility]     
Clinical Pharmacy Note  Warfarin Consult    Morro Kevin is a 74 y.o. male receiving warfarin managed by pharmacy.     Warfarin Indication: Heart Valve Replacement  Target INR range: 2-3   Dose prior to admission: 11.25mg every Wednesday and 7.5mg all other days  Follows at [facility] for INR management       Current warfarin drug-drug interactions: Flagyl (monitor for increased INR/bleeding), cefepime (monitor for increased INR/bleeding), levothyroxine (taking together at home), mirtazipine (taking together at home)    Recent Labs     03/03/25  0448 03/04/25  0554 03/04/25  0555 03/04/25  2229 03/05/25  0545   HGB 7.5*  --  6.7* 7.1* 7.7*   HCT 21.8*  --  20.2* 21.4* 22.7*   INR 2.01* 2.79*  --   --  2.61*       Assessment/Plan:      Warfarin held last night due to significant jump in INR. Suspect due to flagyl that's on board.  Pt has discharge orders  Flagyl to con't for 2 more days    Would suggest 7.5 mg today and tomorrow. [facility] to see if they want him seen sooner than the appt on 3/24/25 at 1100       Thank you for the consult.  Will continue to follow.    Tiff Goodrich [facility]     
Discharge instructions and medications reviewed with patient.  Pt. Verbalized understanding. Denies questions.  Discontinued IV without complications.  Pt. tolerated well.    Electronically signed by Kandy Lai RN on 3/5/2025 at 3:09 PM    
Inpatient consult to Respiratory Care ordered for patient regarding home CPAP usage. Pt states he does use home CPAP device, but does not have it with him. Patient declines to use a hospital unit at this time.    Electronically signed by Natalio Almanza RCP on 3/2/2025 at 11:23 PM    
Patient off unit for EGD.    Electronically signed by Elisabeth Astorga RN on 3/3/2025 at 2:30 PM   
Patient returned from unit from EGD. VSS. Patient denies additional needs at this time. Call light is in reach.     Electronically signed by Elisabeth Astorga RN on 3/3/2025 at 4:45 pm   
Pt to PACU from OR. Pt vital signs WNL, on room air. Pt sleeping comfortably but easily wakes to speech/touch. Pain denies pain or nausea. IV infusing well.   
Pt vitals WNL on room air. Pt denies pain or nausea. Pt awake and alert, A&Ox4. Report called to Laury RN, 4W RN.   
Sedation provided by anesthesia. see anesthesia record for vitals and medication administration   
created by: Yinka Coffey on 3/5/2025 12:19 PM      Electronically signed by:  Moises Gandhi MD 3/5/2025 10:39 PM          
of hypertension, hyperlipidemia, asthma, type 2 diabetes, prostate cancer, mechanical heart valve on warfarin who presented on March 2, 2025 with abdominal pain and constipation.  He has been running severely constipated at home.  He has had multiple bowel movements here and since moving his bowels here he feels much better and his abdominal pain has resolved completely.  He did have a small amount of bright red blood per rectum which is not unusual for him when he gets constipated.  He has also had some dark blood in the stool.  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  At that time upper endoscopy had shown minimal gastritis and small erosions in the stomach.  Colon had dark brown stool throughout the colon lavage to get adequate views.  There is sigmoid diverticulosis but no AVMs or fresh blood identified.  There were minimal linear telangiectasias at the anal verge that did not bleed even with irrigation.  There were small internal hemorrhoids.  Admission labs showed BUN 50 and creatinine 2.7.  Baseline BUN is 48 with creatinine of 2.7.  LFTs were normal.  CBC showed a white blood count of 18.2 hemoglobin 8.9 hematocrit 26.9 and platelets 223.  Hemoglobin has dropped to 7.5 overnight.  Hemoglobin is 14.1 on January 2 and 13 0.6 on December 30.  However it was 7.4 on August 2024.  Iron studies show ferritin of 644, iron low at 17, TIBC 238 which is low, percent iron saturation of 7%.  B12 and folate are elevated.  CAT scan of the abdomen pelvis without contrast showed moderate constipation with mild focal dilation of the mid and distal sigmoid colon extending to the rectosigmoid junction which could be from a fecal impaction.  There is mild circumferential mucosal thickening along the proximal mid sigmoid region with moderate pericolonic stranding and mild fluid extending into the left pelvis along the dome of the bladder which could be due 
good bowel movements.  Would discharge on Linzess 145 mcg daily and as needed MiraLAX to prevent recurrent constipation.  Linzess is not available in the hospital so we will treat with MiraLAX at this point.    2.  Sigmoid colitis.  I suspect stercoral colitis based on the significant constipation.  His symptoms have resolved.  3.  Iron deficiency anemia.  Prior history of jejunal dieulafoy lesion.  EGD with push enteroscopy March 3, 2025 showed 2cm sliding hiatal hernia.  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  He had a colonoscopy in 2023 so I do not think this needs repeated.  He gets mild hematochezia which may be from internal hemorrhoids or radiation proctitis. Renal failure likely contributes to the anemia.  Iron studies with elevated ferritin and %sat 7.  B12 and folate normal.     Plan  1.  observe stool output and hemoglobin and reserve colonoscopy or capsule endoscopy unless has persistent drops or active bleeding.  2.  MiraLAX twice daily here.  Would discharge on Linzess 145 mcg daily with MiraLAX as needed.  3.  Regular diet.  4.  OK to continue warfarin for the aortic valve.    5.  I ordered oral iron.  Could give IV once sepsis under control.  6.  Would consider transfusion for the hemoglobin of 6.7.    Thank you for allowing me to participate in the care of your patient.  Please feel free to contact me with any concerns.  [phone]    Chace Morgan MD      
into the left pelvis and along the dome of the bladder which could be due to focal colitis and proctitis, the etiology of which is uncertain.  Recommend surgical follow-up. Radium implants in the prostate gland with no obvious pelvic mass or abscess. Perirenal scarring around both kidneys and a small right renal cyst which is unchanged with no hydronephrosis or urinary obstruction. Mild bibasilar atelectasis or early infiltrates posteriorly.     XR CHEST PORTABLE    Result Date: 3/2/2025  EXAMINATION: ONE XRAY VIEW OF THE CHEST 3/2/2025 4:34 pm COMPARISON: 08/02/2024 HISTORY: ORDERING SYSTEM PROVIDED HISTORY: short of breath one week TECHNOLOGIST PROVIDED HISTORY: Reason for exam:->short of breath one week Reason for Exam: abdominal pain FINDINGS: The left ventricle is mildly enlarged.  The pulmonary vessels are normal. There are postop changes along the mediastinum and sternum which is unchanged.  There is an old healed fracture of the left clavicle.  No consolidation or effusion is seen.     Stable chronic changes with no acute abnormality seen.       Electronically signed by Moises Gandhi MD on 3/3/2025 at 8:39 AM

## 2025-03-06 NOTE — TELEPHONE ENCOUNTER
Care Transitions Initial Follow Up Call    Outreach made within 2 business days of discharge: Yes    Patient: Morro Kevin Patient : 1950   MRN: [id number]  Reason for Admission: ABDOMINAL PAIN    Discharge Date: 3/5/25       Spoke with: PAIN     Discharge department/facility: [facility] Interactive Patient Contact:  Was patient able to fill all prescriptions: Yes  Was patient instructed to bring all medications to the follow-up visit: Yes  Is patient taking all medications as directed in the discharge summary? Yes  Does patient understand their discharge instructions: Yes  Does patient have questions or concerns that need addressed prior to 7-14 day follow up office visit: no    Additional needs identified to be addressed with provider  No needs identified             Scheduled appointment with PCP within 7-14 days    Follow Up  Future Appointments   Date Time Provider Department Center   3/24/2025 11:00 AM [facility]   [facility]   2025  9:00 AM Ton Ray MD [facility]     CALLED PT SCHED 3/13 AT 3 WITH DR. CASTRO.DARIO Banrett MA

## 2025-03-13 DIAGNOSIS — N18.4 ANEMIA DUE TO STAGE 4 CHRONIC KIDNEY DISEASE: ICD-10-CM

## 2025-03-13 DIAGNOSIS — D63.1 ANEMIA DUE TO STAGE 4 CHRONIC KIDNEY DISEASE: ICD-10-CM

## 2025-03-13 LAB
BASOPHILS # BLD: 0 K/UL (ref 0–0.2)
BASOPHILS NFR BLD: 0.4 %
DEPRECATED RDW RBC AUTO: 21.2 % (ref 12.4–15.4)
EOSINOPHIL # BLD: 0.1 K/UL (ref 0–0.6)
EOSINOPHIL NFR BLD: 1.8 %
HCT VFR BLD AUTO: 21.4 % (ref 40.5–52.5)
HGB BLD-MCNC: 7.1 G/DL (ref 13.5–17.5)
LYMPHOCYTES # BLD: 1.1 K/UL (ref 1–5.1)
LYMPHOCYTES NFR BLD: 20.1 %
MCH RBC QN AUTO: 31.5 PG (ref 26–34)
MCHC RBC AUTO-ENTMCNC: 33.2 G/DL (ref 31–36)
MCV RBC AUTO: 94.7 FL (ref 80–100)
MONOCYTES # BLD: 0.3 K/UL (ref 0–1.3)
MONOCYTES NFR BLD: 6 %
NEUTROPHILS # BLD: 4.1 K/UL (ref 1.7–7.7)
NEUTROPHILS NFR BLD: 71.7 %
PLATELET # BLD AUTO: 246 K/UL (ref 135–450)
PMV BLD AUTO: 7.9 FL (ref 5–10.5)
RBC # BLD AUTO: 2.26 M/UL (ref 4.2–5.9)
WBC # BLD AUTO: 5.7 K/UL (ref 4–11)

## 2025-03-14 ENCOUNTER — OFFICE VISIT (OUTPATIENT)
Dept: FAMILY MEDICINE CLINIC | Age: 75
End: 2025-03-14

## 2025-03-14 VITALS
BODY MASS INDEX: 29.54 KG/M2 | WEIGHT: 211 LBS | SYSTOLIC BLOOD PRESSURE: 130 MMHG | HEART RATE: 65 BPM | DIASTOLIC BLOOD PRESSURE: 78 MMHG | OXYGEN SATURATION: 97 % | HEIGHT: 71 IN

## 2025-03-14 DIAGNOSIS — D63.1 ANEMIA DUE TO STAGE 4 CHRONIC KIDNEY DISEASE: ICD-10-CM

## 2025-03-14 DIAGNOSIS — N18.4 ANEMIA DUE TO STAGE 4 CHRONIC KIDNEY DISEASE: ICD-10-CM

## 2025-03-14 DIAGNOSIS — E11.22 TYPE 2 DIABETES MELLITUS WITH STAGE 4 CHRONIC KIDNEY DISEASE, WITH LONG-TERM CURRENT USE OF INSULIN (HCC): ICD-10-CM

## 2025-03-14 DIAGNOSIS — Z79.4 TYPE 2 DIABETES MELLITUS WITH STAGE 4 CHRONIC KIDNEY DISEASE, WITH LONG-TERM CURRENT USE OF INSULIN (HCC): ICD-10-CM

## 2025-03-14 DIAGNOSIS — N18.4 TYPE 2 DIABETES MELLITUS WITH STAGE 4 CHRONIC KIDNEY DISEASE, WITH LONG-TERM CURRENT USE OF INSULIN (HCC): ICD-10-CM

## 2025-03-14 DIAGNOSIS — C61 PROSTATE CANCER (HCC): ICD-10-CM

## 2025-03-14 DIAGNOSIS — I48.0 PAROXYSMAL ATRIAL FIBRILLATION (HCC): ICD-10-CM

## 2025-03-14 DIAGNOSIS — K52.89 STERCORAL COLITIS: ICD-10-CM

## 2025-03-14 DIAGNOSIS — R65.20 SEVERE SEPSIS: Primary | ICD-10-CM

## 2025-03-14 DIAGNOSIS — Z09 HOSPITAL DISCHARGE FOLLOW-UP: ICD-10-CM

## 2025-03-14 DIAGNOSIS — A41.9 SEVERE SEPSIS: Primary | ICD-10-CM

## 2025-03-14 ASSESSMENT — PATIENT HEALTH QUESTIONNAIRE - PHQ9
8. MOVING OR SPEAKING SO SLOWLY THAT OTHER PEOPLE COULD HAVE NOTICED. OR THE OPPOSITE, BEING SO FIGETY OR RESTLESS THAT YOU HAVE BEEN MOVING AROUND A LOT MORE THAN USUAL: NOT AT ALL
SUM OF ALL RESPONSES TO PHQ QUESTIONS 1-9: 0
3. TROUBLE FALLING OR STAYING ASLEEP: NOT AT ALL
4. FEELING TIRED OR HAVING LITTLE ENERGY: NOT AT ALL
6. FEELING BAD ABOUT YOURSELF - OR THAT YOU ARE A FAILURE OR HAVE LET YOURSELF OR YOUR FAMILY DOWN: NOT AT ALL
10. IF YOU CHECKED OFF ANY PROBLEMS, HOW DIFFICULT HAVE THESE PROBLEMS MADE IT FOR YOU TO DO YOUR WORK, TAKE CARE OF THINGS AT HOME, OR GET ALONG WITH OTHER PEOPLE: NOT DIFFICULT AT ALL
5. POOR APPETITE OR OVEREATING: NOT AT ALL
SUM OF ALL RESPONSES TO PHQ QUESTIONS 1-9: 0
7. TROUBLE CONCENTRATING ON THINGS, SUCH AS READING THE NEWSPAPER OR WATCHING TELEVISION: NOT AT ALL
1. LITTLE INTEREST OR PLEASURE IN DOING THINGS: NOT AT ALL
2. FEELING DOWN, DEPRESSED OR HOPELESS: NOT AT ALL
SUM OF ALL RESPONSES TO PHQ QUESTIONS 1-9: 0
SUM OF ALL RESPONSES TO PHQ QUESTIONS 1-9: 0
9. THOUGHTS THAT YOU WOULD BE BETTER OFF DEAD, OR OF HURTING YOURSELF: NOT AT ALL

## 2025-03-14 NOTE — PROGRESS NOTES
/78 (BP Site: Right Upper Arm, Patient Position: Sitting, BP Cuff Size: Medium Adult)   Pulse 65   Ht 1.803 m (5' 10.98\")   Wt 95.7 kg (211 lb)   SpO2 97%   BMI 29.45 kg/m²   General Appearance: Obese.  Alert and oriented to person, place and time, well developed and well- nourished, in no acute distress  Skin: warm and dry, no rash or erythema  Head: normocephalic and atraumatic  Eyes: pupils equal, round, and reactive to light, extraocular eye movements intact, conjunctivae normal  ENT: tympanic membrane, external ear and ear canal normal bilaterally, nose without deformity, nasal mucosa and turbinates normal without polyps  Neck: supple and non-tender without mass, no thyromegaly or thyroid nodules, no cervical lymphadenopathy  Pulmonary/Chest: clear to auscultation bilaterally- no wheezes, rales or rhonchi, normal air movement, no respiratory distress  Cardiovascular: normal rate, regular rhythm, normal S1 and S2, no rubs, clicks, or gallops, distal pulses intact, no carotid bruits  Abdomen: Left lower quadrant tenderness.  Soft, non-distended, normal bowel sounds, no masses or organomegaly  Extremities: no cyanosis, clubbing or edema  Musculoskeletal: normal range of motion, no joint swelling, deformity or tenderness  Neurologic: reflexes normal and symmetric, no cranial nerve deficit, gait, coordination and speech normal      An electronic signature was used to authenticate this note.  --MARITA Hernandez - CNP

## 2025-03-17 PROBLEM — A41.9 SEVERE SEPSIS: Status: RESOLVED | Noted: 2025-03-02 | Resolved: 2025-03-17

## 2025-03-17 PROBLEM — R06.02 SHORTNESS OF BREATH: Status: RESOLVED | Noted: 2024-07-31 | Resolved: 2025-03-17

## 2025-03-17 PROBLEM — R65.20 SEVERE SEPSIS: Status: RESOLVED | Noted: 2025-03-02 | Resolved: 2025-03-17

## 2025-03-21 ENCOUNTER — HOSPITAL ENCOUNTER (OUTPATIENT)
Age: 75
Setting detail: OBSERVATION
LOS: 1 days | Discharge: HOME | End: 2025-03-22
Admitting: INTERNAL MEDICINE
Payer: MEDICARE

## 2025-03-21 ENCOUNTER — RESULTS FOLLOW-UP (OUTPATIENT)
Dept: FAMILY MEDICINE CLINIC | Age: 75
End: 2025-03-21

## 2025-03-21 DIAGNOSIS — R19.5 OCCULT BLOOD POSITIVE STOOL: ICD-10-CM

## 2025-03-21 DIAGNOSIS — D63.1 ANEMIA DUE TO STAGE 4 CHRONIC KIDNEY DISEASE: ICD-10-CM

## 2025-03-21 DIAGNOSIS — D64.9 ACUTE ON CHRONIC ANEMIA: Primary | ICD-10-CM

## 2025-03-21 DIAGNOSIS — N18.4 ANEMIA DUE TO STAGE 4 CHRONIC KIDNEY DISEASE: ICD-10-CM

## 2025-03-21 LAB
ABO + RH BLD: NORMAL
ALBUMIN SERPL-MCNC: 4 G/DL (ref 3.4–5)
ALBUMIN/GLOB SERPL: 1.9 {RATIO} (ref 1.1–2.2)
ALP SERPL-CCNC: 70 U/L (ref 40–129)
ALT SERPL-CCNC: 20 U/L (ref 10–40)
ANION GAP SERPL CALCULATED.3IONS-SCNC: 12 MMOL/L (ref 3–16)
AST SERPL-CCNC: 22 U/L (ref 15–37)
BASOPHILS # BLD: 0 K/UL (ref 0–0.2)
BASOPHILS # BLD: 0 K/UL (ref 0–0.2)
BASOPHILS NFR BLD: 0.7 %
BASOPHILS NFR BLD: 0.9 %
BILIRUB SERPL-MCNC: 0.3 MG/DL (ref 0–1)
BILIRUB UR QL STRIP.AUTO: NEGATIVE
BLD GP AB SCN SERPL QL: NORMAL
BLOOD GROUP ANTIBODIES SERPL: NORMAL
BUN SERPL-MCNC: 36 MG/DL (ref 7–20)
CALCIUM SERPL-MCNC: 9.1 MG/DL (ref 8.3–10.6)
CHLORIDE SERPL-SCNC: 109 MMOL/L (ref 99–110)
CLARITY UR: CLEAR
CO2 SERPL-SCNC: 21 MMOL/L (ref 21–32)
COLOR UR: YELLOW
CREAT SERPL-MCNC: 2.5 MG/DL (ref 0.8–1.3)
DAT IGG CAPTURE: NORMAL
DEPRECATED RDW RBC AUTO: 21.4 % (ref 12.4–15.4)
DEPRECATED RDW RBC AUTO: 21.7 % (ref 12.4–15.4)
EOSINOPHIL # BLD: 0.1 K/UL (ref 0–0.6)
EOSINOPHIL # BLD: 0.1 K/UL (ref 0–0.6)
EOSINOPHIL NFR BLD: 1.7 %
EOSINOPHIL NFR BLD: 2 %
GFR SERPLBLD CREATININE-BSD FMLA CKD-EPI: 27 ML/MIN/{1.73_M2}
GLUCOSE SERPL-MCNC: 149 MG/DL (ref 70–99)
GLUCOSE UR STRIP.AUTO-MCNC: >=1000 MG/DL
HCT VFR BLD AUTO: 18.8 % (ref 40.5–52.5)
HCT VFR BLD AUTO: 19.5 % (ref 40.5–52.5)
HEMOCCULT STL QL: ABNORMAL
HGB BLD-MCNC: 6.2 G/DL (ref 13.5–17.5)
HGB BLD-MCNC: 6.5 G/DL (ref 13.5–17.5)
HGB UR QL STRIP.AUTO: NEGATIVE
KETONES UR STRIP.AUTO-MCNC: NEGATIVE MG/DL
LEUKOCYTE ESTERASE UR QL STRIP.AUTO: NEGATIVE
LYMPHOCYTES # BLD: 0.8 K/UL (ref 1–5.1)
LYMPHOCYTES # BLD: 0.8 K/UL (ref 1–5.1)
LYMPHOCYTES NFR BLD: 18.9 %
LYMPHOCYTES NFR BLD: 20.6 %
MCH RBC QN AUTO: 32.5 PG (ref 26–34)
MCH RBC QN AUTO: 33 PG (ref 26–34)
MCHC RBC AUTO-ENTMCNC: 32.8 G/DL (ref 31–36)
MCHC RBC AUTO-ENTMCNC: 33.1 G/DL (ref 31–36)
MCV RBC AUTO: 99.1 FL (ref 80–100)
MCV RBC AUTO: 99.8 FL (ref 80–100)
MONOCYTES # BLD: 0.3 K/UL (ref 0–1.3)
MONOCYTES # BLD: 0.3 K/UL (ref 0–1.3)
MONOCYTES NFR BLD: 7.5 %
MONOCYTES NFR BLD: 7.9 %
NEUTROPHILS # BLD: 2.7 K/UL (ref 1.7–7.7)
NEUTROPHILS # BLD: 3.1 K/UL (ref 1.7–7.7)
NEUTROPHILS NFR BLD: 69 %
NEUTROPHILS NFR BLD: 70.8 %
NITRITE UR QL STRIP.AUTO: NEGATIVE
PH UR STRIP.AUTO: 6.5 [PH] (ref 5–8)
PLATELET # BLD AUTO: 181 K/UL (ref 135–450)
PLATELET # BLD AUTO: 187 K/UL (ref 135–450)
PMV BLD AUTO: 7.8 FL (ref 5–10.5)
PMV BLD AUTO: 8.1 FL (ref 5–10.5)
POTASSIUM SERPL-SCNC: 3.9 MMOL/L (ref 3.5–5.1)
PROT SERPL-MCNC: 6.1 G/DL (ref 6.4–8.2)
PROT UR STRIP.AUTO-MCNC: NEGATIVE MG/DL
RBC # BLD AUTO: 1.9 M/UL (ref 4.2–5.9)
RBC # BLD AUTO: 1.96 M/UL (ref 4.2–5.9)
SODIUM SERPL-SCNC: 142 MMOL/L (ref 136–145)
SP GR UR STRIP.AUTO: 1.01 (ref 1–1.03)
UA COMPLETE W REFLEX CULTURE PNL UR: ABNORMAL
UA DIPSTICK W REFLEX MICRO PNL UR: ABNORMAL
URN SPEC COLLECT METH UR: ABNORMAL
UROBILINOGEN UR STRIP-ACNC: 0.2 E.U./DL
WBC # BLD AUTO: 3.9 K/UL (ref 4–11)
WBC # BLD AUTO: 4.4 K/UL (ref 4–11)

## 2025-03-21 PROCEDURE — 86870 RBC ANTIBODY IDENTIFICATION: CPT

## 2025-03-21 PROCEDURE — 86900 BLOOD TYPING SEROLOGIC ABO: CPT

## 2025-03-21 PROCEDURE — 36415 COLL VENOUS BLD VENIPUNCTURE: CPT

## 2025-03-21 PROCEDURE — 81003 URINALYSIS AUTO W/O SCOPE: CPT

## 2025-03-21 PROCEDURE — P9016 RBC LEUKOCYTES REDUCED: HCPCS

## 2025-03-21 PROCEDURE — 82270 OCCULT BLOOD FECES: CPT

## 2025-03-21 PROCEDURE — 80053 COMPREHEN METABOLIC PANEL: CPT

## 2025-03-21 PROCEDURE — 86901 BLOOD TYPING SEROLOGIC RH(D): CPT

## 2025-03-21 PROCEDURE — 36430 TRANSFUSION BLD/BLD COMPNT: CPT

## 2025-03-21 PROCEDURE — 86902 BLOOD TYPE ANTIGEN DONOR EA: CPT

## 2025-03-21 PROCEDURE — 86880 COOMBS TEST DIRECT: CPT

## 2025-03-21 PROCEDURE — 86922 COMPATIBILITY TEST ANTIGLOB: CPT

## 2025-03-21 PROCEDURE — 85025 COMPLETE CBC W/AUTO DIFF WBC: CPT

## 2025-03-21 PROCEDURE — 86850 RBC ANTIBODY SCREEN: CPT

## 2025-03-21 PROCEDURE — 99285 EMERGENCY DEPT VISIT HI MDM: CPT

## 2025-03-21 RX ORDER — SODIUM CHLORIDE 9 MG/ML
INJECTION, SOLUTION INTRAVENOUS PRN
Status: DISCONTINUED | OUTPATIENT
Start: 2025-03-21 | End: 2025-03-22 | Stop reason: HOSPADM

## 2025-03-21 ASSESSMENT — ENCOUNTER SYMPTOMS
SHORTNESS OF BREATH: 1
ABDOMINAL PAIN: 0
CONSTIPATION: 1
EYE PAIN: 0
COUGH: 0
VOMITING: 0
BACK PAIN: 0
NAUSEA: 0
SORE THROAT: 0

## 2025-03-21 ASSESSMENT — PAIN - FUNCTIONAL ASSESSMENT: PAIN_FUNCTIONAL_ASSESSMENT: NONE - DENIES PAIN

## 2025-03-21 NOTE — ED PROVIDER NOTES
person, place, and time.   Psychiatric:         Behavior: Behavior normal.         MEDICAL DECISION MAKING    Vitals:    Vitals:    03/22/25 0000 03/22/25 0015 03/22/25 0030 03/22/25 0045   BP: 122/65 (!) 129/59 121/64 134/76   Pulse: 63 64 64 68   Resp: 15 12 13 16   Temp:       TempSrc:       SpO2: 99% 100% 100% 100%   Weight:       Height:           LABS:  Labs Reviewed   CBC WITH AUTO DIFFERENTIAL - Abnormal; Notable for the following components:       Result Value    RBC 1.96 (*)     Hemoglobin 6.5 (*)     Hematocrit 19.5 (*)     RDW 21.4 (*)     Lymphocytes Absolute 0.8 (*)     All other components within normal limits    Narrative:     CALL  [facility] tel. [phone],  Hematology results called to and read back by Yashira RANDALL RN, 03/21/2025  15:55, by [facility]   COMPREHENSIVE METABOLIC PANEL W/ REFLEX TO MG FOR LOW K - Abnormal; Notable for the following components:    Glucose 149 (*)     BUN 36 (*)     Creatinine 2.5 (*)     Est, Glom Filt Rate 27 (*)     Total Protein 6.1 (*)     All other components within normal limits   BLOOD OCCULT STOOL DIAGNOSTIC - Abnormal; Notable for the following components:    Occult Blood Diagnostic   (*)     Value: Result: POSITIVE  Normal range: Negative      All other components within normal limits    Narrative:     ORDER#: [id number]                          ORDERED BY: YEISON PERALTA  SOURCE: Stool                              COLLECTED:  03/21/25 16:12  ANTIBIOTICS AT [facility].:                      RECEIVED :  03/21/25 16:30   URINALYSIS WITH REFLEX TO CULTURE - Abnormal; Notable for the following components:    Glucose, Ur >=1000 (*)     All other components within normal limits   HEMOGLOBIN AND HEMATOCRIT - Abnormal; Notable for the following components:    Hemoglobin 6.5 (*)     Hematocrit 19.4 (*)     All other components within normal limits    Narrative:     CALL  [facility] tel. [phone],  Hematology results called to and read back by Kerry FLORES RN,            PATIENT REFERRED TO:  No follow-up provider specified.    DISCHARGE MEDICATIONS:  New Prescriptions    No medications on file       DISCONTINUED MEDICATIONS:  Discontinued Medications    No medications on file              (Please note the MDM and HPI sections of this note were completed with a voice recognition program.  Efforts were made to edit the dictations but occasionally words are mis-transcribed.)    Electronically signed, Rojelio Nolasco PA-C,          Rojelio Nolasco PA-C  03/22/25 0147

## 2025-03-21 NOTE — CONSENT
Informed Consent for Blood Component Transfusion Note    I have discussed with the patient the rationale for blood component transfusion; its benefits in treating or preventing fatigue, organ damage, or death; and its risk which includes mild transfusion reactions, rare risk of blood borne infection, or more serious but rare reactions. I have discussed the alternatives to transfusion, including the risk and consequences of not receiving transfusion. The patient had an opportunity to ask questions and had agreed to proceed with transfusion of blood components.    Electronically signed by Rojelio Nolasco PA-C on 3/21/25 at 4:24 PM EDT

## 2025-03-22 VITALS
BODY MASS INDEX: 29.76 KG/M2 | HEIGHT: 70 IN | TEMPERATURE: 98.2 F | HEART RATE: 57 BPM | RESPIRATION RATE: 16 BRPM | WEIGHT: 207.89 LBS | SYSTOLIC BLOOD PRESSURE: 122 MMHG | OXYGEN SATURATION: 100 % | DIASTOLIC BLOOD PRESSURE: 68 MMHG

## 2025-03-22 PROBLEM — D64.9 ACUTE ON CHRONIC ANEMIA: Status: ACTIVE | Noted: 2025-03-22

## 2025-03-22 PROBLEM — K92.2 GI BLEED: Status: ACTIVE | Noted: 2025-03-22

## 2025-03-22 LAB
APTT BLD: 28.8 SEC (ref 22.1–36.4)
BLOOD BANK DISPENSE STATUS: NORMAL
BLOOD BANK DISPENSE STATUS: NORMAL
BLOOD BANK PRODUCT CODE: NORMAL
BLOOD BANK PRODUCT CODE: NORMAL
BPU ID: NORMAL
BPU ID: NORMAL
DESCRIPTION BLOOD BANK: NORMAL
DESCRIPTION BLOOD BANK: NORMAL
GLUCOSE BLD-MCNC: 134 MG/DL (ref 70–99)
HCT VFR BLD AUTO: 19.4 % (ref 40.5–52.5)
HCT VFR BLD AUTO: 22.7 % (ref 40.5–52.5)
HCT VFR BLD AUTO: 23.3 % (ref 40.5–52.5)
HGB BLD-MCNC: 6.5 G/DL (ref 13.5–17.5)
HGB BLD-MCNC: 7.6 G/DL (ref 13.5–17.5)
HGB BLD-MCNC: 7.9 G/DL (ref 13.5–17.5)
INR PPP: 2 (ref 0.85–1.15)
PERFORMED ON: ABNORMAL
PROTHROMBIN TIME: 22.7 SEC (ref 11.9–14.9)

## 2025-03-22 PROCEDURE — G0378 HOSPITAL OBSERVATION PER HR: HCPCS

## 2025-03-22 PROCEDURE — 85018 HEMOGLOBIN: CPT

## 2025-03-22 PROCEDURE — 2500000003 HC RX 250 WO HCPCS

## 2025-03-22 PROCEDURE — 85730 THROMBOPLASTIN TIME PARTIAL: CPT

## 2025-03-22 PROCEDURE — 85610 PROTHROMBIN TIME: CPT

## 2025-03-22 PROCEDURE — 36415 COLL VENOUS BLD VENIPUNCTURE: CPT

## 2025-03-22 PROCEDURE — 6360000002 HC RX W HCPCS

## 2025-03-22 PROCEDURE — 6370000000 HC RX 637 (ALT 250 FOR IP)

## 2025-03-22 PROCEDURE — 6370000000 HC RX 637 (ALT 250 FOR IP): Performed by: INTERNAL MEDICINE

## 2025-03-22 PROCEDURE — 94760 N-INVAS EAR/PLS OXIMETRY 1: CPT

## 2025-03-22 PROCEDURE — 85014 HEMATOCRIT: CPT

## 2025-03-22 PROCEDURE — 36430 TRANSFUSION BLD/BLD COMPNT: CPT

## 2025-03-22 RX ORDER — SODIUM CHLORIDE 0.9 % (FLUSH) 0.9 %
5-40 SYRINGE (ML) INJECTION EVERY 12 HOURS SCHEDULED
Status: DISCONTINUED | OUTPATIENT
Start: 2025-03-22 | End: 2025-03-22 | Stop reason: HOSPADM

## 2025-03-22 RX ORDER — ACETAMINOPHEN 325 MG/1
650 TABLET ORAL EVERY 6 HOURS PRN
Status: DISCONTINUED | OUTPATIENT
Start: 2025-03-22 | End: 2025-03-22 | Stop reason: HOSPADM

## 2025-03-22 RX ORDER — ONDANSETRON 2 MG/ML
4 INJECTION INTRAMUSCULAR; INTRAVENOUS EVERY 6 HOURS PRN
Status: DISCONTINUED | OUTPATIENT
Start: 2025-03-22 | End: 2025-03-22 | Stop reason: HOSPADM

## 2025-03-22 RX ORDER — LEVOTHYROXINE SODIUM 112 UG/1
112 TABLET ORAL DAILY
Status: DISCONTINUED | OUTPATIENT
Start: 2025-03-22 | End: 2025-03-22 | Stop reason: HOSPADM

## 2025-03-22 RX ORDER — SODIUM CHLORIDE 9 MG/ML
INJECTION, SOLUTION INTRAVENOUS PRN
Status: DISCONTINUED | OUTPATIENT
Start: 2025-03-22 | End: 2025-03-22 | Stop reason: HOSPADM

## 2025-03-22 RX ORDER — INSULIN GLARGINE 100 [IU]/ML
8 INJECTION, SOLUTION SUBCUTANEOUS NIGHTLY
Status: DISCONTINUED | OUTPATIENT
Start: 2025-03-22 | End: 2025-03-22 | Stop reason: HOSPADM

## 2025-03-22 RX ORDER — TROSPIUM CHLORIDE 20 MG/1
20 TABLET, FILM COATED ORAL NIGHTLY
Status: DISCONTINUED | OUTPATIENT
Start: 2025-03-22 | End: 2025-03-22 | Stop reason: HOSPADM

## 2025-03-22 RX ORDER — METOPROLOL SUCCINATE 50 MG/1
50 TABLET, EXTENDED RELEASE ORAL DAILY
Status: DISCONTINUED | OUTPATIENT
Start: 2025-03-22 | End: 2025-03-22 | Stop reason: HOSPADM

## 2025-03-22 RX ORDER — FUROSEMIDE 40 MG/1
40 TABLET ORAL DAILY PRN
Status: DISCONTINUED | OUTPATIENT
Start: 2025-03-22 | End: 2025-03-22 | Stop reason: HOSPADM

## 2025-03-22 RX ORDER — GLUCAGON 1 MG/ML
1 KIT INJECTION PRN
Status: DISCONTINUED | OUTPATIENT
Start: 2025-03-22 | End: 2025-03-22 | Stop reason: HOSPADM

## 2025-03-22 RX ORDER — MIRTAZAPINE 30 MG/1
30 TABLET, FILM COATED ORAL NIGHTLY
Status: DISCONTINUED | OUTPATIENT
Start: 2025-03-22 | End: 2025-03-22 | Stop reason: HOSPADM

## 2025-03-22 RX ORDER — SODIUM CHLORIDE 0.9 % (FLUSH) 0.9 %
5-40 SYRINGE (ML) INJECTION PRN
Status: DISCONTINUED | OUTPATIENT
Start: 2025-03-22 | End: 2025-03-22 | Stop reason: HOSPADM

## 2025-03-22 RX ORDER — SEVELAMER CARBONATE 800 MG/1
800 TABLET, FILM COATED ORAL
Status: DISCONTINUED | OUTPATIENT
Start: 2025-03-22 | End: 2025-03-22 | Stop reason: HOSPADM

## 2025-03-22 RX ORDER — ONDANSETRON 4 MG/1
4 TABLET, ORALLY DISINTEGRATING ORAL EVERY 8 HOURS PRN
Status: DISCONTINUED | OUTPATIENT
Start: 2025-03-22 | End: 2025-03-22 | Stop reason: HOSPADM

## 2025-03-22 RX ORDER — DEXTROSE MONOHYDRATE 100 MG/ML
INJECTION, SOLUTION INTRAVENOUS CONTINUOUS PRN
Status: DISCONTINUED | OUTPATIENT
Start: 2025-03-22 | End: 2025-03-22 | Stop reason: HOSPADM

## 2025-03-22 RX ORDER — HYDROXYZINE HYDROCHLORIDE 25 MG/1
25 TABLET, FILM COATED ORAL NIGHTLY
Status: DISCONTINUED | OUTPATIENT
Start: 2025-03-22 | End: 2025-03-22 | Stop reason: HOSPADM

## 2025-03-22 RX ORDER — INSULIN LISPRO 100 [IU]/ML
0-4 INJECTION, SOLUTION INTRAVENOUS; SUBCUTANEOUS
Status: DISCONTINUED | OUTPATIENT
Start: 2025-03-22 | End: 2025-03-22 | Stop reason: HOSPADM

## 2025-03-22 RX ORDER — SENNA AND DOCUSATE SODIUM 50; 8.6 MG/1; MG/1
2 TABLET, FILM COATED ORAL 2 TIMES DAILY
Status: DISCONTINUED | OUTPATIENT
Start: 2025-03-22 | End: 2025-03-22 | Stop reason: HOSPADM

## 2025-03-22 RX ORDER — ACETAMINOPHEN 650 MG/1
650 SUPPOSITORY RECTAL EVERY 6 HOURS PRN
Status: DISCONTINUED | OUTPATIENT
Start: 2025-03-22 | End: 2025-03-22 | Stop reason: HOSPADM

## 2025-03-22 RX ORDER — FERROUS SULFATE 324(65)MG
324 TABLET, DELAYED RELEASE (ENTERIC COATED) ORAL
Status: DISCONTINUED | OUTPATIENT
Start: 2025-03-22 | End: 2025-03-22 | Stop reason: HOSPADM

## 2025-03-22 RX ORDER — INSULIN GLARGINE 100 [IU]/ML
10 INJECTION, SOLUTION SUBCUTANEOUS NIGHTLY
Status: DISCONTINUED | OUTPATIENT
Start: 2025-03-22 | End: 2025-03-22 | Stop reason: SDUPTHER

## 2025-03-22 RX ORDER — TAMSULOSIN HYDROCHLORIDE 0.4 MG/1
0.4 CAPSULE ORAL NIGHTLY
Status: DISCONTINUED | OUTPATIENT
Start: 2025-03-22 | End: 2025-03-22 | Stop reason: HOSPADM

## 2025-03-22 RX ORDER — ROSUVASTATIN CALCIUM 40 MG/1
40 TABLET, COATED ORAL EVERY EVENING
Status: DISCONTINUED | OUTPATIENT
Start: 2025-03-22 | End: 2025-03-22 | Stop reason: HOSPADM

## 2025-03-22 RX ADMIN — EMPAGLIFLOZIN 10 MG: 10 TABLET, FILM COATED ORAL at 13:24

## 2025-03-22 RX ADMIN — FERROUS SULFATE TAB EC 324 MG (65 MG FE EQUIVALENT) 324 MG: 324 (65 FE) TABLET DELAYED RESPONSE at 09:30

## 2025-03-22 RX ADMIN — METOPROLOL SUCCINATE 50 MG: 50 TABLET, EXTENDED RELEASE ORAL at 09:30

## 2025-03-22 RX ADMIN — LEVOTHYROXINE SODIUM 112 MCG: 0.11 TABLET ORAL at 06:27

## 2025-03-22 RX ADMIN — SODIUM CHLORIDE, PRESERVATIVE FREE 40 MG: 5 INJECTION INTRAVENOUS at 06:28

## 2025-03-22 ASSESSMENT — LIFESTYLE VARIABLES
HOW MANY STANDARD DRINKS CONTAINING ALCOHOL DO YOU HAVE ON A TYPICAL DAY: PATIENT DOES NOT DRINK
HOW OFTEN DO YOU HAVE A DRINK CONTAINING ALCOHOL: NEVER
HOW OFTEN DO YOU HAVE A DRINK CONTAINING ALCOHOL: 2-4 TIMES A MONTH
HOW MANY STANDARD DRINKS CONTAINING ALCOHOL DO YOU HAVE ON A TYPICAL DAY: 1 OR 2

## 2025-03-22 ASSESSMENT — PAIN - FUNCTIONAL ASSESSMENT: PAIN_FUNCTIONAL_ASSESSMENT: ACTIVITIES ARE NOT PREVENTED

## 2025-03-22 ASSESSMENT — PAIN SCALES - GENERAL: PAINLEVEL_OUTOF10: 0

## 2025-03-22 NOTE — CARE COORDINATION
Case Management Assessment  Initial Evaluation    Date/Time of Evaluation: 3/22/2025 1:40 PM  Assessment Completed by: Alana Bryant RN    If patient is discharged prior to next notation, then this note serves as note for discharge by case management.    Patient Name: Morro Kevin                   YOB: 1950  Diagnosis: Occult blood positive stool [R19.5]  Acute on chronic anemia [D64.9]                   Date / Time: 3/21/2025  3:23 PM    Patient Admission Status: Inpatient   Readmission Risk (Low < 19, Mod (19-27), High > 27): Readmission Risk Score: 24.6    Current PCP: Lavell Ovalles APRN - CNP  PCP verified by CM? Yes    Chart Reviewed: Yes      History Provided by: Patient  Patient Orientation: Alert and Oriented, Person, Place, Situation, Self    Patient Cognition: Alert    Hospitalization in the last 30 days (Readmission):  Yes    If yes, Readmission Assessment in CM Navigator will be completed.    Advance Directives:      Code Status: Full Code   Patient's Primary Decision Maker is: Named in Scanned ACP Document    Primary Decision Maker: RahulJoe navarro Child - [phone]    Secondary Decision Maker: rahullara navarro Child - [phone]    Secondary Decision Maker: whitley kevin Child - [phone]    Secondary Decision Maker: rahulsheila navarro Child - [phone]    Discharge Planning:    Patient lives with: Children Type of Home: Apartment  Primary Care Giver: Self  Patient Support Systems include: Children   Current Financial resources: Medicare  Current community resources: None  Current services prior to admission: C-pap            Current DME:              Type of Home Care services:  None    ADLS  Prior functional level: Independent in ADLs/IADLs  Current functional level: Independent in ADLs/IADLs    PT AM-PAC:   /24  OT AM-PAC:   /24    Family can provide assistance at DC: Yes  Would you like Case Management to discuss the discharge plan with any other family members/significant  others, and if so, who? Yes (OK to talk to children)  Plans to Return to Present Housing: Yes  Other Identified Issues/Barriers to RETURNING to current housing: yes  Potential Assistance needed at discharge: N/A            Potential DME:    Patient expects to discharge to: Apartment  Plan for transportation at discharge: Family    Financial    Payor: MEDICARE / Plan: MEDICARE PART A AND B / Product Type: *No Product type* /     Does insurance require precert for SNF: No    Potential assistance Purchasing Medications: No  Meds-to-Beds request:        [facility]/pharmacy #[id number] - [place], [place] - [address] - P [phone] - F [phone]  [address] [address]  Phone: [phone] Fax: [phone]    [facility] [place] - [facility] -  [phone] - F [phone]  [facility]  [place] [address]  Phone: [phone] Fax: [phone]      Notes:    Factors facilitating achievement of predicted outcomes: Family support, Motivated, Cooperative, and Pleasant    Barriers to discharge: None    Additional Case Management Notes:     The Plan for Transition of Care is related to the following treatment goals of Occult blood positive stool [R19.5]  Acute on chronic anemia [D64.9]    PLAN: From home with son. Pt was independent with all self care PTA. Pt plans to return home at d/c and stated his children will transport pt home at d/c.     Alana Bryant RN  Case Management Department    Electronically signed by Alana Bryant RN on 3/22/2025 at 1:41 PM

## 2025-03-22 NOTE — CARE COORDINATION
Patient downgraded to Obs. Notified by UR. Code 44 letter printed. CATHERINE Perez, to give Code 44 letter to patient (if he has not discharged yet).     Electronically signed by Alana Bryant RN MSN on 3/22/2025 at 4:10 PM

## 2025-03-22 NOTE — FLOWSHEET NOTE
Admitted to room 4264 via w/c from ED. Pleasant and cooperative. Oriented to room and call system. Denies pain or discomfort. Telemetry applied and verified.

## 2025-03-22 NOTE — PLAN OF CARE
Problem: Chronic Conditions and Co-morbidities  Goal: Patient's chronic conditions and co-morbidity symptoms are monitored and maintained or improved  Outcome: Progressing     Problem: Discharge Planning  Goal: Discharge to home or other facility with appropriate resources  Outcome: Progressing  Flowsheets (Taken 3/22/2025 0602)  Discharge to home or other facility with appropriate resources:   Identify barriers to discharge with patient and caregiver   Arrange for needed discharge resources and transportation as appropriate   Identify discharge learning needs (meds, wound care, etc)     Problem: ABCDS Injury Assessment  Goal: Absence of physical injury  Outcome: Progressing  Flowsheets (Taken 3/22/2025 3954)  Absence of Physical Injury: Implement safety measures based on patient assessment

## 2025-03-22 NOTE — PROGRESS NOTES
Patient Discharged. Discharged instructions provided; verbalized understanding. IV removed without complications. Transported patient on a wheel chair down to his ride to home.

## 2025-03-22 NOTE — ED NOTES
Transfusion blood reached vein at 2103, patient will be observed for 15minutes from this time until 2118

## 2025-03-22 NOTE — CONSULTS
Consultation Note    Patient Name: Morro Kevin  : 1950  Age: 74 y.o.     Admitting Physician: Caitlin Becerra MD   Date of Admission: 3/21/2025  3:23 PM   Primary Care Physician: Lavell Ovalles, APRN - CNP        Morro Kevin is being seen at the request of Caitlin Becerra MD for anemia, positive occult blood..    History of Present Illness:  74-year-old male with past medical history of aortic valve stenosis post replacement, on warfarin, chronic anemia, chronic kidney disease, hypothyroidism, metastatic prostate cancer, diabetes presents with a low hemoglobin notified by his primary care physician.  Hemoglobin was 6.2.  He stated that he had multiple similar presentations in the last year.  He denies specific GI symptoms.  He states that he has been elevated constipated.  Denies noticing black tarry stools.  Denies abdominal pain.  Denies hematemesis or coffee-ground emesis.  Patient was admitted.  Patient states that he is feeling significantly better after the blood transfusion.  He typically feels fatigued when his hemoglobin is below 7.  He denies abdominal pain.  Denies nausea or vomiting.  Denies other GI symptoms.    GI History:  Previous evaluation included:    Procedure: Esophagogastroduodenoscopy with push enteroscopy with APC for a jejunal AVM on 3/3/2025    Findings:  1.  The esophagus appeared normal without evidence of Leslie's esophagus or reflux esophagitis.  2.  2cm sliding hiatal hernia without Oliver's ulcers  3.  Normal stomach.  4.  Normal duodenum.  5.  2mm non-bleeding AVM proximal jejunum ablated with argon plasma coagulation without bleeding. Otherwise normal jejunum.        Esophagogastroduodenoscopy  and push enteroscopy procedure Note 7/3/2023    Findings: The scope was advanced into the esophagus, through the GE junction, stomach, duodenal bulb and descending duodenum.  Subsequently the scope was advanced into the proximal jejunum. Fresh blood was noted in  MD    [facility]    [phone]. Also available via Perfect Serve    Please note that some or all of this record was generated using voice recognition software. If there are any questions about the content of this document, please contact the author as some errors in translation may have occurred.

## 2025-03-22 NOTE — DISCHARGE INSTR - COC
25 94.3 kg (207 lb 14.3 oz)     Mental Status:  {IP PT MENTAL STATUS:}    IV Access:  { DAISHA IV ACCESS:112242203}    Nursing Mobility/ADLs:  Walking   {CHP DME ADLs:222084018}  Transfer  {CHP DME ADLs:434362033}  Bathing  {CHP DME ADLs:209104780}  Dressing  {CHP DME ADLs:940182623}  Toileting  {CHP DME ADLs:718531180}  Feeding  {CHP DME ADLs:464920663}  Med Admin  {CHP DME ADLs:583714844}  Med Delivery   { DAISHA MED Delivery:167382128}    Wound Care Documentation and Therapy:        Elimination:  Continence:   Bowel: {YES / NO:}  Bladder: {YES / NO:}  Urinary Catheter: {Urinary Catheter:297126065}   Colostomy/Ileostomy/Ileal Conduit: {YES / NO:}       Date of Last BM: ***    Intake/Output Summary (Last 24 hours) at 3/22/2025 1527  Last data filed at 3/22/2025 0455  Gross per 24 hour   Intake 672.59 ml   Output --   Net 672.59 ml     I/O last 3 completed shifts:  In: 672.6 [Blood:672.6]  Out: -     Safety Concerns:     { DAISHA Safety Concerns:541469263}    Impairments/Disabilities:      { DAISHA Impairments/Disabilities:497215161}    Nutrition Therapy:  Current Nutrition Therapy:   { DAISHA Diet List:097803621}    Routes of Feeding: {[facility] DME Other Feedings:977060867}  Liquids: {Slp liquid thickness:05327}  Daily Fluid Restriction: {CHP DME Yes amt example:122023959}  Last Modified Barium Swallow with Video (Video Swallowing Test): {Done Not Done Date:}    Treatments at the Time of Hospital Discharge:   Respiratory Treatments: ***  Oxygen Therapy:  {Therapy; copd oxygen:97626}  Ventilator:    { CC Vent List:980244816}    Rehab Therapies: {THERAPEUTIC INTERVENTION:0209527559}  Weight Bearing Status/Restrictions: { CC Weight Bearin}  Other Medical Equipment (for information only, NOT a DME order):  {EQUIPMENT:278833911}  Other Treatments: ***    Patient's personal belongings (please select all that are sent with patient):  {MIHIR DME Belongings:053398807}    RN SIGNATURE:   {Esignature:194078961}    CASE MANAGEMENT/SOCIAL WORK SECTION    Inpatient Status Date: ***    Readmission Risk Assessment Score:  [facility] RISK OF UNPLANNED READMISSION 2.0             24.6 Total Score        Discharging to Facility/ Agency   Name:   Address:  Phone:  Fax:    Dialysis Facility (if applicable)   Name:  Address:  Dialysis Schedule:  Phone:  Fax:    / signature: {Esignature:264258623}    PHYSICIAN SECTION    Prognosis: {Prognosis:121952}    Condition at Discharge: { Patient Condition:596758713}    Rehab Potential (if transferring to Rehab): {Prognosis:5752112817}    Recommended Labs or Other Treatments After Discharge: ***    Physician Certification: I certify the above information and transfer of Morro Kevin  is necessary for the continuing treatment of the diagnosis listed and that he requires {Admit to Appropriate Level of Care:13740} for {GREATER/LESS:324121516} 30 days.     Update Admission H&P: {CHP DME Changes in HandP:195430266}    PHYSICIAN SIGNATURE:  {Esignature:255890938}

## 2025-03-22 NOTE — ED NOTES
ED TO INPATIENT SBAR HANDOFF    Patient Name: Morro Kevin   Preferred Name: Dez  : 1950  74 y.o.   Family/Caregiver Present: no   Code Status Order: Full Code  PO Status: NPO:No  Telemetry Order:   C-SSRS: Risk of Suicide: No Risk  Sitter no   Restraints:     Sepsis Risk Score      Situation  Chief Complaint   Patient presents with    Abnormal Lab     Pt to ED with CC of low hemoglobin of 6.2.     Brief Description of Patient's Condition: Chief Complaint of low hemoglobin.  Patient stated was notified by his primary care physician office when his hemoglobin was 6.2.  He states that this has happened several times over the last year.  And it was low.  He said he has been little constipated he denies any black tarry stools.  He has been trying MiraLAX and other stool softener and drinking plenty of water.  Denies abdominal pain, no chest pain, does have some shortness of breath.  Denies lightheaded or dizziness.  No other complaints.  He does have a history of diabetes and hypertension.  He is on warfarin.   Mental Status: oriented, alert, coherent, logical, thought processes intact, and able to concentrate and follow conversation  Arrived from:Home  Imaging:   No orders to display     Abnormal labs:   Abnormal Labs Reviewed   CBC WITH AUTO DIFFERENTIAL - Abnormal; Notable for the following components:       Result Value    RBC 1.96 (*)     Hemoglobin 6.5 (*)     Hematocrit 19.5 (*)     RDW 21.4 (*)     Lymphocytes Absolute 0.8 (*)     All other components within normal limits    Narrative:     CALL  [facility] tel. [phone],  Hematology results called to and read back by Yashira RANDALL RN, 2025  15:55, by APOLLO   COMPREHENSIVE METABOLIC PANEL W/ REFLEX TO MG FOR LOW K - Abnormal; Notable for the following components:    Glucose 149 (*)     BUN 36 (*)     Creatinine 2.5 (*)     Est, Glom Filt Rate 27 (*)     Total Protein 6.1 (*)     All other components within normal limits   BLOOD OCCULT STOOL  disintegrating tablet 4 mg (has no administration in time range)     Or   ondansetron (ZOFRAN) injection 4 mg (has no administration in time range)   acetaminophen (TYLENOL) tablet 650 mg (has no administration in time range)     Or   acetaminophen (TYLENOL) suppository 650 mg (has no administration in time range)   pantoprazole (PROTONIX) 40 mg in sodium chloride (PF) 0.9 % 10 mL injection (has no administration in time range)   0.9 % sodium chloride infusion (has no administration in time range)     Last documented pain medication administration: n/a  Pertinent or High Risk Medications/Drips: no   If Yes, please provide details: n/a  Blood Product Administration: yes  If Yes, please provide details: Pt received 2 units  Process Protocols/Bundles: N/A    Recommendation  Incomplete STAT orders: n/a  Overdue Medications: n/a  Patient Belongings:    Additional Comments: n/a  If any further questions, please call Sending RN at [phone]      Admitting Unit Notification  Name of person notified and time: n/a      Electronically signed by: Electronically signed by Maryann Stevens RN on 3/22/2025 at 4:55 AM

## 2025-03-22 NOTE — ED NOTES
Call received from blood bank, patient blood requires extra screening and possibly coming from [facility]. RN will be notified by blood bank when available, may be a few hours.

## 2025-03-22 NOTE — H&P
V2.0  History and Physical      Name:  Morro Kevin /Age/Sex: 1950  (74 y.o. male)   MRN & CSN:  [id number] & [id number] Encounter Date/Time: 3/22/2025 4:50 AM EDT   Location:  [address] [place] [address] PCP: Lavell Ovalles APRN - CNP       Hospital Day: 2    Assessment and Plan:   Morro Kevin is a 74 y.o. male with a pmh of aortic valve stenosis status post replacement on Coumadin, chronic anemia, chronic kidney disease stage IV, hypothyroidism, prostate cancer mets to lymph node, diabetes mellitus type 2 who presents with Acute on chronic anemia    Hospital Problems           Last Modified POA    * (Principal) Acute on chronic anemia 3/22/2025 Yes       Plan:  #Acute on chronic blood loss anemia  Patient is symptomatic with shortness of breath.  Patient states his baseline hemoglobin ranges from 8.5-9.  8.9 and 26-> 2025 H&H 14.1/44, 2024 H&H 13.6/43.2   - Patient on Coumadin and occult blood is positive  - Will hold Coumadin currently  - Initial hemoglobin 6.5 after 1 unit PRBC transfusion remained 6.5, hematocrit 19.4, platelet 187  - Occult blood positive  - Cannot obtain CTA of abdomen due to CKD stage IV  - Will transfuse with 2 unit PRBC and repeat H&H every 6 hours  - Will provide IV Protonix twice daily  - Will consult to GI specialist    #Subtherapeutic INR  #Aortic valve stenosis status post replaced  - On Coumadin, PTT 22.7, INR 2  - Will hold Coumadin due to anemia    #CKD stage IV  - BUN 36, creatinine 2.5 at his baseline  Patient stated  he was diagnosed with prostate cancer was on chemotherapy, last year noted to mets to lymph node he received aggressive chemotherapy he suffered ATN was on hemodialysis for 6-month discontinued 2025 and his kidney has been recovering.    #Hypothyroidism continue home Synthyroid    DVT Prophylaxis: SCD due to anemia  Diet: Clear liquid diet    Advance care planning/Goal of care  Advance care planning the patient for total of 16  03/21/2025 04:26 PM    BLOODU Negative 03/21/2025 04:26 PM    GLUCOSEU >=1000 03/21/2025 04:26 PM    GLUCOSEU NEGATIVE 12/09/2011 07:17 AM    KETUA Negative 03/21/2025 04:26 PM     Urine Cultures:   Lab Results   Component Value Date/Time    LABURIN  08/01/2024 04:44 PM     <50,000 CFU/ml mixed skin/urogenital elie. No further workup     Blood Cultures: No results found for: \"BC\"  No results found for: \"BLOODCULT2\"  Organism: No results found for: \"ORG\"    Imaging/Diagnostics Last 24 Hours   No results found.      Electronically signed by Donna Thorpe MD on 3/22/2025 at 4:50 AM

## 2025-03-22 NOTE — FLOWSHEET NOTE
03/22/25 0048   Treatment Team Notification   Reason for Communication Critical results   Type of Critical Result Laboratory   Critical Lab Information Hemoglobin 6.5; Hematocrit 19.4   Critical Lab Result Type Hemoglobin and hematocrit   Name of Team Member Notified Constance [place]   Treatment Team Role Physician Assistant   Method of Communication Face to face   Response See orders

## 2025-03-22 NOTE — ED NOTES
Patient was observed for 15 minutes with no side effects or reactions noted. Rate was increased to 175mL/hr   Detail Level: Zone

## 2025-03-22 NOTE — PROGRESS NOTES
Patient mentioned that he has Diabetes and nobody was doing blood sugar checks. Notified the Attending; Received a Telephone order with read back for Accuchec. Order place; will continue to monitor.

## 2025-03-24 ENCOUNTER — ANTI-COAG VISIT (OUTPATIENT)
Dept: PHARMACY | Age: 75
End: 2025-03-24
Payer: MEDICARE

## 2025-03-24 DIAGNOSIS — Z95.2 HISTORY OF AORTIC VALVE REPLACEMENT: Primary | ICD-10-CM

## 2025-03-24 DIAGNOSIS — D64.9 ACUTE ON CHRONIC ANEMIA: ICD-10-CM

## 2025-03-24 LAB
HGB BLD-MCNC: 8.9 G/DL (ref 13.5–17.5)
INTERNATIONAL NORMALIZATION RATIO, POC: 1.5
PROTHROMBIN TIME, POC: 0

## 2025-03-24 PROCEDURE — 85610 PROTHROMBIN TIME: CPT

## 2025-03-24 PROCEDURE — 99211 OFF/OP EST MAY X REQ PHY/QHP: CPT

## 2025-03-24 NOTE — PROGRESS NOTES
Morro Kevin is a 74 y.o. here for warfarin management.  Morro had an INR test today. Results were reviewed and appropriate warfarin management was completed.     Patient verifies current warfarin dosing regimen: Yes     Warfarin medication reviewed and updated on the patient 's home medication list: Yes   All other medications reviewed and updated on the patient 's home medication list: No new medications     Lab Results   Component Value Date    INR 1.5 2025    INR 2.00 (H) 2025    INR 2.61 (H) 2025       Anticoagulation Summary  As of 3/24/2025      INR goal:  2.0-3.0   TTR:  69.4% (8.2 y)   INR used for dosin.5 (3/24/2025)   Warfarin maintenance plan:  11.25 mg (7.5 mg x 1.5) every Wed; 7.5 mg (7.5 mg x 1) all other days   Weekly warfarin total:  56.25 mg   Plan last modified:  Lindsey Varghese RPH (7/3/2024)   Next INR check:  2025   Priority:  Maintenance   Target end date:  Indefinite    Indications    History of aortic valve replacement [Z95.2]                 Anticoagulation Episode Summary       INR check location:  --    Preferred lab:  --    Send INR reminders to:  [facility]    Comments:  EPIC          Anticoagulation Care Providers       Provider Role Specialty Phone number    Ton Ray MD Referring Cardiovascular Disease [phone]          There were no vitals taken for this visit.    Warfarin assessment / plan:     Appears well  Sub-therapeutic INR     Denies increased vitamin K intake.  Denies medication changes.  Denies signs or symptoms of clotting.  Denies signs or symptoms of a stroke     Missed Warfarin dose(s) on the following dates: 3-14-25.  Subtherapeutic INR today = 1.5.  ER => hospital admission on 3-21- thru 3-22-25.  Missed warfarin dose on 3-21-25.  Decreased H/H no bleed.     Take warfarin 11.25 mg on 3-24-25.   Continue Warfarin 7.5 mg (1 tablet) daily except 11.25 mg on      Next INR in two weeks.

## 2025-03-26 ENCOUNTER — TELEPHONE (OUTPATIENT)
Dept: FAMILY MEDICINE CLINIC | Age: 75
End: 2025-03-26

## 2025-03-26 NOTE — TELEPHONE ENCOUNTER
Care Transitions Initial Follow Up Call    Outreach made within 2 business days of discharge: Yes    Patient: Morro Kevin Patient : 1950   MRN: [id number]  Reason for Admission: SENT BY US FOR LOW HEMOGLOBIN   Discharge Date: 3/22/25       Spoke with: SHEILA     Discharge department/facility: [facility] Interactive Patient Contact:  Was patient able to fill all prescriptions: Yes  Was patient instructed to bring all medications to the follow-up visit: Yes  Is patient taking all medications as directed in the discharge summary? Yes  Does patient understand their discharge instructions: Yes  Does patient have questions or concerns that need addressed prior to 7-14 day follow up office visit: no    Additional needs identified to be addressed with provider  No needs identified             Scheduled appointment with PCP within 7-14 days    Follow Up  Future Appointments   Date Time Provider Department Center   2025 10:40 AM [place] [facility]   [facility]   2025  9:00 AM Ton Ray MD [facility]   WE SENT PT HE JUST DID A FOLLOW UP AND IS NOW IN WITH HEMATOLOGY AND WILL FOLLOW UP HERE LATER IF NEED BE.DARIO Barnett MA

## 2025-04-07 ENCOUNTER — ANTI-COAG VISIT (OUTPATIENT)
Dept: PHARMACY | Age: 75
End: 2025-04-07
Payer: MEDICARE

## 2025-04-07 DIAGNOSIS — Z95.2 HISTORY OF AORTIC VALVE REPLACEMENT: Primary | ICD-10-CM

## 2025-04-07 LAB
INTERNATIONAL NORMALIZATION RATIO, POC: 2.7
PROTHROMBIN TIME, POC: 0

## 2025-04-07 PROCEDURE — 85610 PROTHROMBIN TIME: CPT

## 2025-04-07 PROCEDURE — 99211 OFF/OP EST MAY X REQ PHY/QHP: CPT

## 2025-04-07 NOTE — PROGRESS NOTES
(Shingrix) 10/20/2020, 01/22/2021       Orders Placed This Encounter   Procedures    POCT INR     This order was created through the anticoagulation tracking navigator section.      No orders of the defined types were placed in this encounter.     Reviewed AVS with patient / caregiver.    Billing Points:  0 billing points this visit     For Pharmacy Admin Tracking Only    Intervention Detail:   Total # of Interventions Recommended: 0  Total # of Interventions Accepted: 0  Time Spent (min): 20

## 2025-05-05 ENCOUNTER — ANTI-COAG VISIT (OUTPATIENT)
Dept: PHARMACY | Age: 75
End: 2025-05-05
Payer: MEDICARE

## 2025-05-05 DIAGNOSIS — Z95.2 HISTORY OF AORTIC VALVE REPLACEMENT: Primary | ICD-10-CM

## 2025-05-05 LAB
INTERNATIONAL NORMALIZATION RATIO, POC: 3.2
PROTHROMBIN TIME, POC: 0

## 2025-05-05 PROCEDURE — 99211 OFF/OP EST MAY X REQ PHY/QHP: CPT

## 2025-05-05 PROCEDURE — 85610 PROTHROMBIN TIME: CPT

## 2025-05-05 NOTE — PROGRESS NOTES
Placed This Encounter   Procedures    POCT INR     This order was created through the anticoagulation tracking navigator section.      No orders of the defined types were placed in this encounter.     Reviewed AVS with patient / caregiver.    Billing Points:  Adjust dosage and/or reconcile meds (fill pill box) </= 5 medications - 2 points     For Pharmacy Admin Tracking Only    Intervention Detail: Dose Adjustment: 1, reason: Therapy De-escalation  Total # of Interventions Recommended: 1  Total # of Interventions Accepted: 1  Time Spent (min): 20

## 2025-06-02 ENCOUNTER — ANTI-COAG VISIT (OUTPATIENT)
Dept: PHARMACY | Age: 75
End: 2025-06-02
Payer: MEDICARE

## 2025-06-02 DIAGNOSIS — Z95.2 HISTORY OF AORTIC VALVE REPLACEMENT: Primary | ICD-10-CM

## 2025-06-02 LAB
INTERNATIONAL NORMALIZATION RATIO, POC: 4.8
PROTHROMBIN TIME, POC: 0

## 2025-06-02 PROCEDURE — 85610 PROTHROMBIN TIME: CPT

## 2025-06-02 PROCEDURE — 99211 OFF/OP EST MAY X REQ PHY/QHP: CPT

## 2025-06-02 NOTE — PROGRESS NOTES
Morro Kevin is a 74 y.o. here for warfarin management.  Morro had an INR test today. Results were reviewed and appropriate warfarin management was completed.     Patient verifies current warfarin dosing regimen: Yes     Warfarin medication reviewed and updated on the patient 's home medication list: Yes   All other medications reviewed and updated on the patient 's home medication list: Yes     Lab Results   Component Value Date    INR 4.8 2025    INR 3.2 2025    INR 2.7 2025       Anticoagulation Summary  As of 2025      INR goal:  2.0-3.0   TTR:  68.6% (8.4 y)   INR used for dosin.8 (2025)   Warfarin maintenance plan:  11.25 mg (7.5 mg x 1.5) every Wed; 7.5 mg (7.5 mg x 1) all other days   Weekly warfarin total:  56.25 mg   Plan last modified:  Lindsey Varghese [facility] (7/3/2024)   Next INR check:  2025   Priority:  Maintenance   Target end date:  Indefinite    Indications    History of aortic valve replacement [Z95.2]                 Anticoagulation Episode Summary       INR check location:  --    Preferred lab:  --    Send INR reminders to:  [facility]AFF    Comments:  EPIC          Anticoagulation Care Providers       Provider Role Specialty Phone number    Ton Ray MD Referring Cardiovascular Disease [phone]          There were no vitals taken for this visit.    Warfarin assessment / plan:     Appears well  Supra-therapeutic INR.     Denies signs and symptoms of bleeding/bruising.  Denies medication changes.  Denies extra warfarin doses.  Denies increased alcohol intake.  Denies change in appetite.  Denies decrease in vitamin K intake.  Denies fluid retention.     See in 2 weeks    Description    Hold for 2 days then  Continue Warfarin 7.5 mg (1 tablet) daily except 11.25 mg on      Call [phone] with signs or symptoms of bleeding or ANY medication changes (including over-the-counter medications or herbal supplements).

## 2025-06-16 ENCOUNTER — ANTI-COAG VISIT (OUTPATIENT)
Dept: PHARMACY | Age: 75
End: 2025-06-16
Payer: MEDICARE

## 2025-06-16 DIAGNOSIS — Z95.2 HISTORY OF AORTIC VALVE REPLACEMENT: Primary | ICD-10-CM

## 2025-06-16 LAB
INR BLD: 4.1
PROTIME: NORMAL

## 2025-06-16 PROCEDURE — 85610 PROTHROMBIN TIME: CPT | Performed by: PHARMACIST

## 2025-06-16 PROCEDURE — 99212 OFFICE O/P EST SF 10 MIN: CPT | Performed by: PHARMACIST

## 2025-06-16 NOTE — PROGRESS NOTES
Morro Kevin is a 74 y.o. here for warfarin management.  Morro had an INR test today. Results were reviewed and appropriate warfarin management was completed.     Patient verifies current warfarin dosing regimen: Yes     Warfarin medication reviewed and updated on the patient 's home medication list: Yes   All other medications reviewed and updated on the patient 's home medication list: No new medications     Lab Results   Component Value Date    INR 4.10 2025    INR 4.8 2025    INR 3.2 2025     Patient Findings       Negatives:  Signs/symptoms of thrombosis, Signs/symptoms of bleeding, Missed doses, Change in medications, Change in diet/appetite, Bruising          Anticoagulation Summary  As of 2025      INR goal:  2.0-3.0   TTR:  68.3% (8.4 y)   INR used for dosin.10 (2025)   Warfarin maintenance plan:  7.5 mg (7.5 mg x 1) every day   Weekly warfarin total:  52.5 mg   Plan last modified:  Selma Morales (2025)   Next INR check:  2025   Priority:  Maintenance   Target end date:  Indefinite    Indications    History of aortic valve replacement [Z95.2]                 Anticoagulation Episode Summary       INR check location:  --    Preferred lab:  --    Send INR reminders to:  [facility]    Comments:  EPIC  Consents signed 24          Anticoagulation Care Providers       Provider Role Specialty Phone number    Ton Ray MD Referring Cardiovascular Disease [phone]          There were no vitals taken for this visit.    Warfarin assessment / plan:     Appears well  Supra-therapeutic INR.     Denies signs and symptoms of bleeding/bruising.  Denies medication changes.  Denies change in appetite.     INR above goal range today= 4.1     FYI He is having hemoglobin checked q2 weeks and says most recently it was ~13.    Instructed him to hold today's dose of warfarin and decreased his weekly dose by ~7% (3.75). New dose: 7.5 mg

## 2025-06-30 ENCOUNTER — ANTI-COAG VISIT (OUTPATIENT)
Dept: PHARMACY | Age: 75
End: 2025-06-30
Payer: MEDICARE

## 2025-06-30 DIAGNOSIS — Z95.2 HISTORY OF AORTIC VALVE REPLACEMENT: Primary | ICD-10-CM

## 2025-06-30 LAB
INTERNATIONAL NORMALIZATION RATIO, POC: 6.5
PROTHROMBIN TIME, POC: 0

## 2025-06-30 PROCEDURE — 99211 OFF/OP EST MAY X REQ PHY/QHP: CPT

## 2025-06-30 PROCEDURE — 85610 PROTHROMBIN TIME: CPT

## 2025-06-30 NOTE — PROGRESS NOTES
Morro Kevin is a 74 y.o. here for warfarin management.  Morro had an INR test today. Results were reviewed and appropriate warfarin management was completed.     Patient verifies current warfarin dosing regimen: Yes     Warfarin medication reviewed and updated on the patient 's home medication list: Yes   All other medications reviewed and updated on the patient 's home medication list: Yes     Lab Results   Component Value Date    INR 6.5 2025    INR 4.10 2025    INR 4.8 2025       Anticoagulation Summary  As of 2025      INR goal:  2.0-3.0   TTR:  68.0% (8.5 y)   INR used for dosin.5 (2025)   Warfarin maintenance plan:  7.5 mg (7.5 mg x 1) every day   Weekly warfarin total:  52.5 mg   Plan last modified:  Selma Morales (2025)   Next INR check:  2025   Priority:  Maintenance   Target end date:  Indefinite    Indications    History of aortic valve replacement [Z95.2]                 Anticoagulation Episode Summary       INR check location:  --    Preferred lab:  --    Send INR reminders to:  [facility]    Comments:  EPIC  Consents signed 24          Anticoagulation Care Providers       Provider Role Specialty Phone number    Ton Ray MD Referring Cardiovascular Disease [phone]          There were no vitals taken for this visit.    Warfarin assessment / plan:     Appears well  Supra-therapeutic INR.     Denies signs and symptoms of bleeding/bruising.  Denies medication changes.  Denies extra warfarin doses.  Denies increased alcohol intake.  Denies change in appetite.  Denies illness, fever, vomiting or diarrhea.  Denies decrease in vitamin K intake.  Denies fluid retention.     Perplexed why INR still supra. Will restart with 1/2 dosing and see in 1 week.    Description    Hold for 3 days then   Take 1/2 tab( 3.75mg) daily and recheck next Monday     Call [phone] with signs or symptoms of bleeding or ANY

## 2025-07-07 ENCOUNTER — ANTI-COAG VISIT (OUTPATIENT)
Dept: PHARMACY | Age: 75
End: 2025-07-07
Payer: MEDICARE

## 2025-07-07 DIAGNOSIS — Z95.2 HISTORY OF AORTIC VALVE REPLACEMENT: Primary | ICD-10-CM

## 2025-07-07 LAB
INTERNATIONAL NORMALIZATION RATIO, POC: 1.2
PROTHROMBIN TIME, POC: 0

## 2025-07-07 PROCEDURE — 99211 OFF/OP EST MAY X REQ PHY/QHP: CPT

## 2025-07-07 PROCEDURE — 85610 PROTHROMBIN TIME: CPT

## 2025-07-07 NOTE — PROGRESS NOTES
three day hold and a 50% reduction in dose.  INR had been creeping upward since May.  He has stage 4 chronic kidney diease, not on dialysis.  MD feels his numbers are improving.  BS have been fluctuating and on the high side.  Has begun Ozempic which has a questionable interaction with warfarin. Although he may be more sensitive to this interaction. Began Ozempic on 2-20-25.    Ozempic/warfarin interaction:   [Semaglutide may increase absorption of Warfarin. Semaglutide may decrease absorption of Warfarin.   These changes are not considered clinically significant   Severity Minor Reliability Rating Intermediate]     He has been spending extensive time in the sun/heat which can also increase the INR.     Changed dose for this week as follows:   Take warfarin 7.5 mg on 7-7, 7-8, 7-9, 7-11 and 7-13-25  Take warfarin 3.75 mg on 7-10 and 7-12-25  This is a decrease of 11.25 mg for this week.    Next INR in one week.     Description    Take warfarin 7.5 mg on 7-7, 7-8, 7-9, 7-11 and 7-13-25  Take warfarin 3.75 mg on 7-10 and 7-12-25    Call [phone] with signs or symptoms of bleeding or ANY medication changes (including over-the-counter medications or herbal supplements).     Especially if you begin oral steroids and/or antibiotics.    If significant bleeding occurs please seek immediate medical attention.    Keep the number of servings and portion size of vitamin K containing foods (dark green, leafy vegetables) the same each week. Vegetables high in vitamin K : broccoli, spinach, leaf lettuce, krystle lettuce, kale, collards, asparagus, brussel sprouts.  Please call if you routine diet changes.     Limit alcohol intake. Please call if this changes.     Please arrive 15 minutes prior to your appointment.    Allow 3 business days for warfarin refills.       Immunization History   Administered Date(s) Administered    COVID-19, MODERNA BLUE border, Primary or Immunocompromised, (age 12y+), IM, 100 mcg/0.5mL 02/18/2021,

## 2025-07-14 ENCOUNTER — ANTI-COAG VISIT (OUTPATIENT)
Dept: PHARMACY | Age: 75
End: 2025-07-14
Payer: MEDICARE

## 2025-07-14 DIAGNOSIS — Z95.2 HISTORY OF AORTIC VALVE REPLACEMENT: Primary | ICD-10-CM

## 2025-07-14 LAB
INTERNATIONAL NORMALIZATION RATIO, POC: 2.1
PROTHROMBIN TIME, POC: 0

## 2025-07-14 PROCEDURE — 85610 PROTHROMBIN TIME: CPT

## 2025-07-14 PROCEDURE — 99211 OFF/OP EST MAY X REQ PHY/QHP: CPT

## 2025-07-14 NOTE — PROGRESS NOTES
65 y+), IM, Trivalent PF, 0.5mL 10/11/2018, 10/08/2021, 2024    Pneumococcal, PCV-13, PREVNAR 13, (age 6w+), IM, 0.5mL 10/11/2018    Pneumococcal, PPSV23, PNEUMOVAX 23, (age 2y+), SC/IM, 0.5mL 10/04/2012, 2020    RSV, AREXVY, (age 60y+), PF, IM, 0.5mL 11/15/2023    TDaP, ADACEL (age 10y-64y), BOOSTRIX (age 10y+), IM, 0.5mL 2015    Zoster Live (Zostavax) 2015    Zoster Recombinant (Shingrix) 10/20/2020, 2021       Orders Placed This Encounter   Procedures    POCT INR     This order was created through the anticoagulation tracking navigator section.      No orders of the defined types were placed in this encounter.     Reviewed AVS with patient / caregiver.    Billing Points:  Basic Education (disease state, med overview, expected symptoms) - 1 point      For Pharmacy Admin Tracking Only    Intervention Detail: Adherence Monitorin and Dose Adjustment: 1, reason: Therapy Optimization  Total # of Interventions Recommended: 1  Total # of Interventions Accepted: 2  Time Spent (min): 20

## 2025-08-04 ENCOUNTER — ANTI-COAG VISIT (OUTPATIENT)
Dept: PHARMACY | Age: 75
End: 2025-08-04
Payer: MEDICARE

## 2025-08-04 DIAGNOSIS — Z95.2 HISTORY OF AORTIC VALVE REPLACEMENT: Primary | ICD-10-CM

## 2025-08-04 LAB
INTERNATIONAL NORMALIZATION RATIO, POC: 3
PROTHROMBIN TIME, POC: 0

## 2025-08-04 PROCEDURE — 85610 PROTHROMBIN TIME: CPT

## 2025-08-04 PROCEDURE — 99211 OFF/OP EST MAY X REQ PHY/QHP: CPT

## 2025-08-22 RX ORDER — MIRTAZAPINE 30 MG/1
30 TABLET, FILM COATED ORAL NIGHTLY
Qty: 90 TABLET | Refills: 0 | Status: SHIPPED | OUTPATIENT
Start: 2025-08-22

## 2025-09-03 ENCOUNTER — ANTI-COAG VISIT (OUTPATIENT)
Dept: PHARMACY | Age: 75
End: 2025-09-03
Payer: MEDICARE

## 2025-09-03 DIAGNOSIS — Z95.2 HISTORY OF AORTIC VALVE REPLACEMENT: Primary | ICD-10-CM

## 2025-09-03 LAB
INTERNATIONAL NORMALIZATION RATIO, POC: 3.5
PROTHROMBIN TIME, POC: NORMAL

## 2025-09-03 PROCEDURE — 85610 PROTHROMBIN TIME: CPT | Performed by: PHARMACIST

## 2025-09-03 PROCEDURE — 99212 OFFICE O/P EST SF 10 MIN: CPT | Performed by: PHARMACIST

## (undated) DEVICE — ENDOSCOPY KIT: Brand: MEDLINE INDUSTRIES, INC.

## (undated) DEVICE — BITE BLOCK ENDOSCP AD 60 FR W/ ADJ STRP PLAS GRN BLOX

## (undated) DEVICE — ELECTRODE PT RET AD L9FT HI MOIST COND ADH HYDRGEL CORDED

## (undated) DEVICE — DEVICE DEL L180CM SHTH DIA2.5MM FOR 23.5-26.5MM VID CAP

## (undated) DEVICE — FIAPC® PROBE W/ FILTER 2200 A OD 2.3MM/6.9FR; L 2.2M/7.2FT: Brand: ERBE

## (undated) DEVICE — REPLAY HEMOSTASIS CLIP, 16MM SPAN: Brand: REPLAY